# Patient Record
Sex: FEMALE | Race: WHITE | NOT HISPANIC OR LATINO | Employment: FULL TIME | URBAN - NONMETROPOLITAN AREA
[De-identification: names, ages, dates, MRNs, and addresses within clinical notes are randomized per-mention and may not be internally consistent; named-entity substitution may affect disease eponyms.]

---

## 2019-12-02 ENCOUNTER — HOSPITAL ENCOUNTER (INPATIENT)
Facility: HOSPITAL | Age: 44
LOS: 3 days | Discharge: HOME | DRG: 603 | End: 2019-12-05
Attending: EMERGENCY MEDICINE | Admitting: FAMILY MEDICINE

## 2019-12-02 ENCOUNTER — APPOINTMENT (EMERGENCY)
Dept: RADIOLOGY | Facility: HOSPITAL | Age: 44
DRG: 603 | End: 2019-12-02

## 2019-12-02 DIAGNOSIS — Z79.4 DIABETES MELLITUS TYPE 2, INSULIN DEPENDENT (HCC): ICD-10-CM

## 2019-12-02 DIAGNOSIS — S91.309A OPEN WOUND OF FOOT: Primary | ICD-10-CM

## 2019-12-02 DIAGNOSIS — R73.9 HYPERGLYCEMIA: ICD-10-CM

## 2019-12-02 DIAGNOSIS — R07.9 CHEST PAIN: ICD-10-CM

## 2019-12-02 DIAGNOSIS — R06.02 SHORTNESS OF BREATH: ICD-10-CM

## 2019-12-02 DIAGNOSIS — R10.9 ABDOMINAL PAIN, UNSPECIFIED ABDOMINAL LOCATION: ICD-10-CM

## 2019-12-02 DIAGNOSIS — E11.9 DIABETES MELLITUS TYPE 2, INSULIN DEPENDENT (HCC): ICD-10-CM

## 2019-12-02 PROBLEM — S91.302A OPEN WOUND OF LEFT FOOT: Status: ACTIVE | Noted: 2019-12-02

## 2019-12-02 LAB
ALBUMIN SERPL BCP-MCNC: 2.4 G/DL (ref 3.5–5)
ALP SERPL-CCNC: 109 U/L (ref 46–116)
ALT SERPL W P-5'-P-CCNC: 16 U/L (ref 12–78)
ANION GAP SERPL CALCULATED.3IONS-SCNC: 9 MMOL/L (ref 4–13)
AST SERPL W P-5'-P-CCNC: 12 U/L (ref 5–45)
BACTERIA UR QL AUTO: ABNORMAL /HPF
BASE EX.OXY STD BLDV CALC-SCNC: 68.9 % (ref 60–80)
BASE EXCESS BLDV CALC-SCNC: 0.2 MMOL/L
BASOPHILS # BLD AUTO: 0.02 THOUSANDS/ΜL (ref 0–0.1)
BASOPHILS NFR BLD AUTO: 0 % (ref 0–1)
BILIRUB DIRECT SERPL-MCNC: 0.17 MG/DL (ref 0–0.2)
BILIRUB SERPL-MCNC: 0.39 MG/DL (ref 0.2–1)
BILIRUB UR QL STRIP: NEGATIVE
BUN SERPL-MCNC: 15 MG/DL (ref 5–25)
CALCIUM SERPL-MCNC: 8.8 MG/DL (ref 8.3–10.1)
CHLORIDE SERPL-SCNC: 95 MMOL/L (ref 100–108)
CLARITY UR: ABNORMAL
CO2 SERPL-SCNC: 25 MMOL/L (ref 21–32)
COLOR UR: YELLOW
CREAT SERPL-MCNC: 1.14 MG/DL (ref 0.6–1.3)
CRP SERPL QL: 175.3 MG/L
EOSINOPHIL # BLD AUTO: 0.11 THOUSAND/ΜL (ref 0–0.61)
EOSINOPHIL NFR BLD AUTO: 2 % (ref 0–6)
ERYTHROCYTE [DISTWIDTH] IN BLOOD BY AUTOMATED COUNT: 13.2 % (ref 11.6–15.1)
ERYTHROCYTE [SEDIMENTATION RATE] IN BLOOD: 95 MM/HOUR (ref 0–20)
GFR SERPL CREATININE-BSD FRML MDRD: 58 ML/MIN/1.73SQ M
GLUCOSE SERPL-MCNC: 313 MG/DL (ref 65–140)
GLUCOSE SERPL-MCNC: 445 MG/DL (ref 65–140)
GLUCOSE SERPL-MCNC: 455 MG/DL (ref 65–140)
GLUCOSE SERPL-MCNC: 479 MG/DL (ref 65–140)
GLUCOSE UR STRIP-MCNC: ABNORMAL MG/DL
HCG SERPL QL: NEGATIVE
HCO3 BLDV-SCNC: 23.6 MMOL/L (ref 24–30)
HCT VFR BLD AUTO: 32.2 % (ref 34.8–46.1)
HGB BLD-MCNC: 10.8 G/DL (ref 11.5–15.4)
HGB UR QL STRIP.AUTO: ABNORMAL
IMM GRANULOCYTES # BLD AUTO: 0.07 THOUSAND/UL (ref 0–0.2)
IMM GRANULOCYTES NFR BLD AUTO: 1 % (ref 0–2)
KETONES UR STRIP-MCNC: ABNORMAL MG/DL
LACTATE SERPL-SCNC: 1.2 MMOL/L (ref 0.5–2)
LEUKOCYTE ESTERASE UR QL STRIP: ABNORMAL
LIPASE SERPL-CCNC: 81 U/L (ref 73–393)
LYMPHOCYTES # BLD AUTO: 1.36 THOUSANDS/ΜL (ref 0.6–4.47)
LYMPHOCYTES NFR BLD AUTO: 18 % (ref 14–44)
MCH RBC QN AUTO: 26.8 PG (ref 26.8–34.3)
MCHC RBC AUTO-ENTMCNC: 33.5 G/DL (ref 31.4–37.4)
MCV RBC AUTO: 80 FL (ref 82–98)
MONOCYTES # BLD AUTO: 0.55 THOUSAND/ΜL (ref 0.17–1.22)
MONOCYTES NFR BLD AUTO: 7 % (ref 4–12)
NEUTROPHILS # BLD AUTO: 5.35 THOUSANDS/ΜL (ref 1.85–7.62)
NEUTS SEG NFR BLD AUTO: 72 % (ref 43–75)
NITRITE UR QL STRIP: NEGATIVE
NON-SQ EPI CELLS URNS QL MICRO: ABNORMAL /HPF
NRBC BLD AUTO-RTO: 0 /100 WBCS
NT-PROBNP SERPL-MCNC: 185 PG/ML
O2 CT BLDV-SCNC: 11.2 ML/DL
PCO2 BLDV: 33.9 MM HG (ref 42–50)
PH BLDV: 7.46 [PH] (ref 7.3–7.4)
PH UR STRIP.AUTO: 6.5 [PH]
PLATELET # BLD AUTO: 247 THOUSANDS/UL (ref 149–390)
PMV BLD AUTO: 12 FL (ref 8.9–12.7)
PO2 BLDV: 33.9 MM HG (ref 35–45)
POTASSIUM SERPL-SCNC: 3.8 MMOL/L (ref 3.5–5.3)
PROT SERPL-MCNC: 7.6 G/DL (ref 6.4–8.2)
PROT UR STRIP-MCNC: ABNORMAL MG/DL
RBC # BLD AUTO: 4.03 MILLION/UL (ref 3.81–5.12)
RBC #/AREA URNS AUTO: ABNORMAL /HPF
SODIUM SERPL-SCNC: 129 MMOL/L (ref 136–145)
SP GR UR STRIP.AUTO: 1.01 (ref 1–1.03)
TROPONIN I SERPL-MCNC: <0.02 NG/ML
URINE COMMENT: ABNORMAL
UROBILINOGEN UR QL STRIP.AUTO: 0.2 E.U./DL
WBC # BLD AUTO: 7.46 THOUSAND/UL (ref 4.31–10.16)
WBC #/AREA URNS AUTO: ABNORMAL /HPF

## 2019-12-02 PROCEDURE — 82948 REAGENT STRIP/BLOOD GLUCOSE: CPT

## 2019-12-02 PROCEDURE — 80076 HEPATIC FUNCTION PANEL: CPT | Performed by: EMERGENCY MEDICINE

## 2019-12-02 PROCEDURE — 87205 SMEAR GRAM STAIN: CPT | Performed by: EMERGENCY MEDICINE

## 2019-12-02 PROCEDURE — 85025 COMPLETE CBC W/AUTO DIFF WBC: CPT | Performed by: EMERGENCY MEDICINE

## 2019-12-02 PROCEDURE — 96360 HYDRATION IV INFUSION INIT: CPT

## 2019-12-02 PROCEDURE — 81001 URINALYSIS AUTO W/SCOPE: CPT | Performed by: EMERGENCY MEDICINE

## 2019-12-02 PROCEDURE — 87147 CULTURE TYPE IMMUNOLOGIC: CPT | Performed by: EMERGENCY MEDICINE

## 2019-12-02 PROCEDURE — 85652 RBC SED RATE AUTOMATED: CPT | Performed by: EMERGENCY MEDICINE

## 2019-12-02 PROCEDURE — 73630 X-RAY EXAM OF FOOT: CPT

## 2019-12-02 PROCEDURE — 82805 BLOOD GASES W/O2 SATURATION: CPT | Performed by: EMERGENCY MEDICINE

## 2019-12-02 PROCEDURE — 80048 BASIC METABOLIC PNL TOTAL CA: CPT | Performed by: EMERGENCY MEDICINE

## 2019-12-02 PROCEDURE — 83880 ASSAY OF NATRIURETIC PEPTIDE: CPT | Performed by: EMERGENCY MEDICINE

## 2019-12-02 PROCEDURE — 87070 CULTURE OTHR SPECIMN AEROBIC: CPT | Performed by: EMERGENCY MEDICINE

## 2019-12-02 PROCEDURE — 93005 ELECTROCARDIOGRAM TRACING: CPT

## 2019-12-02 PROCEDURE — 96361 HYDRATE IV INFUSION ADD-ON: CPT

## 2019-12-02 PROCEDURE — 99285 EMERGENCY DEPT VISIT HI MDM: CPT | Performed by: EMERGENCY MEDICINE

## 2019-12-02 PROCEDURE — 86140 C-REACTIVE PROTEIN: CPT | Performed by: EMERGENCY MEDICINE

## 2019-12-02 PROCEDURE — 99285 EMERGENCY DEPT VISIT HI MDM: CPT

## 2019-12-02 PROCEDURE — 83690 ASSAY OF LIPASE: CPT | Performed by: EMERGENCY MEDICINE

## 2019-12-02 PROCEDURE — 36415 COLL VENOUS BLD VENIPUNCTURE: CPT | Performed by: EMERGENCY MEDICINE

## 2019-12-02 PROCEDURE — 71046 X-RAY EXAM CHEST 2 VIEWS: CPT

## 2019-12-02 PROCEDURE — 84703 CHORIONIC GONADOTROPIN ASSAY: CPT | Performed by: EMERGENCY MEDICINE

## 2019-12-02 PROCEDURE — 87040 BLOOD CULTURE FOR BACTERIA: CPT | Performed by: EMERGENCY MEDICINE

## 2019-12-02 PROCEDURE — 83605 ASSAY OF LACTIC ACID: CPT | Performed by: EMERGENCY MEDICINE

## 2019-12-02 PROCEDURE — 84484 ASSAY OF TROPONIN QUANT: CPT | Performed by: EMERGENCY MEDICINE

## 2019-12-02 RX ORDER — ONDANSETRON 2 MG/ML
4 INJECTION INTRAMUSCULAR; INTRAVENOUS EVERY 6 HOURS PRN
Status: DISCONTINUED | OUTPATIENT
Start: 2019-12-02 | End: 2019-12-05 | Stop reason: HOSPADM

## 2019-12-02 RX ORDER — GABAPENTIN 100 MG/1
200 CAPSULE ORAL 2 TIMES DAILY
Status: DISCONTINUED | OUTPATIENT
Start: 2019-12-02 | End: 2019-12-05 | Stop reason: HOSPADM

## 2019-12-02 RX ORDER — GABAPENTIN 100 MG/1
200 CAPSULE ORAL 2 TIMES DAILY
COMMUNITY
End: 2020-11-07 | Stop reason: HOSPADM

## 2019-12-02 RX ORDER — SULFAMETHOXAZOLE AND TRIMETHOPRIM 800; 160 MG/1; MG/1
1 TABLET ORAL ONCE
Status: COMPLETED | OUTPATIENT
Start: 2019-12-02 | End: 2019-12-02

## 2019-12-02 RX ORDER — INSULIN GLARGINE 100 [IU]/ML
10 INJECTION, SOLUTION SUBCUTANEOUS
Status: DISCONTINUED | OUTPATIENT
Start: 2019-12-02 | End: 2019-12-03

## 2019-12-02 RX ORDER — KETOCONAZOLE 20 MG/G
2 CREAM TOPICAL AS NEEDED
COMMUNITY
Start: 2019-03-25 | End: 2019-12-05 | Stop reason: HOSPADM

## 2019-12-02 RX ORDER — OXYCODONE HYDROCHLORIDE 5 MG/1
5 TABLET ORAL EVERY 4 HOURS PRN
Status: DISCONTINUED | OUTPATIENT
Start: 2019-12-02 | End: 2019-12-05 | Stop reason: HOSPADM

## 2019-12-02 RX ORDER — LANCETS
EACH MISCELLANEOUS
Status: ON HOLD | COMMUNITY

## 2019-12-02 RX ORDER — INSULIN GLARGINE 100 [IU]/ML
10 INJECTION, SOLUTION SUBCUTANEOUS
Status: ON HOLD | COMMUNITY
End: 2019-12-05 | Stop reason: SDUPTHER

## 2019-12-02 RX ORDER — OXYCODONE HYDROCHLORIDE 5 MG/1
10 TABLET ORAL EVERY 4 HOURS PRN
Status: DISCONTINUED | OUTPATIENT
Start: 2019-12-02 | End: 2019-12-05 | Stop reason: HOSPADM

## 2019-12-02 RX ORDER — ACETAMINOPHEN 325 MG/1
650 TABLET ORAL EVERY 4 HOURS PRN
Status: DISCONTINUED | OUTPATIENT
Start: 2019-12-02 | End: 2019-12-05 | Stop reason: HOSPADM

## 2019-12-02 RX ORDER — HYDROMORPHONE HCL/PF 1 MG/ML
0.5 SYRINGE (ML) INJECTION
Status: DISCONTINUED | OUTPATIENT
Start: 2019-12-02 | End: 2019-12-05 | Stop reason: HOSPADM

## 2019-12-02 RX ADMIN — VANCOMYCIN HYDROCHLORIDE 1250 MG: 1 INJECTION, POWDER, LYOPHILIZED, FOR SOLUTION INTRAVENOUS at 21:01

## 2019-12-02 RX ADMIN — INSULIN LISPRO 5 UNITS: 100 INJECTION, SOLUTION INTRAVENOUS; SUBCUTANEOUS at 22:39

## 2019-12-02 RX ADMIN — GABAPENTIN 200 MG: 100 CAPSULE ORAL at 22:24

## 2019-12-02 RX ADMIN — SULFAMETHOXAZOLE AND TRIMETHOPRIM 1 TABLET: 800; 160 TABLET ORAL at 18:42

## 2019-12-02 RX ADMIN — Medication 2000 MG: at 23:19

## 2019-12-02 RX ADMIN — INSULIN GLARGINE 10 UNITS: 100 INJECTION, SOLUTION SUBCUTANEOUS at 22:25

## 2019-12-02 RX ADMIN — SODIUM CHLORIDE 1000 ML: 0.9 INJECTION, SOLUTION INTRAVENOUS at 18:27

## 2019-12-02 NOTE — ED PROVIDER NOTES
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, but still has open surgical site  PCP told pt to come to ER d/t symptoms concerned for infection  Pt also reporting uncontrolled sugars at home  67-year-old female with past medical history pertinent for Charcot foot, diabetes who presents to the emergency department for evaluation of worsening foot pain and concern for infection since Saturday  Patient also reports having chest pain that is worse with coughing along with shortness of breath  Patient reports abdominal pain as well as periumbilical and nonradiating  No nausea, vomiting or diarrhea  Normal bowel movements  Patient had a meal just prior to arrival   Patient reports that she did not take her insulin this evening  Patient is concerned about her Charcot foot which had surgery in April and states that the surgical site is still open with foul odor and drainage  Patient reports that she has uncontrolled sugars at home  Patient reports in the past she has been allergic to clindamycin and has had to have IV antibiotics  Denies fevers or chills  No URI symptoms  No other concerns        History provided by:  Patient and spouse  Chest Pain   Pain location:  Substernal area  Pain quality: aching    Pain radiates to:  Does not radiate  Pain radiates to the back: no    Pain severity:  No pain (Unless coughing)  Onset quality:  Unable to specify  Timing:  Sporadic  Progression:  Worsening  Relieved by:  Nothing  Worsened by:  Nothing tried  Ineffective treatments:  None tried  Associated symptoms: abdominal pain, cough and shortness of breath    Associated symptoms: no back pain, no dizziness, no fatigue, no fever, no headache, no lower extremity edema, no nausea, no numbness, no palpitations, not vomiting and no weakness    Abdominal pain:     Location:  Periumbilical    Quality:  Dull    Severity:  Mild    Onset quality:  Gradual    Timing:  Constant    Progression:  Worsening      Prior to Admission Medications   Prescriptions Last Dose Informant Patient Reported? Taking? ACCU-CHEK FASTCLIX LANCETS MISC   Yes Yes   Sig: by Does not apply route   Syringe/Needle, Disp, 30G X 1/2" 1 ML MISC   Yes Yes   Sig: by Does not apply route   gabapentin (NEURONTIN) 100 mg capsule   Yes No   Sig: Take 200 mg by mouth 2 (two) times a day   insulin glargine (LANTUS) 100 units/mL subcutaneous injection   Yes Yes   Sig: Inject 10 Units under the skin daily at bedtime   insulin glulisine (APIDRA) 100 units/mL injection   Yes No   Sig: Inject 100 Units under the skin   ketoconazole (NIZORAL) 2 % cream   Yes Yes   Sig: Apply 2 g topically as needed      Facility-Administered Medications: None       Past Medical History:   Diagnosis Date    Charcot's joint of foot, left     Diabetes mellitus ([facility] [address] )        History reviewed  No pertinent surgical history  History reviewed  No pertinent family history  I have reviewed and agree with the history as documented  Social History     Tobacco Use    Smoking status: Never Smoker    Smokeless tobacco: Never Used   Substance Use Topics    Alcohol use: Never     Frequency: Never    Drug use: Never        Review of Systems   Constitutional: Negative for activity change, appetite change, chills, fatigue and fever  HENT: Negative for congestion, ear discharge, rhinorrhea and sore throat  Eyes: Negative for visual disturbance  Respiratory: Positive for cough and shortness of breath  Negative for chest tightness and wheezing  Cardiovascular: Positive for chest pain  Negative for palpitations and leg swelling  Gastrointestinal: Positive for abdominal pain  Negative for constipation, diarrhea, nausea and vomiting  Endocrine: Positive for polyuria  Genitourinary: Positive for vaginal bleeding  Negative for dysuria, flank pain, pelvic pain and vaginal pain  Musculoskeletal: Negative for arthralgias, back pain, gait problem, joint swelling, neck pain and neck stiffness  Skin: Positive for wound  Negative for rash  With drainage on the left lateral side of left lower extremity foot   Neurological: Negative for dizziness, weakness, numbness and headaches  Psychiatric/Behavioral: Negative for behavioral problems  Physical Exam  Physical Exam   Constitutional: She is oriented to person, place, and time  She appears well-developed and well-nourished  No distress  HENT:   Head: Normocephalic and atraumatic  Right Ear: External ear normal    Left Ear: External ear normal    Nose: Nose normal    Mouth/Throat: Oropharynx is clear and moist    Eyes: Pupils are equal, round, and reactive to light  EOM are normal    Neck: Normal range of motion  Neck supple  Cardiovascular: Regular rhythm, normal heart sounds and intact distal pulses  Tachycardia present  Mild tachycardia to the 110s   Pulmonary/Chest: Effort normal and breath sounds normal  No respiratory distress  She has no wheezes  She has no rales  Abdominal: Soft  Bowel sounds are normal  There is no tenderness  Negative Mccarthy's sign   Negative McBurney's sign   Musculoskeletal: Normal range of motion  She exhibits no edema, tenderness or deformity  Neurological: She is alert and oriented to person, place, and time  She exhibits normal muscle tone  Skin: Skin is warm and dry  Capillary refill takes less than 2 seconds  She is not diaphoretic  Left foot:  1 5 cm diameter circular wound on the lateral aspect of patient's left mid foot with purulent drainage and odor noted with surrounding warmth and mild erythema  Mild swelling notable in comparison to the right lower extremity  Right foot:  Amputated digit noted  Psychiatric: She has a normal mood and affect     Nursing note and vitals reviewed  Vital Signs  ED Triage Vitals [12/02/19 1722]   Temperature Pulse Respirations Blood Pressure SpO2   99 2 °F (37 3 °C) (!) 110 18 157/80 98 %      Temp Source Heart Rate Source Patient Position - Orthostatic VS BP Location FiO2 (%)   Oral Monitor Sitting Right arm --      Pain Score       9           Vitals:    12/02/19 1722 12/02/19 1842 12/02/19 1957   BP: 157/80 132/67 148/77   Pulse: (!) 110 92 91   Patient Position - Orthostatic VS: Sitting Lying Sitting         Visual Acuity      ED Medications  Medications   vancomycin (VANCOCIN) 1,250 mg in sodium chloride 0 9 % 500 mL IVPB (has no administration in time range)   sodium chloride 0 9 % bolus 1,000 mL (1,000 mL Intravenous New Bag 12/2/19 1827)   sulfamethoxazole-trimethoprim (BACTRIM DS) 800-160 mg per tablet 1 tablet (1 tablet Oral Given 12/2/19 1842)       Diagnostic Studies  Results Reviewed     Procedure Component Value Units Date/Time    Wound culture and Gram stain [[id number]] Collected:  12/02/19 1945    Lab Status:   In process Specimen:  Wound from Foot, Left Updated:  12/02/19 1954    Sedimentation rate, automated [[id number]]  (Abnormal) Collected:  12/02/19 1736    Lab Status:  Final result Specimen:  Blood from Arm, Right Updated:  12/02/19 1949     Sed Rate 95 mm/hour     Fingerstick Glucose (POCT) [[id number]]  (Abnormal) Collected:  12/02/19 1933    Lab Status:  Final result Updated:  12/02/19 1935     POC Glucose 445 mg/dl     Urine Microscopic [[id number]]  (Abnormal) Collected:  12/02/19 1835    Lab Status:  Final result Specimen:  Urine Updated:  12/02/19 1901     RBC, UA 2-4 /hpf      WBC, UA 2-4 /hpf      Epithelial Cells Occasional /hpf      Bacteria, UA Occasional /hpf      URINE COMMENT Yeast present 1+    UA w Reflex to Microscopic w Reflex to Culture [[id number]]  (Abnormal) Collected:  12/02/19 1835    Lab Status:  Final result Specimen:  Urine Updated:  12/02/19 1845     Color, UA Yellow     Clarity, UA Slightly Cloudy     Specific Adams, UA 1 010     pH, UA 6 5     Leukocytes, UA Elevated glucose may cause decreased leukocyte values  See urine microscopic for [facility] result/     Nitrite, UA Negative     Protein, UA 30 (1+) mg/dl      Glucose, UA >=1000 (1%) mg/dl      Ketones, UA Trace mg/dl      Urobilinogen, UA 0 2 E U /dl      Bilirubin, UA Negative     Blood, UA Small    C-reactive protein [[id number]]  (Abnormal) Collected:  12/02/19 1736    Lab Status:  Final result Specimen:  Blood from Arm, Right Updated:  12/02/19 1841      3 mg/L     Troponin I [[id number]]  (Normal) Collected:  12/02/19 1736    Lab Status:  Final result Specimen:  Blood from Arm, Right Updated:  12/02/19 1839     Troponin I <0 02 ng/mL     Blood culture #2 [[id number]] Collected:  12/02/19 1827    Lab Status: In process Specimen:  Blood from Arm, Right Updated:  12/02/19 1833    Blood culture #1 [[id number]] Collected:  12/02/19 1828    Lab Status:   In process Specimen:  Blood from Arm, Left Updated:  12/02/19 1833    NT-BNP PRO [[id number]]  (Abnormal) Collected:  12/02/19 1736    Lab Status:  Final result Specimen:  Blood from Arm, Right Updated:  12/02/19 1821     NT-proBNP 185 pg/mL     hCG, qualitative pregnancy [[id number]]  (Normal) Collected:  12/02/19 1738    Lab Status:  Final result Specimen:  Blood from Arm, Right Updated:  12/02/19 1817     Preg, Serum Negative    Basic metabolic panel [[id number]]  (Abnormal) Collected:  12/02/19 1736    Lab Status:  Final result Specimen:  Blood from Arm, Right Updated:  12/02/19 1813     Sodium 129 mmol/L      Potassium 3 8 mmol/L      Chloride 95 mmol/L      CO2 25 mmol/L      ANION GAP 9 mmol/L      BUN 15 mg/dL      Creatinine 1 14 mg/dL      Glucose 455 mg/dL      Calcium 8 8 mg/dL      eGFR 58 ml/min/1 73sq m     Narrative:       [place] guidelines for Chronic Kidney Disease (CKD):     Stage 1 with normal or high GFR (GFR > 90 mL/min/1 73 square meters)    Stage 2 Mild CKD (GFR = 60-89 mL/min/1 73 square meters)    Stage 3A Moderate CKD (GFR = 45-59 mL/min/1 73 square meters)    Stage 3B Moderate CKD (GFR = 30-44 mL/min/1 73 square meters)    Stage 4 Severe CKD (GFR = 15-29 mL/min/1 73 square meters)    Stage 5 End Stage CKD (GFR <15 mL/min/1 73 square meters)  Note: GFR calculation is accurate only with a steady state creatinine    Hepatic function panel [[id number]]  (Abnormal) Collected:  12/02/19 1736    Lab Status:  Final result Specimen:  Blood from Arm, Right Updated:  12/02/19 1811     Total Bilirubin 0 39 mg/dL      Bilirubin, Direct 0 17 mg/dL      Alkaline Phosphatase 109 U/L      AST 12 U/L      ALT 16 U/L      Total Protein 7 6 g/dL      Albumin 2 4 g/dL     Lipase [[id number]]  (Normal) Collected:  12/02/19 1736    Lab Status:  Final result Specimen:  Blood from Arm, Right Updated:  12/02/19 1811     Lipase 81 u/L     Lactic acid, plasma [[id number]]  (Normal) Collected:  12/02/19 1736    Lab Status:  Final result Specimen:  Blood from Arm, Right Updated:  12/02/19 1811     LACTIC ACID 1 2 mmol/L     Narrative:       Result may be elevated if tourniquet was used during collection      CBC and differential [[id number]]  (Abnormal) Collected:  12/02/19 1736    Lab Status:  Final result Specimen:  Blood from Arm, Right Updated:  12/02/19 1749     WBC 7 46 Thousand/uL      RBC 4 03 Million/uL      Hemoglobin 10 8 g/dL      Hematocrit 32 2 %      MCV 80 fL      MCH 26 8 pg      MCHC 33 5 g/dL      RDW 13 2 %      MPV 12 0 fL      Platelets 609 Thousands/uL      nRBC 0 /100 WBCs      Neutrophils Relative 72 %      Immat GRANS % 1 %      Lymphocytes Relative 18 %      Monocytes Relative 7 %      Eosinophils Relative 2 %      Basophils Relative 0 %      Neutrophils Absolute 5 35 Thousands/µL      Immature Grans Absolute 0 07 Thousand/uL      Lymphocytes Absolute 1 36 Thousands/µL      Monocytes Absolute 0 55 Thousand/µL      Eosinophils Absolute 0 11 Thousand/µL      Basophils Absolute 0 02 Thousands/µL     Blood gas, venous [[id number]]  (Abnormal) Collected:  12/02/19 1736    Lab Status:  Final result Specimen:  Blood from Arm, Right Updated:  12/02/19 1749     pH, Isaias 7 460     pCO2, Isaias 33 9 mm Hg      pO2, Isaias 33 9 mm Hg      HCO3, Isaias 23 6 mmol/L      Base Excess, Isaias 0 2 mmol/L      O2 Content, Isaias 11 2 ml/dL      O2 HGB, VENOUS 68 9 %     Fingerstick Glucose (POCT) [[id number]]  (Abnormal) Collected:  12/02/19 1736    Lab Status:  Final result Updated:  12/02/19 1737     POC Glucose 479 mg/dl                  XR chest 2 views    (Results Pending)   XR foot 3+ views LEFT    (Results Pending)   Left foot noted to have inflammation around the site of the wound on x-ray  No obvious osteomyelitis currently  CXR without infiltrate               Procedures  ECG 12 Lead Documentation Only  Date/Time: 12/2/2019 5:30 PM  Performed by: Kailee Mcdermott MD  Authorized by: Kailee Mcdermott MD     Indications / Diagnosis:  Chest pain  ECG reviewed by me, the ED Provider: yes    Patient location:  ED  Previous ECG:     Previous ECG:  Unavailable    Comparison to cardiac monitor: Yes    Interpretation:     Interpretation: abnormal    Rate:     ECG rate:  102    ECG rate assessment: tachycardic    Rhythm:     Rhythm: sinus tachycardia    Ectopy:     Ectopy: none    QRS:     QRS axis:  Normal  Conduction:     Conduction: normal    ST segments:     ST segments:  Normal  T waves:     T waves: normal               ED Course                               MDM  Number of Diagnoses or Management Options  Abdominal pain, unspecified abdominal location:   Chest pain:   Open wound of foot:   Shortness of breath:   Diagnosis management comments: 40-year-old female with past medical history pertinent for Charcot foot, diabetes who presents to the emergency department for evaluation of worsening foot pain and concern for infection since Saturday, along with chest pain, abdominal pain and shortness of breath  Fingerstick blood sugar level on arrival was noted be 409  EKG obtained on arrival did not show any signs of ischemia  Patient had multiple complaints including abdominal pain, chest pain and shortness of breath occluding infection of her left foot  Left foot was noted to have pus and drainage as noted above  Otherwise patient's exam was non concerning over her chest, abdomen or lungs  Labs were obtained and patient was started on IV fluids for her elevated blood sugar level  Bactrim was started for patient's possible foot infection after blood cultures were obtained as patient reports that she is allergic to clindamycin  Results of labs showed elevated CRP of 175 with an elevated sed rate of 95  There is no significant leukocytosis  The sodium was noted to be 129 though likely secondary to patient's hyperglycemia  Given abnormal CRP and sed rate, patient was started on vancomycin and had wound cultures obtained  Troponin and abdominal labs were nonconcerning  Chest x-ray showed no infiltrate  Blood sugar levels did decrease after IV fluids were given  Foot x-ray showed inflammation without any obvious osteomyelitis  Patient was discussed with hospitalist for admission given abnormal inflammatory labs and possible need for orthopedic evaluation of patient's wound  Patient was agreeable to plan of admission at this time         Amount and/or Complexity of Data Reviewed  Clinical lab tests: ordered and reviewed  Tests in the radiology section of CPT®: ordered and reviewed  Tests in the medicine section of CPT®: ordered and reviewed  Discussion of test results with the performing providers: yes  Decide to obtain previous medical records or to obtain history from someone other than the patient: yes  Obtain history from someone other than the patient: yes  Review and summarize past medical records: yes  Discuss the patient with other providers: yes  Independent visualization of images, tracings, or specimens: yes    Patient Progress  Patient progress: improved        Disposition  Final diagnoses:   Open wound of foot   Chest pain   Abdominal pain, unspecified abdominal location   Shortness of breath   Hyperglycemia     Time reflects when diagnosis was documented in both MDM as applicable and the Disposition within this note     Time User Action Codes Description Comment    12/2/2019  7:56 PM Papo Hardy [address] Open wound of foot     12/2/2019  7:56 PM Han Velasquez N Add [R07 9] Chest pain     12/2/2019  7:56 PM Han Velasquez N Add [R10 9] Abdominal pain, unspecified abdominal location     12/2/2019  7:56 PM Marianna Eva N Add [R06 02] Shortness of breath     12/2/2019  8:02 PM Marina Hernandez Add [R73 9] Hyperglycemia       ED Disposition     ED Disposition Condition Date/Time Comment    Admit Stable Mon Dec 2, 2019  8:03 PM Case was discussed with Dr Kd Huber and the patient's admission status was agreed to be Admission Status: inpatient status to the service of Dr Kd Huber  Follow-up Information    None         Patient's Medications   Discharge Prescriptions    No medications on file     No discharge procedures on file      ED Provider  Electronically Signed by MD Salma Miramontes MD  12/02/19 2006

## 2019-12-02 NOTE — LETTER
[facility]  [address]  [facility] [address]  Dept: [phone] December 5, 2019     Patient: Geovanna Ziegler   YOB: 1975   Date of Visit: 12/2/2019       To Whom it May Concern:    Jimy Ponce is under my professional care  She was seen in the hospital from 12/2/2019   to 12/05/19  She may return to work on 12/9/19 with the following limitations Nonweightbearing left lower extremity  If you have any questions or concerns, please don't hesitate to call           Sincerely,          Dela Rubinstein, MD

## 2019-12-03 ENCOUNTER — APPOINTMENT (INPATIENT)
Dept: MRI IMAGING | Facility: HOSPITAL | Age: 44
DRG: 603 | End: 2019-12-03

## 2019-12-03 LAB
ANION GAP SERPL CALCULATED.3IONS-SCNC: 8 MMOL/L (ref 4–13)
ATRIAL RATE: 102 BPM
BASOPHILS # BLD AUTO: 0.03 THOUSANDS/ΜL (ref 0–0.1)
BASOPHILS NFR BLD AUTO: 1 % (ref 0–1)
BUN SERPL-MCNC: 11 MG/DL (ref 5–25)
CALCIUM SERPL-MCNC: 8.4 MG/DL (ref 8.3–10.1)
CHLORIDE SERPL-SCNC: 104 MMOL/L (ref 100–108)
CO2 SERPL-SCNC: 24 MMOL/L (ref 21–32)
CREAT SERPL-MCNC: 0.84 MG/DL (ref 0.6–1.3)
EOSINOPHIL # BLD AUTO: 0.25 THOUSAND/ΜL (ref 0–0.61)
EOSINOPHIL NFR BLD AUTO: 4 % (ref 0–6)
ERYTHROCYTE [DISTWIDTH] IN BLOOD BY AUTOMATED COUNT: 13.3 % (ref 11.6–15.1)
EST. AVERAGE GLUCOSE BLD GHB EST-MCNC: 301 MG/DL
GFR SERPL CREATININE-BSD FRML MDRD: 84 ML/MIN/1.73SQ M
GLUCOSE SERPL-MCNC: 241 MG/DL (ref 65–140)
GLUCOSE SERPL-MCNC: 261 MG/DL (ref 65–140)
GLUCOSE SERPL-MCNC: 287 MG/DL (ref 65–140)
GLUCOSE SERPL-MCNC: 307 MG/DL (ref 65–140)
GLUCOSE SERPL-MCNC: 309 MG/DL (ref 65–140)
HBA1C MFR BLD: 12.1 % (ref 4.2–6.3)
HCT VFR BLD AUTO: 31.1 % (ref 34.8–46.1)
HGB BLD-MCNC: 10.2 G/DL (ref 11.5–15.4)
IMM GRANULOCYTES # BLD AUTO: 0.08 THOUSAND/UL (ref 0–0.2)
IMM GRANULOCYTES NFR BLD AUTO: 1 % (ref 0–2)
LYMPHOCYTES # BLD AUTO: 1.42 THOUSANDS/ΜL (ref 0.6–4.47)
LYMPHOCYTES NFR BLD AUTO: 24 % (ref 14–44)
MCH RBC QN AUTO: 26.5 PG (ref 26.8–34.3)
MCHC RBC AUTO-ENTMCNC: 32.8 G/DL (ref 31.4–37.4)
MCV RBC AUTO: 81 FL (ref 82–98)
MONOCYTES # BLD AUTO: 0.54 THOUSAND/ΜL (ref 0.17–1.22)
MONOCYTES NFR BLD AUTO: 9 % (ref 4–12)
NEUTROPHILS # BLD AUTO: 3.51 THOUSANDS/ΜL (ref 1.85–7.62)
NEUTS SEG NFR BLD AUTO: 61 % (ref 43–75)
NRBC BLD AUTO-RTO: 0 /100 WBCS
P AXIS: 71 DEGREES
PLATELET # BLD AUTO: 236 THOUSANDS/UL (ref 149–390)
PMV BLD AUTO: 11.7 FL (ref 8.9–12.7)
POTASSIUM SERPL-SCNC: 3.5 MMOL/L (ref 3.5–5.3)
PR INTERVAL: 130 MS
QRS AXIS: 77 DEGREES
QRSD INTERVAL: 84 MS
QT INTERVAL: 338 MS
QTC INTERVAL: 440 MS
RBC # BLD AUTO: 3.85 MILLION/UL (ref 3.81–5.12)
SODIUM SERPL-SCNC: 136 MMOL/L (ref 136–145)
T WAVE AXIS: 47 DEGREES
VENTRICULAR RATE: 102 BPM
WBC # BLD AUTO: 5.83 THOUSAND/UL (ref 4.31–10.16)

## 2019-12-03 PROCEDURE — 99232 SBSQ HOSP IP/OBS MODERATE 35: CPT | Performed by: FAMILY MEDICINE

## 2019-12-03 PROCEDURE — A9585 GADOBUTROL INJECTION: HCPCS | Performed by: PODIATRIST

## 2019-12-03 PROCEDURE — 80048 BASIC METABOLIC PNL TOTAL CA: CPT | Performed by: NURSE PRACTITIONER

## 2019-12-03 PROCEDURE — 83036 HEMOGLOBIN GLYCOSYLATED A1C: CPT | Performed by: NURSE PRACTITIONER

## 2019-12-03 PROCEDURE — 85025 COMPLETE CBC W/AUTO DIFF WBC: CPT | Performed by: NURSE PRACTITIONER

## 2019-12-03 PROCEDURE — 82948 REAGENT STRIP/BLOOD GLUCOSE: CPT

## 2019-12-03 PROCEDURE — 73720 MRI LWR EXTREMITY W/O&W/DYE: CPT

## 2019-12-03 RX ORDER — INSULIN GLARGINE 100 [IU]/ML
12 INJECTION, SOLUTION SUBCUTANEOUS
Status: DISCONTINUED | OUTPATIENT
Start: 2019-12-03 | End: 2019-12-04

## 2019-12-03 RX ADMIN — GABAPENTIN 200 MG: 100 CAPSULE ORAL at 08:06

## 2019-12-03 RX ADMIN — Medication 2000 MG: at 11:07

## 2019-12-03 RX ADMIN — ENOXAPARIN SODIUM 40 MG: 40 INJECTION SUBCUTANEOUS at 08:06

## 2019-12-03 RX ADMIN — INSULIN GLARGINE 12 UNITS: 100 INJECTION, SOLUTION SUBCUTANEOUS at 22:49

## 2019-12-03 RX ADMIN — VANCOMYCIN HYDROCHLORIDE 1500 MG: 1 INJECTION, POWDER, LYOPHILIZED, FOR SOLUTION INTRAVENOUS at 08:11

## 2019-12-03 RX ADMIN — INSULIN LISPRO 3 UNITS: 100 INJECTION, SOLUTION INTRAVENOUS; SUBCUTANEOUS at 18:48

## 2019-12-03 RX ADMIN — VANCOMYCIN HYDROCHLORIDE 1500 MG: 1 INJECTION, POWDER, LYOPHILIZED, FOR SOLUTION INTRAVENOUS at 20:28

## 2019-12-03 RX ADMIN — OXYCODONE HYDROCHLORIDE 10 MG: 10 TABLET ORAL at 08:04

## 2019-12-03 RX ADMIN — INSULIN LISPRO 4 UNITS: 100 INJECTION, SOLUTION INTRAVENOUS; SUBCUTANEOUS at 11:01

## 2019-12-03 RX ADMIN — INSULIN LISPRO 4 UNITS: 100 INJECTION, SOLUTION INTRAVENOUS; SUBCUTANEOUS at 17:47

## 2019-12-03 RX ADMIN — GABAPENTIN 200 MG: 100 CAPSULE ORAL at 17:52

## 2019-12-03 RX ADMIN — GADOBUTROL 10 ML: 604.72 INJECTION INTRAVENOUS at 10:24

## 2019-12-03 RX ADMIN — INSULIN LISPRO 3 UNITS: 100 INJECTION, SOLUTION INTRAVENOUS; SUBCUTANEOUS at 07:52

## 2019-12-03 RX ADMIN — COLLAGENASE SANTYL: 250 OINTMENT TOPICAL at 20:14

## 2019-12-03 RX ADMIN — Medication 2000 MG: at 22:50

## 2019-12-03 RX ADMIN — INSULIN LISPRO 4 UNITS: 100 INJECTION, SOLUTION INTRAVENOUS; SUBCUTANEOUS at 22:49

## 2019-12-03 NOTE — PROGRESS NOTES
Pt away at MRI upon time of pt interview  Per RN, pt has good intake of meals while hospitalized  No family available at time of visit  Per wound, will add David BID  Will obtain verbal and diet wt hx as pt is available upon follow up  Per wt hx, pt had nonsignificant wt loss x 6 mo, will monitor  Would benefit from DM ed  Will consider banatrol if pt experiences diarrhea from antibiotic treatment  Will monitor I/O, labs  Provide diet ed as pt is available/ as appropriate

## 2019-12-03 NOTE — H&P
[address] [facility]    H&P- [place] 1975, 38 y o  female MRN: [id number]    Unit/Bed#: -[facility] Encounter: [id number]    Primary Care Provider: Kimber Dumont MD   Date and time admitted to hospital: 12/2/2019  5:17 PM        * Open wound of left foot  Assessment & Plan      · Wound opened on Saturday with purulent drainage and foul odor  · Had surgery for Charcot foot in April, opened wound is at the location of small surgical site  · Follows with Dr Louisa Winter of [place] [facility]  ·  3  · In the ER received Bactrim and Vanco  · Continue vanco  · Start cefepime and de-escalate pending culture results  · MRI of foot with contrast  · Wound care consult  · Podiatry consult    Diabetes mellitus type 2, insulin dependent ([facility] )  Assessment & Plan  No results found for: HGBA1C    Recent Labs     12/02/19  1736 12/02/19  1933   POCGLU 479* 445*       Blood Sugar Average: Last 72 hrs:  (P) 462     · Glucose 462  · Patient says sugars have been running high lately, this could be due to infection  · Diabetic diet  · Fingerstick blood sugars with sliding scale coverage  · Continue home Lantus  · Hold home Apidra  · Check HGB A1c        VTE Prophylaxis: Enoxaparin (Lovenox)  / sequential compression device   Code Status:  Full  POLST: POLST form is not discussed and not completed at this time  Discussion with family:  Patient    Anticipated Length of Stay:  Patient will be admitted on an Inpatient basis with an anticipated length of stay of  greater than 2 midnights  Justification for Hospital Stay:  As described in plan above    Total Time for Visit, including Counseling / Coordination of Care: 30 minutes  Greater than 50% of this total time spent on direct patient counseling and coordination of care      Chief Complaint:   Left foot wound    History of Present Illness:    Jimy Reyes is a 38 y o  female with a history of type 2 insulin-dependent diabetes and Charcot foot who presents with a wound that opened on the bottom of her left foot on Saturday  She said that she had a surgery for Charcot foot in April, and there had been a small healing surgical wound at the site, but it has also felt tender, like a bruise  On Saturday when it opened she said it drained yellow fluid and smelled like a dead animal   She says that it is hot around the area, and she has been nauseous  She denies fever or chills  She also endorses a cough  She says that her sugars have also been running high  She has had the cough for several days, it is nonproductive and it is associated with mid chest nonradiating pain, which is only when she coughs  She says that she has been nauseous, however her appetite has not been affected  Review of Systems:    Review of Systems   Constitutional: Negative for chills and fever  Respiratory: Positive for cough  Negative for shortness of breath  Cardiovascular: Positive for chest pain  Negative for palpitations and leg swelling  Gastrointestinal: Positive for nausea  Negative for abdominal distention, abdominal pain, constipation, diarrhea and vomiting  Genitourinary: Negative for dysuria and frequency  Musculoskeletal: Negative for arthralgias and myalgias  Skin: Positive for wound  Neurological: Negative for dizziness, syncope, weakness and headaches  All other systems reviewed and are negative  Past Medical and Surgical History:     Past Medical History:   Diagnosis Date    Charcot's joint of foot, left     [facility] mellitus ([facility] )        History reviewed  No pertinent surgical history  Meds/Allergies:    Prior to Admission medications    Medication Sig Start Date End Date Taking?  Authorizing Provider   ACCU-CHEK FASTCLIX LANCETS MISC by Does not apply route   Yes Historical Provider, MD   insulin glargine (LANTUS) 100 units/mL subcutaneous injection Inject 10 Units under the skin daily at bedtime   Yes Historical Provider, MD ketoconazole (NIZORAL) 2 % cream Apply 2 g topically as needed 3/25/19  Yes Historical Provider, MD   Syringe/Needle, Disp, 30G X 1/2" 1 ML MISC by Does not apply route   Yes Historical Provider, MD   gabapentin (NEURONTIN) 100 mg capsule Take 200 mg by mouth 2 (two) times a day    Historical Provider, MD   insulin glulisine (APIDRA) 100 units/mL injection Inject 100 Units under the skin    Historical Provider, MD     I have reviewed home medications with patient personally  Allergies: Allergies   Allergen Reactions    Clindamycin Other (See Comments)     CHEST PRESSURE, FEELS LIKE A HEART ATTACK PER PT       Social History:     Marital Status: /Civil Union   Occupation:  Caregiver  Patient Pre-hospital Living Situation:  Home  Patient Pre-hospital Level of Mobility:  Independent  Patient Pre-hospital Diet Restrictions:  Diabetic  Substance Use History:   Social History     Substance and Sexual Activity   Alcohol Use Never    Frequency: Never     Social History     Tobacco Use   Smoking Status Never Smoker   Smokeless Tobacco Never Used     Social History     Substance and Sexual Activity   Drug Use Never       Family History:    History reviewed  No pertinent family history  Physical Exam:     Vitals:   Blood Pressure: 114/70 (12/02/19 2304)  Pulse: 81 (12/02/19 2304)  Temperature: 98 7 °F (37 1 °C) (12/02/19 2304)  Temp Source: Oral (12/02/19 2136)  Respirations: 18 (12/02/19 2304)  Height: 5' 4" (162 6 cm) (12/02/19 2144)  Weight - Scale: 104 kg (228 lb 13 4 oz) (12/02/19 2144)  SpO2: 95 % (12/02/19 2304)    Physical Exam   Constitutional: She is oriented to person, place, and time  She appears well-developed and well-nourished  HENT:   Head: Normocephalic and atraumatic  Mouth/Throat: Oropharynx is clear and moist    Eyes: Pupils are equal, round, and reactive to light  EOM are normal    Neck: Normal range of motion  Neck supple     Cardiovascular: Normal rate, regular rhythm, normal heart sounds and intact distal pulses  Exam reveals no gallop and no friction rub  No murmur heard  Pulmonary/Chest: Effort normal and breath sounds normal  No respiratory distress  Abdominal: Soft  Bowel sounds are normal  She exhibits no distension and no mass  There is no tenderness  There is no guarding  Musculoskeletal: Normal range of motion  She exhibits no edema, tenderness or deformity  Right 1st toe amputation and Charcot foot   Neurological: She is alert and oriented to person, place, and time  Skin: Skin is warm and dry  Capillary refill takes less than 2 seconds  Left foot wound 1 5 cm round, with surrounding redness and heat, tenderness, and yellow drainage and foul odor   Nursing note and vitals reviewed  Additional Data:     Lab Results: I have personally reviewed pertinent reports  Results from last 7 days   Lab Units 12/02/19  1736   WBC Thousand/uL 7 46   HEMOGLOBIN g/dL 10 8*   HEMATOCRIT % 32 2*   PLATELETS Thousands/uL 247   NEUTROS PCT % 72   LYMPHS PCT % 18   MONOS PCT % 7   EOS PCT % 2     Results from last 7 days   Lab Units 12/02/19  1736   SODIUM mmol/L 129*   POTASSIUM mmol/L 3 8   CHLORIDE mmol/L 95*   CO2 mmol/L 25   BUN mg/dL 15   CREATININE mg/dL 1 14   ANION GAP mmol/L 9   CALCIUM mg/dL 8 8   ALBUMIN g/dL 2 4*   TOTAL BILIRUBIN mg/dL 0 39   ALK PHOS U/L 109   ALT U/L 16   AST U/L 12   GLUCOSE RANDOM mg/dL 455*         Results from last 7 days   Lab Units 12/02/19  2217 12/02/19  1933 12/02/19  1736   POC GLUCOSE mg/dl 313* 445* 479*         Results from last 7 days   Lab Units 12/02/19  1736   LACTIC ACID mmol/L 1 2       Imaging: I have personally reviewed pertinent reports        XR chest 2 views    (Results Pending)   XR foot 3+ views LEFT    (Results Pending)   MRI inpatient order    (Results Pending)       EKG, Pathology, and Other Studies Reviewed on Admission:   · EKG:     Allscripts / Epic Records Reviewed: Yes     ** Please Note: This note has been constructed using a voice recognition system   **

## 2019-12-03 NOTE — PROGRESS NOTES
Progress Note - Alyx Breaker 1975, 38 y o  female MRN: [id number]    Unit/Bed#: -01 Encounter: [id number]    Primary Care Provider: Armida Boss MD   Date and time admitted to hospital: 12/2/2019  5:17 PM        Diabetes mellitus type 2, insulin dependent [facility])  Assessment & Plan  Lab Results   Component Value Date    HGBA1C 12 1 (H) 12/03/2019       Recent Labs     12/02/19  2217 12/03/19  0722 12/03/19  1057 12/03/19  1550   POCGLU 313* 261* 309* 287*       Blood Sugar Average: Last 72 hrs:  (P) 349     · Glucose 462  · Patient says sugars have been running high lately, this could be due to infection  · Diabetic diet  · Fingerstick blood sugars with sliding scale coverage  · Continue home Lantus  · Insulin sliding scale  · Hemoglobin A1c is above 12 showing chronic the elevated blood sugar      * Open wound of left foot  Assessment & Plan      · Wound opened on Saturday with purulent drainage and foul odor  · Had surgery for Charcot foot in April, opened wound is at the location of small surgical site  · Follows with Dr Raul Persaud of [place] [facility]  ·  3  · Continue with the cefepime and vancomycin and follow-up on the cultures  · MRI did not show any evidence of abscess or osteomyelitis  · Surgery evaluation appreciated        VTE Pharmacologic Prophylaxis:   Pharmacologic: Enoxaparin (Lovenox)  Mechanical VTE Prophylaxis in Place: Yes    Patient Centered Rounds: I have performed bedside rounds with nursing staff today  Discussions with Specialists or Other Care Team Provider:     Education and Discussions with Family / Patient:     Time Spent for Care: 30 minutes  More than 50% of total time spent on counseling and coordination of care as described above      Current Length of Stay: 1 day(s)    Current Patient Status: Inpatient   Certification Statement: The patient will continue to require additional inpatient hospital stay due to Pending wound care    Discharge Plan:     Code Status: Level 1 - Full Code      Subjective:   Patient seen and examined  No specific complaints offered    Objective:     Vitals:   Temp (24hrs), Av 5 °F (36 9 °C), Min:98 4 °F (36 9 °C), Max:98 7 °F (37 1 °C)    Temp:  [98 4 °F (36 9 °C)-98 7 °F (37 1 °C)] 98 4 °F (36 9 °C)  HR:  [81-94] 89  Resp:  [16-20] 18  BP: (114-156)/(67-83) 118/68  SpO2:  [95 %-98 %] 96 %  Body mass index is 39 28 kg/m²  Input and Output Summary (last 24 hours): Intake/Output Summary (Last 24 hours) at 12/3/2019 1822  Last data filed at 12/3/2019 1401  Gross per 24 hour   Intake 2127 87 ml   Output    Net 2127 87 ml       Physical Exam:     Physical Exam   Constitutional: She appears well-developed  No distress  HENT:   Head: Normocephalic and atraumatic  Eyes: Right eye exhibits no discharge  Neck: No JVD present  Cardiovascular: Normal rate and regular rhythm  No murmur heard  Pulmonary/Chest: Effort normal and breath sounds normal  No stridor  No respiratory distress  Abdominal: Soft  Bowel sounds are normal  She exhibits no distension  Musculoskeletal: Normal range of motion  She exhibits no edema  Lower extremity wound covered in dressing  Drainage noted on opening the dressing   Neurological: She is alert  No cranial nerve deficit  Coordination normal    Skin: Skin is warm  Additional Data:     Labs:    Results from last 7 days   Lab Units 19  0547   WBC Thousand/uL 5 83   HEMOGLOBIN g/dL 10 2*   HEMATOCRIT % 31 1*   PLATELETS Thousands/uL 236   NEUTROS PCT % 61   LYMPHS PCT % 24   MONOS PCT % 9   EOS PCT % 4     Results from last 7 days   Lab Units 19  0547 19  1736   POTASSIUM mmol/L 3 5 3 8   CHLORIDE mmol/L 104 95*   CO2 mmol/L 24 25   BUN mg/dL 11 15   CREATININE mg/dL 0 84 1 14   CALCIUM mg/dL 8 4 8 8   ALK PHOS U/L  --  109   ALT U/L  --  16   AST U/L  --  12           * I Have Reviewed All Lab Data Listed Above    * Additional Pertinent Lab Tests Reviewed: [address] Admission Reviewed    Imaging:    Imaging Reports Reviewed Today Include:   Imaging Personally Reviewed by Myself Includes:      Recent Cultures (last 7 days):     Results from last 7 days   Lab Units 12/02/19 1945 12/02/19 1828 12/02/19 1827   BLOOD CULTURE   --  Received in Microbiology Lab  Culture in Progress  Received in Microbiology Lab  Culture in Progress  GRAM STAIN RESULT  3+ Gram positive cocci in pairs and chains*  1+ Gram negative rods*  No Polys*  --   --        Last 24 Hours Medication List:     Current Facility-Administered Medications:  acetaminophen 650 mg Oral Q4H PRN KIRA Lu    cefepime 2,000 mg Intravenous Q12H KIRA Lu Last Rate: Stopped (12/03/19 1140)   collagenase  Topical Daily Fam Ellis DO    enoxaparin 40 mg Subcutaneous Daily KIRA Lu    gabapentin 200 mg Oral BID KIRA Lu    HYDROmorphone 0 5 mg Intravenous Q1H PRN KIRA Lu    insulin glargine 12 Units Subcutaneous HS Nicolas Robles MD    insulin lispro 1-6 Units Subcutaneous TID AC KIRA Lu    insulin lispro 1-6 Units Subcutaneous HS KIRA Lu    insulin lispro 3 Units Subcutaneous TID With Meals Nicolas Robles MD    ondansetron 4 mg Intravenous Q6H PRN KIRA Lu    oxyCODONE 10 mg Oral Q4H PRN KIRA Lu    oxyCODONE 5 mg Oral Q4H PRN KIRA Lu    vancomycin 15 mg/kg Intravenous Q12H KIRA Lu Last Rate: Stopped (12/03/19 1103)        Today, Patient Was Seen By: Nicolas Robles MD    ** Please Note: Dictation voice to text software may have been used in the creation of this document   **

## 2019-12-03 NOTE — PLAN OF CARE
Problem: PAIN - ADULT  Goal: Verbalizes/displays adequate comfort level or baseline comfort level  Description  Interventions:  - Encourage patient to monitor pain and request assistance  - Assess pain using appropriate pain scale/0-10  - Administer analgesics based on type and severity of pain and evaluate response  - Implement non-pharmacological measures as appropriate and evaluate response  - Consider cultural and social influences on pain and pain management  - Notify physician/advanced practitioner if interventions unsuccessful or patient reports new pain   Outcome: Progressing     Problem: INFECTION - ADULT  Goal: Absence or prevention of progression during hospitalization  Description  INTERVENTIONS:  - Assess and monitor for signs and symptoms of infection,(left lower foot wound monitor ,drainage , pain , swelling,redness)  - Monitor lab/diagnostic results  - Monitor all insertion sites  -   - [place] appropriate cooling/warming therapies per order  - Administer medications as ordered/iv antibiotics, pain medication  - Instruct and encourage patient and family to use good hand hygiene technique  - Identify and instruct in appropriate isolation precautions for identified infection/condition    Outcome: Progressing

## 2019-12-03 NOTE — ASSESSMENT & PLAN NOTE
Lab Results   Component Value Date    HGBA1C 12 1 (H) 12/03/2019       Recent Labs     12/02/19  2217 12/03/19  0722 12/03/19  1057 12/03/19  1550   POCGLU 313* 261* 309* 287*       Blood Sugar Average: Last 72 hrs:  (P) 349     · Glucose 462  · Patient says sugars have been running high lately, this could be due to infection  · Diabetic diet  · Fingerstick blood sugars with sliding scale coverage  · Continue home Lantus  · Insulin sliding scale  · Hemoglobin A1c is above 12 showing chronic the elevated blood sugar

## 2019-12-03 NOTE — ASSESSMENT & PLAN NOTE
· Wound opened on Saturday with purulent drainage and foul odor  · Had surgery for Charcot foot in April, opened wound is at the location of small surgical site  · Follows with Dr Dharmesh Espinoza of [place] [facility]  ·  3  · Continue with the cefepime and vancomycin and follow-up on the cultures  · MRI did not show any evidence of abscess or osteomyelitis  · Surgery evaluation appreciated

## 2019-12-03 NOTE — ASSESSMENT & PLAN NOTE
No results found for: HGBA1C    Recent Labs     12/02/19  1736 12/02/19  1933   POCGLU 479* 445*       Blood Sugar Average: Last 72 hrs:  (P) 462     · Glucose 462  · Patient says sugars have been running high lately, this could be due to infection  · Diabetic diet  · Fingerstick blood sugars with sliding scale coverage  · Continue home Lantus  · Hold home Apidra  · Check HGB A1c

## 2019-12-03 NOTE — UTILIZATION REVIEW
Initial Clinical Review    Admission: Date/Time/Statement: Inpatient Admission Orders (From admission, onward)     Ordered        12/02/19 2005  Inpatient Admission (expected length of stay for this patient Order details is greater than two midnights)  Once                   Orders Placed This Encounter   Procedures    Inpatient Admission (expected length of stay for this patient Order details is greater than two midnights)     Standing Status:   Standing     Number of Occurrences:   1     Order Specific Question:   Admitting Physician     Answer:   Dick Mack [[id number]]     Order Specific Question:   Level of Care     Answer:   Med Surg [16]     Order Specific Question:   Estimated length of stay     Answer:   More than 2 Midnights     Order Specific Question:   Certification     Answer:   I certify that inpatient services are medically necessary for this patient for a duration of greater than two midnights  See H&P and MD Progress Notes for additional information about the patient's course of treatment  ED Arrival Information     Expected Arrival Acuity Means of Arrival Escorted By Service Admission Type    - 12/2/2019 17:13 Emergent Walk-In Spouse Hospitalist Emergency    Arrival Complaint    chest pain        Chief Complaint   Patient presents with    Chest Pain     Pt here with CP worse with coughing  Pt also with some SOB  pt rating pain 9/10  pt describing pain as "feelign like a bruise"  Pt also with abdominal pain  Pt has sharkof foot and had surgery in April, but still has open surgical site  PCP told pt to come to ER d/t symptoms concerned for infection  Pt also reporting uncontrolled sugars at home  Assessment/Plan: 38year old female to the ED from home with complaints of worsening foot pain for 3 days along with chest pian, cough and shortness of breath  Admitted to inpatient for open wound left foot, diabetes mellitus    H/o DM, charcot foot with sugery April 2019 and has had a small healing surgical wound at the site  Draining yellow fluid with foul smell  Has been nauseaous with cough and high blood sugars recently  Arrives to ED with clear lungs, right 1st toe amuptation with charcot foot, 1 5 cm round wound on left food with surrounding rdenss, heat, tenderness and yellow foul smelling drainage  Sodium 129, could be secondary to blood sugar  IV fluids initiated in ED  Elevated CRP and sed rate  Wound and blood cultures pending, IV abx initiated  Check MRI, wound and podiatry consult  ED Triage Vitals [19 1722]   Temperature Pulse Respirations Blood Pressure SpO2   99 2 °F (37 3 °C) (!) 110 18 157/80 98 %      Temp Source Heart Rate Source Patient Position - Orthostatic VS BP Location FiO2 (%)   Oral Monitor Sitting Right arm --      Pain Score       9        Wt Readings from Last 1 Encounters:   19 104 kg (228 lb 13 4 oz)     Additional Vital Signs:   Date/Time Temp Pulse Resp BP MAP (mmHg) SpO2 O2 Device Patient Position - Orthostatic VS   19 0804       None (Room air)    19 07:24:08 98 4 °F (36 9 °C) 94 17 148/77 101 97 %     19 23:04:27 98 7 °F (37 1 °C) 81 18 114/70 85 95 %     19 2200       None (Room air)    19 21:36:20 98 6 °F (37 °C) 93 16 156/83 107 98 % None (Room air) Lying   19 2102  91 18 125/73  98 % None (Room air) Lying   19 1957  91 20 148/77  98 % None (Room air) Sitting   19 1842  92 20 132/67  98 % None (Room       Pertinent Labs/Diagnostic Test Results:   Xray left foot 12/3:   Marked deformity of the midfoot presumably related to Charcot arthropathy   No definite acute osseous abnormality, and no radiographic evidence for osteomyelitis  CXR 12/3: No acute cardiopulmonary disease    EK/2: Interpretation:     Interpretation: abnormal    Rate:     ECG rate:  102    ECG rate assessment: tachycardic    Rhythm:     Rhythm: sinus tachycardia    Ectopy:     Ectopy: none QRS:     QRS axis:  Normal  Conduction:     Conduction: normal    ST segments:     ST segments:  Normal  T waves:     T waves: normal    Results from last 7 days   Lab Units 12/03/19  0547 12/02/19  1736   WBC Thousand/uL 5 83 7 46   HEMOGLOBIN g/dL 10 2* 10 8*   HEMATOCRIT % 31 1* 32 2*   PLATELETS Thousands/uL 236 247   NEUTROS ABS Thousands/µL 3 51 5 35         Results from last 7 days   Lab Units 12/03/19  0547 12/02/19  1736   SODIUM mmol/L 136 129*   POTASSIUM mmol/L 3 5 3 8   CHLORIDE mmol/L 104 95*   CO2 mmol/L 24 25   ANION GAP mmol/L 8 9   BUN mg/dL 11 15   CREATININE mg/dL 0 84 1 14   EGFR ml/min/1 73sq m 84 58   CALCIUM mg/dL 8 4 8 8     Results from last 7 days   Lab Units 12/02/19  1736   AST U/L 12   ALT U/L 16   ALK PHOS U/L 109   TOTAL PROTEIN g/dL 7 6   ALBUMIN g/dL 2 4*   TOTAL BILIRUBIN mg/dL 0 39   BILIRUBIN DIRECT mg/dL 0 17     Results from last 7 days   Lab Units 12/03/19  1057 12/03/19  0722 12/02/19  2217 12/02/19  1933 12/02/19  1736   POC GLUCOSE mg/dl 309* 261* 313* 445* 479*     Results from last 7 days   Lab Units 12/03/19  0547 12/02/19  1736   GLUCOSE RANDOM mg/dL 241* 455*             No results found for: BETA-HYDROXYBUTYRATE       Results from last 7 days   Lab Units 12/02/19  1736   PH RENETTA  7 460*   PCO2 RENETTA mm Hg 33 9*   PO2 RENETTA mm Hg 33 9*   HCO3 RENETTA mmol/L 23 6*   BASE EXC RENETTA mmol/L 0 2   O2 CONTENT RENETTA ml/dL 11 2   O2 HGB, VENOUS % 68 9             Results from last 7 days   Lab Units 12/02/19  1736   TROPONIN I ng/mL <0 02       Results from last 7 days   Lab Units 12/02/19  1736   LACTIC ACID mmol/L 1 2     Results from last 7 days   Lab Units 12/02/19  1736   NT-PRO BNP pg/mL 185*             Results from last 7 days   Lab Units 12/02/19  1736   LIPASE u/L 81     Results from last 7 days   Lab Units 12/02/19  1736   CRP mg/L 175 3*   SED RATE mm/hour 95*         Results from last 7 days   Lab Units 12/02/19  1835   CLARITY UA  Slightly Cloudy   COLOR UA  Yellow   SPEC GRAV UA  1 010   PH UA  6 5   GLUCOSE UA mg/dl >=1000 (1%)*   KETONES UA mg/dl Trace*   BLOOD UA  Small*   PROTEIN UA mg/dl 30 (1+)*   NITRITE UA  Negative   BILIRUBIN UA  Negative   UROBILINOGEN UA E U /dl 0 2   LEUKOCYTES UA  Elevated glucose may cause decreased leukocyte values  See urine microscopic for [facility] result/*   WBC UA /hpf 2-4*   RBC UA /hpf 2-4*   BACTERIA UA /hpf Occasional   EPITHELIAL CELLS WET PREP /hpf Occasional     Results from last 7 days   Lab Units 12/02/19  1945 12/02/19  1828 12/02/19  1827   BLOOD CULTURE   --  Received in Microbiology Lab  Culture in Progress  Received in Microbiology Lab  Culture in Progress  GRAM STAIN RESULT  3+ Gram positive cocci in pairs and chains*  1+ Gram negative rods*  No Polys*  --   --      ED Treatment:   Medication Administration from 12/02/2019 1713 to 12/02/2019 2126       Date/Time Order Dose Route Action     12/02/2019 1827 sodium chloride 0 9 % bolus 1,000 mL 1,000 mL Intravenous New Bag     12/02/2019 1842 sulfamethoxazole-trimethoprim (BACTRIM DS) 800-160 mg per tablet 1 tablet 1 tablet Oral Given     12/02/2019 2101 vancomycin (VANCOCIN) 1,250 mg in sodium chloride 0 9 % 500 mL IVPB 1,250 mg Intravenous New Bag        Past Medical History:   Diagnosis Date    Charcot's joint of foot, left     Diabetes mellitus ([facility] [address] )      Admitting Diagnosis: Shortness of breath [R06 02]  Chest pain [R07 9]  Hyperglycemia [R73 9]  Open wound of foot [S91 309A]  Abdominal pain, unspecified abdominal location [R10 9]  Age/Sex: 38 y o  female  Admission Orders:   Up with assist  MRI foot  HGB A1c  Scheduled Medications:    Medications:  cefepime 2,000 mg Intravenous Q12H   enoxaparin 40 mg Subcutaneous Daily   gabapentin 200 mg Oral BID   insulin glargine 10 Units Subcutaneous HS   insulin lispro 1-6 Units Subcutaneous TID AC   insulin lispro 1-6 Units Subcutaneous HS   vancomycin 15 mg/kg Intravenous Q12H     Continuous IV Infusions:     PRN Meds:    acetaminophen 650 mg Oral Q4H PRN   HYDROmorphone 0 5 mg Intravenous Q1H PRN   ondansetron 4 mg Intravenous Q6H PRN   oxyCODONE 10 mg Oral Q4H PRN   oxyCODONE 5 mg Oral Q4H PRN       IP CONSULT TO CASE MANAGEMENT  IP CONSULT TO WOUND CARE  IP CONSULT TO PODIATRY  IP CONSULT TO PHARMACY    Parvin Reyna RN  Network Utilization Review Department  [email or web address]  ATTENTION: Please call with any questions or concerns to [phone] and carefully listen to the prompts so that you are directed to the right person  All voicemails are confidential   Livia Sinha all requests for admission clinical reviews, approved or denied determinations and any other requests to dedicated fax number below belonging to the campus where the patient is receiving treatment   List of dedicated fax numbers for the Facilities:  [address] DENIALS (Administrative/Medical Necessity) [phone]   [address] (Maternity/NICU/Pediatrics) [phone] 5400 [facility] [phone]   Liliana Po [phone]   [facility] [phone]   [facility] 1525 [facility] [phone]   Meagan Magdaleno [phone]   [facility] 2000 [facility] [address] [place] [address] [address] [facility] [phone]

## 2019-12-03 NOTE — CONSULTS
Consultation - General Surgery   Jimy Frazier 38 y o  female MRN: [id number]  Unit/Bed#: -01 Encounter: [id number]    Assessment/Plan     Assessment:    Postsurgical changes and possibly cellulitis at the lateral aspect of the midfoot without a drainable abscess or evidence of osteomyelitis  MRI examination left forefoot and toes which is completed  Cellulitis plantar aspect left foot    Preliminary wound culture grew 3+ g positive cocci in pairs and 1+ g negative rods  Type 2 insulin dependent diabetes mellitus, hemoglobin A1c 12 1    Charcot left foot, open wound bottom left heel which opened up 3 days ago    Diabetic peripheral neuropathy, both lower extremities    Plan:  Discussed via secure tiger text messaging with Dr Lurdes Flores, on-call surgeon  The patient is on a diabetic diet  There is no indication for surgical drainage as no evidence of drainable abscess or osteomyelitis on MRI of the left foot, results posted below  Maintain the patient on present IV antibiotics including cefepime and vancomycin for presumptive diagnosis of cellulitis left forefoot  White blood cell count is normal   Local wound care, daily dressing change with wet to dry dressing recommended left forefoot  Wound cultures have been obtained  This was prior to starting antibiotic therapy  The patient can be heel-touch partial weight-bearing left foot with crutches  Wound management consultation requested  Dr Lurdes Flores will examine the patient later this afternoon  Management of medical comorbidities including glycemic control per the hospitalist team    History of Present Illness     HPI:  Jimy Frazier is a 38 y o  diabetic white female who presents with an open area on the plantar aspect of her left foot since 3 days ago  The patient has a history of type 2 diabetes mellitus and requires insulin for glycemic control    She had been followed by podiatrist in [facility] and underwent surgery for a Charcot left foot in April of 2019  The patient has had drainage from healing surgical wound that opened up  She had noted yellow foul-smelling drainage from the open area  The patient has been nauseated and was seen emergency department because of chest pain which has now ameliorated  There has been no fever or chills  She has had a nonproductive cough  The patient is nonsmoker  Her blood sugars have been running, she stated in excess of 400  [facility]  She previously was on an insulin pump though the patient had to discontinue as she did not have insurance coverage since about a month ago  She was not evaluated in the emergency department since yesterday afternoon  The patient had x-ray of her left foot performed which did not show any evidence of osteomyelitis present  The patient was sent for an MRI of the left forefoot and toes with results just finalized and listed below  There is no evidence of drainable fluid collection or osteomyelitis  Her white count on admission was entirely normal   Wound cultures were obtained prior to starting IV cefepime by the hospitalist physician  General surgical consultation has been requested at this time for evaluation of the open nonhealing ulcer on the left foot  Photographic documentation on examination of the plantar aspect left foot was reviewed and available on the ED physicians documentation  Wound culture and Gram stain   Order: [id number]   Status:  Preliminary result   Visible to patient:  No (Not Released)   Next appt:  None   Specimen Information: Foot, Left;  Wound        GRAM STAIN RESULT  Abnormal    3+ Gram positive cocci in pairs and chains      1+ Gram negative rods      No Polys                  Specimen Collected: 12/02/19 19:45   Last Resulted: 12/03/19 10:22               Inpatient consult to Acute Care Surgery  Consult performed by: Britney Watts PA-C  Consult ordered by: Rodena Closs, MD        Review of Systems     Review of Systems   Constitutional: Denies any fever or chills  No weight change  HEENT: She denies any symptoms   Respiratory: Negative SOB, adnits dry non-productive cough  Nonsmoker  Cardiovascular: She admitted chest pain  Negative for palpitations and leg swelling  Gastrointestinal: Nausea present, no vomitting  No specific abdominal pain  Genitourinary: Negative for dysuria and frequency  Musculoskeletal: Negative for arthralgias and myalgias  Skin: As described above in the HPI  Neurological: Denies any numbness or tingling in either foot  All other systems reviewed and are negative  OB/GYN: , AB0, LMP 2 weeks ago  Historical Information   Past Medical History:   Diagnosis Date    Charcot's joint of foot, left     Diabetes mellitus ([address] )      History reviewed  No pertinent surgical history  Social History   Social History     Substance and Sexual Activity   Alcohol Use Never    Frequency: Never     Social History     Substance and Sexual Activity   Drug Use Never     Social History     Tobacco Use   Smoking Status Never Smoker   Smokeless Tobacco Never Used     Family History: Patient's brother  at age 61 for last year because of complications of diabetes mellitus      Meds/Allergies   current meds:   Current Facility-Administered Medications   Medication Dose Route Frequency    acetaminophen (TYLENOL) tablet 650 mg  650 mg Oral Q4H PRN    cefepime (MAXIPIME) 2,000 mg in dextrose 5 % 50 mL IVPB  2,000 mg Intravenous Q12H    enoxaparin (LOVENOX) subcutaneous injection 40 mg  40 mg Subcutaneous Daily    gabapentin (NEURONTIN) capsule 200 mg  200 mg Oral BID    HYDROmorphone (DILAUDID) injection 0 5 mg  0 5 mg Intravenous Q1H PRN    insulin glargine (LANTUS) subcutaneous injection 10 Units 0 1 mL  10 Units Subcutaneous HS    insulin lispro (HumaLOG) 100 units/mL subcutaneous injection 1-6 Units  1-6 Units Subcutaneous TID AC    insulin lispro (HumaLOG) 100 units/mL subcutaneous injection 1-6 Units  1-6 Units Subcutaneous HS    ondansetron (ZOFRAN) injection 4 mg  4 mg Intravenous Q6H PRN    oxyCODONE (ROXICODONE) immediate release tablet 10 mg  10 mg Oral Q4H PRN    oxyCODONE (ROXICODONE) IR tablet 5 mg  5 mg Oral Q4H PRN    vancomycin (VANCOCIN) 1,500 mg in sodium chloride 0 9 % 250 mL IVPB  15 mg/kg Intravenous Q12H     Allergies   Allergen Reactions    Clindamycin Other (See Comments)     CHEST PRESSURE, FEELS LIKE A HEART ATTACK PER PT       Objective   First Vitals:   Blood Pressure: 157/80 (12/02/19 1722)  Pulse: (!) 110 (12/02/19 1722)  Temperature: 99 2 °F (37 3 °C) (12/02/19 1722)  Temp Source: Oral (12/02/19 1722)  Respirations: 18 (12/02/19 1722)  Height: 5' 4" (162 6 cm) (12/02/19 2144)  Weight - Scale: 104 kg (229 lb 15 oz) (12/02/19 1956)  SpO2: 98 % (12/02/19 1722)    Current Vitals:   Blood Pressure: 148/77 (12/03/19 0724)  Pulse: 94 (12/03/19 0724)  Temperature: 98 4 °F (36 9 °C) (12/03/19 0724)  Temp Source: Oral (12/02/19 2136)  Respirations: 17 (12/03/19 0724)  Height: 5' 4" (162 6 cm) (12/03/19 1110)  Weight - Scale: 104 kg (228 lb 13 4 oz) (12/02/19 2144)  SpO2: 97 % (12/03/19 0724)      Intake/Output Summary (Last 24 hours) at 12/3/2019 1153  Last data filed at 12/3/2019 1103  Gross per 24 hour   Intake 1477 87 ml   Output    Net 1477 87 ml       Invasive Devices     Peripheral Intravenous Line            Peripheral IV 12/02/19 Right Antecubital less than 1 day                Physical Exam     Patient is awake and alert  No acute distress  Speech is clear  ENT clear and unremarkable  Oral mucosa is pink and moist   Neck supple  No adenopathy or goiter  Chest symmetric  Heart regular rate and rhythm  No murmur or gallop  Lungs clear auscultation  Back no CVA tenderness to percussion  Abdomen positive bowel sounds are heard  The abdomen is soft and nontender  No guarding or rigidity    No epigastric tenderness to palpation  Negative Mccarthy sign  She moves all 4 extremities well  Neurological exam shows that the patient has decreased sensation light touch on the lateral aspect of the left foot  There is a 1 5-2 cm round circular open area of the left lateral mid forefoot with yellow foul-smelling purulent drainage and surrounding mild erythema and tenderness to palpation  No crepitus  Swelling around the ulceration is mild  DP and PT pulses are present  Good capillary refill in the toes left foot  Ambulation is not observed  Mental status appropriate  No focal motor weakness is present in the extremities  Cranial nerves 2-12 appear symmetrical     Lab Results:   I have personally reviewed pertinent lab results  , CBC:   Lab Results   Component Value Date    WBC 5 83 12/03/2019    HGB 10 2 (L) 12/03/2019    HCT 31 1 (L) 12/03/2019    MCV 81 (L) 12/03/2019     12/03/2019    MCH 26 5 (L) 12/03/2019    MCHC 32 8 12/03/2019    RDW 13 3 12/03/2019    MPV 11 7 12/03/2019    NRBC 0 12/03/2019   , CMP:   Lab Results   Component Value Date    SODIUM 136 12/03/2019    K 3 5 12/03/2019     12/03/2019    CO2 24 12/03/2019    BUN 11 12/03/2019    CREATININE 0 84 12/03/2019    CALCIUM 8 4 12/03/2019    AST 12 12/02/2019    ALT 16 12/02/2019    ALKPHOS 109 12/02/2019    EGFR 84 12/03/2019   , Coagulation: No results found for: PT, INR, APTT, Urinalysis:   Lab Results   Component Value Date    COLORU Yellow 12/02/2019    CLARITYU Slightly Cloudy 12/02/2019    SPECGRAV 1 010 12/02/2019    PHUR 6 5 12/02/2019    LEUKOCYTESUR (A) 12/02/2019     Elevated glucose may cause decreased leukocyte values   See urine microscopic for [facility] result/    NITRITE Negative 12/02/2019    GLUCOSEU >=1000 (1%) (A) 12/02/2019    KETONESU Trace (A) 12/02/2019    BILIRUBINUR Negative 12/02/2019    BLOODU Small (A) 12/02/2019     hCG, qualitative pregnancy   Order: [id number]   Status:  Final result   Visible to patient:  No (Inaccessible in [address])   Next appt:  None    Ref Range & Units 12/2/19 1738   Preg, Serum Negative Negative          Specimen Collected: 12/02/19 17:38   Last Resulted: 12/02/19 18:17           C-reactive protein   Order: [id number]   Status:  Final result   Visible to patient:  No (Not Released)   Next appt:  None    Ref Range & Units 12/2/19 1736   CRP <3 0 mg/L 175  3High           Specimen Collected: 12/02/19 17:36   Last Resulted: 12/02/19 18:41           Sedimentation rate, automated   Order: [id number]   Status:  Final result   Visible to patient:  No (Not Released)   Next appt:  None    Ref Range & Units 12/2/19 1736   Sed Rate 0 - 20 mm/hour 95High           Specimen Collected: 12/02/19 17:36   Last Resulted: 12/02/19 19:49           Lactic acid, plasma   Order: [id number]   Status:  Final result   Visible to patient:  No (Inaccessible in MyChart)   Next appt:  None    Ref Range & Units 12/2/19 1736   LACTIC ACID 0 5 - 2 0 mmol/L 1 2              Imaging: I have personally reviewed pertinent films in PACS     LEFT FOOT     INDICATION:   r/o osteo, hx of charcot foot      COMPARISON:  None     VIEWS:  XR FOOT 3+ VW LEFT         FINDINGS:  There is marked deformity of the midfoot, presumably related to Charcot arthropathy  A metallic bone anchor is seen in the lateral aspect of the midfoot  No definite evidence of acute fracture or malalignment      No periosteal reaction or cortical destruction to suggest osteomyelitis      There is diffuse soft tissue swelling  No evidence of free opaque foreign body or soft tissue gas      IMPRESSION:     Marked deformity of the midfoot presumably related to Charcot arthropathy  No definite acute osseous abnormality, and no radiographic evidence for osteomyelitis      MRI LEFT FOOT WITH AND WITHOUT CONTRAST     INDICATION:   nonhealing open area lateral aspect of left foot after surgery(4-2019)      COMPARISON:  Left foot radiographs 12/2/2019      TECHNIQUE:   sequences were obtained of the left foot including the following:  Precontrast sequences: localizers, sagittal STIR, sagittal T1, coronal T1, coronal T2 fat sat/STIR, axial T2 fat sat/STIR, axial PD, coronal T1 fat sat  Postcontrast   sequences: Coronal T1 fat sat, sagittal T1 fat sat        IV Contrast:  10 mL of gadobutrol injection (MULTI-DOSE)      FINDINGS:     SUBCUTANEOUS TISSUES: Multiple foci of susceptibility artifact, as well as ill-defined T1 intermediate signal, T2 hyperintensity and enhancement in the lateral soft tissues overlying the cuboid and 5th tarsometatarsal joint consistent with prior surgery  No focal fluid collection      BONES:  Otherwise normal signal intensity and enhancement      FIRST MTP JOINT:  Intact      SESAMOID BONES:  Intact      OTHER ARTICULAR SURFACES:  Neuropathic arthropathy of the hindfoot and midfoot      PLANTAR FASCIA:  Intact      LISFRANC LIGAMENT:  Intact      FOREFOOT TENDONS:  Intact      INTERMETATARSAL REGIONS:  No bursitis or Wilson's neuroma      MUSCULATURE: Diffuse fatty atrophy and T2 hyperintensity of the intrinsic foot musculature      IMPRESSION:     1  Postsurgical changes and possibly cellulitis at the lateral aspect of the midfoot without a drainable abscess or evidence of osteomyelitis      2   Neuropathic arthropathy in the hindfoot and midfoot        EKG, Pathology, and Other Studies: I have personally reviewed pertinent reports  Counseling / Coordination of Care  Total floor / unit time spent today 45 minutes  Greater than 50% of total time was spent with the patient and / or family counseling and / or coordination of care  A description of the counseling / coordination of care: review of diagnostic imaging laboratory studies, historical perspective, personal examination of the patient by this provider, and tiger text messages with the on-call general surgeon who also examine the patient later today        Leidy Little PA-C

## 2019-12-03 NOTE — ASSESSMENT & PLAN NOTE
· Wound opened on Saturday with purulent drainage and foul odor  · Had surgery for Charcot foot in April, opened wound is at the location of small surgical site  · Follows with Dr Jessee Menchaca of [facility]  ·  3  · In the ER received Bactrim and Vanco  · Continue vanco  · Start cefepime and de-escalate pending culture results  · MRI of foot with contrast  · Wound care consult  · Podiatry consult

## 2019-12-03 NOTE — SOCIAL WORK
CM met with patient at the bedside,baseline information was obtained  CM discussed the role of CM in helping the patient develop a discharge plan and assist the patient in carry out their plan  Pt lives with her  in a 2 SH, 2 FELISA and 5 steps to the 2nd floor bedroom, bathroom is on the first floor  Pt performed ADLs independently  Pt ambulates with a cane and has corrective shoes  Pt is employed full time and drives  Pt has hx of HHC with [facility]  Pt has no hx of STR  Pt denies hx of MH or D&A tx  PCP: Dr Joaquina Valle  Preferred Pharmacy: [facility], [address] [place] [address]    Contact: John Koroma () [phone]  No formal POA   will speak on pt behalf  Pt  will transport at time of d/c     Pt has support of  and adult children  Pt is currently uninsured  CM notified financial counselor/patient advocacy  CM to work with pt to provide resources at time of d/c

## 2019-12-03 NOTE — PLAN OF CARE
Problem: Potential for Falls  Goal: Patient will remain free of falls  Description  INTERVENTIONS:  - Assess patient frequently for physical needs  -  Identify cognitive and physical deficits and behaviors that affect risk of falls    -  [place] fall precautions as indicated by assessment   - Educate patient/family on patient safety including physical limitations  - Instruct patient to call for assistance with activity based on assessment  - Modify environment to reduce risk of injury  - Consider OT/PT consult to assist with strengthening/mobility  Outcome: Progressing     Problem: PAIN - ADULT  Goal: Verbalizes/displays adequate comfort level or baseline comfort level  Description  Interventions:  - Encourage patient to monitor pain and request assistance  - Assess pain using appropriate pain scale  - Administer analgesics based on type and severity of pain and evaluate response  - Implement non-pharmacological measures as appropriate and evaluate response  - Consider cultural and social influences on pain and pain management  - Notify physician/advanced practitioner if interventions unsuccessful or patient reports new pain  Outcome: Progressing     Problem: INFECTION - ADULT  Goal: Absence or prevention of progression during hospitalization  Description  INTERVENTIONS:  - Assess and monitor for signs and symptoms of infection  - Monitor lab/diagnostic results  - Monitor all insertion sites, i e  indwelling lines, tubes, and drains  - Monitor endotracheal if appropriate and nasal secretions for changes in amount and color  - [place] appropriate cooling/warming therapies per order  - Administer medications as ordered  - Instruct and encourage patient and family to use good hand hygiene technique  - Identify and instruct in appropriate isolation precautions for identified infection/condition  Outcome: Progressing     Problem: SAFETY ADULT  Goal: Maintain or return to baseline ADL function  Description  INTERVENTIONS:  -  Assess patient's ability to carry out ADLs; assess patient's baseline for ADL function and identify physical deficits which impact ability to perform ADLs (bathing, care of mouth/teeth, toileting, grooming, dressing, etc )  - Assess/evaluate cause of self-care deficits   - Assess range of motion  - Assess patient's mobility; develop plan if impaired  - Assess patient's need for assistive devices and provide as appropriate  - Encourage maximum independence but intervene and supervise when necessary  - Involve family in performance of ADLs  - Assess for home care needs following discharge   - Consider OT consult to assist with ADL evaluation and planning for discharge  - Provide patient education as appropriate  Outcome: Progressing  Goal: Maintain or return mobility status to optimal level  Description  INTERVENTIONS:  - Assess patient's baseline mobility status (ambulation, transfers, stairs, etc )    - Identify cognitive and physical deficits and behaviors that affect mobility  - Identify mobility aids required to assist with transfers and/or ambulation (gait belt, sit-to-stand, lift, walker, cane, etc )  - [place] fall precautions as indicated by assessment  - Record patient progress and toleration of activity level on Mobility SBAR; progress patient to next Phase/Stage  - Instruct patient to call for assistance with activity based on assessment  - Consider rehabilitation consult to assist with strengthening/weightbearing, etc   Outcome: Progressing     Problem: DISCHARGE PLANNING  Goal: Discharge to home or other facility with appropriate resources  Description  INTERVENTIONS:  - Identify barriers to discharge w/patient and caregiver  - Arrange for needed discharge resources and transportation as appropriate  - Identify discharge learning needs (meds, wound care, etc )  - Arrange for interpretive services to assist at discharge as needed  - Refer to Case Management Department for coordinating discharge planning if the patient needs post-hospital services based on physician/advanced practitioner order or complex needs related to functional status, cognitive ability, or social support system  Outcome: Progressing     Problem: Knowledge Deficit  Goal: Patient/family/caregiver demonstrates understanding of disease process, treatment plan, medications, and discharge instructions  Description  Complete learning assessment and assess knowledge base  Interventions:  - Provide teaching at level of understanding  - Provide teaching via preferred learning methods  Outcome: Progressing     Problem: Nutrition/Hydration-ADULT  Goal: Nutrient/Hydration intake appropriate for improving, restoring or maintaining nutritional needs  Description  Monitor and assess patient's nutrition/hydration status for malnutrition  Collaborate with interdisciplinary team and initiate plan and interventions as ordered  Monitor patient's weight and dietary intake as ordered or per policy  Utilize nutrition screening tool and intervene as necessary  Determine patient's food preferences and provide high-protein, high-caloric foods as appropriate       INTERVENTIONS:  - Monitor oral intake, urinary output, labs, and treatment plans  - Assess nutrition and hydration status and recommend course of action  - Evaluate amount of meals eaten  - Assist patient with eating if necessary   - Allow adequate time for meals  - Recommend/ encourage appropriate diets, oral nutritional supplements, and vitamin/mineral supplements  - Order, calculate, and assess calorie counts as needed  - Recommend, monitor, and adjust tube feedings and TPN/PPN based on assessed needs  - Assess need for intravenous fluids  - Provide specific nutrition/hydration education as appropriate  - Include patient/family/caregiver in decisions related to nutrition  Outcome: Progressing

## 2019-12-04 LAB
GLUCOSE SERPL-MCNC: 147 MG/DL (ref 65–140)
GLUCOSE SERPL-MCNC: 261 MG/DL (ref 65–140)
GLUCOSE SERPL-MCNC: 326 MG/DL (ref 65–140)
GLUCOSE SERPL-MCNC: 356 MG/DL (ref 65–140)
GLUCOSE SERPL-MCNC: 357 MG/DL (ref 65–140)
GLUCOSE SERPL-MCNC: 382 MG/DL (ref 65–140)
GLUCOSE SERPL-MCNC: 423 MG/DL (ref 65–140)
VANCOMYCIN TROUGH SERPL-MCNC: 11.5 UG/ML (ref 10–20)

## 2019-12-04 PROCEDURE — 99232 SBSQ HOSP IP/OBS MODERATE 35: CPT | Performed by: FAMILY MEDICINE

## 2019-12-04 PROCEDURE — 80202 ASSAY OF VANCOMYCIN: CPT | Performed by: NURSE PRACTITIONER

## 2019-12-04 PROCEDURE — 82948 REAGENT STRIP/BLOOD GLUCOSE: CPT

## 2019-12-04 RX ORDER — INSULIN GLARGINE 100 [IU]/ML
18 INJECTION, SOLUTION SUBCUTANEOUS
Status: DISCONTINUED | OUTPATIENT
Start: 2019-12-04 | End: 2019-12-04

## 2019-12-04 RX ORDER — INSULIN GLARGINE 100 [IU]/ML
30 INJECTION, SOLUTION SUBCUTANEOUS
Status: DISCONTINUED | OUTPATIENT
Start: 2019-12-04 | End: 2019-12-04

## 2019-12-04 RX ADMIN — Medication 2000 MG: at 11:17

## 2019-12-04 RX ADMIN — INSULIN LISPRO 5 UNITS: 100 INJECTION, SOLUTION INTRAVENOUS; SUBCUTANEOUS at 08:29

## 2019-12-04 RX ADMIN — SODIUM CHLORIDE 6 UNITS/HR: 9 INJECTION, SOLUTION INTRAVENOUS at 20:30

## 2019-12-04 RX ADMIN — ENOXAPARIN SODIUM 40 MG: 40 INJECTION SUBCUTANEOUS at 08:28

## 2019-12-04 RX ADMIN — COLLAGENASE SANTYL: 250 OINTMENT TOPICAL at 08:29

## 2019-12-04 RX ADMIN — GABAPENTIN 200 MG: 100 CAPSULE ORAL at 17:34

## 2019-12-04 RX ADMIN — GABAPENTIN 200 MG: 100 CAPSULE ORAL at 08:29

## 2019-12-04 RX ADMIN — VANCOMYCIN HYDROCHLORIDE 1500 MG: 1 INJECTION, POWDER, LYOPHILIZED, FOR SOLUTION INTRAVENOUS at 09:47

## 2019-12-04 RX ADMIN — Medication 2000 MG: at 22:07

## 2019-12-04 RX ADMIN — SODIUM CHLORIDE 7 UNITS/HR: 9 INJECTION, SOLUTION INTRAVENOUS at 16:04

## 2019-12-04 RX ADMIN — INSULIN LISPRO 10 UNITS: 100 INJECTION, SOLUTION INTRAVENOUS; SUBCUTANEOUS at 11:48

## 2019-12-04 NOTE — CONSULTS
Consult Note - Wound   Jimy Balderas 38 y o  female MRN: [id number]  Unit/Bed#: -01 Encounter: [id number]      History and Present Illness:    Patient is a 37 y/o female admitted for open wound of left foot  Wound care is consulted for the wound on the left lateral foot  Patient is alert, oriented, continent and mobile, and assisted in the assessment and answered questions appropriately  Patient has Charcot foot, and had a prior ulceration on the left plantar foot that healed after being in a cast and after a bone removal, and the current wound is a result of a nonhealing incision from the bone removal  Patient follows at [facility] but has no insurance so access to care is limited  Patient states that she has special shoe inserts but feels she needed rehab to walk correctly after the cast removal and new shoe inserts, since she felt she kept walking on the side of her foot  Pt states that her blood sugars are generally ranging from  but become extremely elevated when sick  Santyl has been applied daily since admission  Assessment Findings:     1- Left lateral foot: very moist pink wound bed with small amount of slough and biofilm present  Periwound is macerated, erythematic and warm/tender to touch and the left foot is more swollen than the right foot  Wound is approximately 0 6cm deep, no bone is palpated at this time  2- Right foot: clean, dry and intact  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          Wound Care Plan:   1- Left Lateral Foot: irrigate with normal saline and pat dry  Apply skin prep to periwound, especially to white areas of maceration  Cut or tear Maxorb Ag to fit the wound bed and place directly on wound  Cover with 4x4 gauze and an ABD pad folded in half  Wrap with lilian or kerlix  Change once a day or as needed for strikethrough drainage  Surgical shoe should be worn at all times when ambulating         Patient was educated on importance of staying off foot as much as possible and offloading, and discussed following up outpatient with her current podiatrist            Wound Foot Left (Active)   Wound Image   12/4/2019 12:04 PM   Wound Description Pink;Edema;Drainage 12/4/2019 12:04 PM   Ursula-wound Assessment Maceration;Edema; Erythema 12/4/2019 12:04 PM   Wound Length (cm) 0 6 cm 12/4/2019 12:04 PM   Wound Width (cm) 1 cm 12/4/2019 12:04 PM   Wound Depth (cm) 0 6 12/4/2019 12:04 PM   Calculated Wound Area (cm^2) 0 6 cm^2 12/4/2019 12:04 PM   Calculated Wound Volume (cm^3) 0 36 cm^3 12/4/2019 12:04 PM   Drainage Amount Moderate 12/4/2019 12:04 PM   Drainage Description Serosanguineous 12/4/2019 12:04 PM   Treatments Irrigation with NSS 12/4/2019 12:04 PM   Dressing Calcium Alginate with Silver;ABD;Gauze 12/4/2019 12:04 PM   Wound packed? No 12/4/2019  9:59 AM   Dressing Changed New 12/4/2019 12:04 PM   Patient Tolerance Tolerated well 12/4/2019 12:04 PM   Dressing Status Clean; Intact;Dry 12/4/2019 12:04 PM       Wound Care will sign off  Call or Tiger text with any questions      Zoe SEXTON

## 2019-12-04 NOTE — PROGRESS NOTES
Progress Note - General Surgery   Imagene Lise 38 y o  female MRN: [id number]  Unit/Bed#: -01 Encounter: [id number]    Assessment:  37 y/o female with left foot wound    Plan:  Continue santyl to the wound  Pt states she may be unable to afford cream   Care management consult placed  Avoid weight bearing on wound area  Continue abx - culture pending  Stable from surgical standpoint, may follow with podiatry as an outpatient    Subjective/Objective       Subjective: Pt denies left foot pain    Objective:     Blood pressure 127/74, pulse 92, temperature 97 8 °F (36 6 °C), resp  rate 17, height 5' 4" (1 626 m), weight 104 kg (228 lb 13 4 oz), SpO2 99 %  ,Body mass index is 39 28 kg/m²        Intake/Output Summary (Last 24 hours) at 12/4/2019 0800  Last data filed at 12/3/2019 1401  Gross per 24 hour   Intake 1127 87 ml   Output    Net 1127 87 ml       Invasive Devices     Peripheral Intravenous Line            Peripheral IV 12/02/19 Right Antecubital 1 day                Physical Exam: General appearance: alert and oriented, in no acute distress  Extremities: extremities normal, warm and well-perfused; no cyanosis, clubbing, or edema and + 1cm round wound with slight superficial necrosis of the lateral eft foot, minimal erythema anteriorly  Skin: Skin color, texture, turgor normal  No rashes or lesions  Neurologic: Grossly normal    Lab, Imaging and other studies: none new

## 2019-12-04 NOTE — PROGRESS NOTES
Initiated insulin gtt at 7units/hr for bs poct of 356 at Alg  1 per order  Cosigned for Saúl Lee RN

## 2019-12-04 NOTE — PROGRESS NOTES
Vancomycin Assessment    Jimy Olmos is a 38 y o  female who is currently receiving vancomycin 1500 mg iv q 12 hrs for MRSA suspected     Relevant clinical data and objective history reviewed:  Creatinine   Date Value Ref Range Status   12/03/2019 0 84 0 60 - 1 30 mg/dL Final     Comment:     Standardized to IDMS reference method   12/02/2019 1 14 0 60 - 1 30 mg/dL Final     Comment:     Standardized to IDMS reference method     /74   Pulse 92   Temp 97 8 °F (36 6 °C)   Resp 17   Ht 5' 4" (1 626 m)   Wt 104 kg (228 lb 13 4 oz)   SpO2 99%   BMI 39 28 kg/m²   I/O last 3 completed shifts: In: 2127 9 [P O :600; IV Piggyback:1527 9]  Out: -   Lab Results   Component Value Date/Time    BUN 11 12/03/2019 05:47 AM    WBC 5 83 12/03/2019 05:47 AM    HGB 10 2 (L) 12/03/2019 05:47 AM    HCT 31 1 (L) 12/03/2019 05:47 AM    MCV 81 (L) 12/03/2019 05:47 AM     12/03/2019 05:47 AM     Temp Readings from Last 3 Encounters:   12/04/19 97 8 °F (36 6 °C)     Vancomycin Days of Therapy: 3    Assessment/Plan  The patient is currently on vancomycin utilizing scheduled dosing based on actual body weight  Baseline risks associated with therapy include: concomitant nephrotoxic medications  The patient is currently receiving 1500 mg iv q 12 hrs and after clinical evaluation will be changed to 2000 mg iv q 12 hrs  Pharmacy will also follow closely for s/sx of nephrotoxicity, infusion reactions and appropriateness of therapy  BMP and CBC will be ordered per protocol  Plan for trough as patient approaches steady state, prior to the 4th  dose at approximately 1930 on 12/5/19  Due to infection severity, will target a trough of 15-20 (appropriate for most indications)   Pharmacy will continue to follow the patients culture results and clinical progress daily      Harika Mcgarry, Pharmacist

## 2019-12-04 NOTE — PLAN OF CARE
Problem: Potential for Falls  Goal: Patient will remain free of falls  Description  INTERVENTIONS:  - Assess patient frequently for physical needs  -  Identify cognitive and physical deficits and behaviors that affect risk of falls    -  [place] fall precautions as indicated by assessment   - Educate patient/family on patient safety including physical limitations  - Instruct patient to call for assistance with activity based on assessment  - Modify environment to reduce risk of injury  - Consider OT/PT consult to assist with strengthening/mobility  Outcome: Progressing     Problem: PAIN - ADULT  Goal: Verbalizes/displays adequate comfort level or baseline comfort level  Description  Interventions:  - Encourage patient to monitor pain and request assistance  - Assess pain using appropriate pain scale/0-10  - Administer analgesics based on type and severity of pain and evaluate response  - Implement non-pharmacological measures as appropriate and evaluate response  - Consider cultural and social influences on pain and pain management  - Notify physician/advanced practitioner if interventions unsuccessful or patient reports new pain   Outcome: Progressing     Problem: INFECTION - ADULT  Goal: Absence or prevention of progression during hospitalization  Description  INTERVENTIONS:  - Assess and monitor for signs and symptoms of infection,(left lower foot wound monitor ,drainage , pain , swelling,redness)  - Monitor lab/diagnostic results  - Monitor all insertion sites  -   - [place] appropriate cooling/warming therapies per order  - Administer medications as ordered/iv antibiotics, pain medication  - Instruct and encourage patient and family to use good hand hygiene technique  - Identify and instruct in appropriate isolation precautions for identified infection/condition    Outcome: Progressing     Problem: SAFETY ADULT  Goal: Maintain or return to baseline ADL function  Description  INTERVENTIONS:  -  Assess patient's ability to carry out ADLs; assess patient's baseline for ADL function and identify physical deficits which impact ability to perform ADLs (bathing, care of mouth/teeth, toileting, grooming, dressing, etc )  - Assess/evaluate cause of self-care deficits   - Assess range of motion  - Assess patient's mobility; develop plan if impaired  - Assess patient's need for assistive devices and provide as appropriate  - Encourage maximum independence but intervene and supervise when necessary  - Involve family in performance of ADLs  - Assess for home care needs following discharge   - Consider OT consult to assist with ADL evaluation and planning for discharge  - Provide patient education as appropriate  Outcome: Progressing  Goal: Maintain or return mobility status to optimal level  Description  INTERVENTIONS:  - Assess patient's baseline mobility status (ambulation, transfers, stairs, etc )    - Identify cognitive and physical deficits and behaviors that affect mobility  - Identify mobility aids required to assist with transfers and/or ambulation (gait belt, sit-to-stand, lift, walker, cane, etc )  - [place] fall precautions as indicated by assessment  - Record patient progress and toleration of activity level on Mobility SBAR; progress patient to next Phase/Stage  - Instruct patient to call for assistance with activity based on assessment  - Consider rehabilitation consult to assist with strengthening/weightbearing, etc   Outcome: Progressing     Problem: DISCHARGE PLANNING  Goal: Discharge to home or other facility with appropriate resources  Description  INTERVENTIONS:  - Identify barriers to discharge w/patient and caregiver  - Arrange for needed discharge resources and transportation as appropriate  - Identify discharge learning needs (meds, wound care, etc )  - Arrange for interpretive services to assist at discharge as needed  - Refer to Case Management Department for coordinating discharge planning if the patient needs post-hospital services based on physician/advanced practitioner order or complex needs related to functional status, cognitive ability, or social support system  Outcome: Progressing     Problem: Knowledge Deficit  Goal: Patient/family/caregiver demonstrates understanding of disease process, treatment plan, medications, and discharge instructions  Description  Complete learning assessment and assess knowledge base  Interventions:  - Provide teaching at level of understanding  - Provide teaching via preferred learning methods  Outcome: Progressing     Problem: Nutrition/Hydration-ADULT  Goal: Nutrient/Hydration intake appropriate for improving, restoring or maintaining nutritional needs  Description  Monitor and assess patient's nutrition/hydration status for malnutrition  Collaborate with interdisciplinary team and initiate plan and interventions as ordered  Monitor patient's weight and dietary intake as ordered or per policy  Utilize nutrition screening tool and intervene as necessary  Determine patient's food preferences and provide high-protein, high-caloric foods as appropriate       INTERVENTIONS:  - Monitor oral intake, urinary output, labs, and treatment plans  - Assess nutrition and hydration status and recommend course of action  -  - Recommend/ encourage appropriate diets, oral nutritional supplements, and vitamin/mineral supplements  - Order, calculate, and assess calorie counts as needed, const carb level diet  -     - Provide specific nutrition/hydration education as appropriate/encourage protein rich drinks to promote wound healing  - Include patient/family/caregiver in decisions related to nutrition    Outcome: Progressing

## 2019-12-05 VITALS
OXYGEN SATURATION: 98 % | HEIGHT: 64 IN | WEIGHT: 225.31 LBS | HEART RATE: 83 BPM | BODY MASS INDEX: 38.47 KG/M2 | DIASTOLIC BLOOD PRESSURE: 76 MMHG | TEMPERATURE: 98 F | SYSTOLIC BLOOD PRESSURE: 148 MMHG | RESPIRATION RATE: 16 BRPM

## 2019-12-05 LAB
BACTERIA WND AEROBE CULT: ABNORMAL
BACTERIA WND AEROBE CULT: ABNORMAL
GLUCOSE SERPL-MCNC: 157 MG/DL (ref 65–140)
GLUCOSE SERPL-MCNC: 199 MG/DL (ref 65–140)
GLUCOSE SERPL-MCNC: 315 MG/DL (ref 65–140)
GLUCOSE SERPL-MCNC: 324 MG/DL (ref 65–140)
GLUCOSE SERPL-MCNC: 69 MG/DL (ref 65–140)
GRAM STN SPEC: ABNORMAL

## 2019-12-05 PROCEDURE — 82948 REAGENT STRIP/BLOOD GLUCOSE: CPT

## 2019-12-05 PROCEDURE — 99239 HOSP IP/OBS DSCHRG MGMT >30: CPT | Performed by: FAMILY MEDICINE

## 2019-12-05 RX ORDER — INSULIN GLARGINE 100 [IU]/ML
30 INJECTION, SOLUTION SUBCUTANEOUS EVERY 12 HOURS SCHEDULED
Status: DISCONTINUED | OUTPATIENT
Start: 2019-12-05 | End: 2019-12-05 | Stop reason: HOSPADM

## 2019-12-05 RX ORDER — CEPHALEXIN 500 MG/1
500 CAPSULE ORAL EVERY 6 HOURS SCHEDULED
Status: DISCONTINUED | OUTPATIENT
Start: 2019-12-05 | End: 2019-12-05 | Stop reason: HOSPADM

## 2019-12-05 RX ORDER — INSULIN GLARGINE 100 [IU]/ML
60 INJECTION, SOLUTION SUBCUTANEOUS
Refills: 0 | Status: ON HOLD
Start: 2019-12-05 | End: 2020-09-09 | Stop reason: SDUPTHER

## 2019-12-05 RX ORDER — CEPHALEXIN 500 MG/1
500 CAPSULE ORAL EVERY 6 HOURS SCHEDULED
Qty: 28 CAPSULE | Refills: 0 | Status: SHIPPED | OUTPATIENT
Start: 2019-12-05 | End: 2019-12-12

## 2019-12-05 RX ORDER — CEPHALEXIN 500 MG/1
500 CAPSULE ORAL EVERY 6 HOURS SCHEDULED
Qty: 28 CAPSULE | Refills: 0 | Status: CANCELLED | OUTPATIENT
Start: 2019-12-05 | End: 2019-12-12

## 2019-12-05 RX ADMIN — INSULIN LISPRO 15 UNITS: 100 INJECTION, SOLUTION INTRAVENOUS; SUBCUTANEOUS at 08:13

## 2019-12-05 RX ADMIN — INSULIN LISPRO 8 UNITS: 100 INJECTION, SOLUTION INTRAVENOUS; SUBCUTANEOUS at 11:20

## 2019-12-05 RX ADMIN — INSULIN LISPRO 8 UNITS: 100 INJECTION, SOLUTION INTRAVENOUS; SUBCUTANEOUS at 08:12

## 2019-12-05 RX ADMIN — GABAPENTIN 200 MG: 100 CAPSULE ORAL at 08:13

## 2019-12-05 RX ADMIN — INSULIN GLARGINE 30 UNITS: 100 INJECTION, SOLUTION SUBCUTANEOUS at 01:27

## 2019-12-05 RX ADMIN — ENOXAPARIN SODIUM 40 MG: 40 INJECTION SUBCUTANEOUS at 08:14

## 2019-12-05 RX ADMIN — INSULIN LISPRO 15 UNITS: 100 INJECTION, SOLUTION INTRAVENOUS; SUBCUTANEOUS at 11:20

## 2019-12-05 RX ADMIN — Medication 2000 MG: at 09:26

## 2019-12-05 RX ADMIN — INSULIN GLARGINE 30 UNITS: 100 INJECTION, SOLUTION SUBCUTANEOUS at 08:13

## 2019-12-05 NOTE — DISCHARGE INSTR - AVS FIRST PAGE
Follow-up with your podiatrist as soon as possible  Check your blood sugar before meals and at bedtime and keep a log and take the log to the time of appointment  Follow-up a diabetic diet  Nonweightbearing left lower extremity

## 2019-12-05 NOTE — CONSULTS
Vancomycin IV Pharmacy-to-Dose Consultation  Jimy Carmona is a 38 y o  female who was receiving Vancomycin IV with management by the Pharmacy Consult service for treatment of MRSA suspected    The patients Vancomycin therapy has been completed / discontinued  Thank you for allowing us to take part in this patient's care  Pharmacy will sign-off now; please call or re-consult if there are any questions       Inga Gonzalez PharmD  Pharmacist

## 2019-12-05 NOTE — ASSESSMENT & PLAN NOTE
· Wound opened on Saturday with purulent drainage and foul odor  · Had surgery for Charcot foot in April, opened wound is at the location of small surgical site  · Follows with Dr Paola Ray of [facility]  ·  3  · Continue with the cefepime and vancomycin and follow-up on the cultures-culture is growing beta-hemolytic strep    Will discontinue vancomycin  · MRI did not show any evidence of abscess or osteomyelitis  · Surgery evaluation appreciated

## 2019-12-05 NOTE — PLAN OF CARE
Problem: Potential for Falls  Goal: Patient will remain free of falls  Description  INTERVENTIONS:  - Assess patient frequently for physical needs  -  Identify cognitive and physical deficits and behaviors that affect risk of falls    -  [place] fall precautions as indicated by assessment   - Educate patient/family on patient safety including physical limitations  - Instruct patient to call for assistance with activity based on assessment  - Modify environment to reduce risk of injury  - Consider OT/PT consult to assist with strengthening/mobility  Outcome: Progressing     Problem: PAIN - ADULT  Goal: Verbalizes/displays adequate comfort level or baseline comfort level  Description  Interventions:  - Encourage patient to monitor pain and request assistance  - Assess pain using appropriate pain scale/0-10  - Administer analgesics based on type and severity of pain and evaluate response  - Implement non-pharmacological measures as appropriate and evaluate response  - Consider cultural and social influences on pain and pain management  - Notify physician/advanced practitioner if interventions unsuccessful or patient reports new pain   Outcome: Progressing     Problem: INFECTION - ADULT  Goal: Absence or prevention of progression during hospitalization  Description  INTERVENTIONS:  - Assess and monitor for signs and symptoms of infection,(left lower foot wound monitor ,drainage , pain , swelling,redness)  - Monitor lab/diagnostic results  - Monitor all insertion sites  -   - [place] appropriate cooling/warming therapies per order  - Administer medications as ordered/iv antibiotics, pain medication  - Instruct and encourage patient and family to use good hand hygiene technique  - Identify and instruct in appropriate isolation precautions for identified infection/condition    Outcome: Progressing     Problem: SAFETY ADULT  Goal: Maintain or return to baseline ADL function  Description  INTERVENTIONS:  -  Assess patient's ability to carry out ADLs; assess patient's baseline for ADL function and identify physical deficits which impact ability to perform ADLs (bathing, care of mouth/teeth, toileting, grooming, dressing, etc )  - Assess/evaluate cause of self-care deficits   - Assess range of motion  - Assess patient's mobility; develop plan if impaired  - Assess patient's need for assistive devices and provide as appropriate  - Encourage maximum independence but intervene and supervise when necessary  - Involve family in performance of ADLs  - Assess for home care needs following discharge   - Consider OT consult to assist with ADL evaluation and planning for discharge  - Provide patient education as appropriate  Outcome: Progressing  Goal: Maintain or return mobility status to optimal level  Description  INTERVENTIONS:  - Assess patient's baseline mobility status (ambulation, transfers, stairs, etc )    - Identify cognitive and physical deficits and behaviors that affect mobility  - Identify mobility aids required to assist with transfers and/or ambulation (gait belt, sit-to-stand, lift, walker, cane, etc )  - [place] fall precautions as indicated by assessment  - Record patient progress and toleration of activity level on Mobility SBAR; progress patient to next Phase/Stage  - Instruct patient to call for assistance with activity based on assessment  - Consider rehabilitation consult to assist with strengthening/weightbearing, etc   Outcome: Progressing     Problem: DISCHARGE PLANNING  Goal: Discharge to home or other facility with appropriate resources  Description  INTERVENTIONS:  - Identify barriers to discharge w/patient and caregiver  - Arrange for needed discharge resources and transportation as appropriate  - Identify discharge learning needs (meds, wound care, etc )  - Arrange for interpretive services to assist at discharge as needed  - Refer to Case Management Department for coordinating discharge planning if the patient needs post-hospital services based on physician/advanced practitioner order or complex needs related to functional status, cognitive ability, or social support system  Outcome: Progressing     Problem: Knowledge Deficit  Goal: Patient/family/caregiver demonstrates understanding of disease process, treatment plan, medications, and discharge instructions  Description  Complete learning assessment and assess knowledge base  Interventions:  - Provide teaching at level of understanding  - Provide teaching via preferred learning methods  Outcome: Progressing     Problem: Nutrition/Hydration-ADULT  Goal: Nutrient/Hydration intake appropriate for improving, restoring or maintaining nutritional needs  Description  Monitor and assess patient's nutrition/hydration status for malnutrition  Collaborate with interdisciplinary team and initiate plan and interventions as ordered  Monitor patient's weight and dietary intake as ordered or per policy  Utilize nutrition screening tool and intervene as necessary  Determine patient's food preferences and provide high-protein, high-caloric foods as appropriate       INTERVENTIONS:  - Monitor oral intake, urinary output, labs, and treatment plans  - Assess nutrition and hydration status and recommend course of action  -  - Recommend/ encourage appropriate diets, oral nutritional supplements, and vitamin/mineral supplements  - Order, calculate, and assess calorie counts as needed, const carb level diet  -     - Provide specific nutrition/hydration education as appropriate/encourage protein rich drinks to promote wound healing  - Include patient/family/caregiver in decisions related to nutrition    Outcome: Progressing     Problem: METABOLIC, FLUID AND ELECTROLYTES - ADULT  Goal: Electrolytes maintained within normal limits  Description  INTERVENTIONS:  - Monitor labs and assess patient for signs and symptoms of electrolyte imbalances  - Administer electrolyte replacement as ordered  - Monitor response to electrolyte replacements, including repeat lab results as appropriate  - Instruct patient on fluid and nutrition as appropriate  Outcome: Progressing  Goal: Fluid balance maintained  Description  INTERVENTIONS:  - Monitor labs   - Monitor I/O and WT  - Instruct patient on fluid and nutrition as appropriate  - Assess for signs & symptoms of volume excess or deficit  Outcome: Progressing  Goal: Glucose maintained within target range  Description  INTERVENTIONS:  - Monitor Blood Glucose as ordered  - Assess for signs and symptoms of hyperglycemia and hypoglycemia  - Administer ordered medications to maintain glucose within target range  - Assess nutritional intake and initiate nutrition service referral as needed  Outcome: Progressing     Problem: SKIN/TISSUE INTEGRITY - ADULT  Goal: Skin integrity remains intact  Description  INTERVENTIONS  - Identify patients at risk for skin breakdown  - Assess and monitor skin integrity  - Assess and monitor nutrition and hydration status  - Monitor labs (i e  albumin)  - Assess for incontinence   - Turn and reposition patient  - Assist with mobility/ambulation  - Relieve pressure over bony prominences  - Avoid friction and shearing  - Provide appropriate hygiene as needed including keeping skin clean and dry  - Evaluate need for skin moisturizer/barrier cream  - Collaborate with interdisciplinary team (i e  Nutrition, Rehabilitation, etc )   - Patient/family teaching  Outcome: Progressing  Goal: Incision(s), wounds(s) or drain site(s) healing without S/S of infection  Description  INTERVENTIONS  - Assess and document risk factors for skin impairment   - Assess and document dressing, incision, wound bed, drain sites and surrounding tissue  - Consider nutrition services referral as needed  - Oral mucous membranes remain intact  - Provide patient/ family education  Outcome: Progressing  Goal: Oral mucous membranes remain intact  Description  INTERVENTIONS  - Assess oral mucosa and hygiene practices  - Implement preventative oral hygiene regimen  - Implement oral medicated treatments as ordered  - Initiate Nutrition services referral as needed  Outcome: Progressing     Problem: MUSCULOSKELETAL - ADULT  Goal: Maintain or return mobility to safest level of function  Description  INTERVENTIONS:  - Assess patient's ability to carry out ADLs; assess patient's baseline for ADL function and identify physical deficits which impact ability to perform ADLs (bathing, care of mouth/teeth, toileting, grooming, dressing, etc )  - Assess/evaluate cause of self-care deficits   - Assess range of motion  - Assess patient's mobility  - Assess patient's need for assistive devices and provide as appropriate  - Encourage maximum independence but intervene and supervise when necessary  - Involve family in performance of ADLs  - Assess for home care needs following discharge   - Consider OT consult to assist with ADL evaluation and planning for discharge  - Provide patient education as appropriate  Outcome: Progressing  Goal: Maintain proper alignment of affected body part  Description  INTERVENTIONS:  - Support, maintain and protect limb and body alignment  - Provide patient/ family with appropriate education  Outcome: Progressing

## 2019-12-05 NOTE — SOCIAL WORK
Pt cased reviewed during CM rounds  Awaiting results from cultures  Pt anticipated to be ready for d/c today  CM offered [facility] for [facility], pt declined at this time  Pt requesting assistance with medication as she does not currently have insurance   CM to discuss d/c plan and medication with attending Dr Emma Burrell to see if she can find medications that are on the [facility] discount medication list

## 2019-12-05 NOTE — PLAN OF CARE
Problem: Potential for Falls  Goal: Patient will remain free of falls  Description  INTERVENTIONS:  - Assess patient frequently for physical needs  -  Identify cognitive and physical deficits and behaviors that affect risk of falls    -  [place] fall precautions as indicated by assessment   - Educate patient/family on patient safety including physical limitations  - Instruct patient to call for assistance with activity based on assessment  - Modify environment to reduce risk of injury  - Consider OT/PT consult to assist with strengthening/mobility  12/5/2019 1541 by Raul Velez RN  Outcome: Adequate for Discharge  12/5/2019 0650 by Raul Velez RN  Outcome: Progressing     Problem: PAIN - ADULT  Goal: Verbalizes/displays adequate comfort level or baseline comfort level  Description  Interventions:  - Encourage patient to monitor pain and request assistance  - Assess pain using appropriate pain scale/0-10  - Administer analgesics based on type and severity of pain and evaluate response  - Implement non-pharmacological measures as appropriate and evaluate response  - Consider cultural and social influences on pain and pain management  - Notify physician/advanced practitioner if interventions unsuccessful or patient reports new pain   12/5/2019 1541 by Raul Velez RN  Outcome: Adequate for Discharge  12/5/2019 0650 by Raul Velez RN  Outcome: Progressing     Problem: INFECTION - ADULT  Goal: Absence or prevention of progression during hospitalization  Description  INTERVENTIONS:  - Assess and monitor for signs and symptoms of infection,(left lower foot wound monitor ,drainage , pain , swelling,redness)  - Monitor lab/diagnostic results  - Monitor all insertion sites  -   - [place] appropriate cooling/warming therapies per order  - Administer medications as ordered/iv antibiotics, pain medication  - Instruct and encourage patient and family to use good hand hygiene technique  - Identify and instruct in appropriate isolation precautions for identified infection/condition    12/5/2019 1541 by Vivian Solis RN  Outcome: Adequate for Discharge  12/5/2019 0650 by Vivian Solis RN  Outcome: Progressing     Problem: SAFETY ADULT  Goal: Maintain or return to baseline ADL function  Description  INTERVENTIONS:  -  Assess patient's ability to carry out ADLs; assess patient's baseline for ADL function and identify physical deficits which impact ability to perform ADLs (bathing, care of mouth/teeth, toileting, grooming, dressing, etc )  - Assess/evaluate cause of self-care deficits   - Assess range of motion  - Assess patient's mobility; develop plan if impaired  - Assess patient's need for assistive devices and provide as appropriate  - Encourage maximum independence but intervene and supervise when necessary  - Involve family in performance of ADLs  - Assess for home care needs following discharge   - Consider OT consult to assist with ADL evaluation and planning for discharge  - Provide patient education as appropriate  12/5/2019 1541 by Vivian Solis RN  Outcome: Adequate for Discharge  12/5/2019 0650 by Vivian Solis RN  Outcome: Progressing  Goal: Maintain or return mobility status to optimal level  Description  INTERVENTIONS:  - Assess patient's baseline mobility status (ambulation, transfers, stairs, etc )    - Identify cognitive and physical deficits and behaviors that affect mobility  - Identify mobility aids required to assist with transfers and/or ambulation (gait belt, sit-to-stand, lift, walker, cane, etc )  - [place] fall precautions as indicated by assessment  - Record patient progress and toleration of activity level on Mobility SBAR; progress patient to next Phase/Stage  - Instruct patient to call for assistance with activity based on assessment  - Consider rehabilitation consult to assist with strengthening/weightbearing, etc   12/5/2019 1541 by Vivian Solis RN  Outcome: Adequate for Discharge  12/5/2019 0650 by Belem Jon RN  Outcome: Progressing     Problem: DISCHARGE PLANNING  Goal: Discharge to home or other facility with appropriate resources  Description  INTERVENTIONS:  - Identify barriers to discharge w/patient and caregiver  - Arrange for needed discharge resources and transportation as appropriate  - Identify discharge learning needs (meds, wound care, etc )  - Arrange for interpretive services to assist at discharge as needed  - Refer to Case Management Department for coordinating discharge planning if the patient needs post-hospital services based on physician/advanced practitioner order or complex needs related to functional status, cognitive ability, or social support system  12/5/2019 1541 by Belem Jon RN  Outcome: Adequate for Discharge  12/5/2019 0650 by Belem Jon RN  Outcome: Progressing     Problem: Knowledge Deficit  Goal: Patient/family/caregiver demonstrates understanding of disease process, treatment plan, medications, and discharge instructions  Description  Complete learning assessment and assess knowledge base  Interventions:  - Provide teaching at level of understanding  - Provide teaching via preferred learning methods  12/5/2019 1541 by Belem Jon RN  Outcome: Adequate for Discharge  12/5/2019 0650 by Belem Jon RN  Outcome: Progressing     Problem: Nutrition/Hydration-ADULT  Goal: Nutrient/Hydration intake appropriate for improving, restoring or maintaining nutritional needs  Description  Monitor and assess patient's nutrition/hydration status for malnutrition  Collaborate with interdisciplinary team and initiate plan and interventions as ordered  Monitor patient's weight and dietary intake as ordered or per policy  Utilize nutrition screening tool and intervene as necessary  Determine patient's food preferences and provide high-protein, high-caloric foods as appropriate       INTERVENTIONS:  - Monitor oral intake, urinary output, labs, and treatment plans  - Assess nutrition and hydration status and recommend course of action  -  - Recommend/ encourage appropriate diets, oral nutritional supplements, and vitamin/mineral supplements  - Order, calculate, and assess calorie counts as needed, const carb level diet  -     - Provide specific nutrition/hydration education as appropriate/encourage protein rich drinks to promote wound healing  - Include patient/family/caregiver in decisions related to nutrition    12/5/2019 1541 by Jenni Strong RN  Outcome: Adequate for Discharge  12/5/2019 0650 by Jenni Strong RN  Outcome: Progressing     Problem: METABOLIC, FLUID AND ELECTROLYTES - ADULT  Goal: Electrolytes maintained within normal limits  Description  INTERVENTIONS:  - Monitor labs and assess patient for signs and symptoms of electrolyte imbalances  - Administer electrolyte replacement as ordered  - Monitor response to electrolyte replacements, including repeat lab results as appropriate  - Instruct patient on fluid and nutrition as appropriate  12/5/2019 1541 by Jenni Strong RN  Outcome: Adequate for Discharge  12/5/2019 0650 by Jenni Strong RN  Outcome: Progressing  Goal: Fluid balance maintained  Description  INTERVENTIONS:  - Monitor labs   - Monitor I/O and WT  - Instruct patient on fluid and nutrition as appropriate  - Assess for signs & symptoms of volume excess or deficit  12/5/2019 1541 by Jenni Strong RN  Outcome: Adequate for Discharge  12/5/2019 0650 by Jenni Strong RN  Outcome: Progressing  Goal: Glucose maintained within target range  Description  INTERVENTIONS:  - Monitor Blood Glucose as ordered  - Assess for signs and symptoms of hyperglycemia and hypoglycemia  - Administer ordered medications to maintain glucose within target range  - Assess nutritional intake and initiate nutrition service referral as needed  12/5/2019 1541 by Jenni Strong RN  Outcome: Adequate for Discharge  12/5/2019 8578 by Silvino Robert RN  Outcome: Progressing     Problem: SKIN/TISSUE INTEGRITY - ADULT  Goal: Skin integrity remains intact  Description  INTERVENTIONS  - Identify patients at risk for skin breakdown  - Assess and monitor skin integrity  - Assess and monitor nutrition and hydration status  - Monitor labs (i e  albumin)  - Assess for incontinence   - Turn and reposition patient  - Assist with mobility/ambulation  - Relieve pressure over bony prominences  - Avoid friction and shearing  - Provide appropriate hygiene as needed including keeping skin clean and dry  - Evaluate need for skin moisturizer/barrier cream  - Collaborate with interdisciplinary team (i e  Nutrition, Rehabilitation, etc )   - Patient/family teaching  12/5/2019 1541 by Silvino Robert RN  Outcome: Adequate for Discharge  12/5/2019 0650 by Silvino Robert RN  Outcome: Progressing  Goal: Incision(s), wounds(s) or drain site(s) healing without S/S of infection  Description  INTERVENTIONS  - Assess and document risk factors for skin impairment   - Assess and document dressing, incision, wound bed, drain sites and surrounding tissue  - Consider nutrition services referral as needed  - Oral mucous membranes remain intact  - Provide patient/ family education  12/5/2019 1541 by Silvino Robert RN  Outcome: Adequate for Discharge  12/5/2019 0650 by Silvino Robert RN  Outcome: Progressing  Goal: Oral mucous membranes remain intact  Description  INTERVENTIONS  - Assess oral mucosa and hygiene practices  - Implement preventative oral hygiene regimen  - Implement oral medicated treatments as ordered  - Initiate Nutrition services referral as needed  12/5/2019 1541 by Silvino Robert RN  Outcome: Adequate for Discharge  12/5/2019 0650 by Silvino Robert RN  Outcome: Progressing     Problem: MUSCULOSKELETAL - ADULT  Goal: Maintain or return mobility to safest level of function  Description  INTERVENTIONS:  - Assess patient's ability to carry out ADLs; assess patient's baseline for ADL function and identify physical deficits which impact ability to perform ADLs (bathing, care of mouth/teeth, toileting, grooming, dressing, etc )  - Assess/evaluate cause of self-care deficits   - Assess range of motion  - Assess patient's mobility  - Assess patient's need for assistive devices and provide as appropriate  - Encourage maximum independence but intervene and supervise when necessary  - Involve family in performance of ADLs  - Assess for home care needs following discharge   - Consider OT consult to assist with ADL evaluation and planning for discharge  - Provide patient education as appropriate  12/5/2019 1541 by Edwin Carmichael RN  Outcome: Adequate for Discharge  12/5/2019 0650 by Edwin Carmichael RN  Outcome: Progressing  Goal: Maintain proper alignment of affected body part  Description  INTERVENTIONS:  - Support, maintain and protect limb and body alignment  - Provide patient/ family with appropriate education  12/5/2019 1541 by Edwin Carmichael RN  Outcome: Adequate for Discharge  12/5/2019 0650 by Edwin Carmichael RN  Outcome: Progressing

## 2019-12-05 NOTE — ASSESSMENT & PLAN NOTE
Lab Results   Component Value Date    HGBA1C 12 1 (H) 12/03/2019       Recent Labs     12/04/19  1518 12/04/19  1655 12/04/19  1800 12/04/19 2014   POCGLU 356* 423* 357* 261*       Blood Sugar Average: Last 72 hrs:  (P) 696 [id number]     · Patient with consistently elevated blood sugar  · Started on insulin drip and monitor blood sugar  · Patient reported that she was on Lantus 58 units at nighttime and Apidra 10 units with meals

## 2019-12-05 NOTE — DISCHARGE INSTRUCTIONS
Irrigate with normal saline and pat dry  Apply skin prep to periwound, especially to white areas of maceration  Cut or tear Maxorb Ag to fit the wound bed and place directly on wound  Cover with 4x4 gauze and an ABD pad folded in half  Wrap with lilian or kerlix  Change once a day or as needed for strikethrough drainage  Surgical shoe should be worn at all times when ambulating

## 2019-12-05 NOTE — NURSING NOTE
2400 FSBS 69, pt states that she "feels okay " Snack/drink provided, pt refused at this time  SLIM NP made aware, insulin gtt d/c'd  Will monitor FSBS & patient  Denies further complaints  Call bell in reach

## 2019-12-05 NOTE — PLAN OF CARE
Problem: Potential for Falls  Goal: Patient will remain free of falls  Description  INTERVENTIONS:  - Assess patient frequently for physical needs  -  Identify cognitive and physical deficits and behaviors that affect risk of falls    -  [place] fall precautions as indicated by assessment   - Educate patient/family on patient safety including physical limitations  - Instruct patient to call for assistance with activity based on assessment  - Modify environment to reduce risk of injury  - Consider OT/PT consult to assist with strengthening/mobility  Outcome: Progressing     Problem: PAIN - ADULT  Goal: Verbalizes/displays adequate comfort level or baseline comfort level  Description  Interventions:  - Encourage patient to monitor pain and request assistance  - Assess pain using appropriate pain scale/0-10  - Administer analgesics based on type and severity of pain and evaluate response  - Implement non-pharmacological measures as appropriate and evaluate response  - Consider cultural and social influences on pain and pain management  - Notify physician/advanced practitioner if interventions unsuccessful or patient reports new pain   Outcome: Progressing     Problem: INFECTION - ADULT  Goal: Absence or prevention of progression during hospitalization  Description  INTERVENTIONS:  - Assess and monitor for signs and symptoms of infection,(left lower foot wound monitor ,drainage , pain , swelling,redness)  - Monitor lab/diagnostic results  - Monitor all insertion sites  -   - [place] appropriate cooling/warming therapies per order  - Administer medications as ordered/iv antibiotics, pain medication  - Instruct and encourage patient and family to use good hand hygiene technique  - Identify and instruct in appropriate isolation precautions for identified infection/condition    Outcome: Progressing     Problem: SAFETY ADULT  Goal: Maintain or return to baseline ADL function  Description  INTERVENTIONS:  -  Assess patient's ability to carry out ADLs; assess patient's baseline for ADL function and identify physical deficits which impact ability to perform ADLs (bathing, care of mouth/teeth, toileting, grooming, dressing, etc )  - Assess/evaluate cause of self-care deficits   - Assess range of motion  - Assess patient's mobility; develop plan if impaired  - Assess patient's need for assistive devices and provide as appropriate  - Encourage maximum independence but intervene and supervise when necessary  - Involve family in performance of ADLs  - Assess for home care needs following discharge   - Consider OT consult to assist with ADL evaluation and planning for discharge  - Provide patient education as appropriate  Outcome: Progressing  Goal: Maintain or return mobility status to optimal level  Description  INTERVENTIONS:  - Assess patient's baseline mobility status (ambulation, transfers, stairs, etc )    - Identify cognitive and physical deficits and behaviors that affect mobility  - Identify mobility aids required to assist with transfers and/or ambulation (gait belt, sit-to-stand, lift, walker, cane, etc )  - [place] fall precautions as indicated by assessment  - Record patient progress and toleration of activity level on Mobility SBAR; progress patient to next Phase/Stage  - Instruct patient to call for assistance with activity based on assessment  - Consider rehabilitation consult to assist with strengthening/weightbearing, etc   Outcome: Progressing     Problem: DISCHARGE PLANNING  Goal: Discharge to home or other facility with appropriate resources  Description  INTERVENTIONS:  - Identify barriers to discharge w/patient and caregiver  - Arrange for needed discharge resources and transportation as appropriate  - Identify discharge learning needs (meds, wound care, etc )  - Arrange for interpretive services to assist at discharge as needed  - Refer to Case Management Department for coordinating discharge planning if the patient needs post-hospital services based on physician/advanced practitioner order or complex needs related to functional status, cognitive ability, or social support system  Outcome: Progressing     Problem: Knowledge Deficit  Goal: Patient/family/caregiver demonstrates understanding of disease process, treatment plan, medications, and discharge instructions  Description  Complete learning assessment and assess knowledge base  Interventions:  - Provide teaching at level of understanding  - Provide teaching via preferred learning methods  Outcome: Progressing     Problem: Nutrition/Hydration-ADULT  Goal: Nutrient/Hydration intake appropriate for improving, restoring or maintaining nutritional needs  Description  Monitor and assess patient's nutrition/hydration status for malnutrition  Collaborate with interdisciplinary team and initiate plan and interventions as ordered  Monitor patient's weight and dietary intake as ordered or per policy  Utilize nutrition screening tool and intervene as necessary  Determine patient's food preferences and provide high-protein, high-caloric foods as appropriate       INTERVENTIONS:  - Monitor oral intake, urinary output, labs, and treatment plans  - Assess nutrition and hydration status and recommend course of action  -  - Recommend/ encourage appropriate diets, oral nutritional supplements, and vitamin/mineral supplements  - Order, calculate, and assess calorie counts as needed, const carb level diet  -     - Provide specific nutrition/hydration education as appropriate/encourage protein rich drinks to promote wound healing  - Include patient/family/caregiver in decisions related to nutrition    Outcome: Progressing     Problem: METABOLIC, FLUID AND ELECTROLYTES - ADULT  Goal: Electrolytes maintained within normal limits  Description  INTERVENTIONS:  - Monitor labs and assess patient for signs and symptoms of electrolyte imbalances  - Administer electrolyte replacement as ordered  - Monitor response to electrolyte replacements, including repeat lab results as appropriate  - Instruct patient on fluid and nutrition as appropriate  Outcome: Progressing  Goal: Fluid balance maintained  Description  INTERVENTIONS:  - Monitor labs   - Monitor I/O and WT  - Instruct patient on fluid and nutrition as appropriate  - Assess for signs & symptoms of volume excess or deficit  Outcome: Progressing  Goal: Glucose maintained within target range  Description  INTERVENTIONS:  - Monitor Blood Glucose as ordered  - Assess for signs and symptoms of hyperglycemia and hypoglycemia  - Administer ordered medications to maintain glucose within target range  - Assess nutritional intake and initiate nutrition service referral as needed  Outcome: Progressing     Problem: SKIN/TISSUE INTEGRITY - ADULT  Goal: Skin integrity remains intact  Description  INTERVENTIONS  - Identify patients at risk for skin breakdown  - Assess and monitor skin integrity  - Assess and monitor nutrition and hydration status  - Monitor labs (i e  albumin)  - Assess for incontinence   - Turn and reposition patient  - Assist with mobility/ambulation  - Relieve pressure over bony prominences  - Avoid friction and shearing  - Provide appropriate hygiene as needed including keeping skin clean and dry  - Evaluate need for skin moisturizer/barrier cream  - Collaborate with interdisciplinary team (i e  Nutrition, Rehabilitation, etc )   - Patient/family teaching  Outcome: Progressing  Goal: Incision(s), wounds(s) or drain site(s) healing without S/S of infection  Description  INTERVENTIONS  - Assess and document risk factors for skin impairment   - Assess and document dressing, incision, wound bed, drain sites and surrounding tissue  - Consider nutrition services referral as needed  - Oral mucous membranes remain intact  - Provide patient/ family education  Outcome: Progressing  Goal: Oral mucous membranes remain intact  Description  INTERVENTIONS  - Assess oral mucosa and hygiene practices  - Implement preventative oral hygiene regimen  - Implement oral medicated treatments as ordered  - Initiate Nutrition services referral as needed  Outcome: Progressing     Problem: MUSCULOSKELETAL - ADULT  Goal: Maintain or return mobility to safest level of function  Description  INTERVENTIONS:  - Assess patient's ability to carry out ADLs; assess patient's baseline for ADL function and identify physical deficits which impact ability to perform ADLs (bathing, care of mouth/teeth, toileting, grooming, dressing, etc )  - Assess/evaluate cause of self-care deficits   - Assess range of motion  - Assess patient's mobility  - Assess patient's need for assistive devices and provide as appropriate  - Encourage maximum independence but intervene and supervise when necessary  - Involve family in performance of ADLs  - Assess for home care needs following discharge   - Consider OT consult to assist with ADL evaluation and planning for discharge  - Provide patient education as appropriate  Outcome: Progressing  Goal: Maintain proper alignment of affected body part  Description  INTERVENTIONS:  - Support, maintain and protect limb and body alignment  - Provide patient/ family with appropriate education  Outcome: Progressing

## 2019-12-05 NOTE — PROGRESS NOTES
Progress Note - Kylie Nickerson 1975, 38 y o  female MRN: [id number]    Unit/Bed#: -01 Encounter: [id number]    Primary Care Provider: Marcus Bailey MD   Date and time admitted to hospital: 12/2/2019  5:17 PM        Diabetes mellitus type 2, insulin dependent [facility])  Assessment & Plan  Lab Results   Component Value Date    HGBA1C 12 1 (H) 12/03/2019       Recent Labs     12/04/19  1518 12/04/19  1655 12/04/19  1800 12/04/19 2014   POCGLU 356* 423* 357* 261*       Blood Sugar Average: Last 72 hrs:  (P) 436 [id number]     · Patient with consistently elevated blood sugar  · Started on insulin drip and monitor blood sugar  · Patient reported that she was on Lantus 58 units at nighttime and Apidra 10 units with meals      * Open wound of left foot  Assessment & Plan      · Wound opened on Saturday with purulent drainage and foul odor  · Had surgery for Charcot foot in April, opened wound is at the location of small surgical site  · Follows with Dr Ephraim Ng of [facility]  ·  3  · Continue with the cefepime and vancomycin and follow-up on the cultures-culture is growing beta-hemolytic strep  Will discontinue vancomycin  · MRI did not show any evidence of abscess or osteomyelitis  · Surgery evaluation appreciated        VTE Pharmacologic Prophylaxis:   Pharmacologic: Enoxaparin (Lovenox)  Mechanical VTE Prophylaxis in Place: Yes    Patient Centered Rounds: I have performed bedside rounds with nursing staff today  Discussions with Specialists or Other Care Team Provider:     Education and Discussions with Family / Patient:     Time Spent for Care: 30 minutes  More than 50% of total time spent on counseling and coordination of care as described above      Current Length of Stay: 2 day(s)    Current Patient Status: Inpatient   Certification Statement: The patient will continue to require additional inpatient hospital stay due to Pending final wound culture    Discharge Plan:     Code Status: Level 1 - Full Code      Subjective:   Patient seen and examined  No specific complaints  Appears that the drainage is improving and no surgical intervention is needed    Objective:     Vitals:   Temp (24hrs), Av 2 °F (36 8 °C), Min:97 8 °F (36 6 °C), Max:98 6 °F (37 °C)    Temp:  [97 8 °F (36 6 °C)-98 6 °F (37 °C)] 98 6 °F (37 °C)  HR:  [85-92] 89  Resp:  [17-18] 17  BP: (124-127)/(71-74) 127/74  SpO2:  [97 %-99 %] 97 %  Body mass index is 39 28 kg/m²  Input and Output Summary (last 24 hours): Intake/Output Summary (Last 24 hours) at 2019  Last data filed at 2019 1744  Gross per 24 hour   Intake 600 ml   Output 1 ml   Net 599 ml       Physical Exam:     Physical Exam   Constitutional: She appears well-developed  No distress  HENT:   Head: Normocephalic and atraumatic  Eyes: Right eye exhibits no discharge  Left eye exhibits no discharge  Neck: No JVD present  Cardiovascular: Normal rate and regular rhythm  No murmur heard  Pulmonary/Chest: Effort normal  No stridor  No respiratory distress  Abdominal: Soft  Musculoskeletal: Normal range of motion  Lower extremity wound covered in dressing   Neurological: She is alert  Additional Data:     Labs:    Results from last 7 days   Lab Units 19  0547   WBC Thousand/uL 5 83   HEMOGLOBIN g/dL 10 2*   HEMATOCRIT % 31 1*   PLATELETS Thousands/uL 236   NEUTROS PCT % 61   LYMPHS PCT % 24   MONOS PCT % 9   EOS PCT % 4     Results from last 7 days   Lab Units 19  0547 19  1736   POTASSIUM mmol/L 3 5 3 8   CHLORIDE mmol/L 104 95*   CO2 mmol/L 24 25   BUN mg/dL 11 15   CREATININE mg/dL 0 84 1 14   CALCIUM mg/dL 8 4 8 8   ALK PHOS U/L  --  109   ALT U/L  --  16   AST U/L  --  12           * I Have Reviewed All Lab Data Listed Above  * Additional Pertinent Lab Tests Reviewed:  All Labs For Current Hospital Admission Reviewed    Imaging:    Imaging Reports Reviewed Today Include:   Imaging Personally Reviewed by Myself Includes:     Recent Cultures (last 7 days):     Results from last 7 days   Lab Units 12/02/19 1945 12/02/19 1828 12/02/19 1827   BLOOD CULTURE   --  No Growth at 24 hrs  No Growth at 24 hrs  GRAM STAIN RESULT  3+ Gram positive cocci in pairs and chains*  1+ Gram negative rods*  No Polys*  --   --    WOUND CULTURE  4+ Growth of Beta Hemolytic Streptococcus Group B*  --   --        Last 24 Hours Medication List:     Current Facility-Administered Medications:  acetaminophen 650 mg Oral Q4H PRN KIRA Sánchez    cefepime 2,000 mg Intravenous Q12H KIRA Sánchez Last Rate: 2,000 mg (12/04/19 1117)   enoxaparin 40 mg Subcutaneous Daily KIRA Sánchez    gabapentin 200 mg Oral BID KIRA Sánchez    HYDROmorphone 0 5 mg Intravenous Q1H PRN KIRA Sánchez    insulin regular (HumuLIN R,NovoLIN R) infusion 0 3-21 Units/hr Intravenous Titrated Laine Rain MD Last Rate: 6 Units/hr (12/04/19 2030)   ondansetron 4 mg Intravenous Q6H PRN KIRA Sánchez    oxyCODONE 10 mg Oral Q4H PRN KIRA Sánchez    oxyCODONE 5 mg Oral Q4H PRN KIRA Sánchez    vancomycin 2,000 mg Intravenous Q12H KIRA Sánchez         Today, Patient Was Seen By: Laine Rain MD    ** Please Note: Dictation voice to text software may have been used in the creation of this document   **

## 2019-12-06 ENCOUNTER — DOCUMENTATION (OUTPATIENT)
Dept: OTHER | Facility: HOSPITAL | Age: 44
End: 2019-12-06

## 2019-12-07 NOTE — DISCHARGE SUMMARY
Discharge summary- Nino Pham 1975, 38 y o  female MRN: [id number]     Unit/Bed#: -01 Encounter: [id number]     Primary Care Provider: Vanesa Corral MD   Date and time admitted to hospital: 12/2/2019  5:17 PM           Diabetes mellitus type 2, insulin dependent [facility])  Assessment & Plan        Lab Results   Component Value Date     HGBA1C 12 1 (H) 12/03/2019                Recent Labs     12/05/19  0103 12/05/19  0738 12/05/19  1043 12/05/19  1551   POCGLU 157* 324* 315* 199*         Blood Sugar Average: Last 72 hrs:  (P) 882 [id number]      · Patient's blood sugar was controlled with the insulin drip and transition to Lantus and sliding scale of insulin  Patient reported that she has Lantus and mealtime insulin at home and she can manage her blood sugar at home  · Recommended to follow up with the primary care physician as an outpatient given her elevated hemoglobin A1c  · Also recommended to check her blood sugar before meals and at bedtime and take the log to the primary care physician at the time of appointment for further management of her blood sugar        * Open wound of left foot  Assessment & Plan        · Wound opened on Saturday with purulent drainage and foul odor  · Had surgery for Charcot foot in April, opened wound is at the location of small surgical site  · Follows with Dr Stephanie Nettles of [facility]  ·  3  · Wound culture is growing group B strep-transition to Keflex  · MRI did not show any evidence of abscess or osteomyelitis  · Surgery evaluation appreciated, continue with the local wound care    Patient reported that she can manage the wound care as an outpatient           Discharging Physician / Practitioner: Jagruti Garcia MD  PCP: Vanesa Corral MD  Admission Date:       Admission Orders (From admission, onward)       Ordered          12/02/19 2005   Inpatient Admission (expected length of stay for this patient Order details is greater than two midnights)  Once                       Discharge Date: 12/5/19          Resolved Problems  Date Reviewed: 12/7/2019     None             Consultations During Hospital Stay:  · Surgery  · Wound care     Procedures Performed:   ·       Significant Findings / Test Results:   · MRI foot-postsurgical changes and possibly cellulitis at the lateral aspect of the midfoot without drainable abscess or evidence of osteomyelitis  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     Incidental Findings:   ·       Test Results Pending at Discharge (will require follow up):   ·       Outpatient Tests Requested:  ·       Complications:   none     Reason for Admission:  Open wound on the left foot     Hospital Course:      Jimy Ponce is a 38 y o  female patient who originally presented to the hospital on 12/2/2019 due to nonhealing/draining wound on the left  Foot  Patient with known history of diabetes mellitus with previous ulcerations  Patient was admitted to the hospital and had an MRI done which was negative for any abscess or any osteomyelitis  Patient believes her wound developed from her new orthotic shoes which was rubbing again is her foot  Patient was admitted to the hospital and surgery evaluated the patient  Wound Care followed the patient while in the hospital and manage the dressing changes  Her wound culture came back positive for group B strep  Patient was on vancomycin which was subsequently transitioned to Keflex  Patient do not have insurance but she is able to afford Keflex  Patient will be discharged home with 7 more days of Keflex with outpatient wound care and follow-up with the primary care physician and podiatrist as an outpatient  Patient has her own podiatrist into our health system  Patient remained afebrile and hemodynamically stable  Patient with uncontrolled blood sugar and her hemoglobin A1c was about 12  This was discussed with the patient in detail    Patient reported that she does not have insurance and she tries to stretch  her insulin  Recommended that she should monitor her blood sugar before meals and at bedtime and keep a log and take the log to the primary care physician at the time of appointment  For details refer to the chart        Please see above list of diagnoses and related plan for additional information       Condition at Discharge: good      Discharge Day Visit / Exam:      Subjective:  Patient seen and examined  Patient feeling well and wants to go home  Discussed with the patient about transition to Keflex  Patient does not have any insurance and she can afford Keflex  Vitals: Blood Pressure: 148/76 (12/05/19 1531)  Pulse: 83 (12/05/19 1531)  Temperature: 98 °F (36 7 °C) (12/05/19 1531)  Temp Source: Oral (12/02/19 2136)  Respirations: 16 (12/05/19 1531)  Height: 5' 4" (162 6 cm) (12/03/19 1110)  Weight - Scale: 102 kg (225 lb 5 oz) (12/05/19 0525)  SpO2: 98 % (12/05/19 1531)  Exam:   Physical Exam   Constitutional: She appears well-developed  No distress  HENT:   Head: Normocephalic and atraumatic  Eyes: Right eye exhibits no discharge  Left eye exhibits no discharge  Neck: No JVD present  Cardiovascular: Normal rate and regular rhythm  Pulmonary/Chest: Effort normal and breath sounds normal    Abdominal: Soft  Musculoskeletal:   Right foot wound with decreasing drainage  No erythema noted in the surrounding region   Neurological: She is alert  No cranial nerve deficit  Skin: Skin is warm   No erythema          Discussion with Family:  Patient     Discharge instructions/Information to patient and family:   See after visit summary for information provided to patient and family        Provisions for Follow-Up Care:  See after visit summary for information related to follow-up care and any pertinent home health orders        Disposition:      Home     For Discharges to [address] [facility]:   · Not Applicable to this Patient - Not Applicable to this Patient     Planned Readmission: none     Discharge Statement:  I spent 45 minutes discharging the patient  This time was spent on the day of discharge  I had direct contact with the patient on the day of discharge  Greater than 50% of the total time was spent examining patient, answering all patient questions, arranging and discussing plan of care with patient as well as directly providing post-discharge instructions    Additional time then spent on discharge activities      Discharge Medications:  See after visit summary for reconciled discharge medications provided to patient and family        ** Please Note: This note has been constructed using a voice recognition system **

## 2019-12-07 NOTE — ASSESSMENT & PLAN NOTE
· Wound opened on Saturday with purulent drainage and foul odor  · Had surgery for Charcot foot in April, opened wound is at the location of small surgical site  · Follows with Dr Santa Pimentel of [facility]  ·  3  · Wound culture is growing group B strep-transition to Keflex  · MRI did not show any evidence of abscess or osteomyelitis  · Surgery evaluation appreciated, continue with the local wound care    Patient reported that she can manage the wound care as an outpatient

## 2019-12-07 NOTE — PROGRESS NOTES
Progress Note - Roxana Rubalcava 1975, 38 y o  female MRN: [id number]    Unit/Bed#: -01 Encounter: [id number]    Primary Care Provider: Chris Alexander MD   Date and time admitted to hospital: 12/2/2019  5:17 PM        Diabetes mellitus type 2, insulin dependent [facility])  Assessment & Plan  Lab Results   Component Value Date    HGBA1C 12 1 (H) 12/03/2019       Recent Labs     12/05/19  0103 12/05/19  0738 12/05/19  1043 12/05/19  1551   POCGLU 157* 324* 315* 199*       Blood Sugar Average: Last 72 hrs:  (P) 780 [id number]     · Patient's blood sugar was controlled with the insulin drip and transition to Lantus and sliding scale of insulin  Patient reported that she has Lantus and mealtime insulin at home and she can manage her blood sugar at home  · Recommended to follow up with the primary care physician as an outpatient given her elevated hemoglobin A1c  · Also recommended to check her blood sugar before meals and at bedtime and take the log to the primary care physician at the time of appointment for further management of her blood sugar      * Open wound of left foot  Assessment & Plan      · Wound opened on Saturday with purulent drainage and foul odor  · Had surgery for Charcot foot in April, opened wound is at the location of small surgical site  · Follows with Dr Van Mckay of [facility]  ·  3  · Wound culture is growing group B strep-transition to Keflex  · MRI did not show any evidence of abscess or osteomyelitis  · Surgery evaluation appreciated, continue with the local wound care    Patient reported that she can manage the wound care as an outpatient        Discharging Physician / Practitioner: Jodie Ontiveros MD  PCP: Chris Alexander MD  Admission Date:   Admission Orders (From admission, onward)     Ordered        12/02/19 2005  Inpatient Admission (expected length of stay for this patient Order details is greater than two midnights)  Once Discharge Date: 12/07/19    Resolved Problems  Date Reviewed: 12/7/2019    None          Consultations During Hospital Stay:  · Surgery  · Wound care    Procedures Performed:   ·     Significant Findings / Test Results:   · MRI foot-postsurgical changes and possibly cellulitis at the lateral aspect of the midfoot without drainable abscess or evidence of osteomyelitis  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    Incidental Findings:   ·     Test Results Pending at Discharge (will require follow up):   ·      Outpatient Tests Requested:  ·     Complications:   none    Reason for Admission:  Open wound on the left foot    Hospital Course: Jimy Frazier is a 38 y o  female patient who originally presented to the hospital on 12/2/2019 due to nonhealing/draining wound on the left  Foot  Patient with known history of diabetes mellitus with previous ulcerations  Patient was admitted to the hospital and had an MRI done which was negative for any abscess or any osteomyelitis  Patient believes her wound developed from her new orthotic shoes which was rubbing again is her foot  Patient was admitted to the hospital and surgery evaluated the patient  Wound Care followed the patient while in the hospital and manage the dressing changes  Her wound culture came back positive for group B strep  Patient was on vancomycin which was subsequently transitioned to Keflex  Patient do not have insurance but she is able to afford Keflex  Patient will be discharged home with 7 more days of Keflex with outpatient wound care and follow-up with the primary care physician and podiatrist as an outpatient  Patient has her own podiatrist into our health system  Patient remained afebrile and hemodynamically stable  Patient with uncontrolled blood sugar and her hemoglobin A1c was about 12  This was discussed with the patient in detail    Patient reported that she does not have insurance and she tries to stretch her insulin  Recommended that she should monitor her blood sugar before meals and at bedtime and keep a log and take the log to the primary care physician at the time of appointment  For details refer to the chart      Please see above list of diagnoses and related plan for additional information  Condition at Discharge: good     Discharge Day Visit / Exam:     Subjective:  Patient seen and examined  Patient feeling well and wants to go home  Discussed with the patient about transition to Keflex  Patient does not have any insurance and she can afford Keflex  Vitals: Blood Pressure: 148/76 (12/05/19 1531)  Pulse: 83 (12/05/19 1531)  Temperature: 98 °F (36 7 °C) (12/05/19 1531)  Temp Source: Oral (12/02/19 2136)  Respirations: 16 (12/05/19 1531)  Height: 5' 4" (162 6 cm) (12/03/19 1110)  Weight - Scale: 102 kg (225 lb 5 oz) (12/05/19 0525)  SpO2: 98 % (12/05/19 1531)  Exam:   Physical Exam   Constitutional: She appears well-developed  No distress  HENT:   Head: Normocephalic and atraumatic  Eyes: Right eye exhibits no discharge  Left eye exhibits no discharge  Neck: No JVD present  Cardiovascular: Normal rate and regular rhythm  Pulmonary/Chest: Effort normal and breath sounds normal    Abdominal: Soft  Musculoskeletal:   Right foot wound with decreasing drainage  No erythema noted in the surrounding region   Neurological: She is alert  No cranial nerve deficit  Skin: Skin is warm  No erythema  Discussion with Family:  Patient    Discharge instructions/Information to patient and family:   See after visit summary for information provided to patient and family  Provisions for Follow-Up Care:  See after visit summary for information related to follow-up care and any pertinent home health orders        Disposition:     Home    For Discharges to [address] [facility]:   · Not Applicable to this Patient - Not Applicable to this Patient    Planned Readmission: none     Discharge Statement:  I spent 45 minutes discharging the patient  This time was spent on the day of discharge  I had direct contact with the patient on the day of discharge  Greater than 50% of the total time was spent examining patient, answering all patient questions, arranging and discussing plan of care with patient as well as directly providing post-discharge instructions  Additional time then spent on discharge activities  Discharge Medications:  See after visit summary for reconciled discharge medications provided to patient and family        ** Please Note: This note has been constructed using a voice recognition system **

## 2019-12-07 NOTE — ASSESSMENT & PLAN NOTE
Lab Results   Component Value Date    HGBA1C 12 1 (H) 12/03/2019       Recent Labs     12/05/19  0103 12/05/19  0738 12/05/19  1043 12/05/19  1551   POCGLU 157* 324* 315* 199*       Blood Sugar Average: Last 72 hrs:  (P) 059 [id number]     · Patient's blood sugar was controlled with the insulin drip and transition to Lantus and sliding scale of insulin  Patient reported that she has Lantus and mealtime insulin at home and she can manage her blood sugar at home    · Recommended to follow up with the primary care physician as an outpatient given her elevated hemoglobin A1c  · Also recommended to check her blood sugar before meals and at bedtime and take the log to the primary care physician at the time of appointment for further management of her blood sugar

## 2019-12-08 LAB
BACTERIA BLD CULT: NORMAL
BACTERIA BLD CULT: NORMAL

## 2020-09-07 ENCOUNTER — APPOINTMENT (EMERGENCY)
Dept: RADIOLOGY | Facility: HOSPITAL | Age: 45
DRG: 638 | End: 2020-09-07
Payer: COMMERCIAL

## 2020-09-07 ENCOUNTER — HOSPITAL ENCOUNTER (INPATIENT)
Facility: HOSPITAL | Age: 45
LOS: 2 days | Discharge: HOME | DRG: 638 | End: 2020-09-09
Attending: EMERGENCY MEDICINE | Admitting: ANESTHESIOLOGY
Payer: COMMERCIAL

## 2020-09-07 DIAGNOSIS — R79.89 ELEVATED D-DIMER: ICD-10-CM

## 2020-09-07 DIAGNOSIS — Z79.4 DIABETES MELLITUS TYPE 2, INSULIN DEPENDENT (HCC): ICD-10-CM

## 2020-09-07 DIAGNOSIS — S91.302A OPEN WOUND OF LEFT FOOT, INITIAL ENCOUNTER: ICD-10-CM

## 2020-09-07 DIAGNOSIS — E11.9 DIABETES MELLITUS TYPE 2, INSULIN DEPENDENT (HCC): ICD-10-CM

## 2020-09-07 DIAGNOSIS — E83.39 SERUM PHOSPHATE ELEVATED: ICD-10-CM

## 2020-09-07 DIAGNOSIS — E10.10 DIABETIC KETOACIDOSIS WITHOUT COMA ASSOCIATED WITH TYPE 1 DIABETES MELLITUS (HCC): Primary | ICD-10-CM

## 2020-09-07 LAB
ALBUMIN SERPL BCP-MCNC: 3.4 G/DL (ref 3.5–5)
ALP SERPL-CCNC: 149 U/L (ref 46–116)
ALT SERPL W P-5'-P-CCNC: 26 U/L (ref 12–78)
ANION GAP SERPL CALCULATED.3IONS-SCNC: 27 MMOL/L (ref 4–13)
ARTERIAL PATENCY WRIST A: YES
AST SERPL W P-5'-P-CCNC: 14 U/L (ref 5–45)
BASE EXCESS BLDA CALC-SCNC: -20.3 MMOL/L
BETA-HYDROXYBUTYRATE: 6.2 MMOL/L
BILIRUB SERPL-MCNC: 0.5 MG/DL (ref 0.2–1)
BILIRUB UR QL STRIP: NEGATIVE
BUN SERPL-MCNC: 29 MG/DL (ref 5–25)
CALCIUM SERPL-MCNC: 9.8 MG/DL (ref 8.3–10.1)
CHLORIDE SERPL-SCNC: 94 MMOL/L (ref 100–108)
CK SERPL-CCNC: 27 U/L (ref 26–192)
CLARITY UR: CLEAR
CO2 SERPL-SCNC: 10 MMOL/L (ref 21–32)
COLOR UR: YELLOW
CREAT SERPL-MCNC: 1.8 MG/DL (ref 0.6–1.3)
D DIMER PPP FEU-MCNC: 2.73 UG/ML FEU
ERYTHROCYTE [DISTWIDTH] IN BLOOD BY AUTOMATED COUNT: 15.6 % (ref 11.6–15.1)
EXT PREG TEST URINE: NEGATIVE
EXT. CONTROL ED NAV: NORMAL
GFR SERPL CREATININE-BSD FRML MDRD: 33 ML/MIN/1.73SQ M
GLUCOSE SERPL-MCNC: 715 MG/DL (ref 65–140)
GLUCOSE SERPL-MCNC: >500 MG/DL (ref 65–140)
GLUCOSE SERPL-MCNC: >500 MG/DL (ref 65–140)
GLUCOSE UR STRIP-MCNC: ABNORMAL MG/DL
HCO3 BLDA-SCNC: 6.1 MMOL/L (ref 22–28)
HCT VFR BLD AUTO: 35.5 % (ref 34.8–46.1)
HGB BLD-MCNC: 11 G/DL (ref 11.5–15.4)
HGB UR QL STRIP.AUTO: ABNORMAL
INR PPP: 1.04 (ref 0.84–1.19)
KETONES UR STRIP-MCNC: ABNORMAL MG/DL
LACTATE SERPL-SCNC: 3 MMOL/L (ref 0.5–2)
LEUKOCYTE ESTERASE UR QL STRIP: NEGATIVE
LIPASE SERPL-CCNC: 141 U/L (ref 73–393)
MAGNESIUM SERPL-MCNC: 2.4 MG/DL (ref 1.6–2.6)
MCH RBC QN AUTO: 21.8 PG (ref 26.8–34.3)
MCHC RBC AUTO-ENTMCNC: 31 G/DL (ref 31.4–37.4)
MCV RBC AUTO: 70 FL (ref 82–98)
NITRITE UR QL STRIP: NEGATIVE
NON VENT ROOM AIR: 21 %
O2 CT BLDA-SCNC: 17.1 ML/DL (ref 16–23)
OXYHGB MFR BLDA: 97 % (ref 94–97)
PCO2 BLDA: 17.1 MM HG (ref 36–44)
PH BLDA: 7.17 [PH] (ref 7.35–7.45)
PH UR STRIP.AUTO: 5.5 [PH]
PHOSPHATE SERPL-MCNC: 6.6 MG/DL (ref 2.7–4.5)
PLATELET # BLD AUTO: 404 THOUSANDS/UL (ref 149–390)
PMV BLD AUTO: 11.2 FL (ref 8.9–12.7)
PO2 BLDA: 125.1 MM HG (ref 75–129)
POTASSIUM SERPL-SCNC: 6.1 MMOL/L (ref 3.5–5.3)
PROT SERPL-MCNC: 9.5 G/DL (ref 6.4–8.2)
PROT UR STRIP-MCNC: ABNORMAL MG/DL
PROTHROMBIN TIME: 13.4 SECONDS (ref 11.6–14.5)
RBC # BLD AUTO: 5.05 MILLION/UL (ref 3.81–5.12)
SODIUM SERPL-SCNC: 131 MMOL/L (ref 136–145)
SP GR UR STRIP.AUTO: 1.02 (ref 1–1.03)
SPECIMEN SOURCE: ABNORMAL
TROPONIN I SERPL-MCNC: <0.02 NG/ML
UROBILINOGEN UR QL STRIP.AUTO: 0.2 E.U./DL
WBC # BLD AUTO: 19.36 THOUSAND/UL (ref 4.31–10.16)

## 2020-09-07 PROCEDURE — 84484 ASSAY OF TROPONIN QUANT: CPT | Performed by: EMERGENCY MEDICINE

## 2020-09-07 PROCEDURE — 99223 1ST HOSP IP/OBS HIGH 75: CPT | Performed by: ANESTHESIOLOGY

## 2020-09-07 PROCEDURE — 36415 COLL VENOUS BLD VENIPUNCTURE: CPT | Performed by: EMERGENCY MEDICINE

## 2020-09-07 PROCEDURE — 83605 ASSAY OF LACTIC ACID: CPT | Performed by: EMERGENCY MEDICINE

## 2020-09-07 PROCEDURE — 99291 CRITICAL CARE FIRST HOUR: CPT | Performed by: EMERGENCY MEDICINE

## 2020-09-07 PROCEDURE — 85610 PROTHROMBIN TIME: CPT | Performed by: EMERGENCY MEDICINE

## 2020-09-07 PROCEDURE — 82948 REAGENT STRIP/BLOOD GLUCOSE: CPT

## 2020-09-07 PROCEDURE — 81025 URINE PREGNANCY TEST: CPT | Performed by: EMERGENCY MEDICINE

## 2020-09-07 PROCEDURE — 82805 BLOOD GASES W/O2 SATURATION: CPT | Performed by: EMERGENCY MEDICINE

## 2020-09-07 PROCEDURE — 82010 KETONE BODYS QUAN: CPT | Performed by: EMERGENCY MEDICINE

## 2020-09-07 PROCEDURE — 96375 TX/PRO/DX INJ NEW DRUG ADDON: CPT

## 2020-09-07 PROCEDURE — 96374 THER/PROPH/DIAG INJ IV PUSH: CPT

## 2020-09-07 PROCEDURE — 81001 URINALYSIS AUTO W/SCOPE: CPT | Performed by: EMERGENCY MEDICINE

## 2020-09-07 PROCEDURE — 36600 WITHDRAWAL OF ARTERIAL BLOOD: CPT

## 2020-09-07 PROCEDURE — 84100 ASSAY OF PHOSPHORUS: CPT | Performed by: EMERGENCY MEDICINE

## 2020-09-07 PROCEDURE — 82550 ASSAY OF CK (CPK): CPT | Performed by: EMERGENCY MEDICINE

## 2020-09-07 PROCEDURE — 85027 COMPLETE CBC AUTOMATED: CPT | Performed by: EMERGENCY MEDICINE

## 2020-09-07 PROCEDURE — 96361 HYDRATE IV INFUSION ADD-ON: CPT

## 2020-09-07 PROCEDURE — 83735 ASSAY OF MAGNESIUM: CPT | Performed by: EMERGENCY MEDICINE

## 2020-09-07 PROCEDURE — 80053 COMPREHEN METABOLIC PANEL: CPT | Performed by: EMERGENCY MEDICINE

## 2020-09-07 PROCEDURE — 87635 SARS-COV-2 COVID-19 AMP PRB: CPT | Performed by: EMERGENCY MEDICINE

## 2020-09-07 PROCEDURE — 99291 CRITICAL CARE FIRST HOUR: CPT

## 2020-09-07 PROCEDURE — 93005 ELECTROCARDIOGRAM TRACING: CPT

## 2020-09-07 PROCEDURE — 85379 FIBRIN DEGRADATION QUANT: CPT | Performed by: EMERGENCY MEDICINE

## 2020-09-07 PROCEDURE — 71045 X-RAY EXAM CHEST 1 VIEW: CPT

## 2020-09-07 PROCEDURE — 83690 ASSAY OF LIPASE: CPT | Performed by: EMERGENCY MEDICINE

## 2020-09-07 PROCEDURE — 80307 DRUG TEST PRSMV CHEM ANLYZR: CPT | Performed by: EMERGENCY MEDICINE

## 2020-09-07 PROCEDURE — 83036 HEMOGLOBIN GLYCOSYLATED A1C: CPT | Performed by: EMERGENCY MEDICINE

## 2020-09-07 RX ORDER — ONDANSETRON 2 MG/ML
4 INJECTION INTRAMUSCULAR; INTRAVENOUS ONCE
Status: COMPLETED | OUTPATIENT
Start: 2020-09-07 | End: 2020-09-07

## 2020-09-07 RX ORDER — SODIUM CHLORIDE 9 MG/ML
250 INJECTION, SOLUTION INTRAVENOUS CONTINUOUS
Status: DISCONTINUED | OUTPATIENT
Start: 2020-09-08 | End: 2020-09-08

## 2020-09-07 RX ORDER — MORPHINE SULFATE 4 MG/ML
4 INJECTION, SOLUTION INTRAMUSCULAR; INTRAVENOUS ONCE
Status: COMPLETED | OUTPATIENT
Start: 2020-09-07 | End: 2020-09-07

## 2020-09-07 RX ORDER — SODIUM CHLORIDE 9 MG/ML
3 INJECTION INTRAVENOUS
Status: DISCONTINUED | OUTPATIENT
Start: 2020-09-07 | End: 2020-09-08

## 2020-09-07 RX ORDER — SODIUM CHLORIDE 9 MG/ML
2000 INJECTION, SOLUTION INTRAVENOUS CONTINUOUS
Status: DISPENSED | OUTPATIENT
Start: 2020-09-07 | End: 2020-09-07

## 2020-09-07 RX ORDER — SODIUM CHLORIDE 9 MG/ML
500 INJECTION, SOLUTION INTRAVENOUS CONTINUOUS
Status: DISCONTINUED | OUTPATIENT
Start: 2020-09-07 | End: 2020-09-08

## 2020-09-07 RX ADMIN — INSULIN HUMAN 10 UNITS: 100 INJECTION, SOLUTION PARENTERAL at 23:31

## 2020-09-07 RX ADMIN — SODIUM CHLORIDE 1000 ML/HR: 0.9 INJECTION, SOLUTION INTRAVENOUS at 22:42

## 2020-09-07 RX ADMIN — SODIUM CHLORIDE 500 ML/HR: 0.9 INJECTION, SOLUTION INTRAVENOUS at 22:43

## 2020-09-07 RX ADMIN — SODIUM CHLORIDE 1000 ML/HR: 0.9 INJECTION, SOLUTION INTRAVENOUS at 21:51

## 2020-09-07 RX ADMIN — Medication 9 UNITS/HR: at 23:40

## 2020-09-07 RX ADMIN — MORPHINE SULFATE 4 MG: 4 INJECTION INTRAVENOUS at 22:21

## 2020-09-07 RX ADMIN — Medication 3.38 G: at 23:35

## 2020-09-07 RX ADMIN — ONDANSETRON 4 MG: 2 INJECTION INTRAMUSCULAR; INTRAVENOUS at 22:21

## 2020-09-07 RX ADMIN — SODIUM CHLORIDE 500 ML/HR: 0.9 INJECTION, SOLUTION INTRAVENOUS at 23:32

## 2020-09-07 NOTE — LETTER
[address] [address]  Dept: [phone]    September 9, 2020     Patient: Kobe German   YOB: 1975   Date of Visit: 9/7/2020       To Whom it May Concern:    Jimy Clark is under my professional care  She was seen in the hospital from 9/7/2020   to 09/09/20  She may return to work on 09/11/2020 without limitations  If you have any questions or concerns, please don't hesitate to call           Sincerely,          Chikis Opitz, CRNP

## 2020-09-08 PROBLEM — D72.829 LEUKOCYTOSIS: Status: ACTIVE | Noted: 2020-09-08

## 2020-09-08 PROBLEM — E10.10 DIABETIC KETOACIDOSIS WITHOUT COMA ASSOCIATED WITH TYPE 1 DIABETES MELLITUS (HCC): Status: ACTIVE | Noted: 2020-09-08

## 2020-09-08 PROBLEM — R79.89 ELEVATED D-DIMER: Status: RESOLVED | Noted: 2020-09-08 | Resolved: 2020-09-08

## 2020-09-08 PROBLEM — N17.9 AKI (ACUTE KIDNEY INJURY) (HCC): Status: ACTIVE | Noted: 2020-09-08

## 2020-09-08 PROBLEM — E11.10 DIABETIC KETOACIDOSIS WITHOUT COMA ASSOCIATED WITH TYPE 2 DIABETES MELLITUS (HCC): Status: ACTIVE | Noted: 2020-09-08

## 2020-09-08 PROBLEM — E87.2 METABOLIC ACIDOSIS, INCREASED ANION GAP: Status: ACTIVE | Noted: 2020-09-08

## 2020-09-08 PROBLEM — S81.802A NON-HEALING WOUND OF LEFT LOWER EXTREMITY: Status: ACTIVE | Noted: 2020-09-08

## 2020-09-08 PROBLEM — R79.89 ELEVATED D-DIMER: Status: ACTIVE | Noted: 2020-09-08

## 2020-09-08 PROBLEM — E87.29 METABOLIC ACIDOSIS, INCREASED ANION GAP: Status: ACTIVE | Noted: 2020-09-08

## 2020-09-08 PROBLEM — N17.9 AKI (ACUTE KIDNEY INJURY) (HCC): Status: RESOLVED | Noted: 2020-09-08 | Resolved: 2020-09-08

## 2020-09-08 PROBLEM — E87.29 METABOLIC ACIDOSIS, INCREASED ANION GAP: Status: RESOLVED | Noted: 2020-09-08 | Resolved: 2020-09-08

## 2020-09-08 PROBLEM — E87.2 METABOLIC ACIDOSIS, INCREASED ANION GAP: Status: RESOLVED | Noted: 2020-09-08 | Resolved: 2020-09-08

## 2020-09-08 LAB
ALBUMIN SERPL BCP-MCNC: 2.6 G/DL (ref 3.5–5)
ALP SERPL-CCNC: 111 U/L (ref 46–116)
ALT SERPL W P-5'-P-CCNC: 21 U/L (ref 12–78)
AMPHETAMINES SERPL QL SCN: NEGATIVE
ANION GAP SERPL CALCULATED.3IONS-SCNC: 10 MMOL/L (ref 4–13)
ANION GAP SERPL CALCULATED.3IONS-SCNC: 12 MMOL/L (ref 4–13)
ANION GAP SERPL CALCULATED.3IONS-SCNC: 17 MMOL/L (ref 4–13)
ANION GAP SERPL CALCULATED.3IONS-SCNC: 22 MMOL/L (ref 4–13)
ANION GAP SERPL CALCULATED.3IONS-SCNC: 26 MMOL/L (ref 4–13)
ANION GAP SERPL CALCULATED.3IONS-SCNC: 9 MMOL/L (ref 4–13)
ANION GAP SERPL CALCULATED.3IONS-SCNC: 9 MMOL/L (ref 4–13)
AST SERPL W P-5'-P-CCNC: 14 U/L (ref 5–45)
ATRIAL RATE: 109 BPM
ATRIAL RATE: 128 BPM
BACTERIA UR QL AUTO: ABNORMAL /HPF
BARBITURATES UR QL: NEGATIVE
BASE EX.OXY STD BLDV CALC-SCNC: 87.6 % (ref 60–80)
BASE EXCESS BLDV CALC-SCNC: -11.9 MMOL/L
BASOPHILS # BLD AUTO: 0.02 THOUSANDS/ΜL (ref 0–0.1)
BASOPHILS NFR BLD AUTO: 0 % (ref 0–1)
BENZODIAZ UR QL: NEGATIVE
BILIRUB DIRECT SERPL-MCNC: 0.11 MG/DL (ref 0–0.2)
BILIRUB SERPL-MCNC: 0.35 MG/DL (ref 0.2–1)
BUN SERPL-MCNC: 12 MG/DL (ref 5–25)
BUN SERPL-MCNC: 13 MG/DL (ref 5–25)
BUN SERPL-MCNC: 14 MG/DL (ref 5–25)
BUN SERPL-MCNC: 17 MG/DL (ref 5–25)
BUN SERPL-MCNC: 21 MG/DL (ref 5–25)
BUN SERPL-MCNC: 25 MG/DL (ref 5–25)
BUN SERPL-MCNC: 26 MG/DL (ref 5–25)
CALCIUM SERPL-MCNC: 7.4 MG/DL (ref 8.3–10.1)
CALCIUM SERPL-MCNC: 7.4 MG/DL (ref 8.3–10.1)
CALCIUM SERPL-MCNC: 7.8 MG/DL (ref 8.3–10.1)
CALCIUM SERPL-MCNC: 7.8 MG/DL (ref 8.3–10.1)
CALCIUM SERPL-MCNC: 8 MG/DL (ref 8.3–10.1)
CALCIUM SERPL-MCNC: 8.1 MG/DL (ref 8.3–10.1)
CALCIUM SERPL-MCNC: 8.4 MG/DL (ref 8.3–10.1)
CHLORIDE SERPL-SCNC: 103 MMOL/L (ref 100–108)
CHLORIDE SERPL-SCNC: 105 MMOL/L (ref 100–108)
CHLORIDE SERPL-SCNC: 108 MMOL/L (ref 100–108)
CHLORIDE SERPL-SCNC: 109 MMOL/L (ref 100–108)
CHLORIDE SERPL-SCNC: 110 MMOL/L (ref 100–108)
CHLORIDE SERPL-SCNC: 111 MMOL/L (ref 100–108)
CHLORIDE SERPL-SCNC: 113 MMOL/L (ref 100–108)
CO2 SERPL-SCNC: 10 MMOL/L (ref 21–32)
CO2 SERPL-SCNC: 15 MMOL/L (ref 21–32)
CO2 SERPL-SCNC: 18 MMOL/L (ref 21–32)
CO2 SERPL-SCNC: 19 MMOL/L (ref 21–32)
CO2 SERPL-SCNC: 20 MMOL/L (ref 21–32)
CO2 SERPL-SCNC: 20 MMOL/L (ref 21–32)
CO2 SERPL-SCNC: 8 MMOL/L (ref 21–32)
COCAINE UR QL: NEGATIVE
CREAT SERPL-MCNC: 0.99 MG/DL (ref 0.6–1.3)
CREAT SERPL-MCNC: 1.08 MG/DL (ref 0.6–1.3)
CREAT SERPL-MCNC: 1.09 MG/DL (ref 0.6–1.3)
CREAT SERPL-MCNC: 1.18 MG/DL (ref 0.6–1.3)
CREAT SERPL-MCNC: 1.32 MG/DL (ref 0.6–1.3)
CREAT SERPL-MCNC: 1.42 MG/DL (ref 0.6–1.3)
CREAT SERPL-MCNC: 1.43 MG/DL (ref 0.6–1.3)
EOSINOPHIL # BLD AUTO: 0 THOUSAND/ΜL (ref 0–0.61)
EOSINOPHIL NFR BLD AUTO: 0 % (ref 0–6)
ERYTHROCYTE [DISTWIDTH] IN BLOOD BY AUTOMATED COUNT: 15.9 % (ref 11.6–15.1)
EST. AVERAGE GLUCOSE BLD GHB EST-MCNC: 355 MG/DL
GFR SERPL CREATININE-BSD FRML MDRD: 44 ML/MIN/1.73SQ M
GFR SERPL CREATININE-BSD FRML MDRD: 44 ML/MIN/1.73SQ M
GFR SERPL CREATININE-BSD FRML MDRD: 48 ML/MIN/1.73SQ M
GFR SERPL CREATININE-BSD FRML MDRD: 55 ML/MIN/1.73SQ M
GFR SERPL CREATININE-BSD FRML MDRD: 61 ML/MIN/1.73SQ M
GFR SERPL CREATININE-BSD FRML MDRD: 62 ML/MIN/1.73SQ M
GFR SERPL CREATININE-BSD FRML MDRD: 69 ML/MIN/1.73SQ M
GLUCOSE SERPL-MCNC: 114 MG/DL (ref 65–140)
GLUCOSE SERPL-MCNC: 115 MG/DL (ref 65–140)
GLUCOSE SERPL-MCNC: 124 MG/DL (ref 65–140)
GLUCOSE SERPL-MCNC: 127 MG/DL (ref 65–140)
GLUCOSE SERPL-MCNC: 155 MG/DL (ref 65–140)
GLUCOSE SERPL-MCNC: 162 MG/DL (ref 65–140)
GLUCOSE SERPL-MCNC: 169 MG/DL (ref 65–140)
GLUCOSE SERPL-MCNC: 170 MG/DL (ref 65–140)
GLUCOSE SERPL-MCNC: 191 MG/DL (ref 65–140)
GLUCOSE SERPL-MCNC: 192 MG/DL (ref 65–140)
GLUCOSE SERPL-MCNC: 193 MG/DL (ref 65–140)
GLUCOSE SERPL-MCNC: 203 MG/DL (ref 65–140)
GLUCOSE SERPL-MCNC: 206 MG/DL (ref 65–140)
GLUCOSE SERPL-MCNC: 228 MG/DL (ref 65–140)
GLUCOSE SERPL-MCNC: 238 MG/DL (ref 65–140)
GLUCOSE SERPL-MCNC: 278 MG/DL (ref 65–140)
GLUCOSE SERPL-MCNC: 292 MG/DL (ref 65–140)
GLUCOSE SERPL-MCNC: 343 MG/DL (ref 65–140)
GLUCOSE SERPL-MCNC: 357 MG/DL (ref 65–140)
GLUCOSE SERPL-MCNC: 376 MG/DL (ref 65–140)
GLUCOSE SERPL-MCNC: 435 MG/DL (ref 65–140)
GLUCOSE SERPL-MCNC: 505 MG/DL (ref 65–140)
GLUCOSE SERPL-MCNC: 76 MG/DL (ref 65–140)
GLUCOSE SERPL-MCNC: >500 MG/DL (ref 65–140)
GLUCOSE SERPL-MCNC: >500 MG/DL (ref 65–140)
HBA1C MFR BLD: 14 %
HCO3 BLDV-SCNC: 14.5 MMOL/L (ref 24–30)
HCT VFR BLD AUTO: 32.5 % (ref 34.8–46.1)
HGB BLD-MCNC: 10.1 G/DL (ref 11.5–15.4)
IMM GRANULOCYTES # BLD AUTO: 0.17 THOUSAND/UL (ref 0–0.2)
IMM GRANULOCYTES NFR BLD AUTO: 1 % (ref 0–2)
LACTATE SERPL-SCNC: 1.9 MMOL/L (ref 0.5–2)
LYMPHOCYTES # BLD AUTO: 2.15 THOUSANDS/ΜL (ref 0.6–4.47)
LYMPHOCYTES NFR BLD AUTO: 13 % (ref 14–44)
MAGNESIUM SERPL-MCNC: 1.9 MG/DL (ref 1.6–2.6)
MAGNESIUM SERPL-MCNC: 2 MG/DL (ref 1.6–2.6)
MAGNESIUM SERPL-MCNC: 2.1 MG/DL (ref 1.6–2.6)
MAGNESIUM SERPL-MCNC: 2.1 MG/DL (ref 1.6–2.6)
MAGNESIUM SERPL-MCNC: 2.4 MG/DL (ref 1.6–2.6)
MAGNESIUM SERPL-MCNC: 2.8 MG/DL (ref 1.6–2.6)
MCH RBC QN AUTO: 22.3 PG (ref 26.8–34.3)
MCHC RBC AUTO-ENTMCNC: 31.1 G/DL (ref 31.4–37.4)
MCV RBC AUTO: 72 FL (ref 82–98)
METHADONE UR QL: NEGATIVE
MONOCYTES # BLD AUTO: 0.78 THOUSAND/ΜL (ref 0.17–1.22)
MONOCYTES NFR BLD AUTO: 5 % (ref 4–12)
NEUTROPHILS # BLD AUTO: 13.28 THOUSANDS/ΜL (ref 1.85–7.62)
NEUTS SEG NFR BLD AUTO: 81 % (ref 43–75)
NON-SQ EPI CELLS URNS QL MICRO: ABNORMAL /HPF
NRBC BLD AUTO-RTO: 0 /100 WBCS
O2 CT BLDV-SCNC: 13.2 ML/DL
OPIATES UR QL SCN: POSITIVE
OTHER STN SPEC: ABNORMAL
OXYCODONE+OXYMORPHONE UR QL SCN: NEGATIVE
P AXIS: 61 DEGREES
P AXIS: 73 DEGREES
PCO2 BLDV: 34.9 MM HG (ref 42–50)
PCP UR QL: NEGATIVE
PH BLDV: 7.24 [PH] (ref 7.3–7.4)
PHOSPHATE SERPL-MCNC: 0.9 MG/DL (ref 2.7–4.5)
PHOSPHATE SERPL-MCNC: 1.4 MG/DL (ref 2.7–4.5)
PHOSPHATE SERPL-MCNC: 1.7 MG/DL (ref 2.7–4.5)
PHOSPHATE SERPL-MCNC: 1.8 MG/DL (ref 2.7–4.5)
PHOSPHATE SERPL-MCNC: 2.2 MG/DL (ref 2.7–4.5)
PHOSPHATE SERPL-MCNC: 2.4 MG/DL (ref 2.7–4.5)
PLATELET # BLD AUTO: 352 THOUSANDS/UL (ref 149–390)
PMV BLD AUTO: 11.2 FL (ref 8.9–12.7)
PO2 BLDV: 58.1 MM HG (ref 35–45)
POTASSIUM SERPL-SCNC: 3.7 MMOL/L (ref 3.5–5.3)
POTASSIUM SERPL-SCNC: 3.9 MMOL/L (ref 3.5–5.3)
POTASSIUM SERPL-SCNC: 4 MMOL/L (ref 3.5–5.3)
POTASSIUM SERPL-SCNC: 4 MMOL/L (ref 3.5–5.3)
POTASSIUM SERPL-SCNC: 4.1 MMOL/L (ref 3.5–5.3)
POTASSIUM SERPL-SCNC: 4.1 MMOL/L (ref 3.5–5.3)
POTASSIUM SERPL-SCNC: 4.2 MMOL/L (ref 3.5–5.3)
PR INTERVAL: 138 MS
PR INTERVAL: 144 MS
PROCALCITONIN SERPL-MCNC: 0.37 NG/ML
PROT SERPL-MCNC: 7.6 G/DL (ref 6.4–8.2)
QRS AXIS: 46 DEGREES
QRS AXIS: 80 DEGREES
QRSD INTERVAL: 84 MS
QRSD INTERVAL: 88 MS
QT INTERVAL: 320 MS
QT INTERVAL: 350 MS
QTC INTERVAL: 467 MS
QTC INTERVAL: 471 MS
RBC # BLD AUTO: 4.53 MILLION/UL (ref 3.81–5.12)
RBC #/AREA URNS AUTO: ABNORMAL /HPF
SARS-COV-2 RNA RESP QL NAA+PROBE: NEGATIVE
SODIUM SERPL-SCNC: 137 MMOL/L (ref 136–145)
SODIUM SERPL-SCNC: 137 MMOL/L (ref 136–145)
SODIUM SERPL-SCNC: 138 MMOL/L (ref 136–145)
SODIUM SERPL-SCNC: 139 MMOL/L (ref 136–145)
SODIUM SERPL-SCNC: 140 MMOL/L (ref 136–145)
SODIUM SERPL-SCNC: 141 MMOL/L (ref 136–145)
SODIUM SERPL-SCNC: 142 MMOL/L (ref 136–145)
T WAVE AXIS: -10 DEGREES
T WAVE AXIS: 22 DEGREES
THC UR QL: NEGATIVE
VENTRICULAR RATE: 109 BPM
VENTRICULAR RATE: 128 BPM
WBC # BLD AUTO: 16.4 THOUSAND/UL (ref 4.31–10.16)
WBC #/AREA URNS AUTO: ABNORMAL /HPF

## 2020-09-08 PROCEDURE — 84145 PROCALCITONIN (PCT): CPT | Performed by: NURSE PRACTITIONER

## 2020-09-08 PROCEDURE — 80076 HEPATIC FUNCTION PANEL: CPT | Performed by: NURSE PRACTITIONER

## 2020-09-08 PROCEDURE — 83735 ASSAY OF MAGNESIUM: CPT | Performed by: NURSE PRACTITIONER

## 2020-09-08 PROCEDURE — 99233 SBSQ HOSP IP/OBS HIGH 50: CPT | Performed by: ANESTHESIOLOGY

## 2020-09-08 PROCEDURE — 83605 ASSAY OF LACTIC ACID: CPT | Performed by: EMERGENCY MEDICINE

## 2020-09-08 PROCEDURE — 84100 ASSAY OF PHOSPHORUS: CPT | Performed by: NURSE PRACTITIONER

## 2020-09-08 PROCEDURE — 82948 REAGENT STRIP/BLOOD GLUCOSE: CPT

## 2020-09-08 PROCEDURE — 80048 BASIC METABOLIC PNL TOTAL CA: CPT | Performed by: NURSE PRACTITIONER

## 2020-09-08 PROCEDURE — 36415 COLL VENOUS BLD VENIPUNCTURE: CPT | Performed by: EMERGENCY MEDICINE

## 2020-09-08 PROCEDURE — 93005 ELECTROCARDIOGRAM TRACING: CPT

## 2020-09-08 PROCEDURE — 82805 BLOOD GASES W/O2 SATURATION: CPT | Performed by: NURSE PRACTITIONER

## 2020-09-08 PROCEDURE — 80048 BASIC METABOLIC PNL TOTAL CA: CPT

## 2020-09-08 PROCEDURE — 85025 COMPLETE CBC W/AUTO DIFF WBC: CPT | Performed by: NURSE PRACTITIONER

## 2020-09-08 RX ORDER — MAGNESIUM SULFATE HEPTAHYDRATE 40 MG/ML
2 INJECTION, SOLUTION INTRAVENOUS ONCE
Status: COMPLETED | OUTPATIENT
Start: 2020-09-08 | End: 2020-09-08

## 2020-09-08 RX ORDER — SODIUM CHLORIDE 9 MG/ML
2000 INJECTION, SOLUTION INTRAVENOUS CONTINUOUS
Status: DISCONTINUED | OUTPATIENT
Start: 2020-09-08 | End: 2020-09-08

## 2020-09-08 RX ORDER — HEPARIN SODIUM 5000 [USP'U]/ML
5000 INJECTION, SOLUTION INTRAVENOUS; SUBCUTANEOUS EVERY 8 HOURS SCHEDULED
Status: DISCONTINUED | OUTPATIENT
Start: 2020-09-08 | End: 2020-09-09 | Stop reason: HOSPADM

## 2020-09-08 RX ORDER — DEXTROSE AND SODIUM CHLORIDE 5; .9 G/100ML; G/100ML
250 INJECTION, SOLUTION INTRAVENOUS CONTINUOUS
Status: DISCONTINUED | OUTPATIENT
Start: 2020-09-08 | End: 2020-09-08

## 2020-09-08 RX ORDER — LORAZEPAM 2 MG/ML
0.5 INJECTION INTRAMUSCULAR ONCE
Status: COMPLETED | OUTPATIENT
Start: 2020-09-08 | End: 2020-09-08

## 2020-09-08 RX ORDER — SODIUM CHLORIDE 9 MG/ML
250 INJECTION, SOLUTION INTRAVENOUS CONTINUOUS
Status: DISCONTINUED | OUTPATIENT
Start: 2020-09-08 | End: 2020-09-08

## 2020-09-08 RX ORDER — POLYETHYLENE GLYCOL 3350 17 G/17G
17 POWDER, FOR SOLUTION ORAL DAILY
Status: DISCONTINUED | OUTPATIENT
Start: 2020-09-08 | End: 2020-09-09 | Stop reason: HOSPADM

## 2020-09-08 RX ORDER — SODIUM CHLORIDE 9 MG/ML
500 INJECTION, SOLUTION INTRAVENOUS CONTINUOUS
Status: DISCONTINUED | OUTPATIENT
Start: 2020-09-08 | End: 2020-09-08

## 2020-09-08 RX ORDER — POTASSIUM CHLORIDE 14.9 MG/ML
20 INJECTION INTRAVENOUS ONCE
Status: COMPLETED | OUTPATIENT
Start: 2020-09-08 | End: 2020-09-08

## 2020-09-08 RX ORDER — INSULIN GLARGINE 100 [IU]/ML
30 INJECTION, SOLUTION SUBCUTANEOUS ONCE
Status: COMPLETED | OUTPATIENT
Start: 2020-09-08 | End: 2020-09-08

## 2020-09-08 RX ORDER — SODIUM CHLORIDE 9 MG/ML
500 INJECTION, SOLUTION INTRAVENOUS ONCE
Status: COMPLETED | OUTPATIENT
Start: 2020-09-08 | End: 2020-09-08

## 2020-09-08 RX ORDER — ONDANSETRON 2 MG/ML
4 INJECTION INTRAMUSCULAR; INTRAVENOUS ONCE
Status: COMPLETED | OUTPATIENT
Start: 2020-09-08 | End: 2020-09-08

## 2020-09-08 RX ORDER — DEXTROSE MONOHYDRATE 25 G/50ML
25 INJECTION, SOLUTION INTRAVENOUS ONCE
Status: COMPLETED | OUTPATIENT
Start: 2020-09-08 | End: 2020-09-08

## 2020-09-08 RX ORDER — GABAPENTIN 100 MG/1
200 CAPSULE ORAL 2 TIMES DAILY
Status: DISCONTINUED | OUTPATIENT
Start: 2020-09-08 | End: 2020-09-09 | Stop reason: HOSPADM

## 2020-09-08 RX ORDER — FLUCONAZOLE 100 MG/1
200 TABLET ORAL ONCE
Status: COMPLETED | OUTPATIENT
Start: 2020-09-08 | End: 2020-09-08

## 2020-09-08 RX ORDER — CALCIUM GLUCONATE 20 MG/ML
1 INJECTION, SOLUTION INTRAVENOUS ONCE
Status: COMPLETED | OUTPATIENT
Start: 2020-09-08 | End: 2020-09-08

## 2020-09-08 RX ORDER — INSULIN GLARGINE 100 [IU]/ML
60 INJECTION, SOLUTION SUBCUTANEOUS
Status: DISCONTINUED | OUTPATIENT
Start: 2020-09-09 | End: 2020-09-09 | Stop reason: HOSPADM

## 2020-09-08 RX ORDER — POTASSIUM CHLORIDE 20 MEQ/1
40 TABLET, EXTENDED RELEASE ORAL ONCE
Status: COMPLETED | OUTPATIENT
Start: 2020-09-08 | End: 2020-09-08

## 2020-09-08 RX ADMIN — Medication 1.5 UNITS/HR: at 14:03

## 2020-09-08 RX ADMIN — HEPARIN SODIUM 5000 UNITS: 5000 INJECTION INTRAVENOUS; SUBCUTANEOUS at 14:03

## 2020-09-08 RX ADMIN — DEXTROSE AND SODIUM CHLORIDE 250 ML/HR: 5; .9 INJECTION, SOLUTION INTRAVENOUS at 11:36

## 2020-09-08 RX ADMIN — DEXTROSE MONOHYDRATE 25 ML: 25 INJECTION, SOLUTION INTRAVENOUS at 12:32

## 2020-09-08 RX ADMIN — SODIUM CHLORIDE 500 ML/HR: 0.9 INJECTION, SOLUTION INTRAVENOUS at 02:15

## 2020-09-08 RX ADMIN — POTASSIUM PHOSPHATE, MONOBASIC POTASSIUM PHOSPHATE, DIBASIC 30 MMOL: 224; 236 INJECTION, SOLUTION, CONCENTRATE INTRAVENOUS at 07:33

## 2020-09-08 RX ADMIN — CALCIUM GLUCONATE 1 G: 20 INJECTION, SOLUTION INTRAVENOUS at 16:36

## 2020-09-08 RX ADMIN — FLUCONAZOLE 200 MG: 100 TABLET ORAL at 16:36

## 2020-09-08 RX ADMIN — Medication 18 UNITS/HR: at 08:15

## 2020-09-08 RX ADMIN — POLYETHYLENE GLYCOL 3350 17 G: 17 POWDER, FOR SOLUTION ORAL at 08:16

## 2020-09-08 RX ADMIN — MAGNESIUM SULFATE HEPTAHYDRATE 2 G: 40 INJECTION, SOLUTION INTRAVENOUS at 02:44

## 2020-09-08 RX ADMIN — SODIUM CHLORIDE 250 ML/HR: 0.9 INJECTION, SOLUTION INTRAVENOUS at 07:37

## 2020-09-08 RX ADMIN — SODIUM CHLORIDE 250 ML/HR: 0.9 INJECTION, SOLUTION INTRAVENOUS at 03:15

## 2020-09-08 RX ADMIN — POTASSIUM CHLORIDE 40 MEQ: 1500 TABLET, EXTENDED RELEASE ORAL at 11:36

## 2020-09-08 RX ADMIN — DEXTROSE AND SODIUM CHLORIDE 250 ML/HR: 5; .9 INJECTION, SOLUTION INTRAVENOUS at 07:38

## 2020-09-08 RX ADMIN — ONDANSETRON 4 MG: 2 INJECTION INTRAMUSCULAR; INTRAVENOUS at 07:32

## 2020-09-08 RX ADMIN — Medication 2 TABLET: at 19:22

## 2020-09-08 RX ADMIN — INSULIN GLARGINE 30 UNITS: 100 INJECTION, SOLUTION SUBCUTANEOUS at 19:19

## 2020-09-08 RX ADMIN — GABAPENTIN 200 MG: 100 CAPSULE ORAL at 08:16

## 2020-09-08 RX ADMIN — Medication 2 TABLET: at 02:44

## 2020-09-08 RX ADMIN — INSULIN LISPRO 4 UNITS: 100 INJECTION, SOLUTION INTRAVENOUS; SUBCUTANEOUS at 22:48

## 2020-09-08 RX ADMIN — SODIUM CHLORIDE 500 ML/HR: 0.9 INJECTION, SOLUTION INTRAVENOUS at 01:14

## 2020-09-08 RX ADMIN — POTASSIUM CHLORIDE 20 MEQ: 14.9 INJECTION, SOLUTION INTRAVENOUS at 02:43

## 2020-09-08 RX ADMIN — LORAZEPAM 0.5 MG: 2 INJECTION INTRAMUSCULAR; INTRAVENOUS at 06:18

## 2020-09-08 RX ADMIN — HEPARIN SODIUM 5000 UNITS: 5000 INJECTION INTRAVENOUS; SUBCUTANEOUS at 01:55

## 2020-09-08 RX ADMIN — POTASSIUM & SODIUM PHOSPHATES POWDER PACK 280-160-250 MG 2 PACKET: 280-160-250 PACK at 22:47

## 2020-09-08 RX ADMIN — HEPARIN SODIUM 5000 UNITS: 5000 INJECTION INTRAVENOUS; SUBCUTANEOUS at 22:48

## 2020-09-08 NOTE — ASSESSMENT & PLAN NOTE
· Per patient, has hx of Charcot foot  Had bone spur causing non-healing ulcer  Surgical procedure last year to remove spur and now has non-healing surgical wound  · Follows up with wound care service in [facility] Marlena [facility]  · Consult wound care while inpatient

## 2020-09-08 NOTE — ASSESSMENT & PLAN NOTE
Lab Results   Component Value Date    HGBA1C 12 1 (H) 12/03/2019       Recent Labs     09/07/20  2345 09/08/20  0044 09/08/20  0111 09/08/20  0312   POCGLU >500* >500* >500* 357*       Blood Sugar Average: Last 72 hrs:  (P) 357   · Pt w hx DM type 2, states has no previous history of DKA  · Home medications include   · Lantus 60 units at HS  · Apidra 10 units TID w meals  · Has not been taking lantus for last month due to financial difficulty obtaining medications  Continued to take mealtime insulin  · Follows with Endocrinology (Dr Anneliese White) at [address]  · This morning, stated feeling not well, nausea, multiple episodes of vomiting  · In the ED  · BHB 6 2, pH on ABG 7 1, glucose on , potassium 6 1, AG 27, sodium 131 (glucose corrected 141)  · Received 2 liter NSS for DKA protocol   Started on /hr x 4 hours  · Given 10 units insulin bolus and started on insulin gtt  · Start insulin gtt, IVF per DKA protocol  · q4hr BMP, Mag, phos w replacements ordered  · Hgb A1C ordered  · NPO until corrected  · Case mgmt consult to assist w financial difficulty w medications  ·

## 2020-09-08 NOTE — ASSESSMENT & PLAN NOTE
· Per patient, has hx of Charcot foot  Had bone spur causing non-healing ulcer  Surgical procedure last year to remove spur and now has non-healing surgical wound  · Follows up with  [facility] in [address] [place] [address] last visit was last week  · Consult wound care while inpatient  · Consider getting records from [address]  Foot ulcer significantly worse when compared to pictures in our system from 12/2019     · No concerns for infectious etiology at this point  · Continue gabapentin 200mg BID as ordered PTA

## 2020-09-08 NOTE — ASSESSMENT & PLAN NOTE
· Per patient, has hx of Charcot foot  Had bone spur causing non-healing ulcer  Surgical procedure last year to remove spur and now has non-healing surgical wound  · Follows up with  [facility] in [address] [place] [address] last visit was last week  · Consult wound care while inpatient  · Consider getting records from [address]  Foot ulcer significantly worse when compared to pictures in our system from 12/2019  · Consider further work up as source of infection if she does not improve     · Continue gabapentin 200mg BID as ordered PTA

## 2020-09-08 NOTE — UTILIZATION REVIEW
Initial Clinical Review    Admission: Date/Time/Statement:   Admission Orders (From admission, onward)     Ordered        09/07/20 2253  Inpatient Admission (expected length of stay for this patient Order details is greater than two midnights)  Once                   Orders Placed This Encounter   Procedures    Inpatient Admission (expected length of stay for this patient Order details is greater than two midnights)     Standing Status:   Standing     Number of Occurrences:   1     Order Specific Question:   Admitting Physician     Answer:   Mitchell Ng [[id number]]     Order Specific Question:   Level of Care     Answer:   Critical Care [15]     Order Specific Question:   Estimated length of stay     Answer:   More than 2 Midnights     Order Specific Question:   Certification     Answer:   I certify that inpatient services are medically necessary for this patient for a duration of greater than two midnights  See H&P and MD Progress Notes for additional information about the patient's course of treatment  ED Arrival Information     Expected Arrival Acuity Means of Arrival Escorted By Service Admission Type    - 9/7/2020 21:26 Urgent Walk-In Self Critical Care/ICU Urgent    Arrival Complaint    Vomiting, Rapid Heart Rate, High Blood Sugar        Chief Complaint   Patient presents with    Abdominal Pain     Epigastric pain starting at 9AM this morning, patient had BM right before, pt reports N/V throughout the day     Assessment/Plan: 54year old female to the ED from home with complaints of abdominal pain, chest pain, nausea, fatigue  which started 12 hours prior to arrival  H/O diabetes and Charcot's joint of left foot   Admitted to inpatient critical care for DKA, RAQUEL, metabolic acidosis with increased anion gap  She arrives to the ED appearing ill, with dry mucous membranes, is tachycardic with abdominal tenderness and decreased bs  Her glucose is 715    Has not been taking lantus for a month due to financial difficulties  BHB 6 2, pH on ABG 7 1, glucose on , potassium 6 1, AG 27, sodium 131  IV fluids started  IV insulin drip initiated  DKA protocol  Check BMP every 4 hours  NPO  I/O  D-dimer elevated  Unable to do CT due to renal function  Given morphine, zofran, IV abx in the ED  COVID negative  Additionally, has a nonhealing foot wound to left foot  Case management consult  9/8 UPdate: H/O DMI  Had episode of vomiting overnight  Only slight improvement of labs  Tachycardia continues  Continue IV fluids       ED Triage Vitals [09/07/20 2138]   Temperature Pulse Respirations Blood Pressure SpO2   97 7 °F (36 5 °C) (!) 124 18 158/76 100 %      Temp Source Heart Rate Source Patient Position - Orthostatic VS BP Location FiO2 (%)   Temporal Monitor Lying Left arm --      Pain Score       Worst Possible Pain          Wt Readings from Last 1 Encounters:   09/08/20 92 8 kg (204 lb 9 4 oz)     Additional Vital Signs:   Date/Time   Temp  Pulse   Resp   BP   MAP (mmHg)   SpO2   O2 Device  Patient Position - Orthostatic VS    09/08/20 1200   98 7 °F (37 1 °C)  103   17   118/59   82   97 %   None (Room air)  Lying    09/08/20 0700   98 8 °F (37 1 °C)  108Abnormal     14   140/72   97   96 %   None (Room air)  Lying    09/08/20 0500   98 °F (36 7 °C)  116Abnormal     12   105/59   79   95 %   None (Room air)  --    09/08/20 0400   --  118Abnormal     15   114/57   80   95 %   --  --    09/08/20 0300   --  126Abnormal     19   158/74   106   100 %   None (Room air)  Lying    09/08/20 0200   --  131Abnormal     21   118/56   80   100 %   None (Room air)  Lying    09/08/20 0115   --  129Abnormal     17   130/70   92   100 %   None (Room air)  --    09/08/20 0030   --  128Abnormal     20   145/67   97   100 %   --  Lying    09/08/20 0000   --  131Abnormal     23Abnormal     137/63   91   100 %   --  Lying    09/07/20 2345   --  129Abnormal     17   149/72   104   100 %   --  Lying    09/07/20 2009   -- 130Abnormal     18   149/72   --   100 %   --  Lying    09/07/20 2313   98 °F (36 7 °C)  --   --   --   --   --   --  --    09/07/20 2142   97 7 °F (36 5 °C)  134Abnormal     --   158/76   --   --   --  Lying    09/07/20 2138   97 7 °F (36 5 °C)  124Abnormal     18   158/76             Pertinent Labs/Diagnostic Test Results:   9/8 CXR: No acute cardiopulmonary disease  9/7 EKG:  Interpretation:     Interpretation: abnormal    Rate:     ECG rate:  128bpm    ECG rate assessment: tachycardic    Rhythm:     Rhythm: sinus tachycardia    Ectopy:     Ectopy: none    QRS:     QRS axis:  Right  ST segments:     ST segments:  Abnormal    Depression:  V5, V6 and II  T waves:     T waves: flattening and inverted      Flattening:  V4, V5 and V6    Inverted:  V1  Comments:      No STEMI  MI 144ms  QRS 84ms  QT 467ms  Results from last 7 days   Lab Units 09/07/20  2320   SARS-COV-2  Negative     Results from last 7 days   Lab Units 09/08/20  0541 09/07/20  2150   WBC Thousand/uL 16 40* 19 36*   HEMOGLOBIN g/dL 10 1* 11 0*   HEMATOCRIT % 32 5* 35 5   PLATELETS Thousands/uL 352 404*   NEUTROS ABS Thousands/µL 13 28*  --          Results from last 7 days   Lab Units 09/08/20  1004 09/08/20  0541 09/08/20  0205 09/08/20  0056 09/07/20  2150   SODIUM mmol/L 142 140 137 137 131*   POTASSIUM mmol/L 3 7 4 1 4 2 4 0 6 1*   CHLORIDE mmol/L 113* 108 105 103 94*   CO2 mmol/L 20* 15* 10* 8* 10*   ANION GAP mmol/L 9 17* 22* 26* 27*   BUN mg/dL 17 21 25 26* 29*   CREATININE mg/dL 1 08 1 32* 1 43* 1 42* 1 80*   EGFR ml/min/1 73sq m 62 48 44 44 33   CALCIUM mg/dL 7 8* 8 4 8 1* 8 0* 9 8   MAGNESIUM mg/dL 2 4 2 8* 1 9  --  2 4   PHOSPHORUS mg/dL 1 4* 0 9* 2 4*  --  6 6*     Results from last 7 days   Lab Units 09/08/20  0541 09/07/20  2150   AST U/L 14 14   ALT U/L 21 26   ALK PHOS U/L 111 149*   TOTAL PROTEIN g/dL 7 6 9 5*   ALBUMIN g/dL 2 6* 3 4*   TOTAL BILIRUBIN mg/dL 0 35 0 50   BILIRUBIN DIRECT mg/dL 0 11  --      Results from last 7 days Lab Units 09/08/20  1203 09/08/20  1101 09/08/20  0959 09/08/20  0900 09/08/20  0814 09/08/20  0732 09/08/20  0625 09/08/20  0514 09/08/20  0404 09/08/20  0312 09/08/20  0111 09/08/20  0044   POC GLUCOSE mg/dl 76 114 162* 169* 191* 228* 278* 376* 343* 357* >500* >500*     Results from last 7 days   Lab Units 09/08/20  1004 09/08/20  0541 09/08/20  0205 09/08/20  0056 09/07/20  2150   GLUCOSE RANDOM mg/dL 155* 292* 435* 505* 715*         Results from last 7 days   Lab Units 09/07/20  2150   HEMOGLOBIN A1C % 14 0*   EAG mg/dl 355     BETA-HYDROXYBUTYRATE   Date Value Ref Range Status   09/07/2020 6 2 (H) <0 6 mmol/L Final      Results from last 7 days   Lab Units 09/07/20  2207   PH ART  7 170*   PCO2 ART mm Hg 17 1*   PO2 ART mm Hg 125 1   HCO3 ART mmol/L 6 1*   BASE EXC ART mmol/L -20 3   O2 CONTENT ART mL/dL 17 1   O2 HGB, ARTERIAL % 97 0   ABG SOURCE  Radial, Left     Results from last 7 days   Lab Units 09/08/20  0625   PH RNEETTA  7 237*   PCO2 RENETTA mm Hg 34 9*   PO2 RENETTA mm Hg 58 1*   HCO3 RENETTA mmol/L 14 5*   BASE EXC RENETTA mmol/L -11 9   O2 CONTENT RENETTA ml/dL 13 2   O2 HGB, VENOUS % 87 6*         Results from last 7 days   Lab Units 09/07/20  2150   CK TOTAL U/L 27     Results from last 7 days   Lab Units 09/07/20  2150   TROPONIN I ng/mL <0 02     Results from last 7 days   Lab Units 09/07/20  2150   D-DIMER QUANTITATIVE ug/ml FEU 2 73*     Results from last 7 days   Lab Units 09/07/20  2150   PROTIME seconds 13 4   INR  1 04         Results from last 7 days   Lab Units 09/08/20  0025   PROCALCITONIN ng/ml 0 37*     Results from last 7 days   Lab Units 09/08/20  0025 09/07/20  2150   LACTIC ACID mmol/L 1 9 3 0*       Results from last 7 days   Lab Units 09/07/20  2150   LIPASE u/L 141     Results from last 7 days   Lab Units 09/07/20  2330   CLARITY UA  Clear   COLOR UA  Yellow   SPEC GRAV UA  1 025   PH UA  5 5   GLUCOSE UA mg/dl 500 (1/2%)*   KETONES UA mg/dl >=80 (3+)*   BLOOD UA  Trace-Intact*   PROTEIN UA mg/dl Trace*   NITRITE UA  Negative   BILIRUBIN UA  Negative   UROBILINOGEN UA E U /dl 0 2   LEUKOCYTES UA  Negative   WBC UA /hpf 2-4*   RBC UA /hpf 4-10*   BACTERIA UA /hpf Occasional   EPITHELIAL CELLS WET PREP /hpf Occasional     Results from last 7 days   Lab Units 09/07/20  2330   AMPH/METH  Negative   BARBITURATE UR  Negative   BENZODIAZEPINE UR  Negative   COCAINE UR  Negative   METHADONE URINE  Negative   OPIATE UR  Positive*   PCP UR  Negative   THC UR  Negative         ED Treatment:   Medication Administration from 09/07/2020 2126 to 09/08/2020 0102       Date/Time Order Dose Route Action     09/07/2020 2242 sodium chloride 0 9 % infusion 1,000 mL/hr Intravenous New Bag     09/07/2020 2151 sodium chloride 0 9 % infusion 1,000 mL/hr Intravenous New Bag     09/07/2020 2332 sodium chloride 0 9 % infusion 500 mL/hr Intravenous New Bag     09/07/2020 2243 sodium chloride 0 9 % infusion 500 mL/hr Intravenous New Bag     09/07/2020 2221 morphine (PF) 4 mg/mL injection 4 mg 4 mg Intravenous Given     09/07/2020 2221 ondansetron (ZOFRAN) injection 4 mg 4 mg Intravenous Given     09/07/2020 2331 insulin regular (HumuLIN R,NovoLIN R) injection 10 Units 10 Units Intravenous Given     09/07/2020 2340 insulin regular (HumuLIN R,NovoLIN R) 1 Units/mL in sodium chloride 0 9 % 100 mL infusion 9 Units/hr Intravenous New Bag     09/07/2020 2325 sodium bicarbonate 150 mEq in dextrose 5 % 1,000 mL infusion 100 mL/hr Intravenous Not Given     09/07/2020 2335 piperacillin-tazobactam (ZOSYN) 3 375 g in sodium chloride 0 9 % 100 mL IVPB 3 375 g Intravenous New Bag        Past Medical History:   Diagnosis Date    Charcot's joint of foot, left     Diabetes mellitus ([facility] )      Admitting Diagnosis: Vomiting [R11 10]  Serum phosphate elevated [E83 39]  Elevated d-dimer [R79 89]  Diabetic ketoacidosis without coma associated with type 1 diabetes mellitus ([facility] ) [E10 10]  Age/Sex: 54 y o  female  Admission Orders:  I/O  SCDS UP with assist  Procal  BMP, phos, mag every 4 hours    Scheduled Medications:  gabapentin, 200 mg, Oral, BID  heparin (porcine), 5,000 Units, Subcutaneous, Q8H ALEX  polyethylene glycol, 17 g, Oral, Daily  potassium phosphate, 30 mmol, Intravenous, Once      Continuous IV Infusions:  dextrose 5 % and sodium chloride 0 9 %, 250 mL/hr, Intravenous, Continuous  insulin regular (HumuLIN R,NovoLIN R) infusion, 0 1-30 Units/hr, Intravenous, Continuous      PRN Meds:       IP CONSULT TO CASE MANAGEMENT  IP CONSULT TO PODIATRY    Network Utilization Review Department  [email or web address]  ATTENTION: Please call with any questions or concerns to [phone] and carefully listen to the prompts so that you are directed to the right person  All voicemails are confidential   Jeff Gaitan all requests for admission clinical reviews, approved or denied determinations and any other requests to dedicated fax number below belonging to the campus where the patient is receiving treatment   List of dedicated fax numbers for the Facilities:  [address] DENIALS (Administrative/Medical Necessity) [phone]   [address] [place] [address] (Maternity/NICU/Pediatrics) [phone]   Diana Gregoryo [phone]   Ashlee Turk [phone]   Fartun Perkins [phone]   Kenya Martin [phone]   [address] [place] [address] [phone]   [facility]  [phone]   [address] [facility]  [address] [address] [address] [facility] [phone]

## 2020-09-08 NOTE — PLAN OF CARE
Problem: Potential for Falls  Goal: Patient will remain free of falls  Description: INTERVENTIONS:  - Assess patient frequently for physical needs  -  Identify cognitive and physical deficits and behaviors that affect risk of falls    -  [place] fall precautions as indicated by assessment   - Educate patient/family on patient safety including physical limitations  - Instruct patient to call for assistance with activity based on assessment  - Modify environment to reduce risk of injury  - Consider OT/PT consult to assist with strengthening/mobility  Outcome: Progressing     Problem: PAIN - ADULT  Goal: Verbalizes/displays adequate comfort level or baseline comfort level  Description: Interventions:  - Encourage patient to monitor pain and request assistance  - Assess pain using appropriate pain scale  - Administer analgesics based on type and severity of pain and evaluate response  - Implement non-pharmacological measures as appropriate and evaluate response  - Consider cultural and social influences on pain and pain management  - Notify physician/advanced practitioner if interventions unsuccessful or patient reports new pain  Outcome: Progressing     Problem: INFECTION - ADULT  Goal: Absence or prevention of progression during hospitalization  Description: INTERVENTIONS:  - Assess and monitor for signs and symptoms of infection  - Monitor lab/diagnostic results  - Monitor all insertion sites, i e  indwelling lines, tubes, and drains  - Monitor endotracheal if appropriate and nasal secretions for changes in amount and color  - [place] appropriate cooling/warming therapies per order  - Administer medications as ordered  - Instruct and encourage patient and family to use good hand hygiene technique  - Identify and instruct in appropriate isolation precautions for identified infection/condition  Outcome: Progressing  Goal: Absence of fever/infection during neutropenic period  Description: INTERVENTIONS:  - Monitor WBC    Outcome: Progressing     Problem: SAFETY ADULT  Goal: Maintain or return to baseline ADL function  Description: INTERVENTIONS:  -  Assess patient's ability to carry out ADLs; assess patient's baseline for ADL function and identify physical deficits which impact ability to perform ADLs (bathing, care of mouth/teeth, toileting, grooming, dressing, etc )  - Assess/evaluate cause of self-care deficits   - Assess range of motion  - Assess patient's mobility; develop plan if impaired  - Assess patient's need for assistive devices and provide as appropriate  - Encourage maximum independence but intervene and supervise when necessary  - Involve family in performance of ADLs  - Assess for home care needs following discharge   - Consider OT consult to assist with ADL evaluation and planning for discharge  - Provide patient education as appropriate  Outcome: Progressing  Goal: Maintain or return mobility status to optimal level  Description: INTERVENTIONS:  - Assess patient's baseline mobility status (ambulation, transfers, stairs, etc )    - Identify cognitive and physical deficits and behaviors that affect mobility  - Identify mobility aids required to assist with transfers and/or ambulation (gait belt, sit-to-stand, lift, walker, cane, etc )  - [place] fall precautions as indicated by assessment  - Record patient progress and toleration of activity level on Mobility SBAR; progress patient to next Phase/Stage  - Instruct patient to call for assistance with activity based on assessment  - Consider rehabilitation consult to assist with strengthening/weightbearing, etc   Outcome: Progressing     Problem: DISCHARGE PLANNING  Goal: Discharge to home or other facility with appropriate resources  Description: INTERVENTIONS:  - Identify barriers to discharge w/patient and caregiver  - Arrange for needed discharge resources and transportation as appropriate  - Identify discharge learning needs (meds, wound care, etc )  - Arrange for interpretive services to assist at discharge as needed  - Refer to Case Management Department for coordinating discharge planning if the patient needs post-hospital services based on physician/advanced practitioner order or complex needs related to functional status, cognitive ability, or social support system  Outcome: Progressing     Problem: Knowledge Deficit  Goal: Patient/family/caregiver demonstrates understanding of disease process, treatment plan, medications, and discharge instructions  Description: Complete learning assessment and assess knowledge base    Interventions:  - Provide teaching at level of understanding  - Provide teaching via preferred learning methods  Outcome: Progressing

## 2020-09-08 NOTE — ASSESSMENT & PLAN NOTE
Lab Results   Component Value Date    HGBA1C 12 1 (H) 12/03/2019       Recent Labs     09/07/20  2155 09/07/20  2345   POCGLU >500* >500*       Blood Sugar Average: Last 72 hrs:     · Pt w hx DM type 2, states has no previous history of DKA  · Home medications include   · Lantus 60 units at HS  · Apidra 10 units TID w meals  · Has not been taking lantus for last month due to financial difficulty obtaining medications  Continued to take mealtime insulin  · Follows with Endocrinology at [address]  · This morning, stated feeling not well, nausea, multiple episodes of vomiting  · In the ED  · BHB 6 2, pH on ABG 7 1, glucose on , potassium 6 1, AG 27, sodium 131 (glucose corrected 141)  · Received 2 liter NSS for DKA protocol   Started on /hr x 4 hours  · Given 10 units insulin bolus and started on insulin gtt  · Start insulin gtt, IVF per DKA protocol  · q4hr BMP, Mag, phos w replacements ordered  · Hgb A1C ordered  · NPO until corrected  · Case mgmt consult to assist w financial difficulty w medications  ·

## 2020-09-08 NOTE — ASSESSMENT & PLAN NOTE
· Baseline creatinine from 12/2019  Is 0 8-1 1  Denies any history of kidney diease     · Secondary to DKA, dehydration, nausea/vomiting  · Fluid resuscitation per DKA protocol  · Trend labs  · Accurate I&O  · Avoid hypotension, nephrotoxins

## 2020-09-08 NOTE — ED NOTES
Patient to be held until covid specimen results  Patient ambulatory to bathroom with steady gait to provide urine specimen  Patient wound on left foot, [facility]       [facility]  09/07/20 9828

## 2020-09-08 NOTE — ED NOTES
Supervisor called to mix medications  Inpatient provided at bedside gave verbal orders to cancel [facility] Supervisor called back and made aware again               Manoj Durbin RN  09/07/20 9213

## 2020-09-08 NOTE — ASSESSMENT & PLAN NOTE
· Secondary to #1  · Monitor fever curve, WBC  · Procalcitonin ordered  · Received Zosyn 3 375 x1 dose in ED  Hold on further antibiotics  · CXR without infectious concern  · Covid swab negative   UA negative

## 2020-09-08 NOTE — ASSESSMENT & PLAN NOTE
· Secondary to #1  · Monitor fever curve, WBC  · Procalcitonin 0 37  · Received Zosyn 3 375 x1 dose in ED  Hold on further antibiotics  · CXR without infectious concern  · Covid swab negative   UA negative  · May need further workup of diabetic foot ulcer if does not improve

## 2020-09-08 NOTE — ASSESSMENT & PLAN NOTE
Lab Results   Component Value Date    HGBA1C 14 0 (H) 09/07/2020       Recent Labs     09/08/20  1400 09/08/20  1613 09/08/20  1816 09/08/20 1958   POCGLU 127 170* 193* 238*       Blood Sugar Average: Last 72 hrs:  (P) 357   · Pt w hx DM type 1, states has no previous history of DKA  · Home medications include   · Lantus 60 units at HS  · Apidra 10 units TID w meals  · Has not been taking lantus for last month due to financial difficulty obtaining medications  Continued to take mealtime insulin  · Follows with Endocrinology (Dr Ambar Garcia) at [address]  · In the ED  · BHB 6 2, pH on ABG 7 1, glucose on , potassium 6 1, AG 27, sodium 131 (glucose corrected 141)  · Received 2 liter NSS for DKA protocol  Started on /hr x 4 hours  · Given 10 units insulin bolus and started on insulin gtt  · Insulin gtt, IVF per DKA protocol  · q4hr BMP, Mag, phos w replacements ordered  · Case mgmt consult to assist w financial difficulty w medications  · AG closed 9/8, diet ordered and tolerated  · 9/8, non-DKA insulin gtt discontinued  Lantus resumed  SSI ordered  Glucose checks AC/HS and at bedtime

## 2020-09-08 NOTE — ASSESSMENT & PLAN NOTE
· Secondary to #1  · Monitor fever curve, WBC  · Procalcitonin ordered  · Received Zosyn 3 375 x1 dose in ED  Hold on further antibiotics  · CXR without infectious concern  · Covid swab negative   UA negative  · May need further workup of diabetic foot ulcer if does not improve

## 2020-09-08 NOTE — PROGRESS NOTES
Chart reviewed aware of medical management  Admit with DKA  Scheduled IV insulin drip with IV fluids  CM consulted for dc planning and assist with medications upon dc  Aware patient is having difficulties paying her co pays  Wound Care is Consulted and Podiatry  CM met with patient at the bedside,baseline information  was obtained  CM discussed the role of CM in helping the patient develop a discharge plan and assist the patient in carry out their plan  Patient has identified:  Cody Sherman her spouse as her primary  he  is able to assist upon discharge  No POA: No advance directive:  Pt verbalized Cody Sherman would be the person assisting with decisions with making if need be  At this time patient is not interested in receiving information  PCP: Dr Nicki Coronado            Pt has a prescription plan and uses [facility]           Patient is not a [place]  Pt denies SA/MH history  Pt resides with her   She has 2 adult children who are out of the house  She is employed full time  Discussed cost of insulin: for 3 months the cost is $200 for each insulin thus $400 for 3 month supply  Pt does have an endocrine MD from [facility]  CM suggested vials instead of pens for patient  Per patient it is least expensive for pens then vials      CM spoke to Mr Osmin Leon at the bedside and we reviewed the cost of the insulin, he is self employee, however aware of the importance of his wife health-- and he stated " he will make it work" to get her the insulin that is needed "     Only alternative CM can think of at this time is Relion brand insulin, unsure if this will be effective, will discuss with medical team     Aware patient was on an insulin pump in the past, but that was discontinued as insurance changed and the copy for the pump and Insulin was $1000    House set up: 2 [address] 2 story home - bathroom on the 1st floor, 12 steps to the bedroom  Functional level PTA: I/pta  DME use:  None currently ( has a cane)  Prior use of Home Care or STR: none  Transportation: pt drives and family available upon dc  Community Resources: none  CM reviewed d/c planning process including the following: identifying help at home, patient preference for d/c planning needs, availability of treatment team to discuss questions or concerns patient and/or family may have regarding understanding medications and recognizing signs and symptoms once discharged  CM also encouraged patient to follow up with all recommended appointments after discharge  Patient advised of importance for patient and family to participate in managing patients medical well being  Goal of patient upon discharge is to go home  CM will follow, anticipate home, with PCP appointment

## 2020-09-08 NOTE — ED NOTES
Report given to [facility]  Advised insulin bolus, finger stick, and covid swab would be collected  Insulin trip and zosyn to be started on the floor  Aware of changing fluid orders        Reta Perez RN  09/07/20 9822

## 2020-09-08 NOTE — H&P
H&P- Gillian Cisneros 1975, 54 y o  female MRN: [id number]    Unit/Bed#: -01 Encounter: [id number]    Primary Care Provider: Rodrick Rivera MD   Date and time admitted to hospital: 9/7/2020  9:34 PM        * Diabetic ketoacidosis without coma associated with type 1 diabetes mellitus [facility])  Assessment & Plan  Lab Results   Component Value Date    HGBA1C 12 1 (H) 12/03/2019       Recent Labs     09/07/20  2155 09/07/20  2345   POCGLU >500* >500*       Blood Sugar Average: Last 72 hrs:     · Pt w hx DM type 1, states has no previous history of DKA  · Home medications include   · Lantus 60 units at HS  · Apidra 10 units TID w meals  · Has not been taking lantus for last month due to financial difficulty obtaining medications  Continued to take mealtime insulin  · Follows with Endocrinology at [address]  · This morning, stated feeling not well, nausea, multiple episodes of vomiting  · In the ED  · BHB 6 2, pH on ABG 7 1, glucose on , potassium 6 1, AG 27, sodium 131 (glucose corrected 141)  · Received 2 liter NSS for DKA protocol  Started on /hr x 4 hours  · Given 10 units insulin bolus and started on insulin gtt  · Start insulin gtt, IVF per DKA protocol  · q4hr BMP, Mag, phos w replacements ordered  · Hgb A1C ordered  · NPO until corrected  · Case mgmt consult to assist w financial difficulty w medications  ·     Metabolic acidosis, increased anion gap  Assessment & Plan  · Related to DKA  · See plan under DKA  · Monitor AG on BMP q4 hours    RAQUEL (acute kidney injury) ([facility] [address] )  Assessment & Plan  · Baseline creatinine from 12/2019  Is 0 8-1 1  Denies any history of kidney diease  · Secondary to DKA, dehydration, nausea/vomiting  · Fluid resuscitation per DKA protocol  · Trend labs  · Accurate I&O  · Avoid hypotension, nephrotoxins    Non-healing wound of left foot  Assessment & Plan  · Per patient, has hx of Charcot foot  Had bone spur causing non-healing ulcer   Surgical procedure last year to remove spur and now has non-healing surgical wound  · Follows up with wound care service in [facility] [place]  · Consult wound care while inpatient  Elevated d-dimer  Assessment & Plan  · Likely secondary to DKA, elevated lactic acid, dehydration, RAQUEL  · Pt also w tachycardia on ED presentation  · Unable to obtain CT scan due to RAQUEL  · Treat DKA  If symptoms do not improve, consider CT or VQ scan vs  BLE duplex  Leukocytosis  Assessment & Plan  · Secondary to #1  · Monitor fever curve, WBC  · Procalcitonin ordered  · Received Zosyn 3 375 x1 dose in ED  Hold on further antibiotics  · CXR without infectious concern  · Covid swab negative  UA negative        -------------------------------------------------------------------------------------------------------------  Chief Complaint: nausea, vomiting, elevated blood sugars    History of Present Illness     Jimy Becker is a 54year old female PMH of DM 1, DM foot ulcer, Charcots joint left foot, osteomyelitis left foot in 2019, who presented to the ED w complaints of vomiting, epigastric pain, constipation and high blood sugars at home  Stopped lantus 1 month ago due to financial issues  In the ED, BHB was 6 2, lactic acid 3 0, pH 7 1, glucose 715, AG 27 consistent w DKA    Given morphine zofran and Zosyn x1 dose for abd pain and vomiting  D- dimer was 2 73  CT scan unable to be compleyed due to RAQUEL  She was given 2 5L NSS, 10 units Insulin bolus, and started on insulin gtt  Admitted to critical care medicine for DKA  History obtained from chart review, the patient and    -------------------------------------------------------------------------------------------------------------  Dispo:  Admit to Stepdown Level 1    Code Status: Full code  --------------------------------------------------------------------------------------------------------------  Review of Systems   Constitutional: Positive for appetite change, chills and fatigue  HENT: Negative for sore throat and trouble swallowing  Respiratory: Negative for cough, chest tightness and shortness of breath  Cardiovascular: Negative for chest pain, palpitations and leg swelling  Does report some discomfort with vomiting  Gastrointestinal: Positive for abdominal pain, constipation, nausea and vomiting  Negative for diarrhea  Genitourinary: Negative for difficulty urinating, frequency and urgency  Neurological: Positive for dizziness and light-headedness  Negative for numbness  Physical Exam  Vitals signs and nursing note reviewed  Constitutional:       General: She is not in acute distress  Appearance: She is obese  She is ill-appearing  She is not toxic-appearing or diaphoretic  HENT:      Head: Normocephalic and atraumatic  Nose: No congestion  Mouth/Throat:      Mouth: Mucous membranes are dry  Pharynx: No oropharyngeal exudate  Eyes:      General: No scleral icterus  Right eye: No discharge  Left eye: No discharge  Conjunctiva/sclera: Conjunctivae normal       Pupils: Pupils are equal, round, and reactive to light  Neck:      Musculoskeletal: Neck supple  No neck rigidity  Trachea: No tracheal deviation  Cardiovascular:      Rate and Rhythm: Regular rhythm  Tachycardia present  Pulses: Normal pulses  Heart sounds: Normal heart sounds  No murmur  No friction rub  Pulmonary:      Effort: Pulmonary effort is normal  No respiratory distress  Breath sounds: Normal breath sounds  No wheezing or rales  Abdominal:      General: Bowel sounds are normal  There is no distension  Palpations: Abdomen is soft  Tenderness: There is abdominal tenderness  There is no guarding  Comments: Epigastric tenderness on palpation  Genitourinary:     Comments: Has not voided since arrival  Musculoskeletal: Normal range of motion  General: No swelling     Skin:     General: Skin is warm and dry  Capillary Refill: Capillary refill takes less than 2 seconds  Coloration: Skin is not pale  Neurological:      General: No focal deficit present  Mental Status: She is alert and oriented to person, place, and time  Mental status is at baseline  Psychiatric:         Mood and Affect: Mood normal        --------------------------------------------------------------------------------------------------------------  Vitals:   Vitals:    09/07/20 2343 09/07/20 2345 09/08/20 0000 09/08/20 0030   BP: 149/72 149/72 137/63 145/67   BP Location: Left arm Left arm Left arm Left arm   Pulse: (!) 130 (!) 129 (!) 131 (!) 128   Resp: 18 17 (!) 23 20   Temp:       TempSrc:       SpO2: 100% 100% 100% 100%   Weight:         Temp  Min: 97 7 °F (36 5 °C)  Max: 98 °F (36 7 °C)  IBW: -92 5 kg     Body mass index is 33 98 kg/m²      Laboratory and Diagnostics:  Results from last 7 days   Lab Units 09/07/20 2150   WBC Thousand/uL 19 36*   HEMOGLOBIN g/dL 11 0*   HEMATOCRIT % 35 5   PLATELETS Thousands/uL 404*     Results from last 7 days   Lab Units 09/07/20 2150   SODIUM mmol/L 131*   POTASSIUM mmol/L 6 1*   CHLORIDE mmol/L 94*   CO2 mmol/L 10*   ANION GAP mmol/L 27*   BUN mg/dL 29*   CREATININE mg/dL 1 80*   CALCIUM mg/dL 9 8   GLUCOSE RANDOM mg/dL 715*   ALT U/L 26   AST U/L 14   ALK PHOS U/L 149*   ALBUMIN g/dL 3 4*   TOTAL BILIRUBIN mg/dL 0 50     Results from last 7 days   Lab Units 09/07/20 2150   MAGNESIUM mg/dL 2 4   PHOSPHORUS mg/dL 6 6*      Results from last 7 days   Lab Units 09/07/20  2150   INR  1 04      Results from last 7 days   Lab Units 09/07/20  2150   TROPONIN I ng/mL <0 02     Results from last 7 days   Lab Units 09/08/20  0025 09/07/20  2150   LACTIC ACID mmol/L 1 9 3 0*     ABG:  Results from last 7 days   Lab Units 09/07/20  2207   PH ART  7 170*   PCO2 ART mm Hg 17 1*   PO2 ART mm Hg 125 1   HCO3 ART mmol/L 6 1*   BASE EXC ART mmol/L -20 3   ABG SOURCE  Radial, Left VBG:  Results from last 7 days   Lab Units 09/07/20  2207   ABG SOURCE  Radial, Left           Micro:        EKG: ST  QTc 467  Imaging: I have personally reviewed pertinent reports  and I have personally reviewed pertinent films in PACS CXR shows NAD  Historical Information   Past Medical History:   Diagnosis Date    Charcot's joint of foot, left     Diabetes mellitus ([address] )      History reviewed  No pertinent surgical history  Social History   Social History     Substance and Sexual Activity   Alcohol Use Never    Frequency: Never     Social History     Substance and Sexual Activity   Drug Use Never     Social History     Tobacco Use   Smoking Status Never Smoker   Smokeless Tobacco Never Used     Exercise History: none  Family History:   History reviewed  No pertinent family history  Medications:  Current Facility-Administered Medications   Medication Dose Route Frequency    insulin regular (HumuLIN R,NovoLIN R) 1 Units/mL in sodium chloride 0 9 % 100 mL infusion  0 1-30 Units/hr Intravenous Continuous    sodium chloride (PF) 0 9 % injection 3 mL  3 mL Intravenous Q1H PRN    sodium chloride 0 9 % infusion  500 mL/hr Intravenous Continuous    Followed by   Nawaf Pert sodium chloride 0 9 % infusion  250 mL/hr Intravenous Continuous     Home medications:  Prior to Admission Medications   Prescriptions Last Dose Informant Patient Reported? Taking?    ACCU-CHEK FASTCLIX LANCETS MISC   Yes No   Sig: by Does not apply route   Syringe/Needle, Disp, 30G X 1/2" 1 ML MISC   Yes No   Sig: by Does not apply route   gabapentin (NEURONTIN) 100 mg capsule   Yes No   Sig: Take 200 mg by mouth 2 (two) times a day   insulin glargine (LANTUS) 100 units/mL subcutaneous injection   No No   Sig: Inject 60 Units under the skin daily at bedtime   insulin glulisine (APIDRA) 100 units/mL injection   No No   Sig: Inject 10 Units under the skin 3 (three) times a day with meals      Facility-Administered Medications: None Allergies: Allergies   Allergen Reactions    Clindamycin Other (See Comments)     CHEST PRESSURE, FEELS LIKE A HEART ATTACK PER PT       ------------------------------------------------------------------------------------------------------------  Advance Directive and Living Will:      Power of :    POLST:    ------------------------------------------------------------------------------------------------------------  Anticipated Length of Stay is > 2 midnights    Care Time Delivered:   Upon my evaluation, this patient had a high probability of imminent or life-threatening deterioration due to DKA, which required my direct attention, intervention, and personal management  I have personally provided 20 minutes (2250 to 2310) of critical care time, exclusive of procedures, teaching, family meetings, and any prior time recorded by providers other than myself  Susa Kehr, CRNP        Portions of the record may have been created with voice recognition software  Occasional wrong word or "sound a like" substitutions may have occurred due to the inherent limitations of voice recognition software    Read the chart carefully and recognize, using context, where substitutions have occurred

## 2020-09-08 NOTE — ED PROVIDER NOTES
History  Chief Complaint   Patient presents with    Abdominal Pain     Epigastric pain starting at 9AM this morning, patient had BM right before, pt reports N/V throughout the day     14-year-old female with a past medical history of diabetes and Charcot's joint of left foot presents with abdominal pain, chest pain, nausea, fatigue, nonbloody nonbilious emesis, constipation since this morning  Patient states that she took a laxative and an enema for her constipation earlier today because she had not had a bowel movement two days  She did have a small bowel movement later this morning  Patient is a diabetic and states that she ran out of her Lantus two days ago because "I cannot afford it anymore "  Patient states she has checked her blood sugar at home and it was over 600  Patient denies previous history of diabetic ketoacidosis and has never been in diabetic coma  Patient denies sick contacts or known COVID-19 exposure  Patient denies fever, chills, headache, neck stiffness, myalgias, cough, sputum production, sore throat, back pain, rash, urinary symptoms or bloody stools  Patient has taken no medications at home for symptoms  Dr Cresencio Collet wore PPE during clinical evaluation due COVID-19 pandemic including Bonnet, face mask, eye goggles and gloves        History provided by:  Patient and spouse   used: No    Abdominal Pain   Pain location:  Epigastric  Pain quality: aching and bloating    Pain radiates to:  Does not radiate  Pain severity:  Moderate  Onset quality:  Sudden  Duration:  12 hours  Timing:  Constant  Progression:  Worsening  Chronicity:  New  Context: not alcohol use, not awakening from sleep, not diet changes, not eating, not laxative use, not medication withdrawal, not previous surgeries, not recent illness, not recent sexual activity, not recent travel, not retching, not sick contacts, not suspicious food intake and not trauma    Relieved by:  Nothing  Worsened by: Nothing  Ineffective treatments:  None tried  Associated symptoms: chest pain, constipation, fatigue, nausea and vomiting    Associated symptoms: no anorexia, no belching, no chills, no cough, no diarrhea, no dysuria, no fever, no flatus, no hematemesis, no hematochezia, no hematuria, no melena, no shortness of breath, no sore throat, no vaginal bleeding and no vaginal discharge    Chest pain:     Quality: pressure      Severity:  Mild    Onset quality:  Sudden    Duration:  12 hours    Timing:  Constant    Progression:  Waxing and waning    Chronicity:  New  Nausea:     Severity:  Moderate    Onset quality:  Sudden    Duration:  12 hours    Timing:  Intermittent    Progression:  Waxing and waning  Vomiting:     Quality:  Unable to specify    Number of occurrences:  4    Severity:  Moderate    Duration:  12 hours    Timing:  Sporadic    Progression:  Partially resolved  Risk factors: obesity    Risk factors: no alcohol abuse, no aspirin use, not elderly, has not had multiple surgeries, no NSAID use and no recent hospitalization        Prior to Admission Medications   Prescriptions Last Dose Informant Patient Reported? Taking? ACCU-CHEK FASTCLIX LANCETS MISC   Yes No   Sig: by Does not apply route   Syringe/Needle, Disp, 30G X 1/2" 1 ML MISC   Yes No   Sig: by Does not apply route   gabapentin (NEURONTIN) 100 mg capsule   Yes No   Sig: Take 200 mg by mouth 2 (two) times a day   insulin glargine (LANTUS) 100 units/mL subcutaneous injection   No No   Sig: Inject 60 Units under the skin daily at bedtime   insulin glulisine (APIDRA) 100 units/mL injection   No No   Sig: Inject 10 Units under the skin 3 (three) times a day with meals      Facility-Administered Medications: None       Past Medical History:   Diagnosis Date    Charcot's joint of foot, left     Diabetes mellitus ([facility] [address] )        History reviewed  No pertinent surgical history  History reviewed  No pertinent family history    I have reviewed and agree with the history as documented  E-Cigarette/Vaping    E-Cigarette Use Never User      E-Cigarette/Vaping Substances     Social History     Tobacco Use    Smoking status: Never Smoker    Smokeless tobacco: Never Used   Substance Use Topics    Alcohol use: Never     Frequency: Never    Drug use: Never       Review of Systems   Constitutional: Positive for fatigue  Negative for chills, diaphoresis and fever  HENT: Negative for congestion, drooling, facial swelling, nosebleeds, sneezing, sore throat, trouble swallowing and voice change  Eyes: Negative for photophobia, pain and visual disturbance  Respiratory: Negative for cough, chest tightness, shortness of breath and wheezing  Cardiovascular: Positive for chest pain  Negative for palpitations and leg swelling  Gastrointestinal: Positive for abdominal pain, constipation, nausea and vomiting  Negative for anal bleeding, anorexia, blood in stool, diarrhea, flatus, hematemesis, hematochezia and melena  Genitourinary: Negative for decreased urine volume, difficulty urinating, dysuria, flank pain, frequency, hematuria, pelvic pain, urgency, vaginal bleeding and vaginal discharge  Musculoskeletal: Negative for arthralgias, back pain, gait problem, joint swelling, myalgias, neck pain and neck stiffness  Skin: Negative for color change, pallor, rash and wound  Allergic/Immunologic: Negative for immunocompromised state  Neurological: Negative for dizziness, tremors, seizures, syncope, facial asymmetry, speech difficulty, weakness, light-headedness, numbness and headaches  Hematological: Negative for adenopathy  Psychiatric/Behavioral: Negative for agitation, confusion, dysphoric mood, hallucinations and suicidal ideas  The patient is not nervous/anxious  Physical Exam  Physical Exam  Vitals signs and nursing note reviewed  Exam conducted with a chaperone present  Constitutional:       General: She is not in acute distress       Appearance: She is well-developed  She is obese  She is ill-appearing  She is not toxic-appearing or diaphoretic  HENT:      Head: Normocephalic and atraumatic  Jaw: There is normal jaw occlusion  Right Ear: Hearing, tympanic membrane, ear canal and external ear normal       Left Ear: Hearing, tympanic membrane, ear canal and external ear normal       Nose: Nose normal       Right Sinus: No maxillary sinus tenderness or frontal sinus tenderness  Left Sinus: No maxillary sinus tenderness or frontal sinus tenderness  Mouth/Throat:      Mouth: Mucous membranes are dry  Pharynx: Uvula midline  No pharyngeal swelling, oropharyngeal exudate or posterior oropharyngeal erythema  Tonsils: No tonsillar abscesses  Eyes:      General: Lids are normal  No visual field deficit or scleral icterus  Extraocular Movements: Extraocular movements intact  Conjunctiva/sclera: Conjunctivae normal       Pupils: Pupils are equal, round, and reactive to light  Neck:      Musculoskeletal: Full passive range of motion without pain, normal range of motion and neck supple  Thyroid: No thyroid mass or thyromegaly  Trachea: Trachea and phonation normal    Cardiovascular:      Rate and Rhythm: Regular rhythm  Tachycardia present  Pulses: Normal pulses  Radial pulses are 2+ on the right side and 2+ on the left side  Dorsalis pedis pulses are 2+ on the right side and 2+ on the left side  Heart sounds: Normal heart sounds, S1 normal and S2 normal    Pulmonary:      Effort: Pulmonary effort is normal  No tachypnea, accessory muscle usage, respiratory distress or retractions  Breath sounds: Normal breath sounds  No stridor  No decreased breath sounds, wheezing, rhonchi or rales  Chest:      Chest wall: No mass, deformity or tenderness  Abdominal:      General: Abdomen is flat and protuberant  Bowel sounds are decreased  There is no distension        Palpations: Abdomen is soft  Abdomen is not rigid  There is no shifting dullness, fluid wave, hepatomegaly, splenomegaly, mass or pulsatile mass  Tenderness: There is abdominal tenderness in the epigastric area  There is no right CVA tenderness, left CVA tenderness, guarding or rebound  Negative signs include Mccarthy's sign and McBurney's sign  Hernia: No hernia is present  Musculoskeletal: Normal range of motion  General: No swelling, tenderness, deformity or signs of injury  Right lower leg: No edema  Left lower leg: No edema  Lymphadenopathy:      Cervical: No cervical adenopathy  Skin:     General: Skin is warm and dry  Capillary Refill: Capillary refill takes less than 2 seconds  Coloration: Skin is not ashen, cyanotic, jaundiced, mottled, pale or sallow  Findings: No abrasion, acne, bruising, burn, ecchymosis, erythema, signs of injury, laceration, lesion, petechiae or rash  Rash is not macular or papular  Neurological:      General: No focal deficit present  Mental Status: She is alert and oriented to person, place, and time  Mental status is at baseline  She is not disoriented  GCS: GCS eye subscore is 4  GCS verbal subscore is 5  GCS motor subscore is 6  Cranial Nerves: Cranial nerves are intact  No cranial nerve deficit, dysarthria or facial asymmetry  Sensory: Sensation is intact  No sensory deficit  Motor: Motor function is intact  No weakness, tremor, atrophy, abnormal muscle tone or seizure activity  Coordination: Coordination is intact  Coordination normal       Gait: Gait is intact  Gait normal       Comments: Patient is AAOx4, GCS 15; speaking clearly and appropriately; motor and sensation intact; visual fields intact; cranial nerves II-XII grossly intact; no facial droop, slurred speech or arm drift   Psychiatric:         Attention and Perception: Attention and perception normal  She is attentive           Mood and Affect: Affect normal  Mood is anxious  Speech: Speech normal          Behavior: Behavior normal  Behavior is cooperative  Thought Content:  Thought content normal          Cognition and Memory: Cognition and memory normal          Judgment: Judgment normal          Vital Signs  ED Triage Vitals [09/07/20 2138]   Temperature Pulse Respirations Blood Pressure SpO2   97 7 °F (36 5 °C) (!) 124 18 158/76 100 %      Temp Source Heart Rate Source Patient Position - Orthostatic VS BP Location FiO2 (%)   Temporal Monitor Lying Left arm --      Pain Score       Worst Possible Pain           Vitals:    09/07/20 2142 09/07/20 2343 09/07/20 2345 09/08/20 0000   BP: 158/76 149/72 149/72 137/63   Pulse: (!) 134 (!) 130 (!) 129 (!) 131   Patient Position - Orthostatic VS: Lying Lying Lying Lying         Visual Acuity      ED Medications  Medications   sodium chloride (PF) 0 9 % injection 3 mL (has no administration in time range)   sodium chloride 0 9 % infusion (0 mL/hr Intravenous Stopped 9/7/20 2256)     Followed by   sodium chloride 0 9 % infusion (500 mL/hr Intravenous New Bag 9/7/20 2332)     Followed by   sodium chloride 0 9 % infusion (has no administration in time range)   insulin regular (HumuLIN R,NovoLIN R) 1 Units/mL in sodium chloride 0 9 % 100 mL infusion (9 Units/hr Intravenous New Bag 9/7/20 2340)   piperacillin-tazobactam (ZOSYN) 3 375 g in sodium chloride 0 9 % 100 mL IVPB (3 375 g Intravenous New Bag 9/7/20 2335)   morphine (PF) 4 mg/mL injection 4 mg (4 mg Intravenous Given 9/7/20 2221)   ondansetron (ZOFRAN) injection 4 mg (4 mg Intravenous Given 9/7/20 2221)   insulin regular (HumuLIN R,NovoLIN R) injection 10 Units (10 Units Intravenous Given 9/7/20 2331)       Diagnostic Studies  Results Reviewed     Procedure Component Value Units Date/Time    Basic metabolic panel [[id number]]     Lab Status:  No result Specimen:  Blood     Magnesium [[id number]]     Lab Status:  No result Specimen:  Blood     Phosphorus [[id number]] Lab Status:  No result Specimen:  Blood     POCT pregnancy, urine [[id number]]  (Normal) Resulted:  09/07/20 2341    Lab Status:  Final result Specimen:  Urine Updated:  09/07/20 2357     EXT PREG TEST UR (Ref: Negative) Negative     Control valid    UA w Reflex to Microscopic w Reflex to Culture [[id number]]  (Abnormal) Collected:  09/07/20 2330    Lab Status:  Final result Specimen:  Urine, Clean Catch Updated:  09/07/20 2357     Color, UA Yellow     Clarity, UA Clear     Specific Gravity, UA 1 025     pH, UA 5 5     Leukocytes, UA Negative     Nitrite, UA Negative     Protein, UA Trace mg/dl      Glucose,  (1/2%) mg/dl      Ketones, UA >=80 (3+) mg/dl      Urobilinogen, UA 0 2 E U /dl      Bilirubin, UA Negative     Blood, UA Trace-Intact    Urine Microscopic [[id number]] Collected:  09/07/20 2330    Lab Status: In process Specimen:  Urine, Clean Catch Updated:  09/07/20 2357    Rapid drug screen, urine [[id number]] Collected:  09/07/20 2330    Lab Status: In process Specimen:  Urine, Catheter Updated:  09/07/20 2352    Procalcitonin with AM Reflex [[id number]]     Lab Status:  No result Specimen:  Blood     Novel Coronavirus [facility] [[id number]] Collected:  09/07/20 2320    Lab Status:   In process Specimen:  Nares from Nose Updated:  09/07/20 2348    Fingerstick Glucose (POCT) [[id number]]  (Abnormal) Collected:  09/07/20 2345    Lab Status:  Final result Updated:  09/07/20 2347     POC Glucose >500 mg/dl     CBC [[id number]]  (Abnormal) Collected:  09/07/20 2150    Lab Status:  Edited Result - FINAL Specimen:  Blood from Arm, Right Updated:  09/07/20 2308     WBC 19 36 Thousand/uL      RBC 5 05 Million/uL      Hemoglobin 11 0 g/dL      Hematocrit 35 5 %      MCV 70 fL      MCH 21 8 pg      MCHC 31 0 g/dL      RDW 15 6 %      Platelets 499 Thousands/uL      MPV 11 2 fL     Lactic acid, plasma [[id number]]  (Abnormal) Collected:  09/07/20 2150    Lab Status:  Final result Specimen:  Blood from Arm, Right Updated:  09/07/20 2234     LACTIC ACID 3 0 mmol/L     Narrative:       Result may be elevated if tourniquet was used during collection      Lactic acid 2 Hours [[id number]]     Lab Status:  No result Specimen:  Blood     Comprehensive metabolic panel [[id number]]  (Abnormal) Collected:  09/07/20 2150    Lab Status:  Final result Specimen:  Blood from Arm, Right Updated:  09/07/20 2234     Sodium 131 mmol/L      Potassium 6 1 mmol/L      Chloride 94 mmol/L      CO2 10 mmol/L      ANION GAP 27 mmol/L      BUN 29 mg/dL      Creatinine 1 80 mg/dL      Glucose 715 mg/dL      Calcium 9 8 mg/dL      AST 14 U/L      ALT 26 U/L      Alkaline Phosphatase 149 U/L      Total Protein 9 5 g/dL      Albumin 3 4 g/dL      Total Bilirubin 0 50 mg/dL      eGFR 33 ml/min/1 73sq m     Narrative:       [place] guidelines for Chronic Kidney Disease (CKD):     Stage 1 with normal or high GFR (GFR > 90 mL/min/1 73 square meters)    Stage 2 Mild CKD (GFR = 60-89 mL/min/1 73 square meters)    Stage 3A Moderate CKD (GFR = 45-59 mL/min/1 73 square meters)    Stage 3B Moderate CKD (GFR = 30-44 mL/min/1 73 square meters)    Stage 4 Severe CKD (GFR = 15-29 mL/min/1 73 square meters)    Stage 5 End Stage CKD (GFR <15 mL/min/1 73 square meters)  Note: GFR calculation is accurate only with a steady state creatinine    Troponin I [[id number]]  (Normal) Collected:  09/07/20 2150    Lab Status:  Final result Specimen:  Blood from Arm, Right Updated:  09/07/20 2223     Troponin I <0 02 ng/mL     Lipase [[id number]]  (Normal) Collected:  09/07/20 2150    Lab Status:  Final result Specimen:  Blood from Arm, Right Updated:  09/07/20 2218     Lipase 141 u/L     CK [[id number]]  (Normal) Collected:  09/07/20 2150    Lab Status:  Final result Specimen:  Blood from Arm, Right Updated:  09/07/20 2218     Total CK 27 U/L     Magnesium [[id number]]  (Normal) Collected:  09/07/20 2150    Lab Status:  Final result Specimen:  Blood from Arm, Right Updated:  09/07/20 2217     Magnesium 2 4 mg/dL     Phosphorus [[id number]]  (Abnormal) Collected:  09/07/20 2150    Lab Status:  Final result Specimen:  Blood from Arm, Right Updated:  09/07/20 2217     Phosphorus 6 6 mg/dL     D-dimer, quantitative [[id number]]  (Abnormal) Collected:  09/07/20 2150    Lab Status:  Final result Specimen:  Blood from Arm, Right Updated:  09/07/20 2216     D-Dimer, Quant 2 73 ug/ml FEU     Blood gas, arterial [[id number]]  (Abnormal) Collected:  09/07/20 2207    Lab Status:  Final result Specimen:  Blood, Arterial from Radial, Left Updated:  09/07/20 2215     pH, Arterial 7 170     pCO2, Arterial 17 1 mm Hg      pO2, Arterial 125 1 mm Hg      HCO3, Arterial 6 1 mmol/L      Base Excess, Arterial -20 3 mmol/L      O2 Content, Arterial 17 1 mL/dL      O2 HGB,Arterial  97 0 %      SOURCE Radial, Left     SABRA TEST Yes     ROOM AIR FIO2 21 %     Protime-INR [[id number]]  (Normal) Collected:  09/07/20 2150    Lab Status:  Final result Specimen:  Blood from Arm, Right Updated:  09/07/20 2213     Protime 13 4 seconds      INR 1 04    Beta Hydroxybutyrate [[id number]]  (Abnormal) Collected:  09/07/20 2150    Lab Status:  Final result Specimen:  Blood from Arm, Right Updated:  09/07/20 2201     BETA-HYDROXYBUTYRATE 6 2 mmol/L     Hemoglobin A1c w/EAG Estimation [[id number]] Collected:  09/07/20 2150    Lab Status: In process Specimen:  Blood from Arm, Right Updated:  09/07/20 2157    Fingerstick Glucose (POCT) [[id number]]  (Abnormal) Collected:  09/07/20 2155    Lab Status:  Final result Updated:  09/07/20 2157     POC Glucose >500 mg/dl                  XR chest 1 view portable    (Results Pending)              Procedures  ECG 12 Lead Documentation Only    Date/Time: 9/7/2020 9:47 PM  Performed by: Maddie Taveras MD  Authorized by:  Maddie Taveras MD     ECG reviewed by me, the ED Provider: yes    Patient location:  ED  Previous ECG:     Comparison to cardiac monitor: Yes Interpretation:     Interpretation: abnormal    Rate:     ECG rate:  128bpm    ECG rate assessment: tachycardic    Rhythm:     Rhythm: sinus tachycardia    Ectopy:     Ectopy: none    QRS:     QRS axis:  Right  ST segments:     ST segments:  Abnormal    Depression:  V5, V6 and II  T waves:     T waves: flattening and inverted      Flattening:  V4, V5 and V6    Inverted:  V1  Comments:      No STEMI  MI 144ms  QRS 84ms  QT 467ms      CriticalCare Time  Performed by: Jacques Jordan MD  Authorized by: Jacques Jordan MD     Critical care provider statement:     Critical care time (minutes):  45    Critical care was necessary to treat or prevent imminent or life-threatening deterioration of the following conditions:  Metabolic crisis    Critical care was time spent personally by me on the following activities:  Development of treatment plan with patient or surrogate, discussions with consultants, evaluation of patient's response to treatment, examination of patient, review of old charts, re-evaluation of patient's condition, ordering and review of laboratory studies and ordering and review of radiographic studies    I assumed direction of critical care for this patient from another provider in my specialty: no    Comments:      DKA             ED Course  ED Course as of Sep 08 0004   Mon Sep 07, 2020   2236 Texted Dr Erica Mcclendon, Critical Care Attending who accepts patient to ICU for DKA  65 Contacted by CT tech regarding patient's elevated creatinine and her GFR status  Will discuss CT PE study and CT abdomen pelvis with critical care team   May need to wait until she has improvement of her RAQUEL secondary to her diabetic ketoacidosis  [facility] [address] [place] [address], [address] who accepts patient  Patient updated on her labs and plan for admission to ICU       2252 Zainab requests to stop bicarb gtt order  Nurse updated        2252 Will hold on CT PE study and CT abdomen pelvis as discussed with Nely Farah Mary Curran   Critical care team can re-evaluate later tonight or tomorrow  US AUDIT      Most Recent Value   Initial Alcohol Screen: US AUDIT-C    1  How often do you have a drink containing alcohol?  0 Filed at: 09/07/2020 2241   2  How many drinks containing alcohol do you have on a typical day you are drinking? 0 Filed at: 09/07/2020 2241   3a  Male UNDER 65: How often do you have five or more drinks on one occasion? 0 Filed at: 09/07/2020 2241   3b  FEMALE Any Age, or MALE 65+: How often do you have 4 or more drinks on one occassion?   0 Filed at: 09/07/2020 2241   Audit-C Score  0 Filed at: 09/07/2020 2241            MDM  Number of Diagnoses or Management Options  Diabetic ketoacidosis without coma associated with type 1 diabetes mellitus ([facility] ):   Elevated d-dimer:   Serum phosphate elevated:      Amount and/or Complexity of Data Reviewed  Clinical lab tests: reviewed and ordered  Tests in the radiology section of CPT®: reviewed and ordered  Tests in the medicine section of CPT®: ordered and reviewed  Review and summarize past medical records: yes  Discuss the patient with other providers: yes (Critical Care, Dr Jet Vincent and Aleah Little PA-C)  Independent visualization of images, tracings, or specimens: yes (Chest x-ray, EKG, CT abdomen pelvis)    Risk of Complications, Morbidity, and/or Mortality  Presenting problems: moderate  Diagnostic procedures: moderate  Management options: moderate    Patient Progress  Patient progress: stable        Disposition  Final diagnoses:   Diabetic ketoacidosis without coma associated with type 1 diabetes mellitus ([place] [address] )   Elevated d-dimer   Serum phosphate elevated     Time reflects when diagnosis was documented in both MDM as applicable and the Disposition within this note     Time User Action Codes Description Comment    9/7/2020 10:53 PM Eulalia Lion Add [E10 10] Diabetic ketoacidosis without coma associated with type 1 diabetes mellitus ([facility] )     9/7/2020 11:35 PM German Stone Add [R79 [id number]] Elevated d-dimer     9/7/2020 11:35 PM Williealberto Stone Add [E83 39] Serum phosphate elevated       ED Disposition     ED Disposition Condition Date/Time Comment    Admit Stable Mon Sep 7, 2020 10:53 PM Case was discussed with Dr Werner Arango and the patient's admission status was agreed to be Admission Status: [facility]    None         Patient's Medications   Discharge Prescriptions    No medications on file     No discharge procedures on file      PDMP Review       Value Time User    PDMP Reviewed  Yes 12/5/2019  2:45 PM Alysia Gabriel MD          ED Provider  Electronically Signed by      MD Garry Do MD  09/08/20 0005

## 2020-09-08 NOTE — ASSESSMENT & PLAN NOTE
· Likely secondary to DKA, elevated lactic acid, dehydration, RAQUEL  · Pt also w tachycardia on ED presentation  · Unable to obtain CT scan due to RAQUEL  · Treat DKA  If symptoms do not improve, consider CT or VQ scan vs  BLE duplex

## 2020-09-08 NOTE — PROGRESS NOTES
Progress Note - Nate Pals 1975, 54 y o  female MRN: [id number]    Unit/Bed#: -01 Encounter: [id number]    Primary Care Provider: Zuleika Goff MD   Date and time admitted to hospital: 9/7/2020  9:34 PM        * Diabetic ketoacidosis without coma associated with type 1 diabetes mellitus [facility])  Assessment & Plan  Lab Results   Component Value Date    HGBA1C 12 1 (H) 12/03/2019       Recent Labs     09/07/20  2345 09/08/20  0044 09/08/20  0111 09/08/20  0312   POCGLU >500* >500* >500* 357*       Blood Sugar Average: Last 72 hrs:  (P) 357   · Pt w hx DM type 1, states has no previous history of DKA  · Home medications include   · Lantus 60 units at HS  · Apidra 10 units TID w meals  · Has not been taking lantus for last month due to financial difficulty obtaining medications  Continued to take mealtime insulin  · Follows with Endocrinology (Dr Yanick Farah) at [address]  · This morning, stated feeling not well, nausea, multiple episodes of vomiting  · In the ED  · BHB 6 2, pH on ABG 7 1, glucose on , potassium 6 1, AG 27, sodium 131 (glucose corrected 141)  · Received 2 liter NSS for DKA protocol  Started on /hr x 4 hours  · Given 10 units insulin bolus and started on insulin gtt  · Start insulin gtt, IVF per DKA protocol  · q4hr BMP, Mag, phos w replacements ordered  · Hgb A1C ordered  · NPO until corrected  · Case mgmt consult to assist w financial difficulty w medications  ·     Metabolic acidosis, increased anion gap  Assessment & Plan  · Related to DKA  · See plan under DKA  · Monitor AG on BMP q4 hours    RAQUEL (acute kidney injury) ([facility] )  Assessment & Plan  · Baseline creatinine from 12/2019  Is 0 8-1 1  Denies any history of kidney diease     · Secondary to DKA, dehydration, nausea/vomiting  · Fluid resuscitation per DKA protocol  · Trend labs  · Accurate I&O  · Avoid hypotension, nephrotoxins    Non-healing wound of left foot  Assessment & Plan  · Per patient, has hx of Charcot foot  Had bone spur causing non-healing ulcer  Surgical procedure last year to remove spur and now has non-healing surgical wound  · Follows up with  [facility] in [address] [place] [address] last visit was last week  · Consult wound care while inpatient  · Consider getting records from [address]  Foot ulcer significantly worse when compared to pictures in our system from 12/2019  · Consider further work up as source of infection if she does not improve  · Continue gabapentin 200mg BID as ordered PTA    Elevated d-dimer  Assessment & Plan  · Likely secondary to DKA, elevated lactic acid, dehydration, RAQUEL  · Pt also w tachycardia on ED presentation  · Unable to obtain CT scan due to RAQUEL  · Treat DKA  If symptoms do not improve, consider CT or VQ scan vs  BLE duplex  Leukocytosis  Assessment & Plan  · Secondary to #1  · Monitor fever curve, WBC  · Procalcitonin ordered  · Received Zosyn 3 375 x1 dose in ED  Hold on further antibiotics  · CXR without infectious concern  · Covid swab negative  UA negative  · May need further workup of diabetic foot ulcer if does not improve        ----------------------------------------------------------------------------------------  HPI/24hr events: Admitted last night w DKA  Hx type 1 DM  States has not had lantus for approx 1 month  Felt ok until yesterday morning  Overnight, had slight improvement in her labs  Episode of vomiting x 1 after taking po phos  No other issues overnight  Disposition: Continue Stepdown Level 1 level of care , consider downgrade later if DKA improves  Code Status: Level 1 - Full Code  ---------------------------------------------------------------------------------------  SUBJECTIVE    Review of Systems   Constitutional: Positive for fatigue  Negative for chills and fever  HENT: Negative for sore throat  Respiratory: Negative for chest tightness and shortness of breath      Cardiovascular: Negative for chest pain and palpitations  Gastrointestinal: Positive for vomiting  Negative for abdominal pain and nausea  Genitourinary: Negative for difficulty urinating  Neurological: Negative for light-headedness and headaches  Psychiatric/Behavioral: Negative for sleep disturbance  ---------------------------------------------------------------------------------------  OBJECTIVE    Vitals   Vitals:    20 0100 20 0115 20 0200 20 0300   BP:  130/70 118/56 158/74   BP Location:  Left arm Left arm Left arm   Pulse:  (!) 129 (!) 131 (!) 126   Resp:     Temp:       TempSrc:       SpO2:  100% 100% 100%   Weight: 92 8 kg (204 lb 9 4 oz)      Height: 5' 4" (1 626 m)        Temp (24hrs), Av 8 °F (36 6 °C), Min:97 7 °F (36 5 °C), Max:98 °F (36 7 °C)  Current: Temperature: 98 °F (36 7 °C)          Respiratory:  SpO2: SpO2: 100 %, SpO2 Activity: SpO2 Activity: At Rest, SpO2 Device: O2 Device: None (Room air)       Invasive/non-invasive ventilation settings   Respiratory    Lab Data (Last 4 hours)    None         O2/Vent Data (Last 4 hours)    None                Physical Exam  Vitals signs and nursing note reviewed  Constitutional:       General: She is not in acute distress  Appearance: Normal appearance  She is obese  She is not ill-appearing or diaphoretic  HENT:      Head: Normocephalic and atraumatic  Mouth/Throat:      Mouth: Mucous membranes are moist       Pharynx: No oropharyngeal exudate  Eyes:      Conjunctiva/sclera: Conjunctivae normal       Pupils: Pupils are equal, round, and reactive to light  Neck:      Musculoskeletal: Neck supple  Trachea: No tracheal deviation  Cardiovascular:      Rate and Rhythm: Regular rhythm  Tachycardia present  Pulses: Normal pulses  Heart sounds: Normal heart sounds  No murmur  No friction rub  Pulmonary:      Effort: Pulmonary effort is normal  No respiratory distress  Breath sounds: Normal breath sounds   No wheezing or rales  Abdominal:      General: Bowel sounds are normal  There is no distension  Palpations: Abdomen is soft  Tenderness: There is no abdominal tenderness  There is no guarding  Musculoskeletal: Normal range of motion  General: No swelling  Skin:     General: Skin is warm and dry  Capillary Refill: Capillary refill takes less than 2 seconds  Coloration: Skin is not pale  Comments: Foot ulcer to dorsal side of left foot  Thin yellow drainage  Dressing in place   Neurological:      General: No focal deficit present  Mental Status: She is alert and oriented to person, place, and time  Mental status is at baseline     Psychiatric:         Mood and Affect: Mood normal          Behavior: Behavior normal          Laboratory and Diagnostics:  Results from last 7 days   Lab Units 09/07/20  2150   WBC Thousand/uL 19 36*   HEMOGLOBIN g/dL 11 0*   HEMATOCRIT % 35 5   PLATELETS Thousands/uL 404*     Results from last 7 days   Lab Units 09/08/20  0205 09/08/20  0056 09/07/20  2150   SODIUM mmol/L 137 137 131*   POTASSIUM mmol/L 4 2 4 0 6 1*   CHLORIDE mmol/L 105 103 94*   CO2 mmol/L 10* 8* 10*   ANION GAP mmol/L 22* 26* 27*   BUN mg/dL 25 26* 29*   CREATININE mg/dL 1 43* 1 42* 1 80*   CALCIUM mg/dL 8 1* 8 0* 9 8   GLUCOSE RANDOM mg/dL 435* 505* 715*   ALT U/L  --   --  26   AST U/L  --   --  14   ALK PHOS U/L  --   --  149*   ALBUMIN g/dL  --   --  3 4*   TOTAL BILIRUBIN mg/dL  --   --  0 50     Results from last 7 days   Lab Units 09/08/20  0205 09/07/20  2150   MAGNESIUM mg/dL 1 9 2 4   PHOSPHORUS mg/dL 2 4* 6 6*      Results from last 7 days   Lab Units 09/07/20  2150   INR  1 04      Results from last 7 days   Lab Units 09/07/20  2150   TROPONIN I ng/mL <0 02     Results from last 7 days   Lab Units 09/08/20  0025 09/07/20  2150   LACTIC ACID mmol/L 1 9 3 0*     ABG:  Results from last 7 days   Lab Units 09/07/20  2207   PH ART  7 170*   PCO2 ART mm Hg 17 1*   PO2 ART mm Hg 125 1   HCO3 ART mmol/L 6 1*   BASE EXC ART mmol/L -20 3   ABG SOURCE  Radial, Left     VBG:  Results from last 7 days   Lab Units 09/07/20  2207   ABG SOURCE  Radial, Left           Micro        EKG: ST  Imaging: No new imaging to review    Intake and Output  I/O       09/06 0701 - 09/07 0700 09/07 0701 - 09/08 0700    I V  (mL/kg)  3550 (38 3)    IV Piggyback  100    Total Intake(mL/kg)  3650 (39 3)    Urine (mL/kg/hr)  500    Emesis/NG output  0    Total Output  500    Net  +3150          Unmeasured Emesis Occurrence  1 x          Height and Weights   Height: 5' 4" (162 6 cm)  IBW: 54 7 kg  Body mass index is 35 12 kg/m²  Weight (last 2 days)     Date/Time   Weight    09/08/20 0100   92 8 (204 59)    09/07/20 2142   89 8 (197 97)                Nutrition       Diet Orders   (From admission, onward)             Start     Ordered    09/08/20 0146  Diet NPO; Ice chips  Diet effective now     Question Answer Comment   Diet Type NPO    NPO Except: Ice chips    RD to adjust diet per protocol?  No        09/08/20 0146                  Active Medications  Scheduled Meds:  Current Facility-Administered Medications   Medication Dose Route Frequency Provider Last Rate    dextrose 5 % and sodium chloride 0 9 %  250 mL/hr Intravenous Continuous Temi Hard, CRNP      gabapentin  200 mg Oral BID Temi Hard, CRNP      heparin (porcine)  5,000 Units Subcutaneous [facility] Temi Hard, CRNP      insulin regular (HumuLIN R,NovoLIN R) infusion  0 1-30 Units/hr Intravenous Continuous Temi Hard, CRNP 4 5 Units/hr (09/08/20 0131)    magnesium sulfate  2 g Intravenous Once Mónica Ash MD      polyethylene glycol  17 g Oral Daily Temi Hard, CRNP      potassium chloride  20 mEq Intravenous Once Mónica Ash MD 20 mEq (09/08/20 0243)    sodium chloride  250 mL/hr Intravenous Continuous Temi Hard, CRNP 250 mL/hr (09/08/20 0315)     Continuous Infusions:  dextrose 5 % and sodium chloride 0 9 %, 250 mL/hr  insulin regular (HumuLIN R,NovoLIN R) infusion, 0 1-30 Units/hr, Last Rate: 4 5 Units/hr (09/08/20 0131)  sodium chloride, 250 mL/hr, Last Rate: 250 mL/hr (09/08/20 0315)      PRN Meds:        Invasive Devices Review  Invasive Devices     Peripheral Intravenous Line            Peripheral IV 09/07/20 Left Antecubital less than 1 day    Peripheral IV 09/07/20 Right Antecubital less than 1 day                Rationale for remaining devices: n/a     ---------------------------------------------------------------------------------------  Care Time Delivered:   No Critical Care time spent       KIRA Vasquez      Portions of the record may have been created with voice recognition software  Occasional wrong word or "sound a like" substitutions may have occurred due to the inherent limitations of voice recognition software    Read the chart carefully and recognize, using context, where substitutions have occurred

## 2020-09-08 NOTE — PLAN OF CARE
Problem: Potential for Falls  Goal: Patient will remain free of falls  Description: INTERVENTIONS:  - Assess patient frequently for physical needs  -  Identify cognitive and physical deficits and behaviors that affect risk of falls    -  [place] fall precautions as indicated by assessment   - Educate patient/family on patient safety including physical limitations  - Instruct patient to call for assistance with activity based on assessment  - Modify environment to reduce risk of injury  - Consider OT/PT consult to assist with strengthening/mobility  Outcome: Progressing     Problem: PAIN - ADULT  Goal: Verbalizes/displays adequate comfort level or baseline comfort level  Description: Interventions:  - Encourage patient to monitor pain and request assistance  - Assess pain using appropriate pain scale  - Administer analgesics based on type and severity of pain and evaluate response  - Implement non-pharmacological measures as appropriate and evaluate response  - Consider cultural and social influences on pain and pain management  - Notify physician/advanced practitioner if interventions unsuccessful or patient reports new pain  Outcome: Progressing     Problem: INFECTION - ADULT  Goal: Absence or prevention of progression during hospitalization  Description: INTERVENTIONS:  - Assess and monitor for signs and symptoms of infection  - Monitor lab/diagnostic results  - Monitor all insertion sites, i e  indwelling lines, tubes, and drains  - Monitor endotracheal if appropriate and nasal secretions for changes in amount and color  - [place] appropriate cooling/warming therapies per order  - Administer medications as ordered  - Instruct and encourage patient and family to use good hand hygiene technique  - Identify and instruct in appropriate isolation precautions for identified infection/condition  Outcome: Progressing  Goal: Absence of fever/infection during neutropenic period  Description: INTERVENTIONS:  - Monitor WBC    Outcome: Progressing     Problem: SAFETY ADULT  Goal: Maintain or return to baseline ADL function  Description: INTERVENTIONS:  -  Assess patient's ability to carry out ADLs; assess patient's baseline for ADL function and identify physical deficits which impact ability to perform ADLs (bathing, care of mouth/teeth, toileting, grooming, dressing, etc )  - Assess/evaluate cause of self-care deficits   - Assess range of motion  - Assess patient's mobility; develop plan if impaired  - Assess patient's need for assistive devices and provide as appropriate  - Encourage maximum independence but intervene and supervise when necessary  - Involve family in performance of ADLs  - Assess for home care needs following discharge   - Consider OT consult to assist with ADL evaluation and planning for discharge  - Provide patient education as appropriate  Outcome: Progressing  Goal: Maintain or return mobility status to optimal level  Description: INTERVENTIONS:  - Assess patient's baseline mobility status (ambulation, transfers, stairs, etc )    - Identify cognitive and physical deficits and behaviors that affect mobility  - Identify mobility aids required to assist with transfers and/or ambulation (gait belt, sit-to-stand, lift, walker, cane, etc )  - [place] fall precautions as indicated by assessment  - Record patient progress and toleration of activity level on Mobility SBAR; progress patient to next Phase/Stage  - Instruct patient to call for assistance with activity based on assessment  - Consider rehabilitation consult to assist with strengthening/weightbearing, etc   Outcome: Progressing     Problem: DISCHARGE PLANNING  Goal: Discharge to home or other facility with appropriate resources  Description: INTERVENTIONS:  - Identify barriers to discharge w/patient and caregiver  - Arrange for needed discharge resources and transportation as appropriate  - Identify discharge learning needs (meds, wound care, etc )  - Arrange for interpretive services to assist at discharge as needed  - Refer to Case Management Department for coordinating discharge planning if the patient needs post-hospital services based on physician/advanced practitioner order or complex needs related to functional status, cognitive ability, or social support system  Outcome: Progressing     Problem: Knowledge Deficit  Goal: Patient/family/caregiver demonstrates understanding of disease process, treatment plan, medications, and discharge instructions  Description: Complete learning assessment and assess knowledge base  Interventions:  - Provide teaching at level of understanding  - Provide teaching via preferred learning methods  Outcome: Progressing     Problem: Nutrition/Hydration-ADULT  Goal: Nutrient/Hydration intake appropriate for improving, restoring or maintaining nutritional needs  Description: Monitor and assess patient's nutrition/hydration status for malnutrition  Collaborate with interdisciplinary team and initiate plan and interventions as ordered  Monitor patient's weight and dietary intake as ordered or per policy  Utilize nutrition screening tool and intervene as necessary  Determine patient's food preferences and provide high-protein, high-caloric foods as appropriate       INTERVENTIONS:  - Monitor oral intake, urinary output, labs, and treatment plans  - Assess nutrition and hydration status and recommend course of action  - Evaluate amount of meals eaten  - Assist patient with eating if necessary   - Allow adequate time for meals  - Recommend/ encourage appropriate diets, oral nutritional supplements, and vitamin/mineral supplements  - Order, calculate, and assess calorie counts as needed  - Recommend, monitor, and adjust tube feedings and TPN/PPN based on assessed needs  - Assess need for intravenous fluids  - Provide specific nutrition/hydration education as appropriate  - Include patient/family/caregiver in decisions related to nutrition  Outcome: Progressing

## 2020-09-09 VITALS
SYSTOLIC BLOOD PRESSURE: 141 MMHG | TEMPERATURE: 98.2 F | HEIGHT: 64 IN | BODY MASS INDEX: 35 KG/M2 | DIASTOLIC BLOOD PRESSURE: 76 MMHG | WEIGHT: 205.03 LBS | RESPIRATION RATE: 16 BRPM | HEART RATE: 89 BPM | OXYGEN SATURATION: 97 %

## 2020-09-09 PROBLEM — D72.829 LEUKOCYTOSIS: Status: RESOLVED | Noted: 2020-09-08 | Resolved: 2020-09-09

## 2020-09-09 LAB
ANION GAP SERPL CALCULATED.3IONS-SCNC: 8 MMOL/L (ref 4–13)
BUN SERPL-MCNC: 9 MG/DL (ref 5–25)
CALCIUM SERPL-MCNC: 7.8 MG/DL (ref 8.3–10.1)
CHLORIDE SERPL-SCNC: 108 MMOL/L (ref 100–108)
CO2 SERPL-SCNC: 23 MMOL/L (ref 21–32)
CREAT SERPL-MCNC: 0.96 MG/DL (ref 0.6–1.3)
ERYTHROCYTE [DISTWIDTH] IN BLOOD BY AUTOMATED COUNT: 16.5 % (ref 11.6–15.1)
GFR SERPL CREATININE-BSD FRML MDRD: 71 ML/MIN/1.73SQ M
GLUCOSE SERPL-MCNC: 234 MG/DL (ref 65–140)
GLUCOSE SERPL-MCNC: 252 MG/DL (ref 65–140)
GLUCOSE SERPL-MCNC: 278 MG/DL (ref 65–140)
HCT VFR BLD AUTO: 29.1 % (ref 34.8–46.1)
HGB BLD-MCNC: 8.8 G/DL (ref 11.5–15.4)
MAGNESIUM SERPL-MCNC: 2 MG/DL (ref 1.6–2.6)
MCH RBC QN AUTO: 22.2 PG (ref 26.8–34.3)
MCHC RBC AUTO-ENTMCNC: 30.2 G/DL (ref 31.4–37.4)
MCV RBC AUTO: 74 FL (ref 82–98)
PHOSPHATE SERPL-MCNC: 1.9 MG/DL (ref 2.7–4.5)
PLATELET # BLD AUTO: 245 THOUSANDS/UL (ref 149–390)
PMV BLD AUTO: 11 FL (ref 8.9–12.7)
POTASSIUM SERPL-SCNC: 4 MMOL/L (ref 3.5–5.3)
PROCALCITONIN SERPL-MCNC: 0.2 NG/ML
RBC # BLD AUTO: 3.96 MILLION/UL (ref 3.81–5.12)
SODIUM SERPL-SCNC: 139 MMOL/L (ref 136–145)
WBC # BLD AUTO: 10.11 THOUSAND/UL (ref 4.31–10.16)

## 2020-09-09 PROCEDURE — 99238 HOSP IP/OBS DSCHRG MGMT 30/<: CPT | Performed by: NURSE PRACTITIONER

## 2020-09-09 PROCEDURE — 84145 PROCALCITONIN (PCT): CPT | Performed by: PHYSICIAN ASSISTANT

## 2020-09-09 PROCEDURE — 82948 REAGENT STRIP/BLOOD GLUCOSE: CPT

## 2020-09-09 PROCEDURE — 80048 BASIC METABOLIC PNL TOTAL CA: CPT | Performed by: PHYSICIAN ASSISTANT

## 2020-09-09 PROCEDURE — 85027 COMPLETE CBC AUTOMATED: CPT | Performed by: PHYSICIAN ASSISTANT

## 2020-09-09 PROCEDURE — NC001 PR NO CHARGE: Performed by: PHYSICIAN ASSISTANT

## 2020-09-09 PROCEDURE — 83735 ASSAY OF MAGNESIUM: CPT | Performed by: PHYSICIAN ASSISTANT

## 2020-09-09 PROCEDURE — 84100 ASSAY OF PHOSPHORUS: CPT | Performed by: PHYSICIAN ASSISTANT

## 2020-09-09 RX ORDER — INSULIN GLULISINE 100 [IU]/ML
INJECTION, SOLUTION SUBCUTANEOUS
Qty: 10 ML | Refills: 0 | Status: ON HOLD
Start: 2020-09-09 | End: 2020-10-27 | Stop reason: CLARIF

## 2020-09-09 RX ORDER — INSULIN GLARGINE 100 [IU]/ML
60 INJECTION, SOLUTION SUBCUTANEOUS
Qty: 2000 UNITS | Refills: 0 | Status: SHIPPED | OUTPATIENT
Start: 2020-09-09 | End: 2020-09-09 | Stop reason: SDUPTHER

## 2020-09-09 RX ORDER — INSULIN GLARGINE 100 [IU]/ML
INJECTION, SOLUTION SUBCUTANEOUS
Qty: 2000 UNITS | Refills: 0 | Status: ON HOLD
Start: 2020-09-09 | End: 2022-08-04

## 2020-09-09 RX ORDER — INSULIN GLULISINE 100 [IU]/ML
10 INJECTION, SOLUTION SUBCUTANEOUS
Qty: 10 ML | Refills: 0 | Status: SHIPPED | OUTPATIENT
Start: 2020-09-09 | End: 2020-09-09 | Stop reason: SDUPTHER

## 2020-09-09 RX ADMIN — INSULIN LISPRO 10 UNITS: 100 INJECTION, SOLUTION INTRAVENOUS; SUBCUTANEOUS at 11:20

## 2020-09-09 RX ADMIN — INSULIN LISPRO 8 UNITS: 100 INJECTION, SOLUTION INTRAVENOUS; SUBCUTANEOUS at 07:35

## 2020-09-09 RX ADMIN — INSULIN LISPRO 10 UNITS: 100 INJECTION, SOLUTION INTRAVENOUS; SUBCUTANEOUS at 07:59

## 2020-09-09 RX ADMIN — HEPARIN SODIUM 5000 UNITS: 5000 INJECTION INTRAVENOUS; SUBCUTANEOUS at 05:31

## 2020-09-09 RX ADMIN — POTASSIUM PHOSPHATE, MONOBASIC POTASSIUM PHOSPHATE, DIBASIC 30 MMOL: 224; 236 INJECTION, SOLUTION, CONCENTRATE INTRAVENOUS at 08:02

## 2020-09-09 RX ADMIN — INSULIN LISPRO 6 UNITS: 100 INJECTION, SOLUTION INTRAVENOUS; SUBCUTANEOUS at 05:31

## 2020-09-09 RX ADMIN — POLYETHYLENE GLYCOL 3350 17 G: 17 POWDER, FOR SOLUTION ORAL at 08:02

## 2020-09-09 RX ADMIN — MICONAZOLE NITRATE: 20 CREAM TOPICAL at 08:02

## 2020-09-09 RX ADMIN — INSULIN LISPRO 8 UNITS: 100 INJECTION, SOLUTION INTRAVENOUS; SUBCUTANEOUS at 11:19

## 2020-09-09 NOTE — ASSESSMENT & PLAN NOTE
Lab Results   Component Value Date    HGBA1C 14 0 (H) 09/07/2020       Recent Labs     09/08/20  1958 09/08/20  2212 09/09/20  0734 09/09/20  1118   POCGLU 238* 203* 252* 234*       Blood Sugar Average: Last 72 hrs:  (P) 239 [id number]   · Pt w hx DM type 1, states has no previous history of DKA  · Home medications include   · Lantus 60 units at HS  · Apidra 10 units TID w meals  · Has not been taking lantus for last month due to financial difficulty obtaining medications  Continued to take mealtime insulin  · Follows with Endocrinology (Dr Alejandra Mcwilliams) at [address]  · In the ED  · BHB 6 2, pH on ABG 7 1, glucose on , potassium 6 1, AG 27, sodium 131 (glucose corrected 141)  · Received 2 liter NSS for DKA protocol  Started on /hr x 4 hours  · Given 10 units insulin bolus and started on insulin gtt  · Insulin gtt, IVF per DKA protocol  · q4hr BMP, Mag, phos w replacements ordered  · Case mgmt consult to assist w financial difficulty w medications  · DKA resolved: AG closed 9/8, diet ordered and tolerated  · 9/8, non-DKA insulin gtt discontinued  Lantus resumed  SSI ordered  Glucose checks AC/HS and at bedtime  · At discharge will resume home insulin  Case management spoke with pt and spouse regarding insulin cost and both were agreeable  · Instructed pt to follow up PCP and Endocrinology after discharge

## 2020-09-09 NOTE — PROGRESS NOTES
Progress Note - Shanon December 1975, 54 y o  female MRN: [id number]    Unit/Bed#: -01 Encounter: [id number]    Primary Care Provider: Jay Oakley MD   Date and time admitted to hospital: 9/7/2020  9:34 PM        * Diabetic ketoacidosis without coma associated with type 1 diabetes mellitus [facility])  Assessment & Plan  Lab Results   Component Value Date    HGBA1C 14 0 (H) 09/07/2020       Recent Labs     09/08/20  1400 09/08/20  1613 09/08/20  1816 09/08/20 1958   POCGLU 127 170* 193* 238*       Blood Sugar Average: Last 72 hrs:  (P) 357   · Pt w hx DM type 1, states has no previous history of DKA  · Home medications include   · Lantus 60 units at HS  · Apidra 10 units TID w meals  · Has not been taking lantus for last month due to financial difficulty obtaining medications  Continued to take mealtime insulin  · Follows with Endocrinology (Dr Ankit Gill) at [address]  · In the ED  · BHB 6 2, pH on ABG 7 1, glucose on , potassium 6 1, AG 27, sodium 131 (glucose corrected 141)  · Received 2 liter NSS for DKA protocol  Started on /hr x 4 hours  · Given 10 units insulin bolus and started on insulin gtt  · Insulin gtt, IVF per DKA protocol  · q4hr BMP, Mag, phos w replacements ordered  · Case mgmt consult to assist w financial difficulty w medications  · AG closed 9/8, diet ordered and tolerated  · 9/8, non-DKA insulin gtt discontinued  Lantus resumed  SSI ordered  Glucose checks AC/HS and at bedtime  Non-healing wound of left foot  Assessment & Plan  · Per patient, has hx of Charcot foot  Had bone spur causing non-healing ulcer  Surgical procedure last year to remove spur and now has non-healing surgical wound  · Follows up with  [facility] Ank[facility] in [address] [place] [address] last visit was last week  · Consult wound care while inpatient  · Consider getting records from [address]  Foot ulcer significantly worse when compared to pictures in our system from 12/2019  · No concerns for infectious etiology at this point  · Continue gabapentin 200mg BID as ordered PTA    Leukocytosis  Assessment & Plan  · Secondary to #1  · Monitor fever curve, WBC  · Procalcitonin 0 37  · Received Zosyn 3 375 x1 dose in ED  Hold on further antibiotics  · CXR without infectious concern  · Covid swab negative  UA negative  · May need further workup of diabetic foot ulcer if does not improve      ----------------------------------------------------------------------------------------  HPI/24hr events: Transitioned from DKA protocol to non-DKA insulin  Repeat evening labs revealed anion gap remained closed  Patient administered Lantus and insulin drip was discontinued with sliding scale insulin coverage  No significant events overnight  Patient denies nausea, vomiting, diarrhea, chest pain, SOB, abdominal pain and headache       Disposition: Transfer to Med-Surg   Code Status: Level 1 - Full Code  ---------------------------------------------------------------------------------------    Review of Systems  Review of systems was reviewed and negative unless stated above in HPI/24-hour events   ---------------------------------------------------------------------------------------  OBJECTIVE    Vitals   Vitals:    20 1900 20 1945 20 2254 20 0400   BP:  139/74 153/82 136/79   BP Location:  Left arm Left arm Left arm   Pulse: 101 100 99 84   Resp: 19 18 20 17   Temp: 98 1 °F (36 7 °C)  98 6 °F (37 °C) 98 3 °F (36 8 °C)   TempSrc: Oral  Oral Oral   SpO2: 98% 97% 98% 97%   Weight:       Height:         Temp (24hrs), Av 4 °F (36 9 °C), Min:98 °F (36 7 °C), Max:98 8 °F (37 1 °C)  Current: Temperature: 98 3 °F (36 8 °C)          Respiratory:  SpO2: SpO2: 97 %       Invasive/non-invasive ventilation settings   Respiratory    Lab Data (Last 4 hours)    None         O2/Vent Data (Last 4 hours)    None                Physical Exam  Constitutional:       General: She is not in acute distress  Appearance: Normal appearance  She is not diaphoretic  HENT:      Head: Normocephalic and atraumatic  Right Ear: External ear normal       Left Ear: External ear normal       Nose: Nose normal  No congestion  Mouth/Throat:      Mouth: Mucous membranes are moist       Pharynx: Oropharynx is clear  Eyes:      General: No scleral icterus  Right eye: No discharge  Left eye: No discharge  Extraocular Movements: Extraocular movements intact  Conjunctiva/sclera: Conjunctivae normal       Pupils: Pupils are equal, round, and reactive to light  Neck:      Musculoskeletal: Normal range of motion and neck supple  No neck rigidity or muscular tenderness  Cardiovascular:      Rate and Rhythm: Normal rate and regular rhythm  Pulses: Normal pulses  Heart sounds: Normal heart sounds  No friction rub  No gallop  Pulmonary:      Effort: Pulmonary effort is normal  No respiratory distress  Breath sounds: Normal breath sounds  No stridor  No wheezing, rhonchi or rales  Chest:      Chest wall: No tenderness  Abdominal:      General: Bowel sounds are normal  There is no distension  Palpations: Abdomen is soft  There is no mass  Tenderness: There is no abdominal tenderness  There is no guarding or rebound  Hernia: No hernia is present  Musculoskeletal: Normal range of motion  General: No swelling, tenderness or deformity  Right lower leg: No edema  Left lower leg: No edema  Comments: Wound noted on the left foot  Dressing in place  Skin:     General: Skin is warm and dry  Capillary Refill: Capillary refill takes less than 2 seconds  Coloration: Skin is not jaundiced or pale  Findings: No rash  Neurological:      General: No focal deficit present  Mental Status: She is alert and oriented to person, place, and time  Motor: No weakness           Laboratory and Diagnostics:  Results from last 7 days   Lab Units 09/08/20  0541 09/07/20  2150   WBC Thousand/uL 16 40* 19 36*   HEMOGLOBIN g/dL 10 1* 11 0*   HEMATOCRIT % 32 5* 35 5   PLATELETS Thousands/uL 352 404*   NEUTROS PCT % 81*  --    MONOS PCT % 5  --      Results from last 7 days   Lab Units 09/08/20 2212 09/08/20 1823 09/08/20  1422 09/08/20  1004 09/08/20  0541 09/08/20  0205 09/08/20  0056 09/07/20  2150   SODIUM mmol/L 138 139 141 142 140 137 137 131*   POTASSIUM mmol/L 4 0 3 9 4 1 3 7 4 1 4 2 4 0 6 1*   CHLORIDE mmol/L 110* 109* 111* 113* 108 105 103 94*   CO2 mmol/L 19* 18* 20* 20* 15* 10* 8* 10*   ANION GAP mmol/L 9 12 10 9 17* 22* 26* 27*   BUN mg/dL 12 13 14 17 21 25 26* 29*   CREATININE mg/dL 0 99 1 18 1 09 1 08 1 32* 1 43* 1 42* 1 80*   CALCIUM mg/dL 7 8* 7 4* 7 4* 7 8* 8 4 8 1* 8 0* 9 8   GLUCOSE RANDOM mg/dL 192* 206* 124 155* 292* 435* 505* 715*   ALT U/L  --   --   --   --  21  --   --  26   AST U/L  --   --   --   --  14  --   --  14   ALK PHOS U/L  --   --   --   --  111  --   --  149*   ALBUMIN g/dL  --   --   --   --  2 6*  --   --  3 4*   TOTAL BILIRUBIN mg/dL  --   --   --   --  0 35  --   --  0 50     Results from last 7 days   Lab Units 09/08/20 2212 09/08/20 1823 09/08/20  1422 09/08/20  1004 09/08/20  0541 09/08/20  0205 09/07/20  2150   MAGNESIUM mg/dL 2 1 2 0 2 1 2 4 2 8* 1 9 2 4   PHOSPHORUS mg/dL 1 7* 1 8* 2 2* 1 4* 0 9* 2 4* 6 6*      Results from last 7 days   Lab Units 09/07/20  2150   INR  1 04      Results from last 7 days   Lab Units 09/07/20  2150   TROPONIN I ng/mL <0 02     Results from last 7 days   Lab Units 09/08/20  0025 09/07/20  2150   LACTIC ACID mmol/L 1 9 3 0*     ABG:  Results from last 7 days   Lab Units 09/07/20 2207   PH ART  7 170*   PCO2 ART mm Hg 17 1*   PO2 ART mm Hg 125 1   HCO3 ART mmol/L 6 1*   BASE EXC ART mmol/L -20 3   ABG SOURCE  Radial, Left     VBG:  Results from last 7 days   Lab Units 09/08/20  0625 09/07/20 2207   PH RENETTA  7 237*  --    PCO2 RENETTA mm Hg 34 9*  --    PO2 RENETTA mm Hg 58 1*  --    HCO3 RENETTA mmol/L 14 5*  --    BASE EXC RENETTA mmol/L -11 9  --    ABG SOURCE   --  Radial, Left     Results from last 7 days   Lab Units 09/08/20  0025   PROCALCITONIN ng/ml 0 37*       Micro        EKG: NSR on telemetry  Imaging: I have personally reviewed pertinent reports  Intake and Output  I/O       09/07 0701 - 09/08 0700 09/08 0701 - 09/09 0700    I V  (mL/kg) 4286 8 (46 2) 2197 2 (23 7)    IV Piggyback 100 318 8    Total Intake(mL/kg) 4386 8 (47 3) 2515 9 (27 1)    Urine (mL/kg/hr) 1100 625 (0 5)    Emesis/NG output 0     Total Output 1100 625    Net +3286 8 +1890 9          Unmeasured Emesis Occurrence 2 x           Height and Weights   Height: 5' 4" (162 6 cm)  IBW: 54 7 kg  Body mass index is 35 12 kg/m²  Weight (last 2 days)     Date/Time   Weight    09/08/20 0100   92 8 (204 59)    09/07/20 2142   89 8 (197 97)                Nutrition       Diet Orders   (From admission, onward)             Start     Ordered    09/08/20 1425  Diet Conrad/CHO Controlled; Consistent Carbohydrate Diet Level 2 (5 carb servings/75 grams CHO/meal)  Diet effective now     Comments:  Patient can not use artificial sweetners  May have regular sugar for coffee and regular soda (no diet soda)   Question Answer Comment   Diet Type Conrad/CHO Controlled    Conrad/CHO Controlled Consistent Carbohydrate Diet Level 2 (5 carb servings/75 grams CHO/meal)    RD to adjust diet per protocol?  No        09/08/20 1425                  Active Medications  Scheduled Meds:  Current Facility-Administered Medications   Medication Dose Route Frequency Provider Last Rate    gabapentin  200 mg Oral BID KIRA Malone      heparin (porcine)  5,000 Units Subcutaneous Count includes the [facility] KIRA Malone      insulin glargine  60 Units Subcutaneous HS Arpan Yancey Jr, PA-C      insulin lispro  4-20 Units Subcutaneous 4x Daily (with meals and at bedtime) Arpan Dozier PA-C      polyethylene glycol  17 g Oral Daily Zainab ALTAMIRANO KIRA Otto       Continuous Infusions:     PRN Meds:        Invasive Devices Review  Invasive Devices     Peripheral Intravenous Line            Peripheral IV 09/07/20 Left Antecubital 1 day    Peripheral IV 09/07/20 Right Antecubital 1 day                Rationale for remaining devices: NA  ---------------------------------------------------------------------------------------  Advance Directive and Living Will:      Power of :    POLST:    ---------------------------------------------------------------------------------------  Care Time Delivered:   No Critical Care time spent       [employer], PA-C      Portions of the record may have been created with voice recognition software  Occasional wrong word or "sound a like" substitutions may have occurred due to the inherent limitations of voice recognition software    Read the chart carefully and recognize, using context, where substitutions have occurred

## 2020-09-09 NOTE — CASE MANAGEMENT
LORELEI followed up with patient that her insulin is $100 each for 3 months  Yesterday I spoke to patient's spouse and he said he will make sure they obtain Jimy Mccartney's insulin

## 2020-09-09 NOTE — WOUND OSTOMY CARE
Spoke with Jak Dietz, she stated patient was being discharged home and did not require a consult from [facility]  She stated the patient follows with  Foot and Ankle in [address] [facility], with her latest visit last week  Patient is to follow with Wound Care and Podiatry after discharge         Kasia Pugh, INDERJITN, RN

## 2020-09-09 NOTE — ASSESSMENT & PLAN NOTE
· Per patient, has hx of Charcot foot  Had bone spur causing non-healing ulcer  Surgical procedure last year to remove spur and now has non-healing surgical wound  · Follows up with  Foot and Ankle in [address] [place] [address] last visit was last week  · Consult wound care while inpatient  · Consider getting records from [address]  Foot ulcer significantly worse when compared to pictures in our system from 12/2019  · No concerns for infectious etiology at this point  · Continue gabapentin 200mg BID as ordered PTA  · Pt instructed to continue with wound care and podiatry after discharge

## 2020-09-09 NOTE — DISCHARGE SUMMARY
Discharge- Kody Pike 1975, 54 y o  female MRN: [id number]    Unit/Bed#: -01 Encounter: [id number]    Primary Care Provider: Blayne Tello MD   Date and time admitted to hospital: 9/7/2020  9:34 PM        * Diabetic ketoacidosis without coma associated with type 1 diabetes mellitus [facility])  Assessment & Plan  Lab Results   Component Value Date    HGBA1C 14 0 (H) 09/07/2020       Recent Labs     09/08/20 1958 09/08/20  2212 09/09/20  0734 09/09/20  1118   POCGLU 238* 203* 252* 234*       Blood Sugar Average: Last 72 hrs:  (P) 218 [id number]   · Pt w hx DM type 1, states has no previous history of DKA  · Home medications include   · Lantus 60 units at HS  · Apidra 10 units TID w meals  · Has not been taking lantus for last month due to financial difficulty obtaining medications  Continued to take mealtime insulin  · Follows with Endocrinology (Dr Michael Wade) at [address]  · In the ED  · BHB 6 2, pH on ABG 7 1, glucose on , potassium 6 1, AG 27, sodium 131 (glucose corrected 141)  · Received 2 liter NSS for DKA protocol  Started on /hr x 4 hours  · Given 10 units insulin bolus and started on insulin gtt  · Insulin gtt, IVF per DKA protocol  · q4hr BMP, Mag, phos w replacements ordered  · Case mgmt consult to assist w financial difficulty w medications  · DKA resolved: AG closed 9/8, diet ordered and tolerated  · 9/8, non-DKA insulin gtt discontinued  Lantus resumed  SSI ordered  Glucose checks AC/HS and at bedtime  · At discharge will resume home insulin  Case management spoke with pt and spouse regarding insulin cost and both were agreeable  · Instructed pt to follow up PCP and Endocrinology after discharge  Non-healing wound of left foot  Assessment & Plan  · Per patient, has hx of Charcot foot  Had bone spur causing non-healing ulcer  Surgical procedure last year to remove spur and now has non-healing surgical wound    · Follows up with [address] [place] [address] and Ankle in [address] [facility] last visit was last week  · Consult wound care while inpatient  · Consider getting records from [address]  Foot ulcer significantly worse when compared to pictures in our system from 12/2019  · No concerns for infectious etiology at this point  · Continue gabapentin 200mg BID as ordered PTA  · Pt instructed to continue with wound care and podiatry after discharge  Leukocytosis-resolved as of 9/9/2020  Assessment & Plan  · Secondary to #1  · Monitor fever curve, WBC  · Procalcitonin 0 37 > 0 20  · Received Zosyn 3 375 x1 dose in ED  Hold on further antibiotics  · CXR without infectious concern  · Covid swab negative  · UA negative  · diabetic foot ulcer does not appear to be source of infection at this time  Encouraged pt to continue with wound care outpatient  · Leukocytosis resolved on 09/09/2020  Resolved Problems  Date Reviewed: 9/9/2020          Resolved    Metabolic acidosis, increased anion gap 9/8/2020     Resolved by  Mo Hatfield PA-C    Elevated d-dimer 9/8/2020     Resolved by  Mo Hatfield PA-C    Leukocytosis 9/9/2020     Resolved by  KIRA Abdalla    RAQUEL (acute kidney [facility]) ([facility] ) 9/8/2020     Resolved by  Mo Hatfield PA-C          Admission Date:   Admission Orders (From admission, onward)     Ordered        09/07/20 2253  Inpatient Admission (expected length of stay for this patient Order details is greater than two midnights)  Once                     Admitting Diagnosis: Vomiting [R11 10]  Serum phosphate elevated [E83 39]  Elevated d-dimer [R79 89]  Diabetic ketoacidosis without coma associated with type 1 diabetes mellitus ([facility] ) [E10 10]    HPI:  Shanon Carlisle is a 54year old female PMHx of DM 1, DM foot ulcer, Charcots joint left foot, osteomyelitis left foot in 2019, who presented to the ED on 09/07/2020 with complaints of vomiting, epigastric pain, constipation and high blood sugars at home  Patient admitted to stopping lantus 1 month ago due to financial issues  In the ED, patient was in DKA as evidence by BHB 6 2, lactic acid 3 0, pH 7 1, glucose 715, AG 27  Given morphine zofran and Zosyn x1 dose for abd pain and vomiting  D- dimer was 2 73  CT scan unable to be compleyed due to RAQUEL  She was given 2 5L NSS, 10 units Insulin bolus, and started on insulin gtt  Patient was admitted to critical care medicine for continued treatment of DKA  After tolerating diet, patient was resumed on lantus (half dose of 30 units given) and insulin infusion was discontinued 2 hours after  Today (09/09/2020), patient was continued on home dose of meal time insulin  Pt reports resolution of nausea and vomiting  States she "feels full" but is still eating  Discussed with Case Management regarding discharge planning  Case management informed patient and her spouse of cost of insulin  Patient agreeable to plan which included continuing on insulin as previously ordered (prior to admission), follow up with PCP and Endocrinology regarding admission and insulin, and [facility]  Procedures Performed:   Orders Placed This Encounter   Procedures   [facility] ED ECG Documentation Only       Summary of Hospital Course: Patient was admitted to step down level of care for DKA  Patient was continued on DKA protocol  On 09/08/2020, DKA resolved; anion gap closed  Patient was transitioned to non-DKA insulin infusion  Significant Findings, Care, Treatment and Services Provided: see above    Complications: n/a    Condition at Discharge: stable         Discharge instructions/Information to patient and family:   See after visit summary for information provided to patient and family  Provisions for Follow-Up Care:  See after visit summary for information related to follow-up care and any pertinent home health orders        PCP: Heavenly Groves MD    Disposition: Home    Planned Readmission: No    Discharge Statement   I spent 30 minutes discharging the patient  This time was spent on the day of discharge  I had direct contact with the patient on the day of discharge  Additional documentation is required if more than 30 minutes were spent on discharge  Discharge Medications:  See after visit summary for reconciled discharge medications provided to patient and family

## 2020-09-09 NOTE — DISCHARGE INSTRUCTIONS
Diabetic Ketoacidosis   WHAT YOU NEED TO KNOW:   What is diabetic ketoacidosis? Diabetic ketoacidosis (DKA) is a life-threatening condition caused by dangerously high blood sugar levels  Your blood sugar levels become high because your body does not have enough insulin  Insulin helps move sugar out of the blood so it can be used for energy  The lack of insulin forces your body to use fat instead of sugar for energy  As fats are broken down, they leave chemicals called ketones that build up in your blood  Ketones are dangerous at high levels  What increases my risk for DKA? · Not enough insulin    · Poorly controlled diabetes    · Infection or other illness    · Heart attack, stroke, trauma, or surgery    · Certain medicines such as steroids or blood pressure medicines    · Illegal drugs such as cocaine    · Emotional stress    · Pregnancy  What are the signs and symptoms of DKA? · More thirst and more frequent urination than usual     · Abdominal pain, nausea, and vomiting     · Blurry vision     · Dry mouth, eyes, and skin, or your face is red and warm     · Fast, deep breathing, and a faster heartbeat than normal for you    · Weak, tired, and confused     · Fruity, sweet breath     · Mood changes and irritability  How is DKA treated? DKA can be life-threatening  You must get immediate medical attention  The goal of treatment is to replace lost body fluids, and to bring your blood sugar level back to normal    How can I help prevent DKA? The best way to prevent DKA is to control your diabetes  Ask your healthcare provider for more information on how to manage your diabetes  The following may help decrease your risk for DKA:  · Monitor your blood sugar levels closely  if you have an infection, are stressed, sick, or experience trauma  Check your blood sugar levels often  You may need to check at least 3 times each day   If your blood sugar level is too high, give yourself insulin as directed by your healthcare provider  · Manage your sick days  When you are sick, you may not eat as much as you normally would  You may need to change the amount of insulin you give yourself  You may need to check your blood sugar level more often than usual  Make a plan with your healthcare provider about how to manage your diabetes when you are sick  · Check your ketones as directed  Follow your healthcare provider's instructions about when you should check your blood or urine for ketones  Your healthcare provider may give you a machine to check your blood ketones  Urine ketones can be checked with sticks you dip in your urine  Do not exercise if you have ketones in your urine or blood  · Know how to treat DKA  If you have signs of DKA, drink more liquids that do not contain sugar, such as water  Take your insulin as directed by your healthcare provider and go to the nearest emergency room  Call 911 for any of the following:   · You have a seizure  · You begin to breathe fast, or are short of breath  · You become weak and confused  When should I seek immediate care? · You are more drowsy than usual      When should I contact my healthcare provider? · You have fruity, sweet breath  · You have severe, new stomach pain and are vomiting  · Your blood sugar level is lower or higher than your healthcare provider says it should be  · You have ketones in your blood or urine  · You have a fever or chills  · You are more thirsty than usual      · You are urinating more often than usual      · You have questions or concerns about your condition or care  CARE AGREEMENT:   You have the right to help plan your care  Learn about your health condition and how it may be treated  Discuss treatment options with your caregivers to decide what care you want to receive  You always have the right to refuse treatment  The above information is an  only   It is not intended as medical advice for individual conditions or treatments  Talk to your doctor, nurse or pharmacist before following any medical regimen to see if it is safe and effective for you  © 2017 [address] [employer] Information is for End User's use only and may not be sold, redistributed or otherwise used for commercial purposes  All illustrations and images included in CareNotes® are the copyrighted property of A D A M [employer] Inc  or [employer]

## 2020-09-09 NOTE — PROGRESS NOTES
Pt with decreased appetite at this time related to acute condition, ate a few bites of B tray at time of visit  RD does not have protocol to adjust diet order  Recommend ordering ensure compact chocolate daily at B as pt has reported GI pain with sugar substitute consumption (glucerna not appropriate)  Initiated diet ed, pt reports drinking sweetened guers tea frequently  Encouraged decreased intake of these, increasing plain water intake noting low fluid intake of about 6 cups fluid daily  Pt understands  Will provide further diet ed as appropriate/at follow up  Noting intolerance to sugar substitutes  Pt reports being able to order food options she can tolerate on current diet order  Continue diet as ordered

## 2020-09-09 NOTE — ASSESSMENT & PLAN NOTE
· Secondary to #1  · Monitor fever curve, WBC  · Procalcitonin 0 37 > 0 20  · Received Zosyn 3 375 x1 dose in ED  Hold on further antibiotics  · CXR without infectious concern  · Covid swab negative  · UA negative  · diabetic foot ulcer does not appear to be source of infection at this time  Encouraged pt to continue with wound care outpatient  · Leukocytosis resolved on 09/09/2020

## 2020-09-09 NOTE — PLAN OF CARE
Problem: Potential for Falls  Goal: Patient will remain free of falls  Description: INTERVENTIONS:  - Assess patient frequently for physical needs  -  Identify cognitive and physical deficits and behaviors that affect risk of falls    -  [place] fall precautions as indicated by assessment   - Educate patient/family on patient safety including physical limitations  - Instruct patient to call for assistance with activity based on assessment  - Modify environment to reduce risk of injury  - Consider OT/PT consult to assist with strengthening/mobility  Outcome: Progressing     Problem: PAIN - ADULT  Goal: Verbalizes/displays adequate comfort level or baseline comfort level  Description: Interventions:  - Encourage patient to monitor pain and request assistance  - Assess pain using appropriate pain scale  - Administer analgesics based on type and severity of pain and evaluate response  - Implement non-pharmacological measures as appropriate and evaluate response  - Consider cultural and social influences on pain and pain management  - Notify physician/advanced practitioner if interventions unsuccessful or patient reports new pain  Outcome: Progressing     Problem: INFECTION - ADULT  Goal: Absence or prevention of progression during hospitalization  Description: INTERVENTIONS:  - Assess and monitor for signs and symptoms of infection  - Monitor lab/diagnostic results  - Monitor all insertion sites, i e  indwelling lines, tubes, and drains  - Monitor endotracheal if appropriate and nasal secretions for changes in amount and color  - [place] appropriate cooling/warming therapies per order  - Administer medications as ordered  - Instruct and encourage patient and family to use good hand hygiene technique  - Identify and instruct in appropriate isolation precautions for identified infection/condition  Outcome: Progressing  Goal: Absence of fever/infection during neutropenic period  Description: INTERVENTIONS:  - Monitor WBC    Outcome: Progressing     Problem: SAFETY ADULT  Goal: Maintain or return to baseline ADL function  Description: INTERVENTIONS:  -  Assess patient's ability to carry out ADLs; assess patient's baseline for ADL function and identify physical deficits which impact ability to perform ADLs (bathing, care of mouth/teeth, toileting, grooming, dressing, etc )  - Assess/evaluate cause of self-care deficits   - Assess range of motion  - Assess patient's mobility; develop plan if impaired  - Assess patient's need for assistive devices and provide as appropriate  - Encourage maximum independence but intervene and supervise when necessary  - Involve family in performance of ADLs  - Assess for home care needs following discharge   - Consider OT consult to assist with ADL evaluation and planning for discharge  - Provide patient education as appropriate  Outcome: Progressing  Goal: Maintain or return mobility status to optimal level  Description: INTERVENTIONS:  - Assess patient's baseline mobility status (ambulation, transfers, stairs, etc )    - Identify cognitive and physical deficits and behaviors that affect mobility  - Identify mobility aids required to assist with transfers and/or ambulation (gait belt, sit-to-stand, lift, walker, cane, etc )  - [place] fall precautions as indicated by assessment  - Record patient progress and toleration of activity level on Mobility SBAR; progress patient to next Phase/Stage  - Instruct patient to call for assistance with activity based on assessment  - Consider rehabilitation consult to assist with strengthening/weightbearing, etc   Outcome: Progressing     Problem: DISCHARGE PLANNING  Goal: Discharge to home or other facility with appropriate resources  Description: INTERVENTIONS:  - Identify barriers to discharge w/patient and caregiver  - Arrange for needed discharge resources and transportation as appropriate  - Identify discharge learning needs (meds, wound care, etc )  - Arrange for interpretive services to assist at discharge as needed  - Refer to Case Management Department for coordinating discharge planning if the patient needs post-hospital services based on physician/advanced practitioner order or complex needs related to functional status, cognitive ability, or social support system  Outcome: Progressing     Problem: Knowledge Deficit  Goal: Patient/family/caregiver demonstrates understanding of disease process, treatment plan, medications, and discharge instructions  Description: Complete learning assessment and assess knowledge base  Interventions:  - Provide teaching at level of understanding  - Provide teaching via preferred learning methods  Outcome: Progressing     Problem: Nutrition/Hydration-ADULT  Goal: Nutrient/Hydration intake appropriate for improving, restoring or maintaining nutritional needs  Description: Monitor and assess patient's nutrition/hydration status for malnutrition  Collaborate with interdisciplinary team and initiate plan and interventions as ordered  Monitor patient's weight and dietary intake as ordered or per policy  Utilize nutrition screening tool and intervene as necessary  Determine patient's food preferences and provide high-protein, high-caloric foods as appropriate       INTERVENTIONS:  - Monitor oral intake, urinary output, labs, and treatment plans  - Assess nutrition and hydration status and recommend course of action  - Evaluate amount of meals eaten  - Assist patient with eating if necessary   - Allow adequate time for meals  - Recommend/ encourage appropriate diets, oral nutritional supplements, and vitamin/mineral supplements  - Order, calculate, and assess calorie counts as needed  - Recommend, monitor, and adjust tube feedings and TPN/PPN based on assessed needs  - Assess need for intravenous fluids  - Provide specific nutrition/hydration education as appropriate  - Include patient/family/caregiver in decisions related to nutrition  Outcome: Progressing

## 2020-09-11 NOTE — UTILIZATION REVIEW
Notification of Discharge  This is a Notification of Discharge from our facility [address]  Please be advised that this patient has been discharge from our facility  Below you will find the admission and discharge date and time including the patients disposition  PRESENTATION DATE: 9/7/2020  9:34 PM  OBS ADMISSION DATE:   IP ADMISSION DATE: 9/7/20 2254   DISCHARGE DATE: 9/9/2020  3:45 PM  DISPOSITION: Home/Self Care Home/Self Care   Admission Orders listed below:  Admission Orders (From admission, onward)     Ordered        09/07/20 2253  Inpatient Admission (expected length of stay for this patient Order details is greater than two midnights)  Once                   Please contact the UR Department if additional information is required to close this patient's authorization/case  [address] Utilization Review Department  Main: [phone] x carefully listen to the prompts  All voicemails are confidential   [email or web address]  Send all requests for admission clinical reviews, approved or denied determinations and any other requests to dedicated fax number below belonging to the campus where the patient is receiving treatment   List of dedicated fax numbers:  [address] DENIALS (Administrative/Medical Necessity) [phone]   [address] (Maternity/NICU/Pediatrics) [phone]   Maricruz Sherman [phone]   Julia Hinton [phone]   Cullen Opitz [phone]   [address] [phone]   [facility]  [phone]   [address] [facility]  [address] [facility] [address] [facility] [phone]

## 2020-10-26 ENCOUNTER — TRANSCRIBE ORDERS (OUTPATIENT)
Dept: ADMINISTRATIVE | Facility: HOSPITAL | Age: 45
End: 2020-10-26

## 2020-10-26 ENCOUNTER — HOSPITAL ENCOUNTER (OUTPATIENT)
Dept: RADIOLOGY | Facility: HOSPITAL | Age: 45
Discharge: HOME | End: 2020-10-26
Payer: COMMERCIAL

## 2020-10-26 ENCOUNTER — LAB (OUTPATIENT)
Dept: LAB | Facility: HOSPITAL | Age: 45
End: 2020-10-26
Payer: COMMERCIAL

## 2020-10-26 DIAGNOSIS — E10.621 DIABETIC ULCER OF LEFT FOOT ASSOCIATED WITH TYPE 1 DIABETES MELLITUS, WITH BONE INVOLVEMENT WITHOUT EVIDENCE OF NECROSIS, UNSPECIFIED PART OF FOOT (HCC): ICD-10-CM

## 2020-10-26 DIAGNOSIS — E08.621 DIABETIC ULCER OF LEFT FOOT ASSOCIATED WITH DIABETES MELLITUS DUE TO UNDERLYING CONDITION, WITH BONE INVOLVEMENT WITHOUT EVIDENCE OF NECROSIS, UNSPECIFIED PART OF FOOT (HCC): Primary | ICD-10-CM

## 2020-10-26 DIAGNOSIS — L97.528 DIABETIC ULCER OF LEFT FOOT ASSOCIATED WITH DIABETES MELLITUS DUE TO UNDERLYING CONDITION, WITH OTHER ULCER SEVERITY, UNSPECIFIED PART OF FOOT (HCC): Primary | ICD-10-CM

## 2020-10-26 DIAGNOSIS — E08.621 DIABETIC ULCER OF LEFT FOOT ASSOCIATED WITH DIABETES MELLITUS DUE TO UNDERLYING CONDITION, WITH OTHER ULCER SEVERITY, UNSPECIFIED PART OF FOOT (HCC): ICD-10-CM

## 2020-10-26 DIAGNOSIS — L97.526 DIABETIC ULCER OF LEFT FOOT ASSOCIATED WITH DIABETES MELLITUS DUE TO UNDERLYING CONDITION, WITH BONE INVOLVEMENT WITHOUT EVIDENCE OF NECROSIS, UNSPECIFIED PART OF FOOT (HCC): Primary | ICD-10-CM

## 2020-10-26 DIAGNOSIS — L97.526 DIABETIC ULCER OF LEFT FOOT ASSOCIATED WITH TYPE 1 DIABETES MELLITUS, WITH BONE INVOLVEMENT WITHOUT EVIDENCE OF NECROSIS, UNSPECIFIED PART OF FOOT (HCC): ICD-10-CM

## 2020-10-26 DIAGNOSIS — E08.621 DIABETIC ULCER OF LEFT FOOT ASSOCIATED WITH DIABETES MELLITUS DUE TO UNDERLYING CONDITION, WITH BONE INVOLVEMENT WITHOUT EVIDENCE OF NECROSIS, UNSPECIFIED PART OF FOOT (HCC): ICD-10-CM

## 2020-10-26 DIAGNOSIS — L97.528 DIABETIC ULCER OF LEFT FOOT ASSOCIATED WITH DIABETES MELLITUS DUE TO UNDERLYING CONDITION, WITH OTHER ULCER SEVERITY, UNSPECIFIED PART OF FOOT (HCC): ICD-10-CM

## 2020-10-26 DIAGNOSIS — L97.526 DIABETIC ULCER OF LEFT FOOT ASSOCIATED WITH DIABETES MELLITUS DUE TO UNDERLYING CONDITION, WITH BONE INVOLVEMENT WITHOUT EVIDENCE OF NECROSIS, UNSPECIFIED PART OF FOOT (HCC): ICD-10-CM

## 2020-10-26 DIAGNOSIS — E08.621 DIABETIC ULCER OF LEFT FOOT ASSOCIATED WITH DIABETES MELLITUS DUE TO UNDERLYING CONDITION, WITH OTHER ULCER SEVERITY, UNSPECIFIED PART OF FOOT (HCC): Primary | ICD-10-CM

## 2020-10-26 LAB
ALBUMIN SERPL BCP-MCNC: 2.6 G/DL (ref 3.5–5)
ALP SERPL-CCNC: 121 U/L (ref 46–116)
ALT SERPL W P-5'-P-CCNC: 17 U/L (ref 12–78)
ANION GAP SERPL CALCULATED.3IONS-SCNC: 8 MMOL/L (ref 4–13)
AST SERPL W P-5'-P-CCNC: 11 U/L (ref 5–45)
BASOPHILS # BLD AUTO: 0.04 THOUSANDS/ΜL (ref 0–0.1)
BASOPHILS NFR BLD AUTO: 0 % (ref 0–1)
BILIRUB SERPL-MCNC: 0.23 MG/DL (ref 0.2–1)
BUN SERPL-MCNC: 16 MG/DL (ref 5–25)
CALCIUM ALBUM COR SERPL-MCNC: 10.3 MG/DL (ref 8.3–10.1)
CALCIUM SERPL-MCNC: 9.2 MG/DL (ref 8.3–10.1)
CHLORIDE SERPL-SCNC: 101 MMOL/L (ref 100–108)
CO2 SERPL-SCNC: 27 MMOL/L (ref 21–32)
CREAT SERPL-MCNC: 1.02 MG/DL (ref 0.6–1.3)
EOSINOPHIL # BLD AUTO: 0.28 THOUSAND/ΜL (ref 0–0.61)
EOSINOPHIL NFR BLD AUTO: 2 % (ref 0–6)
ERYTHROCYTE [DISTWIDTH] IN BLOOD BY AUTOMATED COUNT: 15.8 % (ref 11.6–15.1)
GFR SERPL CREATININE-BSD FRML MDRD: 66 ML/MIN/1.73SQ M
GLUCOSE SERPL-MCNC: 162 MG/DL (ref 65–140)
HCT VFR BLD AUTO: 28.9 % (ref 34.8–46.1)
HGB BLD-MCNC: 8.6 G/DL (ref 11.5–15.4)
IMM GRANULOCYTES # BLD AUTO: 0.07 THOUSAND/UL (ref 0–0.2)
IMM GRANULOCYTES NFR BLD AUTO: 1 % (ref 0–2)
LYMPHOCYTES # BLD AUTO: 1.66 THOUSANDS/ΜL (ref 0.6–4.47)
LYMPHOCYTES NFR BLD AUTO: 14 % (ref 14–44)
MAGNESIUM SERPL-MCNC: 1.7 MG/DL (ref 1.6–2.6)
MCH RBC QN AUTO: 21.5 PG (ref 26.8–34.3)
MCHC RBC AUTO-ENTMCNC: 29.8 G/DL (ref 31.4–37.4)
MCV RBC AUTO: 72 FL (ref 82–98)
MONOCYTES # BLD AUTO: 0.58 THOUSAND/ΜL (ref 0.17–1.22)
MONOCYTES NFR BLD AUTO: 5 % (ref 4–12)
NEUTROPHILS # BLD AUTO: 8.96 THOUSANDS/ΜL (ref 1.85–7.62)
NEUTS SEG NFR BLD AUTO: 78 % (ref 43–75)
NRBC BLD AUTO-RTO: 0 /100 WBCS
PLATELET # BLD AUTO: 400 THOUSANDS/UL (ref 149–390)
PMV BLD AUTO: 10.7 FL (ref 8.9–12.7)
POTASSIUM SERPL-SCNC: 3.9 MMOL/L (ref 3.5–5.3)
PROT SERPL-MCNC: 8.1 G/DL (ref 6.4–8.2)
RBC # BLD AUTO: 4 MILLION/UL (ref 3.81–5.12)
SODIUM SERPL-SCNC: 136 MMOL/L (ref 136–145)
WBC # BLD AUTO: 11.59 THOUSAND/UL (ref 4.31–10.16)

## 2020-10-26 PROCEDURE — 85025 COMPLETE CBC W/AUTO DIFF WBC: CPT

## 2020-10-26 PROCEDURE — 83735 ASSAY OF MAGNESIUM: CPT

## 2020-10-26 PROCEDURE — 80053 COMPREHEN METABOLIC PANEL: CPT

## 2020-10-26 PROCEDURE — 36415 COLL VENOUS BLD VENIPUNCTURE: CPT

## 2020-10-26 PROCEDURE — 73630 X-RAY EXAM OF FOOT: CPT

## 2020-10-27 ENCOUNTER — HOSPITAL ENCOUNTER (INPATIENT)
Facility: HOSPITAL | Age: 45
LOS: 11 days | Discharge: HOME HEALTH | DRG: 617 | End: 2020-11-07
Attending: EMERGENCY MEDICINE | Admitting: FAMILY MEDICINE
Payer: COMMERCIAL

## 2020-10-27 DIAGNOSIS — E11.42 CONTROLLED TYPE 2 DIABETES MELLITUS WITH DIABETIC POLYNEUROPATHY, WITH LONG-TERM CURRENT USE OF INSULIN (HCC): ICD-10-CM

## 2020-10-27 DIAGNOSIS — Z78.9 DIFFICULT INTRAVENOUS ACCESS: ICD-10-CM

## 2020-10-27 DIAGNOSIS — E11.621 ULCER OF LEFT FOOT DUE TO TYPE 2 DIABETES MELLITUS (HCC): ICD-10-CM

## 2020-10-27 DIAGNOSIS — Z45.2 PICC (PERIPHERALLY INSERTED CENTRAL CATHETER) IN PLACE: ICD-10-CM

## 2020-10-27 DIAGNOSIS — Z89.9 S/P AMPUTATION: ICD-10-CM

## 2020-10-27 DIAGNOSIS — D62 ACUTE BLOOD LOSS AS CAUSE OF POSTOPERATIVE ANEMIA: ICD-10-CM

## 2020-10-27 DIAGNOSIS — S91.302A OPEN WOUND OF LEFT FOOT: ICD-10-CM

## 2020-10-27 DIAGNOSIS — L97.529 ULCER OF LEFT FOOT DUE TO TYPE 2 DIABETES MELLITUS (HCC): ICD-10-CM

## 2020-10-27 DIAGNOSIS — Z79.4 CONTROLLED TYPE 2 DIABETES MELLITUS WITH DIABETIC POLYNEUROPATHY, WITH LONG-TERM CURRENT USE OF INSULIN (HCC): ICD-10-CM

## 2020-10-27 DIAGNOSIS — N17.9 ACUTE KIDNEY INJURY (HCC): ICD-10-CM

## 2020-10-27 DIAGNOSIS — S81.802A NON-HEALING WOUND OF LEFT LOWER EXTREMITY: ICD-10-CM

## 2020-10-27 DIAGNOSIS — E11.621 DIABETIC ULCER OF LEFT MIDFOOT ASSOCIATED WITH TYPE 2 DIABETES MELLITUS, WITH MUSCLE INVOLVEMENT WITHOUT EVIDENCE OF NECROSIS (HCC): ICD-10-CM

## 2020-10-27 DIAGNOSIS — M86.9 OSTEOMYELITIS (HCC): Primary | ICD-10-CM

## 2020-10-27 DIAGNOSIS — L97.425 DIABETIC ULCER OF LEFT MIDFOOT ASSOCIATED WITH TYPE 2 DIABETES MELLITUS, WITH MUSCLE INVOLVEMENT WITHOUT EVIDENCE OF NECROSIS (HCC): ICD-10-CM

## 2020-10-27 PROBLEM — L97.509 DIABETIC FOOT ULCER (HCC): Status: ACTIVE | Noted: 2020-10-27

## 2020-10-27 LAB
ALBUMIN SERPL BCP-MCNC: 2.5 G/DL (ref 3.5–5)
ALP SERPL-CCNC: 122 U/L (ref 46–116)
ALT SERPL W P-5'-P-CCNC: 17 U/L (ref 12–78)
ANION GAP SERPL CALCULATED.3IONS-SCNC: 10 MMOL/L (ref 4–13)
APTT PPP: 25 SECONDS (ref 23–37)
AST SERPL W P-5'-P-CCNC: 11 U/L (ref 5–45)
BASOPHILS # BLD AUTO: 0.03 THOUSANDS/ΜL (ref 0–0.1)
BASOPHILS NFR BLD AUTO: 0 % (ref 0–1)
BILIRUB SERPL-MCNC: 0.25 MG/DL (ref 0.2–1)
BUN SERPL-MCNC: 16 MG/DL (ref 5–25)
CALCIUM ALBUM COR SERPL-MCNC: 10.1 MG/DL (ref 8.3–10.1)
CALCIUM SERPL-MCNC: 8.9 MG/DL (ref 8.3–10.1)
CHLORIDE SERPL-SCNC: 97 MMOL/L (ref 100–108)
CO2 SERPL-SCNC: 26 MMOL/L (ref 21–32)
CREAT SERPL-MCNC: 1.11 MG/DL (ref 0.6–1.3)
CRP SERPL QL: 105.7 MG/L
EOSINOPHIL # BLD AUTO: 0.19 THOUSAND/ΜL (ref 0–0.61)
EOSINOPHIL NFR BLD AUTO: 2 % (ref 0–6)
ERYTHROCYTE [DISTWIDTH] IN BLOOD BY AUTOMATED COUNT: 15.7 % (ref 11.6–15.1)
ERYTHROCYTE [SEDIMENTATION RATE] IN BLOOD: 88 MM/HOUR (ref 0–19)
GFR SERPL CREATININE-BSD FRML MDRD: 60 ML/MIN/1.73SQ M
GLUCOSE SERPL-MCNC: 256 MG/DL (ref 65–140)
GLUCOSE SERPL-MCNC: 286 MG/DL (ref 65–140)
HCT VFR BLD AUTO: 27.8 % (ref 34.8–46.1)
HGB BLD-MCNC: 8.2 G/DL (ref 11.5–15.4)
IMM GRANULOCYTES # BLD AUTO: 0.09 THOUSAND/UL (ref 0–0.2)
IMM GRANULOCYTES NFR BLD AUTO: 1 % (ref 0–2)
INR PPP: 1.05 (ref 0.84–1.19)
LACTATE SERPL-SCNC: 1.3 MMOL/L (ref 0.5–2)
LYMPHOCYTES # BLD AUTO: 1.41 THOUSANDS/ΜL (ref 0.6–4.47)
LYMPHOCYTES NFR BLD AUTO: 12 % (ref 14–44)
MCH RBC QN AUTO: 21.4 PG (ref 26.8–34.3)
MCHC RBC AUTO-ENTMCNC: 29.5 G/DL (ref 31.4–37.4)
MCV RBC AUTO: 72 FL (ref 82–98)
MONOCYTES # BLD AUTO: 0.53 THOUSAND/ΜL (ref 0.17–1.22)
MONOCYTES NFR BLD AUTO: 5 % (ref 4–12)
NEUTROPHILS # BLD AUTO: 9.51 THOUSANDS/ΜL (ref 1.85–7.62)
NEUTS SEG NFR BLD AUTO: 80 % (ref 43–75)
NRBC BLD AUTO-RTO: 0 /100 WBCS
PLATELET # BLD AUTO: 409 THOUSANDS/UL (ref 149–390)
PMV BLD AUTO: 10.8 FL (ref 8.9–12.7)
POTASSIUM SERPL-SCNC: 3.7 MMOL/L (ref 3.5–5.3)
PROT SERPL-MCNC: 8 G/DL (ref 6.4–8.2)
PROTHROMBIN TIME: 13.5 SECONDS (ref 11.6–14.5)
RBC # BLD AUTO: 3.84 MILLION/UL (ref 3.81–5.12)
SODIUM SERPL-SCNC: 133 MMOL/L (ref 136–145)
WBC # BLD AUTO: 11.76 THOUSAND/UL (ref 4.31–10.16)

## 2020-10-27 PROCEDURE — 99284 EMERGENCY DEPT VISIT MOD MDM: CPT

## 2020-10-27 PROCEDURE — 84145 PROCALCITONIN (PCT): CPT | Performed by: EMERGENCY MEDICINE

## 2020-10-27 PROCEDURE — 87205 SMEAR GRAM STAIN: CPT | Performed by: NURSE PRACTITIONER

## 2020-10-27 PROCEDURE — 87186 SC STD MICRODIL/AGAR DIL: CPT | Performed by: NURSE PRACTITIONER

## 2020-10-27 PROCEDURE — 85025 COMPLETE CBC W/AUTO DIFF WBC: CPT | Performed by: EMERGENCY MEDICINE

## 2020-10-27 PROCEDURE — 36415 COLL VENOUS BLD VENIPUNCTURE: CPT | Performed by: EMERGENCY MEDICINE

## 2020-10-27 PROCEDURE — 85610 PROTHROMBIN TIME: CPT | Performed by: EMERGENCY MEDICINE

## 2020-10-27 PROCEDURE — 86140 C-REACTIVE PROTEIN: CPT | Performed by: NURSE PRACTITIONER

## 2020-10-27 PROCEDURE — 99222 1ST HOSP IP/OBS MODERATE 55: CPT | Performed by: NURSE PRACTITIONER

## 2020-10-27 PROCEDURE — 85730 THROMBOPLASTIN TIME PARTIAL: CPT | Performed by: EMERGENCY MEDICINE

## 2020-10-27 PROCEDURE — 96375 TX/PRO/DX INJ NEW DRUG ADDON: CPT

## 2020-10-27 PROCEDURE — 96365 THER/PROPH/DIAG IV INF INIT: CPT

## 2020-10-27 PROCEDURE — 87077 CULTURE AEROBIC IDENTIFY: CPT | Performed by: NURSE PRACTITIONER

## 2020-10-27 PROCEDURE — 82948 REAGENT STRIP/BLOOD GLUCOSE: CPT

## 2020-10-27 PROCEDURE — 87147 CULTURE TYPE IMMUNOLOGIC: CPT | Performed by: NURSE PRACTITIONER

## 2020-10-27 PROCEDURE — 85652 RBC SED RATE AUTOMATED: CPT | Performed by: NURSE PRACTITIONER

## 2020-10-27 PROCEDURE — 80053 COMPREHEN METABOLIC PANEL: CPT | Performed by: EMERGENCY MEDICINE

## 2020-10-27 PROCEDURE — 87040 BLOOD CULTURE FOR BACTERIA: CPT | Performed by: EMERGENCY MEDICINE

## 2020-10-27 PROCEDURE — 83605 ASSAY OF LACTIC ACID: CPT | Performed by: EMERGENCY MEDICINE

## 2020-10-27 PROCEDURE — 87070 CULTURE OTHR SPECIMN AEROBIC: CPT | Performed by: NURSE PRACTITIONER

## 2020-10-27 PROCEDURE — 93005 ELECTROCARDIOGRAM TRACING: CPT

## 2020-10-27 RX ORDER — INSULIN GLARGINE 100 [IU]/ML
56 INJECTION, SOLUTION SUBCUTANEOUS
Status: DISCONTINUED | OUTPATIENT
Start: 2020-10-27 | End: 2020-11-04

## 2020-10-27 RX ORDER — POLYETHYLENE GLYCOL 3350 17 G/17G
17 POWDER, FOR SOLUTION ORAL DAILY PRN
Status: DISCONTINUED | OUTPATIENT
Start: 2020-10-27 | End: 2020-11-01

## 2020-10-27 RX ORDER — OXYCODONE HYDROCHLORIDE 5 MG/1
5 TABLET ORAL EVERY 4 HOURS PRN
Status: DISCONTINUED | OUTPATIENT
Start: 2020-10-27 | End: 2020-11-07 | Stop reason: HOSPADM

## 2020-10-27 RX ORDER — METRONIDAZOLE 500 MG/1
500 TABLET ORAL EVERY 8 HOURS
Status: DISCONTINUED | OUTPATIENT
Start: 2020-10-27 | End: 2020-10-28

## 2020-10-27 RX ORDER — ACETAMINOPHEN 325 MG/1
650 TABLET ORAL EVERY 6 HOURS PRN
Status: DISCONTINUED | OUTPATIENT
Start: 2020-10-27 | End: 2020-11-07 | Stop reason: HOSPADM

## 2020-10-27 RX ORDER — POLYETHYLENE GLYCOL 3350 17 G/17G
17 POWDER, FOR SOLUTION ORAL DAILY
Status: ON HOLD | COMMUNITY

## 2020-10-27 RX ORDER — MORPHINE SULFATE 4 MG/ML
4 INJECTION, SOLUTION INTRAMUSCULAR; INTRAVENOUS EVERY 4 HOURS PRN
Status: DISCONTINUED | OUTPATIENT
Start: 2020-10-27 | End: 2020-11-07 | Stop reason: HOSPADM

## 2020-10-27 RX ORDER — CEFAZOLIN SODIUM 2 G/50ML
2000 SOLUTION INTRAVENOUS EVERY 8 HOURS
Status: DISCONTINUED | OUTPATIENT
Start: 2020-10-28 | End: 2020-10-28

## 2020-10-27 RX ORDER — GABAPENTIN 100 MG/1
200 CAPSULE ORAL 2 TIMES DAILY
Status: DISCONTINUED | OUTPATIENT
Start: 2020-10-27 | End: 2020-10-31

## 2020-10-27 RX ORDER — KETOROLAC TROMETHAMINE 30 MG/ML
30 INJECTION, SOLUTION INTRAMUSCULAR; INTRAVENOUS ONCE
Status: COMPLETED | OUTPATIENT
Start: 2020-10-27 | End: 2020-10-27

## 2020-10-27 RX ADMIN — METRONIDAZOLE 500 MG: 500 TABLET, FILM COATED ORAL at 23:46

## 2020-10-27 RX ADMIN — INSULIN LISPRO 6 UNITS: 100 INJECTION, SOLUTION INTRAVENOUS; SUBCUTANEOUS at 23:46

## 2020-10-27 RX ADMIN — SODIUM CHLORIDE 1500 ML: 0.9 INJECTION, SOLUTION INTRAVENOUS at 20:05

## 2020-10-27 RX ADMIN — KETOROLAC TROMETHAMINE 30 MG: 30 INJECTION, SOLUTION INTRAMUSCULAR at 20:17

## 2020-10-27 RX ADMIN — GABAPENTIN 200 MG: 100 CAPSULE ORAL at 23:46

## 2020-10-27 RX ADMIN — INSULIN GLARGINE 56 UNITS: 100 INJECTION, SOLUTION SUBCUTANEOUS at 23:47

## 2020-10-27 RX ADMIN — PIPERACILLIN SODIUM AND TAZOBACTAM SODIUM 3.38 G: 36; 4.5 INJECTION, POWDER, FOR SOLUTION INTRAVENOUS at 23:48

## 2020-10-27 RX ADMIN — VANCOMYCIN HYDROCHLORIDE 1500 MG: 1 INJECTION, POWDER, LYOPHILIZED, FOR SOLUTION INTRAVENOUS at 20:17

## 2020-10-28 ENCOUNTER — APPOINTMENT (INPATIENT)
Dept: NON INVASIVE DIAGNOSTICS | Facility: HOSPITAL | Age: 45
DRG: 617 | End: 2020-10-28
Payer: COMMERCIAL

## 2020-10-28 ENCOUNTER — APPOINTMENT (INPATIENT)
Dept: MRI IMAGING | Facility: HOSPITAL | Age: 45
DRG: 617 | End: 2020-10-28
Payer: COMMERCIAL

## 2020-10-28 PROBLEM — E66.01 MORBID OBESITY (HCC): Status: ACTIVE | Noted: 2020-10-28

## 2020-10-28 PROBLEM — D64.9 ACUTE ON CHRONIC ANEMIA: Status: ACTIVE | Noted: 2020-10-28

## 2020-10-28 PROBLEM — E11.8 CONTROLLED TYPE 2 DIABETES MELLITUS WITH COMPLICATION, WITH LONG-TERM CURRENT USE OF INSULIN (HCC): Status: ACTIVE | Noted: 2019-12-02

## 2020-10-28 PROBLEM — E11.49 CONTROLLED TYPE 2 DIABETES MELLITUS WITH NEUROLOGIC COMPLICATION, WITH LONG-TERM CURRENT USE OF INSULIN (HCC): Status: ACTIVE | Noted: 2019-12-02

## 2020-10-28 PROBLEM — L97.425 DIABETIC ULCER OF LEFT MIDFOOT WITH MUSCLE INVOLVEMENT WITHOUT EVIDENCE OF NECROSIS (HCC): Status: ACTIVE | Noted: 2020-10-27

## 2020-10-28 LAB
ALBUMIN SERPL BCP-MCNC: 2 G/DL (ref 3.5–5)
ALP SERPL-CCNC: 96 U/L (ref 46–116)
ALT SERPL W P-5'-P-CCNC: 14 U/L (ref 12–78)
ANION GAP SERPL CALCULATED.3IONS-SCNC: 9 MMOL/L (ref 4–13)
AST SERPL W P-5'-P-CCNC: 15 U/L (ref 5–45)
BASOPHILS # BLD AUTO: 0.03 THOUSANDS/ΜL (ref 0–0.1)
BASOPHILS NFR BLD AUTO: 0 % (ref 0–1)
BILIRUB SERPL-MCNC: 0.23 MG/DL (ref 0.2–1)
BUN SERPL-MCNC: 14 MG/DL (ref 5–25)
CALCIUM ALBUM COR SERPL-MCNC: 9.9 MG/DL (ref 8.3–10.1)
CALCIUM SERPL-MCNC: 8.3 MG/DL (ref 8.3–10.1)
CHLORIDE SERPL-SCNC: 104 MMOL/L (ref 100–108)
CO2 SERPL-SCNC: 26 MMOL/L (ref 21–32)
CREAT SERPL-MCNC: 1.01 MG/DL (ref 0.6–1.3)
EOSINOPHIL # BLD AUTO: 0.29 THOUSAND/ΜL (ref 0–0.61)
EOSINOPHIL NFR BLD AUTO: 4 % (ref 0–6)
ERYTHROCYTE [DISTWIDTH] IN BLOOD BY AUTOMATED COUNT: 15.6 % (ref 11.6–15.1)
FERRITIN SERPL-MCNC: 46 NG/ML (ref 8–388)
GFR SERPL CREATININE-BSD FRML MDRD: 67 ML/MIN/1.73SQ M
GLUCOSE SERPL-MCNC: 127 MG/DL (ref 65–140)
GLUCOSE SERPL-MCNC: 135 MG/DL (ref 65–140)
GLUCOSE SERPL-MCNC: 157 MG/DL (ref 65–140)
GLUCOSE SERPL-MCNC: 207 MG/DL (ref 65–140)
GLUCOSE SERPL-MCNC: 55 MG/DL (ref 65–140)
GLUCOSE SERPL-MCNC: 78 MG/DL (ref 65–140)
HCT VFR BLD AUTO: 25.2 % (ref 34.8–46.1)
HGB BLD-MCNC: 7.4 G/DL (ref 11.5–15.4)
IMM GRANULOCYTES # BLD AUTO: 0.08 THOUSAND/UL (ref 0–0.2)
IMM GRANULOCYTES NFR BLD AUTO: 1 % (ref 0–2)
IRON SATN MFR SERPL: 4 %
IRON SERPL-MCNC: 11 UG/DL (ref 50–170)
LYMPHOCYTES # BLD AUTO: 1.68 THOUSANDS/ΜL (ref 0.6–4.47)
LYMPHOCYTES NFR BLD AUTO: 22 % (ref 14–44)
MAGNESIUM SERPL-MCNC: 1.7 MG/DL (ref 1.6–2.6)
MCH RBC QN AUTO: 21.6 PG (ref 26.8–34.3)
MCHC RBC AUTO-ENTMCNC: 29.4 G/DL (ref 31.4–37.4)
MCV RBC AUTO: 74 FL (ref 82–98)
MONOCYTES # BLD AUTO: 0.48 THOUSAND/ΜL (ref 0.17–1.22)
MONOCYTES NFR BLD AUTO: 6 % (ref 4–12)
NEUTROPHILS # BLD AUTO: 4.98 THOUSANDS/ΜL (ref 1.85–7.62)
NEUTS SEG NFR BLD AUTO: 67 % (ref 43–75)
NRBC BLD AUTO-RTO: 0 /100 WBCS
PLATELET # BLD AUTO: 319 THOUSANDS/UL (ref 149–390)
PMV BLD AUTO: 10.5 FL (ref 8.9–12.7)
POTASSIUM SERPL-SCNC: 3.5 MMOL/L (ref 3.5–5.3)
PROCALCITONIN SERPL-MCNC: <0.05 NG/ML
PROCALCITONIN SERPL-MCNC: <0.05 NG/ML
PROT SERPL-MCNC: 6.8 G/DL (ref 6.4–8.2)
RBC # BLD AUTO: 3.43 MILLION/UL (ref 3.81–5.12)
SODIUM SERPL-SCNC: 139 MMOL/L (ref 136–145)
TIBC SERPL-MCNC: 281 UG/DL (ref 250–450)
VIT B12 SERPL-MCNC: 368 PG/ML (ref 100–900)
WBC # BLD AUTO: 7.54 THOUSAND/UL (ref 4.31–10.16)

## 2020-10-28 PROCEDURE — 82728 ASSAY OF FERRITIN: CPT | Performed by: FAMILY MEDICINE

## 2020-10-28 PROCEDURE — 84145 PROCALCITONIN (PCT): CPT | Performed by: NURSE PRACTITIONER

## 2020-10-28 PROCEDURE — 93926 LOWER EXTREMITY STUDY: CPT

## 2020-10-28 PROCEDURE — 83735 ASSAY OF MAGNESIUM: CPT | Performed by: NURSE PRACTITIONER

## 2020-10-28 PROCEDURE — 85025 COMPLETE CBC W/AUTO DIFF WBC: CPT | Performed by: NURSE PRACTITIONER

## 2020-10-28 PROCEDURE — 93922 UPR/L XTREMITY ART 2 LEVELS: CPT | Performed by: SURGERY

## 2020-10-28 PROCEDURE — 82948 REAGENT STRIP/BLOOD GLUCOSE: CPT

## 2020-10-28 PROCEDURE — 83550 IRON BINDING TEST: CPT | Performed by: FAMILY MEDICINE

## 2020-10-28 PROCEDURE — 93926 LOWER EXTREMITY STUDY: CPT | Performed by: SURGERY

## 2020-10-28 PROCEDURE — 80053 COMPREHEN METABOLIC PANEL: CPT | Performed by: NURSE PRACTITIONER

## 2020-10-28 PROCEDURE — 73718 MRI LOWER EXTREMITY W/O DYE: CPT

## 2020-10-28 PROCEDURE — 83540 ASSAY OF IRON: CPT | Performed by: FAMILY MEDICINE

## 2020-10-28 PROCEDURE — 99285 EMERGENCY DEPT VISIT HI MDM: CPT | Performed by: EMERGENCY MEDICINE

## 2020-10-28 PROCEDURE — G1004 CDSM NDSC: HCPCS

## 2020-10-28 PROCEDURE — 82607 VITAMIN B-12: CPT | Performed by: FAMILY MEDICINE

## 2020-10-28 PROCEDURE — 99232 SBSQ HOSP IP/OBS MODERATE 35: CPT | Performed by: FAMILY MEDICINE

## 2020-10-28 RX ADMIN — CEFAZOLIN SODIUM 2000 MG: 2 SOLUTION INTRAVENOUS at 03:46

## 2020-10-28 RX ADMIN — INSULIN LISPRO 30 UNITS: 100 INJECTION, SOLUTION INTRAVENOUS; SUBCUTANEOUS at 09:09

## 2020-10-28 RX ADMIN — ENOXAPARIN SODIUM 40 MG: 40 INJECTION SUBCUTANEOUS at 08:57

## 2020-10-28 RX ADMIN — GABAPENTIN 200 MG: 100 CAPSULE ORAL at 18:07

## 2020-10-28 RX ADMIN — PIPERACILLIN SODIUM AND TAZOBACTAM SODIUM 3.38 G: 36; 4.5 INJECTION, POWDER, FOR SOLUTION INTRAVENOUS at 18:07

## 2020-10-28 RX ADMIN — VANCOMYCIN HYDROCHLORIDE 1500 MG: 1 INJECTION, POWDER, LYOPHILIZED, FOR SOLUTION INTRAVENOUS at 09:09

## 2020-10-28 RX ADMIN — GABAPENTIN 200 MG: 100 CAPSULE ORAL at 08:57

## 2020-10-28 RX ADMIN — METRONIDAZOLE 500 MG: 500 TABLET, FILM COATED ORAL at 06:04

## 2020-10-28 RX ADMIN — INSULIN GLARGINE 56 UNITS: 100 INJECTION, SOLUTION SUBCUTANEOUS at 21:13

## 2020-10-28 RX ADMIN — INSULIN LISPRO 2 UNITS: 100 INJECTION, SOLUTION INTRAVENOUS; SUBCUTANEOUS at 21:13

## 2020-10-28 RX ADMIN — INSULIN LISPRO 30 UNITS: 100 INJECTION, SOLUTION INTRAVENOUS; SUBCUTANEOUS at 11:32

## 2020-10-28 RX ADMIN — PIPERACILLIN SODIUM AND TAZOBACTAM SODIUM 3.38 G: 36; 4.5 INJECTION, POWDER, FOR SOLUTION INTRAVENOUS at 11:31

## 2020-10-28 RX ADMIN — INSULIN LISPRO 4 UNITS: 100 INJECTION, SOLUTION INTRAVENOUS; SUBCUTANEOUS at 11:31

## 2020-10-29 LAB
ABO GROUP BLD: NORMAL
ABO GROUP BLD: NORMAL
APTT PPP: 30 SECONDS (ref 23–37)
ATRIAL RATE: 127 BPM
BLD GP AB SCN SERPL QL: NEGATIVE
ERYTHROCYTE [DISTWIDTH] IN BLOOD BY AUTOMATED COUNT: 15.6 % (ref 11.6–15.1)
GLUCOSE SERPL-MCNC: 119 MG/DL (ref 65–140)
GLUCOSE SERPL-MCNC: 156 MG/DL (ref 65–140)
GLUCOSE SERPL-MCNC: 159 MG/DL (ref 65–140)
GLUCOSE SERPL-MCNC: 209 MG/DL (ref 65–140)
GLUCOSE SERPL-MCNC: 61 MG/DL (ref 65–140)
GLUCOSE SERPL-MCNC: 62 MG/DL (ref 65–140)
HCT VFR BLD AUTO: 25 % (ref 34.8–46.1)
HGB BLD-MCNC: 7.6 G/DL (ref 11.5–15.4)
INR PPP: 0.94 (ref 0.84–1.19)
MCH RBC QN AUTO: 21.8 PG (ref 26.8–34.3)
MCHC RBC AUTO-ENTMCNC: 30.4 G/DL (ref 31.4–37.4)
MCV RBC AUTO: 72 FL (ref 82–98)
P AXIS: 66 DEGREES
PLATELET # BLD AUTO: 394 THOUSANDS/UL (ref 149–390)
PMV BLD AUTO: 10.4 FL (ref 8.9–12.7)
PR INTERVAL: 132 MS
PROTHROMBIN TIME: 12.4 SECONDS (ref 11.6–14.5)
QRS AXIS: 85 DEGREES
QRSD INTERVAL: 80 MS
QT INTERVAL: 296 MS
QTC INTERVAL: 430 MS
RBC # BLD AUTO: 3.48 MILLION/UL (ref 3.81–5.12)
RH BLD: POSITIVE
RH BLD: POSITIVE
SPECIMEN EXPIRATION DATE: NORMAL
T WAVE AXIS: 15 DEGREES
VENTRICULAR RATE: 127 BPM
WBC # BLD AUTO: 7.8 THOUSAND/UL (ref 4.31–10.16)

## 2020-10-29 PROCEDURE — 85610 PROTHROMBIN TIME: CPT | Performed by: PODIATRIST

## 2020-10-29 PROCEDURE — 82948 REAGENT STRIP/BLOOD GLUCOSE: CPT

## 2020-10-29 PROCEDURE — 93010 ELECTROCARDIOGRAM REPORT: CPT | Performed by: INTERNAL MEDICINE

## 2020-10-29 PROCEDURE — 86901 BLOOD TYPING SEROLOGIC RH(D): CPT | Performed by: PODIATRIST

## 2020-10-29 PROCEDURE — 99232 SBSQ HOSP IP/OBS MODERATE 35: CPT | Performed by: FAMILY MEDICINE

## 2020-10-29 PROCEDURE — NC001 PR NO CHARGE: Performed by: NURSE PRACTITIONER

## 2020-10-29 PROCEDURE — 85730 THROMBOPLASTIN TIME PARTIAL: CPT | Performed by: PODIATRIST

## 2020-10-29 PROCEDURE — 86920 COMPATIBILITY TEST SPIN: CPT

## 2020-10-29 PROCEDURE — 86850 RBC ANTIBODY SCREEN: CPT | Performed by: PODIATRIST

## 2020-10-29 PROCEDURE — 85027 COMPLETE CBC AUTOMATED: CPT | Performed by: FAMILY MEDICINE

## 2020-10-29 PROCEDURE — 86900 BLOOD TYPING SEROLOGIC ABO: CPT | Performed by: PODIATRIST

## 2020-10-29 RX ORDER — FERROUS SULFATE 325(65) MG
325 TABLET ORAL
Status: DISCONTINUED | OUTPATIENT
Start: 2020-10-30 | End: 2020-11-05

## 2020-10-29 RX ADMIN — VANCOMYCIN HYDROCHLORIDE 1250 MG: 1 INJECTION, POWDER, LYOPHILIZED, FOR SOLUTION INTRAVENOUS at 09:31

## 2020-10-29 RX ADMIN — INSULIN LISPRO 2 UNITS: 100 INJECTION, SOLUTION INTRAVENOUS; SUBCUTANEOUS at 12:02

## 2020-10-29 RX ADMIN — INSULIN LISPRO 2 UNITS: 100 INJECTION, SOLUTION INTRAVENOUS; SUBCUTANEOUS at 22:11

## 2020-10-29 RX ADMIN — CYANOCOBALAMIN TAB 500 MCG 1000 MCG: 500 TAB at 12:02

## 2020-10-29 RX ADMIN — GABAPENTIN 200 MG: 100 CAPSULE ORAL at 08:43

## 2020-10-29 RX ADMIN — ENOXAPARIN SODIUM 40 MG: 40 INJECTION SUBCUTANEOUS at 08:43

## 2020-10-29 RX ADMIN — GABAPENTIN 200 MG: 100 CAPSULE ORAL at 18:21

## 2020-10-29 RX ADMIN — PIPERACILLIN SODIUM AND TAZOBACTAM SODIUM 3.38 G: 36; 4.5 INJECTION, POWDER, FOR SOLUTION INTRAVENOUS at 02:16

## 2020-10-29 RX ADMIN — OXYCODONE HYDROCHLORIDE 5 MG: 5 TABLET ORAL at 02:21

## 2020-10-29 RX ADMIN — INSULIN LISPRO 30 UNITS: 100 INJECTION, SOLUTION INTRAVENOUS; SUBCUTANEOUS at 12:01

## 2020-10-29 RX ADMIN — INSULIN LISPRO 4 UNITS: 100 INJECTION, SOLUTION INTRAVENOUS; SUBCUTANEOUS at 07:56

## 2020-10-29 RX ADMIN — PIPERACILLIN SODIUM AND TAZOBACTAM SODIUM 3.38 G: 36; 4.5 INJECTION, POWDER, FOR SOLUTION INTRAVENOUS at 11:30

## 2020-10-29 RX ADMIN — PIPERACILLIN SODIUM AND TAZOBACTAM SODIUM 3.38 G: 36; 4.5 INJECTION, POWDER, FOR SOLUTION INTRAVENOUS at 18:21

## 2020-10-29 RX ADMIN — VANCOMYCIN HYDROCHLORIDE 1250 MG: 1 INJECTION, POWDER, LYOPHILIZED, FOR SOLUTION INTRAVENOUS at 20:23

## 2020-10-29 RX ADMIN — INSULIN LISPRO 30 UNITS: 100 INJECTION, SOLUTION INTRAVENOUS; SUBCUTANEOUS at 07:56

## 2020-10-30 ENCOUNTER — APPOINTMENT (INPATIENT)
Dept: RADIOLOGY | Facility: HOSPITAL | Age: 45
DRG: 617 | End: 2020-10-30
Payer: COMMERCIAL

## 2020-10-30 ENCOUNTER — ANESTHESIA EVENT (INPATIENT)
Dept: PERIOP | Facility: HOSPITAL | Age: 45
DRG: 617 | End: 2020-10-30
Payer: COMMERCIAL

## 2020-10-30 ENCOUNTER — ANESTHESIA (INPATIENT)
Dept: PERIOP | Facility: HOSPITAL | Age: 45
DRG: 617 | End: 2020-10-30
Payer: COMMERCIAL

## 2020-10-30 VITALS — HEART RATE: 122 BPM

## 2020-10-30 LAB
BASE EXCESS BLDA CALC-SCNC: 0 MMOL/L (ref -2–3)
CA-I BLD-SCNC: 1.16 MMOL/L (ref 1.12–1.32)
EXT PREGNANCY TEST URINE: NEGATIVE
EXT. CONTROL: NORMAL
GLUCOSE SERPL-MCNC: 169 MG/DL (ref 65–140)
GLUCOSE SERPL-MCNC: 221 MG/DL (ref 65–140)
GLUCOSE SERPL-MCNC: 236 MG/DL (ref 65–140)
GLUCOSE SERPL-MCNC: 246 MG/DL (ref 65–140)
HCO3 BLDA-SCNC: 24.5 MMOL/L (ref 24–30)
HCT VFR BLD CALC: 24 % (ref 34.8–46.1)
HGB BLDA-MCNC: 8.2 G/DL (ref 11.5–15.4)
PCO2 BLD: 26 MMOL/L (ref 21–32)
PCO2 BLD: 39.2 MM HG (ref 42–50)
PH BLD: 7.4 [PH] (ref 7.3–7.4)
PO2 BLD: 57 MM HG (ref 35–45)
POTASSIUM BLD-SCNC: 4.3 MMOL/L (ref 3.5–5.3)
SAO2 % BLD FROM PO2: 89 % (ref 60–85)
SARS-COV-2 RNA RESP QL NAA+PROBE: NEGATIVE
SODIUM BLD-SCNC: 139 MMOL/L (ref 136–145)
SPECIMEN SOURCE: ABNORMAL
VANCOMYCIN TROUGH SERPL-MCNC: 15.3 UG/ML (ref 10–20)

## 2020-10-30 PROCEDURE — P9016 RBC LEUKOCYTES REDUCED: HCPCS

## 2020-10-30 PROCEDURE — 82948 REAGENT STRIP/BLOOD GLUCOSE: CPT

## 2020-10-30 PROCEDURE — 0Y6N0Z0 DETACHMENT AT LEFT FOOT, COMPLETE, OPEN APPROACH: ICD-10-PCS | Performed by: PODIATRIST

## 2020-10-30 PROCEDURE — 73630 X-RAY EXAM OF FOOT: CPT

## 2020-10-30 PROCEDURE — 82803 BLOOD GASES ANY COMBINATION: CPT

## 2020-10-30 PROCEDURE — 82330 ASSAY OF CALCIUM: CPT

## 2020-10-30 PROCEDURE — 84295 ASSAY OF SERUM SODIUM: CPT

## 2020-10-30 PROCEDURE — 81025 URINE PREGNANCY TEST: CPT | Performed by: PHYSICIAN ASSISTANT

## 2020-10-30 PROCEDURE — 30233N1 TRANSFUSION OF NONAUTOLOGOUS RED BLOOD CELLS INTO PERIPHERAL VEIN, PERCUTANEOUS APPROACH: ICD-10-PCS | Performed by: FAMILY MEDICINE

## 2020-10-30 PROCEDURE — 87635 SARS-COV-2 COVID-19 AMP PRB: CPT | Performed by: FAMILY MEDICINE

## 2020-10-30 PROCEDURE — 80202 ASSAY OF VANCOMYCIN: CPT | Performed by: PHYSICIAN ASSISTANT

## 2020-10-30 PROCEDURE — 88305 TISSUE EXAM BY PATHOLOGIST: CPT | Performed by: PATHOLOGY

## 2020-10-30 PROCEDURE — 84132 ASSAY OF SERUM POTASSIUM: CPT

## 2020-10-30 PROCEDURE — 88311 DECALCIFY TISSUE: CPT | Performed by: PATHOLOGY

## 2020-10-30 PROCEDURE — 85014 HEMATOCRIT: CPT

## 2020-10-30 PROCEDURE — 82947 ASSAY GLUCOSE BLOOD QUANT: CPT

## 2020-10-30 PROCEDURE — 0L8P0ZZ DIVISION OF LEFT LOWER LEG TENDON, OPEN APPROACH: ICD-10-PCS | Performed by: PODIATRIST

## 2020-10-30 RX ORDER — HYDROMORPHONE HCL/PF 1 MG/ML
0.5 SYRINGE (ML) INJECTION
Status: DISCONTINUED | OUTPATIENT
Start: 2020-10-30 | End: 2020-10-30 | Stop reason: HOSPADM

## 2020-10-30 RX ORDER — EPHEDRINE SULFATE 50 MG/ML
INJECTION INTRAVENOUS AS NEEDED
Status: DISCONTINUED | OUTPATIENT
Start: 2020-10-30 | End: 2020-10-30

## 2020-10-30 RX ORDER — ONDANSETRON 2 MG/ML
4 INJECTION INTRAMUSCULAR; INTRAVENOUS ONCE AS NEEDED
Status: COMPLETED | OUTPATIENT
Start: 2020-10-30 | End: 2020-10-30

## 2020-10-30 RX ORDER — LIDOCAINE HYDROCHLORIDE 10 MG/ML
INJECTION, SOLUTION EPIDURAL; INFILTRATION; INTRACAUDAL; PERINEURAL AS NEEDED
Status: DISCONTINUED | OUTPATIENT
Start: 2020-10-30 | End: 2020-10-30

## 2020-10-30 RX ORDER — ONDANSETRON 2 MG/ML
INJECTION INTRAMUSCULAR; INTRAVENOUS AS NEEDED
Status: DISCONTINUED | OUTPATIENT
Start: 2020-10-30 | End: 2020-10-30

## 2020-10-30 RX ORDER — FENTANYL CITRATE 50 UG/ML
INJECTION, SOLUTION INTRAMUSCULAR; INTRAVENOUS AS NEEDED
Status: DISCONTINUED | OUTPATIENT
Start: 2020-10-30 | End: 2020-10-30

## 2020-10-30 RX ORDER — BUPIVACAINE HYDROCHLORIDE 5 MG/ML
INJECTION, SOLUTION PERINEURAL AS NEEDED
Status: DISCONTINUED | OUTPATIENT
Start: 2020-10-30 | End: 2020-10-30 | Stop reason: HOSPADM

## 2020-10-30 RX ORDER — MIDAZOLAM HYDROCHLORIDE 2 MG/2ML
INJECTION, SOLUTION INTRAMUSCULAR; INTRAVENOUS AS NEEDED
Status: DISCONTINUED | OUTPATIENT
Start: 2020-10-30 | End: 2020-10-30

## 2020-10-30 RX ORDER — MAGNESIUM HYDROXIDE 1200 MG/15ML
LIQUID ORAL AS NEEDED
Status: DISCONTINUED | OUTPATIENT
Start: 2020-10-30 | End: 2020-10-30 | Stop reason: HOSPADM

## 2020-10-30 RX ORDER — DIPHENHYDRAMINE HYDROCHLORIDE 50 MG/ML
12.5 INJECTION INTRAMUSCULAR; INTRAVENOUS ONCE AS NEEDED
Status: COMPLETED | OUTPATIENT
Start: 2020-10-30 | End: 2020-10-30

## 2020-10-30 RX ORDER — FENTANYL CITRATE/PF 50 MCG/ML
25 SYRINGE (ML) INJECTION
Status: DISCONTINUED | OUTPATIENT
Start: 2020-10-30 | End: 2020-10-30 | Stop reason: HOSPADM

## 2020-10-30 RX ORDER — PROPOFOL 10 MG/ML
INJECTION, EMULSION INTRAVENOUS AS NEEDED
Status: DISCONTINUED | OUTPATIENT
Start: 2020-10-30 | End: 2020-10-30

## 2020-10-30 RX ORDER — SODIUM CHLORIDE, SODIUM LACTATE, POTASSIUM CHLORIDE, CALCIUM CHLORIDE 600; 310; 30; 20 MG/100ML; MG/100ML; MG/100ML; MG/100ML
INJECTION, SOLUTION INTRAVENOUS CONTINUOUS PRN
Status: DISCONTINUED | OUTPATIENT
Start: 2020-10-30 | End: 2020-10-30

## 2020-10-30 RX ADMIN — INSULIN GLARGINE 56 UNITS: 100 INJECTION, SOLUTION SUBCUTANEOUS at 22:03

## 2020-10-30 RX ADMIN — VANCOMYCIN HYDROCHLORIDE 1250 MG: 1 INJECTION, POWDER, LYOPHILIZED, FOR SOLUTION INTRAVENOUS at 10:31

## 2020-10-30 RX ADMIN — MORPHINE SULFATE 4 MG: 4 INJECTION INTRAVENOUS at 22:38

## 2020-10-30 RX ADMIN — FENTANYL CITRATE 25 MCG: 50 INJECTION, SOLUTION INTRAMUSCULAR; INTRAVENOUS at 11:08

## 2020-10-30 RX ADMIN — FENTANYL CITRATE 25 MCG: 50 INJECTION INTRAMUSCULAR; INTRAVENOUS at 15:49

## 2020-10-30 RX ADMIN — FENTANYL CITRATE 25 MCG: 50 INJECTION, SOLUTION INTRAMUSCULAR; INTRAVENOUS at 13:25

## 2020-10-30 RX ADMIN — SODIUM CHLORIDE, SODIUM LACTATE, POTASSIUM CHLORIDE, AND CALCIUM CHLORIDE: .6; .31; .03; .02 INJECTION, SOLUTION INTRAVENOUS at 13:45

## 2020-10-30 RX ADMIN — VANCOMYCIN HYDROCHLORIDE 1250 MG: 1 INJECTION, POWDER, LYOPHILIZED, FOR SOLUTION INTRAVENOUS at 08:45

## 2020-10-30 RX ADMIN — FENTANYL CITRATE 25 MCG: 50 INJECTION INTRAMUSCULAR; INTRAVENOUS at 16:07

## 2020-10-30 RX ADMIN — LIDOCAINE HYDROCHLORIDE 50 MG: 10 INJECTION, SOLUTION EPIDURAL; INFILTRATION; INTRACAUDAL; PERINEURAL at 11:08

## 2020-10-30 RX ADMIN — ONDANSETRON 4 MG: 2 INJECTION INTRAMUSCULAR; INTRAVENOUS at 15:30

## 2020-10-30 RX ADMIN — SODIUM CHLORIDE, SODIUM LACTATE, POTASSIUM CHLORIDE, AND CALCIUM CHLORIDE: .6; .31; .03; .02 INJECTION, SOLUTION INTRAVENOUS at 10:49

## 2020-10-30 RX ADMIN — PIPERACILLIN SODIUM AND TAZOBACTAM SODIUM 3.38 G: 36; 4.5 INJECTION, POWDER, FOR SOLUTION INTRAVENOUS at 18:11

## 2020-10-30 RX ADMIN — PIPERACILLIN SODIUM AND TAZOBACTAM SODIUM 3.38 G: 36; 4.5 INJECTION, POWDER, FOR SOLUTION INTRAVENOUS at 01:50

## 2020-10-30 RX ADMIN — INSULIN LISPRO 4 UNITS: 100 INJECTION, SOLUTION INTRAVENOUS; SUBCUTANEOUS at 22:04

## 2020-10-30 RX ADMIN — GABAPENTIN 200 MG: 100 CAPSULE ORAL at 18:11

## 2020-10-30 RX ADMIN — PROPOFOL 50 MG: 10 INJECTION, EMULSION INTRAVENOUS at 11:24

## 2020-10-30 RX ADMIN — FENTANYL CITRATE 25 MCG: 50 INJECTION, SOLUTION INTRAMUSCULAR; INTRAVENOUS at 14:33

## 2020-10-30 RX ADMIN — FENTANYL CITRATE 25 MCG: 50 INJECTION, SOLUTION INTRAMUSCULAR; INTRAVENOUS at 13:45

## 2020-10-30 RX ADMIN — DIPHENHYDRAMINE HYDROCHLORIDE 12.5 MG: 50 INJECTION, SOLUTION INTRAMUSCULAR; INTRAVENOUS at 15:47

## 2020-10-30 RX ADMIN — VANCOMYCIN HYDROCHLORIDE 1250 MG: 1 INJECTION, POWDER, LYOPHILIZED, FOR SOLUTION INTRAVENOUS at 22:23

## 2020-10-30 RX ADMIN — EPHEDRINE SULFATE 10 MG: 50 INJECTION, SOLUTION INTRAVENOUS at 11:38

## 2020-10-30 RX ADMIN — INSULIN LISPRO 6 UNITS: 100 INJECTION, SOLUTION INTRAVENOUS; SUBCUTANEOUS at 16:10

## 2020-10-30 RX ADMIN — ONDANSETRON 4 MG: 2 INJECTION INTRAMUSCULAR; INTRAVENOUS at 14:37

## 2020-10-30 RX ADMIN — PROPOFOL 120 MG: 10 INJECTION, EMULSION INTRAVENOUS at 11:08

## 2020-10-30 RX ADMIN — MIDAZOLAM HYDROCHLORIDE 2 MG: 1 INJECTION, SOLUTION INTRAMUSCULAR; INTRAVENOUS at 11:01

## 2020-10-31 ENCOUNTER — APPOINTMENT (INPATIENT)
Dept: RADIOLOGY | Facility: HOSPITAL | Age: 45
DRG: 617 | End: 2020-10-31
Payer: COMMERCIAL

## 2020-10-31 PROBLEM — Z45.2 PICC (PERIPHERALLY INSERTED CENTRAL CATHETER) IN PLACE: Status: ACTIVE | Noted: 2020-10-31

## 2020-10-31 LAB
BACTERIA WND AEROBE CULT: ABNORMAL
CREAT SERPL-MCNC: 1.14 MG/DL (ref 0.6–1.3)
ERYTHROCYTE [DISTWIDTH] IN BLOOD BY AUTOMATED COUNT: 17.2 % (ref 11.6–15.1)
GFR SERPL CREATININE-BSD FRML MDRD: 58 ML/MIN/1.73SQ M
GLUCOSE SERPL-MCNC: 179 MG/DL (ref 65–140)
GLUCOSE SERPL-MCNC: 217 MG/DL (ref 65–140)
GLUCOSE SERPL-MCNC: 248 MG/DL (ref 65–140)
GLUCOSE SERPL-MCNC: 95 MG/DL (ref 65–140)
GRAM STN SPEC: ABNORMAL
GRAM STN SPEC: ABNORMAL
HCT VFR BLD AUTO: 24.1 % (ref 34.8–46.1)
HGB BLD-MCNC: 7 G/DL (ref 11.5–15.4)
MCH RBC QN AUTO: 21.9 PG (ref 26.8–34.3)
MCHC RBC AUTO-ENTMCNC: 29 G/DL (ref 31.4–37.4)
MCV RBC AUTO: 76 FL (ref 82–98)
PLATELET # BLD AUTO: 381 THOUSANDS/UL (ref 149–390)
PMV BLD AUTO: 10.5 FL (ref 8.9–12.7)
RBC # BLD AUTO: 3.19 MILLION/UL (ref 3.81–5.12)
WBC # BLD AUTO: 10.9 THOUSAND/UL (ref 4.31–10.16)

## 2020-10-31 PROCEDURE — 36569 INSJ PICC 5 YR+ W/O IMAGING: CPT | Performed by: ANESTHESIOLOGY

## 2020-10-31 PROCEDURE — 82948 REAGENT STRIP/BLOOD GLUCOSE: CPT

## 2020-10-31 PROCEDURE — 85027 COMPLETE CBC AUTOMATED: CPT | Performed by: PHYSICIAN ASSISTANT

## 2020-10-31 PROCEDURE — 30233N1 TRANSFUSION OF NONAUTOLOGOUS RED BLOOD CELLS INTO PERIPHERAL VEIN, PERCUTANEOUS APPROACH: ICD-10-PCS | Performed by: FAMILY MEDICINE

## 2020-10-31 PROCEDURE — 71045 X-RAY EXAM CHEST 1 VIEW: CPT

## 2020-10-31 PROCEDURE — 93005 ELECTROCARDIOGRAM TRACING: CPT

## 2020-10-31 PROCEDURE — 99232 SBSQ HOSP IP/OBS MODERATE 35: CPT | Performed by: PHYSICIAN ASSISTANT

## 2020-10-31 PROCEDURE — 02HV33Z INSERTION OF INFUSION DEVICE INTO SUPERIOR VENA CAVA, PERCUTANEOUS APPROACH: ICD-10-PCS | Performed by: ANESTHESIOLOGY

## 2020-10-31 PROCEDURE — 82565 ASSAY OF CREATININE: CPT | Performed by: FAMILY MEDICINE

## 2020-10-31 PROCEDURE — P9016 RBC LEUKOCYTES REDUCED: HCPCS

## 2020-10-31 RX ORDER — HEPARIN SODIUM 5000 [USP'U]/ML
5000 INJECTION, SOLUTION INTRAVENOUS; SUBCUTANEOUS EVERY 8 HOURS SCHEDULED
Status: DISCONTINUED | OUTPATIENT
Start: 2020-10-31 | End: 2020-11-07 | Stop reason: HOSPADM

## 2020-10-31 RX ORDER — GABAPENTIN 300 MG/1
300 CAPSULE ORAL 3 TIMES DAILY
Status: DISCONTINUED | OUTPATIENT
Start: 2020-10-31 | End: 2020-11-07 | Stop reason: HOSPADM

## 2020-10-31 RX ADMIN — INSULIN LISPRO 4 UNITS: 100 INJECTION, SOLUTION INTRAVENOUS; SUBCUTANEOUS at 08:15

## 2020-10-31 RX ADMIN — SODIUM CHLORIDE 500 ML: 0.9 INJECTION, SOLUTION INTRAVENOUS at 14:22

## 2020-10-31 RX ADMIN — INSULIN GLARGINE 56 UNITS: 100 INJECTION, SOLUTION SUBCUTANEOUS at 21:59

## 2020-10-31 RX ADMIN — INSULIN LISPRO 2 UNITS: 100 INJECTION, SOLUTION INTRAVENOUS; SUBCUTANEOUS at 12:13

## 2020-10-31 RX ADMIN — GABAPENTIN 300 MG: 300 CAPSULE ORAL at 15:27

## 2020-10-31 RX ADMIN — HEPARIN SODIUM 5000 UNITS: 5000 INJECTION INTRAVENOUS; SUBCUTANEOUS at 15:27

## 2020-10-31 RX ADMIN — CYANOCOBALAMIN TAB 500 MCG 1000 MCG: 500 TAB at 08:15

## 2020-10-31 RX ADMIN — INSULIN LISPRO 30 UNITS: 100 INJECTION, SOLUTION INTRAVENOUS; SUBCUTANEOUS at 08:16

## 2020-10-31 RX ADMIN — HEPARIN SODIUM 5000 UNITS: 5000 INJECTION INTRAVENOUS; SUBCUTANEOUS at 21:14

## 2020-10-31 RX ADMIN — FERROUS SULFATE TAB 325 MG (65 MG ELEMENTAL FE) 325 MG: 325 (65 FE) TAB at 08:15

## 2020-10-31 RX ADMIN — INSULIN LISPRO 4 UNITS: 100 INJECTION, SOLUTION INTRAVENOUS; SUBCUTANEOUS at 21:58

## 2020-10-31 RX ADMIN — VANCOMYCIN HYDROCHLORIDE 1500 MG: 1 INJECTION, POWDER, LYOPHILIZED, FOR SOLUTION INTRAVENOUS at 08:21

## 2020-10-31 RX ADMIN — PIPERACILLIN SODIUM AND TAZOBACTAM SODIUM 3.38 G: 36; 4.5 INJECTION, POWDER, FOR SOLUTION INTRAVENOUS at 11:08

## 2020-10-31 RX ADMIN — PIPERACILLIN SODIUM AND TAZOBACTAM SODIUM 3.38 G: 36; 4.5 INJECTION, POWDER, FOR SOLUTION INTRAVENOUS at 17:45

## 2020-10-31 RX ADMIN — INSULIN LISPRO 30 UNITS: 100 INJECTION, SOLUTION INTRAVENOUS; SUBCUTANEOUS at 12:14

## 2020-10-31 RX ADMIN — GABAPENTIN 300 MG: 300 CAPSULE ORAL at 21:08

## 2020-10-31 RX ADMIN — IRON SUCROSE 300 MG: 20 INJECTION, SOLUTION INTRAVENOUS at 21:00

## 2020-10-31 RX ADMIN — VANCOMYCIN HYDROCHLORIDE 1500 MG: 1 INJECTION, POWDER, LYOPHILIZED, FOR SOLUTION INTRAVENOUS at 21:09

## 2020-10-31 RX ADMIN — GABAPENTIN 200 MG: 100 CAPSULE ORAL at 08:15

## 2020-10-31 RX ADMIN — PIPERACILLIN SODIUM AND TAZOBACTAM SODIUM 3.38 G: 36; 4.5 INJECTION, POWDER, FOR SOLUTION INTRAVENOUS at 03:33

## 2020-10-31 RX ADMIN — OXYCODONE HYDROCHLORIDE 5 MG: 5 TABLET ORAL at 13:40

## 2020-11-01 LAB
ABO GROUP BLD BPU: NORMAL
ABO GROUP BLD BPU: NORMAL
ANION GAP SERPL CALCULATED.3IONS-SCNC: 5 MMOL/L (ref 4–13)
BACTERIA UR QL AUTO: ABNORMAL /HPF
BILIRUB UR QL STRIP: NEGATIVE
BPU ID: NORMAL
BPU ID: NORMAL
BUN SERPL-MCNC: 16 MG/DL (ref 5–25)
CALCIUM SERPL-MCNC: 8.1 MG/DL (ref 8.3–10.1)
CHLORIDE SERPL-SCNC: 108 MMOL/L (ref 100–108)
CLARITY UR: CLEAR
CO2 SERPL-SCNC: 29 MMOL/L (ref 21–32)
COLOR UR: ABNORMAL
CREAT SERPL-MCNC: 1.99 MG/DL (ref 0.6–1.3)
CROSSMATCH: NORMAL
CROSSMATCH: NORMAL
ERYTHROCYTE [DISTWIDTH] IN BLOOD BY AUTOMATED COUNT: 18.6 % (ref 11.6–15.1)
GFR SERPL CREATININE-BSD FRML MDRD: 29 ML/MIN/1.73SQ M
GLUCOSE SERPL-MCNC: 100 MG/DL (ref 65–140)
GLUCOSE SERPL-MCNC: 155 MG/DL (ref 65–140)
GLUCOSE SERPL-MCNC: 174 MG/DL (ref 65–140)
GLUCOSE SERPL-MCNC: 186 MG/DL (ref 65–140)
GLUCOSE SERPL-MCNC: 191 MG/DL (ref 65–140)
GLUCOSE SERPL-MCNC: 226 MG/DL (ref 65–140)
GLUCOSE UR STRIP-MCNC: NEGATIVE MG/DL
HCT VFR BLD AUTO: 24.9 % (ref 34.8–46.1)
HGB BLD-MCNC: 7.4 G/DL (ref 11.5–15.4)
HGB UR QL STRIP.AUTO: NEGATIVE
KETONES UR STRIP-MCNC: NEGATIVE MG/DL
LEUKOCYTE ESTERASE UR QL STRIP: ABNORMAL
MCH RBC QN AUTO: 23.5 PG (ref 26.8–34.3)
MCHC RBC AUTO-ENTMCNC: 29.7 G/DL (ref 31.4–37.4)
MCV RBC AUTO: 79 FL (ref 82–98)
NITRITE UR QL STRIP: NEGATIVE
NON-SQ EPI CELLS URNS QL MICRO: ABNORMAL /HPF
PH UR STRIP.AUTO: 6 [PH]
PLATELET # BLD AUTO: 293 THOUSANDS/UL (ref 149–390)
PMV BLD AUTO: 10.4 FL (ref 8.9–12.7)
POTASSIUM SERPL-SCNC: 4.2 MMOL/L (ref 3.5–5.3)
PROT UR STRIP-MCNC: NEGATIVE MG/DL
RBC # BLD AUTO: 3.15 MILLION/UL (ref 3.81–5.12)
RBC #/AREA URNS AUTO: ABNORMAL /HPF
SODIUM SERPL-SCNC: 142 MMOL/L (ref 136–145)
SP GR UR STRIP.AUTO: <=1.005 (ref 1–1.03)
UNIT DISPENSE STATUS: NORMAL
UNIT DISPENSE STATUS: NORMAL
UNIT PRODUCT CODE: NORMAL
UNIT PRODUCT CODE: NORMAL
UNIT RH: NORMAL
UNIT RH: NORMAL
UROBILINOGEN UR QL STRIP.AUTO: 0.2 E.U./DL
WBC # BLD AUTO: 10.46 THOUSAND/UL (ref 4.31–10.16)
WBC #/AREA URNS AUTO: ABNORMAL /HPF

## 2020-11-01 PROCEDURE — 85027 COMPLETE CBC AUTOMATED: CPT | Performed by: PHYSICIAN ASSISTANT

## 2020-11-01 PROCEDURE — 80048 BASIC METABOLIC PNL TOTAL CA: CPT | Performed by: FAMILY MEDICINE

## 2020-11-01 PROCEDURE — 82948 REAGENT STRIP/BLOOD GLUCOSE: CPT

## 2020-11-01 PROCEDURE — 81001 URINALYSIS AUTO W/SCOPE: CPT | Performed by: FAMILY MEDICINE

## 2020-11-01 PROCEDURE — 82272 OCCULT BLD FECES 1-3 TESTS: CPT | Performed by: FAMILY MEDICINE

## 2020-11-01 PROCEDURE — 99232 SBSQ HOSP IP/OBS MODERATE 35: CPT | Performed by: FAMILY MEDICINE

## 2020-11-01 RX ORDER — POLYETHYLENE GLYCOL 3350 17 G/17G
17 POWDER, FOR SOLUTION ORAL DAILY
Status: DISCONTINUED | OUTPATIENT
Start: 2020-11-01 | End: 2020-11-07 | Stop reason: HOSPADM

## 2020-11-01 RX ORDER — SODIUM CHLORIDE 9 MG/ML
50 INJECTION, SOLUTION INTRAVENOUS CONTINUOUS
Status: DISCONTINUED | OUTPATIENT
Start: 2020-11-01 | End: 2020-11-04

## 2020-11-01 RX ORDER — AMOXICILLIN 250 MG
2 CAPSULE ORAL 2 TIMES DAILY
Status: DISCONTINUED | OUTPATIENT
Start: 2020-11-01 | End: 2020-11-07 | Stop reason: HOSPADM

## 2020-11-01 RX ADMIN — WATER 1 ML: 1 INJECTION INTRAMUSCULAR; INTRAVENOUS; SUBCUTANEOUS at 21:07

## 2020-11-01 RX ADMIN — PIPERACILLIN SODIUM AND TAZOBACTAM SODIUM 3.38 G: 36; 4.5 INJECTION, POWDER, FOR SOLUTION INTRAVENOUS at 17:34

## 2020-11-01 RX ADMIN — POLYETHYLENE GLYCOL 3350 17 G: 17 POWDER, FOR SOLUTION ORAL at 12:14

## 2020-11-01 RX ADMIN — GABAPENTIN 300 MG: 300 CAPSULE ORAL at 20:39

## 2020-11-01 RX ADMIN — INSULIN LISPRO 4 UNITS: 100 INJECTION, SOLUTION INTRAVENOUS; SUBCUTANEOUS at 11:55

## 2020-11-01 RX ADMIN — INSULIN GLARGINE 56 UNITS: 100 INJECTION, SOLUTION SUBCUTANEOUS at 21:54

## 2020-11-01 RX ADMIN — VANCOMYCIN HYDROCHLORIDE 1500 MG: 1 INJECTION, POWDER, LYOPHILIZED, FOR SOLUTION INTRAVENOUS at 08:12

## 2020-11-01 RX ADMIN — DOCUSATE SODIUM AND SENNOSIDES 2 TABLET: 8.6; 5 TABLET ORAL at 12:14

## 2020-11-01 RX ADMIN — PIPERACILLIN SODIUM AND TAZOBACTAM SODIUM 3.38 G: 36; 4.5 INJECTION, POWDER, FOR SOLUTION INTRAVENOUS at 11:56

## 2020-11-01 RX ADMIN — ACETAMINOPHEN 650 MG: 325 TABLET ORAL at 17:34

## 2020-11-01 RX ADMIN — ALTEPLASE 2 MG: 2.2 INJECTION, POWDER, LYOPHILIZED, FOR SOLUTION INTRAVENOUS at 20:39

## 2020-11-01 RX ADMIN — MORPHINE SULFATE 4 MG: 4 INJECTION INTRAVENOUS at 02:19

## 2020-11-01 RX ADMIN — HEPARIN SODIUM 5000 UNITS: 5000 INJECTION INTRAVENOUS; SUBCUTANEOUS at 06:23

## 2020-11-01 RX ADMIN — PIPERACILLIN SODIUM AND TAZOBACTAM SODIUM 3.38 G: 36; 4.5 INJECTION, POWDER, FOR SOLUTION INTRAVENOUS at 02:20

## 2020-11-01 RX ADMIN — INSULIN LISPRO 30 UNITS: 100 INJECTION, SOLUTION INTRAVENOUS; SUBCUTANEOUS at 11:55

## 2020-11-01 RX ADMIN — HEPARIN SODIUM 5000 UNITS: 5000 INJECTION INTRAVENOUS; SUBCUTANEOUS at 21:53

## 2020-11-01 RX ADMIN — GABAPENTIN 300 MG: 300 CAPSULE ORAL at 08:08

## 2020-11-01 RX ADMIN — GABAPENTIN 300 MG: 300 CAPSULE ORAL at 16:42

## 2020-11-01 RX ADMIN — INSULIN LISPRO 30 UNITS: 100 INJECTION, SOLUTION INTRAVENOUS; SUBCUTANEOUS at 08:08

## 2020-11-01 RX ADMIN — MICONAZOLE NITRATE 200 MG: 200 SUPPOSITORY VAGINAL at 21:55

## 2020-11-01 RX ADMIN — CYANOCOBALAMIN TAB 500 MCG 1000 MCG: 500 TAB at 08:08

## 2020-11-01 RX ADMIN — MICONAZOLE NITRATE 1 APPLICATION: KIT VAGINAL at 21:54

## 2020-11-01 RX ADMIN — INSULIN LISPRO 2 UNITS: 100 INJECTION, SOLUTION INTRAVENOUS; SUBCUTANEOUS at 21:53

## 2020-11-01 RX ADMIN — INSULIN LISPRO 2 UNITS: 100 INJECTION, SOLUTION INTRAVENOUS; SUBCUTANEOUS at 08:08

## 2020-11-01 RX ADMIN — SODIUM CHLORIDE 75 ML/HR: 0.9 INJECTION, SOLUTION INTRAVENOUS at 16:42

## 2020-11-01 RX ADMIN — FERROUS SULFATE TAB 325 MG (65 MG ELEMENTAL FE) 325 MG: 325 (65 FE) TAB at 08:08

## 2020-11-01 RX ADMIN — HEPARIN SODIUM 5000 UNITS: 5000 INJECTION INTRAVENOUS; SUBCUTANEOUS at 13:03

## 2020-11-01 RX ADMIN — DOCUSATE SODIUM AND SENNOSIDES 2 TABLET: 8.6; 5 TABLET ORAL at 17:11

## 2020-11-01 RX ADMIN — VANCOMYCIN HYDROCHLORIDE 1500 MG: 1 INJECTION, POWDER, LYOPHILIZED, FOR SOLUTION INTRAVENOUS at 21:58

## 2020-11-02 PROBLEM — D62 ACUTE BLOOD LOSS AS CAUSE OF POSTOPERATIVE ANEMIA: Status: ACTIVE | Noted: 2020-11-02

## 2020-11-02 LAB
ABO GROUP BLD: NORMAL
ANION GAP SERPL CALCULATED.3IONS-SCNC: 9 MMOL/L (ref 4–13)
ANISOCYTOSIS BLD QL SMEAR: PRESENT
ATRIAL RATE: 115 BPM
BACTERIA BLD CULT: NORMAL
BACTERIA BLD CULT: NORMAL
BASOPHILS # BLD MANUAL: 0 THOUSAND/UL (ref 0–0.1)
BASOPHILS NFR MAR MANUAL: 0 % (ref 0–1)
BLD GP AB SCN SERPL QL: NEGATIVE
BUN SERPL-MCNC: 16 MG/DL (ref 5–25)
CALCIUM SERPL-MCNC: 6.5 MG/DL (ref 8.3–10.1)
CHLORIDE SERPL-SCNC: 115 MMOL/L (ref 100–108)
CO2 SERPL-SCNC: 21 MMOL/L (ref 21–32)
CREAT SERPL-MCNC: 1.76 MG/DL (ref 0.6–1.3)
EOSINOPHIL # BLD MANUAL: 0.18 THOUSAND/UL (ref 0–0.4)
EOSINOPHIL NFR BLD MANUAL: 2 % (ref 0–6)
ERYTHROCYTE [DISTWIDTH] IN BLOOD BY AUTOMATED COUNT: 19 % (ref 11.6–15.1)
GFR SERPL CREATININE-BSD FRML MDRD: 34 ML/MIN/1.73SQ M
GLUCOSE SERPL-MCNC: 131 MG/DL (ref 65–140)
GLUCOSE SERPL-MCNC: 139 MG/DL (ref 65–140)
GLUCOSE SERPL-MCNC: 143 MG/DL (ref 65–140)
GLUCOSE SERPL-MCNC: 153 MG/DL (ref 65–140)
GLUCOSE SERPL-MCNC: 56 MG/DL (ref 65–140)
GLUCOSE SERPL-MCNC: 57 MG/DL (ref 65–140)
GLUCOSE SERPL-MCNC: 59 MG/DL (ref 65–140)
GLUCOSE SERPL-MCNC: 70 MG/DL (ref 65–140)
GLUCOSE SERPL-MCNC: 83 MG/DL (ref 65–140)
HCT VFR BLD AUTO: 19.2 % (ref 34.8–46.1)
HCT VFR BLD AUTO: 31.4 % (ref 34.8–46.1)
HGB BLD-MCNC: 5.7 G/DL (ref 11.5–15.4)
HGB BLD-MCNC: 9.4 G/DL (ref 11.5–15.4)
LYMPHOCYTES # BLD AUTO: 2.15 THOUSAND/UL (ref 0.6–4.47)
LYMPHOCYTES # BLD AUTO: 24 % (ref 14–44)
MCH RBC QN AUTO: 23.9 PG (ref 26.8–34.3)
MCHC RBC AUTO-ENTMCNC: 29.2 G/DL (ref 31.4–37.4)
MCV RBC AUTO: 82 FL (ref 82–98)
MICROCYTES BLD QL AUTO: PRESENT
MONOCYTES # BLD AUTO: 0 THOUSAND/UL (ref 0–1.22)
MONOCYTES NFR BLD: 0 % (ref 4–12)
NEUTROPHILS # BLD MANUAL: 6.62 THOUSAND/UL (ref 1.85–7.62)
NEUTS SEG NFR BLD AUTO: 74 % (ref 43–75)
NRBC BLD AUTO-RTO: 0 /100 WBCS
P AXIS: 51 DEGREES
PLATELET # BLD AUTO: 192 THOUSANDS/UL (ref 149–390)
PLATELET BLD QL SMEAR: ADEQUATE
PMV BLD AUTO: 10.8 FL (ref 8.9–12.7)
POIKILOCYTOSIS BLD QL SMEAR: PRESENT
POTASSIUM SERPL-SCNC: 3.2 MMOL/L (ref 3.5–5.3)
PR INTERVAL: 136 MS
QRS AXIS: 49 DEGREES
QRSD INTERVAL: 80 MS
QT INTERVAL: 320 MS
QTC INTERVAL: 442 MS
RBC # BLD AUTO: 2.34 MILLION/UL (ref 3.81–5.12)
RH BLD: POSITIVE
SODIUM SERPL-SCNC: 145 MMOL/L (ref 136–145)
SPECIMEN EXPIRATION DATE: NORMAL
T WAVE AXIS: 30 DEGREES
TOTAL CELLS COUNTED SPEC: 100
VANCOMYCIN TROUGH SERPL-MCNC: 44.3 UG/ML (ref 10–20)
VENTRICULAR RATE: 115 BPM
WBC # BLD AUTO: 8.94 THOUSAND/UL (ref 4.31–10.16)

## 2020-11-02 PROCEDURE — 82948 REAGENT STRIP/BLOOD GLUCOSE: CPT

## 2020-11-02 PROCEDURE — 86900 BLOOD TYPING SEROLOGIC ABO: CPT | Performed by: NURSE PRACTITIONER

## 2020-11-02 PROCEDURE — 86850 RBC ANTIBODY SCREEN: CPT | Performed by: NURSE PRACTITIONER

## 2020-11-02 PROCEDURE — 85018 HEMOGLOBIN: CPT | Performed by: NURSE PRACTITIONER

## 2020-11-02 PROCEDURE — 85014 HEMATOCRIT: CPT | Performed by: NURSE PRACTITIONER

## 2020-11-02 PROCEDURE — 85027 COMPLETE CBC AUTOMATED: CPT | Performed by: NURSE PRACTITIONER

## 2020-11-02 PROCEDURE — P9016 RBC LEUKOCYTES REDUCED: HCPCS

## 2020-11-02 PROCEDURE — 86923 COMPATIBILITY TEST ELECTRIC: CPT

## 2020-11-02 PROCEDURE — 80048 BASIC METABOLIC PNL TOTAL CA: CPT | Performed by: NURSE PRACTITIONER

## 2020-11-02 PROCEDURE — 86901 BLOOD TYPING SEROLOGIC RH(D): CPT | Performed by: NURSE PRACTITIONER

## 2020-11-02 PROCEDURE — 80202 ASSAY OF VANCOMYCIN: CPT | Performed by: FAMILY MEDICINE

## 2020-11-02 PROCEDURE — 99232 SBSQ HOSP IP/OBS MODERATE 35: CPT | Performed by: FAMILY MEDICINE

## 2020-11-02 PROCEDURE — 85007 BL SMEAR W/DIFF WBC COUNT: CPT | Performed by: NURSE PRACTITIONER

## 2020-11-02 RX ORDER — DEXTROSE MONOHYDRATE 25 G/50ML
25 INJECTION, SOLUTION INTRAVENOUS ONCE
Status: COMPLETED | OUTPATIENT
Start: 2020-11-02 | End: 2020-11-02

## 2020-11-02 RX ORDER — POTASSIUM CHLORIDE 20 MEQ/1
40 TABLET, EXTENDED RELEASE ORAL 2 TIMES DAILY
Status: COMPLETED | OUTPATIENT
Start: 2020-11-02 | End: 2020-11-02

## 2020-11-02 RX ADMIN — INSULIN LISPRO 30 UNITS: 100 INJECTION, SOLUTION INTRAVENOUS; SUBCUTANEOUS at 11:47

## 2020-11-02 RX ADMIN — INSULIN LISPRO 30 UNITS: 100 INJECTION, SOLUTION INTRAVENOUS; SUBCUTANEOUS at 16:59

## 2020-11-02 RX ADMIN — INSULIN LISPRO 2 UNITS: 100 INJECTION, SOLUTION INTRAVENOUS; SUBCUTANEOUS at 11:47

## 2020-11-02 RX ADMIN — MICONAZOLE NITRATE 1 APPLICATION: KIT VAGINAL at 22:31

## 2020-11-02 RX ADMIN — CYANOCOBALAMIN TAB 500 MCG 1000 MCG: 500 TAB at 08:03

## 2020-11-02 RX ADMIN — HEPARIN SODIUM 5000 UNITS: 5000 INJECTION INTRAVENOUS; SUBCUTANEOUS at 05:43

## 2020-11-02 RX ADMIN — INSULIN LISPRO 30 UNITS: 100 INJECTION, SOLUTION INTRAVENOUS; SUBCUTANEOUS at 08:03

## 2020-11-02 RX ADMIN — GABAPENTIN 300 MG: 300 CAPSULE ORAL at 22:24

## 2020-11-02 RX ADMIN — GABAPENTIN 300 MG: 300 CAPSULE ORAL at 16:50

## 2020-11-02 RX ADMIN — VANCOMYCIN HYDROCHLORIDE 1500 MG: 1 INJECTION, POWDER, LYOPHILIZED, FOR SOLUTION INTRAVENOUS at 08:03

## 2020-11-02 RX ADMIN — POTASSIUM CHLORIDE 40 MEQ: 1500 TABLET, EXTENDED RELEASE ORAL at 17:03

## 2020-11-02 RX ADMIN — DEXTROSE MONOHYDRATE 25 ML: 25 INJECTION, SOLUTION INTRAVENOUS at 22:39

## 2020-11-02 RX ADMIN — PIPERACILLIN SODIUM AND TAZOBACTAM SODIUM 3.38 G: 36; 4.5 INJECTION, POWDER, FOR SOLUTION INTRAVENOUS at 02:12

## 2020-11-02 RX ADMIN — HEPARIN SODIUM 5000 UNITS: 5000 INJECTION INTRAVENOUS; SUBCUTANEOUS at 14:39

## 2020-11-02 RX ADMIN — DOCUSATE SODIUM AND SENNOSIDES 2 TABLET: 8.6; 5 TABLET ORAL at 08:03

## 2020-11-02 RX ADMIN — INSULIN GLARGINE 56 UNITS: 100 INJECTION, SOLUTION SUBCUTANEOUS at 23:31

## 2020-11-02 RX ADMIN — OXYCODONE HYDROCHLORIDE 5 MG: 5 TABLET ORAL at 10:41

## 2020-11-02 RX ADMIN — DOCUSATE SODIUM AND SENNOSIDES 2 TABLET: 8.6; 5 TABLET ORAL at 17:03

## 2020-11-02 RX ADMIN — HEPARIN SODIUM 5000 UNITS: 5000 INJECTION INTRAVENOUS; SUBCUTANEOUS at 22:24

## 2020-11-02 RX ADMIN — GABAPENTIN 300 MG: 300 CAPSULE ORAL at 08:03

## 2020-11-02 RX ADMIN — POTASSIUM CHLORIDE 40 MEQ: 1500 TABLET, EXTENDED RELEASE ORAL at 08:43

## 2020-11-02 RX ADMIN — SODIUM CHLORIDE 75 ML/HR: 0.9 INJECTION, SOLUTION INTRAVENOUS at 09:46

## 2020-11-02 RX ADMIN — FERROUS SULFATE TAB 325 MG (65 MG ELEMENTAL FE) 325 MG: 325 (65 FE) TAB at 08:03

## 2020-11-02 RX ADMIN — MICONAZOLE NITRATE 200 MG: 200 SUPPOSITORY VAGINAL at 22:32

## 2020-11-02 RX ADMIN — POLYETHYLENE GLYCOL 3350 17 G: 17 POWDER, FOR SOLUTION ORAL at 08:03

## 2020-11-03 ENCOUNTER — APPOINTMENT (INPATIENT)
Dept: ULTRASOUND IMAGING | Facility: HOSPITAL | Age: 45
DRG: 617 | End: 2020-11-03
Payer: COMMERCIAL

## 2020-11-03 LAB
ABO GROUP BLD BPU: NORMAL
ANION GAP SERPL CALCULATED.3IONS-SCNC: 10 MMOL/L (ref 4–13)
BASOPHILS # BLD AUTO: 0.03 THOUSANDS/ΜL (ref 0–0.1)
BASOPHILS NFR BLD AUTO: 0 % (ref 0–1)
BPU ID: NORMAL
BUN SERPL-MCNC: 19 MG/DL (ref 5–25)
CALCIUM SERPL-MCNC: 9.1 MG/DL (ref 8.3–10.1)
CHLORIDE SERPL-SCNC: 109 MMOL/L (ref 100–108)
CO2 SERPL-SCNC: 25 MMOL/L (ref 21–32)
CREAT SERPL-MCNC: 2.22 MG/DL (ref 0.6–1.3)
CROSSMATCH: NORMAL
EOSINOPHIL # BLD AUTO: 0.35 THOUSAND/ΜL (ref 0–0.61)
EOSINOPHIL NFR BLD AUTO: 3 % (ref 0–6)
ERYTHROCYTE [DISTWIDTH] IN BLOOD BY AUTOMATED COUNT: 20.4 % (ref 11.6–15.1)
GFR SERPL CREATININE-BSD FRML MDRD: 26 ML/MIN/1.73SQ M
GLUCOSE SERPL-MCNC: 163 MG/DL (ref 65–140)
GLUCOSE SERPL-MCNC: 291 MG/DL (ref 65–140)
GLUCOSE SERPL-MCNC: 300 MG/DL (ref 65–140)
GLUCOSE SERPL-MCNC: 79 MG/DL (ref 65–140)
GLUCOSE SERPL-MCNC: 81 MG/DL (ref 65–140)
HCT VFR BLD AUTO: 25.7 % (ref 34.8–46.1)
HCT VFR BLD AUTO: 28.1 % (ref 34.8–46.1)
HGB BLD-MCNC: 7.9 G/DL (ref 11.5–15.4)
HGB BLD-MCNC: 8.2 G/DL (ref 11.5–15.4)
IMM GRANULOCYTES # BLD AUTO: 0.28 THOUSAND/UL (ref 0–0.2)
IMM GRANULOCYTES NFR BLD AUTO: 2 % (ref 0–2)
LYMPHOCYTES # BLD AUTO: 2.8 THOUSANDS/ΜL (ref 0.6–4.47)
LYMPHOCYTES NFR BLD AUTO: 22 % (ref 14–44)
MCH RBC QN AUTO: 24 PG (ref 26.8–34.3)
MCHC RBC AUTO-ENTMCNC: 29.2 G/DL (ref 31.4–37.4)
MCV RBC AUTO: 82 FL (ref 82–98)
MONOCYTES # BLD AUTO: 0.62 THOUSAND/ΜL (ref 0.17–1.22)
MONOCYTES NFR BLD AUTO: 5 % (ref 4–12)
NEUTROPHILS # BLD AUTO: 8.42 THOUSANDS/ΜL (ref 1.85–7.62)
NEUTS SEG NFR BLD AUTO: 68 % (ref 43–75)
NRBC BLD AUTO-RTO: 0 /100 WBCS
PLATELET # BLD AUTO: 284 THOUSANDS/UL (ref 149–390)
PMV BLD AUTO: 10.7 FL (ref 8.9–12.7)
POTASSIUM SERPL-SCNC: 4.6 MMOL/L (ref 3.5–5.3)
PTH-INTACT SERPL-MCNC: 64.9 PG/ML (ref 18.4–80.1)
RBC # BLD AUTO: 3.41 MILLION/UL (ref 3.81–5.12)
SODIUM SERPL-SCNC: 144 MMOL/L (ref 136–145)
UNIT DISPENSE STATUS: NORMAL
UNIT PRODUCT CODE: NORMAL
UNIT RH: NORMAL
VANCOMYCIN SERPL-MCNC: 22.9 UG/ML
WBC # BLD AUTO: 12.5 THOUSAND/UL (ref 4.31–10.16)

## 2020-11-03 PROCEDURE — 80202 ASSAY OF VANCOMYCIN: CPT | Performed by: NURSE PRACTITIONER

## 2020-11-03 PROCEDURE — 97163 PT EVAL HIGH COMPLEX 45 MIN: CPT

## 2020-11-03 PROCEDURE — 82948 REAGENT STRIP/BLOOD GLUCOSE: CPT

## 2020-11-03 PROCEDURE — 99232 SBSQ HOSP IP/OBS MODERATE 35: CPT | Performed by: FAMILY MEDICINE

## 2020-11-03 PROCEDURE — 85025 COMPLETE CBC W/AUTO DIFF WBC: CPT | Performed by: NURSE PRACTITIONER

## 2020-11-03 PROCEDURE — 97116 GAIT TRAINING THERAPY: CPT

## 2020-11-03 PROCEDURE — 85014 HEMATOCRIT: CPT | Performed by: NURSE PRACTITIONER

## 2020-11-03 PROCEDURE — 97167 OT EVAL HIGH COMPLEX 60 MIN: CPT

## 2020-11-03 PROCEDURE — 85018 HEMOGLOBIN: CPT | Performed by: NURSE PRACTITIONER

## 2020-11-03 PROCEDURE — 80048 BASIC METABOLIC PNL TOTAL CA: CPT | Performed by: NURSE PRACTITIONER

## 2020-11-03 PROCEDURE — 99255 IP/OBS CONSLTJ NEW/EST HI 80: CPT | Performed by: INTERNAL MEDICINE

## 2020-11-03 PROCEDURE — 76770 US EXAM ABDO BACK WALL COMP: CPT

## 2020-11-03 PROCEDURE — 83970 ASSAY OF PARATHORMONE: CPT | Performed by: INTERNAL MEDICINE

## 2020-11-03 RX ADMIN — INSULIN LISPRO 6 UNITS: 100 INJECTION, SOLUTION INTRAVENOUS; SUBCUTANEOUS at 13:29

## 2020-11-03 RX ADMIN — PIPERACILLIN AND TAZOBACTAM 3.38 G: 3; .375 INJECTION, POWDER, LYOPHILIZED, FOR SOLUTION INTRAVENOUS at 16:54

## 2020-11-03 RX ADMIN — GABAPENTIN 300 MG: 300 CAPSULE ORAL at 16:53

## 2020-11-03 RX ADMIN — HEPARIN SODIUM 5000 UNITS: 5000 INJECTION INTRAVENOUS; SUBCUTANEOUS at 05:04

## 2020-11-03 RX ADMIN — INSULIN LISPRO 8 UNITS: 100 INJECTION, SOLUTION INTRAVENOUS; SUBCUTANEOUS at 16:53

## 2020-11-03 RX ADMIN — INSULIN GLARGINE 56 UNITS: 100 INJECTION, SOLUTION SUBCUTANEOUS at 22:12

## 2020-11-03 RX ADMIN — POLYETHYLENE GLYCOL 3350 17 G: 17 POWDER, FOR SOLUTION ORAL at 07:58

## 2020-11-03 RX ADMIN — SODIUM CHLORIDE 50 ML/HR: 0.9 INJECTION, SOLUTION INTRAVENOUS at 11:05

## 2020-11-03 RX ADMIN — INSULIN LISPRO 2 UNITS: 100 INJECTION, SOLUTION INTRAVENOUS; SUBCUTANEOUS at 22:12

## 2020-11-03 RX ADMIN — GABAPENTIN 300 MG: 300 CAPSULE ORAL at 20:28

## 2020-11-03 RX ADMIN — HEPARIN SODIUM 5000 UNITS: 5000 INJECTION INTRAVENOUS; SUBCUTANEOUS at 22:12

## 2020-11-03 RX ADMIN — HEPARIN SODIUM 5000 UNITS: 5000 INJECTION INTRAVENOUS; SUBCUTANEOUS at 13:28

## 2020-11-03 RX ADMIN — VANCOMYCIN HYDROCHLORIDE 750 MG: 750 INJECTION, SOLUTION INTRAVENOUS at 18:46

## 2020-11-03 RX ADMIN — DOCUSATE SODIUM AND SENNOSIDES 2 TABLET: 8.6; 5 TABLET ORAL at 18:45

## 2020-11-03 RX ADMIN — GABAPENTIN 300 MG: 300 CAPSULE ORAL at 07:58

## 2020-11-03 RX ADMIN — CYANOCOBALAMIN TAB 500 MCG 1000 MCG: 500 TAB at 07:58

## 2020-11-03 RX ADMIN — PIPERACILLIN AND TAZOBACTAM 3.38 G: 3; .375 INJECTION, POWDER, LYOPHILIZED, FOR SOLUTION INTRAVENOUS at 22:12

## 2020-11-03 RX ADMIN — FERROUS SULFATE TAB 325 MG (65 MG ELEMENTAL FE) 325 MG: 325 (65 FE) TAB at 07:59

## 2020-11-03 RX ADMIN — PIPERACILLIN AND TAZOBACTAM 3.38 G: 3; .375 INJECTION, POWDER, LYOPHILIZED, FOR SOLUTION INTRAVENOUS at 11:06

## 2020-11-03 RX ADMIN — DOCUSATE SODIUM AND SENNOSIDES 2 TABLET: 8.6; 5 TABLET ORAL at 07:57

## 2020-11-04 LAB
ALBUMIN SERPL BCP-MCNC: 1.9 G/DL (ref 3.5–5)
ANION GAP SERPL CALCULATED.3IONS-SCNC: 6 MMOL/L (ref 4–13)
ANION GAP SERPL CALCULATED.3IONS-SCNC: 8 MMOL/L (ref 4–13)
BACTERIA UR QL AUTO: ABNORMAL /HPF
BASOPHILS # BLD AUTO: 0.02 THOUSANDS/ΜL (ref 0–0.1)
BASOPHILS NFR BLD AUTO: 0 % (ref 0–1)
BILIRUB UR QL STRIP: NEGATIVE
BUN SERPL-MCNC: 22 MG/DL (ref 5–25)
BUN SERPL-MCNC: 22 MG/DL (ref 5–25)
CALCIUM ALBUM COR SERPL-MCNC: 10.6 MG/DL (ref 8.3–10.1)
CALCIUM SERPL-MCNC: 8.9 MG/DL (ref 8.3–10.1)
CALCIUM SERPL-MCNC: 8.9 MG/DL (ref 8.3–10.1)
CHLORIDE SERPL-SCNC: 107 MMOL/L (ref 100–108)
CHLORIDE SERPL-SCNC: 107 MMOL/L (ref 100–108)
CLARITY UR: CLEAR
CO2 SERPL-SCNC: 28 MMOL/L (ref 21–32)
CO2 SERPL-SCNC: 29 MMOL/L (ref 21–32)
COLOR UR: YELLOW
CREAT SERPL-MCNC: 2.24 MG/DL (ref 0.6–1.3)
CREAT SERPL-MCNC: 2.24 MG/DL (ref 0.6–1.3)
CREAT UR-MCNC: 17.6 MG/DL
CREAT UR-MCNC: 18.3 MG/DL
EOSINOPHIL # BLD AUTO: 0.33 THOUSAND/ΜL (ref 0–0.61)
EOSINOPHIL NFR BLD AUTO: 4 % (ref 0–6)
ERYTHROCYTE [DISTWIDTH] IN BLOOD BY AUTOMATED COUNT: 20.4 % (ref 11.6–15.1)
GFR SERPL CREATININE-BSD FRML MDRD: 26 ML/MIN/1.73SQ M
GFR SERPL CREATININE-BSD FRML MDRD: 26 ML/MIN/1.73SQ M
GLUCOSE SERPL-MCNC: 172 MG/DL (ref 65–140)
GLUCOSE SERPL-MCNC: 195 MG/DL (ref 65–140)
GLUCOSE SERPL-MCNC: 222 MG/DL (ref 65–140)
GLUCOSE SERPL-MCNC: 61 MG/DL (ref 65–140)
GLUCOSE SERPL-MCNC: 68 MG/DL (ref 65–140)
GLUCOSE SERPL-MCNC: 68 MG/DL (ref 65–140)
GLUCOSE SERPL-MCNC: 87 MG/DL (ref 65–140)
GLUCOSE UR STRIP-MCNC: NEGATIVE MG/DL
HCT VFR BLD AUTO: 23.9 % (ref 34.8–46.1)
HEMOCCULT STL QL: NEGATIVE
HGB BLD-MCNC: 7.2 G/DL (ref 11.5–15.4)
HGB UR QL STRIP.AUTO: NEGATIVE
IMM GRANULOCYTES # BLD AUTO: 0.15 THOUSAND/UL (ref 0–0.2)
IMM GRANULOCYTES NFR BLD AUTO: 2 % (ref 0–2)
KETONES UR STRIP-MCNC: NEGATIVE MG/DL
LEUKOCYTE ESTERASE UR QL STRIP: NEGATIVE
LYMPHOCYTES # BLD AUTO: 1.72 THOUSANDS/ΜL (ref 0.6–4.47)
LYMPHOCYTES NFR BLD AUTO: 18 % (ref 14–44)
MCH RBC QN AUTO: 24.7 PG (ref 26.8–34.3)
MCHC RBC AUTO-ENTMCNC: 30.1 G/DL (ref 31.4–37.4)
MCV RBC AUTO: 82 FL (ref 82–98)
MONOCYTES # BLD AUTO: 0.43 THOUSAND/ΜL (ref 0.17–1.22)
MONOCYTES NFR BLD AUTO: 5 % (ref 4–12)
NEUTROPHILS # BLD AUTO: 6.73 THOUSANDS/ΜL (ref 1.85–7.62)
NEUTS SEG NFR BLD AUTO: 71 % (ref 43–75)
NITRITE UR QL STRIP: NEGATIVE
NON-SQ EPI CELLS URNS QL MICRO: ABNORMAL /HPF
NRBC BLD AUTO-RTO: 0 /100 WBCS
OTHER STN SPEC: ABNORMAL
PH UR STRIP.AUTO: 6.5 [PH]
PHOSPHATE SERPL-MCNC: 5.2 MG/DL (ref 2.7–4.5)
PLATELET # BLD AUTO: 267 THOUSANDS/UL (ref 149–390)
PMV BLD AUTO: 10.6 FL (ref 8.9–12.7)
POTASSIUM SERPL-SCNC: 4.1 MMOL/L (ref 3.5–5.3)
POTASSIUM SERPL-SCNC: 4.1 MMOL/L (ref 3.5–5.3)
PROT UR STRIP-MCNC: NEGATIVE MG/DL
PROT UR-MCNC: 9 MG/DL
PROT/CREAT UR: 0.51 MG/G{CREAT} (ref 0–0.1)
RBC # BLD AUTO: 2.91 MILLION/UL (ref 3.81–5.12)
RBC #/AREA URNS AUTO: ABNORMAL /HPF
SODIUM 24H UR-SCNC: 91 MOL/L
SODIUM SERPL-SCNC: 142 MMOL/L (ref 136–145)
SODIUM SERPL-SCNC: 143 MMOL/L (ref 136–145)
SP GR UR STRIP.AUTO: 1.01 (ref 1–1.03)
UROBILINOGEN UR QL STRIP.AUTO: 0.2 E.U./DL
UUN 24H UR-MCNC: 114 MG/DL
VANCOMYCIN SERPL-MCNC: 19 UG/ML
WBC # BLD AUTO: 9.38 THOUSAND/UL (ref 4.31–10.16)
WBC #/AREA URNS AUTO: ABNORMAL /HPF

## 2020-11-04 PROCEDURE — P9016 RBC LEUKOCYTES REDUCED: HCPCS

## 2020-11-04 PROCEDURE — 82948 REAGENT STRIP/BLOOD GLUCOSE: CPT

## 2020-11-04 PROCEDURE — 84300 ASSAY OF URINE SODIUM: CPT | Performed by: INTERNAL MEDICINE

## 2020-11-04 PROCEDURE — 81001 URINALYSIS AUTO W/SCOPE: CPT | Performed by: INTERNAL MEDICINE

## 2020-11-04 PROCEDURE — 82570 ASSAY OF URINE CREATININE: CPT | Performed by: INTERNAL MEDICINE

## 2020-11-04 PROCEDURE — 80202 ASSAY OF VANCOMYCIN: CPT | Performed by: NURSE PRACTITIONER

## 2020-11-04 PROCEDURE — 80048 BASIC METABOLIC PNL TOTAL CA: CPT | Performed by: NURSE PRACTITIONER

## 2020-11-04 PROCEDURE — 84156 ASSAY OF PROTEIN URINE: CPT | Performed by: INTERNAL MEDICINE

## 2020-11-04 PROCEDURE — 80069 RENAL FUNCTION PANEL: CPT | Performed by: INTERNAL MEDICINE

## 2020-11-04 PROCEDURE — 84540 ASSAY OF URINE/UREA-N: CPT | Performed by: INTERNAL MEDICINE

## 2020-11-04 PROCEDURE — 99232 SBSQ HOSP IP/OBS MODERATE 35: CPT | Performed by: INTERNAL MEDICINE

## 2020-11-04 PROCEDURE — 99232 SBSQ HOSP IP/OBS MODERATE 35: CPT | Performed by: FAMILY MEDICINE

## 2020-11-04 PROCEDURE — 85025 COMPLETE CBC W/AUTO DIFF WBC: CPT | Performed by: NURSE PRACTITIONER

## 2020-11-04 RX ORDER — INSULIN GLARGINE 100 [IU]/ML
45 INJECTION, SOLUTION SUBCUTANEOUS
Status: DISCONTINUED | OUTPATIENT
Start: 2020-11-04 | End: 2020-11-07 | Stop reason: HOSPADM

## 2020-11-04 RX ADMIN — HEPARIN SODIUM 5000 UNITS: 5000 INJECTION INTRAVENOUS; SUBCUTANEOUS at 05:00

## 2020-11-04 RX ADMIN — INSULIN LISPRO 2 UNITS: 100 INJECTION, SOLUTION INTRAVENOUS; SUBCUTANEOUS at 16:53

## 2020-11-04 RX ADMIN — HEPARIN SODIUM 5000 UNITS: 5000 INJECTION INTRAVENOUS; SUBCUTANEOUS at 21:21

## 2020-11-04 RX ADMIN — INSULIN GLARGINE 45 UNITS: 100 INJECTION, SOLUTION SUBCUTANEOUS at 21:21

## 2020-11-04 RX ADMIN — HEPARIN SODIUM 5000 UNITS: 5000 INJECTION INTRAVENOUS; SUBCUTANEOUS at 13:41

## 2020-11-04 RX ADMIN — GABAPENTIN 300 MG: 300 CAPSULE ORAL at 07:21

## 2020-11-04 RX ADMIN — PIPERACILLIN AND TAZOBACTAM 3.38 G: 3; .375 INJECTION, POWDER, LYOPHILIZED, FOR SOLUTION INTRAVENOUS at 10:53

## 2020-11-04 RX ADMIN — VANCOMYCIN HYDROCHLORIDE 750 MG: 750 INJECTION, SOLUTION INTRAVENOUS at 21:21

## 2020-11-04 RX ADMIN — POLYETHYLENE GLYCOL 3350 17 G: 17 POWDER, FOR SOLUTION ORAL at 07:21

## 2020-11-04 RX ADMIN — INSULIN LISPRO 2 UNITS: 100 INJECTION, SOLUTION INTRAVENOUS; SUBCUTANEOUS at 10:58

## 2020-11-04 RX ADMIN — DOCUSATE SODIUM AND SENNOSIDES 2 TABLET: 8.6; 5 TABLET ORAL at 07:22

## 2020-11-04 RX ADMIN — PIPERACILLIN AND TAZOBACTAM 3.38 G: 3; .375 INJECTION, POWDER, LYOPHILIZED, FOR SOLUTION INTRAVENOUS at 05:00

## 2020-11-04 RX ADMIN — GABAPENTIN 300 MG: 300 CAPSULE ORAL at 16:53

## 2020-11-04 RX ADMIN — PIPERACILLIN AND TAZOBACTAM 3.38 G: 3; .375 INJECTION, POWDER, LYOPHILIZED, FOR SOLUTION INTRAVENOUS at 16:54

## 2020-11-04 RX ADMIN — GABAPENTIN 300 MG: 300 CAPSULE ORAL at 21:21

## 2020-11-04 RX ADMIN — PIPERACILLIN AND TAZOBACTAM 3.38 G: 3; .375 INJECTION, POWDER, LYOPHILIZED, FOR SOLUTION INTRAVENOUS at 22:47

## 2020-11-04 RX ADMIN — INSULIN LISPRO 4 UNITS: 100 INJECTION, SOLUTION INTRAVENOUS; SUBCUTANEOUS at 21:21

## 2020-11-04 RX ADMIN — FERROUS SULFATE TAB 325 MG (65 MG ELEMENTAL FE) 325 MG: 325 (65 FE) TAB at 07:21

## 2020-11-04 RX ADMIN — CYANOCOBALAMIN TAB 500 MCG 1000 MCG: 500 TAB at 07:22

## 2020-11-05 LAB
ABO GROUP BLD BPU: NORMAL
ALBUMIN SERPL BCP-MCNC: 1.6 G/DL (ref 3.5–5)
ANION GAP SERPL CALCULATED.3IONS-SCNC: 10 MMOL/L (ref 4–13)
ANION GAP SERPL CALCULATED.3IONS-SCNC: 10 MMOL/L (ref 4–13)
BASOPHILS # BLD AUTO: 0.02 THOUSANDS/ΜL (ref 0–0.1)
BASOPHILS NFR BLD AUTO: 0 % (ref 0–1)
BPU ID: NORMAL
BUN SERPL-MCNC: 19 MG/DL (ref 5–25)
BUN SERPL-MCNC: 19 MG/DL (ref 5–25)
CALCIUM ALBUM COR SERPL-MCNC: 9.1 MG/DL (ref 8.3–10.1)
CALCIUM SERPL-MCNC: 7.2 MG/DL (ref 8.3–10.1)
CALCIUM SERPL-MCNC: 7.3 MG/DL (ref 8.3–10.1)
CHLORIDE SERPL-SCNC: 112 MMOL/L (ref 100–108)
CHLORIDE SERPL-SCNC: 112 MMOL/L (ref 100–108)
CO2 SERPL-SCNC: 24 MMOL/L (ref 21–32)
CO2 SERPL-SCNC: 24 MMOL/L (ref 21–32)
CREAT SERPL-MCNC: 2.09 MG/DL (ref 0.6–1.3)
CREAT SERPL-MCNC: 2.13 MG/DL (ref 0.6–1.3)
CROSSMATCH: NORMAL
EOSINOPHIL # BLD AUTO: 0.33 THOUSAND/ΜL (ref 0–0.61)
EOSINOPHIL NFR BLD AUTO: 4 % (ref 0–6)
ERYTHROCYTE [DISTWIDTH] IN BLOOD BY AUTOMATED COUNT: 20.5 % (ref 11.6–15.1)
GFR SERPL CREATININE-BSD FRML MDRD: 27 ML/MIN/1.73SQ M
GFR SERPL CREATININE-BSD FRML MDRD: 28 ML/MIN/1.73SQ M
GLUCOSE SERPL-MCNC: 102 MG/DL (ref 65–140)
GLUCOSE SERPL-MCNC: 125 MG/DL (ref 65–140)
GLUCOSE SERPL-MCNC: 154 MG/DL (ref 65–140)
GLUCOSE SERPL-MCNC: 179 MG/DL (ref 65–140)
GLUCOSE SERPL-MCNC: 82 MG/DL (ref 65–140)
GLUCOSE SERPL-MCNC: 82 MG/DL (ref 65–140)
GLUCOSE SERPL-MCNC: 93 MG/DL (ref 65–140)
HCT VFR BLD AUTO: 24.4 % (ref 34.8–46.1)
HGB BLD-MCNC: 7.3 G/DL (ref 11.5–15.4)
IMM GRANULOCYTES # BLD AUTO: 0.14 THOUSAND/UL (ref 0–0.2)
IMM GRANULOCYTES NFR BLD AUTO: 2 % (ref 0–2)
LYMPHOCYTES # BLD AUTO: 1.79 THOUSANDS/ΜL (ref 0.6–4.47)
LYMPHOCYTES NFR BLD AUTO: 23 % (ref 14–44)
MCH RBC QN AUTO: 24.8 PG (ref 26.8–34.3)
MCHC RBC AUTO-ENTMCNC: 29.9 G/DL (ref 31.4–37.4)
MCV RBC AUTO: 83 FL (ref 82–98)
MONOCYTES # BLD AUTO: 0.38 THOUSAND/ΜL (ref 0.17–1.22)
MONOCYTES NFR BLD AUTO: 5 % (ref 4–12)
NEUTROPHILS # BLD AUTO: 5.04 THOUSANDS/ΜL (ref 1.85–7.62)
NEUTS SEG NFR BLD AUTO: 66 % (ref 43–75)
NRBC BLD AUTO-RTO: 0 /100 WBCS
PHOSPHATE SERPL-MCNC: 4.4 MG/DL (ref 2.7–4.5)
PLATELET # BLD AUTO: 265 THOUSANDS/UL (ref 149–390)
PMV BLD AUTO: 10.8 FL (ref 8.9–12.7)
POTASSIUM SERPL-SCNC: 3.4 MMOL/L (ref 3.5–5.3)
POTASSIUM SERPL-SCNC: 3.4 MMOL/L (ref 3.5–5.3)
RBC # BLD AUTO: 2.94 MILLION/UL (ref 3.81–5.12)
SODIUM SERPL-SCNC: 146 MMOL/L (ref 136–145)
SODIUM SERPL-SCNC: 146 MMOL/L (ref 136–145)
UNIT DISPENSE STATUS: NORMAL
UNIT PRODUCT CODE: NORMAL
UNIT RH: NORMAL
WBC # BLD AUTO: 7.7 THOUSAND/UL (ref 4.31–10.16)

## 2020-11-05 PROCEDURE — 80069 RENAL FUNCTION PANEL: CPT | Performed by: INTERNAL MEDICINE

## 2020-11-05 PROCEDURE — 82948 REAGENT STRIP/BLOOD GLUCOSE: CPT

## 2020-11-05 PROCEDURE — 99232 SBSQ HOSP IP/OBS MODERATE 35: CPT | Performed by: INTERNAL MEDICINE

## 2020-11-05 PROCEDURE — 80048 BASIC METABOLIC PNL TOTAL CA: CPT | Performed by: NURSE PRACTITIONER

## 2020-11-05 PROCEDURE — 85025 COMPLETE CBC W/AUTO DIFF WBC: CPT | Performed by: NURSE PRACTITIONER

## 2020-11-05 PROCEDURE — 99232 SBSQ HOSP IP/OBS MODERATE 35: CPT | Performed by: PHYSICIAN ASSISTANT

## 2020-11-05 RX ORDER — LEVOFLOXACIN 750 MG/1
750 TABLET ORAL EVERY OTHER DAY
Status: COMPLETED | OUTPATIENT
Start: 2020-11-05 | End: 2020-11-07

## 2020-11-05 RX ORDER — AMOXICILLIN 250 MG/1
250 CAPSULE ORAL EVERY 12 HOURS SCHEDULED
Status: DISCONTINUED | OUTPATIENT
Start: 2020-11-05 | End: 2020-11-07 | Stop reason: HOSPADM

## 2020-11-05 RX ORDER — LORAZEPAM 0.5 MG/1
0.5 TABLET ORAL EVERY 6 HOURS PRN
Status: DISCONTINUED | OUTPATIENT
Start: 2020-11-05 | End: 2020-11-07 | Stop reason: HOSPADM

## 2020-11-05 RX ADMIN — POLYETHYLENE GLYCOL 3350 17 G: 17 POWDER, FOR SOLUTION ORAL at 08:23

## 2020-11-05 RX ADMIN — DOCUSATE SODIUM AND SENNOSIDES 2 TABLET: 8.6; 5 TABLET ORAL at 18:05

## 2020-11-05 RX ADMIN — INSULIN LISPRO 2 UNITS: 100 INJECTION, SOLUTION INTRAVENOUS; SUBCUTANEOUS at 12:00

## 2020-11-05 RX ADMIN — OXYCODONE HYDROCHLORIDE 5 MG: 5 TABLET ORAL at 14:39

## 2020-11-05 RX ADMIN — PIPERACILLIN AND TAZOBACTAM 3.38 G: 3; .375 INJECTION, POWDER, LYOPHILIZED, FOR SOLUTION INTRAVENOUS at 04:39

## 2020-11-05 RX ADMIN — INSULIN GLARGINE 45 UNITS: 100 INJECTION, SOLUTION SUBCUTANEOUS at 22:44

## 2020-11-05 RX ADMIN — GABAPENTIN 300 MG: 300 CAPSULE ORAL at 08:22

## 2020-11-05 RX ADMIN — FERROUS SULFATE TAB 325 MG (65 MG ELEMENTAL FE) 325 MG: 325 (65 FE) TAB at 08:22

## 2020-11-05 RX ADMIN — GABAPENTIN 300 MG: 300 CAPSULE ORAL at 22:43

## 2020-11-05 RX ADMIN — INSULIN LISPRO 2 UNITS: 100 INJECTION, SOLUTION INTRAVENOUS; SUBCUTANEOUS at 22:44

## 2020-11-05 RX ADMIN — PIPERACILLIN AND TAZOBACTAM 3.38 G: 3; .375 INJECTION, POWDER, LYOPHILIZED, FOR SOLUTION INTRAVENOUS at 11:12

## 2020-11-05 RX ADMIN — AMOXICILLIN 250 MG: 250 CAPSULE ORAL at 22:43

## 2020-11-05 RX ADMIN — CYANOCOBALAMIN TAB 500 MCG 1000 MCG: 500 TAB at 08:22

## 2020-11-05 RX ADMIN — GABAPENTIN 300 MG: 300 CAPSULE ORAL at 18:03

## 2020-11-05 RX ADMIN — DOCUSATE SODIUM AND SENNOSIDES 2 TABLET: 8.6; 5 TABLET ORAL at 08:22

## 2020-11-05 RX ADMIN — LEVOFLOXACIN 750 MG: 750 TABLET, FILM COATED ORAL at 14:01

## 2020-11-05 RX ADMIN — AMOXICILLIN 250 MG: 250 CAPSULE ORAL at 14:01

## 2020-11-06 LAB
ANION GAP SERPL CALCULATED.3IONS-SCNC: 7 MMOL/L (ref 4–13)
BUN SERPL-MCNC: 28 MG/DL (ref 5–25)
CALCIUM SERPL-MCNC: 9.6 MG/DL (ref 8.3–10.1)
CHLORIDE SERPL-SCNC: 106 MMOL/L (ref 100–108)
CO2 SERPL-SCNC: 29 MMOL/L (ref 21–32)
CREAT SERPL-MCNC: 2.7 MG/DL (ref 0.6–1.3)
ERYTHROCYTE [DISTWIDTH] IN BLOOD BY AUTOMATED COUNT: 20.1 % (ref 11.6–15.1)
GFR SERPL CREATININE-BSD FRML MDRD: 20 ML/MIN/1.73SQ M
GLUCOSE SERPL-MCNC: 190 MG/DL (ref 65–140)
GLUCOSE SERPL-MCNC: 302 MG/DL (ref 65–140)
GLUCOSE SERPL-MCNC: 62 MG/DL (ref 65–140)
GLUCOSE SERPL-MCNC: 81 MG/DL (ref 65–140)
GLUCOSE SERPL-MCNC: 90 MG/DL (ref 65–140)
HCT VFR BLD AUTO: 28.9 % (ref 34.8–46.1)
HGB BLD-MCNC: 8.8 G/DL (ref 11.5–15.4)
MCH RBC QN AUTO: 24.7 PG (ref 26.8–34.3)
MCHC RBC AUTO-ENTMCNC: 30.4 G/DL (ref 31.4–37.4)
MCV RBC AUTO: 81 FL (ref 82–98)
PLATELET # BLD AUTO: 296 THOUSANDS/UL (ref 149–390)
PMV BLD AUTO: 10.3 FL (ref 8.9–12.7)
POTASSIUM SERPL-SCNC: 4.7 MMOL/L (ref 3.5–5.3)
RBC # BLD AUTO: 3.56 MILLION/UL (ref 3.81–5.12)
SODIUM SERPL-SCNC: 142 MMOL/L (ref 136–145)
WBC # BLD AUTO: 8.15 THOUSAND/UL (ref 4.31–10.16)

## 2020-11-06 PROCEDURE — 99232 SBSQ HOSP IP/OBS MODERATE 35: CPT | Performed by: PHYSICIAN ASSISTANT

## 2020-11-06 PROCEDURE — 97116 GAIT TRAINING THERAPY: CPT

## 2020-11-06 PROCEDURE — 85027 COMPLETE CBC AUTOMATED: CPT | Performed by: PHYSICIAN ASSISTANT

## 2020-11-06 PROCEDURE — 97530 THERAPEUTIC ACTIVITIES: CPT

## 2020-11-06 PROCEDURE — 99232 SBSQ HOSP IP/OBS MODERATE 35: CPT | Performed by: INTERNAL MEDICINE

## 2020-11-06 PROCEDURE — 80048 BASIC METABOLIC PNL TOTAL CA: CPT | Performed by: PHYSICIAN ASSISTANT

## 2020-11-06 PROCEDURE — 82948 REAGENT STRIP/BLOOD GLUCOSE: CPT

## 2020-11-06 RX ADMIN — DOCUSATE SODIUM AND SENNOSIDES 2 TABLET: 8.6; 5 TABLET ORAL at 08:46

## 2020-11-06 RX ADMIN — INSULIN LISPRO 8 UNITS: 100 INJECTION, SOLUTION INTRAVENOUS; SUBCUTANEOUS at 22:02

## 2020-11-06 RX ADMIN — AMOXICILLIN 250 MG: 250 CAPSULE ORAL at 22:00

## 2020-11-06 RX ADMIN — POLYETHYLENE GLYCOL 3350 17 G: 17 POWDER, FOR SOLUTION ORAL at 08:46

## 2020-11-06 RX ADMIN — GABAPENTIN 300 MG: 300 CAPSULE ORAL at 22:00

## 2020-11-06 RX ADMIN — INSULIN GLARGINE 45 UNITS: 100 INJECTION, SOLUTION SUBCUTANEOUS at 22:03

## 2020-11-06 RX ADMIN — AMOXICILLIN 250 MG: 250 CAPSULE ORAL at 08:46

## 2020-11-06 RX ADMIN — DOCUSATE SODIUM AND SENNOSIDES 2 TABLET: 8.6; 5 TABLET ORAL at 18:06

## 2020-11-06 RX ADMIN — GABAPENTIN 300 MG: 300 CAPSULE ORAL at 08:46

## 2020-11-06 RX ADMIN — INSULIN LISPRO 2 UNITS: 100 INJECTION, SOLUTION INTRAVENOUS; SUBCUTANEOUS at 12:30

## 2020-11-06 RX ADMIN — CYANOCOBALAMIN TAB 500 MCG 1000 MCG: 500 TAB at 08:46

## 2020-11-06 RX ADMIN — GABAPENTIN 300 MG: 300 CAPSULE ORAL at 18:06

## 2020-11-07 VITALS
WEIGHT: 213 LBS | RESPIRATION RATE: 18 BRPM | HEART RATE: 83 BPM | SYSTOLIC BLOOD PRESSURE: 151 MMHG | OXYGEN SATURATION: 97 % | DIASTOLIC BLOOD PRESSURE: 79 MMHG | BODY MASS INDEX: 36.37 KG/M2 | TEMPERATURE: 99 F | HEIGHT: 64 IN

## 2020-11-07 PROBLEM — Z89.9 S/P AMPUTATION: Status: ACTIVE | Noted: 2020-11-07

## 2020-11-07 LAB
ANION GAP SERPL CALCULATED.3IONS-SCNC: 8 MMOL/L (ref 4–13)
BACTERIA UR QL AUTO: ABNORMAL /HPF
BILIRUB UR QL STRIP: NEGATIVE
BUN SERPL-MCNC: 34 MG/DL (ref 5–25)
CALCIUM SERPL-MCNC: 9.4 MG/DL (ref 8.3–10.1)
CHLORIDE SERPL-SCNC: 105 MMOL/L (ref 100–108)
CLARITY UR: CLEAR
CO2 SERPL-SCNC: 30 MMOL/L (ref 21–32)
COLOR UR: YELLOW
CREAT SERPL-MCNC: 2.56 MG/DL (ref 0.6–1.3)
CREAT UR-MCNC: 22.7 MG/DL
ERYTHROCYTE [DISTWIDTH] IN BLOOD BY AUTOMATED COUNT: 19.9 % (ref 11.6–15.1)
GFR SERPL CREATININE-BSD FRML MDRD: 22 ML/MIN/1.73SQ M
GLUCOSE SERPL-MCNC: 163 MG/DL (ref 65–140)
GLUCOSE SERPL-MCNC: 71 MG/DL (ref 65–140)
GLUCOSE SERPL-MCNC: 79 MG/DL (ref 65–140)
GLUCOSE SERPL-MCNC: 80 MG/DL (ref 65–140)
GLUCOSE UR STRIP-MCNC: NEGATIVE MG/DL
HCT VFR BLD AUTO: 30.1 % (ref 34.8–46.1)
HGB BLD-MCNC: 9.4 G/DL (ref 11.5–15.4)
HGB UR QL STRIP.AUTO: NEGATIVE
KETONES UR STRIP-MCNC: NEGATIVE MG/DL
LEUKOCYTE ESTERASE UR QL STRIP: NEGATIVE
MCH RBC QN AUTO: 25.5 PG (ref 26.8–34.3)
MCHC RBC AUTO-ENTMCNC: 31.2 G/DL (ref 31.4–37.4)
MCV RBC AUTO: 82 FL (ref 82–98)
NITRITE UR QL STRIP: NEGATIVE
NON-SQ EPI CELLS URNS QL MICRO: ABNORMAL /HPF
OTHER STN SPEC: ABNORMAL
PH UR STRIP.AUTO: 7.5 [PH]
PLATELET # BLD AUTO: 332 THOUSANDS/UL (ref 149–390)
PMV BLD AUTO: 10.5 FL (ref 8.9–12.7)
POTASSIUM SERPL-SCNC: 4.1 MMOL/L (ref 3.5–5.3)
PROT UR STRIP-MCNC: NEGATIVE MG/DL
RBC # BLD AUTO: 3.69 MILLION/UL (ref 3.81–5.12)
RBC #/AREA URNS AUTO: ABNORMAL /HPF
SODIUM 24H UR-SCNC: 85 MOL/L
SODIUM SERPL-SCNC: 143 MMOL/L (ref 136–145)
SP GR UR STRIP.AUTO: 1.01 (ref 1–1.03)
UROBILINOGEN UR QL STRIP.AUTO: 0.2 E.U./DL
UUN 24H UR-MCNC: 195 MG/DL
WBC # BLD AUTO: 8.52 THOUSAND/UL (ref 4.31–10.16)
WBC #/AREA URNS AUTO: ABNORMAL /HPF

## 2020-11-07 PROCEDURE — 81001 URINALYSIS AUTO W/SCOPE: CPT | Performed by: INTERNAL MEDICINE

## 2020-11-07 PROCEDURE — 82948 REAGENT STRIP/BLOOD GLUCOSE: CPT

## 2020-11-07 PROCEDURE — 84540 ASSAY OF URINE/UREA-N: CPT | Performed by: INTERNAL MEDICINE

## 2020-11-07 PROCEDURE — 99239 HOSP IP/OBS DSCHRG MGMT >30: CPT | Performed by: FAMILY MEDICINE

## 2020-11-07 PROCEDURE — 82570 ASSAY OF URINE CREATININE: CPT | Performed by: INTERNAL MEDICINE

## 2020-11-07 PROCEDURE — 84300 ASSAY OF URINE SODIUM: CPT | Performed by: INTERNAL MEDICINE

## 2020-11-07 PROCEDURE — 80048 BASIC METABOLIC PNL TOTAL CA: CPT | Performed by: PHYSICIAN ASSISTANT

## 2020-11-07 PROCEDURE — 85027 COMPLETE CBC AUTOMATED: CPT | Performed by: PHYSICIAN ASSISTANT

## 2020-11-07 PROCEDURE — 99232 SBSQ HOSP IP/OBS MODERATE 35: CPT | Performed by: INTERNAL MEDICINE

## 2020-11-07 RX ORDER — OXYCODONE HYDROCHLORIDE 5 MG/1
5 TABLET ORAL EVERY 4 HOURS PRN
Qty: 18 TABLET | Refills: 0 | Status: SHIPPED | OUTPATIENT
Start: 2020-11-07 | End: 2021-04-13

## 2020-11-07 RX ORDER — LEVOFLOXACIN 500 MG/1
500 TABLET, FILM COATED ORAL EVERY 24 HOURS
Qty: 3 TABLET | Refills: 0 | Status: SHIPPED | OUTPATIENT
Start: 2020-11-07 | End: 2020-11-10

## 2020-11-07 RX ORDER — AMOXICILLIN 250 MG/1
250 CAPSULE ORAL EVERY 12 HOURS SCHEDULED
Qty: 5 CAPSULE | Refills: 0 | Status: SHIPPED | OUTPATIENT
Start: 2020-11-07 | End: 2020-11-10

## 2020-11-07 RX ORDER — GABAPENTIN 300 MG/1
300 CAPSULE ORAL 3 TIMES DAILY
Qty: 90 CAPSULE | Refills: 0 | Status: ON HOLD | OUTPATIENT
Start: 2020-11-07 | End: 2021-04-13

## 2020-11-07 RX ADMIN — CYANOCOBALAMIN TAB 500 MCG 1000 MCG: 500 TAB at 08:08

## 2020-11-07 RX ADMIN — GABAPENTIN 300 MG: 300 CAPSULE ORAL at 16:03

## 2020-11-07 RX ADMIN — POLYETHYLENE GLYCOL 3350 17 G: 17 POWDER, FOR SOLUTION ORAL at 08:08

## 2020-11-07 RX ADMIN — AMOXICILLIN 250 MG: 250 CAPSULE ORAL at 08:08

## 2020-11-07 RX ADMIN — INSULIN LISPRO 2 UNITS: 100 INJECTION, SOLUTION INTRAVENOUS; SUBCUTANEOUS at 12:35

## 2020-11-07 RX ADMIN — LEVOFLOXACIN 750 MG: 750 TABLET, FILM COATED ORAL at 08:08

## 2020-11-07 RX ADMIN — DOCUSATE SODIUM AND SENNOSIDES 2 TABLET: 8.6; 5 TABLET ORAL at 08:08

## 2020-11-07 RX ADMIN — GABAPENTIN 300 MG: 300 CAPSULE ORAL at 08:08

## 2020-11-16 ENCOUNTER — TELEPHONE (OUTPATIENT)
Dept: FAMILY MEDICINE CLINIC | Facility: CLINIC | Age: 45
End: 2020-11-16

## 2021-01-21 ENCOUNTER — OFFICE VISIT (OUTPATIENT)
Dept: WOUND CARE | Facility: CLINIC | Age: 46
End: 2021-01-21
Payer: COMMERCIAL

## 2021-01-21 VITALS
TEMPERATURE: 97.5 F | WEIGHT: 228.6 LBS | HEIGHT: 64 IN | HEART RATE: 80 BPM | RESPIRATION RATE: 18 BRPM | BODY MASS INDEX: 39.03 KG/M2 | SYSTOLIC BLOOD PRESSURE: 148 MMHG | DIASTOLIC BLOOD PRESSURE: 84 MMHG

## 2021-01-21 DIAGNOSIS — E08.621 DIABETIC ULCER OF RIGHT MIDFOOT ASSOCIATED WITH DIABETES MELLITUS DUE TO UNDERLYING CONDITION, LIMITED TO BREAKDOWN OF SKIN (HCC): Primary | ICD-10-CM

## 2021-01-21 DIAGNOSIS — L97.411 DIABETIC ULCER OF RIGHT MIDFOOT ASSOCIATED WITH DIABETES MELLITUS DUE TO UNDERLYING CONDITION, LIMITED TO BREAKDOWN OF SKIN (HCC): Primary | ICD-10-CM

## 2021-01-21 PROCEDURE — G0463 HOSPITAL OUTPT CLINIC VISIT: HCPCS | Performed by: PODIATRIST

## 2021-01-21 PROCEDURE — 11042 DBRDMT SUBQ TIS 1ST 20SQCM/<: CPT | Performed by: PODIATRIST

## 2021-01-21 PROCEDURE — 99213 OFFICE O/P EST LOW 20 MIN: CPT | Performed by: PODIATRIST

## 2021-01-21 RX ORDER — LIDOCAINE 40 MG/G
CREAM TOPICAL ONCE
Status: COMPLETED | OUTPATIENT
Start: 2021-01-21 | End: 2021-01-21

## 2021-01-21 RX ADMIN — LIDOCAINE 1 APPLICATION: 40 CREAM TOPICAL at 08:30

## 2021-01-21 NOTE — ASSESSMENT & PLAN NOTE
Post debridement wound redressed with Puracol dry sterile dressing  Wound was offloaded in surgical shoe provided    Patient to change dressing every other day  Lab Results   Component Value Date    HGBA1C 14 0 (H) 09/07/2020

## 2021-01-21 NOTE — PATIENT INSTRUCTIONS
Orders Placed This Encounter   Procedures    Wound cleansing and dressings     Right Plantar foot wound:  Wash your hands with soap and water  Remove old dressing, discard into plastic bag and place in trash  Cleanse the wound with NSS prior to applying a clean dressing  Do not use tissue or cotton balls  Do not scrub the wound  Pat dry using gauze  Shower yes keep wound dry in the showere  Apply Betadine paint to skin surrounding wound  Apply Puracol to the wound  Cover with gauze  Secure with lilian and tape  Change dressing every other day    Off-loading Instructions:    Keep weight and pressure off wound at all times and Wear off-loading device as directed by your physician-----Surgical shoe to the right foot       Treatments above were completed today at the [facility]    ABD pad tot he surgical shoe for offloading     Standing Status:   Future     Standing Expiration Date:   1/21/2022

## 2021-01-21 NOTE — PROGRESS NOTES
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 due to underlying condition, limited to breakdown of skin (HCC)  -     lidocaine (LMX) 4 % cream  -     Wound cleansing and dressings; Future              Debridement   Universal Protocol:  Consent given by: patient  Time out: Immediately prior to procedure a "time out" was called to verify the correct patient, procedure, equipment, support staff and site/side marked as required  Performed by: physician  Debridement type: surgical  Level of debridement: subcutaneous tissue  Pain control: lidocaine 4%  Post-debridement measurements  Length (cm): 2 1  Width (cm): 2 4  Depth (cm): 0 2  Percent debrided: 100%  Surface Area (cm^2): 5 04  Area debrided (cm^2): 5 04  Volume (cm^3): 1 01  Tissue and other material debrided: subcutaneous tissue  Devitalized tissue debrided: biofilm, callus, exudate, fibrin and slough  Instrument(s) utilized: blade  Bleeding: small  Hemostasis obtained with: pressure  Procedural pain (0-10): 0  Post-procedural pain: 0   Response to treatment: procedure was tolerated well              Subjective:        Patient presents for initial visit for her right foot  She relates new onset blister right plantar foot which subsequently broke down into an open wound  She states that she does have inserts into the shoe typically, but that with recovery of the left foot she had had increased pressure on the right lower extremity    She denies any pain      The following portions of the patient's history were reviewed and updated as appropriate: She  has a past medical history of Charcot's joint of foot, left, Diabetes mellitus ([facility] [address] ), and Osteomyelitis of foot, right, acute ([facility] [address] )  She  has a past surgical history that includes Foot surgery (Right); Foot Amputation (Left, 10/30/2020); and Foot capsule release w/ percutaneous heel cord lengthening, tibial tendon transfer (Left, 10/30/2020)  Current Outpatient Medications   Medication Sig Dispense Refill    ACCU-CHEK FASTCLIX LANCETS MISC by Does not apply route      cyanocobalamin (VITAMIN B-12) 1000 MCG tablet Take 1 tablet (1,000 mcg total) by mouth daily 30 tablet 0    gabapentin (NEURONTIN) 300 mg capsule Take 1 capsule (300 mg total) by mouth 3 (three) times a day 90 capsule 0    INSULIN ASPART FLEXPEN SC Inject 30 Units under the skin 3 (three) times a day 30 units with every meal and an additional sliding scale based on blood glucose      insulin glargine (LANTUS) 100 units/mL subcutaneous injection Continue prior to admission dose (Patient taking differently: 57 Units daily at bedtime Continue prior to admission dose) 2000 Units 0    oxyCODONE (ROXICODONE) 5 mg immediate release tablet Take 1 tablet (5 mg total) by mouth every 4 (four) hours as needed for moderate painMax Daily Amount: 30 mg 18 tablet 0    polyethylene glycol (MIRALAX) 17 g packet Take 17 g by mouth daily      Syringe/Needle, Disp, 30G X 1/2" 1 ML MISC by Does not apply route       No current facility-administered medications for this visit        Current Outpatient Medications on File Prior to Visit   Medication Sig    ACCU-CHEK FASTCLIX LANCETS MISC by Does not apply route    cyanocobalamin (VITAMIN B-12) 1000 MCG tablet Take 1 tablet (1,000 mcg total) by mouth daily    gabapentin (NEURONTIN) 300 mg capsule Take 1 capsule (300 mg total) by mouth 3 (three) times a day    INSULIN ASPART FLEXPEN SC Inject 30 Units under the skin 3 (three) times a day 30 units with every meal and an additional sliding scale based on blood glucose    insulin glargine (LANTUS) 100 units/mL subcutaneous injection Continue prior to admission dose (Patient taking differently: 57 Units daily at bedtime Continue prior to admission dose)    oxyCODONE (ROXICODONE) 5 mg immediate release tablet Take 1 tablet (5 mg total) by mouth every 4 (four) hours as needed for moderate painMax Daily Amount: 30 mg    polyethylene glycol (MIRALAX) 17 g packet Take 17 g by mouth daily    Syringe/Needle, Disp, 30G X 1/2" 1 ML MISC by Does not apply route     No current facility-administered medications on file prior to visit  She is allergic to clindamycin       Review of Systems   Skin: Positive for color change and wound  Objective:         Wound 01/21/21 Diabetic Ulcer Plantar Right (Active)   Wound Image   01/21/21 0848   Wound Description Slough;Pink;Yellow 01/21/21 0822   Ursula-wound Assessment Maceration;Callus 01/21/21 0822   Wound Length (cm) 2 cm 01/21/21 0822   Wound Width (cm) 2 2 cm 01/21/21 0822   Wound Depth (cm) 0 1 cm 01/21/21 0822   Wound Surface Area (cm^2) 4 4 cm^2 01/21/21 0822   Wound Volume (cm^3) 0 44 cm^3 01/21/21 0822   Calculated Wound Volume (cm^3) 0 44 cm^3 01/21/21 0822   Drainage Amount Small 01/21/21 0822   Drainage Description Serosanguineous 01/21/21 0822   Non-staged Wound Description Full thickness 01/21/21 0822                          /84   Pulse 80   Temp 97 5 °F (36 4 °C)   Resp 18   Ht 5' 4" (1 626 m)   Wt 104 kg (228 lb 9 6 oz)   BMI 39 24 kg/m²     Physical Exam  Constitutional:       General: She is awake  Appearance: She is obese  Cardiovascular:      Pulses:           Dorsalis pedis pulses are 2+ on the right side  Posterior tibial pulses are 2+ on the right side  Musculoskeletal:      Right foot: Charcot foot present  Feet:      Right foot:      Skin integrity: Skin breakdown and callus present  No erythema or warmth  Comments: Full-thickness wound extending through subcutaneous layer with 100% fibrous base    Periwound maceration approximately 3 cm with hyperkeratosis periwound  No active drainage no purulence no clinical signs of infection  Skin:     Capillary Refill: Capillary refill takes less than 2 seconds  Neurological:      Mental Status: She is alert  Psychiatric:         Behavior: Behavior is cooperative  Wound Instructions:  Orders Placed This Encounter   Procedures    Wound cleansing and dressings     Right Plantar foot wound:  Wash your hands with soap and water  Remove old dressing, discard into plastic bag and place in trash  Cleanse the wound with NSS prior to applying a clean dressing  Do not use tissue or cotton balls  Do not scrub the wound  Pat dry using gauze  Shower yes keep wound dry in the showere  Apply Betadine paint to skin surrounding wound  Apply Puracol to the wound  Cover with gauze  Secure with lilian and tape  Change dressing every other day    Off-loading Instructions:    Keep weight and pressure off wound at all times and Wear off-loading device as directed by your physician-----Surgical shoe to the right foot       Treatments above were completed today at the [facility]    ABD pad tot he surgical shoe for offloading     Standing Status:   Future     Standing Expiration Date:   1/21/2022

## 2021-01-28 ENCOUNTER — OFFICE VISIT (OUTPATIENT)
Dept: WOUND CARE | Facility: CLINIC | Age: 46
End: 2021-01-28
Payer: COMMERCIAL

## 2021-01-28 VITALS
TEMPERATURE: 97.7 F | HEART RATE: 88 BPM | DIASTOLIC BLOOD PRESSURE: 72 MMHG | RESPIRATION RATE: 20 BRPM | SYSTOLIC BLOOD PRESSURE: 144 MMHG

## 2021-01-28 DIAGNOSIS — L97.411 DIABETIC ULCER OF RIGHT MIDFOOT ASSOCIATED WITH DIABETES MELLITUS DUE TO UNDERLYING CONDITION, LIMITED TO BREAKDOWN OF SKIN (HCC): Primary | ICD-10-CM

## 2021-01-28 DIAGNOSIS — E08.621 DIABETIC ULCER OF RIGHT MIDFOOT ASSOCIATED WITH DIABETES MELLITUS DUE TO UNDERLYING CONDITION, LIMITED TO BREAKDOWN OF SKIN (HCC): Primary | ICD-10-CM

## 2021-01-28 PROCEDURE — 97597 DBRDMT OPN WND 1ST 20 CM/<: CPT | Performed by: PODIATRIST

## 2021-01-28 RX ORDER — LIDOCAINE 40 MG/G
CREAM TOPICAL ONCE
Status: COMPLETED | OUTPATIENT
Start: 2021-01-28 | End: 2021-01-28

## 2021-01-28 RX ADMIN — LIDOCAINE: 40 CREAM TOPICAL at 08:44

## 2021-01-28 NOTE — PATIENT INSTRUCTIONS
Orders Placed This Encounter   Procedures    Wound cleansing and dressings     Right Plantar foot wound:    Wash your hands with soap and water  Remove old dressing, discard into plastic bag and place in trash  Cleanse the wound with NSS prior to applying a clean dressing  Do not use tissue or cotton balls  Do not scrub the wound  Pat dry using gauze  Shower yes keep wound dry in the shower  Apply Betadine paint to skin surrounding wound  Apply Mes[place] to the wound  Cover with gauze  Secure with lilian and tape  Change dressing every other day  This was done today      Off-loading Instructions:     Keep weight and pressure off wound at all times and Wear off-loading device as directed by your physician-----Surgical shoe to the right foot          Standing Status:   Future     Standing Expiration Date:   1/28/2022     High Protein Diet   WHAT YOU NEED TO KNOW:   A high-protein diet is a meal plan that includes extra protein  Your body may need extra protein if you have certain health conditions, such as cancer, burns, or injuries  You may also need to follow this diet to get stronger after a surgery or illness  Extra protein helps to heal wounds and form new tissue in the body  Your dietitian will tell you how much protein and how many calories you need each day  DISCHARGE INSTRUCTIONS:   Foods high in protein:  The average amount of protein is listed below in grams (g)  To find the exact amount of protein in a food, read the food labels on packaged items  · Dairy:      ? 1 cup of any type of milk (8 g)    ? ½ cup of evaporated canned milk (9 g)    ? ¼ cup of nonfat dry milk (11 g)    ? 1 ounce of semi-hard or solid cheese (7 g)    ? ¼ cup of parmesan cheese (8 g)    ? ½ cup of cottage cheese (14 g)    ? ½ cup of pudding (4 g)    ? 1 cup of plain or fruit yogurt (8 g)    · Meats and meat substitutes:      ? 3 ounces of cooked freshwater fish (21 g)     ? 3 ounces of cooked shellfish (19 g)    ?  ½ cup of canned tuna (14 g)    ? 3 ounces of cooked chicken, turkey, or other poultry (24 g)    ? 3 ounces of cooked beef, pork, lamb, or other red meat (21 g)    ? 1 large egg (6 g)    ? ¼ cup of fat free egg substitute (5 g)    ? ½ cup of tofu or tempeh (10 g)    ? 1 cup of cooked dried beans, such as mabry, kidney, or navy (15 g)    · Nuts and seeds:      ? 2 tablespoons of almonds, cashews, sunflower seeds, or walnuts (5 g)    ? 2 tablespoons of peanuts (7 g)    ? 2 tablespoons of peanut butter (8 g)    How to add extra protein:   · Add powdered milk to milk, cereals, scrambled eggs, soups, and casseroles  · Add cheese to sauces, soups, or vegetables  · Add eggs to tuna, salads, sauces, or casseroles  · Add nutrition supplements and breakfast drink mixes to milk or shakes  · Add nuts to foods or eat them as snacks  · Add meat (beef, chicken, and pork) to soups, casseroles, pasta dishes, or vegetables  · Add beans, peas, and other legumes to salads  · Eat cottage cheese or yogurt with fruit  © Copyright [address] Information is for End User's use only and may not be sold, redistributed or otherwise used for commercial purposes  All illustrations and images included in CareNotes® are the copyrighted property of A D A M [employer] Inc  or Fabien Jorge  The above information is an  only  It is not intended as medical advice for individual conditions or treatments  Talk to your doctor, nurse or pharmacist before following any medical regimen to see if it is safe and effective for you

## 2021-02-02 NOTE — ASSESSMENT & PLAN NOTE
Post debridement wound redressed with mesalt DSD    Patient to change every other day  Lab Results   Component Value Date    HGBA1C 14 0 (H) 09/07/2020

## 2021-02-02 NOTE — PROGRESS NOTES
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 due to underlying condition, limited to breakdown of skin (HCC)  -     lidocaine (LMX) 4 % cream  -     Wound cleansing and dressings; Future              Debridement   Universal Protocol:  Consent given by: patient  Time out: Immediately prior to procedure a "time out" was called to verify the correct patient, procedure, equipment, support staff and site/side marked as required  Performed by: physician  Debridement type: selective  Pain control: lidocaine 4%  Post-debridement measurements  Length (cm): 2 2  Width (cm): 1 6  Depth (cm): 0 1  Percent debrided: 100%  Surface Area (cm^2): 3 52  Area debrided (cm^2): 3 52  Volume (cm^3): 0 35  Devitalized tissue debrided: biofilm, callus, exudate, fibrin and slough  Instrument(s) utilized: blade  Bleeding: small  Hemostasis obtained with: pressure  Procedural pain (0-10): 0  Post-procedural pain: 0   Response to treatment: procedure was tolerated well              Subjective:        Patient presents for follow up eval right foot    She relates that she could not tolerate the surgical shoe and has offloaded her regular shoe instead      The following portions of the patient's history were reviewed and updated as appropriate: She  has a past medical history of Charcot's joint of foot, left, Diabetes mellitus ([address] ), and Osteomyelitis of foot, right, acute ([facility] [address] )  She  has a past surgical history that includes Foot surgery (Right);  Foot Amputation (Left, 10/30/2020); and Foot capsule release w/ percutaneous heel cord lengthening, tibial tendon transfer (Left, 10/30/2020)  Current Outpatient Medications   Medication Sig Dispense Refill    ACCU-CHEK FASTCLIX LANCETS MISC by Does not apply route      cyanocobalamin (VITAMIN B-12) 1000 MCG tablet Take 1 tablet (1,000 mcg total) by mouth daily 30 tablet 0    gabapentin (NEURONTIN) 300 mg capsule Take 1 capsule (300 mg total) by mouth 3 (three) times a day 90 capsule 0    INSULIN ASPART FLEXPEN SC Inject 30 Units under the skin 3 (three) times a day 30 units with every meal and an additional sliding scale based on blood glucose      insulin glargine (LANTUS) 100 units/mL subcutaneous injection Continue prior to admission dose (Patient taking differently: 57 Units daily at bedtime Continue prior to admission dose) 2000 Units 0    oxyCODONE (ROXICODONE) 5 mg immediate release tablet Take 1 tablet (5 mg total) by mouth every 4 (four) hours as needed for moderate painMax Daily Amount: 30 mg 18 tablet 0    polyethylene glycol (MIRALAX) 17 g packet Take 17 g by mouth daily      Syringe/Needle, Disp, 30G X 1/2" 1 ML MISC by Does not apply route       No current facility-administered medications for this visit        Current Outpatient Medications on File Prior to Visit   Medication Sig    ACCU-CHEK FASTCLIX LANCETS MISC by Does not apply route    cyanocobalamin (VITAMIN B-12) 1000 MCG tablet Take 1 tablet (1,000 mcg total) by mouth daily    gabapentin (NEURONTIN) 300 mg capsule Take 1 capsule (300 mg total) by mouth 3 (three) times a day    INSULIN ASPART FLEXPEN SC Inject 30 Units under the skin 3 (three) times a day 30 units with every meal and an additional sliding scale based on blood glucose    insulin glargine (LANTUS) 100 units/mL subcutaneous injection Continue prior to admission dose (Patient taking differently: 57 Units daily at bedtime Continue prior to admission dose)    oxyCODONE (ROXICODONE) 5 mg immediate release tablet Take 1 tablet (5 mg total) by mouth every 4 (four) hours as needed for moderate painMax Daily Amount: 30 mg    polyethylene glycol (MIRALAX) 17 g packet Take 17 g by mouth daily    Syringe/Needle, Disp, 30G X 1/2" 1 ML MISC by Does not apply route     No current facility-administered medications on file prior to visit  She is allergic to clindamycin       Review of Systems   Skin: Positive for wound  Negative for color change  Objective:          Wound 01/21/21 Diabetic Ulcer Plantar Right (Active)   Wound Image   01/28/21 0816   Wound Description Slough;Pink;Yellow 01/28/21 0816   Ursula-wound Assessment Maceration; Purple;Callus 01/28/21 0816   Wound Length (cm) 2 cm 01/28/21 0816   Wound Width (cm) 1 4 cm 01/28/21 0816   Wound Depth (cm) 0 1 cm 01/28/21 0816   Wound Surface Area (cm^2) 2 8 cm^2 01/28/21 0816   Wound Volume (cm^3) 0 28 cm^3 01/28/21 0816   Calculated Wound Volume (cm^3) 0 28 cm^3 01/28/21 0816   Change in Wound Size % 36 36 01/28/21 0816   Drainage Amount Moderate 01/28/21 0816   Drainage Description Yellow;Brown 01/28/21 0816   Non-staged Wound Description Full thickness 01/28/21 0816   Treatments Cleansed 01/28/21 0816   Patient Tolerance Tolerated well 01/28/21 0816                          /72   Pulse 88   Temp 97 7 °F (36 5 °C)   Resp 20     Physical Exam  Constitutional:       General: She is awake  Appearance: She is obese  Feet:      Comments: Wound is full thickness with periwound hyperkeratosis 0 5 cm and mild maceration 0 5 cm periwound  No active drainage  No periwound erythema  Neurological:      Mental Status: She is alert  Psychiatric:         Behavior: Behavior is cooperative  Wound Instructions:  Orders Placed This Encounter   Procedures    Wound cleansing and dressings     Right Plantar foot wound:    Wash your hands with soap and water  Remove old dressing, discard into plastic bag and place in trash  Cleanse the wound with NSS prior to applying a clean dressing   Do not use tissue or cotton balls  Do not scrub the wound  Pat dry using gauze  Shower yes keep wound dry in the shower  Apply Betadine paint to skin surrounding wound  Apply Mesalt to the wound  Cover with gauze  Secure with lilian and tape  Change dressing every other day   This was done today      Off-loading Instructions:     Keep weight and pressure off wound at all times and Wear off-loading device as directed by your physician-----Surgical shoe to the right foot          Standing Status:   Future     Standing Expiration Date:   1/28/2022

## 2021-02-03 ENCOUNTER — OFFICE VISIT (OUTPATIENT)
Dept: WOUND CARE | Facility: CLINIC | Age: 46
End: 2021-02-03
Payer: COMMERCIAL

## 2021-02-03 VITALS
TEMPERATURE: 98.5 F | HEART RATE: 88 BPM | DIASTOLIC BLOOD PRESSURE: 70 MMHG | RESPIRATION RATE: 18 BRPM | SYSTOLIC BLOOD PRESSURE: 148 MMHG

## 2021-02-03 DIAGNOSIS — L97.411 DIABETIC ULCER OF RIGHT MIDFOOT ASSOCIATED WITH DIABETES MELLITUS DUE TO UNDERLYING CONDITION, LIMITED TO BREAKDOWN OF SKIN (HCC): Primary | ICD-10-CM

## 2021-02-03 DIAGNOSIS — E08.621 DIABETIC ULCER OF RIGHT MIDFOOT ASSOCIATED WITH DIABETES MELLITUS DUE TO UNDERLYING CONDITION, LIMITED TO BREAKDOWN OF SKIN (HCC): Primary | ICD-10-CM

## 2021-02-03 PROCEDURE — 97597 DBRDMT OPN WND 1ST 20 CM/<: CPT | Performed by: PODIATRIST

## 2021-02-03 RX ORDER — LIDOCAINE 40 MG/G
CREAM TOPICAL ONCE
Status: COMPLETED | OUTPATIENT
Start: 2021-02-03 | End: 2021-02-03

## 2021-02-03 RX ADMIN — LIDOCAINE: 40 CREAM TOPICAL at 15:36

## 2021-02-03 NOTE — ASSESSMENT & PLAN NOTE
Post debridement wound was redressed with Puracol Ag and offloaded with felt padding  Prescription provided for right insole modification    Lab Results   Component Value Date    HGBA1C 14 0 (H) 09/07/2020

## 2021-02-03 NOTE — PROGRESS NOTES
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 due to underlying condition, limited to breakdown of skin (HCC)  -     Wound cleansing and dressings; Future  -     lidocaine (LMX) 4 % cream  -     Diabetic Shoe Inserts              Debridement   Universal Protocol:  Consent given by: patient  Time out: Immediately prior to procedure a "time out" was called to verify the correct patient, procedure, equipment, support staff and site/side marked as required  Performed by: physician  Debridement type: selective  Pain control: lidocaine 4%  Post-debridement measurements  Length (cm): 1 9  Width (cm): 1 3  Depth (cm): 0 1  Percent debrided: 100%  Surface Area (cm^2): 2 47  Area debrided (cm^2): 2 47  Volume (cm^3): 0 25  Devitalized tissue debrided: biofilm, callus, exudate, fibrin and slough  Instrument(s) utilized: blade  Bleeding: small  Hemostasis obtained with: pressure  Procedural pain (0-10): 0  Post-procedural pain: 0   Response to treatment: procedure was tolerated well              Subjective:        Patient presents for follow-up evaluation right lower extremity  The following portions of the patient's history were reviewed and updated as appropriate: She  has a past medical history of Charcot's joint of foot, left, Diabetes mellitus ([facility] [address] ), and Osteomyelitis of foot, right, acute ([facility] [address] )  She  has a past surgical history that includes Foot surgery (Right); Foot Amputation (Left, 10/30/2020); and Foot capsule release w/ percutaneous heel cord lengthening, tibial tendon transfer (Left, 10/30/2020)    Current Outpatient Medications   Medication Sig Dispense Refill    ACCU-CHEK FASTCLIX LANCETS MISC by Does not apply route      cyanocobalamin (VITAMIN B-12) 1000 MCG tablet Take 1 tablet (1,000 mcg total) by mouth daily 30 tablet 0    gabapentin (NEURONTIN) 300 mg capsule Take 1 capsule (300 mg total) by mouth 3 (three) times a day 90 capsule 0    INSULIN ASPART FLEXPEN SC Inject 30 Units under the skin 3 (three) times a day 30 units with every meal and an additional sliding scale based on blood glucose      insulin glargine (LANTUS) 100 units/mL subcutaneous injection Continue prior to admission dose (Patient taking differently: 57 Units daily at bedtime Continue prior to admission dose) 2000 Units 0    oxyCODONE (ROXICODONE) 5 mg immediate release tablet Take 1 tablet (5 mg total) by mouth every 4 (four) hours as needed for moderate painMax Daily Amount: 30 mg 18 tablet 0    polyethylene glycol (MIRALAX) 17 g packet Take 17 g by mouth daily      Syringe/Needle, Disp, 30G X 1/2" 1 ML MISC by Does not apply route       No current facility-administered medications for this visit        Current Outpatient Medications on File Prior to Visit   Medication Sig    ACCU-CHEK FASTCLIX LANCETS MISC by Does not apply route    cyanocobalamin (VITAMIN B-12) 1000 MCG tablet Take 1 tablet (1,000 mcg total) by mouth daily    gabapentin (NEURONTIN) 300 mg capsule Take 1 capsule (300 mg total) by mouth 3 (three) times a day    INSULIN ASPART FLEXPEN SC Inject 30 Units under the skin 3 (three) times a day 30 units with every meal and an additional sliding scale based on blood glucose    insulin glargine (LANTUS) 100 units/mL subcutaneous injection Continue prior to admission dose (Patient taking differently: 57 Units daily at bedtime Continue prior to admission dose)    oxyCODONE (ROXICODONE) 5 mg immediate release tablet Take 1 tablet (5 mg total) by mouth every 4 (four) hours as needed for moderate painMax Daily Amount: 30 mg    polyethylene glycol (MIRALAX) 17 g packet Take 17 g by mouth daily    Syringe/Needle, Disp, 30G X 1/2" 1 ML MISC by Does not apply route     No current facility-administered medications on file prior to visit  She is allergic to clindamycin       Review of Systems   Skin: Positive for wound  Negative for color change  Objective:       Wound 01/21/21 Diabetic Ulcer Plantar Right (Active)   Wound Image Images linked 02/03/21 1530   Wound Description Slough;Pink;Yellow 02/03/21 1530   Ursula-wound Assessment Maceration;Callus 02/03/21 1530   Wound Length (cm) 1 9 cm 02/03/21 1530   Wound Width (cm) 1 3 cm 02/03/21 1530   Wound Depth (cm) 0 1 cm 02/03/21 1530   Wound Surface Area (cm^2) 2 47 cm^2 02/03/21 1530   Wound Volume (cm^3) 0 25 cm^3 02/03/21 1530   Calculated Wound Volume (cm^3) 0 25 cm^3 02/03/21 1530   Change in Wound Size % 43 18 02/03/21 1530   Drainage Amount Moderate 02/03/21 1530   Drainage Description Yellow;Brown 02/03/21 1530   Non-staged Wound Description Full thickness 02/03/21 1530   Treatments Cleansed 02/03/21 1530       /70   Pulse 88   Temp 98 5 °F (36 9 °C)   Resp 18     Physical Exam  Constitutional:       General: She is awake  Appearance: She is obese  Musculoskeletal:      Right foot: Deformity and Charcot foot present  Feet:      Comments: Hemorrhagic callus right periwound  Plantar midfoot prominence in this area  Wound base is pale pink with fibro granular base  No active drainage no periwound erythema or edema  Neurological:      Mental Status: She is alert  Psychiatric:         Behavior: Behavior is cooperative  Wound Instructions:  Orders Placed This Encounter   Procedures    Wound cleansing and dressings     Right Plantar foot wound:     Wash your hands with soap and water  Dolphus Genta old dressing, discard into plastic bag and place in trash      Cleanse the wound with NSS prior to applying a clean dressing  Do not use tissue or cotton balls  Do not scrub the wound   Den Connolly dry using gauze  Shower yes keep wound dry in the shower  Apply Betadine paint to skin surrounding wound  Apply Mesalt to the wound   Cover with gauze  Secure with lilian and tape   Change dressing every other day  This was done today        Off-loading Instructions:       Keep weight and pressure off wound at all times and Wear off-loading device as directed by your physician-----Surgical shoe to the right foot  Standing Status:   Future     Standing Expiration Date:   2/3/2022    Diabetic Shoe Inserts     Please modify previously dispensed insoles x3 on the right lower extremity as it is not offloading and contributing to right foot ulcer  Order Specific Question:   Type     Answer:    Other-Please Comment

## 2021-02-03 NOTE — PATIENT INSTRUCTIONS
Orders Placed This Encounter   Procedures    Wound cleansing and dressings     Right Plantar foot wound:     Wash your hands with soap and water  Adriana Cramer old dressing, discard into plastic bag and place in trash      Cleanse the wound with NSS prior to applying a clean dressing  Do not use tissue or cotton balls  Do not scrub the wound  Pat dry using gauze  Shower yes keep wound dry in the shower  Apply Betadine paint to skin surrounding wound  Apply Mesalt to the wound   Cover with gauze  Secure with lilian and tape   Change dressing every other day  This was done today        Off-loading Instructions:       Keep weight and pressure off wound at all times and Wear off-loading device as directed by your physician-----Surgical shoe to the right foot       Standing Status:   Future     Standing Expiration Date:   2/3/2022

## 2021-02-10 ENCOUNTER — OFFICE VISIT (OUTPATIENT)
Dept: WOUND CARE | Facility: CLINIC | Age: 46
End: 2021-02-10
Payer: COMMERCIAL

## 2021-02-10 DIAGNOSIS — L97.411 DIABETIC ULCER OF RIGHT MIDFOOT ASSOCIATED WITH DIABETES MELLITUS DUE TO UNDERLYING CONDITION, LIMITED TO BREAKDOWN OF SKIN (HCC): Primary | ICD-10-CM

## 2021-02-10 DIAGNOSIS — E08.621 DIABETIC ULCER OF RIGHT MIDFOOT ASSOCIATED WITH DIABETES MELLITUS DUE TO UNDERLYING CONDITION, LIMITED TO BREAKDOWN OF SKIN (HCC): Primary | ICD-10-CM

## 2021-02-10 PROCEDURE — 97597 DBRDMT OPN WND 1ST 20 CM/<: CPT | Performed by: PODIATRIST

## 2021-02-10 NOTE — PROGRESS NOTES
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 due to underlying condition, limited to breakdown of skin ([facility] [address] )  -     Wound cleansing and dressings; Future              Debridement   Universal Protocol:  Consent given by: patient  Time out: Immediately prior to procedure a "time out" was called to verify the correct patient, procedure, equipment, support staff and site/side marked as required  Performed by: physician  Debridement type: selective  Pain control: lidocaine 4%  Post-debridement measurements  Length (cm): 1 5  Width (cm): 1  Depth (cm): 0 2  Percent debrided: 100%  Surface Area (cm^2): 1 5  Area debrided (cm^2): 1 5  Volume (cm^3): 0 3  Devitalized tissue debrided: biofilm, callus, exudate, fibrin and slough  Instrument(s) utilized: blade  Bleeding: small  Hemostasis obtained with: pressure  Procedural pain (0-10): 0  Post-procedural pain: 0   Response to treatment: procedure was tolerated well              Subjective:        Patient presents for follow-up evaluation right plantar foot  The following portions of the patient's history were reviewed and updated as appropriate: She  has a past medical history of Charcot's joint of foot, left, Diabetes mellitus ([facility] [address] ), and Osteomyelitis of foot, right, acute ([facility] [address] )  She  has a past surgical history that includes Foot surgery (Right);  Foot Amputation (Left, 10/30/2020); and Foot capsule release w/ percutaneous heel cord lengthening, tibial tendon transfer (Left, 10/30/2020)  Current Outpatient Medications   Medication Sig Dispense Refill    ACCU-CHEK FASTCLIX LANCETS MISC by Does not apply route      cyanocobalamin (VITAMIN B-12) 1000 MCG tablet Take 1 tablet (1,000 mcg total) by mouth daily 30 tablet 0    gabapentin (NEURONTIN) 300 mg capsule Take 1 capsule (300 mg total) by mouth 3 (three) times a day 90 capsule 0    INSULIN ASPART FLEXPEN SC Inject 30 Units under the skin 3 (three) times a day 30 units with every meal and an additional sliding scale based on blood glucose      insulin glargine (LANTUS) 100 units/mL subcutaneous injection Continue prior to admission dose (Patient taking differently: 57 Units daily at bedtime Continue prior to admission dose) 2000 Units 0    oxyCODONE (ROXICODONE) 5 mg immediate release tablet Take 1 tablet (5 mg total) by mouth every 4 (four) hours as needed for moderate painMax Daily Amount: 30 mg 18 tablet 0    polyethylene glycol (MIRALAX) 17 g packet Take 17 g by mouth daily      Syringe/Needle, Disp, 30G X 1/2" 1 ML MISC by Does not apply route       No current facility-administered medications for this visit        Current Outpatient Medications on File Prior to Visit   Medication Sig    ACCU-CHEK FASTCLIX LANCETS MISC by Does not apply route    cyanocobalamin (VITAMIN B-12) 1000 MCG tablet Take 1 tablet (1,000 mcg total) by mouth daily    gabapentin (NEURONTIN) 300 mg capsule Take 1 capsule (300 mg total) by mouth 3 (three) times a day    INSULIN ASPART FLEXPEN SC Inject 30 Units under the skin 3 (three) times a day 30 units with every meal and an additional sliding scale based on blood glucose    insulin glargine (LANTUS) 100 units/mL subcutaneous injection Continue prior to admission dose (Patient taking differently: 57 Units daily at bedtime Continue prior to admission dose)    oxyCODONE (ROXICODONE) 5 mg immediate release tablet Take 1 tablet (5 mg total) by mouth every 4 (four) hours as needed for moderate painMax Daily Amount: 30 mg    polyethylene glycol (MIRALAX) 17 g packet Take 17 g by mouth daily    Syringe/Needle, Disp, 30G X 1/2" 1 ML MISC by Does not apply route     No current facility-administered medications on file prior to visit  She is allergic to clindamycin       Review of Systems   Skin: Positive for color change and wound  Objective:       Wound 01/21/21 Diabetic Ulcer Plantar Right (Active)   Wound Image Images linked 02/10/21 1527   Wound Description Slough;Pink;Yellow 02/10/21 1527   Ursula-wound Assessment Maceration;Callus 02/10/21 1527   Wound Length (cm) 1 5 cm 02/10/21 1527   Wound Width (cm) 1 cm 02/10/21 1527   Wound Depth (cm) 0 1 cm 02/10/21 1527   Wound Surface Area (cm^2) 1 5 cm^2 02/10/21 1527   Wound Volume (cm^3) 0 15 cm^3 02/10/21 1527   Calculated Wound Volume (cm^3) 0 15 cm^3 02/10/21 1527   Change in Wound Size % 65 91 02/10/21 1527   Drainage Amount Moderate 02/10/21 1527   Drainage Description Yellow;Brown 02/10/21 1527   Non-staged Wound Description Full thickness 02/10/21 1527   Treatments Cleansed 02/10/21 1527   Patient Tolerance Tolerated well 02/10/21 1527       There were no vitals taken for this visit  Physical Exam  Constitutional:       General: She is awake  Musculoskeletal:        Feet:    Feet:      Right foot:      Skin integrity: Skin breakdown and callus present  No erythema or warmth  Neurological:      Mental Status: She is alert  Psychiatric:         Behavior: Behavior is cooperative  Wound Instructions:  Orders Placed This Encounter   Procedures    Wound cleansing and dressings     Right Plantar foot wound:     Wash your hands with soap and water  Endy Caron old dressing, discard into plastic bag and place in trash      Cleanse the wound with NSS prior to applying a clean dressing  Do not use tissue or cotton balls     Do not scrub the wound  Pat dry using gauze  Shower yes keep wound dry in the shower  Apply Betadine paint to skin surrounding wound  Apply Mesalt to the wound   Cover with gauze  Secure with lilian and tape   Change dressing every day  This was done today        Off-loading Instructions:       Keep weight and pressure off wound at all times and Wear off-loading device as directed by your physician-----Surgical shoe to the right foot       Standing Status:   Future     Standing Expiration Date:   2/10/2022

## 2021-02-10 NOTE — ASSESSMENT & PLAN NOTE
Post debridement wound redressed with Mesalt dry sterile dressing to be changed daily and offloaded  Lab Results   Component Value Date    HGBA1C 14 0 (H) 09/07/2020

## 2021-02-10 NOTE — PATIENT INSTRUCTIONS
Orders Placed This Encounter   Procedures    Wound cleansing and dressings     Right Plantar foot wound:     Wash your hands with soap and water  Adriana Cramer old dressing, discard into plastic bag and place in trash      Cleanse the wound with NSS prior to applying a clean dressing  Do not use tissue or cotton balls  Do not scrub the wound  Pat dry using gauze  Shower yes keep wound dry in the shower  Apply Betadine paint to skin surrounding wound  Apply Mesalt to the wound   Cover with gauze  Secure with lilian and tape   Change dressing every day  This was done today        Off-loading Instructions:       Keep weight and pressure off wound at all times and Wear off-loading device as directed by your physician-----Surgical shoe to the right foot       Standing Status:   Future     Standing Expiration Date:   2/10/2022

## 2021-02-17 ENCOUNTER — OFFICE VISIT (OUTPATIENT)
Dept: WOUND CARE | Facility: CLINIC | Age: 46
End: 2021-02-17
Payer: COMMERCIAL

## 2021-02-17 VITALS
DIASTOLIC BLOOD PRESSURE: 47 MMHG | RESPIRATION RATE: 20 BRPM | TEMPERATURE: 98.8 F | SYSTOLIC BLOOD PRESSURE: 105 MMHG | HEART RATE: 103 BPM

## 2021-02-17 DIAGNOSIS — L97.411 DIABETIC ULCER OF RIGHT MIDFOOT ASSOCIATED WITH DIABETES MELLITUS DUE TO UNDERLYING CONDITION, LIMITED TO BREAKDOWN OF SKIN (HCC): Primary | ICD-10-CM

## 2021-02-17 DIAGNOSIS — E08.621 DIABETIC ULCER OF RIGHT MIDFOOT ASSOCIATED WITH DIABETES MELLITUS DUE TO UNDERLYING CONDITION, LIMITED TO BREAKDOWN OF SKIN (HCC): Primary | ICD-10-CM

## 2021-02-17 PROCEDURE — 97597 DBRDMT OPN WND 1ST 20 CM/<: CPT | Performed by: PODIATRIST

## 2021-02-17 NOTE — PATIENT INSTRUCTIONS
Orders Placed This Encounter   Procedures    Wound cleansing and dressings     Right Plantar foot wound:      Wash your hands with soap and water  Silvia Oliva old dressing, discard into plastic bag and place in trash      Cleanse the wound with NSS prior to applying a clean dressing  Do not use tissue or cotton balls  Do not scrub the wound  Pat dry using gauze       Shower yes keep wound dry in the shower       Apply Betadine paint to skin surrounding wound  Apply Mesalt to the wound   Cover with gauze  Secure with lilian and tape   Change dressing every other day  This was done today        Off-loading Instructions:       Keep weight and pressure off wound at all times and Wear off-loading device as directed by your physician-----Surgical shoe to the right foot       Standing Status:   Future     Standing Expiration Date:   2/17/2022

## 2021-02-17 NOTE — ASSESSMENT & PLAN NOTE
Post debridement wound redressed with Mesalt dry sterile dressing offloading  Lab Results   Component Value Date    HGBA1C 14 0 (H) 09/07/2020

## 2021-02-17 NOTE — PROGRESS NOTES
Patient ID: Chester Banerjee is a 54 y o  female Date of Birth 1975     Chief Complaint   Patient presents with    Follow Up Wound Care Visit     Right foot ulcer       Allergies  Clindamycin    Assessment and Plan    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 due to underlying condition, limited to breakdown of skin ([facility] [address] )  Post debridement wound redressed with Mesalt dry sterile dressing offloading  Lab Results   Component Value Date    HGBA1C 14 0 (H) 09/07/2020        Diagnoses and all orders for this visit:    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 due to underlying condition, limited to breakdown of skin ([facility] [address] )  -     Wound cleansing and dressings; Future              Debridement   Universal Protocol:  Consent given by: patient  Time out: Immediately prior to procedure a "time out" was called to verify the correct patient, procedure, equipment, support staff and site/side marked as required  Performed by: physician  Debridement type: selective  Pain control: lidocaine 4%  Post-debridement measurements  Length (cm): 1 2  Width (cm): 0 9  Depth (cm): 0 1  Percent debrided: 100%  Surface Area (cm^2): 1 08  Area debrided (cm^2): 1 08  Volume (cm^3): 0 11  Devitalized tissue debrided: biofilm, callus, exudate, fibrin and slough  Instrument(s) utilized: blade  Bleeding: small  Hemostasis obtained with: pressure  Procedural pain (0-10): 0  Post-procedural pain: 0   Response to treatment: procedure was tolerated well              Subjective:        Patient seen for follow-up evaluation right plantar foot  She has had her insoles accommodated for      The following portions of the patient's history were reviewed and updated as appropriate: She  has a past medical history of Charcot's joint of foot, left, Diabetes mellitus ([facility] [address] ), and Osteomyelitis of foot, right, acute ([facility] [address] )  She  has a past surgical history that includes Foot surgery (Right);  Foot Amputation (Left, 10/30/2020); and Foot capsule release w/ percutaneous heel cord lengthening, tibial tendon transfer (Left, 10/30/2020)  Current Outpatient Medications   Medication Sig Dispense Refill    ACCU-CHEK FASTCLIX LANCETS MISC by Does not apply route      cyanocobalamin (VITAMIN B-12) 1000 MCG tablet Take 1 tablet (1,000 mcg total) by mouth daily 30 tablet 0    gabapentin (NEURONTIN) 300 mg capsule Take 1 capsule (300 mg total) by mouth 3 (three) times a day 90 capsule 0    INSULIN ASPART FLEXPEN SC Inject 30 Units under the skin 3 (three) times a day 30 units with every meal and an additional sliding scale based on blood glucose      insulin glargine (LANTUS) 100 units/mL subcutaneous injection Continue prior to admission dose (Patient taking differently: 57 Units daily at bedtime Continue prior to admission dose) 2000 Units 0    oxyCODONE (ROXICODONE) 5 mg immediate release tablet Take 1 tablet (5 mg total) by mouth every 4 (four) hours as needed for moderate painMax Daily Amount: 30 mg 18 tablet 0    polyethylene glycol (MIRALAX) 17 g packet Take 17 g by mouth daily      Syringe/Needle, Disp, 30G X 1/2" 1 ML MISC by Does not apply route       No current facility-administered medications for this visit        Current Outpatient Medications on File Prior to Visit   Medication Sig    ACCU-CHEK FASTCLIX LANCETS MISC by Does not apply route    cyanocobalamin (VITAMIN B-12) 1000 MCG tablet Take 1 tablet (1,000 mcg total) by mouth daily    gabapentin (NEURONTIN) 300 mg capsule Take 1 capsule (300 mg total) by mouth 3 (three) times a day    INSULIN ASPART FLEXPEN SC Inject 30 Units under the skin 3 (three) times a day 30 units with every meal and an additional sliding scale based on blood glucose    insulin glargine (LANTUS) 100 units/mL subcutaneous injection Continue prior to admission dose (Patient taking differently: 57 Units daily at bedtime Continue prior to admission dose)    oxyCODONE (ROXICODONE) 5 mg immediate release tablet Take 1 tablet (5 mg total) by mouth every 4 (four) hours as needed for moderate painMax Daily Amount: 30 mg    polyethylene glycol (MIRALAX) 17 g packet Take 17 g by mouth daily    Syringe/Needle, Disp, 30G X 1/2" 1 ML MISC by Does not apply route     No current facility-administered medications on file prior to visit  She is allergic to clindamycin       Review of Systems   Skin: Positive for wound  Negative for color change  Objective:       Wound 01/21/21 Diabetic Ulcer Plantar Right (Active)   Wound Image Images linked 02/17/21 1530   Wound Description Slough;Pink;Yellow 02/17/21 1530   Ursula-wound Assessment Maceration;Callus 02/17/21 1530   Wound Length (cm) 1 2 cm 02/17/21 1530   Wound Width (cm) 0 9 cm 02/17/21 1530   Wound Depth (cm) 0 1 cm 02/17/21 1530   Wound Surface Area (cm^2) 1 08 cm^2 02/17/21 1530   Wound Volume (cm^3) 0 11 cm^3 02/17/21 1530   Calculated Wound Volume (cm^3) 0 11 cm^3 02/17/21 1530   Change in Wound Size % 75 02/17/21 1530   Drainage Amount Moderate 02/17/21 1530   Drainage Description Brown;Yellow 02/17/21 1530   Non-staged Wound Description Full thickness 02/17/21 1530   Treatments Cleansed 02/17/21 1530   Patient Tolerance Tolerated well 02/17/21 1530       BP (!) 105/47   Pulse 103   Temp 98 8 °F (37 1 °C)   Resp 20     Physical Exam  Constitutional:       General: She is awake  Appearance: She is obese  Musculoskeletal:        Feet:    Neurological:      Mental Status: She is alert  Psychiatric:         Behavior: Behavior is cooperative  Wound Instructions:  Orders Placed This Encounter   Procedures    Wound cleansing and dressings     Right Plantar foot wound:      Wash your hands with soap and water  Tamsen Copier old dressing, discard into plastic bag and place in trash      Cleanse the wound with NSS prior to applying a clean dressing  Do not use tissue or cotton balls  Do not scrub the wound   Doris Castañeda dry using gauze       Shower yes keep wound dry in the shower       Apply Betadine paint to skin surrounding wound  Apply Mesalt to the wound   Cover with gauze  Secure with lilian and tape   Change dressing every other day  This was done today        Off-loading Instructions:       Keep weight and pressure off wound at all times and Wear off-loading device as directed by your physician-----Surgical shoe to the right foot       Standing Status:   Future     Standing Expiration Date:   2/17/2022

## 2021-03-02 ENCOUNTER — OFFICE VISIT (OUTPATIENT)
Dept: WOUND CARE | Facility: CLINIC | Age: 46
End: 2021-03-02
Payer: COMMERCIAL

## 2021-03-02 VITALS
DIASTOLIC BLOOD PRESSURE: 83 MMHG | SYSTOLIC BLOOD PRESSURE: 147 MMHG | TEMPERATURE: 98.2 F | HEART RATE: 88 BPM | RESPIRATION RATE: 20 BRPM

## 2021-03-02 DIAGNOSIS — L97.411 DIABETIC ULCER OF RIGHT MIDFOOT ASSOCIATED WITH DIABETES MELLITUS DUE TO UNDERLYING CONDITION, LIMITED TO BREAKDOWN OF SKIN (HCC): Primary | ICD-10-CM

## 2021-03-02 DIAGNOSIS — E08.621 DIABETIC ULCER OF RIGHT MIDFOOT ASSOCIATED WITH DIABETES MELLITUS DUE TO UNDERLYING CONDITION, LIMITED TO BREAKDOWN OF SKIN (HCC): Primary | ICD-10-CM

## 2021-03-02 PROCEDURE — 97597 DBRDMT OPN WND 1ST 20 CM/<: CPT | Performed by: PODIATRIST

## 2021-03-02 NOTE — PATIENT INSTRUCTIONS
Orders Placed This Encounter   Procedures    Wound cleansing and dressings     Right Plantar foot wound:      Wash your hands with soap and water  Mila Harris old dressing, discard into plastic bag and place in trash      Cleanse the wound with NSS prior to applying a clean dressing  Do not use tissue or cotton balls  Do not scrub the wound  Pat dry using gauze       Shower yes keep wound dry in the shower       Apply Betadine paint to skin surrounding wound  Apply Mesalt to the wound   Cover with gauze  Secure with lilian and tape   Change dressing every other day  This was done today        Off-loading Instructions:       Keep weight and pressure off wound at all times and Wear off-loading device as directed by your physician-----Surgical shoe to the right foot       Standing Status:   Future     Standing Expiration Date:   3/2/2022

## 2021-03-04 NOTE — PROGRESS NOTES
Patient ID: Héctor Phelps is a 54 y o  female Date of Birth 1975     Chief Complaint   Patient presents with    Follow Up Wound Care Visit     right foot wound, dressing intact       Allergies  Clindamycin    Assessment and Plan    No problem-specific Assessment & Plan notes found for this encounter  Diagnoses and all orders for this visit:    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 due to underlying condition, limited to breakdown of skin ([facility])  -     Wound cleansing and dressings; Future              Debridement   Universal Protocol:  Consent given by: patient  Time out: Immediately prior to procedure a "time out" was called to verify the correct patient, procedure, equipment, support staff and site/side marked as required  Performed by: physician  Debridement type: selective  Pain control: lidocaine 4%  Post-debridement measurements  Length (cm): 1  Width (cm): 0 5  Depth (cm): 0 1  Percent debrided: 100%  Surface Area (cm^2): 0 5  Area debrided (cm^2): 0 5  Volume (cm^3): 0 05  Devitalized tissue debrided: biofilm, callus, exudate, fibrin and slough  Instrument(s) utilized: blade  Bleeding: small  Hemostasis obtained with: pressure  Procedural pain (0-10): 0  Post-procedural pain: 0   Response to treatment: procedure was tolerated well              Subjective:        Patient seen for follow-up evaluation right foot      The following portions of the patient's history were reviewed and updated as appropriate: She  has a past medical history of Charcot's joint of foot, left, Diabetes mellitus ([facility] [address] ), and Osteomyelitis of foot, right, acute ([facility] [address] )  She  has a past surgical history that includes Foot surgery (Right); Foot Amputation (Left, 10/30/2020); and Foot capsule release w/ percutaneous heel cord lengthening, tibial tendon transfer (Left, 10/30/2020)    Current Outpatient Medications   Medication Sig Dispense Refill    ACCU-CHEK FASTCLIX LANCETS MISC by Does not apply route      cyanocobalamin (VITAMIN B-12) 1000 MCG tablet Take 1 tablet (1,000 mcg total) by mouth daily 30 tablet 0    gabapentin (NEURONTIN) 300 mg capsule Take 1 capsule (300 mg total) by mouth 3 (three) times a day 90 capsule 0    INSULIN ASPART FLEXPEN SC Inject 30 Units under the skin 3 (three) times a day 30 units with every meal and an additional sliding scale based on blood glucose      insulin glargine (LANTUS) 100 units/mL subcutaneous injection Continue prior to admission dose (Patient taking differently: 57 Units daily at bedtime Continue prior to admission dose) 2000 Units 0    oxyCODONE (ROXICODONE) 5 mg immediate release tablet Take 1 tablet (5 mg total) by mouth every 4 (four) hours as needed for moderate painMax Daily Amount: 30 mg 18 tablet 0    polyethylene glycol (MIRALAX) 17 g packet Take 17 g by mouth daily      Syringe/Needle, Disp, 30G X 1/2" 1 ML MISC by Does not apply route       No current facility-administered medications for this visit        Current Outpatient Medications on File Prior to Visit   Medication Sig    ACCU-CHEK FASTCLIX LANCETS MISC by Does not apply route    cyanocobalamin (VITAMIN B-12) 1000 MCG tablet Take 1 tablet (1,000 mcg total) by mouth daily    gabapentin (NEURONTIN) 300 mg capsule Take 1 capsule (300 mg total) by mouth 3 (three) times a day    INSULIN ASPART FLEXPEN SC Inject 30 Units under the skin 3 (three) times a day 30 units with every meal and an additional sliding scale based on blood glucose    insulin glargine (LANTUS) 100 units/mL subcutaneous injection Continue prior to admission dose (Patient taking differently: 57 Units daily at bedtime Continue prior to admission dose)    oxyCODONE (ROXICODONE) 5 mg immediate release tablet Take 1 tablet (5 mg total) by mouth every 4 (four) hours as needed for moderate painMax Daily Amount: 30 mg    polyethylene glycol (MIRALAX) 17 g packet Take 17 g by mouth daily    Syringe/Needle, Disp, 30G X 1/2" 1 ML MISC by Does not apply route     No current facility-administered medications on file prior to visit  She is allergic to clindamycin       Review of Systems   Skin: Positive for wound  Negative for color change  Objective:         Wound 01/21/21 Diabetic Ulcer Plantar Right (Active)   Wound Image   03/02/21 1602   Wound Description Slough;Pink;Yellow 03/02/21 1600   Ursula-wound Assessment Maceration;Callus 03/02/21 1600   Wound Length (cm) 0 9 cm 03/02/21 1600   Wound Width (cm) 0 4 cm 03/02/21 1600   Wound Depth (cm) 0 1 cm 03/02/21 1600   Wound Surface Area (cm^2) 0 36 cm^2 03/02/21 1600   Wound Volume (cm^3) 0 04 cm^3 03/02/21 1600   Calculated Wound Volume (cm^3) 0 04 cm^3 03/02/21 1600   Change in Wound Size % 90 91 03/02/21 1600   Drainage Amount Moderate 03/02/21 1600   Drainage Description Brown;Yellow 03/02/21 1600   Non-staged Wound Description Full thickness 03/02/21 1600   Treatments Cleansed 03/02/21 1600   Patient Tolerance Tolerated well 03/02/21 1600                          /83   Pulse 88   Temp 98 2 °F (36 8 °C)   Resp 20     Physical Exam  Constitutional:       General: She is awake  Appearance: She is obese  Feet:      Comments:  Minimal hyperkeratosis periwound with wound base fibro granular  No active drainage no periwound erythema or edema  Neurological:      Mental Status: She is alert  Sensory: Sensory deficit present  Psychiatric:         Behavior: Behavior is cooperative  Wound Instructions:  Orders Placed This Encounter   Procedures    Wound cleansing and dressings     Right Plantar foot wound:      Wash your hands with soap and water  Adriana Bela old dressing, discard into plastic bag and place in trash      Cleanse the wound with NSS prior to applying a clean dressing  Do not use tissue or cotton balls  Do not scrub the wound   Pat dry using gauze       Shower yes keep wound dry in the shower       Apply Betadine paint to skin surrounding wound  Apply Mesalt to the wound   Cover with gauze  Secure with lilian and tape   Change dressing every other day  This was done today        Off-loading Instructions:       Keep weight and pressure off wound at all times and Wear off-loading device as directed by your physician-----Surgical shoe to the right foot       Standing Status:   Future     Standing Expiration Date:   3/2/2022

## 2021-03-04 NOTE — ASSESSMENT & PLAN NOTE
Post debridement wound redressed with Mesalt dry sterile dressing   She is to continue with her current offloading insoles  Lab Results   Component Value Date    HGBA1C 14 0 (H) 09/07/2020

## 2021-03-16 ENCOUNTER — OFFICE VISIT (OUTPATIENT)
Dept: WOUND CARE | Facility: CLINIC | Age: 46
End: 2021-03-16
Payer: COMMERCIAL

## 2021-03-16 VITALS
RESPIRATION RATE: 18 BRPM | TEMPERATURE: 98.5 F | SYSTOLIC BLOOD PRESSURE: 138 MMHG | DIASTOLIC BLOOD PRESSURE: 81 MMHG | HEART RATE: 101 BPM

## 2021-03-16 DIAGNOSIS — E08.621 DIABETIC ULCER OF RIGHT MIDFOOT ASSOCIATED WITH DIABETES MELLITUS DUE TO UNDERLYING CONDITION, LIMITED TO BREAKDOWN OF SKIN (HCC): Primary | ICD-10-CM

## 2021-03-16 DIAGNOSIS — L97.411 DIABETIC ULCER OF RIGHT MIDFOOT ASSOCIATED WITH DIABETES MELLITUS DUE TO UNDERLYING CONDITION, LIMITED TO BREAKDOWN OF SKIN (HCC): Primary | ICD-10-CM

## 2021-03-16 PROCEDURE — 97597 DBRDMT OPN WND 1ST 20 CM/<: CPT | Performed by: PODIATRIST

## 2021-03-16 RX ORDER — LIDOCAINE 40 MG/G
CREAM TOPICAL ONCE
Status: COMPLETED | OUTPATIENT
Start: 2021-03-16 | End: 2021-03-16

## 2021-03-16 RX ADMIN — LIDOCAINE: 40 CREAM TOPICAL at 16:00

## 2021-03-16 NOTE — PATIENT INSTRUCTIONS
Orders Placed This Encounter   Procedures    Wound cleansing and dressings     Right Plantar foot wound:       Wash your hands with soap and water  Pat Iverson old dressing, discard into plastic bag and place in trash      Cleanse the wound with NSS prior to applying a clean dressing  Do not use tissue or cotton balls  Do not scrub the wound  Pat dry using gauze        Shower yes keep wound dry in the shower        Apply Betadine paint to skin surrounding wound  Apply Mesalt to the wound   Cover with gauze  Secure with lilian and tape   Change dressing every other day   This was done today        Off-loading Instructions:       Keep weight and pressure off wound at all times and Wear off-loading device as directed by your physician-----Surgical shoe to the right foot     Standing Status:   Future     Standing Expiration Date:   3/16/2022

## 2021-03-22 NOTE — PROGRESS NOTES
Patient ID: Marie Lezama is a 54 y o  female Date of Birth 1975     Chief Complaint   Patient presents with    Follow Up Wound Care Visit     dressing intact       Allergies  Clindamycin    Assessment and Plan    No problem-specific Assessment & Plan notes found for this encounter  Diagnoses and all orders for this visit:    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 due to underlying condition, limited to breakdown of skin (HCC)  -     Wound cleansing and dressings; Future  -     lidocaine (LMX) 4 % cream              Debridement   Universal Protocol:  Consent given by: patient  Time out: Immediately prior to procedure a "time out" was called to verify the correct patient, procedure, equipment, support staff and site/side marked as required  Performed by: physician  Debridement type: selective  Pain control: lidocaine 4%  Post-debridement measurements  Length (cm): 0 4  Width (cm): 0 3  Depth (cm): 0 1  Percent debrided: 100%  Surface Area (cm^2): 0 12  Area debrided (cm^2): 0 12  Volume (cm^3): 0 01  Devitalized tissue debrided: biofilm, callus, exudate, fibrin and slough  Instrument(s) utilized: blade  Bleeding: small  Hemostasis obtained with: pressure  Procedural pain (0-10): 0  Post-procedural pain: 0   Response to treatment: procedure was tolerated well              Subjective:        Patient seen for follow up right foot      The following portions of the patient's history were reviewed and updated as appropriate: She  has a past medical history of Charcot's joint of foot, left, Diabetes mellitus ([facility] [address] ), and Osteomyelitis of foot, right, acute ([facility] [address] )  She  has a past surgical history that includes Foot surgery (Right); Foot Amputation (Left, 10/30/2020); and Foot capsule release w/ percutaneous heel cord lengthening, tibial tendon transfer (Left, 10/30/2020)    Current Outpatient Medications   Medication Sig Dispense Refill    ACCU-CHEK FASTCLIX LANCETS MISC by Does not apply route      cyanocobalamin (VITAMIN B-12) 1000 MCG tablet Take 1 tablet (1,000 mcg total) by mouth daily 30 tablet 0    gabapentin (NEURONTIN) 300 mg capsule Take 1 capsule (300 mg total) by mouth 3 (three) times a day 90 capsule 0    INSULIN ASPART FLEXPEN SC Inject 30 Units under the skin 3 (three) times a day 30 units with every meal and an additional sliding scale based on blood glucose      insulin glargine (LANTUS) 100 units/mL subcutaneous injection Continue prior to admission dose (Patient taking differently: 57 Units daily at bedtime Continue prior to admission dose) 2000 Units 0    oxyCODONE (ROXICODONE) 5 mg immediate release tablet Take 1 tablet (5 mg total) by mouth every 4 (four) hours as needed for moderate painMax Daily Amount: 30 mg 18 tablet 0    polyethylene glycol (MIRALAX) 17 g packet Take 17 g by mouth daily      Syringe/Needle, Disp, 30G X 1/2" 1 ML MISC by Does not apply route       No current facility-administered medications for this visit        Current Outpatient Medications on File Prior to Visit   Medication Sig    ACCU-CHEK FASTCLIX LANCETS MISC by Does not apply route    cyanocobalamin (VITAMIN B-12) 1000 MCG tablet Take 1 tablet (1,000 mcg total) by mouth daily    gabapentin (NEURONTIN) 300 mg capsule Take 1 capsule (300 mg total) by mouth 3 (three) times a day    INSULIN ASPART FLEXPEN SC Inject 30 Units under the skin 3 (three) times a day 30 units with every meal and an additional sliding scale based on blood glucose    insulin glargine (LANTUS) 100 units/mL subcutaneous injection Continue prior to admission dose (Patient taking differently: 57 Units daily at bedtime Continue prior to admission dose)    oxyCODONE (ROXICODONE) 5 mg immediate release tablet Take 1 tablet (5 mg total) by mouth every 4 (four) hours as needed for moderate painMax Daily Amount: 30 mg    polyethylene glycol (MIRALAX) 17 g packet Take 17 g by mouth daily    Syringe/Needle, Disp, 30G X 1/2" 1 ML MISC by Does not apply route     No current facility-administered medications on file prior to visit  She is allergic to clindamycin       Review of Systems   Skin: Positive for wound  Negative for color change  Objective:         Wound 01/21/21 Diabetic Ulcer Plantar Right (Active)   Wound Image   03/16/21 1542   Wound Description Slough;Pink;Yellow 03/16/21 1542   Ursula-wound Assessment Maceration;Callus 03/16/21 1542   Wound Length (cm) 0 4 cm 03/16/21 1542   Wound Width (cm) 0 3 cm 03/16/21 1542   Wound Depth (cm) 0 1 cm 03/16/21 1542   Wound Surface Area (cm^2) 0 12 cm^2 03/16/21 1542   Wound Volume (cm^3) 0 01 cm^3 03/16/21 1542   Calculated Wound Volume (cm^3) 0 01 cm^3 03/16/21 1542   Change in Wound Size % 97 73 03/16/21 1542   Drainage Amount Small 03/16/21 1542   Drainage Description Brown;Yellow 03/16/21 1542   Non-staged Wound Description Full thickness 03/16/21 1542   Treatments Irrigation with NSS 03/16/21 1542   Patient Tolerance Tolerated well 03/16/21 1542                          /81   Pulse 101   Temp 98 5 °F (36 9 °C) (Temporal)   Resp 18     Physical Exam  Constitutional:       General: She is awake  Appearance: She is obese  Feet:      Right foot:      Skin integrity: Ulcer present  No erythema or warmth  Comments: Full thickness wound with hyperkeratosis periwound and granular base with fibrous tissue 15% well adherent  No active drainage, no clinical signs infection  Neurological:      Mental Status: She is alert  Psychiatric:         Behavior: Behavior is cooperative  Wound Instructions:  Orders Placed This Encounter   Procedures    Wound cleansing and dressings     Right Plantar foot wound:       Wash your hands with soap and water  Pat Iverson old dressing, discard into plastic bag and place in trash      Cleanse the wound with NSS prior to applying a clean dressing  Do not use tissue or cotton balls     Do not scrub the wound  Pat dry using gauze        Shower yes keep wound dry in the shower        Apply Betadine paint to skin surrounding wound  Apply Mesalt to the wound   Cover with gauze  Secure with lilian and tape   Change dressing every other day   This was done today        Off-loading Instructions:       Keep weight and pressure off wound at all times and Wear off-loading device as directed by your physician-----Surgical shoe to the right foot     Standing Status:   Future     Standing Expiration Date:   3/16/2022

## 2021-04-06 ENCOUNTER — OFFICE VISIT (OUTPATIENT)
Dept: WOUND CARE | Facility: CLINIC | Age: 46
End: 2021-04-06
Payer: COMMERCIAL

## 2021-04-06 VITALS
SYSTOLIC BLOOD PRESSURE: 162 MMHG | TEMPERATURE: 98.3 F | HEART RATE: 111 BPM | DIASTOLIC BLOOD PRESSURE: 95 MMHG | RESPIRATION RATE: 20 BRPM

## 2021-04-06 DIAGNOSIS — L97.411 DIABETIC ULCER OF RIGHT MIDFOOT ASSOCIATED WITH DIABETES MELLITUS DUE TO UNDERLYING CONDITION, LIMITED TO BREAKDOWN OF SKIN (HCC): Primary | ICD-10-CM

## 2021-04-06 DIAGNOSIS — Z79.4 CONTROLLED TYPE 2 DIABETES MELLITUS WITH DIABETIC NEUROPATHY, WITH LONG-TERM CURRENT USE OF INSULIN (HCC): ICD-10-CM

## 2021-04-06 DIAGNOSIS — E08.621 DIABETIC ULCER OF RIGHT MIDFOOT ASSOCIATED WITH DIABETES MELLITUS DUE TO UNDERLYING CONDITION, LIMITED TO BREAKDOWN OF SKIN (HCC): Primary | ICD-10-CM

## 2021-04-06 DIAGNOSIS — E11.40 CONTROLLED TYPE 2 DIABETES MELLITUS WITH DIABETIC NEUROPATHY, WITH LONG-TERM CURRENT USE OF INSULIN (HCC): ICD-10-CM

## 2021-04-06 PROCEDURE — 11042 DBRDMT SUBQ TIS 1ST 20SQCM/<: CPT | Performed by: PODIATRIST

## 2021-04-06 RX ORDER — LIDOCAINE 40 MG/G
CREAM TOPICAL ONCE
Status: COMPLETED | OUTPATIENT
Start: 2021-04-06 | End: 2021-04-06

## 2021-04-06 RX ADMIN — LIDOCAINE: 40 CREAM TOPICAL at 15:48

## 2021-04-06 NOTE — PATIENT INSTRUCTIONS
Orders Placed This Encounter   Procedures    Wound cleansing and dressings     Right Plantar foot wound:       Wash your hands with soap and water  Blima Livier old dressing, discard into plastic bag and place in trash      Cleanse the wound with NSS prior to applying a clean dressing  Do not use tissue or cotton balls  Do not scrub the wound  Pat dry using gauze        Shower yes keep wound dry in the shower        Apply Betadine paint to skin surrounding wound  Apply Mesalt to the wound   Cover with gauze  Secure with lilian and tape   Change dressing every other day  This was done today        Off-loading Instructions:       Keep weight and pressure off wound at all times and Wear off-loading device as directed by your physician-----Surgical shoe to the right foot     Standing Status:   Future     Standing Expiration Date:   4/6/2022     High Protein Diet   WHAT YOU NEED TO KNOW:   A high-protein diet is a meal plan that includes extra protein  Your body may need extra protein if you have certain health conditions, such as cancer, burns, or injuries  You may also need to follow this diet to get stronger after a surgery or illness  Extra protein helps to heal wounds and form new tissue in the body  Your dietitian will tell you how much protein and how many calories you need each day  DISCHARGE INSTRUCTIONS:   Foods high in protein:  The average amount of protein is listed below in grams (g)  To find the exact amount of protein in a food, read the food labels on packaged items  · Dairy:      ? 1 cup of any type of milk (8 g)    ? ½ cup of evaporated canned milk (9 g)    ? ¼ cup of nonfat dry milk (11 g)    ? 1 ounce of semi-hard or solid cheese (7 g)    ? ¼ cup of parmesan cheese (8 g)    ? ½ cup of cottage cheese (14 g)    ? ½ cup of pudding (4 g)    ? 1 cup of plain or fruit yogurt (8 g)    · Meats and meat substitutes:      ? 3 ounces of cooked freshwater fish (21 g)     ?  3 ounces of cooked shellfish (19 g)    ? ½ cup of canned tuna (14 g)    ? 3 ounces of cooked chicken, turkey, or other poultry (24 g)    ? 3 ounces of cooked beef, pork, lamb, or other red meat (21 g)    ? 1 large egg (6 g)    ? ¼ cup of fat free egg substitute (5 g)    ? ½ cup of tofu or tempeh (10 g)    ? 1 cup of cooked dried beans, such as mabry, kidney, or navy (15 g)    · Nuts and seeds:      ? 2 tablespoons of almonds, cashews, sunflower seeds, or walnuts (5 g)    ? 2 tablespoons of peanuts (7 g)    ? 2 tablespoons of peanut butter (8 g)    How to add extra protein:   · Add powdered milk to milk, cereals, scrambled eggs, soups, and casseroles  · Add cheese to sauces, soups, or vegetables  · Add eggs to tuna, salads, sauces, or casseroles  · Add nutrition supplements and breakfast drink mixes to milk or shakes  · Add nuts to foods or eat them as snacks  · Add meat (beef, chicken, and pork) to soups, casseroles, pasta dishes, or vegetables  · Add beans, peas, and other legumes to salads  · Eat cottage cheese or yogurt with fruit  © Copyright [address] Information is for End User's use only and may not be sold, redistributed or otherwise used for commercial purposes  All illustrations and images included in CareNotes® are the copyrighted property of A D A M , Inc  or [facility] [place]   The above information is an  only  It is not intended as medical advice for individual conditions or treatments  Talk to your doctor, nurse or pharmacist before following any medical regimen to see if it is safe and effective for you

## 2021-04-06 NOTE — ASSESSMENT & PLAN NOTE
Post debridement wound redressed with Mesalt dry sterile dressing with Betadine paint periwound  Patient is advised to return to specifically designed insoles for offloading for her right plantar foot    Lab Results   Component Value Date    HGBA1C 14 0 (H) 09/07/2020

## 2021-04-06 NOTE — ASSESSMENT & PLAN NOTE
Rigid MAFO prescribed for the left foot  Lab Results   Component Value Date    HGBA1C 14 0 (H) 09/07/2020

## 2021-04-06 NOTE — PROGRESS NOTES
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 due to underlying condition, limited to breakdown of skin (HCC)  -     lidocaine (LMX) 4 % cream  -     Wound cleansing and dressings; Future         Patient requires modification of left foot MAFO as initial fitting/ current device does not properly alleviate pressure  This should be a custom device to alleviate pressure on the lateral foot  Without modification is anticipated that ulceration will develop  Debridement   Universal Protocol:  Consent given by: patient  Time out: Immediately prior to procedure a "time out" was called to verify the correct patient, procedure, equipment, support staff and site/side marked as required  Performed by: physician  Debridement type: surgical  Level of debridement: subcutaneous tissue  Pain control: lidocaine 4%  Post-debridement measurements  Length (cm): 0 9  Width (cm): 0 6  Depth (cm): 0 4  Percent debrided: 100%  Surface Area (cm^2): 0 54  Area debrided (cm^2): 0 54  Volume (cm^3): 0 22  Tissue and other material debrided: subcutaneous tissue  Devitalized tissue debrided: biofilm, callus, exudate, fibrin and slough  Instrument(s) utilized: blade  Bleeding: small  Hemostasis obtained with: pressure  Procedural pain (0-10): 0  Post-procedural pain: 0   Response to treatment: procedure was tolerated well              Subjective:        Patient presents for follow-up evaluation right plantar foot wound    She states that her  has designed an accommodative insole for the right foot      The following portions of the patient's history were reviewed and updated as appropriate: She  has a past medical history of Charcot's joint of foot, left, Diabetes mellitus ([facility] [address] ), and Osteomyelitis of foot, right, acute ([facility] [address] )  She  has a past surgical history that includes Foot surgery (Right); Foot Amputation (Left, 10/30/2020); and Foot capsule release w/ percutaneous heel cord lengthening, tibial tendon transfer (Left, 10/30/2020)  Current Outpatient Medications   Medication Sig Dispense Refill    ACCU-CHEK FASTCLIX LANCETS MISC by Does not apply route      cyanocobalamin (VITAMIN B-12) 1000 MCG tablet Take 1 tablet (1,000 mcg total) by mouth daily 30 tablet 0    gabapentin (NEURONTIN) 300 mg capsule Take 1 capsule (300 mg total) by mouth 3 (three) times a day 90 capsule 0    INSULIN ASPART FLEXPEN SC Inject 30 Units under the skin 3 (three) times a day 30 units with every meal and an additional sliding scale based on blood glucose      insulin glargine (LANTUS) 100 units/mL subcutaneous injection Continue prior to admission dose (Patient taking differently: 57 Units daily at bedtime Continue prior to admission dose) 2000 Units 0    oxyCODONE (ROXICODONE) 5 mg immediate release tablet Take 1 tablet (5 mg total) by mouth every 4 (four) hours as needed for moderate painMax Daily Amount: 30 mg 18 tablet 0    polyethylene glycol (MIRALAX) 17 g packet Take 17 g by mouth daily      Syringe/Needle, Disp, 30G X 1/2" 1 ML MISC by Does not apply route       No current facility-administered medications for this visit        Current Outpatient Medications on File Prior to Visit   Medication Sig    ACCU-CHEK FASTCLIX LANCETS MISC by Does not apply route    cyanocobalamin (VITAMIN B-12) 1000 MCG tablet Take 1 tablet (1,000 mcg total) by mouth daily    gabapentin (NEURONTIN) 300 mg capsule Take 1 capsule (300 mg total) by mouth 3 (three) times a day    INSULIN ASPART FLEXPEN SC Inject 30 Units under the skin 3 (three) times a day 30 units with every meal and an additional sliding scale based on blood glucose    insulin glargine (LANTUS) 100 units/mL subcutaneous injection Continue prior to admission dose (Patient taking differently: 57 Units daily at bedtime Continue prior to admission dose)    oxyCODONE (ROXICODONE) 5 mg immediate release tablet Take 1 tablet (5 mg total) by mouth every 4 (four) hours as needed for moderate painMax Daily Amount: 30 mg    polyethylene glycol (MIRALAX) 17 g packet Take 17 g by mouth daily    Syringe/Needle, Disp, 30G X 1/2" 1 ML MISC by Does not apply route     No current facility-administered medications on file prior to visit  She is allergic to clindamycin       Review of Systems   Skin: Positive for wound  Negative for color change  Objective:       Wound 01/21/21 Diabetic Ulcer Plantar Right (Active)   Wound Image Images linked 04/06/21 1601   Wound Description -- (Post debridement 0 9cm x 0 6cm x 0 4cm) 04/06/21 1601       /95   Pulse (!) 111   Temp 98 3 °F (36 8 °C)   Resp 20     Physical Exam  Constitutional:       General: She is awake  Appearance: She is obese  Musculoskeletal:      Right lower leg: Edema present  Feet:    Feet:      Comments:  No active drainage no periwound erythema or edema  Skin:     Capillary Refill: Capillary refill takes 2 to 3 seconds  Neurological:      Mental Status: She is alert  Psychiatric:         Behavior: Behavior is cooperative  Wound Instructions:  Orders Placed This Encounter   Procedures    Wound cleansing and dressings     Right Plantar foot wound:       Wash your hands with soap and water  Violet Goff old dressing, discard into plastic bag and place in trash      Cleanse the wound with NSS prior to applying a clean dressing  Do not use tissue or cotton balls  Do not scrub the wound  Pat dry using gauze        Shower yes keep wound dry in the shower        Apply Betadine paint to skin surrounding wound  Apply Mesalt to the wound   Cover with gauze  Secure with lilian and tape   Change dressing every other day   This was done today        Off-loading Instructions:       Keep weight and pressure off wound at all times and Wear off-loading device as directed by your physician-----Surgical shoe to the right foot     Standing Status:   Future     Standing Expiration Date:   4/6/2022

## 2021-04-13 ENCOUNTER — HOSPITAL ENCOUNTER (EMERGENCY)
Facility: HOSPITAL | Age: 46
Discharge: HOME | End: 2021-04-13
Attending: EMERGENCY MEDICINE
Payer: COMMERCIAL

## 2021-04-13 ENCOUNTER — OFFICE VISIT (OUTPATIENT)
Dept: WOUND CARE | Facility: CLINIC | Age: 46
End: 2021-04-13
Payer: COMMERCIAL

## 2021-04-13 ENCOUNTER — APPOINTMENT (EMERGENCY)
Dept: RADIOLOGY | Facility: HOSPITAL | Age: 46
End: 2021-04-13
Payer: COMMERCIAL

## 2021-04-13 VITALS
HEART RATE: 100 BPM | DIASTOLIC BLOOD PRESSURE: 81 MMHG | RESPIRATION RATE: 20 BRPM | SYSTOLIC BLOOD PRESSURE: 159 MMHG | TEMPERATURE: 97.8 F

## 2021-04-13 VITALS
SYSTOLIC BLOOD PRESSURE: 136 MMHG | HEIGHT: 64 IN | BODY MASS INDEX: 40.8 KG/M2 | WEIGHT: 238.98 LBS | DIASTOLIC BLOOD PRESSURE: 61 MMHG | RESPIRATION RATE: 19 BRPM | TEMPERATURE: 97.5 F | OXYGEN SATURATION: 98 % | HEART RATE: 98 BPM

## 2021-04-13 DIAGNOSIS — E08.621 DIABETIC ULCER OF RIGHT MIDFOOT ASSOCIATED WITH DIABETES MELLITUS DUE TO UNDERLYING CONDITION, LIMITED TO BREAKDOWN OF SKIN (HCC): Primary | ICD-10-CM

## 2021-04-13 DIAGNOSIS — L03.116 CELLULITIS OF LEFT FOOT: ICD-10-CM

## 2021-04-13 DIAGNOSIS — Z51.89 VISIT FOR WOUND CHECK: Primary | ICD-10-CM

## 2021-04-13 DIAGNOSIS — E08.621 DIABETIC ULCER OF LEFT MIDFOOT ASSOCIATED WITH DIABETES MELLITUS DUE TO UNDERLYING CONDITION, WITH FAT LAYER EXPOSED (HCC): ICD-10-CM

## 2021-04-13 DIAGNOSIS — L97.422 DIABETIC ULCER OF LEFT MIDFOOT ASSOCIATED WITH DIABETES MELLITUS DUE TO UNDERLYING CONDITION, WITH FAT LAYER EXPOSED (HCC): ICD-10-CM

## 2021-04-13 DIAGNOSIS — L97.411 DIABETIC ULCER OF RIGHT MIDFOOT ASSOCIATED WITH DIABETES MELLITUS DUE TO UNDERLYING CONDITION, LIMITED TO BREAKDOWN OF SKIN (HCC): Primary | ICD-10-CM

## 2021-04-13 PROCEDURE — 87070 CULTURE OTHR SPECIMN AEROBIC: CPT | Performed by: PODIATRIST

## 2021-04-13 PROCEDURE — 99213 OFFICE O/P EST LOW 20 MIN: CPT | Performed by: PODIATRIST

## 2021-04-13 PROCEDURE — 99284 EMERGENCY DEPT VISIT MOD MDM: CPT | Performed by: EMERGENCY MEDICINE

## 2021-04-13 PROCEDURE — 87205 SMEAR GRAM STAIN: CPT | Performed by: PODIATRIST

## 2021-04-13 PROCEDURE — 87186 SC STD MICRODIL/AGAR DIL: CPT | Performed by: PODIATRIST

## 2021-04-13 PROCEDURE — 11042 DBRDMT SUBQ TIS 1ST 20SQCM/<: CPT | Performed by: PODIATRIST

## 2021-04-13 PROCEDURE — 99283 EMERGENCY DEPT VISIT LOW MDM: CPT

## 2021-04-13 PROCEDURE — 11045 DBRDMT SUBQ TISS EACH ADDL: CPT | Performed by: PODIATRIST

## 2021-04-13 PROCEDURE — 73620 X-RAY EXAM OF FOOT: CPT

## 2021-04-13 PROCEDURE — 87077 CULTURE AEROBIC IDENTIFY: CPT | Performed by: PODIATRIST

## 2021-04-13 PROCEDURE — 73130 X-RAY EXAM OF HAND: CPT

## 2021-04-13 PROCEDURE — 87147 CULTURE TYPE IMMUNOLOGIC: CPT | Performed by: PODIATRIST

## 2021-04-13 PROCEDURE — G0463 HOSPITAL OUTPT CLINIC VISIT: HCPCS | Performed by: PODIATRIST

## 2021-04-13 RX ORDER — BACITRACIN, NEOMYCIN, POLYMYXIN B 400; 3.5; 5 [USP'U]/G; MG/G; [USP'U]/G
1 OINTMENT TOPICAL ONCE
Status: DISCONTINUED | OUTPATIENT
Start: 2021-04-13 | End: 2021-04-13 | Stop reason: HOSPADM

## 2021-04-13 RX ORDER — LIDOCAINE 40 MG/G
CREAM TOPICAL ONCE
Status: COMPLETED | OUTPATIENT
Start: 2021-04-13 | End: 2021-04-13

## 2021-04-13 RX ORDER — AMOXICILLIN AND CLAVULANATE POTASSIUM 875; 125 MG/1; MG/1
1 TABLET, FILM COATED ORAL EVERY 12 HOURS SCHEDULED
Qty: 20 TABLET | Refills: 0 | Status: SHIPPED | OUTPATIENT
Start: 2021-04-13 | End: 2021-04-23

## 2021-04-13 RX ADMIN — LIDOCAINE: 40 CREAM TOPICAL at 13:37

## 2021-04-13 NOTE — PATIENT INSTRUCTIONS
Orders Placed This Encounter   Procedures    Wound cleansing and dressings     Right Plantar foot wound:       Wash your hands with soap and water  Jackeline Lush old dressing, discard into plastic bag and place in trash      Cleanse the wound with NSS prior to applying a clean dressing  Do not use tissue or cotton balls  Do not scrub the wound  Pat dry using gauze        Shower yes keep wound dry in the shower        Apply Mesalt to the wound   Cover with gauze  Secure with lilian and tape   Change dressing every other day  This was done today        Off-loading Instructions:       Keep weight and pressure off wound at all times and Wear off-loading device as directed by your physician    Left Plantar foot wound:       Wash your hands with soap and water  Jackeline Lush old dressing, discard into plastic bag and place in trash      Cleanse the wound with NSS prior to applying a clean dressing  Do not use tissue or cotton balls  Do not scrub the wound  Pat dry using gauze        Shower yes keep wound dry in the shower        Apply Maxorb AG to the wound   Cover with gauze  Secure with lilian and tape  Change dressing every other day  This was done today        Off-loading Instructions:       Keep weight and pressure off wound at all times and Wear off-loading device as directed by your physician     Standing Status:   Future     Standing Expiration Date:   4/13/2022     High Protein Diet   WHAT YOU NEED TO KNOW:   A high-protein diet is a meal plan that includes extra protein  Your body may need extra protein if you have certain health conditions, such as cancer, burns, or injuries  You may also need to follow this diet to get stronger after a surgery or illness  Extra protein helps to heal wounds and form new tissue in the body  Your dietitian will tell you how much protein and how many calories you need each day  DISCHARGE INSTRUCTIONS:   Foods high in protein:  The average amount of protein is listed below in grams (g)  To find the exact amount of protein in a food, read the food labels on packaged items  · Dairy:      ? 1 cup of any type of milk (8 g)    ? ½ cup of evaporated canned milk (9 g)    ? ¼ cup of nonfat dry milk (11 g)    ? 1 ounce of semi-hard or solid cheese (7 g)    ? ¼ cup of parmesan cheese (8 g)    ? ½ cup of cottage cheese (14 g)    ? ½ cup of pudding (4 g)    ? 1 cup of plain or fruit yogurt (8 g)    · Meats and meat substitutes:      ? 3 ounces of cooked freshwater fish (21 g)     ? 3 ounces of cooked shellfish (19 g)    ? ½ cup of canned tuna (14 g)    ? 3 ounces of cooked chicken, turkey, or other poultry (24 g)    ? 3 ounces of cooked beef, pork, lamb, or other red meat (21 g)    ? 1 large egg (6 g)    ? ¼ cup of fat free egg substitute (5 g)    ? ½ cup of tofu or tempeh (10 g)    ? 1 cup of cooked dried beans, such as mabry, kidney, or navy (15 g)    · Nuts and seeds:      ? 2 tablespoons of almonds, cashews, sunflower seeds, or walnuts (5 g)    ? 2 tablespoons of peanuts (7 g)    ? 2 tablespoons of peanut butter (8 g)    How to add extra protein:   · Add powdered milk to milk, cereals, scrambled eggs, soups, and casseroles  · Add cheese to sauces, soups, or vegetables  · Add eggs to tuna, salads, sauces, or casseroles  · Add nutrition supplements and breakfast drink mixes to milk or shakes  · Add nuts to foods or eat them as snacks  · Add meat (beef, chicken, and pork) to soups, casseroles, pasta dishes, or vegetables  · Add beans, peas, and other legumes to salads  · Eat cottage cheese or yogurt with fruit  © Copyright [address] Information is for End User's use only and may not be sold, redistributed or otherwise used for commercial purposes  All illustrations and images included in CareNotes® are the copyrighted property of [employer]  or [address]   The above information is an  only   It is not intended as medical advice for individual conditions or treatments  Talk to your doctor, nurse or pharmacist before following any medical regimen to see if it is safe and effective for you

## 2021-04-13 NOTE — ASSESSMENT & PLAN NOTE
Mesalt dry sterile dressing applied  Lab Results   Component Value Date    HGBA1C 14 0 (H) 09/07/2020

## 2021-04-13 NOTE — DISCHARGE INSTRUCTIONS
Is important that you  the antibiotic, Augmentin, sent to the pharmacy by the podiatrist and start this and take as prescribed  Additionally please keep hand and foot clean, dry  Please follow-up with both your family doctor for recheck of the hand and podiatrist for recheck of the foot  If you have ANY new or worsening symptoms please return immediately to the emergency department

## 2021-04-13 NOTE — ASSESSMENT & PLAN NOTE
Maxorb Ag dry sterile dressing applied post debridement  Lab Results   Component Value Date    HGBA1C 14 0 (H) 09/07/2020

## 2021-04-13 NOTE — ASSESSMENT & PLAN NOTE
Patient to take prescribed Augmentin 875mg 1 tab p o  b i d  for the next 10 days  She has also been advised to visit the emergency room today as she is not feeling well  And she also has redness and swelling to the left hand       wound cultures taken from left foot wound

## 2021-04-13 NOTE — LETTER
April 13, 2021     Patient: Maddie Dodson   YOB: 1975   Date of Visit: 4/13/2021       To Whom it May Concern:    Jimy Letymelissa Laurel is under my professional care  She was seen in my office on 4/13/2021  Please excuse from work for two weeks starting 4/13/2021  If you have any questions or concerns, please don't hesitate to call           Sincerely,          Mary Anne Thakur DPM        CC: No Recipients

## 2021-04-13 NOTE — ED PROVIDER NOTES
History  Chief Complaint   Patient presents with    Wound Check     Pt reports having half of L foot amputated in 10/2020 since then has been having issues with the surgical site splitting open  Pt also wanted her L hand looked at due to having glass shatter in her hand last week  50-year-old female presents emergency department for evaluation of wound check  Patient states she had lef foot amputated October 2020  States since having foot in she use the foot has been rolling over and forming a blister  States she is unable to feel this due to chronic neuropathy and developed a ulcer  Patient's all podiatry today was diagnosed with cellulitis in diabetic foot ulcer patient was started on Augmentin and has follow-up in 1 week on 04/20/2021  Patient states she also has wounds of the left hand  States last week she was cleaning a wine glass when it shattered in her hand  States she had swelling and redness however reports this is significantly improving  She has full range of motion of the hand  She denies any drainage from the wounds  Denies any current redness  Patient denies any fevers or chills  Patient reports tetanus is up-to-date  Patient denies any nausea or vomiting  Patient with history of diabetes  History provided by:  Patient  Wound Check   Fever duration: no fever  There has been no drainage from the wound  Redness Status: left foot redness new, left hand has no redness  Swelling Status: no swelling of left foot, swelling of left hand improved  There is no pain present  She has no difficulty moving the affected extremity or digit  Prior to Admission Medications   Prescriptions Last Dose Informant Patient Reported? Taking?    ACCU-CHEK FASTCLIX LANCETS MISC   Yes Yes   Sig: by Does not apply route   INSULIN ASPART FLEXPEN SC   Yes Yes   Sig: Inject 30 Units under the skin 3 (three) times a day 30 units with every meal and an additional sliding scale based on blood glucose Syringe/Needle, Disp, 30G X 1/2" 1 ML MISC   Yes Yes   Sig: by Does not apply route   amoxicillin-clavulanate (Augmentin) 875-125 mg per tablet Not Taking at Unknown time  No No   Sig: Take 1 tablet by mouth every 12 (twelve) hours for 10 days   Patient not taking: Reported on 4/13/2021   gabapentin (NEURONTIN) 300 mg capsule   No Yes   Sig: Take 1 capsule (300 mg total) by mouth 3 (three) times a day   insulin glargine (LANTUS) 100 units/mL subcutaneous injection   No Yes   Sig: Continue prior to admission dose   Patient taking differently: 57 Units daily at bedtime Continue prior to admission dose   polyethylene glycol (MIRALAX) 17 g packet 4/13/2021 at Unknown time  Yes Yes   Sig: Take 17 g by mouth daily      Facility-Administered Medications Last Administration Doses Remaining   lidocaine (LMX) 4 % cream 4/13/2021  1:37 PM 0          Past Medical History:   Diagnosis Date    Charcot's joint of foot, left     Diabetes mellitus ([facility] [address] )     Osteomyelitis of foot, right, acute ([facility] [address] )        Past Surgical History:   Procedure Laterality Date    FOOT AMPUTATION Left 10/30/2020    Procedure: CHOPART AMPUTATION LEFT  FOOT:;  Surgeon: Kajal Caballero DPM;  Location:  MAIN OR;  Service: Podiatry    FOOT CAPSULE RELEASE W/ PERCUTANEOUS HEEL CORD LENGTHENING, TIBIAL TENDON TRANSFER Left 10/30/2020    Procedure: Achilles tenotomy left foot;  Surgeon: Kajal Caballero DPM;  Location:  MAIN OR;  Service: Podiatry    FOOT SURGERY Right     right first toe amputation  2019       Family History   Problem Relation Age of Onset    Diabetes Brother     Hyperlipidemia Brother     Hypertension Brother     Diabetes Mother     Hypertension Father     Diabetes Sister      I have reviewed and agree with the history as documented      E-Cigarette/Vaping    E-Cigarette Use Never User      E-Cigarette/Vaping Substances    Nicotine No     THC No     CBD No     Flavoring No     Other No     Unknown No      Social History Tobacco Use    Smoking status: Never Smoker    Smokeless tobacco: Never Used   Substance Use Topics    Alcohol use: Never     Frequency: Never    Drug use: Never       Review of Systems   Constitutional: Negative for appetite change, chills, diaphoresis, fatigue and fever  HENT: Negative  Respiratory: Negative  Cardiovascular: Negative  Gastrointestinal: Negative  Genitourinary: Negative  Musculoskeletal: Negative  Skin: Positive for color change and wound  Neurological: Negative  All other systems reviewed and are negative  Physical Exam  Physical Exam  Vitals signs and nursing note reviewed  Constitutional:       General: She is not in acute distress  Appearance: Normal appearance  She is normal weight  She is not ill-appearing, toxic-appearing or diaphoretic  HENT:      Head: Normocephalic and atraumatic  Mouth/Throat:      Mouth: Mucous membranes are moist       Pharynx: Oropharynx is clear  No oropharyngeal exudate or posterior oropharyngeal erythema  Eyes:      Extraocular Movements: Extraocular movements intact  Conjunctiva/sclera: Conjunctivae normal       Pupils: Pupils are equal, round, and reactive to light  Neck:      Musculoskeletal: Normal range of motion  Cardiovascular:      Rate and Rhythm: Normal rate and regular rhythm  Heart sounds: No murmur  Pulmonary:      Effort: Pulmonary effort is normal  No respiratory distress  Breath sounds: Normal breath sounds  No stridor  No wheezing, rhonchi or rales  Chest:      Chest wall: No tenderness  Musculoskeletal:      Left hand: She exhibits swelling  She exhibits normal range of motion, no tenderness, no bony tenderness, normal capillary refill, no deformity and no laceration  Normal sensation noted  Normal strength noted  Comments: Multiple superficial wounds noted on the left hand  Hand is swollen  Patient has normal range of motion  No redness, no drainage from wounds  No tenderness  Normal sensation  Normal radial pulse, normal cap refill  Patient with partial amputation of the left foot  Noted open wound  No active drainage  Minimal surrounding erythema  Please see photo of both below   Skin:     General: Skin is warm and dry  Capillary Refill: Capillary refill takes less than 2 seconds  Findings: No erythema  Comments: Please see photos below  Multiple superficial wounds on the left hand appear to be well healing no active drainage, no tenderness  Open left foot ulcer, minimal surrounding erythema   Neurological:      General: No focal deficit present  Mental Status: She is alert and oriented to person, place, and time  Motor: No weakness  Coordination: Coordination normal       Deep Tendon Reflexes: Reflexes normal    Psychiatric:         Mood and Affect: Mood is anxious  Behavior: Behavior normal          Thought Content: Thought content normal          Judgment: Judgment normal                      Vital Signs  ED Triage Vitals [04/13/21 1556]   Temperature Pulse Respirations Blood Pressure SpO2   97 5 °F (36 4 °C) (!) 117 19 136/61 98 %      Temp Source Heart Rate Source Patient Position - Orthostatic VS BP Location FiO2 (%)   Temporal Monitor Lying Left arm --      Pain Score       2           Vitals:    04/13/21 1556 04/13/21 1702   BP: 136/61    Pulse: (!) 117 98   Patient Position - Orthostatic VS: Lying          Visual Acuity      ED Medications  Medications - No data to display    Diagnostic Studies  Results Reviewed     None                 XR foot 2 views LEFT   ED Interpretation by Isamar Larose PA-C (04/13 1658)   Noted ulcer, no osteomyelitis       Final Result by Woo Bustamante MD (04/13 1714)      Chopart amputation left foot  Lateral stump soft tissue ulcer measuring 2 9 cm x 1 1 cm x 2 3 cm  Subcentimeter osseous fragment identified along the anterior aspect of the calcaneus/talar bone   No definite radiolucent osseous bony erosion although MRI    would be better for osteomyelitis evaluation  Soft tissue swelling around the lateral malleolus and medial malleolus is identified  Workstation performed: [id number]         XR hand 3+ views LEFT   ED Interpretation by Sharona De Leon PA-C (04/13 1658)   No foreign bodies visualized, no acute osseous injury or osteomyelitis      Final Result by Gayle Miller MD (04/13 1709)      Soft tissue swelling about the digits most severe in the fourth digit  Small radiopaque densities identified along the ulnar aspect of the second and third proximal phalanx could relate to vascular calcification although I cannot exclude foreign body if    clinically suspected  Workstation performed: [id number]                    Procedures  Procedures         ED Course                 MDM  Number of Diagnoses or Management Options  Visit for wound check: new and requires workup  Diagnosis management comments: 43-year-old female presents emergency department for evaluation of left hand and foot wounds  Patient reports she developed hand wounds after glass shattered in hand last week  States hand is swollen  Reports redness, swelling have improved  Reports wounds are relatively healed  She denies any drainage  Denies any fevers  Patient reports left foot amputation last October developed blister approximately 5 days ago was seen by Podiatry today and started on Augmentin  Vitals and medical record reviewed  Please see photos above  X-rays were both negative for evidence of osteomyelitis  Questionable foreign bodies noted on x-ray of hand however with no concern with clinical correlation as wounds in this area very superficial, nontender  Patient will start Augmentin as prescribed  She has follow-up with Podiatry in 1 week  Will follow-up with PCP in 48 hours for recheck of hand  Patient refused any IV or blood work in the emergency department    She was educated on strict return precautions  Reports tetanus is up-to-date  Hand was thoroughly cleaned Neosporin was applied and both hands and was were dressed appropriately  Amount and/or Complexity of Data Reviewed  Tests in the radiology section of CPT®: ordered and reviewed  Review and summarize past medical records: yes  Independent visualization of images, tracings, or specimens: yes        Disposition  Final diagnoses:   Visit for wound check     Time reflects when diagnosis was documented in both MDM as applicable and the Disposition within this note     Time User Action Codes Description Comment    4/13/2021  4:57 PM Bobby Giovani [[id number]] Visit for wound check       ED Disposition     ED Disposition Condition Date/Time Comment    Discharge Stable Tue Apr 13, 2021  5:01 PM Rupali Flowers discharge to home/self care  Follow-up Information     Follow up With Specialties Details Why Contact Info    Elbert Kerr MD Family Medicine In 2 days For wound re-check, reevaluation of the left hand   [address]  [phone]      Donnie Rome DPM Podiatry  For wound re-check [address] [facility] [address]  [address] [address]  [phone]            Discharge Medication List as of 4/13/2021  5:02 PM      CONTINUE these medications which have NOT CHANGED    Details   ACCU-CHEK FASTCLIX LANCETS MISC by Does not apply route, Historical Med      amoxicillin-clavulanate (Augmentin) 875-125 mg per tablet Take 1 tablet by mouth every 12 (twelve) hours for 10 days, Starting Tue 4/13/2021, Until Fri 4/23/2021, Normal      gabapentin (NEURONTIN) 300 mg capsule Take 1 capsule (300 mg total) by mouth 3 (three) times a day, Starting Sat 11/7/2020, Until Tue 4/13/2021, Normal      INSULIN ASPART FLEXPEN SC Inject 30 Units under the skin 3 (three) times a day 30 units with every meal and an additional sliding scale based on blood glucose, Historical Med      insulin glargine (LANTUS) 100 units/mL subcutaneous injection Continue prior to admission dose, No Print      polyethylene glycol (MIRALAX) 17 g packet Take 17 g by mouth daily, Historical Med      Syringe/Needle, Disp, 30G X 1/2" 1 ML MISC by Does not apply route, Historical Med           No discharge procedures on file      PDMP Review       Value Time User    PDMP Reviewed  Yes 11/7/2020  4:54 PM Binu Borrero MD          ED Provider  Electronically Signed by           Jessee Lindsey PA-C  04/13/21 5850 Dalton Peters MD  04/17/21 3648

## 2021-04-13 NOTE — ED ATTENDING ATTESTATION
4/13/2021  ITravis MD, saw and evaluated the patient  I have discussed the patient with the resident/non-physician practitioner and agree with the resident's/non-physician practitioner's findings, Plan of Care, and MDM as documented in the resident's/non-physician practitioner's note, except where noted  All available labs and Radiology studies were reviewed  I was present for key portions of any procedure(s) performed by the resident/non-physician practitioner and I was immediately available to provide assistance  At this point I agree with the current assessment done in the Emergency Department        ED Course         Critical Care Time  Procedures

## 2021-04-13 NOTE — PROGRESS NOTES
Patient ID: Leah Hanson is a 54 y o  female Date of Birth 1975     Chief Complaint   Patient presents with    Follow Up Wound Care Visit     New wound left foot  Started as a blister this past Friday  Allergies  Clindamycin    Assessment and Plan    No problem-specific Assessment & Plan notes found for this encounter  Diagnoses and all orders for this visit:    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 due to underlying condition, limited to breakdown of skin ([facility])  -     lidocaine (LMX) 4 % cream  -     Wound cleansing and dressings; Future    Diabetic ulcer of left midfoot associated with diabetes mellitus due to underlying condition, with fat layer exposed ([address] )  -     lidocaine (LMX) 4 % cream  -     Wound cleansing and dressings; Future  -     Wound culture and Gram stain; Future    Cellulitis of left foot  -     amoxicillin-clavulanate (Augmentin) 875-125 mg per tablet; Take 1 tablet by mouth every 12 (twelve) hours for 10 days              Debridement   Wound 01/21/21 Diabetic Ulcer Plantar Right    Universal Protocol:  Consent given by: patient  Time out: Immediately prior to procedure a "time out" was called to verify the correct patient, procedure, equipment, support staff and site/side marked as required      Performed by: physician  Debridement type: surgical  Level of debridement: subcutaneous tissue  Pain control: lidocaine 4%  Post-debridement measurements  Length (cm): 0 5  Width (cm): 0 3  Depth (cm): 0 2  Percent debrided: 100%  Surface Area (cm^2): 0 15  Area debrided (cm^2): 0 15  Volume (cm^3): 0 03  Tissue and other material debrided: subcutaneous tissue  Devitalized tissue debrided: biofilm, callus, exudate, fibrin and slough  Instrument(s) utilized: blade  Bleeding: small  Hemostasis obtained with: pressure  Procedural pain (0-10): 0  Post-procedural pain: 0   Response to treatment: procedure was tolerated well    Debridement   Universal Protocol:  Consent given by: patient  Time out: Immediately prior to procedure a "time out" was called to verify the correct patient, procedure, equipment, support staff and site/side marked as required  Performed by: physician  Debridement type: surgical  Level of debridement: subcutaneous tissue  Pain control: lidocaine 4%  Post-debridement measurements  Length (cm): 4 4  Width (cm): 8  Depth (cm): 0 7  Percent debrided: 100%  Surface Area (cm^2): 35 2  Area debrided (cm^2): 35 2  Volume (cm^3): 24 64  Tissue and other material debrided: subcutaneous tissue  Devitalized tissue debrided: biofilm, callus, exudate, fibrin and slough  Instrument(s) utilized: blade  Bleeding: small  Hemostasis obtained with: pressure  Procedural pain (0-10): 0  Post-procedural pain: 0   Response to treatment: procedure was tolerated well              Subjective:        Patient presents for follow-up evaluation right plantar foot wound and new onset wound on the left foot  She states that over the past 5 days she has noticed increasing redness to left foot and she has noticed feeling chilled  She believes that she had blister formation developing on the left foot which subsequently opened up over the past few days  She states that there was no prior callus      The following portions of the patient's history were reviewed and updated as appropriate: She  has a past medical history of Charcot's joint of foot, left, Diabetes mellitus ([facility] [address] ), and Osteomyelitis of foot, right, acute ([facility] [address] )  She  has a past surgical history that includes Foot surgery (Right); Foot Amputation (Left, 10/30/2020); and Foot capsule release w/ percutaneous heel cord lengthening, tibial tendon transfer (Left, 10/30/2020)    Current Outpatient Medications   Medication Sig Dispense Refill    ACCU-CHEK FASTCLIX LANCETS MISC by Does not apply route      amoxicillin-clavulanate (Augmentin) 875-125 mg per tablet Take 1 tablet by mouth every 12 (twelve) hours for 10 days 20 tablet 0    cyanocobalamin (VITAMIN B-12) 1000 MCG tablet Take 1 tablet (1,000 mcg total) by mouth daily 30 tablet 0    gabapentin (NEURONTIN) 300 mg capsule Take 1 capsule (300 mg total) by mouth 3 (three) times a day 90 capsule 0    INSULIN ASPART FLEXPEN SC Inject 30 Units under the skin 3 (three) times a day 30 units with every meal and an additional sliding scale based on blood glucose      insulin glargine (LANTUS) 100 units/mL subcutaneous injection Continue prior to admission dose (Patient taking differently: 57 Units daily at bedtime Continue prior to admission dose) 2000 Units 0    oxyCODONE (ROXICODONE) 5 mg immediate release tablet Take 1 tablet (5 mg total) by mouth every 4 (four) hours as needed for moderate painMax Daily Amount: 30 mg 18 tablet 0    polyethylene glycol (MIRALAX) 17 g packet Take 17 g by mouth daily      Syringe/Needle, Disp, 30G X 1/2" 1 ML MISC by Does not apply route       No current facility-administered medications for this visit        Current Outpatient Medications on File Prior to Visit   Medication Sig    ACCU-CHEK FASTCLIX LANCETS MISC by Does not apply route    cyanocobalamin (VITAMIN B-12) 1000 MCG tablet Take 1 tablet (1,000 mcg total) by mouth daily    gabapentin (NEURONTIN) 300 mg capsule Take 1 capsule (300 mg total) by mouth 3 (three) times a day    INSULIN ASPART FLEXPEN SC Inject 30 Units under the skin 3 (three) times a day 30 units with every meal and an additional sliding scale based on blood glucose    insulin glargine (LANTUS) 100 units/mL subcutaneous injection Continue prior to admission dose (Patient taking differently: 57 Units daily at bedtime Continue prior to admission dose)    oxyCODONE (ROXICODONE) 5 mg immediate release tablet Take 1 tablet (5 mg total) by mouth every 4 (four) hours as needed for moderate painMax Daily Amount: 30 mg    polyethylene glycol (MIRALAX) 17 g packet Take 17 g by mouth daily    Syringe/Needle, Disp, 30G X 1/2" 1 ML MISC by Does not apply route     No current facility-administered medications on file prior to visit  She is allergic to clindamycin       Review of Systems   Constitutional: Positive for chills  Skin: Positive for color change and wound  Objective:         Wound 01/21/21 Diabetic Ulcer Plantar Right (Active)   Wound Image   04/13/21 1347   Wound Description Slough;Pink;Yellow 04/13/21 1329   Ursula-wound Assessment Callus 04/13/21 1329   Wound Length (cm) 0 4 cm 04/13/21 1329   Wound Width (cm) 0 2 cm 04/13/21 1329   Wound Depth (cm) 0 2 cm 04/13/21 1329   Wound Surface Area (cm^2) 0 08 cm^2 04/13/21 1329   Wound Volume (cm^3) 0 02 cm^3 04/13/21 1329   Calculated Wound Volume (cm^3) 0 02 cm^3 04/13/21 1329   Change in Wound Size % 95 45 04/13/21 1329   Drainage Amount Small 04/13/21 1329   Drainage Description Yellow 04/13/21 1329   Non-staged Wound Description Full thickness 04/13/21 1329   Treatments Cleansed 04/13/21 1329   Patient Tolerance Tolerated well 04/13/21 1329       Wound 04/13/21 Foot Anterior; Left (Active)   Wound Image   04/13/21 1347   Wound Description Pink;Slough;Brown;Black 04/13/21 1320   Ursula-wound Assessment Erythema 04/13/21 1320   Wound Length (cm) 4 4 cm 04/13/21 1320   Wound Width (cm) 7 3 cm 04/13/21 1320   Wound Depth (cm) 0 5 cm 04/13/21 1320   Wound Surface Area (cm^2) 32 12 cm^2 04/13/21 1320   Wound Volume (cm^3) 16 06 cm^3 04/13/21 1320   Calculated Wound Volume (cm^3) 16 06 cm^3 04/13/21 1320   Undermining 1 5 04/13/21 1320   Undermining is depth extending from 1-7 04/13/21 1320   Drainage Amount Moderate 04/13/21 1320   Drainage Description Serous 04/13/21 1320   Non-staged Wound Description Full thickness 04/13/21 1320   Treatments Cleansed 04/13/21 1320   Patient Tolerance Tolerated well 04/13/21 1320                     Wound 01/21/21 Diabetic Ulcer Plantar Right (Active)   Wound Image Images linked 04/13/21 1347       Wound 04/13/21 Foot Anterior; Left (Active)   Wound Image Images linked 04/13/21 1347       /81   Pulse 100   Temp 97 8 °F (36 6 °C)   Resp 20     Physical Exam  Constitutional:       General: She is awake  Musculoskeletal:      Left lower leg: Edema present  Feet:    Feet:      Comments:  Left foot is with significant erythema and edema and mild calor  Wound presents on the left plantar lateral foot with full-thickness breakdown extending through subcutaneous layer  Undermining is present more medially approximately 1 5 cm  Wound base is 100% fibrous well adherent with active serous drainage  No purulence  Skin:     General: Skin is warm  Findings: Erythema present  Neurological:      Mental Status: She is alert  Sensory: Sensory deficit present  Psychiatric:         Behavior: Behavior is cooperative  Wound Instructions:  Orders Placed This Encounter   Procedures    Wound cleansing and dressings     Right Plantar foot wound:       Wash your hands with soap and water  Elyssa Bryce old dressing, discard into plastic bag and place in trash      Cleanse the wound with NSS prior to applying a clean dressing  Do not use tissue or cotton balls  Do not scrub the wound  Pat dry using gauze        Shower yes keep wound dry in the shower        Apply Mesalt to the wound   Cover with gauze  Secure with lilian and tape   Change dressing every other day  This was done today        Off-loading Instructions:       Keep weight and pressure off wound at all times and Wear off-loading device as directed by your physician    Left Plantar foot wound:       Wash your hands with soap and water  Elyssa Bryce old dressing, discard into plastic bag and place in trash      Cleanse the wound with NSS prior to applying a clean dressing  Do not use tissue or cotton balls  Do not scrub the wound  Pat dry using gauze        Shower yes keep wound dry in the shower        Apply Maxorb AG to the wound   Cover with gauze   Secure with lilian and tape  Change dressing every other day   This was done today        Off-loading Instructions:       Keep weight and pressure off wound at all times and Wear off-loading device as directed by your physician     Standing Status:   Future     Standing Expiration Date:   4/13/2022    Wound culture and Gram stain     Standing Status:   Future     Standing Expiration Date:   4/13/2022     Order Specific Question:   Release to patient through Mychart     Answer:   Immediate

## 2021-04-16 LAB
BACTERIA WND AEROBE CULT: ABNORMAL
GRAM STN SPEC: ABNORMAL

## 2021-04-20 ENCOUNTER — OFFICE VISIT (OUTPATIENT)
Dept: WOUND CARE | Facility: CLINIC | Age: 46
End: 2021-04-20
Payer: COMMERCIAL

## 2021-04-20 VITALS
SYSTOLIC BLOOD PRESSURE: 150 MMHG | TEMPERATURE: 97.6 F | HEART RATE: 76 BPM | RESPIRATION RATE: 18 BRPM | DIASTOLIC BLOOD PRESSURE: 89 MMHG

## 2021-04-20 DIAGNOSIS — E08.621 DIABETIC ULCER OF LEFT MIDFOOT ASSOCIATED WITH DIABETES MELLITUS DUE TO UNDERLYING CONDITION, WITH FAT LAYER EXPOSED (HCC): ICD-10-CM

## 2021-04-20 DIAGNOSIS — L97.422 DIABETIC ULCER OF LEFT MIDFOOT ASSOCIATED WITH DIABETES MELLITUS DUE TO UNDERLYING CONDITION, WITH FAT LAYER EXPOSED (HCC): ICD-10-CM

## 2021-04-20 DIAGNOSIS — E08.621 DIABETIC ULCER OF RIGHT MIDFOOT ASSOCIATED WITH DIABETES MELLITUS DUE TO UNDERLYING CONDITION, LIMITED TO BREAKDOWN OF SKIN (HCC): Primary | ICD-10-CM

## 2021-04-20 DIAGNOSIS — L97.411 DIABETIC ULCER OF RIGHT MIDFOOT ASSOCIATED WITH DIABETES MELLITUS DUE TO UNDERLYING CONDITION, LIMITED TO BREAKDOWN OF SKIN (HCC): Primary | ICD-10-CM

## 2021-04-20 PROCEDURE — 11042 DBRDMT SUBQ TIS 1ST 20SQCM/<: CPT | Performed by: PODIATRIST

## 2021-04-20 NOTE — PATIENT INSTRUCTIONS
Orders Placed This Encounter   Procedures    Wound cleansing and dressings     Right Plantar foot wound:       Wash your hands with soap and water  Graceann Messing old dressing, discard into plastic bag and place in trash      Cleanse the wound with NSS prior to applying a clean dressing  Do not use tissue or cotton balls  Do not scrub the wound  Pat dry using gauze        Shower yes keep wound dry in the shower        Apply Mesalt to the wound   Cover with gauze  Secure with lilian and tape   Change dressing every other day  This was done today        Off-loading Instructions:       Keep weight and pressure off wound at all times and Wear off-loading device as directed by your physician     Left Plantar foot wound:       Wash your hands with soap and water  Graceann Messing old dressing, discard into plastic bag and place in trash      Cleanse the wound with NSS prior to applying a clean dressing  Do not use tissue or cotton balls  Do not scrub the wound  Pat dry using gauze        Shower yes keep wound dry in the shower        Apply Mesalt to the wound   Cover with gauze  Secure with lilian and tape  Change dressing every other day   This was done today        Off-loading Instructions:       Keep weight and pressure off wound at all times and Wear off-loading device as directed by your physician     Standing Status:   Future     Standing Expiration Date:   4/20/2022

## 2021-04-20 NOTE — ASSESSMENT & PLAN NOTE
Left plantar foot wound redressed with Mesalt dry sterile dressing and offloaded  Lab Results   Component Value Date    HGBA1C 14 0 (H) 09/07/2020

## 2021-04-20 NOTE — PROGRESS NOTES
Patient ID: Leah Hanson is a 54 y o  female Date of Birth 1975     Chief Complaint   Patient presents with    Follow Up Wound Care Visit     Dressing intact       Allergies  Clindamycin    Assessment and Plan    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 due to underlying condition, limited to breakdown of skin (HCC)   Wound is 100% epithelialized    Diabetic ulcer of left midfoot associated with diabetes mellitus due to underlying condition, with fat layer exposed ([address] )   Left plantar foot wound redressed with Mesalt dry sterile dressing and offloaded  Lab Results   Component Value Date    HGBA1C 14 0 (H) 09/07/2020        Diagnoses and all orders for this visit:    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 due to underlying condition, limited to breakdown of skin ([address] )  -     Wound cleansing and dressings; Future    Diabetic ulcer of left midfoot associated with diabetes mellitus due to underlying condition, with fat layer exposed ([address] )  -     Wound cleansing and dressings; Future              Debridement   Universal Protocol:  Consent given by: patient  Time out: Immediately prior to procedure a "time out" was called to verify the correct patient, procedure, equipment, support staff and site/side marked as required      Performed by: physician  Debridement type: surgical  Level of debridement: subcutaneous tissue  Pain control: lidocaine 4%  Post-debridement measurements  Length (cm): 3  Width (cm): 5 5  Depth (cm): 0 2  Percent debrided: 100%  Surface Area (cm^2): 16 5  Area debrided (cm^2): 16 5  Volume (cm^3): 3 3  Tissue and other material debrided: subcutaneous tissue  Devitalized tissue debrided: biofilm, callus, exudate, fibrin and slough  Instrument(s) utilized: blade  Bleeding: small  Hemostasis obtained with: pressure  Procedural pain (0-10): 0  Post-procedural pain: 0   Response to treatment: procedure was tolerated well              Subjective:        Patient seen for follow-up evaluation bilateral foot wound      The following portions of the patient's history were reviewed and updated as appropriate: She  has a past medical history of Charcot's joint of foot, left, Diabetes mellitus ([facility] [address] ), and Osteomyelitis of foot, right, acute ([facility] [address] )  She  has a past surgical history that includes Foot surgery (Right); Foot Amputation (Left, 10/30/2020); and Foot capsule release w/ percutaneous heel cord lengthening, tibial tendon transfer (Left, 10/30/2020)  Current Outpatient Medications   Medication Sig Dispense Refill    ACCU-CHEK FASTCLIX LANCETS MISC by Does not apply route      amoxicillin-clavulanate (Augmentin) 875-125 mg per tablet Take 1 tablet by mouth every 12 (twelve) hours for 10 days (Patient not taking: Reported on 4/13/2021) 20 tablet 0    gabapentin (NEURONTIN) 300 mg capsule Take 1 capsule (300 mg total) by mouth 3 (three) times a day 90 capsule 0    INSULIN ASPART FLEXPEN SC Inject 30 Units under the skin 3 (three) times a day 30 units with every meal and an additional sliding scale based on blood glucose      insulin glargine (LANTUS) 100 units/mL subcutaneous injection Continue prior to admission dose (Patient taking differently: 57 Units daily at bedtime Continue prior to admission dose) 2000 Units 0    polyethylene glycol (MIRALAX) 17 g packet Take 17 g by mouth daily      Syringe/Needle, Disp, 30G X 1/2" 1 ML MISC by Does not apply route       No current facility-administered medications for this visit        Current Outpatient Medications on File Prior to Visit   Medication Sig    ACCU-CHEK FASTCLIX LANCETS MISC by Does not apply route    amoxicillin-clavulanate (Augmentin) 875-125 mg per tablet Take 1 tablet by mouth every 12 (twelve) hours for 10 days (Patient not taking: Reported on 4/13/2021)    gabapentin (NEURONTIN) 300 mg capsule Take 1 capsule (300 mg total) by mouth 3 (three) times a day  INSULIN ASPART FLEXPEN SC Inject 30 Units under the skin 3 (three) times a day 30 units with every meal and an additional sliding scale based on blood glucose    insulin glargine (LANTUS) 100 units/mL subcutaneous injection Continue prior to admission dose (Patient taking differently: 57 Units daily at bedtime Continue prior to admission dose)    polyethylene glycol (MIRALAX) 17 g packet Take 17 g by mouth daily    Syringe/Needle, Disp, 30G X 1/2" 1 ML MISC by Does not apply route     No current facility-administered medications on file prior to visit  She is allergic to clindamycin       Review of Systems   Skin: Positive for wound  Negative for color change           Objective:         Wound 01/21/21 Diabetic Ulcer Plantar Right (Active)   Wound Image   04/20/21 1526   Wound Description Pink 04/20/21 1526   Ursula-wound Assessment Callus 04/20/21 1526   Wound Length (cm) 0 1 cm 04/20/21 1526   Wound Width (cm) 0 1 cm 04/20/21 1526   Wound Depth (cm) 0 1 cm 04/20/21 1526   Wound Surface Area (cm^2) 0 01 cm^2 04/20/21 1526   Wound Volume (cm^3) 0 cm^3 04/20/21 1526   Calculated Wound Volume (cm^3) 0 cm^3 04/20/21 1526   Change in Wound Size % 100 04/20/21 1526   Drainage Amount None 04/20/21 1526   Drainage Description Yellow 04/13/21 1329   Non-staged Wound Description Full thickness 04/13/21 1329   Treatments Cleansed 04/13/21 1329   Patient Tolerance Tolerated well 04/20/21 1526       Wound 04/13/21 Diabetic Ulcer Foot Left (Active)   Wound Image   04/20/21 1533   Wound Description Pink;Slough 04/20/21 1527   Ursula-wound Assessment Erythema 04/20/21 1527   Wound Length (cm) 3 cm 04/20/21 1527   Wound Width (cm) 5 5 cm 04/20/21 1527   Wound Depth (cm) 0 1 cm 04/20/21 1527   Wound Surface Area (cm^2) 16 5 cm^2 04/20/21 1527   Wound Volume (cm^3) 1 65 cm^3 04/20/21 1527   Calculated Wound Volume (cm^3) 1 65 cm^3 04/20/21 1527   Change in Wound Size % 89 73 04/20/21 1527   Undermining 1 5 04/13/21 1320 Undermining is depth extending from 1-7 04/13/21 1320   Drainage Amount Moderate 04/13/21 1320   Drainage Description Serous 04/13/21 1320   Non-staged Wound Description Full thickness 04/13/21 1320   Treatments Cleansed 04/13/21 1320   Patient Tolerance Tolerated well 04/13/21 1320                     Wound 01/21/21 Diabetic Ulcer Plantar Right (Active)   Wound Image Images linked 04/20/21 1526   Wound Description Pink 04/20/21 1526   Ursula-wound Assessment Callus 04/20/21 1526   Wound Length (cm) 0 1 cm 04/20/21 1526   Wound Width (cm) 0 1 cm 04/20/21 1526   Wound Depth (cm) 0 1 cm 04/20/21 1526   Wound Surface Area (cm^2) 0 01 cm^2 04/20/21 1526   Wound Volume (cm^3) 0 cm^3 04/20/21 1526   Calculated Wound Volume (cm^3) 0 cm^3 04/20/21 1526   Change in Wound Size % 100 04/20/21 1526   Drainage Amount None 04/20/21 1526   Patient Tolerance Tolerated well 04/20/21 1526       Wound 04/13/21 Diabetic Ulcer Foot Left (Active)   Wound Image Images linked 04/20/21 1533       /89   Pulse 76   Temp 97 6 °F (36 4 °C)   Resp 18   LMP 03/28/2021     Physical Exam  Constitutional:       General: She is awake  Musculoskeletal:      Left lower leg: No edema  Feet:      Left foot:      Skin integrity: Ulcer present  No erythema or warmth  Comments: Wound is full-thickness extending through subcutaneous layer with well adherent fibrous base  No active drainage no periwound erythema or edema  Skin:     Findings: No erythema  Neurological:      Mental Status: She is alert  Psychiatric:         Behavior: Behavior is cooperative  Wound Instructions:  Orders Placed This Encounter   Procedures    Wound cleansing and dressings     Right Plantar foot wound:       Wash your hands with soap and water  Wallace Heaton old dressing, discard into plastic bag and place in trash      Cleanse the wound with NSS prior to applying a clean dressing  Do not use tissue or cotton balls  Do not scrub the wound   Pat dry using gauze        Shower yes keep wound dry in the shower        Apply Mesalt to the wound   Cover with gauze  Secure with lilian and tape   Change dressing every other day  This was done today        Off-loading Instructions:       Keep weight and pressure off wound at all times and Wear off-loading device as directed by your physician     Left Plantar foot wound:       Wash your hands with soap and water  Elyssa Bryce old dressing, discard into plastic bag and place in trash      Cleanse the wound with NSS prior to applying a clean dressing  Do not use tissue or cotton balls  Do not scrub the wound  Pat dry using gauze        Shower yes keep wound dry in the shower        Apply Mesalt to the wound   Cover with gauze  Secure with lilian and tape  Change dressing every other day   This was done today        Off-loading Instructions:       Keep weight and pressure off wound at all times and Wear off-loading device as directed by your physician     Standing Status:   Future     Standing Expiration Date:   4/20/2022

## 2021-04-27 ENCOUNTER — OFFICE VISIT (OUTPATIENT)
Dept: WOUND CARE | Facility: CLINIC | Age: 46
End: 2021-04-27
Payer: COMMERCIAL

## 2021-04-27 VITALS
DIASTOLIC BLOOD PRESSURE: 81 MMHG | SYSTOLIC BLOOD PRESSURE: 152 MMHG | TEMPERATURE: 98.5 F | HEART RATE: 105 BPM | RESPIRATION RATE: 18 BRPM

## 2021-04-27 DIAGNOSIS — E08.621 DIABETIC ULCER OF LEFT MIDFOOT ASSOCIATED WITH DIABETES MELLITUS DUE TO UNDERLYING CONDITION, WITH FAT LAYER EXPOSED (HCC): ICD-10-CM

## 2021-04-27 DIAGNOSIS — L97.422 DIABETIC ULCER OF LEFT MIDFOOT ASSOCIATED WITH DIABETES MELLITUS DUE TO UNDERLYING CONDITION, WITH FAT LAYER EXPOSED (HCC): ICD-10-CM

## 2021-04-27 DIAGNOSIS — Z79.4 CONTROLLED TYPE 2 DIABETES MELLITUS WITH DIABETIC NEUROPATHY, WITH LONG-TERM CURRENT USE OF INSULIN (HCC): Primary | ICD-10-CM

## 2021-04-27 DIAGNOSIS — E11.40 CONTROLLED TYPE 2 DIABETES MELLITUS WITH DIABETIC NEUROPATHY, WITH LONG-TERM CURRENT USE OF INSULIN (HCC): Primary | ICD-10-CM

## 2021-04-27 PROCEDURE — 11042 DBRDMT SUBQ TIS 1ST 20SQCM/<: CPT | Performed by: PODIATRIST

## 2021-04-27 RX ORDER — LIDOCAINE 40 MG/G
CREAM TOPICAL ONCE
Status: COMPLETED | OUTPATIENT
Start: 2021-04-27 | End: 2021-04-27

## 2021-04-27 RX ADMIN — LIDOCAINE: 40 CREAM TOPICAL at 15:53

## 2021-04-27 NOTE — PATIENT INSTRUCTIONS
Orders Placed This Encounter   Procedures    Wound cleansing and dressings     Wound cleansing and dressings       Right Plantar foot wound:       Wash your hands with soap and water  Cassie Maverick old dressing, discard into plastic bag and place in trash      Cleanse the wound with NSS prior to applying a clean dressing  Do not use tissue or cotton balls  Do not scrub the wound  Pat dry using gauze        Shower yes keep wound dry in the shower        Apply polymem to the wound   Cover with gauze  Secure with lilian and tape   Change dressing every other day  This was done today        Off-loading Instructions:       Keep weight and pressure off wound at all times and Wear off-loading device as directed by your physician      Left Plantar foot wound:       Wash your hands with soap and water  Charlotte Maverick old dressing, discard into plastic bag and place in trash      Cleanse the wound with NSS prior to applying a clean dressing  Do not use tissue or cotton balls  Do not scrub the wound  Pat dry using gauze        Shower yes keep wound dry in the shower        Apply Mesalt to the wound and quik   Cover with gauze  Secure with lilian and tape  Change dressing every other day   This was done today        Off-loading Instructions:       Keep weight and pressure off wound at all times and Wear off-loading device as directed by your physician     Standing Status:   Future     Standing Expiration Date:   4/27/2022

## 2021-04-28 NOTE — ASSESSMENT & PLAN NOTE
Lab Results   Component Value Date    HGBA1C 14 0 (H) 09/07/2020    post debridement wound redressed with Mesalt quick

## 2021-04-28 NOTE — PROGRESS NOTES
Patient ID: Jb Jacinto is a 54 y o  female Date of Birth 1975     Chief Complaint   Patient presents with    Follow Up Wound Care Visit     Patient here for wounds to right and left foot       Allergies  Clindamycin    Assessment and Plan    No problem-specific Assessment & Plan notes found for this encounter  Diagnoses and all orders for this visit:    Controlled type 2 diabetes mellitus with diabetic neuropathy, with long-term current use of insulin (HCC)  -     lidocaine (LMX) 4 % cream  -     Wound cleansing and dressings; Future    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 due to underlying condition, limited to breakdown of skin (HCC)  -     lidocaine (LMX) 4 % cream  -     Wound cleansing and dressings; Future    Diabetic ulcer of left midfoot associated with diabetes mellitus due to underlying condition, with fat layer exposed ([address] )  -     lidocaine (LMX) 4 % cream  -     Wound cleansing and dressings; Future              Debridement   Universal Protocol:  Consent given by: patient  Time out: Immediately prior to procedure a "time out" was called to verify the correct patient, procedure, equipment, support staff and site/side marked as required  Performed by: physician  Debridement type: surgical  Level of debridement: subcutaneous tissue  Pain control: lidocaine 4%  Post-debridement measurements  Length (cm): 3 5  Width (cm): 3 5  Depth (cm): 0 2  Percent debrided: 100%  Surface Area (cm^2): 12 25  Area debrided (cm^2): 12 25  Volume (cm^3): 2 45  Tissue and other material debrided: subcutaneous tissue  Devitalized tissue debrided: biofilm, callus, exudate, fibrin and slough  Instrument(s) utilized: blade  Bleeding: small  Hemostasis obtained with: pressure  Procedural pain (0-10): 0  Post-procedural pain: 0   Response to treatment: procedure was tolerated well              Subjective:             Patient presents for follow-up evaluation plantar foot wound      The following portions of the patient's history were reviewed and updated as appropriate: She  has a past medical history of Charcot's joint of foot, left, Diabetes mellitus ([facility] [address] ), and Osteomyelitis of foot, right, acute ([facility] [address] )  She  has a past surgical history that includes Foot surgery (Right); Foot Amputation (Left, 10/30/2020); and Foot capsule release w/ percutaneous heel cord lengthening, tibial tendon transfer (Left, 10/30/2020)  Current Outpatient Medications   Medication Sig Dispense Refill    ACCU-CHEK FASTCLIX LANCETS MISC by Does not apply route      gabapentin (NEURONTIN) 300 mg capsule Take 1 capsule (300 mg total) by mouth 3 (three) times a day 90 capsule 0    INSULIN ASPART FLEXPEN SC Inject 30 Units under the skin 3 (three) times a day 30 units with every meal and an additional sliding scale based on blood glucose      insulin glargine (LANTUS) 100 units/mL subcutaneous injection Continue prior to admission dose (Patient taking differently: 57 Units daily at bedtime Continue prior to admission dose) 2000 Units 0    polyethylene glycol (MIRALAX) 17 g packet Take 17 g by mouth daily      Syringe/Needle, Disp, 30G X 1/2" 1 ML MISC by Does not apply route       No current facility-administered medications for this visit        Current Outpatient Medications on File Prior to Visit   Medication Sig    ACCU-CHEK FASTCLIX LANCETS MISC by Does not apply route    gabapentin (NEURONTIN) 300 mg capsule Take 1 capsule (300 mg total) by mouth 3 (three) times a day    INSULIN ASPART FLEXPEN SC Inject 30 Units under the skin 3 (three) times a day 30 units with every meal and an additional sliding scale based on blood glucose    insulin glargine (LANTUS) 100 units/mL subcutaneous injection Continue prior to admission dose (Patient taking differently: 57 Units daily at bedtime Continue prior to admission dose)    polyethylene glycol (MIRALAX) 17 g packet Take 17 g by mouth daily    Syringe/Needle, Disp, 30G X 1/2" 1 ML MISC by Does not apply route     No current facility-administered medications on file prior to visit  She is allergic to clindamycin       Review of Systems   Skin: Positive for wound  Negative for color change           Objective:         Wound 01/21/21 Diabetic Ulcer Plantar Right (Active)   Wound Image   04/27/21 1540   Wound Description Epithelialization;Pink 04/27/21 1543   Ursula-wound Assessment Callus 04/27/21 1543   Wound Length (cm) 0 cm 04/27/21 1543   Wound Width (cm) 0 cm 04/27/21 1543   Wound Depth (cm) 0 cm 04/27/21 1543   Wound Surface Area (cm^2) 0 cm^2 04/27/21 1543   Wound Volume (cm^3) 0 cm^3 04/27/21 1543   Calculated Wound Volume (cm^3) 0 cm^3 04/27/21 1543   Change in Wound Size % 100 04/27/21 1543   Drainage Amount None 04/27/21 1543   Drainage Description Yellow 04/13/21 1329   Non-staged Wound Description Not applicable 03/85/96 2091   Treatments Cleansed 04/13/21 1329   Patient Tolerance Tolerated well 04/20/21 1526       Wound 04/13/21 Diabetic Ulcer Foot Left (Active)   Wound Image   04/27/21 1554   Wound Description Pink;Slough 04/27/21 1543   Ursula-wound Assessment Edema 04/27/21 1543   Wound Length (cm) 3 5 cm 04/27/21 1543   Wound Width (cm) 3 5 cm 04/27/21 1543   Wound Depth (cm) 0 1 cm 04/27/21 1543   Wound Surface Area (cm^2) 12 25 cm^2 04/27/21 1543   Wound Volume (cm^3) 1 23 cm^3 04/27/21 1543   Calculated Wound Volume (cm^3) 1 23 cm^3 04/27/21 1543   Change in Wound Size % 92 34 04/27/21 1543   Undermining 1 5 04/13/21 1320   Undermining is depth extending from 1-7 04/13/21 1320   Drainage Amount Small 04/27/21 1543   Drainage Description Serous 04/27/21 1543   Non-staged Wound Description Full thickness 04/27/21 1543   Treatments Cleansed 04/13/21 1320   Patient Tolerance Tolerated well 04/13/21 1320                          /81   Pulse 105   Temp 98 5 °F (36 9 °C)   Resp 18   LMP 03/28/2021     Physical Exam  Constitutional: General: She is awake  Musculoskeletal:         General: No tenderness  Feet:      Comments:  Left plantar foot wound is full-thickness extending through subcutaneous layer with well adherent fibrous base  No periwound erythema or edema no active drainage  Neurological:      Mental Status: She is alert  Sensory: Sensory deficit present  Psychiatric:         Behavior: Behavior is cooperative  Wound Instructions:  Orders Placed This Encounter   Procedures    Wound cleansing and dressings     Wound cleansing and dressings       Right Plantar foot wound:       Wash your hands with soap and water  Erleen Debra old dressing, discard into plastic bag and place in trash      Cleanse the wound with NSS prior to applying a clean dressing  Do not use tissue or cotton balls  Do not scrub the wound  Pat dry using gauze        Shower yes keep wound dry in the shower        Apply polymem to the wound   Cover with gauze  Secure with lilian and tape   Change dressing every other day  This was done today        Off-loading Instructions:       Keep weight and pressure off wound at all times and Wear off-loading device as directed by your physician      Left Plantar foot wound:       Wash your hands with soap and water  Erleen Debra old dressing, discard into plastic bag and place in trash      Cleanse the wound with NSS prior to applying a clean dressing  Do not use tissue or cotton balls  Do not scrub the wound  Pat dry using gauze        Shower yes keep wound dry in the shower        Apply Mesalt to the wound and quik   Cover with gauze  Secure with lilian and tape  Change dressing every other day   This was done today        Off-loading Instructions:       Keep weight and pressure off wound at all times and Wear off-loading device as directed by your physician     Standing Status:   Future     Standing Expiration Date:   4/27/2022

## 2021-05-04 ENCOUNTER — OFFICE VISIT (OUTPATIENT)
Dept: WOUND CARE | Facility: CLINIC | Age: 46
End: 2021-05-04
Payer: COMMERCIAL

## 2021-05-04 VITALS
TEMPERATURE: 98 F | HEART RATE: 104 BPM | RESPIRATION RATE: 20 BRPM | SYSTOLIC BLOOD PRESSURE: 132 MMHG | DIASTOLIC BLOOD PRESSURE: 76 MMHG

## 2021-05-04 DIAGNOSIS — E08.621 DIABETIC ULCER OF LEFT MIDFOOT ASSOCIATED WITH DIABETES MELLITUS DUE TO UNDERLYING CONDITION, WITH FAT LAYER EXPOSED (HCC): Primary | ICD-10-CM

## 2021-05-04 DIAGNOSIS — L97.422 DIABETIC ULCER OF LEFT MIDFOOT ASSOCIATED WITH DIABETES MELLITUS DUE TO UNDERLYING CONDITION, WITH FAT LAYER EXPOSED (HCC): Primary | ICD-10-CM

## 2021-05-04 PROCEDURE — 11042 DBRDMT SUBQ TIS 1ST 20SQCM/<: CPT | Performed by: PODIATRIST

## 2021-05-04 RX ORDER — LIDOCAINE 40 MG/G
CREAM TOPICAL ONCE
Status: COMPLETED | OUTPATIENT
Start: 2021-05-04 | End: 2021-05-04

## 2021-05-04 RX ADMIN — LIDOCAINE: 40 CREAM TOPICAL at 15:45

## 2021-05-04 NOTE — PATIENT INSTRUCTIONS
Orders Placed This Encounter   Procedures    Wound cleansing and dressings     Wound cleansing and dressings                                   Left Plantar foot wound:       Wash your hands with soap and water  Thalia Alaniz old dressing, discard into plastic bag and place in trash      Cleanse the wound with NSS prior to applying a clean dressing  Do not use tissue or cotton balls  Do not scrub the wound  Pat dry using gauze        Shower yes keep wound dry in the shower        Apply Mesalt to the wound and quik   Cover with gauze  Secure with lilian and tape  Change dressing every other day   This was done today        Off-loading Instructions:       Keep weight and pressure off wound at all times and Wear off-loading device as directed by your physician     Standing Status:   Future     Standing Expiration Date:   5/4/2022

## 2021-05-11 ENCOUNTER — OFFICE VISIT (OUTPATIENT)
Dept: WOUND CARE | Facility: CLINIC | Age: 46
End: 2021-05-11
Payer: COMMERCIAL

## 2021-05-11 VITALS
HEART RATE: 112 BPM | SYSTOLIC BLOOD PRESSURE: 135 MMHG | RESPIRATION RATE: 20 BRPM | TEMPERATURE: 97.3 F | DIASTOLIC BLOOD PRESSURE: 79 MMHG

## 2021-05-11 DIAGNOSIS — L97.422 DIABETIC ULCER OF LEFT MIDFOOT ASSOCIATED WITH DIABETES MELLITUS DUE TO UNDERLYING CONDITION, WITH FAT LAYER EXPOSED (HCC): Primary | ICD-10-CM

## 2021-05-11 DIAGNOSIS — E08.621 DIABETIC ULCER OF LEFT MIDFOOT ASSOCIATED WITH DIABETES MELLITUS DUE TO UNDERLYING CONDITION, WITH FAT LAYER EXPOSED (HCC): Primary | ICD-10-CM

## 2021-05-11 PROCEDURE — 11042 DBRDMT SUBQ TIS 1ST 20SQCM/<: CPT | Performed by: PODIATRIST

## 2021-05-11 RX ORDER — LIDOCAINE 40 MG/G
CREAM TOPICAL ONCE
Status: COMPLETED | OUTPATIENT
Start: 2021-05-11 | End: 2021-05-11

## 2021-05-11 RX ADMIN — LIDOCAINE: 40 CREAM TOPICAL at 10:32

## 2021-05-11 NOTE — ASSESSMENT & PLAN NOTE
Lab Results   Component Value Date    HGBA1C 14 0 (H) 09/07/2020    post debridement wound redressed with Maxorb quick

## 2021-05-11 NOTE — PROGRESS NOTES
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 to underlying condition, with fat layer exposed ([facility])  -     Cancel: Wound cleansing and dressings; Future  -     lidocaine (LMX) 4 % cream              Debridement   Universal Protocol:  Consent given by: patient  Time out: Immediately prior to procedure a "time out" was called to verify the correct patient, procedure, equipment, support staff and site/side marked as required  Performed by: physician  Debridement type: surgical  Level of debridement: subcutaneous tissue  Pain control: lidocaine 4%  Post-debridement measurements  Length (cm): 2 5  Width (cm): 2 7  Depth (cm): 0 2  Percent debrided: 100%  Surface Area (cm^2): 6 75  Area debrided (cm^2): 6 75  Volume (cm^3): 1 35  Tissue and other material debrided: subcutaneous tissue  Devitalized tissue debrided: biofilm, callus, exudate, fibrin and slough  Instrument(s) utilized: blade  Bleeding: small  Hemostasis obtained with: pressure  Procedural pain (0-10): 0  Post-procedural pain: 0   Response to treatment: procedure was tolerated well              Subjective:        Patient presents follow-up evaluation left foot      The following portions of the patient's history were reviewed and updated as appropriate: She  has a past medical history of Charcot's joint of foot, left, Diabetes mellitus ([facility]), and Osteomyelitis of foot, right, acute ([facility])  She  has a past surgical history that includes Foot surgery (Right);  Foot Amputation (Left, 10/30/2020); and Foot capsule release w/ percutaneous heel cord lengthening, tibial tendon transfer (Left, 10/30/2020)  Current Outpatient Medications   Medication Sig Dispense Refill    ACCU-CHEK FASTCLIX LANCETS MISC by Does not apply route      gabapentin (NEURONTIN) 300 mg capsule Take 1 capsule (300 mg total) by mouth 3 (three) times a day 90 capsule 0    INSULIN ASPART FLEXPEN SC Inject 30 Units under the skin 3 (three) times a day 30 units with every meal and an additional sliding scale based on blood glucose      insulin glargine (LANTUS) 100 units/mL subcutaneous injection Continue prior to admission dose (Patient taking differently: 57 Units daily at bedtime Continue prior to admission dose) 2000 Units 0    polyethylene glycol (MIRALAX) 17 g packet Take 17 g by mouth daily      Syringe/Needle, Disp, 30G X 1/2" 1 ML MISC by Does not apply route       No current facility-administered medications for this visit  Current Outpatient Medications on File Prior to Visit   Medication Sig    ACCU-CHEK FASTCLIX LANCETS MISC by Does not apply route    gabapentin (NEURONTIN) 300 mg capsule Take 1 capsule (300 mg total) by mouth 3 (three) times a day    INSULIN ASPART FLEXPEN SC Inject 30 Units under the skin 3 (three) times a day 30 units with every meal and an additional sliding scale based on blood glucose    insulin glargine (LANTUS) 100 units/mL subcutaneous injection Continue prior to admission dose (Patient taking differently: 57 Units daily at bedtime Continue prior to admission dose)    polyethylene glycol (MIRALAX) 17 g packet Take 17 g by mouth daily    Syringe/Needle, Disp, 30G X 1/2" 1 ML MISC by Does not apply route     No current facility-administered medications on file prior to visit  She is allergic to clindamycin       Review of Systems   Skin: Positive for wound  Negative for color change  Objective:        Wound 04/13/21 Diabetic Ulcer Foot Left (Active)   Wound Image   05/11/21 1040   Wound Description Pink; Beefy red 05/11/21 1028   Ursula-wound Assessment Callus;Clean;Dry 05/11/21 1028   Wound Length (cm) 2 5 cm 05/11/21 1028   Wound Width (cm) 2 7 cm 05/11/21 1028   Wound Depth (cm) 0 1 cm 05/11/21 1028   Wound Surface Area (cm^2) 6 75 cm^2 05/11/21 1028   Wound Volume (cm^3) 0 68 cm^3 05/11/21 1028   Calculated Wound Volume (cm^3) 0 68 cm^3 05/11/21 1028   Change in Wound Size % 95 77 05/11/21 1028   Undermining 1 5 04/13/21 1320   Undermining is depth extending from 1-7 04/13/21 1320   Drainage Amount Small 05/11/21 1028   Drainage Description Serous 05/11/21 1028   Non-staged Wound Description Full thickness 05/11/21 1028   Treatments Cleansed 05/11/21 1028   Patient Tolerance Tolerated well 05/11/21 1028                            /76   Pulse 104   Temp 98 °F (36 7 °C)   Resp 20     Physical Exam  Constitutional:       Appearance: She is morbidly obese  Feet:      Comments:  Left plantar foot wound is full-thickness extending through subcutaneous layer  No periwound erythema or edema no active drainage  Fibrous base well adherent  Skin:     Findings: No erythema  Neurological:      Mental Status: She is alert  Sensory: Sensory deficit present  Psychiatric:         Behavior: Behavior is cooperative  Wound Instructions:  No orders of the defined types were placed in this encounter

## 2021-05-11 NOTE — PATIENT INSTRUCTIONS
Orders Placed This Encounter   Procedures    Wound cleansing and dressings     Wound cleansing and dressings                                   Left Plantar foot wound:       Wash your hands with soap and water  Sudhakar Messing old dressing, discard into plastic bag and place in trash      Cleanse the wound with NSS prior to applying a clean dressing  Do not use tissue or cotton balls  Do not scrub the wound  Pat dry using gauze        Shower yes keep wound dry in the shower        Apply Purachol AG to the wound and [address] with gauze  Secure with lilian and tape  Change dressing every other day   This was done today        Off-loading Instructions:       Keep weight and pressure off wound at all times and Wear off-loading device as directed by your physician     Standing Status:   Future     Standing Expiration Date:   5/11/2022

## 2021-05-18 ENCOUNTER — OFFICE VISIT (OUTPATIENT)
Dept: WOUND CARE | Facility: CLINIC | Age: 46
End: 2021-05-18
Payer: COMMERCIAL

## 2021-05-18 VITALS
HEART RATE: 74 BPM | DIASTOLIC BLOOD PRESSURE: 76 MMHG | TEMPERATURE: 97.5 F | SYSTOLIC BLOOD PRESSURE: 142 MMHG | RESPIRATION RATE: 18 BRPM

## 2021-05-18 DIAGNOSIS — L97.422 DIABETIC ULCER OF LEFT MIDFOOT ASSOCIATED WITH DIABETES MELLITUS DUE TO UNDERLYING CONDITION, WITH FAT LAYER EXPOSED (HCC): Primary | ICD-10-CM

## 2021-05-18 DIAGNOSIS — E08.621 DIABETIC ULCER OF LEFT MIDFOOT ASSOCIATED WITH DIABETES MELLITUS DUE TO UNDERLYING CONDITION, WITH FAT LAYER EXPOSED (HCC): Primary | ICD-10-CM

## 2021-05-18 PROCEDURE — 11042 DBRDMT SUBQ TIS 1ST 20SQCM/<: CPT | Performed by: PODIATRIST

## 2021-05-18 RX ORDER — LIDOCAINE 40 MG/G
CREAM TOPICAL ONCE
Status: COMPLETED | OUTPATIENT
Start: 2021-05-18 | End: 2021-05-18

## 2021-05-18 RX ADMIN — LIDOCAINE: 40 CREAM TOPICAL at 15:50

## 2021-05-18 NOTE — PATIENT INSTRUCTIONS
Orders Placed This Encounter   Procedures    Wound cleansing and dressings     Wound cleansing and dressings                                     Left Plantar foot wound:       Wash your hands with soap and water   Remove old dressing, discard into plastic bag and place in trash      Cleanse the wound with NSS prior to applying a clean dressing  Do not use tissue or cotton balls  Do not scrub the wound  Pat dry using gauze        Shower yes keep wound dry in the shower        Apply Purachol AG to the wound and [address] with gauze  Secure with lilian and tape  Change dressing every other day   This was done today        Off-loading Instructions:       Keep weight and pressure off wound at all times and Wear off-loading device as directed by your physician     Standing Status:   Future     Standing Expiration Date:   5/18/2022

## 2021-05-19 NOTE — ASSESSMENT & PLAN NOTE
Left plantar foot wound redressed with Puracol Ag dry sterile dressing post debridement  Lab Results   Component Value Date    HGBA1C 14 0 (H) 09/07/2020

## 2021-05-19 NOTE — PROGRESS NOTES
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 due to underlying condition, with fat layer exposed ([address] )  -     Cancel: Wound cleansing and dressings; Future  -     lidocaine (LMX) 4 % cream              Debridement   Universal Protocol:  Consent given by: patient  Time out: Immediately prior to procedure a "time out" was called to verify the correct patient, procedure, equipment, support staff and site/side marked as required  Performed by: physician  Debridement type: surgical  Level of debridement: subcutaneous tissue  Pain control: lidocaine 4%  Post-debridement measurements  Length (cm): 3 4  Width (cm): 3 1  Depth (cm): 1  Percent debrided: 100%  Surface Area (cm^2): 10 54  Area debrided (cm^2): 10 54  Volume (cm^3): 10 54  Tissue and other material debrided: subcutaneous tissue  Devitalized tissue debrided: biofilm, callus, exudate, fibrin and slough  Instrument(s) utilized: blade  Bleeding: small  Hemostasis obtained with: pressure  Procedural pain (0-10): 0  Post-procedural pain: 0   Response to treatment: procedure was tolerated well              Subjective:             Patient seen for follow-up evaluation left foot      The following portions of the patient's history were reviewed and updated as appropriate: She  has a past medical history of Charcot's joint of foot, left, Diabetes mellitus ([address] ), and Osteomyelitis of foot, right, acute [facility])   She  has a past surgical history that includes Foot surgery (Right); Foot Amputation (Left, 10/30/2020); and Foot capsule release w/ percutaneous heel cord lengthening, tibial tendon transfer (Left, 10/30/2020)  Current Outpatient Medications   Medication Sig Dispense Refill    ACCU-CHEK FASTCLIX LANCETS MISC by Does not apply route      gabapentin (NEURONTIN) 300 mg capsule Take 1 capsule (300 mg total) by mouth 3 (three) times a day 90 capsule 0    INSULIN ASPART FLEXPEN SC Inject 30 Units under the skin 3 (three) times a day 30 units with every meal and an additional sliding scale based on blood glucose      insulin glargine (LANTUS) 100 units/mL subcutaneous injection Continue prior to admission dose (Patient taking differently: 57 Units daily at bedtime Continue prior to admission dose) 2000 Units 0    polyethylene glycol (MIRALAX) 17 g packet Take 17 g by mouth daily      Syringe/Needle, Disp, 30G X 1/2" 1 ML MISC by Does not apply route       No current facility-administered medications for this visit  Current Outpatient Medications on File Prior to Visit   Medication Sig    ACCU-CHEK FASTCLIX LANCETS MISC by Does not apply route    gabapentin (NEURONTIN) 300 mg capsule Take 1 capsule (300 mg total) by mouth 3 (three) times a day    INSULIN ASPART FLEXPEN SC Inject 30 Units under the skin 3 (three) times a day 30 units with every meal and an additional sliding scale based on blood glucose    insulin glargine (LANTUS) 100 units/mL subcutaneous injection Continue prior to admission dose (Patient taking differently: 57 Units daily at bedtime Continue prior to admission dose)    polyethylene glycol (MIRALAX) 17 g packet Take 17 g by mouth daily    Syringe/Needle, Disp, 30G X 1/2" 1 ML MISC by Does not apply route     No current facility-administered medications on file prior to visit  She is allergic to clindamycin       Review of Systems   Skin: Positive for wound   Negative for color change  Objective:         Wound 04/13/21 Diabetic Ulcer Foot Left (Active)   Wound Image   05/18/21 1546   Wound Description Pink; Beefy red 05/18/21 1546   Ursula-wound Assessment Callus;Clean;Dry 05/18/21 1546   Wound Length (cm) 2 3 cm 05/18/21 1546   Wound Width (cm) 2 3 cm 05/18/21 1546   Wound Depth (cm) 0 1 cm 05/18/21 1546   Wound Surface Area (cm^2) 5 29 cm^2 05/18/21 1546   Wound Volume (cm^3) 0 53 cm^3 05/18/21 1546   Calculated Wound Volume (cm^3) 0 53 cm^3 05/18/21 1546   Change in Wound Size % 96 7 05/18/21 1546   Undermining 1 5 04/13/21 1320   Undermining is depth extending from 1-7 04/13/21 1320   Drainage Amount Scant 05/18/21 1546   Drainage Description Serous 05/18/21 1546   Non-staged Wound Description Full thickness 05/18/21 1546   Treatments Irrigation with NSS 05/18/21 1546   Patient Tolerance Tolerated well 05/11/21 1028                          /79   Pulse (!) 112   Temp (!) 97 3 °F (36 3 °C)   Resp 20     Physical Exam  Constitutional:       General: She is awake  Musculoskeletal:      Left lower leg: Edema present  Feet:      Comments:  Left foot wound is full-thickness extending through subcutaneous layer with fibro granular base well adherent  No active drainage no periwound erythema or edema  Skin:     Findings: No erythema  Neurological:      Mental Status: She is alert  Psychiatric:         Behavior: Behavior is cooperative  Wound Instructions:  No orders of the defined types were placed in this encounter

## 2021-05-21 NOTE — PROGRESS NOTES
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 to underlying condition, with fat layer exposed ([address] )  -     Wound cleansing and dressings; Future  -     lidocaine (LMX) 4 % cream    Controlled type 2 diabetes mellitus with diabetic neuropathy, with long-term current use of insulin ([facility])  -     Wound cleansing and dressings; Future  -     lidocaine (LMX) 4 % cream              Debridement   Universal Protocol:  Consent given by: patient  Time out: Immediately prior to procedure a "time out" was called to verify the correct patient, procedure, equipment, support staff and site/side marked as required  Performed by: physician  Debridement type: surgical  Level of debridement: subcutaneous tissue  Pain control: lidocaine 4%  Post-debridement measurements  Length (cm): 2 3  Width (cm): 2 3  Depth (cm): 0 2  Percent debrided: 100%  Surface Area (cm^2): 5 29  Area debrided (cm^2): 5 29  Volume (cm^3): 1 06  Tissue and other material debrided: subcutaneous tissue  Devitalized tissue debrided: biofilm, callus, exudate, fibrin and slough  Instrument(s) utilized: blade  Bleeding: small  Hemostasis obtained with: pressure  Procedural pain (0-10): 0  Post-procedural pain: 0   Response to treatment: procedure was tolerated well              Subjective:        Patient seen for follow-up evaluation left heel      The following portions of the patient's history were reviewed and updated as appropriate: She  has a past medical history of Charcot's joint of foot, left, Diabetes mellitus ([address] ), and Osteomyelitis of foot, right, acute ([facility] )    She  has a past surgical history that includes Foot surgery (Right); Foot Amputation (Left, 10/30/2020); and Foot capsule release w/ percutaneous heel cord lengthening, tibial tendon transfer (Left, 10/30/2020)  Current Outpatient Medications   Medication Sig Dispense Refill    ACCU-CHEK FASTCLIX LANCETS MISC by Does not apply route      gabapentin (NEURONTIN) 300 mg capsule Take 1 capsule (300 mg total) by mouth 3 (three) times a day 90 capsule 0    INSULIN ASPART FLEXPEN SC Inject 30 Units under the skin 3 (three) times a day 30 units with every meal and an additional sliding scale based on blood glucose      insulin glargine (LANTUS) 100 units/mL subcutaneous injection Continue prior to admission dose (Patient taking differently: 57 Units daily at bedtime Continue prior to admission dose) 2000 Units 0    polyethylene glycol (MIRALAX) 17 g packet Take 17 g by mouth daily      Syringe/Needle, Disp, 30G X 1/2" 1 ML MISC by Does not apply route       No current facility-administered medications for this visit  Current Outpatient Medications on File Prior to Visit   Medication Sig    ACCU-CHEK FASTCLIX LANCETS MISC by Does not apply route    gabapentin (NEURONTIN) 300 mg capsule Take 1 capsule (300 mg total) by mouth 3 (three) times a day    INSULIN ASPART FLEXPEN SC Inject 30 Units under the skin 3 (three) times a day 30 units with every meal and an additional sliding scale based on blood glucose    insulin glargine (LANTUS) 100 units/mL subcutaneous injection Continue prior to admission dose (Patient taking differently: 57 Units daily at bedtime Continue prior to admission dose)    polyethylene glycol (MIRALAX) 17 g packet Take 17 g by mouth daily    Syringe/Needle, Disp, 30G X 1/2" 1 ML MISC by Does not apply route     No current facility-administered medications on file prior to visit  She is allergic to clindamycin       Review of Systems   Skin: Positive for wound  Negative for color change           Objective: Wound 04/13/21 Diabetic Ulcer Foot Left (Active)   Wound Image   05/18/21 1546   Wound Description Pink; Beefy red 05/18/21 1546   Ursula-wound Assessment Callus;Clean;Dry 05/18/21 1546   Wound Length (cm) 2 3 cm 05/18/21 1546   Wound Width (cm) 2 3 cm 05/18/21 1546   Wound Depth (cm) 0 1 cm 05/18/21 1546   Wound Surface Area (cm^2) 5 29 cm^2 05/18/21 1546   Wound Volume (cm^3) 0 53 cm^3 05/18/21 1546   Calculated Wound Volume (cm^3) 0 53 cm^3 05/18/21 1546   Change in Wound Size % 96 7 05/18/21 1546   Undermining 1 5 04/13/21 1320   Undermining is depth extending from 1-7 04/13/21 1320   Drainage Amount Scant 05/18/21 1546   Drainage Description Serous 05/18/21 1546   Non-staged Wound Description Full thickness 05/18/21 1546   Treatments Irrigation with NSS 05/18/21 1546   Patient Tolerance Tolerated well 05/11/21 1028                          /76   Pulse 74   Temp 97 5 °F (36 4 °C)   Resp 18     Physical Exam  Constitutional:       General: She is awake  Appearance: She is morbidly obese  Musculoskeletal:        Feet:    Skin:     Findings: No erythema  Neurological:      Mental Status: She is alert  Sensory: Sensory deficit present  Psychiatric:         Behavior: Behavior is cooperative  Wound Instructions:  Orders Placed This Encounter   Procedures    Wound cleansing and dressings     Wound cleansing and dressings                                     Left Plantar foot wound:       Wash your hands with soap and water   Remove old dressing, discard into plastic bag and place in trash      Cleanse the wound with NSS prior to applying a clean dressing  Do not use tissue or cotton balls  Do not scrub the wound  Pat dry using gauze        Shower yes keep wound dry in the shower        Apply Purachol AG to the wound and [address] with gauze  Secure with lilian and tape  Change dressing every other day   This was done today        Off-loading Instructions:       Keep weight and pressure off wound at all times and Wear off-loading device as directed by your physician     Standing Status:   Future     Standing Expiration Date:   5/18/2022

## 2021-05-25 ENCOUNTER — OFFICE VISIT (OUTPATIENT)
Dept: WOUND CARE | Facility: CLINIC | Age: 46
End: 2021-05-25
Payer: COMMERCIAL

## 2021-05-25 VITALS
TEMPERATURE: 98.5 F | HEART RATE: 88 BPM | DIASTOLIC BLOOD PRESSURE: 82 MMHG | SYSTOLIC BLOOD PRESSURE: 162 MMHG | RESPIRATION RATE: 18 BRPM

## 2021-05-25 DIAGNOSIS — L97.422 DIABETIC ULCER OF LEFT MIDFOOT ASSOCIATED WITH DIABETES MELLITUS DUE TO UNDERLYING CONDITION, WITH FAT LAYER EXPOSED (HCC): Primary | ICD-10-CM

## 2021-05-25 DIAGNOSIS — E08.621 DIABETIC ULCER OF LEFT MIDFOOT ASSOCIATED WITH DIABETES MELLITUS DUE TO UNDERLYING CONDITION, WITH FAT LAYER EXPOSED (HCC): Primary | ICD-10-CM

## 2021-05-25 PROCEDURE — 11042 DBRDMT SUBQ TIS 1ST 20SQCM/<: CPT | Performed by: PODIATRIST

## 2021-05-25 RX ORDER — LIDOCAINE 40 MG/G
CREAM TOPICAL ONCE
Status: COMPLETED | OUTPATIENT
Start: 2021-05-25 | End: 2021-05-25

## 2021-05-25 RX ADMIN — LIDOCAINE: 40 CREAM TOPICAL at 15:54

## 2021-05-25 NOTE — PATIENT INSTRUCTIONS
Orders Placed This Encounter   Procedures    Wound cleansing and dressings     Wound cleansing and dressings                                     Left Plantar foot wound:       Wash your hands with soap and water   Remove old dressing, discard into plastic bag and place in trash      Cleanse the wound with NSS prior to applying a clean dressing  Do not use tissue or cotton balls  Do not scrub the wound  Pat dry using gauze        Shower yes keep wound dry in the shower        Apply Purachol AG to the wound and [address] with gauze  Secure with lilian and tape  Change dressing every other day   This was done today        Off-loading Instructions:       Keep weight and pressure off wound at all times and Wear off-loading device as directed by your physician     Standing Status:   Future     Standing Expiration Date:   5/25/2022

## 2021-05-25 NOTE — ASSESSMENT & PLAN NOTE
Post debridement wound redressed with Puracol Ag dry sterile dressing  Lab Results   Component Value Date    HGBA1C 14 0 (H) 09/07/2020

## 2021-05-25 NOTE — PROGRESS NOTES
Patient ID: Nori Altamirano is a 54 y o  female Date of Birth 1975     Chief Complaint   Patient presents with    Follow Up Wound Care Visit     dressing intact       Allergies  Clindamycin    Assessment and Plan    Diabetic ulcer of left midfoot associated with diabetes mellitus due to underlying condition, with fat layer exposed ([address] )   Post debridement wound redressed with Puracol Ag dry sterile dressing  Lab Results   Component Value Date    HGBA1C 14 0 (H) 09/07/2020        Diagnoses and all orders for this visit:    Diabetic ulcer of left midfoot associated with diabetes mellitus due to underlying condition, with fat layer exposed ([address] )  -     Wound cleansing and dressings; Future  -     lidocaine (LMX) 4 % cream              Debridement   Universal Protocol:  Consent given by: patient  Time out: Immediately prior to procedure a "time out" was called to verify the correct patient, procedure, equipment, support staff and site/side marked as required  Performed by: physician  Debridement type: surgical  Level of debridement: subcutaneous tissue  Pain control: lidocaine 4%  Post-debridement measurements  Length (cm): 2  Width (cm): 2 3  Depth (cm): 0 2  Percent debrided: 100%  Surface Area (cm^2): 4 6  Area debrided (cm^2): 4 6  Volume (cm^3): 0 92  Tissue and other material debrided: subcutaneous tissue  Devitalized tissue debrided: biofilm, callus, exudate, fibrin and slough  Instrument(s) utilized: blade  Bleeding: small  Hemostasis obtained with: pressure  Procedural pain (0-10): 0  Post-procedural pain: 0   Response to treatment: procedure was tolerated well              Subjective:        Patient presents for follow-up evaluation left plantar foot      The following portions of the patient's history were reviewed and updated as appropriate: She  has a past medical history of Charcot's joint of foot, left, Diabetes mellitus ([address] ), and Osteomyelitis of foot, right, acute ([address] )    She  has a past surgical history that includes Foot surgery (Right); Foot Amputation (Left, 10/30/2020); and Foot capsule release w/ percutaneous heel cord lengthening, tibial tendon transfer (Left, 10/30/2020)  Current Outpatient Medications   Medication Sig Dispense Refill    ACCU-CHEK FASTCLIX LANCETS MISC by Does not apply route      gabapentin (NEURONTIN) 300 mg capsule Take 1 capsule (300 mg total) by mouth 3 (three) times a day 90 capsule 0    INSULIN ASPART FLEXPEN SC Inject 30 Units under the skin 3 (three) times a day 30 units with every meal and an additional sliding scale based on blood glucose      insulin glargine (LANTUS) 100 units/mL subcutaneous injection Continue prior to admission dose (Patient taking differently: 57 Units daily at bedtime Continue prior to admission dose) 2000 Units 0    polyethylene glycol (MIRALAX) 17 g packet Take 17 g by mouth daily      Syringe/Needle, Disp, 30G X 1/2" 1 ML MISC by Does not apply route       No current facility-administered medications for this visit  Current Outpatient Medications on File Prior to Visit   Medication Sig    ACCU-CHEK FASTCLIX LANCETS MISC by Does not apply route    gabapentin (NEURONTIN) 300 mg capsule Take 1 capsule (300 mg total) by mouth 3 (three) times a day    INSULIN ASPART FLEXPEN SC Inject 30 Units under the skin 3 (three) times a day 30 units with every meal and an additional sliding scale based on blood glucose    insulin glargine (LANTUS) 100 units/mL subcutaneous injection Continue prior to admission dose (Patient taking differently: 57 Units daily at bedtime Continue prior to admission dose)    polyethylene glycol (MIRALAX) 17 g packet Take 17 g by mouth daily    Syringe/Needle, Disp, 30G X 1/2" 1 ML MISC by Does not apply route     No current facility-administered medications on file prior to visit  She is allergic to clindamycin       Review of Systems   Skin: Positive for wound           Objective:       Wound 04/13/21 Diabetic Ulcer Foot Left (Active)   Wound Image Images linked 05/25/21 1547   Wound Description Pink; Beefy red 05/25/21 1547   Ursula-wound Assessment Callus;Clean;Dry 05/25/21 1547   Wound Length (cm) 2 cm 05/25/21 1547   Wound Width (cm) 2 3 cm 05/25/21 1547   Wound Depth (cm) 0 1 cm 05/25/21 1547   Wound Surface Area (cm^2) 4 6 cm^2 05/25/21 1547   Wound Volume (cm^3) 0 46 cm^3 05/25/21 1547   Calculated Wound Volume (cm^3) 0 46 cm^3 05/25/21 1547   Change in Wound Size % 97 14 05/25/21 1547   Drainage Amount Moderate 05/25/21 1547   Drainage Description Serous 05/25/21 1547   Non-staged Wound Description Full thickness 05/25/21 1547   Treatments Irrigation with NSS 05/25/21 1547       /82   Pulse 88   Temp 98 5 °F (36 9 °C)   Resp 18     Physical Exam  Constitutional:       General: She is awake  Appearance: She is obese  Musculoskeletal:         General: No tenderness  Left lower leg: Edema present  Feet:      Comments:  Left plantar foot wound is full-thickness extending through subcutaneous layer with well adherent fibrous base  No periwound erythema or edema no active drainage  Neurological:      Mental Status: She is alert  Psychiatric:         Behavior: Behavior is cooperative  Wound Instructions:  Orders Placed This Encounter   Procedures    Wound cleansing and dressings     Wound cleansing and dressings                                     Left Plantar foot wound:       Wash your hands with soap and water   Remove old dressing, discard into plastic bag and place in trash      Cleanse the wound with NSS prior to applying a clean dressing  Do not use tissue or cotton balls  Do not scrub the wound  Pat dry using gauze        Shower yes keep wound dry in the shower        Apply Purachol AG to the wound and [address] with gauze  Secure with lilian and tape  Change dressing every other day   This was done today        Off-loading Instructions:       Keep weight and pressure off wound at all times and Wear off-loading device as directed by your physician     Standing Status:   Future     Standing Expiration Date:   5/25/2022

## 2021-06-02 ENCOUNTER — OFFICE VISIT (OUTPATIENT)
Dept: WOUND CARE | Facility: CLINIC | Age: 46
End: 2021-06-02
Payer: COMMERCIAL

## 2021-06-02 VITALS
DIASTOLIC BLOOD PRESSURE: 84 MMHG | RESPIRATION RATE: 18 BRPM | HEART RATE: 80 BPM | SYSTOLIC BLOOD PRESSURE: 149 MMHG | TEMPERATURE: 97.6 F

## 2021-06-02 DIAGNOSIS — L97.422 DIABETIC ULCER OF LEFT MIDFOOT ASSOCIATED WITH DIABETES MELLITUS DUE TO UNDERLYING CONDITION, WITH FAT LAYER EXPOSED (HCC): Primary | ICD-10-CM

## 2021-06-02 DIAGNOSIS — E08.621 DIABETIC ULCER OF LEFT MIDFOOT ASSOCIATED WITH DIABETES MELLITUS DUE TO UNDERLYING CONDITION, WITH FAT LAYER EXPOSED (HCC): Primary | ICD-10-CM

## 2021-06-02 PROCEDURE — 11042 DBRDMT SUBQ TIS 1ST 20SQCM/<: CPT | Performed by: PODIATRIST

## 2021-06-02 NOTE — PATIENT INSTRUCTIONS
Orders Placed This Encounter   Procedures    Debridement     This order was created via procedure documentation    Wound cleansing and dressings     Wound cleansing and dressings                                     Left Plantar foot wound:       Wash your hands with soap and water   Remove old dressing, discard into plastic bag and place in trash      Cleanse the wound with NSS prior to applying a clean dressing  Do not use tissue or cotton balls  Do not scrub the wound  Pat dry using gauze        Shower yes keep wound dry in the shower        Apply Purachol AG to the wound and [address] with gauze  Secure with lilian and tape  Change dressing every other day   This was done today        Off-loading Instructions:       Keep weight and pressure off wound at all times and Wear off-loading device as directed by your physician     Standing Status:   Future     Standing Expiration Date:   6/2/2022

## 2021-06-02 NOTE — PROGRESS NOTES
Patient ID: Gray Mckeon is a 54 y o  female Date of Birth 1975     Chief Complaint   Patient presents with    Follow Up Wound Care Visit       Allergies  Clindamycin    Assessment and Plan    No problem-specific Assessment & Plan notes found for this encounter  Diagnoses and all orders for this visit:    Diabetic ulcer of left midfoot associated with diabetes mellitus due to underlying condition, with fat layer exposed ([facility] )              Debridement   Universal Protocol:  Consent given by: patient  Time out: Immediately prior to procedure a "time out" was called to verify the correct patient, procedure, equipment, support staff and site/side marked as required  Performed by: physician  Debridement type: surgical  Level of debridement: subcutaneous tissue  Pain control: lidocaine 4%  Post-debridement measurements  Length (cm): 2 2  Width (cm): 2  Depth (cm): 0 3  Percent debrided: 100%  Surface Area (cm^2): 4 4  Area debrided (cm^2): 4 4  Volume (cm^3): 1 32  Tissue and other material debrided: subcutaneous tissue  Devitalized tissue debrided: biofilm, callus, exudate, fibrin and slough  Instrument(s) utilized: blade  Bleeding: small  Hemostasis obtained with: pressure  Procedural pain (0-10): 0  Post-procedural pain: 0   Response to treatment: procedure was tolerated well              Subjective:        Patient seen for follow-up evaluation left foot wound  She has had brace adjustment this morning      The following portions of the patient's history were reviewed and updated as appropriate: She  has a past medical history of Charcot's joint of foot, left, Diabetes mellitus ([facility] [address] ), and Osteomyelitis of foot, right, acute ([facility] [address] )  She  has a past surgical history that includes Foot surgery (Right); Foot Amputation (Left, 10/30/2020); and Foot capsule release w/ percutaneous heel cord lengthening, tibial tendon transfer (Left, 10/30/2020)    Current Outpatient Medications   Medication Sig Dispense Refill    ACCU-CHEK FASTCLIX LANCETS MISC by Does not apply route      gabapentin (NEURONTIN) 300 mg capsule Take 1 capsule (300 mg total) by mouth 3 (three) times a day 90 capsule 0    INSULIN ASPART FLEXPEN SC Inject 30 Units under the skin 3 (three) times a day 30 units with every meal and an additional sliding scale based on blood glucose      insulin glargine (LANTUS) 100 units/mL subcutaneous injection Continue prior to admission dose (Patient taking differently: 57 Units daily at bedtime Continue prior to admission dose) 2000 Units 0    polyethylene glycol (MIRALAX) 17 g packet Take 17 g by mouth daily      Syringe/Needle, Disp, 30G X 1/2" 1 ML MISC by Does not apply route       No current facility-administered medications for this visit  Current Outpatient Medications on File Prior to Visit   Medication Sig    ACCU-CHEK FASTCLIX LANCETS MISC by Does not apply route    gabapentin (NEURONTIN) 300 mg capsule Take 1 capsule (300 mg total) by mouth 3 (three) times a day    INSULIN ASPART FLEXPEN SC Inject 30 Units under the skin 3 (three) times a day 30 units with every meal and an additional sliding scale based on blood glucose    insulin glargine (LANTUS) 100 units/mL subcutaneous injection Continue prior to admission dose (Patient taking differently: 57 Units daily at bedtime Continue prior to admission dose)    polyethylene glycol (MIRALAX) 17 g packet Take 17 g by mouth daily    Syringe/Needle, Disp, 30G X 1/2" 1 ML MISC by Does not apply route     No current facility-administered medications on file prior to visit  She is allergic to clindamycin       Review of Systems   Skin: Positive for wound  Negative for color change  Objective:       Wound 04/13/21 Diabetic Ulcer Foot Left (Active)   Wound Image Images linked 06/02/21 1533   Wound Description Pink; Beefy red 06/02/21 1533   Ursula-wound Assessment Callus;Clean;Dry 06/02/21 1533   Wound Length (cm) 1 9 cm 06/02/21 1533   Wound Width (cm) 1 9 cm 06/02/21 1533   Wound Depth (cm) 0 1 cm 06/02/21 1533   Wound Surface Area (cm^2) 3 61 cm^2 06/02/21 1533   Wound Volume (cm^3) 0 36 cm^3 06/02/21 1533   Calculated Wound Volume (cm^3) 0 36 cm^3 06/02/21 1533   Change in Wound Size % 97 76 06/02/21 1533   Drainage Amount Moderate 06/02/21 1533   Drainage Description Serous 06/02/21 1533   Non-staged Wound Description Full thickness 06/02/21 1533   Treatments Irrigation with NSS 06/02/21 1533       There were no vitals taken for this visit  Physical Exam  Constitutional:       General: She is awake  Appearance: She is morbidly obese  Musculoskeletal:      Left lower leg: Edema present  Feet:      Comments:  Left foot wound is full-thickness extending through subcutaneous layer with hyperkeratosis periwound  Diffuse lower extremity edema left lower extremity     fibro granular base with fibrous tissue loosely adherent no active drainage with hyperkeratosis 0 25 cm periwound  Neurological:      Mental Status: She is alert  Psychiatric:         Behavior: Behavior is cooperative  Wound Instructions:  No orders of the defined types were placed in this encounter

## 2021-06-08 ENCOUNTER — OFFICE VISIT (OUTPATIENT)
Dept: WOUND CARE | Facility: CLINIC | Age: 46
End: 2021-06-08
Payer: COMMERCIAL

## 2021-06-08 VITALS
HEART RATE: 105 BPM | RESPIRATION RATE: 16 BRPM | SYSTOLIC BLOOD PRESSURE: 124 MMHG | TEMPERATURE: 97.3 F | DIASTOLIC BLOOD PRESSURE: 72 MMHG

## 2021-06-08 DIAGNOSIS — E08.621 DIABETIC ULCER OF LEFT MIDFOOT ASSOCIATED WITH DIABETES MELLITUS DUE TO UNDERLYING CONDITION, WITH FAT LAYER EXPOSED (HCC): Primary | ICD-10-CM

## 2021-06-08 DIAGNOSIS — L97.422 DIABETIC ULCER OF LEFT MIDFOOT ASSOCIATED WITH DIABETES MELLITUS DUE TO UNDERLYING CONDITION, WITH FAT LAYER EXPOSED (HCC): Primary | ICD-10-CM

## 2021-06-08 PROCEDURE — 15004 WOUND PREP F/N/HF/G: CPT | Performed by: PODIATRIST

## 2021-06-08 PROCEDURE — 15275 SKIN SUB GRAFT FACE/NK/HF/G: CPT | Performed by: PODIATRIST

## 2021-06-08 RX ORDER — LIDOCAINE 40 MG/G
CREAM TOPICAL ONCE
Status: COMPLETED | OUTPATIENT
Start: 2021-06-08 | End: 2021-06-08

## 2021-06-08 RX ADMIN — LIDOCAINE 1 APPLICATION: 40 CREAM TOPICAL at 15:50

## 2021-06-08 NOTE — PROGRESS NOTES
Procedure Performed: Surgical Preparation Foot Wound = <100sq cm     A formal timeout including patient identification, laterality and existing allergies using [facility] protocol was conducted  Anesthetic used as needed to reduce discomfort  Under aseptic technique, the wound was thoroughly sharp debrided using scalpel to remove all rolled wound edges, fibrotic tissue and debris from the wound base and marginal keratotic tissue so that only viable bleeding tissue remains  This was followed by irrigation with saline solution  This was done in an effort to reduce bacterial burden and provide a viable wound bed surface for the upcoming  Apligraf procedure   Total sq cm debrided = 4

## 2021-06-08 NOTE — PROGRESS NOTES
Patient ID: Gray Mckeon is a 54 y o  female Date of Birth 1975     Chief Complaint   Patient presents with    Follow Up Wound Care Visit     left LE       Allergies  Clindamycin    Assessment and Plan    No problem-specific Assessment & Plan notes found for this encounter  Diagnoses and all orders for this visit:    Diabetic ulcer of left midfoot associated with diabetes mellitus due to underlying condition, with fat layer exposed ([address] )  -     lidocaine (LMX) 4 % cream              Skin Substitute   Performed by: Physician  Product: Apligraf  Application Location and Measurements  Location: genitalia / hands / feet / multiple digits  Skin Sub Lot #: [id number]  Skin Sub Expiration: 6/16/21  Product Applied (sq cm): 7  Product Wasted (sq cm): 37    Application Details  Fenestrated: Yes  Instrument: blade  Secured: Yes  Secured With: Steri-Strips  Dressing Applied: Yes    Procedural pain (0-10): insensate  Post-procedural pain: insensate   Response to treatment: procedure was tolerated well     NSS  NSS Lot #: [id number]   NSS Expiration: 1/31/24                Subjective:        Patient presents for follow-up evaluation left foot wound      The following portions of the patient's history were reviewed and updated as appropriate: She  has a past medical history of Charcot's joint of foot, left, Diabetes mellitus ([facility] [address] ), and Osteomyelitis of foot, right, acute ([facility] [address] )  She  has a past surgical history that includes Foot surgery (Right); Foot Amputation (Left, 10/30/2020); and Foot capsule release w/ percutaneous heel cord lengthening, tibial tendon transfer (Left, 10/30/2020)    Current Outpatient Medications   Medication Sig Dispense Refill    ACCU-CHEK FASTCLIX LANCETS MISC by Does not apply route      gabapentin (NEURONTIN) 300 mg capsule Take 1 capsule (300 mg total) by mouth 3 (three) times a day 90 capsule 0    INSULIN ASPART FLEXPEN SC Inject 30 Units under the skin 3 (three) times a day 30 units with every meal and an additional sliding scale based on blood glucose      insulin glargine (LANTUS) 100 units/mL subcutaneous injection Continue prior to admission dose (Patient taking differently: 57 Units daily at bedtime Continue prior to admission dose) 2000 Units 0    polyethylene glycol (MIRALAX) 17 g packet Take 17 g by mouth daily      Syringe/Needle, Disp, 30G X 1/2" 1 ML MISC by Does not apply route       No current facility-administered medications for this visit  Current Outpatient Medications on File Prior to Visit   Medication Sig    ACCU-CHEK FASTCLIX LANCETS MISC by Does not apply route    gabapentin (NEURONTIN) 300 mg capsule Take 1 capsule (300 mg total) by mouth 3 (three) times a day    INSULIN ASPART FLEXPEN SC Inject 30 Units under the skin 3 (three) times a day 30 units with every meal and an additional sliding scale based on blood glucose    insulin glargine (LANTUS) 100 units/mL subcutaneous injection Continue prior to admission dose (Patient taking differently: 57 Units daily at bedtime Continue prior to admission dose)    polyethylene glycol (MIRALAX) 17 g packet Take 17 g by mouth daily    Syringe/Needle, Disp, 30G X 1/2" 1 ML MISC by Does not apply route     No current facility-administered medications on file prior to visit  She is allergic to clindamycin       Review of Systems   Skin: Positive for wound  Negative for color change  Objective:       Wound 04/13/21 Diabetic Ulcer Foot Left (Active)   Wound Description Pink; Beefy red 06/08/21 1540   Ursula-wound Assessment Callus;Clean;Dry;Hemosiderin staining 06/08/21 1540   Wound Length (cm) 2 cm 06/08/21 1540   Wound Width (cm) 2 cm 06/08/21 1540   Wound Depth (cm) 0 1 cm 06/08/21 1540   Wound Surface Area (cm^2) 4 cm^2 06/08/21 1540   Wound Volume (cm^3) 0 4 cm^3 06/08/21 1540   Calculated Wound Volume (cm^3) 0 4 cm^3 06/08/21 1540   Change in Wound Size % 97 51 06/08/21 1540   Drainage Amount Small 06/08/21 1540   Drainage Description Serosanguineous 06/08/21 1540   Non-staged Wound Description Full thickness 06/08/21 1540   Treatments Irrigation with NSS 06/08/21 1540   Dressing Changed Changed 06/08/21 1540   Patient Tolerance Tolerated well 06/08/21 1540       /72   Pulse 105   Temp (!) 97 3 °F (36 3 °C)   Resp 16     Physical Exam  Constitutional:       General: She is awake  Appearance: She is morbidly obese  Feet:      Comments:  Left foot wound is full-thickness  With hyperkeratosis 0 5 cm periwound  No active drainage no periwound maceration  Skin:     Findings: No erythema  Neurological:      Mental Status: She is alert  Psychiatric:         Behavior: Behavior is cooperative  Wound Instructions:  No orders of the defined types were placed in this encounter

## 2021-06-08 NOTE — PATIENT INSTRUCTIONS
Orders Placed This Encounter   Procedures    Skin Substitute     This order was created via procedure documentation    Wound cleansing and dressings     Debridement       This order was created via procedure documentation   Wound cleansing and dressings      Wound cleansing and dressings                                     Left Plantar foot wound:       Wash your hands with soap and water   Remove old dressing, discard into plastic bag and place in trash      Cleanse the wound with NSS prior to applying a clean dressing  Do not use tissue or cotton balls  Do not scrub the wound  Pat dry using gauze         Shower No  Apligraf applied by physician   PO number [id number]  52:06 01  PH 7 3    Dry sterile dressing over top with gauze, ABD pad, and kerlix  Secured with tape  This was done at today's visit  Leave dressing in place and do not get wet for one week  Come back to wound center in one week for dressing change and wound assessment       Off-loading Instructions:       Keep weight and pressure off wound at all times and Wear off-loading device as directed by your physician     Standing Status:   Future     Standing Expiration Date:   6/8/2022

## 2021-06-15 ENCOUNTER — OFFICE VISIT (OUTPATIENT)
Dept: WOUND CARE | Facility: CLINIC | Age: 46
End: 2021-06-15
Payer: COMMERCIAL

## 2021-06-15 VITALS
RESPIRATION RATE: 18 BRPM | HEART RATE: 92 BPM | SYSTOLIC BLOOD PRESSURE: 150 MMHG | DIASTOLIC BLOOD PRESSURE: 92 MMHG | TEMPERATURE: 98.3 F

## 2021-06-15 DIAGNOSIS — L97.422 DIABETIC ULCER OF LEFT MIDFOOT ASSOCIATED WITH DIABETES MELLITUS DUE TO UNDERLYING CONDITION, WITH FAT LAYER EXPOSED (HCC): ICD-10-CM

## 2021-06-15 DIAGNOSIS — Z79.4 CONTROLLED TYPE 2 DIABETES MELLITUS WITH DIABETIC NEUROPATHY, WITH LONG-TERM CURRENT USE OF INSULIN (HCC): Primary | ICD-10-CM

## 2021-06-15 DIAGNOSIS — E08.621 DIABETIC ULCER OF LEFT MIDFOOT ASSOCIATED WITH DIABETES MELLITUS DUE TO UNDERLYING CONDITION, WITH FAT LAYER EXPOSED (HCC): ICD-10-CM

## 2021-06-15 DIAGNOSIS — E11.40 CONTROLLED TYPE 2 DIABETES MELLITUS WITH DIABETIC NEUROPATHY, WITH LONG-TERM CURRENT USE OF INSULIN (HCC): Primary | ICD-10-CM

## 2021-06-15 PROCEDURE — 15275 SKIN SUB GRAFT FACE/NK/HF/G: CPT | Performed by: PODIATRIST

## 2021-06-15 PROCEDURE — 15004 WOUND PREP F/N/HF/G: CPT | Performed by: PODIATRIST

## 2021-06-15 NOTE — PATIENT INSTRUCTIONS
Orders Placed This Encounter   Procedures    Wound cleansing and dressings     Wound cleansing and dressings                                     Left Plantar foot wound:       Wash your hands with soap and water   Remove old dressing, discard into plastic bag and place in trash      Cleanse the wound with NSS prior to applying a clean dressing  Do not use tissue or cotton balls  Do not scrub the wound  Pat dry using gauze         Shower No  Apligraf applied by physician        Dry sterile dressing over top with gauze, ABD pad, and kerlix  Secured with tape  This was done at today's visit  Leave dressing in place and do not get wet for one week   Come back to wound center in one week for dressing change and wound assessment       Off-loading Instructions:       Keep weight and pressure off wound at all times and Wear off-loading device as directed by your physician     Standing Status:   Future     Standing Expiration Date:   6/15/2022

## 2021-06-16 NOTE — ASSESSMENT & PLAN NOTE
Apligraf applied left lower extremity and offloaded  Lab Results   Component Value Date    HGBA1C 14 0 (H) 09/07/2020

## 2021-06-16 NOTE — PROGRESS NOTES
Procedure Performed: Surgical Preparation Foot Wound = <100sq cm     A formal timeout including patient identification, laterality and existing allergies using [facility] protocol was conducted  Anesthetic used as needed to reduce discomfort  Under aseptic technique, the wound was thoroughly sharp debrided using scalpel to remove all rolled wound edges, fibrotic tissue and debris from the wound base and marginal keratotic tissue so that only viable bleeding tissue remains  This was followed by irrigation with Prophase/saline solution  This was done in an effort to reduce bacterial burden and provide a viable wound bed surface for the upcoming  Apligraf procedure   Total sq cm debrided = 2 25

## 2021-06-16 NOTE — PROGRESS NOTES
Patient ID: Dia Yang is a 54 y o  female Date of Birth 1975     Chief Complaint   Patient presents with    Follow Up Wound Care Visit     Patient here for follow up for left foot wound       Allergies  Clindamycin    Assessment and Plan    Diabetic ulcer of left midfoot associated with diabetes mellitus due to underlying condition, with fat layer exposed ([address] )   Apligraf applied left lower extremity and offloaded  Lab Results   Component Value Date    HGBA1C 14 0 (H) 09/07/2020        Diagnoses and all orders for this visit:    Controlled type 2 diabetes mellitus with diabetic neuropathy, with long-term current use of insulin ([address] )  -     Wound cleansing and dressings; Future    Diabetic ulcer of left midfoot associated with diabetes mellitus due to underlying condition, with fat layer exposed ([address] )  -     Wound cleansing and dressings; Future  -     Skin Substitute              Skin Substitute   Performed by: Physician  Product: Apligraf  Application Location and Measurements  Location: scalp / face / neck / ears  Skin Sub Lot #: [id number]  [id number]  Skin Sub Expiration: 6/19/21  Product Applied (sq cm): 10  Product Wasted (sq cm): 34    Application Details  Fenestrated: Yes  Instrument: blade  Secured: Yes  Secured With: Steri-Strips  Dressing Applied: Yes    Procedural pain (0-10): insensate  Post-procedural pain: insensate   Response to treatment: procedure was tolerated well     NSS  NSS Lot #: [id number]   NSS Expiration: 2/29/24                Subjective:        Patient presents for follow-up evaluation right foot wound      The following portions of the patient's history were reviewed and updated as appropriate: She  has a past medical history of Charcot's joint of foot, left, Diabetes mellitus ([address] ), and Osteomyelitis of foot, right, acute ([address] )  She  has a past surgical history that includes Foot surgery (Right);  Foot Amputation (Left, 10/30/2020); and Foot capsule release w/ percutaneous heel cord lengthening, tibial tendon transfer (Left, 10/30/2020)  Current Outpatient Medications   Medication Sig Dispense Refill    ACCU-CHEK FASTCLIX LANCETS MISC by Does not apply route      gabapentin (NEURONTIN) 300 mg capsule Take 1 capsule (300 mg total) by mouth 3 (three) times a day 90 capsule 0    INSULIN ASPART FLEXPEN SC Inject 30 Units under the skin 3 (three) times a day 30 units with every meal and an additional sliding scale based on blood glucose      insulin glargine (LANTUS) 100 units/mL subcutaneous injection Continue prior to admission dose (Patient taking differently: 57 Units daily at bedtime Continue prior to admission dose) 2000 Units 0    polyethylene glycol (MIRALAX) 17 g packet Take 17 g by mouth daily      Syringe/Needle, Disp, 30G X 1/2" 1 ML MISC by Does not apply route       No current facility-administered medications for this visit  Current Outpatient Medications on File Prior to Visit   Medication Sig    ACCU-CHEK FASTCLIX LANCETS MISC by Does not apply route    gabapentin (NEURONTIN) 300 mg capsule Take 1 capsule (300 mg total) by mouth 3 (three) times a day    INSULIN ASPART FLEXPEN SC Inject 30 Units under the skin 3 (three) times a day 30 units with every meal and an additional sliding scale based on blood glucose    insulin glargine (LANTUS) 100 units/mL subcutaneous injection Continue prior to admission dose (Patient taking differently: 57 Units daily at bedtime Continue prior to admission dose)    polyethylene glycol (MIRALAX) 17 g packet Take 17 g by mouth daily    Syringe/Needle, Disp, 30G X 1/2" 1 ML MISC by Does not apply route     No current facility-administered medications on file prior to visit  She is allergic to clindamycin       Review of Systems   Skin: Positive for wound  Negative for color change           Objective:            /92   Pulse 92   Temp 98 3 °F (36 8 °C)   Resp 18     Physical Exam  Constitutional: General: She is awake  Appearance: She is obese  Musculoskeletal:        Feet:    Skin:     Findings: No erythema  Neurological:      Mental Status: She is alert  Psychiatric:         Behavior: Behavior is cooperative  Wound Instructions:  Orders Placed This Encounter   Procedures    Wound cleansing and dressings     Wound cleansing and dressings                                     Left Plantar foot wound:       Wash your hands with soap and water   Remove old dressing, discard into plastic bag and place in trash      Cleanse the wound with NSS prior to applying a clean dressing  Do not use tissue or cotton balls  Do not scrub the wound  Pat dry using gauze         Shower No  Apligraf applied by physician        Dry sterile dressing over top with gauze, ABD pad, and kerlix  Secured with tape  This was done at today's visit  Leave dressing in place and do not get wet for one week   Come back to wound center in one week for dressing change and wound assessment       Off-loading Instructions:       Keep weight and pressure off wound at all times and Wear off-loading device as directed by your physician     Standing Status:   Future     Standing Expiration Date:   6/15/2022    Skin Substitute     This order was created via procedure documentation

## 2021-06-22 ENCOUNTER — OFFICE VISIT (OUTPATIENT)
Dept: WOUND CARE | Facility: CLINIC | Age: 46
End: 2021-06-22
Payer: COMMERCIAL

## 2021-06-22 VITALS
SYSTOLIC BLOOD PRESSURE: 146 MMHG | TEMPERATURE: 97.6 F | RESPIRATION RATE: 20 BRPM | HEART RATE: 102 BPM | DIASTOLIC BLOOD PRESSURE: 106 MMHG

## 2021-06-22 DIAGNOSIS — L97.422 DIABETIC ULCER OF LEFT MIDFOOT ASSOCIATED WITH DIABETES MELLITUS DUE TO UNDERLYING CONDITION, WITH FAT LAYER EXPOSED (HCC): Primary | ICD-10-CM

## 2021-06-22 DIAGNOSIS — E08.621 DIABETIC ULCER OF LEFT MIDFOOT ASSOCIATED WITH DIABETES MELLITUS DUE TO UNDERLYING CONDITION, WITH FAT LAYER EXPOSED (HCC): Primary | ICD-10-CM

## 2021-06-22 PROCEDURE — 99213 OFFICE O/P EST LOW 20 MIN: CPT | Performed by: PODIATRIST

## 2021-06-22 PROCEDURE — G0463 HOSPITAL OUTPT CLINIC VISIT: HCPCS | Performed by: PODIATRIST

## 2021-06-22 NOTE — PATIENT INSTRUCTIONS
Orders Placed This Encounter   Procedures    Wound cleansing and dressings     Wash your hands with soap and water  Remove old dressing, discard into plastic bag and place in trash  Cleanse the wound with soap (dove unscented) and water prior to applying a clean dressing  Do not use tissue or cotton balls  Do not scrub the wound  Pat dry using gauze  Shower no     GENTLY clean wound  Apply Maxorb AG to the left foot wound  Cover with gauze and lilian  Secure with tape     Change dressing every other day         Off-loading Instructions:       Keep weight and pressure off wound at all times and Wear off-loading device as directed by your physician     Standing Status:   Future     Standing Expiration Date:   6/22/2022

## 2021-06-23 NOTE — ASSESSMENT & PLAN NOTE
Wound redressed with Maxorb AG dsd    Patient for apligraf next week  Lab Results   Component Value Date    HGBA1C 14 0 (H) 09/07/2020

## 2021-06-23 NOTE — PROGRESS NOTES
Patient ID: Anjum Antoine is a 54 y o  female Date of Birth 1975     Chief Complaint   Patient presents with    Follow Up Wound Care Visit     left foot wound       Allergies  Clindamycin    Assessment and Plan    Diabetic ulcer of left midfoot associated with diabetes mellitus due to underlying condition, with fat layer exposed ([address] )  Wound redressed with Maxorb AG dsd  Patient for apligraf next week  Lab Results   Component Value Date    HGBA1C 14 0 (H) 09/07/2020        Diagnoses and all orders for this visit:    Diabetic ulcer of left midfoot associated with diabetes mellitus due to underlying condition, with fat layer exposed ([address] )  -     Wound cleansing and dressings; Future              Procedures        Subjective:        Patient seen for follow up evaluation left foot      The following portions of the patient's history were reviewed and updated as appropriate: She  has a past medical history of Charcot's joint of foot, left, Diabetes mellitus ([address] ), and Osteomyelitis of foot, right, acute ([address] )  She  has a past surgical history that includes Foot surgery (Right); Foot Amputation (Left, 10/30/2020); and Foot capsule release w/ percutaneous heel cord lengthening, tibial tendon transfer (Left, 10/30/2020)    Current Outpatient Medications   Medication Sig Dispense Refill    ACCU-CHEK FASTCLIX LANCETS MISC by Does not apply route      gabapentin (NEURONTIN) 300 mg capsule Take 1 capsule (300 mg total) by mouth 3 (three) times a day 90 capsule 0    INSULIN ASPART FLEXPEN SC Inject 30 Units under the skin 3 (three) times a day 30 units with every meal and an additional sliding scale based on blood glucose      insulin glargine (LANTUS) 100 units/mL subcutaneous injection Continue prior to admission dose (Patient taking differently: 57 Units daily at bedtime Continue prior to admission dose) 2000 Units 0    polyethylene glycol (MIRALAX) 17 g packet Take 17 g by mouth daily      Syringe/Needle, Disp, 30G X 1/2" 1 ML MISC by Does not apply route       No current facility-administered medications for this visit  Current Outpatient Medications on File Prior to Visit   Medication Sig    ACCU-CHEK FASTCLIX LANCETS MISC by Does not apply route    gabapentin (NEURONTIN) 300 mg capsule Take 1 capsule (300 mg total) by mouth 3 (three) times a day    INSULIN ASPART FLEXPEN SC Inject 30 Units under the skin 3 (three) times a day 30 units with every meal and an additional sliding scale based on blood glucose    insulin glargine (LANTUS) 100 units/mL subcutaneous injection Continue prior to admission dose (Patient taking differently: 57 Units daily at bedtime Continue prior to admission dose)    polyethylene glycol (MIRALAX) 17 g packet Take 17 g by mouth daily    Syringe/Needle, Disp, 30G X 1/2" 1 ML MISC by Does not apply route     No current facility-administered medications on file prior to visit  She is allergic to clindamycin       Review of Systems   Skin: Positive for wound  Negative for color change  Objective:            BP (!) 146/106   Pulse 102   Temp 97 6 °F (36 4 °C)   Resp 20     Physical Exam  Constitutional:       General: She is awake  Feet:      Comments: Left foot wound is full thickness with Apligraf still present  Wound base 90% granular no active drainage  Neurological:      Mental Status: She is alert  Sensory: Sensory deficit present  Psychiatric:         Behavior: Behavior is cooperative  Wound Instructions:  Orders Placed This Encounter   Procedures    Wound cleansing and dressings     Wash your hands with soap and water  Remove old dressing, discard into plastic bag and place in trash  Cleanse the wound with soap (dove unscented) and water prior to applying a clean dressing  Do not use tissue or cotton balls  Do not scrub the wound  Pat dry using gauze  Shower no     GENTLY clean wound      Apply Maxorb AG to the left foot wound  Cover with gauze and lilian  Secure with tape     Change dressing every other day         Off-loading Instructions:       Keep weight and pressure off wound at all times and Wear off-loading device as directed by your physician     Standing Status:   Future     Standing Expiration Date:   6/22/2022

## 2021-06-29 ENCOUNTER — OFFICE VISIT (OUTPATIENT)
Dept: WOUND CARE | Facility: CLINIC | Age: 46
End: 2021-06-29
Payer: COMMERCIAL

## 2021-06-29 VITALS
DIASTOLIC BLOOD PRESSURE: 63 MMHG | RESPIRATION RATE: 20 BRPM | HEART RATE: 96 BPM | TEMPERATURE: 98.7 F | SYSTOLIC BLOOD PRESSURE: 155 MMHG

## 2021-06-29 DIAGNOSIS — E08.621 DIABETIC ULCER OF LEFT MIDFOOT ASSOCIATED WITH DIABETES MELLITUS DUE TO UNDERLYING CONDITION, WITH FAT LAYER EXPOSED (HCC): Primary | ICD-10-CM

## 2021-06-29 DIAGNOSIS — L97.422 DIABETIC ULCER OF LEFT MIDFOOT ASSOCIATED WITH DIABETES MELLITUS DUE TO UNDERLYING CONDITION, WITH FAT LAYER EXPOSED (HCC): Primary | ICD-10-CM

## 2021-06-29 PROCEDURE — 15275 SKIN SUB GRAFT FACE/NK/HF/G: CPT | Performed by: PODIATRIST

## 2021-06-29 RX ORDER — LIDOCAINE 40 MG/G
CREAM TOPICAL ONCE
Status: COMPLETED | OUTPATIENT
Start: 2021-06-29 | End: 2021-06-29

## 2021-06-29 RX ADMIN — LIDOCAINE: 40 CREAM TOPICAL at 16:28

## 2021-06-29 NOTE — PATIENT INSTRUCTIONS
Orders Placed This Encounter   Procedures    Wound cleansing and dressings     Wash your hands with soap and water  Remove old dressing, discard into plastic bag and place in trash  Cleanse the wound with soap (dove unscented) and water prior to applying a clean dressing  Do not use tissue or cotton balls  Do not scrub the wound  Pat dry using gauze      Shower no      Apligraf applied by DPM today 6/29/21  Lot # [id number]   Saline lot # [id number] exp date: 2/29/24  Hydrated with saline  Apligraf applied by DPM followed by skin prep, adaptic, steri strips, followed by dry dressing   Will change dressing next week at [address] [facility],[address]          Off-loading Instructions:       Keep weight and pressure off wound at all times and Wear off-loading device as directed by your physician     Standing Status:   Future     Standing Expiration Date:   6/29/2022

## 2021-06-29 NOTE — PROGRESS NOTES
Patient ID: Avery Lujan is a 54 y o  female Date of Birth 1975     Chief Complaint   Patient presents with    Follow Up Wound Care Visit     left foot wound       Allergies  Clindamycin    Assessment and Plan    No problem-specific Assessment & Plan notes found for this encounter  Diagnoses and all orders for this visit:    Diabetic ulcer of left midfoot associated with diabetes mellitus due to underlying condition, with fat layer exposed ([facility] )  -     Wound cleansing and dressings; Future  -     lidocaine (LMX) 4 % cream              Skin Substitute   Performed by: Physician  Product: Apligraf  Application Location and Measurements  Location: genitalia / hands / feet / multiple digits  Skin Sub Lot #: [id number]  Skin Sub Expiration: 7/2/21  Area (sq cm): 2 25  Product Applied (sq cm): 11  Product Wasted (sq cm): 33    Application Details  Fenestrated: Yes  Instrument: blade  Secured: Yes  Secured With: Steri-Strips  Dressing Applied: Yes    Procedural pain (0-10): insensate  Post-procedural pain: insensate   Response to treatment: procedure was tolerated well     NSS  NSS Lot #: [id number]   NSS Expiration: 2/29/24                Subjective:        Patient presents for follow-up evaluation left foot      The following portions of the patient's history were reviewed and updated as appropriate: She  has a past medical history of Charcot's joint of foot, left, Diabetes mellitus ([facility] ), and Osteomyelitis of foot, right, acute ([facility] [facility] )  She  has a past surgical history that includes Foot surgery (Right); Foot Amputation (Left, 10/30/2020); and Foot capsule release w/ percutaneous heel cord lengthening, tibial tendon transfer (Left, 10/30/2020)    Current Outpatient Medications   Medication Sig Dispense Refill    ACCU-CHEK FASTCLIX LANCETS MISC by Does not apply route      gabapentin (NEURONTIN) 300 mg capsule Take 1 capsule (300 mg total) by mouth 3 (three) times a day 90 capsule 0    INSULIN ASPART FLEXPEN SC Inject 30 Units under the skin 3 (three) times a day 30 units with every meal and an additional sliding scale based on blood glucose      insulin glargine (LANTUS) 100 units/mL subcutaneous injection Continue prior to admission dose (Patient taking differently: 57 Units daily at bedtime Continue prior to admission dose) 2000 Units 0    polyethylene glycol (MIRALAX) 17 g packet Take 17 g by mouth daily      Syringe/Needle, Disp, 30G X 1/2" 1 ML MISC by Does not apply route       No current facility-administered medications for this visit  Current Outpatient Medications on File Prior to Visit   Medication Sig    ACCU-CHEK FASTCLIX LANCETS MISC by Does not apply route    gabapentin (NEURONTIN) 300 mg capsule Take 1 capsule (300 mg total) by mouth 3 (three) times a day    INSULIN ASPART FLEXPEN SC Inject 30 Units under the skin 3 (three) times a day 30 units with every meal and an additional sliding scale based on blood glucose    insulin glargine (LANTUS) 100 units/mL subcutaneous injection Continue prior to admission dose (Patient taking differently: 57 Units daily at bedtime Continue prior to admission dose)    polyethylene glycol (MIRALAX) 17 g packet Take 17 g by mouth daily    Syringe/Needle, Disp, 30G X 1/2" 1 ML MISC by Does not apply route     No current facility-administered medications on file prior to visit  She is allergic to clindamycin       Review of Systems   Skin: Positive for wound  Negative for color change  Objective:            /63   Pulse 96   Temp 98 7 °F (37 1 °C)   Resp 20     Physical Exam  Constitutional:       General: She is awake  Feet:      Comments: Wound is full-thickness with minimal hyperkeratosis periwound  No active drainage no periwound erythema or edema  Skin:     Findings: No erythema  Neurological:      Mental Status: She is alert  Sensory: Sensory deficit present     Psychiatric:         Behavior: Behavior is cooperative  Wound Instructions:  Orders Placed This Encounter   Procedures    Wound cleansing and dressings     Wash your hands with soap and water  Remove old dressing, discard into plastic bag and place in trash  Cleanse the wound with soap (dove unscented) and water prior to applying a clean dressing  Do not use tissue or cotton balls  Do not scrub the wound  Pat dry using gauze      Shower no      Apligraf applied by DPM today 6/29/21  Lot # [id number]   Saline lot # [id number] exp date: 2/29/24  Hydrated with saline  Apligraf applied by DPM followed by skin prep, adaptic, steri strips, followed by dry dressing   Will change dressing next week at [address] [facility],[address]          Off-loading Instructions:       Keep weight and pressure off wound at all times and Wear off-loading device as directed by your physician     Standing Status:   Future     Standing Expiration Date:   6/29/2022

## 2021-06-29 NOTE — PROGRESS NOTES
Procedure Performed: Surgical Preparation Foot Wound = <100sq cm     A formal timeout including patient identification, laterality and existing allergies using [facility] protocol was conducted  Anesthetic used as needed to reduce discomfort  Under aseptic technique, the wound was thoroughly sharp debrided using scalpel to remove all rolled wound edges, fibrotic tissue and debris from the wound base and marginal keratotic tissue so that only viable bleeding tissue remains  This was followed by irrigation with saline solution  This was done in an effort to reduce bacterial burden and provide a viable wound bed surface for the upcoming Apligraf procedure   Total sq cm debrided 2 25

## 2021-07-06 ENCOUNTER — OFFICE VISIT (OUTPATIENT)
Dept: WOUND CARE | Facility: CLINIC | Age: 46
End: 2021-07-06
Payer: COMMERCIAL

## 2021-07-06 VITALS
SYSTOLIC BLOOD PRESSURE: 129 MMHG | HEART RATE: 88 BPM | TEMPERATURE: 97.8 F | DIASTOLIC BLOOD PRESSURE: 84 MMHG | RESPIRATION RATE: 18 BRPM

## 2021-07-06 DIAGNOSIS — E11.40 CONTROLLED TYPE 2 DIABETES MELLITUS WITH DIABETIC NEUROPATHY, WITH LONG-TERM CURRENT USE OF INSULIN (HCC): ICD-10-CM

## 2021-07-06 DIAGNOSIS — Z79.4 CONTROLLED TYPE 2 DIABETES MELLITUS WITH DIABETIC NEUROPATHY, WITH LONG-TERM CURRENT USE OF INSULIN (HCC): ICD-10-CM

## 2021-07-06 DIAGNOSIS — L97.422 DIABETIC ULCER OF LEFT MIDFOOT ASSOCIATED WITH DIABETES MELLITUS DUE TO UNDERLYING CONDITION, WITH FAT LAYER EXPOSED (HCC): Primary | ICD-10-CM

## 2021-07-06 DIAGNOSIS — E08.621 DIABETIC ULCER OF LEFT MIDFOOT ASSOCIATED WITH DIABETES MELLITUS DUE TO UNDERLYING CONDITION, WITH FAT LAYER EXPOSED (HCC): Primary | ICD-10-CM

## 2021-07-06 PROCEDURE — 97597 DBRDMT OPN WND 1ST 20 CM/<: CPT | Performed by: PODIATRIST

## 2021-07-06 RX ORDER — LIDOCAINE 40 MG/G
CREAM TOPICAL ONCE
Status: COMPLETED | OUTPATIENT
Start: 2021-07-06 | End: 2021-07-06

## 2021-07-06 RX ADMIN — LIDOCAINE: 40 CREAM TOPICAL at 15:40

## 2021-07-06 NOTE — PATIENT INSTRUCTIONS
Orders Placed This Encounter   Procedures    Wound cleansing and dressings     Wash your hands with soap and water  Shannon Sails old dressing, discard into plastic bag and place in trash   Cleanse the wound with soap (dove unscented) and water prior to applying a clean dressing  Do not use tissue or cotton balls  Do not scrub the wound   Pat dry using gauze        Shower no       Apply Purachol ag and gauze and lilian and every other day and ABD windowed   for pressure relief      Off-loading Instructions:       Keep weight and pressure off wound at all times and Wear off-loading device as directed by your physician     Standing Status:   Future     Standing Expiration Date:   7/6/2022

## 2021-07-06 NOTE — ASSESSMENT & PLAN NOTE
Post debridement wound redressed with Maxorb Ag dry sterile dressing and offloaded   Plan for Apligraf next week  Lab Results   Component Value Date    HGBA1C 14 0 (H) 09/07/2020

## 2021-07-06 NOTE — PROGRESS NOTES
Patient ID: Ashly Najera is a 54 y o  female Date of Birth 1975     Chief Complaint   Patient presents with    Follow Up Wound Care Visit     Dressing intact       Allergies  Clindamycin    Assessment and Plan    No problem-specific Assessment & Plan notes found for this encounter  Diagnoses and all orders for this visit:    Diabetic ulcer of left midfoot associated with diabetes mellitus due to underlying condition, with fat layer exposed ([facility] [address] )  -     Wound cleansing and dressings; Future  -     lidocaine (LMX) 4 % cream    Controlled type 2 diabetes mellitus with diabetic neuropathy, with long-term current use of insulin ([facility])  -     Wound cleansing and dressings; Future  -     lidocaine (LMX) 4 % cream              Debridement   Universal Protocol:  Consent given by: patient  Time out: Immediately prior to procedure a "time out" was called to verify the correct patient, procedure, equipment, support staff and site/side marked as required  Performed by: physician  Debridement type: selective  Pain control: lidocaine 4%  Post-debridement measurements  Length (cm): 1 8  Width (cm): 1 4  Depth (cm): 0 1  Percent debrided: 100%  Surface Area (cm^2): 2 52  Area debrided (cm^2): 2 52  Volume (cm^3): 0 25  Devitalized tissue debrided: biofilm, callus, exudate, fibrin and slough  Instrument(s) utilized: blade  Bleeding: small  Hemostasis obtained with: pressure  Procedural pain (0-10): 0  Post-procedural pain: 0   Response to treatment: procedure was tolerated well              Subjective:        Patient presents for follow-up evaluation left foot      The following portions of the patient's history were reviewed and updated as appropriate: She  has a past medical history of Charcot's joint of foot, left, Diabetes mellitus ([facility] [address] ), and Osteomyelitis of foot, right, acute ([facility] [address] )  She  has a past surgical history that includes Foot surgery (Right);  Foot Amputation (Left, 10/30/2020); and Foot capsule release w/ percutaneous heel cord lengthening, tibial tendon transfer (Left, 10/30/2020)  Current Outpatient Medications   Medication Sig Dispense Refill    ACCU-CHEK FASTCLIX LANCETS MISC by Does not apply route      gabapentin (NEURONTIN) 300 mg capsule Take 1 capsule (300 mg total) by mouth 3 (three) times a day 90 capsule 0    INSULIN ASPART FLEXPEN SC Inject 30 Units under the skin 3 (three) times a day 30 units with every meal and an additional sliding scale based on blood glucose      insulin glargine (LANTUS) 100 units/mL subcutaneous injection Continue prior to admission dose (Patient taking differently: 57 Units daily at bedtime Continue prior to admission dose) 2000 Units 0    polyethylene glycol (MIRALAX) 17 g packet Take 17 g by mouth daily      Syringe/Needle, Disp, 30G X 1/2" 1 ML MISC by Does not apply route       No current facility-administered medications for this visit  Current Outpatient Medications on File Prior to Visit   Medication Sig    ACCU-CHEK FASTCLIX LANCETS MISC by Does not apply route    gabapentin (NEURONTIN) 300 mg capsule Take 1 capsule (300 mg total) by mouth 3 (three) times a day    INSULIN ASPART FLEXPEN SC Inject 30 Units under the skin 3 (three) times a day 30 units with every meal and an additional sliding scale based on blood glucose    insulin glargine (LANTUS) 100 units/mL subcutaneous injection Continue prior to admission dose (Patient taking differently: 57 Units daily at bedtime Continue prior to admission dose)    polyethylene glycol (MIRALAX) 17 g packet Take 17 g by mouth daily    Syringe/Needle, Disp, 30G X 1/2" 1 ML MISC by Does not apply route     No current facility-administered medications on file prior to visit  She is allergic to clindamycin       Review of Systems   Skin: Positive for wound  Negative for color change           Objective:       Wound 04/13/21 Diabetic Ulcer Foot Left (Active)   Wound Image Images linked 07/06/21 1527   Wound Description Pink; Beefy red 07/06/21 1527   Ursula-wound Assessment Callus;Clean;Dry;Hemosiderin staining 07/06/21 1527   Wound Length (cm) 1 8 cm 07/06/21 1527   Wound Width (cm) 1 4 cm 07/06/21 1527   Wound Depth (cm) 0 1 cm 07/06/21 1527   Wound Surface Area (cm^2) 2 52 cm^2 07/06/21 1527   Wound Volume (cm^3) 0 252 cm^3 07/06/21 1527   Calculated Wound Volume (cm^3) 0 25 cm^3 07/06/21 1527   Change in Wound Size % 98 44 07/06/21 1527   Drainage Amount Moderate 07/06/21 1527   Drainage Description Yellow;Brown 07/06/21 1527   Non-staged Wound Description Full thickness 07/06/21 1527   Treatments Irrigation with NSS 07/06/21 1527       /84   Pulse 88   Temp 97 8 °F (36 6 °C)   Resp 18     Physical Exam  Constitutional:       General: She is awake  Feet:      Left foot:      Skin integrity: Ulcer present  No erythema  Comments: Wound is full thickness with fibrogranular base  No active drainage  No periwound erythema or edema  Neurological:      Mental Status: She is alert  Sensory: Sensory deficit present  Psychiatric:         Behavior: Behavior is cooperative  Wound Instructions:  Orders Placed This Encounter   Procedures    Wound cleansing and dressings     Wash your hands with soap and water  Bora Dodson old dressing, discard into plastic bag and place in trash   Cleanse the wound with soap (dove unscented) and water prior to applying a clean dressing  Do not use tissue or cotton balls  Do not scrub the wound   Pat dry using gauze        Shower no       Apply Purachol ag and gauze and lilian and every other day and ABD windowed   for pressure relief      Off-loading Instructions:       Keep weight and pressure off wound at all times and Wear off-loading device as directed by your physician     Standing Status:   Future     Standing Expiration Date:   7/6/2022

## 2021-07-13 ENCOUNTER — OFFICE VISIT (OUTPATIENT)
Dept: WOUND CARE | Facility: CLINIC | Age: 46
End: 2021-07-13
Payer: COMMERCIAL

## 2021-07-13 VITALS
SYSTOLIC BLOOD PRESSURE: 128 MMHG | DIASTOLIC BLOOD PRESSURE: 68 MMHG | HEART RATE: 70 BPM | TEMPERATURE: 98.3 F | RESPIRATION RATE: 18 BRPM

## 2021-07-13 DIAGNOSIS — E11.40 CONTROLLED TYPE 2 DIABETES MELLITUS WITH DIABETIC NEUROPATHY, WITH LONG-TERM CURRENT USE OF INSULIN (HCC): ICD-10-CM

## 2021-07-13 DIAGNOSIS — E08.621 DIABETIC ULCER OF LEFT MIDFOOT ASSOCIATED WITH DIABETES MELLITUS DUE TO UNDERLYING CONDITION, WITH FAT LAYER EXPOSED (HCC): Primary | ICD-10-CM

## 2021-07-13 DIAGNOSIS — L97.422 DIABETIC ULCER OF LEFT MIDFOOT ASSOCIATED WITH DIABETES MELLITUS DUE TO UNDERLYING CONDITION, WITH FAT LAYER EXPOSED (HCC): Primary | ICD-10-CM

## 2021-07-13 DIAGNOSIS — Z79.4 CONTROLLED TYPE 2 DIABETES MELLITUS WITH DIABETIC NEUROPATHY, WITH LONG-TERM CURRENT USE OF INSULIN (HCC): ICD-10-CM

## 2021-07-13 PROCEDURE — 15275 SKIN SUB GRAFT FACE/NK/HF/G: CPT | Performed by: FAMILY MEDICINE

## 2021-07-13 RX ORDER — LIDOCAINE 40 MG/G
CREAM TOPICAL ONCE
Status: COMPLETED | OUTPATIENT
Start: 2021-07-13 | End: 2021-07-13

## 2021-07-13 RX ADMIN — LIDOCAINE: 40 CREAM TOPICAL at 15:49

## 2021-07-13 NOTE — PATIENT INSTRUCTIONS
Orders Placed This Encounter   Procedures    Wound cleansing and dressings     Wash your hands with soap and water  [place] old dressing, discard into plastic bag and place in trash   Cleanse the wound with soap (dove unscented) and water prior to applying a clean dressing  Do not use tissue or cotton balls  Do not scrub the wound   Pat dry using gauze        Shower no          Apligraf applied covered with wound veil then silver alginate  and secured with steri-strips cover with foam and secure with lilian and tape  Do not remove until next visit    Off-loading Instructions:       Keep weight and pressure off wound at all times and Wear off-loading device as directed by your physician    Desiigrsrikanth [id number] 03 02 1A exp:7/15/21  :06 30  PH7 3-7 5  Saline [id number] Exp:2/29/24  [id number]     Standing Status:   Future     Standing Expiration Date:   7/13/2022

## 2021-07-20 ENCOUNTER — OFFICE VISIT (OUTPATIENT)
Dept: WOUND CARE | Facility: CLINIC | Age: 46
End: 2021-07-20
Payer: COMMERCIAL

## 2021-07-20 VITALS
RESPIRATION RATE: 20 BRPM | SYSTOLIC BLOOD PRESSURE: 160 MMHG | HEART RATE: 98 BPM | DIASTOLIC BLOOD PRESSURE: 83 MMHG | TEMPERATURE: 96.7 F

## 2021-07-20 DIAGNOSIS — L97.422 DIABETIC ULCER OF LEFT MIDFOOT ASSOCIATED WITH DIABETES MELLITUS DUE TO UNDERLYING CONDITION, WITH FAT LAYER EXPOSED (HCC): Primary | ICD-10-CM

## 2021-07-20 DIAGNOSIS — E08.621 DIABETIC ULCER OF LEFT MIDFOOT ASSOCIATED WITH DIABETES MELLITUS DUE TO UNDERLYING CONDITION, WITH FAT LAYER EXPOSED (HCC): Primary | ICD-10-CM

## 2021-07-20 PROCEDURE — 99213 OFFICE O/P EST LOW 20 MIN: CPT | Performed by: PODIATRIST

## 2021-07-20 PROCEDURE — G0463 HOSPITAL OUTPT CLINIC VISIT: HCPCS | Performed by: PODIATRIST

## 2021-07-20 RX ORDER — LIDOCAINE 40 MG/G
CREAM TOPICAL ONCE
Status: COMPLETED | OUTPATIENT
Start: 2021-07-20 | End: 2021-07-20

## 2021-07-20 RX ADMIN — LIDOCAINE: 40 CREAM TOPICAL at 16:05

## 2021-07-20 NOTE — PATIENT INSTRUCTIONS
Orders Placed This Encounter   Procedures    Wound cleansing and dressings     Left foot wound:    Wash your hands with soap and water  Remove old dressing, discard into plastic bag and place in trash  Cleanse the wound with soap and water (Dove unscented) prior to applying a clean dressing  Do not use tissue or cotton balls  Do not scrub the wound  Pat dry using gauze  Shower yes with protection  Apply Mesalt to the left foot wound  Cover with gauze  Secure with lilian and tape  Change dressing every day and as needed for excessive drainage or leakage  This was done today  Keep pressure off foot  Standing Status:   Future     Standing Expiration Date:   7/20/2022   High Protein Diet   WHAT YOU NEED TO KNOW:   A high-protein diet is a meal plan that includes extra protein  Your body may need extra protein if you have certain health conditions, such as cancer, burns, or injuries  You may also need to follow this diet to get stronger after a surgery or illness  Extra protein helps to heal wounds and form new tissue in the body  Your dietitian will tell you how much protein and how many calories you need each day  DISCHARGE INSTRUCTIONS:   Foods high in protein:  The average amount of protein is listed below in grams (g)  To find the exact amount of protein in a food, read the food labels on packaged items  · Dairy:      ? 1 cup of any type of milk (8 g)    ? ½ cup of evaporated canned milk (9 g)    ? ¼ cup of nonfat dry milk (11 g)    ? 1 ounce of semi-hard or solid cheese (7 g)    ? ¼ cup of parmesan cheese (8 g)    ? ½ cup of cottage cheese (14 g)    ? ½ cup of pudding (4 g)    ? 1 cup of plain or fruit yogurt (8 g)    · Meats and meat substitutes:      ? 3 ounces of cooked freshwater fish (21 g)     ? 3 ounces of cooked shellfish (19 g)    ? ½ cup of canned tuna (14 g)    ? 3 ounces of cooked chicken, turkey, or other poultry (24 g)    ?  3 ounces of cooked beef, pork, lamb, or other red meat (21 g)    ? 1 large egg (6 g)    ? ¼ cup of fat free egg substitute (5 g)    ? ½ cup of tofu or tempeh (10 g)    ? 1 cup of cooked dried beans, such as mabry, kidney, or navy (15 g)    · Nuts and seeds:      ? 2 tablespoons of almonds, cashews, sunflower seeds, or walnuts (5 g)    ? 2 tablespoons of peanuts (7 g)    ? 2 tablespoons of peanut butter (8 g)    How to add extra protein:   · Add powdered milk to milk, cereals, scrambled eggs, soups, and casseroles  · Add cheese to sauces, soups, or vegetables  · Add eggs to tuna, salads, sauces, or casseroles  · Add nutrition supplements and breakfast drink mixes to milk or shakes  · Add nuts to foods or eat them as snacks  · Add meat (beef, chicken, and pork) to soups, casseroles, pasta dishes, or vegetables  · Add beans, peas, and other legumes to salads  · Eat cottage cheese or yogurt with fruit  © Copyright [employer] 2021 Information is for End User's use only and may not be sold, redistributed or otherwise used for commercial purposes  All illustrations and images included in CareNotes® are the copyrighted property of [employer]  or [employer]   The above information is an  only  It is not intended as medical advice for individual conditions or treatments  Talk to your doctor, nurse or pharmacist before following any medical regimen to see if it is safe and effective for you  Low-Sodium Diet   AMBULATORY CARE:   A low-sodium diet  limits foods that are high in sodium (salt)  You will need to follow a low-sodium diet if you have high blood pressure, kidney disease, or heart failure  You may also need to follow this diet if you have a condition that is causing your body to retain (hold) extra fluid  You may need to limit the amount of sodium you eat in a day to 1,500 to 2,000 mg  Ask your healthcare provider how much sodium you can have each day    How to use food labels to choose foods that are low in sodium:  Read food labels to find the amount of sodium they contain  The amount of sodium is listed in milligrams (mg)  The % Daily Value (DV) column tells you how much of your daily needs are met by 1 serving of the food for each nutrient listed  Choose foods that have less than 5% of the DV of sodium  These foods are considered low in sodium  Foods that have 20% or more of the DV of sodium are considered high in sodium  Some food labels may also list any of the following terms that tell you about the sodium content in the food:  · Sodium-free:  Less than 5 mg in each serving    · Very low sodium:  35 mg of sodium or less in each serving    · Low sodium:  140 mg of sodium or less in each serving    · Reduced sodium: At least 25% less sodium in each serving than the regular type    · Light in sodium:  50% less sodium in each serving    · Unsalted or no added salt:  No extra salt is added during processing (the food may still contain sodium)     Foods to avoid:  Salty foods are high in sodium  You should avoid the following:  · Processed foods:      ? Mixes for cornbread, biscuits, cake, and pudding     ? Instant foods, such as potatoes, cereals, noodles, and rice     ? Packaged foods, such as bread stuffing, rice and pasta mixes, snack dip mixes, and macaroni and cheese     ? Canned foods, such as canned vegetables, soups, broths, sauces, and vegetable or tomato juice    ? Snack foods, such as salted chips, popcorn, pretzels, pork rinds, salted crackers, and salted nuts    ? Frozen foods, such as dinners, entrees, vegetables with sauces, and breaded meats    ? Sauerkraut, pickled vegetables, and other foods prepared in brine    · Meats and cheeses:      ? Smoked or cured meat, such as corned beef, iyer, ham, hot dogs, and sausage    ? Canned meats or spreads, such as potted meats, sardines, anchovies, and imitation seafood    ? Deli or lunch meats, such as bologna, ham, turkey, and roast beef    ?  Processed cheese, such as American cheese and cheese spreads    · Condiments, sauces, and seasonings:      ? Salt (¼ teaspoon of salt contains 575 mg of sodium)    ? Seasonings made with salt, such as garlic salt, celery salt, onion salt, and seasoned salt    ? Regular soy sauce, barbecue sauce, teriyaki sauce, steak sauce, Worcestershire sauce, and most flavored vinegars    ? Canned gravy and mixes     ? Regular condiments, such as mustard, ketchup, and salad dressings    ? Pickles and olives    ? Meat tenderizers and monosodium glutamate (MSG)    Foods to include:  Read the food label to find the exact amount of sodium in each serving  · Bread and cereal:  Try to choose breads with less than 80 mg of sodium per serving  ? Bread, roll, alejandro, tortilla, or unsalted crackers  ? Ready-to-eat cereals with less than 5% DV of sodium (examples include shredded wheat and puffed rice)    ? Pasta    · Vegetables and fruits:      ? Unsalted fresh, frozen, or canned vegetables    ? Fresh, frozen, or canned fruits    ? Fruit juice    · Dairy:  One serving has about 150 mg of sodium  ? Milk, all types    ? Yogurt    ? Hard cheese, such as cheddar, Swiss, [employer], or mozzarella    · Meat and other protein foods:  Some raw meats may have added sodium  ? Plain meats, fish, and poultry     ? Eggs    · Other foods:      ? Homemade pudding    ? Unsalted nuts, popcorn, or pretzels    ? Unsalted butter or margarine    Ways to decrease sodium:   · Add spices and herbs to foods instead of salt during cooking  Use salt-free seasonings to add flavor to foods  Examples include onion powder, garlic powder, basil, cha powder, paprika, and parsley  Try lemon or lime juice or vinegar to give foods a tart flavor  Use hot peppers, pepper, or cayenne pepper to add a spicy flavor  · Do not keep a salt shaker at your kitchen table  This may help keep you from adding salt to food at the table  A teaspoon of salt has 2,300 mg of sodium   It may take time to get used to enjoying the natural flavor of food instead of adding salt  Talk to your healthcare provider before you use salt substitutes  Some salt substitutes have a high amount of potassium and need to be avoided if you have kidney disease  · Choose low-sodium foods at restaurants  Meals from restaurants are often high in sodium  Some restaurants have nutrition information on the menu that tells you the amount of sodium in their foods  If possible, ask for your food to be prepared with less, or no salt  · Shop for unsalted or low-sodium foods and snacks at the grocery store  Examples include unsalted or low-sodium broths, soups, and canned vegetables  Choose fresh or frozen vegetables instead  Choose unsalted nuts or seeds or fresh fruits or vegetables as snacks  Read food labels and choose salt-free, very low-sodium, or low-sodium foods  © Copyright [employer] 2021 Information is for End User's use only and may not be sold, redistributed or otherwise used for commercial purposes  All illustrations and images included in CareNotes® are the copyrighted property of [employer] M [employer] Inc  or [facility] [employer] [place]   The above information is an  only  It is not intended as medical advice for individual conditions or treatments  Talk to your doctor, nurse or pharmacist before following any medical regimen to see if it is safe and effective for you

## 2021-07-20 NOTE — LETTER
July 20, 2021     Patient: Kentrell Che   YOB: 1975   Date of Visit: 7/20/2021       To Whom it May Concern:    Jimy Alex Keily is under my professional care  She was seen in my office on 7/20/2021  She is not able to return to work  If you have any questions or concerns, please don't hesitate to call           Sincerely,          Ann Marie Arreguin DPM        CC: No Recipients

## 2021-07-22 NOTE — PROGRESS NOTES
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 due to underlying condition, with fat layer exposed ([address] )  -     lidocaine (LMX) 4 % cream  -     Wound cleansing and dressings; Future              Procedures        Subjective:        Patient presents for follow-up evaluation left foot      The following portions of the patient's history were reviewed and updated as appropriate: She  has a past medical history of Charcot's joint of foot, left, Diabetes mellitus ([facility] [address] ), and Osteomyelitis of foot, right, acute ([facility] [address] )  She  has a past surgical history that includes Foot surgery (Right); Foot Amputation (Left, 10/30/2020); and Foot capsule release w/ percutaneous heel cord lengthening, tibial tendon transfer (Left, 10/30/2020)  Current Outpatient Medications   Medication Sig Dispense Refill    ACCU-CHEK FASTCLIX LANCETS MISC by Does not apply route      gabapentin (NEURONTIN) 300 mg capsule Take 1 capsule (300 mg total) by mouth 3 (three) times a day 90 capsule 0    INSULIN ASPART FLEXPEN SC Inject 30 Units under the skin 3 (three) times a day 30 units with every meal and an additional sliding scale based on blood glucose      insulin glargine (LANTUS) 100 units/mL subcutaneous injection Continue prior to admission dose (Patient taking differently: 57 Units daily at bedtime Continue prior to admission dose) 2000 Units 0    polyethylene glycol (MIRALAX) 17 g packet Take 17 g by mouth daily      Syringe/Needle, Disp, 30G X 1/2" 1 ML MISC by Does not apply route       No current facility-administered medications for this visit       Current Outpatient Medications on File Prior to Visit   Medication Sig    ACCU-CHEK FASTCLIX LANCETS MISC by Does not apply route    gabapentin (NEURONTIN) 300 mg capsule Take 1 capsule (300 mg total) by mouth 3 (three) times a day    INSULIN ASPART FLEXPEN SC Inject 30 Units under the skin 3 (three) times a day 30 units with every meal and an additional sliding scale based on blood glucose    insulin glargine (LANTUS) 100 units/mL subcutaneous injection Continue prior to admission dose (Patient taking differently: 57 Units daily at bedtime Continue prior to admission dose)    polyethylene glycol (MIRALAX) 17 g packet Take 17 g by mouth daily    Syringe/Needle, Disp, 30G X 1/2" 1 ML MISC by Does not apply route     No current facility-administered medications on file prior to visit  She is allergic to clindamycin       Review of Systems   Skin: Positive for wound  Negative for color change  Objective:            /83   Pulse 98   Temp (!) 96 7 °F (35 9 °C)   Resp 20     Physical Exam  Constitutional:       General: She is awake  Musculoskeletal:      Left lower leg: Edema present  Feet:      Comments:  Left foot wound is full-thickness with Apligraf in place  No active drainage no periwound erythema or edema  Generalized edema left lower extremity  Skin:     Findings: No erythema  Neurological:      Mental Status: She is alert  Psychiatric:         Behavior: Behavior is cooperative  Wound Instructions:  Orders Placed This Encounter   Procedures    Wound cleansing and dressings     Left foot wound:    Wash your hands with soap and water  Remove old dressing, discard into plastic bag and place in trash  Cleanse the wound with soap and water (Dove unscented) prior to applying a clean dressing  Do not use tissue or cotton balls  Do not scrub the wound  Pat dry using gauze  Shower yes with protection  Apply Mesalt to the left foot wound  Cover with gauze  Secure with lilian and tape  Change dressing every day and as needed for excessive drainage or leakage  This was done today  Keep pressure off foot       Standing Status:   Future     Standing Expiration Date:   7/20/2022

## 2021-07-22 NOTE — ASSESSMENT & PLAN NOTE
Lab Results   Component Value Date    HGBA1C 14 0 (H) 09/07/2020    wound redressed with Mesalt dry sterile dressing

## 2021-08-03 ENCOUNTER — OFFICE VISIT (OUTPATIENT)
Dept: WOUND CARE | Facility: CLINIC | Age: 46
End: 2021-08-03
Payer: COMMERCIAL

## 2021-08-03 VITALS
DIASTOLIC BLOOD PRESSURE: 80 MMHG | SYSTOLIC BLOOD PRESSURE: 153 MMHG | TEMPERATURE: 98 F | RESPIRATION RATE: 20 BRPM | HEART RATE: 110 BPM

## 2021-08-03 DIAGNOSIS — E08.621 DIABETIC ULCER OF LEFT MIDFOOT ASSOCIATED WITH DIABETES MELLITUS DUE TO UNDERLYING CONDITION, WITH FAT LAYER EXPOSED (HCC): Primary | ICD-10-CM

## 2021-08-03 DIAGNOSIS — L97.422 DIABETIC ULCER OF LEFT MIDFOOT ASSOCIATED WITH DIABETES MELLITUS DUE TO UNDERLYING CONDITION, WITH FAT LAYER EXPOSED (HCC): Primary | ICD-10-CM

## 2021-08-03 PROCEDURE — 11042 DBRDMT SUBQ TIS 1ST 20SQCM/<: CPT | Performed by: PODIATRIST

## 2021-08-03 RX ORDER — FERROUS SULFATE 325(65) MG
325 TABLET ORAL
COMMUNITY

## 2021-08-03 RX ORDER — LIDOCAINE 40 MG/G
CREAM TOPICAL ONCE
Status: COMPLETED | OUTPATIENT
Start: 2021-08-03 | End: 2021-08-03

## 2021-08-03 RX ADMIN — LIDOCAINE: 40 CREAM TOPICAL at 15:42

## 2021-08-03 NOTE — PROGRESS NOTES
Dr Claus Bob asked RN to call  regarding patients insert  RN called  who stated that if there is an issue with patients insert, then patient could make an appt to be seen  I called patient and gave her the number to call  to make an appt  Patient verbalized understanding

## 2021-08-03 NOTE — PATIENT INSTRUCTIONS
Orders Placed This Encounter   Procedures    Wound cleansing and dressings     Left foot wound:     Wash your hands with soap and water  Remove old dressing, discard into plastic bag and place in trash  Cleanse the wound with soap and water (Dove unscented) prior to applying a clean dressing  Do not use tissue or cotton balls  Do not scrub the wound  Pat dry using gauze      Shower yes with protection      Apply Mesalt to the left foot wound  Cover with gauze  Secure with lilian and tape  Change dressing every day and as needed for excessive drainage or leakage  This was done today      Keep pressure off foot       Standing Status:   Future     Standing Expiration Date:   8/3/2022

## 2021-08-04 NOTE — PROGRESS NOTES
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 due to underlying condition, with fat layer exposed ([facility] [address] )  -     Wound cleansing and dressings; Future  -     lidocaine (LMX) 4 % cream    Other orders  -     ferrous sulfate 325 (65 Fe) mg tablet; Take 325 mg by mouth daily with breakfast              Debridement   Universal Protocol:  Consent given by: patient  Time out: Immediately prior to procedure a "time out" was called to verify the correct patient, procedure, equipment, support staff and site/side marked as required  Performed by: physician and resident  Debridement type: surgical  Level of debridement: subcutaneous tissue  Pain control: lidocaine 4%  Post-debridement measurements  Length (cm): 1 8  Width (cm): 1 5  Depth (cm): 0 9  Percent debrided: 100%  Surface Area (cm^2): 2 7  Area debrided (cm^2): 2 7  Volume (cm^3): 2 43  Tissue and other material debrided: subcutaneous tissue  Devitalized tissue debrided: biofilm, callus, exudate, fibrin and slough  Instrument(s) utilized: blade  Bleeding: small  Hemostasis obtained with: pressure  Procedural pain (0-10): 0  Post-procedural pain: 0   Response to treatment: procedure was tolerated well              Subjective:        Patient presents for follow-up evaluation left foot  She states that she has been ambulatory without any brace from time to time      The following portions of the patient's history were reviewed and updated as appropriate: She  has a past medical history of Charcot's joint of foot, left, Diabetes mellitus ([facility] [address] ), and Osteomyelitis of foot, right, acute ([facility] [address] )    She  has a past surgical history that includes Foot surgery (Right); Foot Amputation (Left, 10/30/2020); and Foot capsule release w/ percutaneous heel cord lengthening, tibial tendon transfer (Left, 10/30/2020)  Current Outpatient Medications   Medication Sig Dispense Refill    ferrous sulfate 325 (65 Fe) mg tablet Take 325 mg by mouth daily with breakfast      ACCU-CHEK FASTCLIX LANCETS MISC by Does not apply route      gabapentin (NEURONTIN) 300 mg capsule Take 1 capsule (300 mg total) by mouth 3 (three) times a day 90 capsule 0    INSULIN ASPART FLEXPEN SC Inject 30 Units under the skin 3 (three) times a day 30 units with every meal and an additional sliding scale based on blood glucose      insulin glargine (LANTUS) 100 units/mL subcutaneous injection Continue prior to admission dose (Patient taking differently: 57 Units daily at bedtime Continue prior to admission dose) 2000 Units 0    polyethylene glycol (MIRALAX) 17 g packet Take 17 g by mouth daily      Syringe/Needle, Disp, 30G X 1/2" 1 ML MISC by Does not apply route       No current facility-administered medications for this visit       Current Outpatient Medications on File Prior to Visit   Medication Sig    ferrous sulfate 325 (65 Fe) mg tablet Take 325 mg by mouth daily with breakfast    ACCU-CHEK FASTCLIX LANCETS MISC by Does not apply route    gabapentin (NEURONTIN) 300 mg capsule Take 1 capsule (300 mg total) by mouth 3 (three) times a day    INSULIN ASPART FLEXPEN SC Inject 30 Units under the skin 3 (three) times a day 30 units with every meal and an additional sliding scale based on blood glucose    insulin glargine (LANTUS) 100 units/mL subcutaneous injection Continue prior to admission dose (Patient taking differently: 57 Units daily at bedtime Continue prior to admission dose)    polyethylene glycol (MIRALAX) 17 g packet Take 17 g by mouth daily    Syringe/Needle, Disp, 30G X 1/2" 1 ML MISC by Does not apply route     No current facility-administered medications on file prior to visit  She is allergic to clindamycin       Review of Systems   Skin: Positive for wound  Negative for color change  Objective:         Wound 04/13/21 Diabetic Ulcer Foot Left (Active)   Wound Image   08/03/21 1538   Wound Description Beefy red 08/03/21 1538   Ursula-wound Assessment Callus 08/03/21 1538   Wound Length (cm) 1 8 cm 08/03/21 1538   Wound Width (cm) 1 5 cm 08/03/21 1538   Wound Depth (cm) 0 8 cm 08/03/21 1538   Wound Surface Area (cm^2) 2 7 cm^2 08/03/21 1538   Wound Volume (cm^3) 2 16 cm^3 08/03/21 1538   Calculated Wound Volume (cm^3) 2 16 cm^3 08/03/21 1538   Change in Wound Size % 86 55 08/03/21 1538   Undermining 1 5 04/13/21 1320   Undermining is depth extending from 1-7 04/13/21 1320   Drainage Amount Moderate 08/03/21 1538   Drainage Description Yellow;Brown 08/03/21 1538   Non-staged Wound Description Full thickness 08/03/21 1538   Treatments Cleansed 08/03/21 1538   Dressing Changed Changed 06/08/21 1540   Patient Tolerance Tolerated well 08/03/21 1538                          /80   Pulse (!) 110   Temp 98 °F (36 7 °C)   Resp 20     Physical Exam  Constitutional:       General: She is awake  Musculoskeletal:      Left foot: Deformity present  Feet:      Left foot:      Skin integrity: Callus present  Comments:  Hyperkeratosis periwound     wound base is fibro granular well adherent full-thickness extending through subcutaneous layer no active drainage  No periwound erythema or edema    Maceration periwound     Neurological:      Mental Status: She is alert  Sensory: Sensory deficit present  Psychiatric:         Behavior: Behavior is cooperative  Wound Instructions:  Orders Placed This Encounter   Procedures    Wound cleansing and dressings     Left foot wound:     Wash your hands with soap and water  Remove old dressing, discard into plastic bag and place in trash    Cleanse the wound with soap and water (Dove unscented) prior to applying a clean dressing  Do not use tissue or cotton balls  Do not scrub the wound  Pat dry using gauze      Shower yes with protection      Apply Mesalt to the left foot wound  Cover with gauze  Secure with lilian and tape  Change dressing every day and as needed for excessive drainage or leakage  This was done today      Keep pressure off foot       Standing Status:   Future     Standing Expiration Date:   8/3/2022

## 2021-08-04 NOTE — ASSESSMENT & PLAN NOTE
Patient's wound redressed with Mesalt dry sterile dressing  We discussed at length the need for offloading for the left foot which has been inadequate with the current MAFO brace   Patient has been referred back to [facility] for adjustment  Lab Results   Component Value Date    HGBA1C 14 0 (H) 09/07/2020

## 2021-08-10 ENCOUNTER — OFFICE VISIT (OUTPATIENT)
Dept: WOUND CARE | Facility: CLINIC | Age: 46
End: 2021-08-10
Payer: COMMERCIAL

## 2021-08-10 VITALS
DIASTOLIC BLOOD PRESSURE: 76 MMHG | SYSTOLIC BLOOD PRESSURE: 132 MMHG | HEART RATE: 106 BPM | RESPIRATION RATE: 20 BRPM | TEMPERATURE: 98 F

## 2021-08-10 DIAGNOSIS — L97.422 DIABETIC ULCER OF LEFT MIDFOOT ASSOCIATED WITH DIABETES MELLITUS DUE TO UNDERLYING CONDITION, WITH FAT LAYER EXPOSED (HCC): Primary | ICD-10-CM

## 2021-08-10 DIAGNOSIS — E08.621 DIABETIC ULCER OF LEFT MIDFOOT ASSOCIATED WITH DIABETES MELLITUS DUE TO UNDERLYING CONDITION, WITH FAT LAYER EXPOSED (HCC): Primary | ICD-10-CM

## 2021-08-10 PROCEDURE — 11042 DBRDMT SUBQ TIS 1ST 20SQCM/<: CPT | Performed by: PODIATRIST

## 2021-08-10 NOTE — PROGRESS NOTES
Patient ID: Hernan Cristina is a 54 y o  female Date of Birth 1975     Chief Complaint   Patient presents with    Follow Up Wound Care Visit     Left foot ulcer, patient has appt  on 8/13/2021 with   Allergies  Clindamycin    Assessment and Plan    No problem-specific Assessment & Plan notes found for this encounter  Diagnoses and all orders for this visit:    Diabetic ulcer of left midfoot associated with diabetes mellitus due to underlying condition, with fat layer exposed ([facility] [address] )  -     Wound cleansing and dressings; Future              Debridement   Universal Protocol:  Consent given by: patient  Time out: Immediately prior to procedure a "time out" was called to verify the correct patient, procedure, equipment, support staff and site/side marked as required  Performed by: physician  Debridement type: surgical  Level of debridement: subcutaneous tissue  Pain control: lidocaine 4%  Post-debridement measurements  Length (cm): 2  Width (cm): 1 5  Depth (cm): 1 4  Percent debrided: 100%  Surface Area (cm^2): 3  Area debrided (cm^2): 3  Volume (cm^3): 4 2  Tissue and other material debrided: subcutaneous tissue  Devitalized tissue debrided: biofilm, callus, exudate, fibrin and slough  Instrument(s) utilized: blade  Bleeding: small  Hemostasis obtained with: pressure  Procedural pain (0-10): 0  Post-procedural pain: 0   Response to treatment: procedure was tolerated well              Subjective:        Patient presents for follow-up evaluation left foot      The following portions of the patient's history were reviewed and updated as appropriate: She  has a past medical history of Charcot's joint of foot, left, Diabetes mellitus ([facility] [address] ), and Osteomyelitis of foot, right, acute ([facility] [address] )  She  has a past surgical history that includes Foot surgery (Right);  Foot Amputation (Left, 10/30/2020); and Foot capsule release w/ percutaneous heel cord lengthening, tibial tendon transfer (Left, 10/30/2020)  Current Outpatient Medications   Medication Sig Dispense Refill    ACCU-CHEK FASTCLIX LANCETS MISC by Does not apply route      ferrous sulfate 325 (65 Fe) mg tablet Take 325 mg by mouth daily with breakfast      gabapentin (NEURONTIN) 300 mg capsule Take 1 capsule (300 mg total) by mouth 3 (three) times a day 90 capsule 0    INSULIN ASPART FLEXPEN SC Inject 30 Units under the skin 3 (three) times a day 30 units with every meal and an additional sliding scale based on blood glucose      insulin glargine (LANTUS) 100 units/mL subcutaneous injection Continue prior to admission dose (Patient taking differently: 57 Units daily at bedtime Continue prior to admission dose) 2000 Units 0    polyethylene glycol (MIRALAX) 17 g packet Take 17 g by mouth daily      Syringe/Needle, Disp, 30G X 1/2" 1 ML MISC by Does not apply route       No current facility-administered medications for this visit  Current Outpatient Medications on File Prior to Visit   Medication Sig    ACCU-CHEK FASTCLIX LANCETS MISC by Does not apply route    ferrous sulfate 325 (65 Fe) mg tablet Take 325 mg by mouth daily with breakfast    gabapentin (NEURONTIN) 300 mg capsule Take 1 capsule (300 mg total) by mouth 3 (three) times a day    INSULIN ASPART FLEXPEN SC Inject 30 Units under the skin 3 (three) times a day 30 units with every meal and an additional sliding scale based on blood glucose    insulin glargine (LANTUS) 100 units/mL subcutaneous injection Continue prior to admission dose (Patient taking differently: 57 Units daily at bedtime Continue prior to admission dose)    polyethylene glycol (MIRALAX) 17 g packet Take 17 g by mouth daily    Syringe/Needle, Disp, 30G X 1/2" 1 ML MISC by Does not apply route     No current facility-administered medications on file prior to visit  She is allergic to clindamycin       Review of Systems   Skin: Positive for wound  Negative for color change           Objective: Wound 04/13/21 Diabetic Ulcer Foot Left (Active)   Wound Image   08/10/21 1537   Wound Description Pink;Slough 08/10/21 1532   Ursula-wound Assessment Callus 08/10/21 1532   Wound Length (cm) 1 8 cm 08/10/21 1532   Wound Width (cm) 1 5 cm 08/10/21 1532   Wound Depth (cm) 0 3 cm 08/10/21 1532   Wound Surface Area (cm^2) 2 7 cm^2 08/10/21 1532   Wound Volume (cm^3) 0 81 cm^3 08/10/21 1532   Calculated Wound Volume (cm^3) 0 81 cm^3 08/10/21 1532   Change in Wound Size % 94 96 08/10/21 1532   Undermining 1 5 04/13/21 1320   Undermining is depth extending from 1-7 04/13/21 1320   Drainage Amount Moderate 08/10/21 1532   Drainage Description Yellow;Brown 08/10/21 1532   Non-staged Wound Description Full thickness 08/10/21 1532   Treatments Cleansed 08/10/21 1532   Dressing Changed Changed 06/08/21 1540   Patient Tolerance Tolerated well 08/10/21 1532                     Wound 04/13/21 Diabetic Ulcer Foot Left (Active)   Wound Image Images linked 08/10/21 1537       /76   Pulse (!) 106   Temp 98 °F (36 7 °C)   Resp 20     Physical Exam  Constitutional:       General: She is awake  Musculoskeletal:      Left lower leg: Edema present  Feet:      Comments: Left foot wound is full-thickness extending through subcutaneous layer with fibro granular base well adherent  Hyperkeratosis periwound 0 5 cm  Periwound maceration 0 5 cm  No active drainage  Neurological:      Mental Status: She is alert  Sensory: Sensory deficit present  Psychiatric:         Behavior: Behavior is cooperative  Wound Instructions:  Orders Placed This Encounter   Procedures    Wound cleansing and dressings     Left foot wound:       Wash your hands with soap and water  Kay Burlesonner old dressing, discard into plastic bag and place in trash   Cleanse the wound with soap and water (Dove unscented) prior to applying a clean dressing  Do not use tissue or cotton balls  Do not scrub the wound   Pat dry using gauze        Shower yes with protection        Apply Mesalt to the left foot wound  Cover with gauze  Secure with lilian and tape  Change dressing every day and as needed for excessive drainage or leakage  This was done today        Keep pressure off foot       Standing Status:   Future     Standing Expiration Date:   8/10/2022

## 2021-08-10 NOTE — ASSESSMENT & PLAN NOTE
Post debridement wound redressed with mesalt DSD  Lab Results   Component Value Date    HGBA1C 14 0 (H) 09/07/2020

## 2021-08-10 NOTE — PATIENT INSTRUCTIONS
Orders Placed This Encounter   Procedures    Wound cleansing and dressings     Left foot wound:       Wash your hands with soap and water  Mariaelena Sunshine old dressing, discard into plastic bag and place in trash   Cleanse the wound with soap and water (Dove unscented) prior to applying a clean dressing  Do not use tissue or cotton balls  Do not scrub the wound  Pat dry using gauze        Shower yes with protection        Apply Mesalt to the left foot wound  Cover with gauze  Secure with lilian and tape  Change dressing every day and as needed for excessive drainage or leakage  This was done today        Keep pressure off foot  Standing Status:   Future     Standing Expiration Date:   8/10/2022   High Protein Diet   WHAT YOU NEED TO KNOW:   A high-protein diet is a meal plan that includes extra protein  Your body may need extra protein if you have certain health conditions, such as cancer, burns, or injuries  You may also need to follow this diet to get stronger after a surgery or illness  Extra protein helps to heal wounds and form new tissue in the body  Your dietitian will tell you how much protein and how many calories you need each day  DISCHARGE INSTRUCTIONS:   Foods high in protein:  The average amount of protein is listed below in grams (g)  To find the exact amount of protein in a food, read the food labels on packaged items  · Dairy:      ? 1 cup of any type of milk (8 g)    ? ½ cup of evaporated canned milk (9 g)    ? ¼ cup of nonfat dry milk (11 g)    ? 1 ounce of semi-hard or solid cheese (7 g)    ? ¼ cup of parmesan cheese (8 g)    ? ½ cup of cottage cheese (14 g)    ? ½ cup of pudding (4 g)    ? 1 cup of plain or fruit yogurt (8 g)    · Meats and meat substitutes:      ? 3 ounces of cooked freshwater fish (21 g)     ? 3 ounces of cooked shellfish (19 g)    ? ½ cup of canned tuna (14 g)    ? 3 ounces of cooked chicken, turkey, or other poultry (24 g)    ?  3 ounces of cooked beef, pork, lamb, or other red meat (21 g)    ? 1 large egg (6 g)    ? ¼ cup of fat free egg substitute (5 g)    ? ½ cup of tofu or tempeh (10 g)    ? 1 cup of cooked dried beans, such as ambry, kidney, or navy (15 g)    · Nuts and seeds:      ? 2 tablespoons of almonds, cashews, sunflower seeds, or walnuts (5 g)    ? 2 tablespoons of peanuts (7 g)    ? 2 tablespoons of peanut butter (8 g)    How to add extra protein:   · Add powdered milk to milk, cereals, scrambled eggs, soups, and casseroles  · Add cheese to sauces, soups, or vegetables  · Add eggs to tuna, salads, sauces, or casseroles  · Add nutrition supplements and breakfast drink mixes to milk or shakes  · Add nuts to foods or eat them as snacks  · Add meat (beef, chicken, and pork) to soups, casseroles, pasta dishes, or vegetables  · Add beans, peas, and other legumes to salads  · Eat cottage cheese or yogurt with fruit  © Copyright [address] 2021 Information is for End User's use only and may not be sold, redistributed or otherwise used for commercial purposes  All illustrations and images included in CareNotes® are the copyrighted property of A D A M , Inc  or Fabien Jorge  The above information is an  only  It is not intended as medical advice for individual conditions or treatments  Talk to your doctor, nurse or pharmacist before following any medical regimen to see if it is safe and effective for you

## 2021-08-17 ENCOUNTER — OFFICE VISIT (OUTPATIENT)
Dept: WOUND CARE | Facility: CLINIC | Age: 46
End: 2021-08-17
Payer: COMMERCIAL

## 2021-08-17 VITALS
TEMPERATURE: 97.5 F | RESPIRATION RATE: 18 BRPM | HEART RATE: 98 BPM | SYSTOLIC BLOOD PRESSURE: 142 MMHG | DIASTOLIC BLOOD PRESSURE: 76 MMHG

## 2021-08-17 DIAGNOSIS — L97.422 DIABETIC ULCER OF LEFT MIDFOOT ASSOCIATED WITH DIABETES MELLITUS DUE TO UNDERLYING CONDITION, WITH FAT LAYER EXPOSED (HCC): Primary | ICD-10-CM

## 2021-08-17 DIAGNOSIS — E08.621 DIABETIC ULCER OF LEFT MIDFOOT ASSOCIATED WITH DIABETES MELLITUS DUE TO UNDERLYING CONDITION, WITH FAT LAYER EXPOSED (HCC): Primary | ICD-10-CM

## 2021-08-17 PROCEDURE — 11042 DBRDMT SUBQ TIS 1ST 20SQCM/<: CPT | Performed by: PODIATRIST

## 2021-08-17 NOTE — LETTER
August 17, 2021     Patient: Terrance Modi   YOB: 1975   Date of Visit: 8/17/2021       To Whom it May Concern:    Jimy Alston is under my professional care  She was seen in my office on 8/17/2021  She may return to work with limitations no weight baring to left leg and must elevate leg at all times       If you have any questions or concerns, please don't hesitate to call           Sincerely,          Eduardo Boas, DPM        CC: No Recipients

## 2021-08-17 NOTE — PATIENT INSTRUCTIONS
Orders Placed This Encounter   Procedures    Wound cleansing and dressings     Left foot wound:       Wash your hands with soap and water  Rajinder Greenberg old dressing, discard into plastic bag and place in trash   Cleanse the wound with soap and water (Dove unscented) prior to applying a clean dressing  Do not use tissue or cotton balls  Do not scrub the wound  Pat dry using gauze        Shower yes with protection        Apply Mesalt with Maxsorb AG to the left foot wound  Cover with gauze  Secure with lilian and tape  Change dressing every day and as needed for excessive drainage or leakage  This was done today        Keep pressure off foot       Standing Status:   Future     Standing Expiration Date:   8/17/2022

## 2021-08-18 NOTE — PROGRESS NOTES
Patient ID: Hernan Cristina is a 54 y o  female Date of Birth 1975     Chief Complaint   Patient presents with    Follow Up Wound Care Visit     dressing intact       Allergies  Clindamycin    Assessment and Plan    No problem-specific Assessment & Plan notes found for this encounter  Diagnoses and all orders for this visit:    Diabetic ulcer of left midfoot associated with diabetes mellitus due to underlying condition, with fat layer exposed ([facility] [address] )  -     Wound cleansing and dressings; Future              Debridement   Universal Protocol:  Consent given by: patient  Time out: Immediately prior to procedure a "time out" was called to verify the correct patient, procedure, equipment, support staff and site/side marked as required  Performed by: physician  Debridement type: surgical  Level of debridement: subcutaneous tissue  Pain control: lidocaine 4%  Post-debridement measurements  Length (cm): 2  Width (cm): 1 7  Depth (cm): 0 3  Percent debrided: 100%  Surface Area (cm^2): 3 4  Area debrided (cm^2): 3 4  Volume (cm^3): 1 02  Tissue and other material debrided: subcutaneous tissue  Devitalized tissue debrided: biofilm, callus, exudate, fibrin and slough  Instrument(s) utilized: blade  Bleeding: small  Hemostasis obtained with: pressure  Procedural pain (0-10): 0  Post-procedural pain: 0   Response to treatment: procedure was tolerated well              Subjective:        Patient presents for follow up left foot wound  She is still awaiting her brace for adjustment      The following portions of the patient's history were reviewed and updated as appropriate: She  has a past medical history of Charcot's joint of foot, left, Diabetes mellitus ([facility] [address] ), and Osteomyelitis of foot, right, acute ([facility] [address] )  She  has a past surgical history that includes Foot surgery (Right);  Foot Amputation (Left, 10/30/2020); and Foot capsule release w/ percutaneous heel cord lengthening, tibial tendon transfer (Left, 10/30/2020)  Current Outpatient Medications   Medication Sig Dispense Refill    ACCU-CHEK FASTCLIX LANCETS MISC by Does not apply route      ferrous sulfate 325 (65 Fe) mg tablet Take 325 mg by mouth daily with breakfast      gabapentin (NEURONTIN) 300 mg capsule Take 1 capsule (300 mg total) by mouth 3 (three) times a day 90 capsule 0    INSULIN ASPART FLEXPEN SC Inject 30 Units under the skin 3 (three) times a day 30 units with every meal and an additional sliding scale based on blood glucose      insulin glargine (LANTUS) 100 units/mL subcutaneous injection Continue prior to admission dose (Patient taking differently: 57 Units daily at bedtime Continue prior to admission dose) 2000 Units 0    polyethylene glycol (MIRALAX) 17 g packet Take 17 g by mouth daily      Syringe/Needle, Disp, 30G X 1/2" 1 ML MISC by Does not apply route       No current facility-administered medications for this visit  Current Outpatient Medications on File Prior to Visit   Medication Sig    ACCU-CHEK FASTCLIX LANCETS MISC by Does not apply route    ferrous sulfate 325 (65 Fe) mg tablet Take 325 mg by mouth daily with breakfast    gabapentin (NEURONTIN) 300 mg capsule Take 1 capsule (300 mg total) by mouth 3 (three) times a day    INSULIN ASPART FLEXPEN SC Inject 30 Units under the skin 3 (three) times a day 30 units with every meal and an additional sliding scale based on blood glucose    insulin glargine (LANTUS) 100 units/mL subcutaneous injection Continue prior to admission dose (Patient taking differently: 57 Units daily at bedtime Continue prior to admission dose)    polyethylene glycol (MIRALAX) 17 g packet Take 17 g by mouth daily    Syringe/Needle, Disp, 30G X 1/2" 1 ML MISC by Does not apply route     No current facility-administered medications on file prior to visit  She is allergic to clindamycin       Review of Systems   Skin: Positive for wound  Negative for color change           Objective: Wound 04/13/21 Diabetic Ulcer Foot Left (Active)   Wound Image   08/17/21 1544   Wound Description Pink;Slough 08/17/21 1533   Ursula-wound Assessment Callus 08/17/21 1533   Wound Length (cm) 1 8 cm 08/17/21 1533   Wound Width (cm) 1 5 cm 08/17/21 1533   Wound Depth (cm) 0 2 cm 08/17/21 1533   Wound Surface Area (cm^2) 2 7 cm^2 08/17/21 1533   Wound Volume (cm^3) 0 54 cm^3 08/17/21 1533   Calculated Wound Volume (cm^3) 0 54 cm^3 08/17/21 1533   Change in Wound Size % 96 64 08/17/21 1533   Undermining 1 5 04/13/21 1320   Undermining is depth extending from 1-7 04/13/21 1320   Drainage Amount Moderate 08/17/21 1533   Drainage Description Yellow;Brown 08/17/21 1533   Non-staged Wound Description Full thickness 08/17/21 1533   Treatments Irrigation with NSS 08/17/21 1533   Dressing Changed Changed 06/08/21 1540   Patient Tolerance Tolerated well 08/17/21 1533                          /76   Pulse 98   Temp 97 5 °F (36 4 °C)   Resp 18     Physical Exam  Constitutional:       General: She is awake  Appearance: She is morbidly obese  Musculoskeletal:         General: No tenderness  Feet:      Comments: Left plantar foot wound is full thickness with fibrogranular base well adherent  Hyperkeratosis 0 25 cm periwound  No active drainage  No periwopund erythema, edema  Skin:     Findings: No erythema  Neurological:      Mental Status: She is alert  Sensory: Sensory deficit present  Psychiatric:         Behavior: Behavior is cooperative  Wound Instructions:  Orders Placed This Encounter   Procedures    Wound cleansing and dressings     Left foot wound:       Wash your hands with soap and water  Particia Alexandru old dressing, discard into plastic bag and place in trash   Cleanse the wound with soap and water (Dove unscented) prior to applying a clean dressing  Do not use tissue or cotton balls  Do not scrub the wound   Pat dry using gauze        Shower yes with protection        Apply Mesalt with Maxsorb AG to the left foot wound  Cover with gauze  Secure with lilian and tape  Change dressing every day and as needed for excessive drainage or leakage  This was done today        Keep pressure off foot       Standing Status:   Future     Standing Expiration Date:   8/17/2022

## 2021-08-18 NOTE — ASSESSMENT & PLAN NOTE
Post debridement wound redressed with mesalt/ maxorb DSD  Lab Results   Component Value Date    HGBA1C 14 0 (H) 09/07/2020

## 2021-08-24 ENCOUNTER — OFFICE VISIT (OUTPATIENT)
Dept: WOUND CARE | Facility: CLINIC | Age: 46
End: 2021-08-24
Payer: COMMERCIAL

## 2021-08-24 VITALS
SYSTOLIC BLOOD PRESSURE: 138 MMHG | DIASTOLIC BLOOD PRESSURE: 84 MMHG | TEMPERATURE: 98.6 F | HEART RATE: 111 BPM | RESPIRATION RATE: 18 BRPM

## 2021-08-24 DIAGNOSIS — L97.422 DIABETIC ULCER OF LEFT MIDFOOT ASSOCIATED WITH DIABETES MELLITUS DUE TO UNDERLYING CONDITION, WITH FAT LAYER EXPOSED (HCC): Primary | ICD-10-CM

## 2021-08-24 DIAGNOSIS — E08.621 DIABETIC ULCER OF LEFT MIDFOOT ASSOCIATED WITH DIABETES MELLITUS DUE TO UNDERLYING CONDITION, WITH FAT LAYER EXPOSED (HCC): Primary | ICD-10-CM

## 2021-08-24 PROCEDURE — 11042 DBRDMT SUBQ TIS 1ST 20SQCM/<: CPT | Performed by: PODIATRIST

## 2021-08-24 RX ORDER — LIDOCAINE 40 MG/G
CREAM TOPICAL ONCE
Status: COMPLETED | OUTPATIENT
Start: 2021-08-24 | End: 2021-08-24

## 2021-08-24 RX ADMIN — LIDOCAINE: 40 CREAM TOPICAL at 15:50

## 2021-08-24 NOTE — PROGRESS NOTES
Patient ID: Terrance Modi is a 54 y o  female Date of Birth 1975     Chief Complaint   Patient presents with    Follow Up Wound Care Visit       Allergies  Clindamycin    Assessment and Plan    No problem-specific Assessment & Plan notes found for this encounter  Diagnoses and all orders for this visit:    Diabetic ulcer of left midfoot associated with diabetes mellitus due to underlying condition, with fat layer exposed ([facility] [address] )  -     lidocaine (LMX) 4 % cream  -     Wound cleansing and dressings; Future              Debridement   Universal Protocol:  Consent given by: patient  Time out: Immediately prior to procedure a "time out" was called to verify the correct patient, procedure, equipment, support staff and site/side marked as required  Performed by: physician  Debridement type: surgical  Level of debridement: subcutaneous tissue  Pain control: lidocaine 4%  Post-debridement measurements  Length (cm): 2 2  Width (cm): 2 1  Depth (cm): 0 9  Percent debrided: 100%  Surface Area (cm^2): 4 62  Area debrided (cm^2): 4 62  Volume (cm^3): 4 16  Tissue and other material debrided: subcutaneous tissue  Devitalized tissue debrided: biofilm, callus, exudate, fibrin and slough  Instrument(s) utilized: blade  Bleeding: small  Hemostasis obtained with: pressure  Procedural pain (0-10): 0  Post-procedural pain: 0   Response to treatment: procedure was tolerated well              Subjective:        Patient seen for follow-up evaluation left lower extremity wound      The following portions of the patient's history were reviewed and updated as appropriate: She  has a past medical history of Charcot's joint of foot, left, Diabetes mellitus ([facility] [address] ), and Osteomyelitis of foot, right, acute ([facility] [address] )  She  has a past surgical history that includes Foot surgery (Right);  Foot Amputation (Left, 10/30/2020); and Foot capsule release w/ percutaneous heel cord lengthening, tibial tendon transfer (Left, 10/30/2020)  Current Outpatient Medications   Medication Sig Dispense Refill    ACCU-CHEK FASTCLIX LANCETS MISC by Does not apply route      ferrous sulfate 325 (65 Fe) mg tablet Take 325 mg by mouth daily with breakfast      gabapentin (NEURONTIN) 300 mg capsule Take 1 capsule (300 mg total) by mouth 3 (three) times a day 90 capsule 0    INSULIN ASPART FLEXPEN SC Inject 30 Units under the skin 3 (three) times a day 30 units with every meal and an additional sliding scale based on blood glucose      insulin glargine (LANTUS) 100 units/mL subcutaneous injection Continue prior to admission dose (Patient taking differently: 57 Units daily at bedtime Continue prior to admission dose) 2000 Units 0    polyethylene glycol (MIRALAX) 17 g packet Take 17 g by mouth daily      Syringe/Needle, Disp, 30G X 1/2" 1 ML MISC by Does not apply route       No current facility-administered medications for this visit  Current Outpatient Medications on File Prior to Visit   Medication Sig    ACCU-CHEK FASTCLIX LANCETS MISC by Does not apply route    ferrous sulfate 325 (65 Fe) mg tablet Take 325 mg by mouth daily with breakfast    gabapentin (NEURONTIN) 300 mg capsule Take 1 capsule (300 mg total) by mouth 3 (three) times a day    INSULIN ASPART FLEXPEN SC Inject 30 Units under the skin 3 (three) times a day 30 units with every meal and an additional sliding scale based on blood glucose    insulin glargine (LANTUS) 100 units/mL subcutaneous injection Continue prior to admission dose (Patient taking differently: 57 Units daily at bedtime Continue prior to admission dose)    polyethylene glycol (MIRALAX) 17 g packet Take 17 g by mouth daily    Syringe/Needle, Disp, 30G X 1/2" 1 ML MISC by Does not apply route     No current facility-administered medications on file prior to visit  She is allergic to clindamycin       Review of Systems   Skin: Positive for wound  Negative for color change           Objective: Wound 04/13/21 Diabetic Ulcer Foot Left (Active)   Wound Image   08/24/21 1555   Wound Description Pink;Slough 08/24/21 1543   Ursula-wound Assessment Callus; Maceration 08/24/21 1543   Wound Length (cm) 2 cm 08/24/21 1543   Wound Width (cm) 1 9 cm 08/24/21 1543   Wound Depth (cm) 0 4 cm 08/24/21 1543   Wound Surface Area (cm^2) 3 8 cm^2 08/24/21 1543   Wound Volume (cm^3) 1 52 cm^3 08/24/21 1543   Calculated Wound Volume (cm^3) 1 52 cm^3 08/24/21 1543   Change in Wound Size % 90 54 08/24/21 1543   Undermining 0 4 08/24/21 1543   Undermining is depth extending from 12-12 08/24/21 1543   Drainage Amount Moderate 08/24/21 1543   Drainage Description Yellow;Brown 08/24/21 1543   Non-staged Wound Description Full thickness 08/24/21 1543   Treatments Irrigation with NSS 08/24/21 1543   Dressing Changed Changed 06/08/21 1540   Patient Tolerance Tolerated well 08/17/21 1533                       Wound 04/13/21 Diabetic Ulcer Foot Left (Active)   Wound Image Images linked 08/24/21 1555       /84   Pulse (!) 111   Temp 98 6 °F (37 °C)   Resp 18     Physical Exam  Constitutional:       General: She is awake  Appearance: She is morbidly obese  Musculoskeletal:         General: No tenderness  Feet:      Left foot:      Skin integrity: No erythema or warmth  Comments:  Wound is full thickness extending through subcutaneous tissue with hyperkeratosis 0 5 cm periwound  No active drainage  Skin:     Findings: No erythema  Neurological:      Mental Status: She is alert  Psychiatric:         Behavior: Behavior is cooperative  Wound Instructions:  Orders Placed This Encounter   Procedures    Wound cleansing and dressings     Left foot wound:       Wash your hands with soap and water  Mariaelena Sunshine old dressing, discard into plastic bag and place in trash   Cleanse the wound with soap and water (Dove unscented) prior to applying a clean dressing  Do not use tissue or cotton balls   Do not scrub the wound  Pat dry using gauze        Shower yes with protection        Apply Mesalt with Maxsorb AG to the left foot wound  Cover with gauze bolster  ABD windowed Secure with lilian and tape  Change dressing every day and as needed for excessive drainage or leakage  This was done today        Keep pressure off foot       Standing Status:   Future     Standing Expiration Date:   8/24/2022

## 2021-08-24 NOTE — PATIENT INSTRUCTIONS
Orders Placed This Encounter   Procedures    Wound cleansing and dressings     Left foot wound:       Wash your hands with soap and water  Mira Clock old dressing, discard into plastic bag and place in trash   Cleanse the wound with soap and water (Dove unscented) prior to applying a clean dressing  Do not use tissue or cotton balls  Do not scrub the wound  Pat dry using gauze        Shower yes with protection        Apply Mesalt with Maxsorb AG to the left foot wound  Cover with gauze bolster  ABD windowed Secure with lilian and tape  Change dressing every day and as needed for excessive drainage or leakage  This was done today        Keep pressure off foot       Standing Status:   Future     Standing Expiration Date:   8/24/2022

## 2021-08-24 NOTE — ASSESSMENT & PLAN NOTE
Post debridement wound redressed with Mesalt then Maxorb dry sterile dressing and offloaded  Lab Results   Component Value Date    HGBA1C 14 0 (H) 09/07/2020

## 2022-08-04 ENCOUNTER — APPOINTMENT (EMERGENCY)
Dept: RADIOLOGY | Facility: HOSPITAL | Age: 47
DRG: 872 | End: 2022-08-04
Payer: COMMERCIAL

## 2022-08-04 ENCOUNTER — APPOINTMENT (INPATIENT)
Dept: CT IMAGING | Facility: HOSPITAL | Age: 47
DRG: 872 | End: 2022-08-04
Payer: COMMERCIAL

## 2022-08-04 ENCOUNTER — HOSPITAL ENCOUNTER (INPATIENT)
Facility: HOSPITAL | Age: 47
LOS: 4 days | DRG: 872 | End: 2022-08-08
Attending: EMERGENCY MEDICINE | Admitting: FAMILY MEDICINE
Payer: COMMERCIAL

## 2022-08-04 ENCOUNTER — TELEPHONE (OUTPATIENT)
Dept: CT IMAGING | Facility: HOSPITAL | Age: 47
End: 2022-08-04

## 2022-08-04 DIAGNOSIS — D64.9 ANEMIA: ICD-10-CM

## 2022-08-04 DIAGNOSIS — N13.30 HYDROURETERONEPHROSIS: ICD-10-CM

## 2022-08-04 DIAGNOSIS — E83.42 HYPOMAGNESEMIA: ICD-10-CM

## 2022-08-04 DIAGNOSIS — L97.422 DIABETIC ULCER OF LEFT MIDFOOT ASSOCIATED WITH DIABETES MELLITUS DUE TO UNDERLYING CONDITION, WITH FAT LAYER EXPOSED (HCC): ICD-10-CM

## 2022-08-04 DIAGNOSIS — A41.9 SEPSIS, DUE TO UNSPECIFIED ORGANISM, UNSPECIFIED WHETHER ACUTE ORGAN DYSFUNCTION PRESENT (HCC): ICD-10-CM

## 2022-08-04 DIAGNOSIS — R78.81 BACTEREMIA: ICD-10-CM

## 2022-08-04 DIAGNOSIS — R55 NEAR SYNCOPE: ICD-10-CM

## 2022-08-04 DIAGNOSIS — R42 DIZZINESS: Primary | ICD-10-CM

## 2022-08-04 DIAGNOSIS — E08.621 DIABETIC ULCER OF LEFT MIDFOOT ASSOCIATED WITH DIABETES MELLITUS DUE TO UNDERLYING CONDITION, WITH FAT LAYER EXPOSED (HCC): ICD-10-CM

## 2022-08-04 DIAGNOSIS — L03.116 CELLULITIS OF LEFT FOOT: ICD-10-CM

## 2022-08-04 DIAGNOSIS — E83.39 HYPOPHOSPHATEMIA: ICD-10-CM

## 2022-08-04 DIAGNOSIS — S91.309A OPEN WOUND OF FOOT, UNSPECIFIED LATERALITY, INITIAL ENCOUNTER: ICD-10-CM

## 2022-08-04 PROBLEM — Z45.2 PICC (PERIPHERALLY INSERTED CENTRAL CATHETER) IN PLACE: Status: RESOLVED | Noted: 2020-10-31 | Resolved: 2022-08-04

## 2022-08-04 PROBLEM — K52.9 GASTROENTERITIS: Status: ACTIVE | Noted: 2022-08-04

## 2022-08-04 PROBLEM — D50.9 IRON DEFICIENCY ANEMIA: Status: ACTIVE | Noted: 2020-10-28

## 2022-08-04 PROBLEM — E87.8 ELECTROLYTE IMBALANCE: Status: ACTIVE | Noted: 2022-08-04

## 2022-08-04 LAB
2HR DELTA HS TROPONIN: 6 NG/L
4HR DELTA HS TROPONIN: 5 NG/L
ALBUMIN SERPL BCP-MCNC: 2.5 G/DL (ref 3.5–5)
ALP SERPL-CCNC: 121 U/L (ref 46–116)
ALT SERPL W P-5'-P-CCNC: 18 U/L (ref 12–78)
ANION GAP SERPL CALCULATED.3IONS-SCNC: 13 MMOL/L (ref 4–13)
ANISOCYTOSIS BLD QL SMEAR: PRESENT
APTT PPP: 23 SECONDS (ref 23–37)
AST SERPL W P-5'-P-CCNC: 17 U/L (ref 5–45)
B-HCG SERPL-ACNC: <2 MIU/ML
BACTERIA UR QL AUTO: ABNORMAL /HPF
BASE EX.OXY STD BLDV CALC-SCNC: 96.4 % (ref 60–80)
BASE EXCESS BLDV CALC-SCNC: -2.3 MMOL/L
BASOPHILS # BLD MANUAL: 0 THOUSAND/UL (ref 0–0.1)
BASOPHILS NFR MAR MANUAL: 0 % (ref 0–1)
BILIRUB SERPL-MCNC: 0.83 MG/DL (ref 0.2–1)
BILIRUB UR QL STRIP: ABNORMAL
BUN SERPL-MCNC: 19 MG/DL (ref 5–25)
CALCIUM ALBUM COR SERPL-MCNC: 9.7 MG/DL (ref 8.3–10.1)
CALCIUM SERPL-MCNC: 8.5 MG/DL (ref 8.3–10.1)
CARDIAC TROPONIN I PNL SERPL HS: 11 NG/L
CARDIAC TROPONIN I PNL SERPL HS: 12 NG/L
CARDIAC TROPONIN I PNL SERPL HS: 6 NG/L
CHLORIDE SERPL-SCNC: 99 MMOL/L (ref 96–108)
CLARITY UR: CLEAR
CO2 SERPL-SCNC: 21 MMOL/L (ref 21–32)
COLOR UR: YELLOW
CREAT SERPL-MCNC: 1.25 MG/DL (ref 0.6–1.3)
CRP SERPL QL: 111.6 MG/L
EOSINOPHIL # BLD MANUAL: 0.13 THOUSAND/UL (ref 0–0.4)
EOSINOPHIL NFR BLD MANUAL: 1 % (ref 0–6)
ERYTHROCYTE [DISTWIDTH] IN BLOOD BY AUTOMATED COUNT: 16.7 % (ref 11.6–15.1)
FERRITIN SERPL-MCNC: 40 NG/ML (ref 8–388)
FLUAV RNA RESP QL NAA+PROBE: NEGATIVE
FLUBV RNA RESP QL NAA+PROBE: NEGATIVE
FOLATE SERPL-MCNC: 12.3 NG/ML (ref 3.1–17.5)
GFR SERPL CREATININE-BSD FRML MDRD: 51 ML/MIN/1.73SQ M
GLUCOSE SERPL-MCNC: 120 MG/DL (ref 65–140)
GLUCOSE SERPL-MCNC: 221 MG/DL (ref 65–140)
GLUCOSE SERPL-MCNC: 228 MG/DL (ref 65–140)
GLUCOSE UR STRIP-MCNC: NEGATIVE MG/DL
HCG SERPL QL: NEGATIVE
HCO3 BLDV-SCNC: 20.9 MMOL/L (ref 24–30)
HCT VFR BLD AUTO: 30.4 % (ref 34.8–46.1)
HGB BLD-MCNC: 8.9 G/DL (ref 11.5–15.4)
HGB UR QL STRIP.AUTO: ABNORMAL
INR PPP: 1.07 (ref 0.84–1.19)
IRON SATN MFR SERPL: 9 % (ref 15–50)
IRON SERPL-MCNC: 29 UG/DL (ref 50–170)
KETONES UR STRIP-MCNC: NEGATIVE MG/DL
LACTATE SERPL-SCNC: 1.9 MMOL/L (ref 0.5–2)
LEUKOCYTE ESTERASE UR QL STRIP: ABNORMAL
LIPASE SERPL-CCNC: 53 U/L (ref 73–393)
LYMPHOCYTES # BLD AUTO: 0.64 THOUSAND/UL (ref 0.6–4.47)
LYMPHOCYTES # BLD AUTO: 5 % (ref 14–44)
MAGNESIUM SERPL-MCNC: 1.2 MG/DL (ref 1.6–2.6)
MCH RBC QN AUTO: 21.5 PG (ref 26.8–34.3)
MCHC RBC AUTO-ENTMCNC: 29.3 G/DL (ref 31.4–37.4)
MCV RBC AUTO: 74 FL (ref 82–98)
MICROCYTES BLD QL AUTO: PRESENT
MONOCYTES # BLD AUTO: 0 THOUSAND/UL (ref 0–1.22)
MONOCYTES NFR BLD: 0 % (ref 4–12)
NEUTROPHILS # BLD MANUAL: 12.06 THOUSAND/UL (ref 1.85–7.62)
NEUTS BAND NFR BLD MANUAL: 6 % (ref 0–8)
NEUTS SEG NFR BLD AUTO: 88 % (ref 43–75)
NITRITE UR QL STRIP: NEGATIVE
NON-SQ EPI CELLS URNS QL MICRO: ABNORMAL /HPF
NT-PROBNP SERPL-MCNC: 175 PG/ML
O2 CT BLDV-SCNC: 13.2 ML/DL
PCO2 BLDV: 29.8 MM HG (ref 42–50)
PH BLDV: 7.46 [PH] (ref 7.3–7.4)
PH UR STRIP.AUTO: 6.5 [PH]
PHOSPHATE SERPL-MCNC: 2.5 MG/DL (ref 2.7–4.5)
PLATELET # BLD AUTO: 296 THOUSANDS/UL (ref 149–390)
PLATELET BLD QL SMEAR: ADEQUATE
PMV BLD AUTO: 10.1 FL (ref 8.9–12.7)
PO2 BLDV: 144.3 MM HG (ref 35–45)
POTASSIUM SERPL-SCNC: 3.9 MMOL/L (ref 3.5–5.3)
PROCALCITONIN SERPL-MCNC: 0.55 NG/ML
PROT SERPL-MCNC: 7.3 G/DL (ref 6.4–8.4)
PROT UR STRIP-MCNC: ABNORMAL MG/DL
PROTHROMBIN TIME: 14 SECONDS (ref 11.6–14.5)
RBC # BLD AUTO: 4.13 MILLION/UL (ref 3.81–5.12)
RBC #/AREA URNS AUTO: ABNORMAL /HPF
RBC MORPH BLD: PRESENT
RSV RNA RESP QL NAA+PROBE: NEGATIVE
SARS-COV-2 RNA RESP QL NAA+PROBE: NEGATIVE
SODIUM SERPL-SCNC: 133 MMOL/L (ref 135–147)
SP GR UR STRIP.AUTO: 1.01 (ref 1–1.03)
TIBC SERPL-MCNC: 324 UG/DL (ref 250–450)
TSH SERPL DL<=0.05 MIU/L-ACNC: 2 UIU/ML (ref 0.45–4.5)
UROBILINOGEN UR QL STRIP.AUTO: 0.2 E.U./DL
VIT B12 SERPL-MCNC: 409 PG/ML (ref 100–900)
WBC # BLD AUTO: 12.83 THOUSAND/UL (ref 4.31–10.16)
WBC #/AREA URNS AUTO: ABNORMAL /HPF

## 2022-08-04 PROCEDURE — 80053 COMPREHEN METABOLIC PANEL: CPT | Performed by: EMERGENCY MEDICINE

## 2022-08-04 PROCEDURE — 81001 URINALYSIS AUTO W/SCOPE: CPT | Performed by: EMERGENCY MEDICINE

## 2022-08-04 PROCEDURE — 87181 SC STD AGAR DILUTION PER AGT: CPT | Performed by: EMERGENCY MEDICINE

## 2022-08-04 PROCEDURE — 85025 COMPLETE CBC W/AUTO DIFF WBC: CPT | Performed by: EMERGENCY MEDICINE

## 2022-08-04 PROCEDURE — 99285 EMERGENCY DEPT VISIT HI MDM: CPT

## 2022-08-04 PROCEDURE — 99285 EMERGENCY DEPT VISIT HI MDM: CPT | Performed by: EMERGENCY MEDICINE

## 2022-08-04 PROCEDURE — 87077 CULTURE AEROBIC IDENTIFY: CPT | Performed by: FAMILY MEDICINE

## 2022-08-04 PROCEDURE — 87070 CULTURE OTHR SPECIMN AEROBIC: CPT | Performed by: FAMILY MEDICINE

## 2022-08-04 PROCEDURE — 83540 ASSAY OF IRON: CPT | Performed by: FAMILY MEDICINE

## 2022-08-04 PROCEDURE — 87154 CUL TYP ID BLD PTHGN 6+ TRGT: CPT | Performed by: EMERGENCY MEDICINE

## 2022-08-04 PROCEDURE — 87205 SMEAR GRAM STAIN: CPT | Performed by: FAMILY MEDICINE

## 2022-08-04 PROCEDURE — 84145 PROCALCITONIN (PCT): CPT | Performed by: EMERGENCY MEDICINE

## 2022-08-04 PROCEDURE — 73620 X-RAY EXAM OF FOOT: CPT

## 2022-08-04 PROCEDURE — 84484 ASSAY OF TROPONIN QUANT: CPT | Performed by: FAMILY MEDICINE

## 2022-08-04 PROCEDURE — 74177 CT ABD & PELVIS W/CONTRAST: CPT

## 2022-08-04 PROCEDURE — 87040 BLOOD CULTURE FOR BACTERIA: CPT | Performed by: EMERGENCY MEDICINE

## 2022-08-04 PROCEDURE — 82805 BLOOD GASES W/O2 SATURATION: CPT | Performed by: EMERGENCY MEDICINE

## 2022-08-04 PROCEDURE — 84703 CHORIONIC GONADOTROPIN ASSAY: CPT | Performed by: FAMILY MEDICINE

## 2022-08-04 PROCEDURE — 73630 X-RAY EXAM OF FOOT: CPT

## 2022-08-04 PROCEDURE — 82728 ASSAY OF FERRITIN: CPT | Performed by: FAMILY MEDICINE

## 2022-08-04 PROCEDURE — 85007 BL SMEAR W/DIFF WBC COUNT: CPT | Performed by: EMERGENCY MEDICINE

## 2022-08-04 PROCEDURE — 83605 ASSAY OF LACTIC ACID: CPT | Performed by: EMERGENCY MEDICINE

## 2022-08-04 PROCEDURE — 84443 ASSAY THYROID STIM HORMONE: CPT | Performed by: EMERGENCY MEDICINE

## 2022-08-04 PROCEDURE — 83690 ASSAY OF LIPASE: CPT | Performed by: EMERGENCY MEDICINE

## 2022-08-04 PROCEDURE — 96365 THER/PROPH/DIAG IV INF INIT: CPT

## 2022-08-04 PROCEDURE — 0241U HB NFCT DS VIR RESP RNA 4 TRGT: CPT | Performed by: EMERGENCY MEDICINE

## 2022-08-04 PROCEDURE — 84484 ASSAY OF TROPONIN QUANT: CPT | Performed by: EMERGENCY MEDICINE

## 2022-08-04 PROCEDURE — 82948 REAGENT STRIP/BLOOD GLUCOSE: CPT

## 2022-08-04 PROCEDURE — 82746 ASSAY OF FOLIC ACID SERUM: CPT | Performed by: FAMILY MEDICINE

## 2022-08-04 PROCEDURE — 87186 SC STD MICRODIL/AGAR DIL: CPT | Performed by: EMERGENCY MEDICINE

## 2022-08-04 PROCEDURE — 87077 CULTURE AEROBIC IDENTIFY: CPT | Performed by: EMERGENCY MEDICINE

## 2022-08-04 PROCEDURE — 83880 ASSAY OF NATRIURETIC PEPTIDE: CPT | Performed by: EMERGENCY MEDICINE

## 2022-08-04 PROCEDURE — 85610 PROTHROMBIN TIME: CPT | Performed by: EMERGENCY MEDICINE

## 2022-08-04 PROCEDURE — 85027 COMPLETE CBC AUTOMATED: CPT | Performed by: EMERGENCY MEDICINE

## 2022-08-04 PROCEDURE — 83036 HEMOGLOBIN GLYCOSYLATED A1C: CPT | Performed by: PODIATRIST

## 2022-08-04 PROCEDURE — 84702 CHORIONIC GONADOTROPIN TEST: CPT | Performed by: EMERGENCY MEDICINE

## 2022-08-04 PROCEDURE — 99223 1ST HOSP IP/OBS HIGH 75: CPT | Performed by: FAMILY MEDICINE

## 2022-08-04 PROCEDURE — 83550 IRON BINDING TEST: CPT | Performed by: FAMILY MEDICINE

## 2022-08-04 PROCEDURE — G1004 CDSM NDSC: HCPCS

## 2022-08-04 PROCEDURE — 87076 CULTURE ANAEROBE IDENT EACH: CPT | Performed by: EMERGENCY MEDICINE

## 2022-08-04 PROCEDURE — 71045 X-RAY EXAM CHEST 1 VIEW: CPT

## 2022-08-04 PROCEDURE — 84100 ASSAY OF PHOSPHORUS: CPT | Performed by: EMERGENCY MEDICINE

## 2022-08-04 PROCEDURE — 86140 C-REACTIVE PROTEIN: CPT | Performed by: EMERGENCY MEDICINE

## 2022-08-04 PROCEDURE — 36415 COLL VENOUS BLD VENIPUNCTURE: CPT | Performed by: EMERGENCY MEDICINE

## 2022-08-04 PROCEDURE — 87186 SC STD MICRODIL/AGAR DIL: CPT | Performed by: FAMILY MEDICINE

## 2022-08-04 PROCEDURE — 82607 VITAMIN B-12: CPT | Performed by: FAMILY MEDICINE

## 2022-08-04 PROCEDURE — 87086 URINE CULTURE/COLONY COUNT: CPT | Performed by: EMERGENCY MEDICINE

## 2022-08-04 PROCEDURE — 96361 HYDRATE IV INFUSION ADD-ON: CPT

## 2022-08-04 PROCEDURE — 96367 TX/PROPH/DG ADDL SEQ IV INF: CPT

## 2022-08-04 PROCEDURE — 93005 ELECTROCARDIOGRAM TRACING: CPT

## 2022-08-04 PROCEDURE — 87147 CULTURE TYPE IMMUNOLOGIC: CPT | Performed by: EMERGENCY MEDICINE

## 2022-08-04 PROCEDURE — 85730 THROMBOPLASTIN TIME PARTIAL: CPT | Performed by: EMERGENCY MEDICINE

## 2022-08-04 PROCEDURE — 83735 ASSAY OF MAGNESIUM: CPT | Performed by: EMERGENCY MEDICINE

## 2022-08-04 RX ORDER — MAGNESIUM SULFATE 1 G/100ML
1 INJECTION INTRAVENOUS ONCE
Status: COMPLETED | OUTPATIENT
Start: 2022-08-04 | End: 2022-08-04

## 2022-08-04 RX ORDER — SACCHAROMYCES BOULARDII 250 MG
250 CAPSULE ORAL 2 TIMES DAILY
Status: DISCONTINUED | OUTPATIENT
Start: 2022-08-04 | End: 2022-08-08 | Stop reason: HOSPADM

## 2022-08-04 RX ORDER — SODIUM CHLORIDE, SODIUM GLUCONATE, SODIUM ACETATE, POTASSIUM CHLORIDE, MAGNESIUM CHLORIDE, SODIUM PHOSPHATE, DIBASIC, AND POTASSIUM PHOSPHATE .53; .5; .37; .037; .03; .012; .00082 G/100ML; G/100ML; G/100ML; G/100ML; G/100ML; G/100ML; G/100ML
150 INJECTION, SOLUTION INTRAVENOUS CONTINUOUS
Status: DISCONTINUED | OUTPATIENT
Start: 2022-08-04 | End: 2022-08-06

## 2022-08-04 RX ORDER — HEPARIN SODIUM 5000 [USP'U]/ML
5000 INJECTION, SOLUTION INTRAVENOUS; SUBCUTANEOUS EVERY 8 HOURS SCHEDULED
Status: DISCONTINUED | OUTPATIENT
Start: 2022-08-04 | End: 2022-08-04

## 2022-08-04 RX ORDER — ACETAMINOPHEN 325 MG/1
650 TABLET ORAL EVERY 6 HOURS PRN
Status: DISCONTINUED | OUTPATIENT
Start: 2022-08-04 | End: 2022-08-08 | Stop reason: HOSPADM

## 2022-08-04 RX ORDER — HEPARIN SODIUM 5000 [USP'U]/ML
5000 INJECTION, SOLUTION INTRAVENOUS; SUBCUTANEOUS EVERY 8 HOURS SCHEDULED
Status: DISCONTINUED | OUTPATIENT
Start: 2022-08-04 | End: 2022-08-06

## 2022-08-04 RX ORDER — ONDANSETRON 2 MG/ML
4 INJECTION INTRAMUSCULAR; INTRAVENOUS EVERY 4 HOURS PRN
Status: DISCONTINUED | OUTPATIENT
Start: 2022-08-04 | End: 2022-08-08 | Stop reason: HOSPADM

## 2022-08-04 RX ORDER — SODIUM CHLORIDE 9 MG/ML
3 INJECTION INTRAVENOUS
Status: DISCONTINUED | OUTPATIENT
Start: 2022-08-04 | End: 2022-08-08 | Stop reason: HOSPADM

## 2022-08-04 RX ORDER — FERROUS SULFATE 325(65) MG
325 TABLET ORAL
Status: DISCONTINUED | OUTPATIENT
Start: 2022-08-05 | End: 2022-08-08 | Stop reason: HOSPADM

## 2022-08-04 RX ORDER — ALBUMIN (HUMAN) 12.5 G/50ML
100 SOLUTION INTRAVENOUS ONCE
Status: COMPLETED | OUTPATIENT
Start: 2022-08-04 | End: 2022-08-04

## 2022-08-04 RX ORDER — GABAPENTIN 300 MG/1
300 CAPSULE ORAL 3 TIMES DAILY
Status: DISCONTINUED | OUTPATIENT
Start: 2022-08-04 | End: 2022-08-08 | Stop reason: HOSPADM

## 2022-08-04 RX ORDER — ACETAMINOPHEN 325 MG/1
650 TABLET ORAL ONCE
Status: COMPLETED | OUTPATIENT
Start: 2022-08-04 | End: 2022-08-04

## 2022-08-04 RX ADMIN — PIPERACILLIN AND TAZOBACTAM 3.38 G: 36; 4.5 INJECTION, POWDER, FOR SOLUTION INTRAVENOUS at 13:09

## 2022-08-04 RX ADMIN — IOHEXOL 85 ML: 350 INJECTION, SOLUTION INTRAVENOUS at 21:45

## 2022-08-04 RX ADMIN — Medication 250 MG: at 22:11

## 2022-08-04 RX ADMIN — GABAPENTIN 300 MG: 300 CAPSULE ORAL at 22:10

## 2022-08-04 RX ADMIN — SODIUM CHLORIDE 1000 ML: 0.9 INJECTION, SOLUTION INTRAVENOUS at 13:54

## 2022-08-04 RX ADMIN — SODIUM CHLORIDE 1000 ML: 9 INJECTION, SOLUTION INTRAVENOUS at 13:10

## 2022-08-04 RX ADMIN — PIPERACILLIN AND TAZOBACTAM 3.38 G: 36; 4.5 INJECTION, POWDER, FOR SOLUTION INTRAVENOUS at 23:07

## 2022-08-04 RX ADMIN — POTASSIUM & SODIUM PHOSPHATES POWDER PACK 280-160-250 MG 1 PACKET: 280-160-250 PACK at 14:51

## 2022-08-04 RX ADMIN — ACETAMINOPHEN 650 MG: 325 TABLET ORAL at 23:07

## 2022-08-04 RX ADMIN — SODIUM CHLORIDE, SODIUM GLUCONATE, SODIUM ACETATE, POTASSIUM CHLORIDE, MAGNESIUM CHLORIDE, SODIUM PHOSPHATE, DIBASIC, AND POTASSIUM PHOSPHATE 150 ML/HR: .53; .5; .37; .037; .03; .012; .00082 INJECTION, SOLUTION INTRAVENOUS at 23:17

## 2022-08-04 RX ADMIN — ONDANSETRON 4 MG: 2 INJECTION INTRAMUSCULAR; INTRAVENOUS at 23:07

## 2022-08-04 RX ADMIN — MAGNESIUM SULFATE HEPTAHYDRATE 1 G: 1 INJECTION, SOLUTION INTRAVENOUS at 14:51

## 2022-08-04 RX ADMIN — ACETAMINOPHEN 650 MG: 325 TABLET ORAL at 12:57

## 2022-08-04 RX ADMIN — ALBUMIN (HUMAN) 100 G: 0.25 INJECTION, SOLUTION INTRAVENOUS at 20:19

## 2022-08-04 RX ADMIN — MAGNESIUM SULFATE HEPTAHYDRATE 1 G: 1 INJECTION, SOLUTION INTRAVENOUS at 22:09

## 2022-08-04 RX ADMIN — SODIUM CHLORIDE 1000 ML: 0.9 INJECTION, SOLUTION INTRAVENOUS at 14:29

## 2022-08-04 RX ADMIN — SODIUM CHLORIDE 1000 ML: 9 INJECTION, SOLUTION INTRAVENOUS at 12:44

## 2022-08-04 NOTE — ASSESSMENT & PLAN NOTE
Unknown etiology  Denies any sick contact  COVID/flu panel negative  Follow C diff, stool enteric panel  Continue IV hydration, continue probiotics

## 2022-08-04 NOTE — ASSESSMENT & PLAN NOTE
Lab Results   Component Value Date    HGBA1C 14 0 (H) 09/07/2020           Recent Labs     08/04/22  1242   POCGLU 221*       Blood Sugar Average: Last 72 hrs:  (P) 221   Continue patient's insulin pump, follow hypoglycemia precaution  Continue IV antibiotic, follow wound care, podiatry recommendation  Below MRI of foot to rule out osteomyelitis  Patient had history CHOPART AMPUTATION LEFT  FOOT: (Left)-Achilles tenotomy left foot (Left- on 10/30/20)

## 2022-08-04 NOTE — ASSESSMENT & PLAN NOTE
Hemoglobin varies from 7 6-11  Patient previously received blood transfusion  Patient and iron panel done in 2020, recheck iron panel, folic acid and vitamin B12  Continue iron supplement

## 2022-08-04 NOTE — ASSESSMENT & PLAN NOTE
Lab Results   Component Value Date    HGBA1C 14 0 (H) 09/07/2020           Recent Labs     08/04/22  1242   POCGLU 221*       Blood Sugar Average: Last 72 hrs:  (P) 221   Patient is on insulin pump, continue patient's home insulin pump patient feel of the form  Follow MRI, vascular study  Follow-up podiatry recommendation

## 2022-08-04 NOTE — ASSESSMENT & PLAN NOTE
Patient was found hyponatremia, hypomagnesemia, hypophosphatemia  Status post supplement  Continue to monitor

## 2022-08-04 NOTE — ASSESSMENT & PLAN NOTE
Unknown etiology  Patient met criteria with tachycardia, tachypnea, elevated WBC and elevated lactic acid  Also having gastroenteritis most likely the cause  Continue IV antibiotic, IV hydration, follow-up blood culture, procalcitonin, trend lactic acid  Follow CT scan abdomen pelvis  Patient also has diabetic foot ulcer, clinically looks infected, follow MRI of the foot  Also follow-up podiatry consult

## 2022-08-04 NOTE — ED PROVIDER NOTES
History  Chief Complaint   Patient presents with    Weakness - Generalized     Presents due to dizziness, weakness, unknown fever, states she has "holes in bilateral feet" for over a year and has not been seen      39-year-old female with past medical history pertinent for obesity, diabetes, dyslipidemia, hypertension, previous osteomyelitis who presents to the emergency department for evaluation of dizziness, presyncope, chest tightness, mild shortness of breath, wounds in bilateral feet, feeling generalized weakness and feeling generally run down  Reports no URI symptoms at this time  Reports having chills at home  Denies knowing if she has a fever at home  Did use off brand Advil about an hour prior to arrival   Denies any urinary symptoms  Denies any increased thirst or increased urinary frequency  No dysuria  Denies any coughing  Reports that her blood sugar levels have been in the 120s  States that her hemoglobin A1c is about 7 or 8  Reports that she gets vertiginous when she stands  Denies any dizziness when sitting or lying in bed  Denies any headache  No blurry vision  Denies any other focal deficits  States that she has wounds on her legs which she has not had evaluated in over a year  Also reports vomiting 3 times today and having nausea earlier  Denies any nausea at this time  No other concerns  Prior to Admission Medications   Prescriptions Last Dose Informant Patient Reported? Taking?    ACCU-CHEK FASTCLIX LANCETS MISC   Yes No   Sig: by Does not apply route   INSULIN ASPART FLEXPEN SC   Yes No   Sig: Inject 30 Units under the skin 3 (three) times a day 30 units with every meal and an additional sliding scale based on blood glucose   Syringe/Needle, Disp, 30G X 1/2" 1 ML MISC   Yes No   Sig: by Does not apply route   ferrous sulfate 325 (65 Fe) mg tablet   Yes No   Sig: Take 325 mg by mouth daily with breakfast   gabapentin (NEURONTIN) 300 mg capsule   No No   Sig: Take 1 capsule (300 mg total) by mouth 3 (three) times a day   insulin glargine (LANTUS) 100 units/mL subcutaneous injection   No No   Sig: Continue prior to admission dose   Patient taking differently: 57 Units daily at bedtime Continue prior to admission dose   polyethylene glycol (MIRALAX) 17 g packet   Yes No   Sig: Take 17 g by mouth daily      Facility-Administered Medications: None       Past Medical History:   Diagnosis Date    Charcot's joint of foot, left     Diabetes mellitus ([facility] )     [facility]        Past Surgical History:   Procedure Laterality Date    FOOT AMPUTATION Left 10/30/2020    Procedure: CHOPART AMPUTATION LEFT  FOOT:;  Surgeon: Dorina Francis DPM;  Location:  MAIN OR;  Service: Podiatry    FOOT CAPSULE RELEASE W/ PERCUTANEOUS HEEL CORD LENGTHENING, TIBIAL TENDON TRANSFER Left 10/30/2020    Procedure: Achilles tenotomy left foot;  Surgeon: Dorina Francis DPM;  Location:  MAIN OR;  Service: Podiatry    FOOT SURGERY Right     right first toe amputation  2019       Family History   Problem Relation Age of Onset    Diabetes Brother     Hyperlipidemia Brother     Hypertension Brother     Diabetes Mother     Hypertension Father     Diabetes Sister      I have reviewed and agree with the history as documented  E-Cigarette/Vaping    E-Cigarette Use Never User      E-Cigarette/Vaping Substances    Nicotine No     THC No     CBD No     Flavoring No     Other No     Unknown No      Social History     Tobacco Use    Smoking status: Never Smoker    Smokeless tobacco: Never Used   Vaping Use    Vaping Use: Never used   Substance Use Topics    Alcohol use: Never    Drug use: Never       Review of Systems   Constitutional: Positive for fatigue  Negative for activity change, appetite change, chills and fever  HENT: Negative for congestion, rhinorrhea and sore throat  Eyes: Negative for visual disturbance     Respiratory: Negative for chest tightness, shortness of breath and wheezing  Cardiovascular: Negative for chest pain, palpitations and leg swelling  Gastrointestinal: Positive for abdominal pain, nausea and vomiting  Negative for constipation and diarrhea  Genitourinary: Negative for dysuria, flank pain and pelvic pain  Musculoskeletal: Negative for back pain and neck pain  Skin: Negative for rash  Neurological: Positive for dizziness and weakness  Negative for numbness and headaches  Syncope: Presyncope  Psychiatric/Behavioral: Negative for behavioral problems  Physical Exam  Physical Exam  Vitals and nursing note reviewed  Constitutional:       General: She is not in acute distress  Appearance: She is well-developed  She is obese  She is not diaphoretic  HENT:      Head: Normocephalic and atraumatic  Right Ear: External ear normal       Left Ear: External ear normal       Nose: Nose normal  No congestion or rhinorrhea  Mouth/Throat:      Mouth: Mucous membranes are moist    Eyes:      Pupils: Pupils are equal, round, and reactive to light  Cardiovascular:      Rate and Rhythm: Regular rhythm  Tachycardia present  Pulses: Normal pulses  Heart sounds: Normal heart sounds  Pulmonary:      Effort: Pulmonary effort is normal  No respiratory distress  Breath sounds: Normal breath sounds  No wheezing or rales  Abdominal:      General: Bowel sounds are normal       Palpations: Abdomen is soft  Tenderness: There is no abdominal tenderness  Comments: Nontender abdomen   Musculoskeletal:         General: No tenderness or deformity  Normal range of motion  Cervical back: Normal range of motion and neck supple  Comments: Right great toe amputation noted on the right foot; foot amputation on the left lower extremity   Skin:     General: Skin is warm and dry  Capillary Refill: Capillary refill takes less than 2 seconds        Comments: 2 cm diameter wound under the left foot; 1 5 cm diameter wound over the right foot; see images below; no pus or drainage noted; no surrounding crepitance notable  Neurological:      General: No focal deficit present  Mental Status: She is alert and oriented to person, place, and time  Mental status is at baseline  Cranial Nerves: No cranial nerve deficit  Sensory: No sensory deficit  Motor: No abnormal muscle tone  Comments: Patient is awake and alert and oriented x 3  No apparent deficits of memory or concentration  Speech is fluent  Fund of knowledge is appropriate  CN II - No field cuts by confrontation  CN III, IV, VI - pupils are 3 mm and briskly reactive  EOMs are full with no nystagmus  CN V - facial sensation is intact  CN VII - no facial asymmetry  CN VIII - auditory acuity is grossly intact to finger rub bilaterally  CN IX - palate rises symmetrically  CN XI - SCM and trapezius muscles are intact  CN XII - tongue protrudes midline  Sensory exam in intact to light touch, pinprick in all dermatomes tested  Coordination is grossly intact for finger-to-nose  5/5 strength in all extremities                 Vital Signs  ED Triage Vitals [08/04/22 1230]   Temperature Pulse Respirations Blood Pressure SpO2   100 °F (37 8 °C) (!) 136 19 153/63 98 %      Temp Source Heart Rate Source Patient Position - Orthostatic VS BP Location FiO2 (%)   Temporal Monitor Lying Right arm --      Pain Score       3           Vitals:    08/04/22 1400 08/04/22 1430 08/04/22 1445 08/04/22 1500   BP: 113/53 113/59 109/55 (!) 97/49   Pulse: (!) 115 (!) 109 (!) 107 (!) 109   Patient Position - Orthostatic VS:             Visual Acuity      ED Medications  Medications   sodium chloride (PF) 0 9 % injection 3 mL (has no administration in time range)   sodium chloride 0 9 % bolus 1,000 mL (0 mL Intravenous Stopped 8/4/22 1310)     Followed by   sodium chloride 0 9 % bolus 1,000 mL (0 mL Intravenous Stopped 8/4/22 1354)     Followed by sodium chloride 0 9 % bolus 1,000 mL (0 mL Intravenous Stopped 8/4/22 1429)     Followed by   sodium chloride 0 9 % bolus 1,000 mL (0 mL Intravenous Stopped 8/4/22 1459)   piperacillin-tazobactam (ZOSYN) 3 375 g in sodium chloride 0 9 % 100 mL IVPB (0 g Intravenous Stopped 8/4/22 1339)   acetaminophen (TYLENOL) tablet 650 mg (650 mg Oral Given 8/4/22 1257)   magnesium sulfate IVPB (premix) SOLN 1 g (1 g Intravenous New Bag 8/4/22 1451)   potassium-sodium phosphates (PHOS-NAK) packet 1 packet (1 packet Oral Given 8/4/22 1451)       Diagnostic Studies  Results Reviewed     Procedure Component Value Units Date/Time    HS Troponin I 2hr [[id number]]  (Normal) Collected: 08/04/22 1429    Lab Status: Final result Specimen: Blood from Arm, Right Updated: 08/04/22 1458     hs TnI 2hr 12 ng/L      Delta 2hr hsTnI 6 ng/L     HS Troponin I 4hr [[id number]]     Lab Status: No result Specimen: Blood     Procalcitonin [[id number]]  (Abnormal) Collected: 08/04/22 1242    Lab Status: Final result Specimen: Blood from Arm, Right Updated: 08/04/22 1418     Procalcitonin 0 55 ng/ml     C-reactive protein [[id number]]  (Abnormal) Collected: 08/04/22 1245    Lab Status: Final result Specimen: Blood from Arm, Right Updated: 08/04/22 1346      6 mg/L     hCG, quantitative [[id number]]  (Normal) Collected: 08/04/22 1242    Lab Status: Final result Specimen: Blood from Arm, Right Updated: 08/04/22 1342     HCG, Quant <2 mIU/mL     Narrative:       Expected Ranges:     Approximate               Approximate HCG  Gestation age          Concentration ( mIU/mL)  _____________          ______________________   Mejia Giles                      HCG values  0 2-1                       5-50  1-2                           2-3                         100-5000  3-4                         500-67275  4-5                         1000-15144  5-6                         43529-962152  6-8                         94603-657182  8-12 48489-170157      FLU/RSV/COVID - if FLU/RSV clinically relevant [[id number]]  (Normal) Collected: 08/04/22 1242    Lab Status: Final result Specimen: Nares from Nose Updated: 08/04/22 1342     SARS-CoV-2 Negative     INFLUENZA A PCR Negative     INFLUENZA B PCR Negative     RSV PCR Negative    Narrative:      FOR PEDIATRIC PATIENTS - copy/paste COVID Guidelines URL to browser: [email or web address]  [employer]    SARS-CoV-2 assay is a Nucleic Acid Amplification assay intended for the  qualitative detection of nucleic acid from SARS-CoV-2 in nasopharyngeal  swabs  Results are for the presumptive identification of SARS-CoV-2 RNA  Positive results are indicative of infection with SARS-CoV-2, the virus  causing COVID-19, but do not rule out bacterial infection or co-infection  with other viruses  Laboratories within the [place] and its  territories are required to report all positive results to the appropriate  public health authorities  Negative results do not preclude SARS-CoV-2  infection and should not be used as the sole basis for treatment or other  patient management decisions  Negative results must be combined with  clinical observations, patient history, and epidemiological information  This test has not been FDA cleared or approved  This test has been authorized by FDA under an Emergency Use Authorization  (EUA)  This test is only authorized for the duration of time the  declaration that circumstances exist justifying the authorization of the  emergency use of an in vitro diagnostic tests for detection of SARS-CoV-2  virus and/or diagnosis of COVID-19 infection under section 564(b)(1) of  the Act, 21 U  [place] C  037VDI-4(K)(8), unless the authorization is terminated  or revoked sooner  The test has been validated but independent review by FDA  and CLIA is pending  Test performed using [employer]:  This RT-PCR assay targets N2,  a region unique to SARS-CoV-2  A conserved region in the E-gene was chosen  for pan-Sarbecovirus detection which includes SARS-CoV-2      Lipase [[id number]]  (Abnormal) Collected: 08/04/22 1242    Lab Status: Final result Specimen: Blood from Arm, Right Updated: 08/04/22 1342     Lipase 53 u/L     Magnesium [[id number]]  (Abnormal) Collected: 08/04/22 1242    Lab Status: Final result Specimen: Blood from Arm, Right Updated: 08/04/22 1342     Magnesium 1 2 mg/dL     Phosphorus [[id number]]  (Abnormal) Collected: 08/04/22 1242    Lab Status: Final result Specimen: Blood from Arm, Right Updated: 08/04/22 1342     Phosphorus 2 5 mg/dL     Comprehensive metabolic panel [[id number]]  (Abnormal) Collected: 08/04/22 1245    Lab Status: Final result Specimen: Blood from Arm, Right Updated: 08/04/22 1338     Sodium 133 mmol/L      Potassium 3 9 mmol/L      Chloride 99 mmol/L      CO2 21 mmol/L      ANION GAP 13 mmol/L      BUN 19 mg/dL      Creatinine 1 25 mg/dL      Glucose 228 mg/dL      Calcium 8 5 mg/dL      Corrected Calcium 9 7 mg/dL      AST 17 U/L      ALT 18 U/L      Alkaline Phosphatase 121 U/L      Total Protein 7 3 g/dL      Albumin 2 5 g/dL      Total Bilirubin 0 83 mg/dL      eGFR 51 ml/min/1 73sq m     Narrative:      [place] guidelines for Chronic Kidney Disease (CKD):     Stage 1 with normal or high GFR (GFR > 90 mL/min/1 73 square meters)    Stage 2 Mild CKD (GFR = 60-89 mL/min/1 73 square meters)    Stage 3A Moderate CKD (GFR = 45-59 mL/min/1 73 square meters)    Stage 3B Moderate CKD (GFR = 30-44 mL/min/1 73 square meters)    Stage 4 Severe CKD (GFR = 15-29 mL/min/1 73 square meters)    Stage 5 End Stage CKD (GFR <15 mL/min/1 73 square meters)  Note: GFR calculation is accurate only with a steady state creatinine    TSH [[id number]]  (Normal) Collected: 08/04/22 1245    Lab Status: Final result Specimen: Blood from Arm, Right Updated: 08/04/22 1338     TSH 3RD GENERATON 1 996 uIU/mL     Narrative: Patients undergoing fluorescein dye angiography may retain small amounts of fluorescein in the body for 48-72 hours post procedure  Samples containing fluorescein can produce falsely depressed TSH values  If the patient had this procedure,a specimen should be resubmitted post fluorescein clearance  NT-BNP PRO [[id number]]  (Abnormal) Collected: 08/04/22 1242    Lab Status: Final result Specimen: Blood from Arm, Right Updated: 08/04/22 1333     NT-proBNP 175 pg/mL     Lactic Acid [[id number]]  (Normal) Collected: 08/04/22 1242    Lab Status: Final result Specimen: Blood from Arm, Right Updated: 08/04/22 1331     LACTIC ACID 1 9 mmol/L     Narrative:      Result may be elevated if tourniquet was used during collection      HS Troponin 0hr (reflex protocol) [[id number]]  (Normal) Collected: 08/04/22 1242    Lab Status: Final result Specimen: Blood from Arm, Right Updated: 08/04/22 1330     hs TnI 0hr 6 ng/L     Protime-INR [[id number]]  (Normal) Collected: 08/04/22 1242    Lab Status: Final result Specimen: Blood from Arm, Right Updated: 08/04/22 1329     Protime 14 0 seconds      INR 1 07    APTT [[id number]]  (Normal) Collected: 08/04/22 1242    Lab Status: Final result Specimen: Blood from Arm, Right Updated: 08/04/22 1329     PTT 23 seconds     Manual Differential(PHLEBS Do Not Order) [[id number]]  (Abnormal) Collected: 08/04/22 1242    Lab Status: Final result Specimen: Blood from Arm, Right Updated: 08/04/22 1323     Segmented % 88 %      Bands % 6 %      Lymphocytes % 5 %      Monocytes % 0 %      Eosinophils, % 1 %      Basophils % 0 %      Absolute Neutrophils 12 06 Thousand/uL      Lymphocytes Absolute 0 64 Thousand/uL      Monocytes Absolute 0 00 Thousand/uL      Eosinophils Absolute 0 13 Thousand/uL      Basophils Absolute 0 00 Thousand/uL      Total Counted --     RBC Morphology Present     Anisocytosis Present     Microcytes Present     Platelet Estimate Adequate    Urine Microscopic [[id number]] (Abnormal) Collected: 08/04/22 1245    Lab Status: Final result Specimen: Urine, Clean Catch Updated: 08/04/22 1315     RBC, UA 4-10 /hpf      WBC, UA 30-50 /hpf      Epithelial Cells Occasional /hpf      Bacteria, UA Occasional /hpf     Urine culture [[id number]] Collected: 08/04/22 1245    Lab Status: In process Specimen: Urine, Clean Catch Updated: 08/04/22 1315    UA w Reflex to Microscopic w Reflex to Culture [[id number]]  (Abnormal) Collected: 08/04/22 1245    Lab Status: Final result Specimen: Urine, Clean Catch Updated: 08/04/22 1306     Color, UA Yellow     Clarity, UA Clear     Specific Gravity, UA 1 015     pH, UA 6 5     Leukocytes, UA Moderate     Nitrite, UA Negative     Protein, UA 30 (1+) mg/dl      Glucose, UA Negative mg/dl      Ketones, UA Negative mg/dl      Urobilinogen, UA 0 2 E U /dl      Bilirubin, UA Small     Occult Blood, UA Trace-Intact    CBC and differential [[id number]]  (Abnormal) Collected: 08/04/22 1242    Lab Status: Final result Specimen: Blood from Arm, Right Updated: 08/04/22 1300     WBC 12 83 Thousand/uL      RBC 4 13 Million/uL      Hemoglobin 8 9 g/dL      Hematocrit 30 4 %      MCV 74 fL      MCH 21 5 pg      MCHC 29 3 g/dL      RDW 16 7 %      MPV 10 1 fL      Platelets 001 Thousands/uL     Narrative: This is an appended report  These results have been appended to a previously verified report  Blood gas, venous [[id number]]  (Abnormal) Collected: 08/04/22 1242    Lab Status: Final result Specimen: Blood from Arm, Right Updated: 08/04/22 1259     pH, Isaias 7 463     pCO2, Isaias 29 8 mm Hg      pO2, Isaias 144 3 mm Hg      HCO3, Isaias 20 9 mmol/L      Base Excess, Isaias -2 3 mmol/L      O2 Content, Isaias 13 2 ml/dL      O2 HGB, VENOUS 96 4 %     Blood culture #1 [[id number]] Collected: 08/04/22 1242    Lab Status: In process Specimen: Blood from Arm, Left Updated: 08/04/22 1256    Blood culture #2 [[id number]] Collected: 08/04/22 1242    Lab Status:  In process Specimen: Blood from Arm, Right Updated: 08/04/22 1256    Fingerstick Glucose (POCT) [[id number]]  (Abnormal) Collected: 08/04/22 1242    Lab Status: Final result Updated: 08/04/22 1243     POC Glucose 221 mg/dl                  XR chest 1 view portable   Final Result by Mary Lou Villatoro MD (08/04 1454)      No acute cardiopulmonary disease  Workstation performed: [id number]         XR foot 3+ vw right   Final Result by Mary Lou Villatoro MD (08/04 1505)      No acute osseous abnormality nor osteomyelitis  Workstation performed: [id number]         XR foot 2 vw left   Final Result by Mary Lou Villatoro MD (08/04 1458)      No acute osseous abnormality nor osteomyelitis              Workstation performed: [id number]                    Procedures  ECG 12 Lead Documentation Only    Date/Time: 8/4/2022 12:20 PM  Performed by: Andrew Rajan MD  Authorized by: Andrew Rajan MD     ECG reviewed by me, the ED Provider: yes    Patient location:  ED  Previous ECG:     Previous ECG:  Compared to current    Similarity:  No change  Interpretation:     Interpretation: normal    Rate:     ECG rate:  129    ECG rate assessment: tachycardic    Rhythm:     Rhythm: sinus tachycardia    Ectopy:     Ectopy: none    QRS:     QRS axis:  Normal  Conduction:     Conduction: normal    ST segments:     ST segments:  Abnormal    Depression:  II, III and aVF  T waves:     T waves: normal               ED Course           RESULTS:  Results Reviewed     Procedure Component Value Units Date/Time    HS Troponin I 2hr [[id number]]  (Normal) Collected: 08/04/22 1429    Lab Status: Final result Specimen: Blood from Arm, Right Updated: 08/04/22 1458     hs TnI 2hr 12 ng/L      Delta 2hr hsTnI 6 ng/L     HS Troponin I 4hr [[id number]]     Lab Status: No result Specimen: Blood     Procalcitonin [[id number]]  (Abnormal) Collected: 08/04/22 1242    Lab Status: Final result Specimen: Blood from Arm, Right Updated: 08/04/22 1418     Procalcitonin 0 55 ng/ml C-reactive protein [[id number]]  (Abnormal) Collected: 08/04/22 1245    Lab Status: Final result Specimen: Blood from Arm, Right Updated: 08/04/22 1346      6 mg/L     hCG, quantitative [[id number]]  (Normal) Collected: 08/04/22 1242    Lab Status: Final result Specimen: Blood from Arm, Right Updated: 08/04/22 1342     HCG, Quant <2 mIU/mL     Narrative:       Expected Ranges:     Approximate               Approximate HCG  Gestation age          Concentration ( mIU/mL)  _____________          ______________________   Jamal Keithtz                      HCG values  0 2-1                       5-50  1-2                           2-3                         100-5000  3-4                         500-51114  4-5                         1000-59777  5-6                         10763-068554  6-8                         56180-071550  8-12                        25834-234117      FLU/RSV/COVID - if FLU/RSV clinically relevant [[id number]]  (Normal) Collected: 08/04/22 1242    Lab Status: Final result Specimen: Nares from Nose Updated: 08/04/22 1342     SARS-CoV-2 Negative     INFLUENZA A PCR Negative     INFLUENZA B PCR Negative     RSV PCR Negative    Narrative:      FOR PEDIATRIC PATIENTS - copy/paste COVID Guidelines URL to browser: [email or web address]    SARS-CoV-2 assay is a Nucleic Acid Amplification assay intended for the  qualitative detection of nucleic acid from SARS-CoV-2 in nasopharyngeal  swabs  Results are for the presumptive identification of SARS-CoV-2 RNA  Positive results are indicative of infection with SARS-CoV-2, the virus  causing COVID-19, but do not rule out bacterial infection or co-infection  with other viruses  Laboratories within the [place] and its  territories are required to report all positive results to the appropriate  public health authorities   Negative results do not preclude SARS-CoV-2  infection and should not be used as the sole basis for treatment or other  patient management decisions  Negative results must be combined with  clinical observations, patient history, and epidemiological information  This test has not been FDA cleared or approved  This test has been authorized by FDA under an Emergency Use Authorization  (EUA)  This test is only authorized for the duration of time the  declaration that circumstances exist justifying the authorization of the  emergency use of an in vitro diagnostic tests for detection of SARS-CoV-2  virus and/or diagnosis of COVID-19 infection under section 564(b)(1) of  the Act, 21 U  [place] C  812VWL-1(Z)(7), unless the authorization is terminated  or revoked sooner  The test has been validated but independent review by FDA  and CLIA is pending  Test performed using [employer]: This RT-PCR assay targets N2,  a region unique to SARS-CoV-2  A conserved region in the E-gene was chosen  for pan-Sarbecovirus detection which includes SARS-CoV-2      Lipase [[id number]]  (Abnormal) Collected: 08/04/22 1242    Lab Status: Final result Specimen: Blood from Arm, Right Updated: 08/04/22 1342     Lipase 53 u/L     Magnesium [[id number]]  (Abnormal) Collected: 08/04/22 1242    Lab Status: Final result Specimen: Blood from Arm, Right Updated: 08/04/22 1342     Magnesium 1 2 mg/dL     Phosphorus [[id number]]  (Abnormal) Collected: 08/04/22 1242    Lab Status: Final result Specimen: Blood from Arm, Right Updated: 08/04/22 1342     Phosphorus 2 5 mg/dL     Comprehensive metabolic panel [[id number]]  (Abnormal) Collected: 08/04/22 1245    Lab Status: Final result Specimen: Blood from Arm, Right Updated: 08/04/22 1338     Sodium 133 mmol/L      Potassium 3 9 mmol/L      Chloride 99 mmol/L      CO2 21 mmol/L      ANION GAP 13 mmol/L      BUN 19 mg/dL      Creatinine 1 25 mg/dL      Glucose 228 mg/dL      Calcium 8 5 mg/dL      Corrected Calcium 9 7 mg/dL      AST 17 U/L      ALT 18 U/L      Alkaline Phosphatase 121 U/L Total Protein 7 3 g/dL      Albumin 2 5 g/dL      Total Bilirubin 0 83 mg/dL      eGFR 51 ml/min/1 73sq m     Narrative:      [place] guidelines for Chronic Kidney Disease (CKD):     Stage 1 with normal or high GFR (GFR > 90 mL/min/1 73 square meters)    Stage 2 Mild CKD (GFR = 60-89 mL/min/1 73 square meters)    Stage 3A Moderate CKD (GFR = 45-59 mL/min/1 73 square meters)    Stage 3B Moderate CKD (GFR = 30-44 mL/min/1 73 square meters)    Stage 4 Severe CKD (GFR = 15-29 mL/min/1 73 square meters)    Stage 5 End Stage CKD (GFR <15 mL/min/1 73 square meters)  Note: GFR calculation is accurate only with a steady state creatinine    TSH [[id number]]  (Normal) Collected: 08/04/22 1245    Lab Status: Final result Specimen: Blood from Arm, Right Updated: 08/04/22 1338     TSH 3RD GENERATON 1 996 uIU/mL     Narrative:      Patients undergoing fluorescein dye angiography may retain small amounts of fluorescein in the body for 48-72 hours post procedure  Samples containing fluorescein can produce falsely depressed TSH values  If the patient had this procedure,a specimen should be resubmitted post fluorescein clearance  NT-BNP PRO [[id number]]  (Abnormal) Collected: 08/04/22 1242    Lab Status: Final result Specimen: Blood from Arm, Right Updated: 08/04/22 1333     NT-proBNP 175 pg/mL     Lactic Acid [[id number]]  (Normal) Collected: 08/04/22 1242    Lab Status: Final result Specimen: Blood from Arm, Right Updated: 08/04/22 1331     LACTIC ACID 1 9 mmol/L     Narrative:      Result may be elevated if tourniquet was used during collection      HS Troponin 0hr (reflex protocol) [[id number]]  (Normal) Collected: 08/04/22 1242    Lab Status: Final result Specimen: Blood from Arm, Right Updated: 08/04/22 1330     hs TnI 0hr 6 ng/L     Protime-INR [[id number]]  (Normal) Collected: 08/04/22 1242    Lab Status: Final result Specimen: Blood from Arm, Right Updated: 08/04/22 1329     Protime 14 0 seconds      INR 1 07    APTT [[id number]]  (Normal) Collected: 08/04/22 1242    Lab Status: Final result Specimen: Blood from Arm, Right Updated: 08/04/22 1329     PTT 23 seconds     Manual Differential(PHLEBS Do Not Order) [[id number]]  (Abnormal) Collected: 08/04/22 1242    Lab Status: Final result Specimen: Blood from Arm, Right Updated: 08/04/22 1323     Segmented % 88 %      Bands % 6 %      Lymphocytes % 5 %      Monocytes % 0 %      Eosinophils, % 1 %      Basophils % 0 %      Absolute Neutrophils 12 06 Thousand/uL      Lymphocytes Absolute 0 64 Thousand/uL      Monocytes Absolute 0 00 Thousand/uL      Eosinophils Absolute 0 13 Thousand/uL      Basophils Absolute 0 00 Thousand/uL      Total Counted --     RBC Morphology Present     Anisocytosis Present     Microcytes Present     Platelet Estimate Adequate    Urine Microscopic [[id number]]  (Abnormal) Collected: 08/04/22 1245    Lab Status: Final result Specimen: Urine, Clean Catch Updated: 08/04/22 1315     RBC, UA 4-10 /hpf      WBC, UA 30-50 /hpf      Epithelial Cells Occasional /hpf      Bacteria, UA Occasional /hpf     Urine culture [[id number]] Collected: 08/04/22 1245    Lab Status:  In process Specimen: Urine, Clean Catch Updated: 08/04/22 1315    UA w Reflex to Microscopic w Reflex to Culture [[id number]]  (Abnormal) Collected: 08/04/22 1245    Lab Status: Final result Specimen: Urine, Clean Catch Updated: 08/04/22 1306     Color, UA Yellow     Clarity, UA Clear     Specific Gravity, UA 1 015     pH, UA 6 5     Leukocytes, UA Moderate     Nitrite, UA Negative     Protein, UA 30 (1+) mg/dl      Glucose, UA Negative mg/dl      Ketones, UA Negative mg/dl      Urobilinogen, UA 0 2 E U /dl      Bilirubin, UA Small     Occult Blood, UA Trace-Intact    CBC and differential [[id number]]  (Abnormal) Collected: 08/04/22 1242    Lab Status: Final result Specimen: Blood from Arm, Right Updated: 08/04/22 1300     WBC 12 83 Thousand/uL      RBC 4 13 Million/uL Hemoglobin 8 9 g/dL      Hematocrit 30 4 %      MCV 74 fL      MCH 21 5 pg      MCHC 29 3 g/dL      RDW 16 7 %      MPV 10 1 fL      Platelets 399 Thousands/uL     Narrative: This is an appended report  These results have been appended to a previously verified report  Blood gas, venous [[id number]]  (Abnormal) Collected: 08/04/22 1242    Lab Status: Final result Specimen: Blood from Arm, Right Updated: 08/04/22 1259     pH, Isaias 7 463     pCO2, Isaias 29 8 mm Hg      pO2, Isaias 144 3 mm Hg      HCO3, Isaias 20 9 mmol/L      Base Excess, Isaias -2 3 mmol/L      O2 Content, Isaias 13 2 ml/dL      O2 HGB, VENOUS 96 4 %     Blood culture #1 [[id number]] Collected: 08/04/22 1242    Lab Status: In process Specimen: Blood from Arm, Left Updated: 08/04/22 1256    Blood culture #2 [[id number]] Collected: 08/04/22 1242    Lab Status: In process Specimen: Blood from Arm, Right Updated: 08/04/22 1256    Fingerstick Glucose (POCT) [[id number]]  (Abnormal) Collected: 08/04/22 1242    Lab Status: Final result Updated: 08/04/22 1243     POC Glucose 221 mg/dl           XR chest 1 view portable   Final Result      No acute cardiopulmonary disease  Workstation performed: [id number]         XR foot 3+ vw right   Final Result      No acute osseous abnormality nor osteomyelitis  Workstation performed: [id number]         XR foot 2 vw left   Final Result      No acute osseous abnormality nor osteomyelitis              Workstation performed: [id number]             Vitals:    08/04/22 1400 08/04/22 1430 08/04/22 1445 08/04/22 1500   BP: 113/53 113/59 109/55 (!) 97/49   TempSrc:       Pulse: (!) 115 (!) 109 (!) 107 (!) 109   Resp: 20 22 21 20   Patient Position - Orthostatic VS:       Temp:               MDM  Number of Diagnoses or Management Options  Anemia  Dizziness  Hypomagnesemia  Hypophosphatemia  Near syncope  Open wound of foot, unspecified laterality, initial encounter  Diagnosis management comments: 69-year-old female with past medical history pertinent for obesity, diabetes, dyslipidemia, hypertension, previous osteomyelitis who presents to the emergency department for evaluation of dizziness, presyncope, chest tightness, mild shortness of breath, wounds in bilateral feet, feeling generalized weakness and feeling generally run down  Vital signs on arrival here were notable for tachycardia with heart rate in the 130s with blood pressures in the 100s over 40s and temperature at 100 2° orally  Patient has exam as above with noted 2 wounds over the feet bilaterally  X-rays obtained of patient's feet, chest and lab work obtained along with cultures to evaluate further  Patient was started on broad-spectrum antibiotics and was given Tylenol as well  Patient had EKG that showed sinus tachycardia with ST depressions in II, III and AVF  Fingerstick blood sugar level on arrival was noted to be above 200  Results of lab work returned showing hypomagnesemia at 1 2, hypophosphatemia at 2 5, and anemia at 8 9  CRP elevated at 111  Imaging results showed no obvious signs of osteomyelitis on x-rays  Chest x-ray was not concerning  Patient was advised admission for her symptoms and to confirm no other cause  Patient was discussed with our hospitalist, Dr Lizeth Malik, who advised admission  Patient was agreeable to plan  Understands results thus far         Amount and/or Complexity of Data Reviewed  Clinical lab tests: ordered and reviewed  Tests in the radiology section of CPT®: ordered and reviewed  Tests in the medicine section of CPT®: ordered and reviewed  Obtain history from someone other than the patient: yes  Review and summarize past medical records: yes  Discuss the patient with other providers: yes  Independent visualization of images, tracings, or specimens: yes    Risk of Complications, Morbidity, and/or Mortality  Presenting problems: moderate  Diagnostic procedures: moderate  Management options: moderate        Disposition  Final diagnoses:   Dizziness   Near syncope   Open wound of foot, unspecified laterality, initial encounter   Hypomagnesemia   Hypophosphatemia   Anemia     Time reflects when diagnosis was documented in both MDM as applicable and the Disposition within this note     Time User Action Codes Description Comment    8/4/2022 12:21 PM Eliezer Blackbird Add [R42] Dizziness     8/4/2022 12:22 PM Eliezer Blackbird Add [R55] Near syncope     8/4/2022 12:55 PM Paulla Salvador N Add [S91 309A] Open wound of foot, unspecified laterality, initial encounter     8/4/2022  2:40 PM Paulla Salvador N Add [E83 42] Hypomagnesemia     8/4/2022  2:40 PM Paulla Salvador N Add [E83 39] Hypophosphatemia     8/4/2022  2:40 PM Eliezer Blackbird Add [D64 9] Anemia       ED Disposition     ED Disposition   Admit    Condition   Stable    Date/Time   Thu Aug 4, 2022  3:47 PM    Comment   Case was discussed with Dr Rey Kanner and the patient's admission status was agreed to be Admission Status: inpatient status to the service of Dr Rey Kanner            [facility]    None         Patient's Medications   Discharge Prescriptions    No medications on file       No discharge procedures on file      PDMP Review       Value Time User    PDMP Reviewed  Yes 11/7/2020  4:54 PM Naveen Murray MD          ED Provider  Electronically Signed by           Gwen Mack MD  08/04/22 5274

## 2022-08-04 NOTE — ASSESSMENT & PLAN NOTE
Continue IV Zosyn  Follow-up blood culture  Follow wound culture  Follow MRI of the foot, vascular study  Follow wound care, podiatry recommendation

## 2022-08-04 NOTE — H&P
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 due to underlying condition, limited to breakdown of skin [facility])  Assessment & Plan  Lab Results   Component Value Date    HGBA1C 14 0 (H) 09/07/2020           Recent Labs     08/04/22  1242   POCGLU 221*       Blood Sugar Average: Last 72 hrs:  (P) 221   Patient is on insulin pump, continue patient's home insulin pump patient feel of the form  Follow MRI, vascular study  Follow-up podiatry recommendation    * Sepsis [facility])  Assessment & Plan  Unknown etiology  Patient met criteria with tachycardia, tachypnea, elevated WBC and elevated lactic acid  Also having gastroenteritis most likely the cause  Continue IV antibiotic, IV hydration, follow-up blood culture, procalcitonin, trend lactic acid  Follow CT scan abdomen pelvis  Patient also has diabetic foot ulcer, clinically looks infected, follow MRI of the foot  Also follow-up podiatry consult    Gastroenteritis  Assessment & Plan  Unknown etiology  Denies any sick contact  COVID/flu panel negative  Follow C diff, stool enteric panel  Continue IV hydration, continue probiotics    Diabetic ulcer of left midfoot associated with diabetes mellitus due to underlying condition, with fat layer exposed [facility])  Assessment & Plan  Lab Results   Component Value Date    HGBA1C 14 0 (H) 09/07/2020           Recent Labs     08/04/22  1242   POCGLU 221*       Blood Sugar Average: Last 72 hrs:  (P) 221   Continue patient's insulin pump, follow hypoglycemia precaution  Continue IV antibiotic, follow wound care, podiatry recommendation  Below MRI of foot to rule out osteomyelitis  Patient had history CHOPART AMPUTATION LEFT  FOOT: (Left)-Achilles tenotomy left foot (Left- on 10/30/20)    Cellulitis of left foot  Assessment & Plan  Continue IV Zosyn  Follow-up blood culture  Follow wound culture  Follow MRI of the foot, vascular study  Follow wound care, podiatry recommendation          Iron deficiency anemia  Assessment & Plan  Hemoglobin varies from 7 6-11  Patient previously received blood transfusion  Patient and iron panel done in 2020, recheck iron panel, folic acid and vitamin B12  Continue iron supplement    VTE Pharmacologic Prophylaxis: VTE Score: 6 High Risk (Score >/= 5) - Pharmacological DVT Prophylaxis Ordered: heparin  Sequential Compression Devices Ordered  Code Status: Level 1 - Full Code as per patient  Discussion with family: With patient  Anticipated Length of Stay: Patient will be admitted on an inpatient basis with an anticipated length of stay of greater than 2 midnights secondary to To monitor above condition  Total Time for Visit, including Counseling / Coordination of Care: 45 minutes Greater than 50% of this total time spent on direct patient counseling and coordination of care  Chief Complaint:  Not feeling well, nausea vomiting and diarrhea    History of Present Illness: Naz Freire is a 51 y o  female with a PMH of type 1 diabetes on insulin pump, complicated with diabetes foot who presents with vomiting, diarrhea, discomfort in abdomen and chills  Patient reports she was not feeling good and having all the symptoms since last night  Denies any sick contact  Denies any chest pain, short of breath, nausea, vomiting  But feels lightheaded and dizzy       Review of Systems:  Review of Systems   Constitutional: Positive for activity change, appetite change and fatigue  Negative for chills, diaphoresis, fever and unexpected weight change     HENT: Negative for congestion, dental problem, rhinorrhea, sinus pressure, sinus pain, sneezing, sore throat and tinnitus  Respiratory: Negative for apnea, cough, choking, chest tightness, shortness of breath and wheezing  Cardiovascular: Negative for chest pain, palpitations and leg swelling  Gastrointestinal: Positive for abdominal pain, diarrhea, nausea and vomiting  Negative for abdominal distention, anal bleeding, blood in stool and constipation  Genitourinary: Negative for difficulty urinating, dyspareunia, dysuria, enuresis and flank pain  Musculoskeletal: Positive for gait problem  Negative for arthralgias  Skin: Positive for color change and wound  Neurological: Positive for dizziness and light-headedness  Negative for tremors, seizures, syncope, facial asymmetry, speech difficulty, weakness, numbness and headaches  Hematological: Negative for adenopathy  Does not bruise/bleed easily  Psychiatric/Behavioral: Negative for agitation, behavioral problems, confusion, decreased concentration and dysphoric mood  All other systems reviewed and are negative  Past Medical and Surgical History:   Past Medical History:   Diagnosis Date    Charcot's joint of foot, left     Diabetes [facility] )     Osteomyelitis of foot, right, acute ([facility] )        Past Surgical History:   Procedure Laterality Date    FOOT AMPUTATION Left 10/30/2020    Procedure: CHOPART AMPUTATION LEFT  FOOT:;  Surgeon: Miladis Gaston DPM;  Location:  MAIN OR;  Service: Podiatry    FOOT CAPSULE RELEASE W/ PERCUTANEOUS HEEL CORD LENGTHENING, TIBIAL TENDON TRANSFER Left 10/30/2020    Procedure: Achilles tenotomy left foot;  Surgeon: Miladis Gaston DPM;  Location:  MAIN OR;  Service: Podiatry    FOOT SURGERY Right     right first toe amputation  2019       Meds/Allergies:  Prior to Admission medications    Medication Sig Start Date End Date Taking?  Authorizing Provider   ferrous sulfate 325 (65 Fe) mg tablet Take 325 mg by mouth daily with breakfast Yes Historical Provider, MD   polyethylene glycol (MIRALAX) 17 g packet Take 17 g by mouth daily   Yes Historical Provider, MD Jb Washington MISC by Does not apply route    Historical Provider, MD   gabapentin (NEURONTIN) 300 mg capsule Take 1 capsule (300 mg total) by mouth 3 (three) times a day 11/7/20 4/13/21  Sonya Grider MD   Syringe/Needle, Disp, 30G X 1/2" 1 ML MISC by Does not apply route    Historical Provider, MD   INSULIN ASPART FLEXPEN SC Inject 30 Units under the skin 3 (three) times a day 30 units with every meal and an additional sliding scale based on blood glucose  Patient not taking: Reported on 8/4/2022 8/4/22  Historical Provider, MD   insulin glargine (LANTUS) 100 units/mL subcutaneous injection Continue prior to admission dose  Patient not taking: Reported on 8/4/2022 9/9/20 8/4/22  Gerhardt Lapine Raibeck, CRNP     I have reviewed home medications with patient personally  Allergies:    Allergies   Allergen Reactions    Clindamycin Other (See Comments)     CHEST PRESSURE, FEELS LIKE A HEART ATTACK PER PT       Social History:  Marital Status: /Civil Union   Occupation:  Employed  Patient Pre-hospital Living Situation: Home  Patient Pre-hospital Level of Mobility: walks  Patient Pre-hospital Diet Restrictions:  Cardiac/diabetic diet  Substance Use History:   Social History     Substance and Sexual Activity   Alcohol Use Never     Social History     Tobacco Use   Smoking Status Never Smoker   Smokeless Tobacco Never Used     Social History     Substance and Sexual Activity   Drug Use Never       Family History:  Family History   Problem Relation Age of Onset    Diabetes Brother     Hyperlipidemia Brother     Hypertension Brother     Diabetes Mother     Hypertension Father     Diabetes Sister        Physical Exam:     Vitals:   Blood Pressure: 92/53 (08/04/22 1615)  Pulse: 99 (08/04/22 1615)  Temperature: 99 4 °F (37 4 °C) (08/04/22 1615)  Temp Source: Temporal (08/04/22 1615)  Respirations: 21 (08/04/22 1615)  Weight - Scale: 109 kg (240 lb 4 8 oz) (08/04/22 1230)  SpO2: 97 % (08/04/22 1615)    Physical Exam  Vitals and nursing note reviewed  Constitutional:       Appearance: Normal appearance  She is not ill-appearing or diaphoretic  HENT:      Head: Normocephalic and atraumatic  Nose: Nose normal  No congestion or rhinorrhea  Mouth/Throat:      Mouth: Mucous membranes are moist       Pharynx: Oropharynx is clear  No oropharyngeal exudate  Eyes:      General: No scleral icterus  Extraocular Movements: Extraocular movements intact  Conjunctiva/sclera: Conjunctivae normal       Pupils: Pupils are equal, round, and reactive to light  Cardiovascular:      Rate and Rhythm: Tachycardia present  Pulses: Normal pulses  Heart sounds: Normal heart sounds  No murmur heard  No friction rub  No gallop  Pulmonary:      Effort: Pulmonary effort is normal  No respiratory distress  Breath sounds: No stridor  No wheezing or rhonchi  Chest:      Chest wall: No tenderness  Abdominal:      General: Abdomen is flat  Bowel sounds are normal  There is no distension  Palpations: There is no mass  Tenderness: There is no abdominal tenderness  There is no rebound  Hernia: No hernia is present  Musculoskeletal:         General: Deformity (Amputation of left foot, amputation of toes on right foot) present  No swelling, tenderness or signs of injury  Cervical back: Normal range of motion  No rigidity  Lymphadenopathy:      Cervical: No cervical adenopathy  Skin:     General: Skin is warm  Capillary Refill: Capillary refill takes less than 2 seconds  Coloration: Skin is not jaundiced or pale  Findings: Erythema ( left foot) present  No bruising or rash  Comments: Patient has foot ulcer as documented in imaging section  Neurological:      General: No focal deficit present        Mental Status: She is alert and oriented to person, place, and time  Cranial Nerves: No cranial nerve deficit  Sensory: No sensory deficit  Motor: No weakness  Coordination: Coordination normal    Psychiatric:         Mood and Affect: Mood normal          Additional Data:     Lab Results:  Results from last 7 days   Lab Units 08/04/22  1242   WBC Thousand/uL 12 83*   HEMOGLOBIN g/dL 8 9*   HEMATOCRIT % 30 4*   PLATELETS Thousands/uL 296   BANDS PCT % 6   LYMPHO PCT % 5*   MONO PCT % 0*   EOS PCT % 1     Results from last 7 days   Lab Units 08/04/22  1245   SODIUM mmol/L 133*   POTASSIUM mmol/L 3 9   CHLORIDE mmol/L 99   CO2 mmol/L 21   BUN mg/dL 19   CREATININE mg/dL 1 25   ANION GAP mmol/L 13   CALCIUM mg/dL 8 5   ALBUMIN g/dL 2 5*   TOTAL BILIRUBIN mg/dL 0 83   ALK PHOS U/L 121*   ALT U/L 18   AST U/L 17   GLUCOSE RANDOM mg/dL 228*     Results from last 7 days   Lab Units 08/04/22  1242   INR  1 07     Results from last 7 days   Lab Units 08/04/22  1242   POC GLUCOSE mg/dl 221*         Results from last 7 days   Lab Units 08/04/22  1242   LACTIC ACID mmol/L 1 9   PROCALCITONIN ng/ml 0 55*       Imaging: Reviewed radiology reports from this admission including: chest xray  XR chest 1 view portable   Final Result by Margreta Favre, MD (08/04 9121)      No acute cardiopulmonary disease  Workstation performed: [id number]         XR foot 3+ vw right   Final Result by Margreta Favre, MD (08/04 6793)      No acute osseous abnormality nor osteomyelitis  Workstation performed: [id number]         XR foot 2 vw left   Final Result by Margreta Favre, MD (08/04 3163)      No acute osseous abnormality nor osteomyelitis              Workstation performed: [id number]         MRI inpatient order    (Results Pending)   MRI inpatient order    (Results Pending)   VAS lower limb arterial duplex, complete bilateral    (Results Pending)   CT abdomen pelvis w contrast    (Results Pending)       EKG and Other Studies Reviewed on Admission:   · EKG: No EKG obtained  ** Please Note: This note has been constructed using a voice recognition system   **

## 2022-08-05 ENCOUNTER — APPOINTMENT (INPATIENT)
Dept: MRI IMAGING | Facility: HOSPITAL | Age: 47
DRG: 872 | End: 2022-08-05
Payer: COMMERCIAL

## 2022-08-05 ENCOUNTER — APPOINTMENT (INPATIENT)
Dept: NON INVASIVE DIAGNOSTICS | Facility: HOSPITAL | Age: 47
DRG: 872 | End: 2022-08-05
Payer: COMMERCIAL

## 2022-08-05 PROBLEM — R78.81 BACTEREMIA: Status: ACTIVE | Noted: 2022-08-05

## 2022-08-05 PROBLEM — N13.30 HYDROURETERONEPHROSIS: Status: ACTIVE | Noted: 2022-08-05

## 2022-08-05 PROBLEM — M86.9 OSTEOMYELITIS OF LEFT FOOT (HCC): Status: ACTIVE | Noted: 2022-08-05

## 2022-08-05 LAB
ALBUMIN SERPL BCP-MCNC: 2.3 G/DL (ref 3.5–5)
ALP SERPL-CCNC: 104 U/L (ref 46–116)
ALT SERPL W P-5'-P-CCNC: 14 U/L (ref 12–78)
ANION GAP SERPL CALCULATED.3IONS-SCNC: 11 MMOL/L (ref 4–13)
AST SERPL W P-5'-P-CCNC: 12 U/L (ref 5–45)
BILIRUB SERPL-MCNC: 1.01 MG/DL (ref 0.2–1)
BUN SERPL-MCNC: 17 MG/DL (ref 5–25)
CALCIUM ALBUM COR SERPL-MCNC: 9.3 MG/DL (ref 8.3–10.1)
CALCIUM SERPL-MCNC: 7.9 MG/DL (ref 8.3–10.1)
CHLORIDE SERPL-SCNC: 102 MMOL/L (ref 96–108)
CO2 SERPL-SCNC: 22 MMOL/L (ref 21–32)
CREAT SERPL-MCNC: 1.27 MG/DL (ref 0.6–1.3)
EST. AVERAGE GLUCOSE BLD GHB EST-MCNC: 157 MG/DL
GFR SERPL CREATININE-BSD FRML MDRD: 50 ML/MIN/1.73SQ M
GLUCOSE SERPL-MCNC: 227 MG/DL (ref 65–140)
GLUCOSE SERPL-MCNC: 237 MG/DL (ref 65–140)
GLUCOSE SERPL-MCNC: 254 MG/DL (ref 65–140)
GLUCOSE SERPL-MCNC: 255 MG/DL (ref 65–140)
GLUCOSE SERPL-MCNC: 270 MG/DL (ref 65–140)
HBA1C MFR BLD: 7.1 %
MAGNESIUM SERPL-MCNC: 2.1 MG/DL (ref 1.6–2.6)
PHOSPHATE SERPL-MCNC: 3.2 MG/DL (ref 2.7–4.5)
POTASSIUM SERPL-SCNC: 3.7 MMOL/L (ref 3.5–5.3)
PROCALCITONIN SERPL-MCNC: 56.43 NG/ML
PROT SERPL-MCNC: 6.6 G/DL (ref 6.4–8.4)
SODIUM SERPL-SCNC: 135 MMOL/L (ref 135–147)

## 2022-08-05 PROCEDURE — 73721 MRI JNT OF LWR EXTRE W/O DYE: CPT

## 2022-08-05 PROCEDURE — 84100 ASSAY OF PHOSPHORUS: CPT | Performed by: FAMILY MEDICINE

## 2022-08-05 PROCEDURE — 84145 PROCALCITONIN (PCT): CPT | Performed by: FAMILY MEDICINE

## 2022-08-05 PROCEDURE — 82948 REAGENT STRIP/BLOOD GLUCOSE: CPT

## 2022-08-05 PROCEDURE — 93925 LOWER EXTREMITY STUDY: CPT | Performed by: SURGERY

## 2022-08-05 PROCEDURE — 93922 UPR/L XTREMITY ART 2 LEVELS: CPT | Performed by: SURGERY

## 2022-08-05 PROCEDURE — 83735 ASSAY OF MAGNESIUM: CPT | Performed by: FAMILY MEDICINE

## 2022-08-05 PROCEDURE — 93923 UPR/LXTR ART STDY 3+ LVLS: CPT

## 2022-08-05 PROCEDURE — 93925 LOWER EXTREMITY STUDY: CPT

## 2022-08-05 PROCEDURE — 99254 IP/OBS CNSLTJ NEW/EST MOD 60: CPT | Performed by: PODIATRIST

## 2022-08-05 PROCEDURE — 93306 TTE W/DOPPLER COMPLETE: CPT

## 2022-08-05 PROCEDURE — G1004 CDSM NDSC: HCPCS

## 2022-08-05 PROCEDURE — 99233 SBSQ HOSP IP/OBS HIGH 50: CPT | Performed by: FAMILY MEDICINE

## 2022-08-05 PROCEDURE — 80053 COMPREHEN METABOLIC PANEL: CPT | Performed by: FAMILY MEDICINE

## 2022-08-05 PROCEDURE — 99222 1ST HOSP IP/OBS MODERATE 55: CPT | Performed by: PODIATRIST

## 2022-08-05 RX ORDER — METRONIDAZOLE 500 MG/1
500 TABLET ORAL EVERY 8 HOURS SCHEDULED
Status: DISCONTINUED | OUTPATIENT
Start: 2022-08-05 | End: 2022-08-05

## 2022-08-05 RX ORDER — METRONIDAZOLE 500 MG/1
500 TABLET ORAL EVERY 8 HOURS SCHEDULED
Status: DISCONTINUED | OUTPATIENT
Start: 2022-08-05 | End: 2022-08-08 | Stop reason: HOSPADM

## 2022-08-05 RX ORDER — CEFTRIAXONE 2 G/50ML
2000 INJECTION, SOLUTION INTRAVENOUS EVERY 24 HOURS
Status: DISCONTINUED | OUTPATIENT
Start: 2022-08-05 | End: 2022-08-08 | Stop reason: HOSPADM

## 2022-08-05 RX ORDER — CEFTRIAXONE 2 G/50ML
2000 INJECTION, SOLUTION INTRAVENOUS EVERY 24 HOURS
Status: DISCONTINUED | OUTPATIENT
Start: 2022-08-05 | End: 2022-08-05

## 2022-08-05 RX ORDER — INSULIN LISPRO 100 [IU]/ML
150 INJECTION, SOLUTION INTRAVENOUS; SUBCUTANEOUS ONCE AS NEEDED
Status: DISCONTINUED | OUTPATIENT
Start: 2022-08-05 | End: 2022-08-08 | Stop reason: HOSPADM

## 2022-08-05 RX ADMIN — GABAPENTIN 300 MG: 300 CAPSULE ORAL at 15:59

## 2022-08-05 RX ADMIN — CEFTRIAXONE 2000 MG: 2 INJECTION, SOLUTION INTRAVENOUS at 16:06

## 2022-08-05 RX ADMIN — SODIUM CHLORIDE, SODIUM GLUCONATE, SODIUM ACETATE, POTASSIUM CHLORIDE, MAGNESIUM CHLORIDE, SODIUM PHOSPHATE, DIBASIC, AND POTASSIUM PHOSPHATE 150 ML/HR: .53; .5; .37; .037; .03; .012; .00082 INJECTION, SOLUTION INTRAVENOUS at 15:45

## 2022-08-05 RX ADMIN — SODIUM CHLORIDE, SODIUM GLUCONATE, SODIUM ACETATE, POTASSIUM CHLORIDE, MAGNESIUM CHLORIDE, SODIUM PHOSPHATE, DIBASIC, AND POTASSIUM PHOSPHATE 150 ML/HR: .53; .5; .37; .037; .03; .012; .00082 INJECTION, SOLUTION INTRAVENOUS at 23:38

## 2022-08-05 RX ADMIN — METRONIDAZOLE 500 MG: 500 TABLET ORAL at 15:59

## 2022-08-05 RX ADMIN — ONDANSETRON 4 MG: 2 INJECTION INTRAMUSCULAR; INTRAVENOUS at 15:45

## 2022-08-05 RX ADMIN — ACETAMINOPHEN 650 MG: 325 TABLET ORAL at 15:44

## 2022-08-05 RX ADMIN — GABAPENTIN 300 MG: 300 CAPSULE ORAL at 21:09

## 2022-08-05 RX ADMIN — SODIUM CHLORIDE, SODIUM GLUCONATE, SODIUM ACETATE, POTASSIUM CHLORIDE, MAGNESIUM CHLORIDE, SODIUM PHOSPHATE, DIBASIC, AND POTASSIUM PHOSPHATE 150 ML/HR: .53; .5; .37; .037; .03; .012; .00082 INJECTION, SOLUTION INTRAVENOUS at 06:01

## 2022-08-05 RX ADMIN — PIPERACILLIN AND TAZOBACTAM 3.38 G: 36; 4.5 INJECTION, POWDER, FOR SOLUTION INTRAVENOUS at 05:58

## 2022-08-05 NOTE — PROGRESS NOTES
Pt away at MRI, unable to obtain diet hx at this time  Per RN report, pt reportedly does not tolerate sugar substitutes, was requesting something other than water to drink  Will defer sugar sweetened beverages to RN to obtain from nourishment station as medically appropriate  Continue with CCD 2, advance from surgical soft to regular/CCD 2 as medically appropriate  Davdi supplementation not appropriate due to reported intolerance to sugar substitutes which are found in supplement  Ensure max and glucerna also contain sugar substitutes  Will consider alternatives with pt as appropriate

## 2022-08-05 NOTE — ASSESSMENT & PLAN NOTE
Both sets of blood culture is growing polymicrobial  Id consult appreciated, antibiotic adjusted  Follow repeat blood culture which will be drawn on 08/06/2022   Follow echocardiogram to rule out vegetation

## 2022-08-05 NOTE — UTILIZATION REVIEW
Initial Clinical Review    Admission: Date/Time/Statement:   Admission Orders (From admission, onward)     Ordered        08/04/22 1547  INPATIENT ADMISSION  Once                      Orders Placed This Encounter   Procedures    INPATIENT ADMISSION     Standing Status:   Standing     Number of Occurrences:   1     Order Specific Question:   Level of Care     Answer:   Med Surg [16]     Order Specific Question:   Estimated length of stay     Answer:   More than 2 Midnights     Order Specific Question:   Certification     Answer:   I certify that inpatient services are medically necessary for this patient for a duration of greater than two midnights  See H&P and MD Progress Notes for additional information about the patient's course of treatment  ED Arrival Information     Expected   -    Arrival   8/4/2022 12:14    Acuity   Emergent            Means of arrival   Walk-In    Escorted by   Self    Service   Hospitalist    Admission type   Emergency            Arrival complaint   Dizziness, Feels like she is going to pass out           Chief Complaint   Patient presents with    Weakness - Generalized     Presents due to dizziness, weakness, unknown fever, states she has "holes in bilateral feet" for over a year and has not been seen        Initial Presentation: 51 y o  female W/PMHX: iron deficiency anemia, hgb 7 6-11, has previously required transfusions, c/w iron supplements, T1DM on insulin pump, diabetic foot ulcers,CHOPART AMPUTATION LEFT  FOOT: (Left)-Achilles tenotomy left foot,   to ED from home, admitted as inpatient, due to Sepsis  Presented with vomiting/diarhea, abdominal pain and chills  Onset of s/s since last night  Exam: Febrile, Tachycardia, amputation of left foot, and amputation of toes on rt  Left foot, erythema left foot, b/l foot ulcers    See images below, ED work up reveals:  Leukocytosis, HgB 8 9, HcT 30 4, hyponatremia 133, hypomagnesemia, hypophosphatemia, hypoalbuminemia, elevated lactic acidosis, gastroenteritis,  Cellulitis left foot, cellulitis right foot, ,  B/l foot xray w/o osseous abnormality or osteomyelitis, Plan: yousif iron panel, folic acid, vit W31, podiatry consult, MRI, doppler study, CT abdomen pelvis, IV ABX, IV hydration, pending BC, pending  Procal, probiotics , trend serial labs with repletion as needed, DVT PPX, prn analgesia and antiemetics  Accu ck with ssi  Left foot:      Right midfoot  Date: 8/5/22   Day 2: Bacteremia: ABX adjusted by ID, f/u BC pending 8/6, ECHO pending r/o vegetation results pending, procal 56 5, bet MRI left foot concerning for osteomyelitis,  aterial doppler shows PAD which remained stable, General surgery consult for BKA, electrolyte imbalance resolved,  + BC growing bacteremia multi organism ID following, Hydrouteronephrosis urology consulted, vasquez placed, reassess US  Tomorrow, gastroenteritis condition improved, C/W pts insulin pump Dexcom levemir 58U hs, ssi with accu ck, T-Max 101 6 obese, MM dry, left foot amputation, rt toes amputated  C/W trending serial labs with repletion as needed, trend fever curve, DVT PPX  Prn analgesia  ID consult:  Infectious disease is being consulted for diagnostic work up and antibiotic management  Sepsis, POA  Patient initially presented with leukocytosis along with tachycardia and later developed fever  This is due to Polymicrobial bacteremia  2 of 2 BC on admission +, Fever curve has down trended but tachycardia continues  Antibiotics adjusted: D/C Zoysn  IV, Ceftriaxone 2 gm iv q 24 H, with oral flagyl 500mg Q8H, repeat B/C x 2 sets tomorrow  ECHO pending, MRI b/l feet pending, trend fever curve, and VS, ABN UA, and [place] uteronephrosis, grossly abn UA on admission, CT imaging in setting of bacteriemia notable for obstructive pathology  Pending UA C/S, trend I/O, urology consult pending, B/L diabetic foot ulcers, wound c/s pending, poorly controlled T1DM     On evaluation, patient reports that her symptoms really set in on Thursday and that prompted her to come in  She has centrally had body aches, fever, abdominal discomfort and diarrhea  She denied having any overt urinary symptoms but does report frequent urination in the evening  Denies having any prosthetic devices in place  She denies ever having any history of neurogenic bladder are having to straight cath or Cage catheter  She reported recent improvement in her sugars using a pump and constant monitoring device  She reports some improvement to her symptoms now being here in the hospital on antibiotics  Wound care: following pt and has skin care plan:    Skin care plans:  1-Hydraguard to bilateral sacrum, buttock and heels BID and PRN  2-Elevate heels to offload pressure  3-Ehob cushion in chair when out of bed  4-Moisturize skin daily with skin nourishing cream   5-Turn/reposition q2h or when medically stable for pressure re-distribution on skin  6-Cleanse bilateral foot wounds with normal saline, apply small amount of Hydrogel to wound beds and cover with an Allevyn bordered foam  Pt has Podiatry consult, may have new wound orders after seen by podiatry      podiatry consult note: uncontrolled DMT1, Chopart's amp of the LLE, with underlying osteomyelitis, Sepsis 2/2 to Osteomyelitis, Charcot foot of the RT with colaspase and cuboidal ulceration  Assessment/ Plan: source sepsis left parts amputation site  MRI was reviewed and there are clear signs with T1-T2 changes indicating osteomyelitis of the distal part of the calcaneus  Unfortunately this amputation is an unstable amputation inherently  With the pull of the Achilles remaining unrestrained, the main complication with this type of amputation is either an ulceration over the talar head or distal calcaneus    With this type of ulceration with the combine findings osteomyelitis, there are no further options for reconstruction on the left lower extremity, she would benefit from a below-knee amputation  She would benefit from a general surgery consult versus transfer as necessary to accomplish this surgery  Continue antibiotics per ID , Orders are in for local wound care,  right foot does only have T2 changes overlying the cuboid, if this ulceration was exhibiting signs of infection along with these findings, we will be very concerned about having osteomyelitis of the cuboid as well  She does have an unstable Charcot foot of the right with previous hallux amputation with substantial midfoot collapse  For functional ambulation and lifestyle, she will need Charcot reconstruction on an outpatient basis  With an A1c of 14 however she currently is not a candidate of this time to reconstruct her right lower extremity  She will need to be seen by me in [address] at [facility] for total contact casting of the right lower extremity to offload the sub cuboid ulceration  She is very young to be having such significant pedal pathology, and likely the main contributing factors her severely uncontrolled diabetes  Urology consult: vasquez to be placed  Repeat US in AM likely experiencing neurogenic bladder 2/2 to elevated BG levels, Uncontrolled DM, full consult tomorrow d/t pt unavailable d/t testing today       ED Triage Vitals [08/04/22 1230]   Temperature Pulse Respirations Blood Pressure SpO2   100 °F (37 8 °C) (!) 136 19 153/63 98 %      Temp Source Heart Rate Source Patient Position - Orthostatic VS BP Location FiO2 (%)   Temporal Monitor Lying Right arm --      Pain Score       3          Wt Readings from Last 1 Encounters:   08/05/22 109 kg (240 lb)     Additional Vital Signs:   Date/Time Temp Pulse Resp BP MAP (mmHg) SpO2 O2 Device Patient Position - Orthostatic VS   08/05/22 15:01:06 101 6 °F (38 7 °C) Abnormal  -- -- 114/67 83 -- -- --   08/05/22 1403 -- 106 Abnormal  -- 127/71 -- -- -- --   08/05/22 07:48:29 98 8 °F (37 1 °C) 106 Abnormal  16 127/71 90 97 % -- --   08/05/22 0132 99 1 °F (37 3 °C) 117 Abnormal  19 135/67 90 93 % None (Room air) Lying   08/04/22 23:52:40 100 7 °F (38 2 °C) Abnormal  118 Abnormal  -- 140/66 91 95 % -- --   08/04/22 23:01:29 101 4 °F (38 6 °C) Abnormal  128 Abnormal  21 171/90 Abnormal  117 97 % -- Lying   08/04/22 1930 -- -- -- -- -- 100 % None (Room air) --   08/04/22 1615 99 4 °F (37 4 °C) 99 21 92/53 70 97 % None (Room air) --   08/04/22 1600 -- 96 16 89/52 Abnormal  67 95 % None (Room air) --   08/04/22 1545 -- 111 Abnormal  20 85/48 Abnormal  61 Abnormal  98 % None (Room air) --   08/04/22 1530 -- 97 16 84/42 Abnormal  59 Abnormal  96 % None (Room air) --   08/04/22 1515 -- 103 18 87/49 Abnormal  65 96 % None (Room air) --   08/04/22 1500 -- 109 Abnormal  20 97/49 Abnormal  71 99 % -- --   08/04/22 1445 -- 107 Abnormal  21 109/55 75 98 % -- --   08/04/22 1430 -- 109 Abnormal  22 113/59 80 98 % None (Room air) --   08/04/22 1400 -- 115 Abnormal  20 113/53 76 98 % None (Room air) --   08/04/22 1345 -- 114 Abnormal  19 115/57 82 97 % None (Room air) --   08/04/22 1315 -- 114 Abnormal  20 110/54 78 96 % None (Room air) --   08/04/22 1300 -- 125 Abnormal  24 Abnormal  100/51 69 97 % None (Room air) --   08/04/22 1245 -- 121 Abnormal  23 Abnormal  106/48 Abnormal  69 96 % None (Room air) --   08/04/22 1241 -- -- -- -- -- -- None (Room air) --           Pertinent Labs/Diagnostic Test Results:  8/6/22 ECHO: pending  8/5/22EKG:   VAS lower limb arterial duplex, complete bilateral   Final Result by Tao Gonzalez MD (08/05 2878)Impression:  RIGHT LOWER LIMB:  Diffuse disease noted throughout the femoral-popliteal arteries without  significant focal stenosis    Ankle/Brachial index:  1 31  which is in the normal category (Prior 1 58 )  PVR/ PPG tracings are normal   Metatarsal and great toe pressure not obtained due to amputation        LEFT LOWER LIMB:  Diffuse disease noted throughout the femoral-popliteal arteries without  significant focal stenosis  Ankle/Brachial index:  0 91  which is in the normal category (Prior 1 37  )  PVR/ PPG tracings are normal   Metatarsal and great toe pressure not obtained due to amputation     Compared to previous study on 10/28/20 , there is no significant change  MRI ankle/heel right  wo contrast   Final Result by Alex Hyatt MD (08/05 4974)   Charcot arthropathy with a plantar skin ulceration at the level of the cuboid  There is mild hyperemia in this region without T1 replacement signal to indicate osteomyelitis at this time  No abscess collection identified in this unenhanced  Workstation performed: [id number]         MRI ankle/heel left  wo contrast   Final Result by Alex Hyatt MD (08/05 9594)   Status post Chopart amputation with prominent plantar skin ulceration and T1 marrow replacement signal at the distal aspect of the calcaneus with corresponding T2 signal changes as above and concerning for osteomyelitis  No evidence of    drainable abscess in this unenhanced study  The study was marked in [facility] for immediate notification  Workstation performed: [id number]         CT abdomen pelvis w contrast   Final Result by Moises Lehman DO (08/04 2159)      Bilateral hydroureteronephrosis without evidence of obstructing stone  Findings may represent recently passed stone versus infection  Left-sided cystic adnexa  Liquid stool within the colon which may represent diarrheal illness      The study was marked in [employer] for immediate notification  Workstation performed: [id number]         XR chest 1 view portable   Final Result by Betsy Smith MD (08/04 2958)      No acute cardiopulmonary disease  Workstation performed: [id number]         XR foot 3+ vw right   Final Result by Betsy Smith MD (08/04 7228)      No acute osseous abnormality nor osteomyelitis              Workstation performed: [id number]         XR foot 2 vw left   Final Result by Betsy Smith MD (08/04 1458)      No acute osseous abnormality nor osteomyelitis              Workstation performed: [id number]         US right upper quadrant    (Results Pending)     Results from last 7 days   Lab Units 08/04/22  1242   SARS-COV-2  Negative     Results from last 7 days   Lab Units 08/04/22  1242   WBC Thousand/uL 12 83*   HEMOGLOBIN g/dL 8 9*   HEMATOCRIT % 30 4*   PLATELETS Thousands/uL 296   BANDS PCT % 6         Results from last 7 days   Lab Units 08/05/22  0535 08/04/22  1245 08/04/22  1242   SODIUM mmol/L 135 133*  --    POTASSIUM mmol/L 3 7 3 9  --    CHLORIDE mmol/L 102 99  --    CO2 mmol/L 22 21  --    ANION GAP mmol/L 11 13  --    BUN mg/dL 17 19  --    CREATININE mg/dL 1 27 1 25  --    EGFR ml/min/1 73sq m 50 51  --    CALCIUM mg/dL 7 9* 8 5  --    MAGNESIUM mg/dL 2 1  --  1 2*   PHOSPHORUS mg/dL 3 2  --  2 5*     Results from last 7 days   Lab Units 08/05/22  0535 08/04/22  1245   AST U/L 12 17   ALT U/L 14 18   ALK PHOS U/L 104 121*   TOTAL PROTEIN g/dL 6 6 7 3   ALBUMIN g/dL 2 3* 2 5*   TOTAL BILIRUBIN mg/dL 1 01* 0 83     Results from last 7 days   Lab Units 08/05/22  1228 08/05/22  0750 08/04/22  2157 08/04/22  1242   POC GLUCOSE mg/dl 254* 237* 120 221*     Results from last 7 days   Lab Units 08/05/22  0535 08/04/22  1245   GLUCOSE RANDOM mg/dL 227* 228*           Results from last 7 days   Lab Units 08/04/22  1242   PH RENETTA  7 463*   PCO2 RENETTA mm Hg 29 8*   PO2 RENETTA mm Hg 144 3*   HCO3 RENETTA mmol/L 20 9*   BASE EXC RENETTA mmol/L -2 3   O2 CONTENT RENETTA ml/dL 13 2   O2 HGB, VENOUS % 96 4*             Results from last 7 days   Lab Units 08/04/22  1932 08/04/22  1429 08/04/22  1242   HS TNI 0HR ng/L  --   --  6   HS TNI 2HR ng/L  --  12  --    HSTNI D2 ng/L  --  6  --    HS TNI 4HR ng/L 11  --   --    HSTNI D4 ng/L 5  --   --          Results from last 7 days   Lab Units 08/04/22  1242   PROTIME seconds 14 0   INR  1 07   PTT seconds 23     Results from last 7 days   Lab Units 08/04/22  1245   TSH 3RD GENERATON uIU/mL 1 996     Results from last 7 days   Lab Units 08/05/22  0535 08/04/22  1242   PROCALCITONIN ng/ml 56 43* 0 55*     Results from last 7 days   Lab Units 08/04/22  1242   LACTIC ACID mmol/L 1 9             Results from last 7 days   Lab Units 08/04/22  1242   NT-PRO BNP pg/mL 175*     Results from last 7 days   Lab Units 08/04/22  1242   FERRITIN ng/mL 40         Results from last 7 days   Lab Units 08/04/22  1242   LIPASE u/L 53*     Results from last 7 days   Lab Units 08/04/22  1245   CRP mg/L 111 6*             Results from last 7 days   Lab Units 08/04/22  1245   CLARITY UA  Clear   COLOR UA  Yellow   SPEC GRAV UA  1 015   PH UA  6 5   GLUCOSE UA mg/dl Negative   KETONES UA mg/dl Negative   BLOOD UA  Trace-Intact*   PROTEIN UA mg/dl 30 (1+)*   NITRITE UA  Negative   BILIRUBIN UA  Small*   UROBILINOGEN UA E U /dl 0 2   LEUKOCYTES UA  Moderate*   WBC UA /hpf 30-50*   RBC UA /hpf 4-10*   BACTERIA UA /hpf Occasional   EPITHELIAL CELLS WET PREP /hpf Occasional     Results from last 7 days   Lab Units 08/04/22  1242   INFLUENZA A PCR  Negative   INFLUENZA B PCR  Negative   RSV PCR  Negative               Results from last 7 days   Lab Units 08/04/22  1839 08/04/22  1245 08/04/22  1242   GRAM STAIN RESULT  Rare Disintegrating polys*  2+ Gram positive cocci in pairs and chains*  1+ Gram negative rods*  --  Gram positive cocci in pairs and chains*  Gram negative rods*  Gram positive cocci in pairs and chains*   URINE CULTURE   --  Culture results to follow    --    WOUND CULTURE  1+ Growth of Gram Negative Alexandru*  --   --                ED Treatment:   Medication Administration from 08/04/2022 1211 to 08/04/2022 1654       Date/Time Order Dose Route Action     08/04/2022 1244 sodium chloride 0 9 % bolus 1,000 mL 1,000 mL Intravenous New Bag     08/04/2022 1310 sodium chloride 0 9 % bolus 1,000 mL 1,000 mL Intravenous New Bag     08/04/2022 1354 sodium chloride 0 9 % bolus 1,000 mL 1,000 mL Intravenous New Bag     08/04/2022 1429 sodium chloride 0 9 % bolus 1,000 mL 1,000 mL Intravenous New Bag     08/04/2022 1309 piperacillin-tazobactam (ZOSYN) 3 375 g in sodium chloride 0 9 % 100 mL IVPB 3 375 g Intravenous New Bag     08/04/2022 1257 acetaminophen (TYLENOL) tablet 650 mg 650 mg Oral Given     08/04/2022 1451 magnesium sulfate IVPB (premix) SOLN 1 g 1 g Intravenous New Bag     08/04/2022 1451 potassium-sodium phosphates (PHOS-NAK) packet 1 packet 1 packet Oral Given        Past Medical History:   Diagnosis Date    Charcot's joint of foot, left     Diabetes mellitus [facility] [facility] )     Osteomyelitis of foot, right, acute ([facility] [facility] )      Present on Admission:   Sepsis ([facility] [facility] )   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 due to underlying condition, limited to breakdown of skin ([facility] [address] )   Cellulitis of left foot   Diabetic ulcer of left midfoot associated with diabetes mellitus due to underlying condition, with fat layer exposed ([facility] [address] [facility] )   Gastroenteritis   Iron deficiency anemia   Electrolyte imbalance      Admitting Diagnosis: Hypomagnesemia [E83 42]  Dizziness [R42]  Hypophosphatemia [E83 39]  Weakness generalized [R53 1]  Anemia [D64 9]  Near syncope [R55]  Open wound of foot, unspecified laterality, initial encounter [S91 309A]  Age/Sex: 51 y o  female  Admission Orders:skin care 2 x time, wound care daily, elevate heels off of bed, daily wts, turn q2H,  Daily wt,  Scd, up and oob, I/O, elevate HOB 30 degrees,  Orthostatic B/P, Up with assistance     Scheduled Medications:  cefTRIAXone, 2,000 mg, Intravenous, Q24H  ferrous sulfate, 325 mg, Oral, Daily With Breakfast  gabapentin, 300 mg, Oral, TID  heparin (porcine), 5,000 Units, Subcutaneous, Q8H ALEX  metroNIDAZOLE, 500 mg, Oral, Q8H ALEX  saccharomyces boulardii, 250 mg, Oral, BID      Continuous IV Infusions:  multi-electrolyte, 150 mL/hr, Intravenous, Continuous      PRN Meds:  acetaminophen, 650 mg, Oral, Q6H PRN  ondansetron, 4 mg, Intravenous, Q4H PRN  perflutren lipid microsphere, 2 mL/min, Intravenous, Once in imaging  sodium chloride (PF), 3 mL, Intravenous, Q1H PRN        IP CONSULT TO CASE MANAGEMENT  IP CONSULT TO PODIATRY  IP CONSULT TO INFECTIOUS DISEASES  IP CONSULT TO UROLOGY    Network Utilization Review Department  ATTENTION: Please call with any questions or concerns to [phone] and carefully listen to the prompts so that you are directed to the right person  All voicemails are confidential   Jennyfer Grand all requests for admission clinical reviews, approved or denied determinations and any other requests to dedicated fax number below belonging to the campus where the patient is receiving treatment   List of dedicated fax numbers for the Facilities:  [address] DENIALS (Administrative/Medical Necessity) [phone]   [address] (Maternity/NICU/Pediatrics) [phone]   [address]  [address] [place] [address] [id number] [place] [address] [address] [facility] [place] [address] [phone]

## 2022-08-05 NOTE — CONSULTS
Consultation - Infectious Disease   Sumeet Rashid 51 y o  female MRN: [id number]  Unit/Bed#: -01 Encounter: [id number]      Inpatient consult to Infectious Diseases  Consult performed by: Saúl Callejas MD  Consult ordered by: Marguerite Sherman MD        REQUIRED DOCUMENTATION:   1  This service was provided via Telemedicine  2  Provider located at [place]  3  TeleMed provider: Saúl Callejas MD   4  Identify all parties in room with patient during tele consult: RN  5  After connecting through [employer], patient was identified by name and date of birth and assistant checked wristband  Patient was then informed that this was a Telemedicine visit and that the exam was being conducted confidentially over secure lines  My office door was closed  No one else was in the room  Patient acknowledged consent and understanding of privacy and security of the Telemedicine visit, and gave us permission to have the assistant stay in the room in order to assist with the history and to conduct the exam   I informed the patient that I have reviewed their record in Epic and presented the opportunity for them to ask any questions regarding the visit today  The patient agreed to participate  Assessment/Recommendations   1  Sepsis, POA  Patient initially presented with leukocytosis along with tachycardia and later developed fever  This is due to problem 2  Fever curve has down trended but tachycardia continues  Antibiotics adjusted as below  Continue to trend fever curve/vitals  Repeat CBC/chemistry tomorrow  Repeat blood cultures tomorrow  Follow-up pending blood cultures  Additional imaging as below  Additional supportive care as per primary  Additional interventions pending clinical course    2  Polymicrobial bacteremia  Two of 2 blood cultures on admission have returned positive  PCR polymicrobial with GBS, Proteus and Bacteroides    Cultures reviewed in the past and patient has isolated similar organisms from her foot wounds  Suspected source is 4  Other consideration for 3 given CT but patient without urinary symptoms  She is without any prosthetic devices  Reports having some discomfort in her foot wounds and very mild lower back discomfort, likely musculoskeletal and possibly related to 3  Discontinue Zosyn  Switch to ceftriaxone 2 g Q 24 with oral Flagyl 500 q 8 based on cultures  Repeat blood cultures tomorrow x2 sets  Would obtain 2D echo  MRI of the right and left feet are pending  Podiatry evaluation pending  Urology evaluation pending  Continue to trend fever curve/vitals  Repeat CBC/chemistry tomorrow  Will further adjust antibiotics based on culture data  Duration of therapy ultimately pending clinical course/interventions  Monitor for any further back complaints, consider imaging if needed  Additional interventions pending clinical course    3  Abnormal UA and hydroureteronephrosis  UA on admission grossly abnormal   Patient however without any urinary symptoms  CT imaging obtained in the setting of bacteremia notable for obstructive pathology  Uncertain if this is contributing to 2  Possibly a complication of 5  No overt CVA tenderness but some lower back discomfort  Antibiotics as above  Follow-up pending urine culture  Follow-up pending blood cultures  Repeat blood cultures ordered  Continue to trend fever curve/WBC  Monitor urine output and back exam  Urology evaluation pending    4  Bilateral diabetic foot ulcers with cellulitis of the left  Patient has known bilateral diabetic foot ulcers and has not seen Podiatry in about a year  Prior cultures reviewed  Suspected source of the above  She particularly seems to have cellulitic changes, tenderness and purulent drainage from the left  X-rays without bony changes as of yet  Antibiotics as above  Follow-up wound culture    Follow-up podiatry evaluation  Follow-up MR imaging  Continue to trend fever curve/WBC    5   Poorly controlled diabetes and obesity  Hemoglobin A1c noted to be 14  Likely risk factor for the above  BMI noted to be 42  Additional glucose management and supportive care as per primary  Above plan discussed in detail with the patient and with primary service  ID consult service will re-evaluate this patient again over the course of admission as needed  Please contact ID attending on call if questions in the interim  History     Reason for Consult:  Bacteremia    HPI: Lora Hdez is a 51y o  year old female with poorly controlled type 1 diabetes and chronic foot wounds and previously required amputation  Chart reviewed in prior discharge summaries noted with episodes of wound infections as well as DKA  She presents this time with vomiting, diarrhea, lower abdominal discomfort and chills  Reportedly symptoms sudden in onset  Patient on presentation noted to be tachycardic and had leukocytosis  On exam she was noted to have erythema specifically around the left foot ulceration  Creatinine unremarkable  X-rays of both feet were taken without any acute bony findings  Chest x-ray was without any pulmonary findings  Patient was ultimately admitted as she met sepsis criteria on admission  She was started on antibiotics  UA noted to be grossly abnormal   Cultures were collected including urine and blood and CT imaging of the abdomen obtained  No other events noted overnight  Patient did eventually develop a fever yesterday to 101  Blood cultures so far returned positive with Gram-positive cocci and PCR testing detected Bacteroides, Proteus and group B strep  Prior wound cultures reviewed in our system and patient has had similar organisms from her wounds in the past   Clinical images reviewed the patient's foot wounds along with prior images in our system  On chart review it seems she has not seen any providers in our system since 2021   No other recent culture data are visits available in Care everywhere  CT imaging of the abdomen ultimately showed bilateral hydroureteronephrosis without any obstructing stone  Liquid stool also noted in the colon likely from diarrheal illness  Urology and Podiatry have been consulted  She remains tachycardic  Chemistry otherwise unremarkable  Differential on CBC unremarkable  On evaluation, patient reports that her symptoms really set in on Thursday and that prompted her to come in  She has centrally had body aches, fever, abdominal discomfort and diarrhea  She denied having any overt urinary symptoms but does report frequent urination in the evening  Denies having any prosthetic devices in place  She denies ever having any history of neurogenic bladder are having to straight cath or Cage catheter  She reported recent improvement in her sugars using a pump and constant monitoring device  She reports some improvement to her symptoms now being here in the hospital on antibiotics  Infectious disease is being consulted for diagnostic work up and antibiotic management  Review of Systems  Pertinent positives and negatives as noted in HPI  Rest complete 12 point system-based review of systems is otherwise negative      PAST MEDICAL HISTORY:  Past Medical History:   Diagnosis Date    Charcot's joint of foot, left     [facility] [address] [facility]     Osteomyelitis of foot, right, acute ([facility] [address] )      Past Surgical History:   Procedure Laterality Date    FOOT AMPUTATION Left 10/30/2020    Procedure: CHOPART AMPUTATION LEFT  FOOT:;  Surgeon: aMx Subramanian DPM;  Location:  MAIN OR;  Service: Podiatry    FOOT CAPSULE RELEASE W/ PERCUTANEOUS HEEL CORD LENGTHENING, TIBIAL TENDON TRANSFER Left 10/30/2020    Procedure: Achilles tenotomy left foot;  Surgeon: Max Subramanian DPM;  Location:  MAIN OR;  Service: Podiatry    FOOT SURGERY Right     right first toe amputation  2019       FAMILY HISTORY:  Non-contributory    SOCIAL HISTORY:  Social History   /Civil Union  Social History     Substance and Sexual Activity   Alcohol Use Never     Social History     Substance and Sexual Activity   Drug Use Never     Social History     Tobacco Use   Smoking Status Never Smoker   Smokeless Tobacco Never Used       ALLERGIES:  Allergies   Allergen Reactions    Clindamycin Other (See Comments)     CHEST PRESSURE, FEELS LIKE A HEART ATTACK PER PT       MEDICATIONS:  All current active medications have been reviewed  Physical Exam     Temp:  [98 8 °F (37 1 °C)-101 4 °F (38 6 °C)] 98 8 °F (37 1 °C)  HR:  [] 106  Resp:  [16-24] 16  BP: ()/(42-90) 127/71  SpO2:  [93 %-100 %] 97 %  Temp (24hrs), Av 9 °F (37 7 °C), Min:98 8 °F (37 1 °C), Max:101 4 °F (38 6 °C)  Current: Temperature: 98 8 °F (37 1 °C)    Intake/Output Summary (Last 24 hours) at 2022 0911  Last data filed at 2022 1551  Gross per 24 hour   Intake 4200 ml   Output --   Net 4200 ml         Physical exam findings reported by bedside and primary medical team staff    General Appearance:  Appearing well, nontoxic, and in no distress, appears stated age; able to provide adequate history  Head:  Normocephalic, without obvious abnormality, atraumatic   Eyes:  PERRL, conjunctiva pink and sclera anicteric, both eyes   Nose: Nares normal, mucosa normal, no drainage   Throat: Oropharynx moist without lesions; lips, mucosa, and tongue normal   Neck: Supple, symmetrical, trachea midline, no adenopathy, no tenderness/mass/nodules   Back:   Symmetric, no curvature, ROM normal, no CVA tenderness; reporting some very mild discomfort just at the tailbone     Lungs:   Clear to auscultation bilaterally, no audible wheezes, rhonchi and rales, respirations unlabored   Chest Wall:  No tenderness or deformity   Heart:  Regular rate and rhythm, S1, S2 normal, no murmur, rub or gallop   Abdomen:   Soft, non-tender, non-distended, positive bowel sounds    No CVA tenderness   Extremities: Extremities normal, atraumatic, no cyanosis, clubbing or edema   Skin: Diabetic ulcerations examined with Nursing  The right foot wound has some bleeding noted around it but no obvious cellulitic changes  There does seem to be a significant amount of callus formation under raising the wound itself  Charcot foot deformity present  The left remaining stump has an ulceration present with purulent drainage noted at the bed of the wound  On review with nursing there is warmth along the top of the wound with some bogginess at the 2:00 And 10:00 Positions  Lymph nodes: Cervical, supraclavicular, and axillary nodes normal   Neurologic: Alert and oriented times 3, freely moving all of her extremities against gravity  Invasive Devices:   Peripheral IV 08/04/22 Right Antecubital (Active)   Site Assessment WDL 08/04/22 1930   Dressing Type Transparent 08/04/22 1930   Line Status Flushed 08/04/22 1930   Dressing Status Clean;Dry; Intact 08/04/22 1930   Dressing Change Due 08/08/22 08/04/22 1930       Peripheral IV 08/04/22 Right Hand (Active)   Site Assessment [facility] 08/04/22 2200   Dressing Type Transparent 08/04/22 2200   Line Status Flushed 08/04/22 2200   Dressing Status Clean;Dry; Intact 08/04/22 2200   Dressing Change Due 08/08/22 08/04/22 2200       Labs, Imaging, & Other Studies     Lab Results:    I have personally reviewed pertinent labs      Results from last 7 days   Lab Units 08/04/22  1242   WBC Thousand/uL 12 83*   HEMOGLOBIN g/dL 8 9*   PLATELETS Thousands/uL 296     Results from last 7 days   Lab Units 08/05/22  0535 08/04/22  1245   POTASSIUM mmol/L 3 7 3 9   CHLORIDE mmol/L 102 99   CO2 mmol/L 22 21   BUN mg/dL 17 19   CREATININE mg/dL 1 27 1 25   EGFR ml/min/1 73sq m 50 51   CALCIUM mg/dL 7 9* 8 5   AST U/L 12 17   ALT U/L 14 18   ALK PHOS U/L 104 121*     Results from last 7 days   Lab Units 08/04/22  1242   GRAM STAIN RESULT  Gram positive cocci in pairs and chains*  Gram positive cocci in pairs and chains*       Imaging Studies:   I have personally reviewed pertinent imaging study reports and images in PACS  EKG, Pathology, and Other Studies:   I have personally reviewed pertinent reports  Counseling/Coordination of care: Total 70 minutes communication with the patient via telehealth  Labs, medical tests and imaging studies were independently and extensively reviewed by me as noted above in HPI and old records were obtained and summarized as noted above in HPI  My recommendations were discussed with the patient in detail who verbalized understanding

## 2022-08-05 NOTE — WOUND OSTOMY CARE
Consult Note - Wound   Cloyce Sosa 51 y o  female MRN: [id number]  Unit/Bed#: -01 Encounter: [id number]      History and Present Illness:  51year old female with bilateral diabetic foot ulcers with cellulitis of the left  She has not seen Podiatry in about one year per MD [facility] Iron deficiency anemia,Type 2 DM with neurologic complication  Acute kidney injury,Morbid Obesity,s/p amputation  Had VAS, MRI,wound culture, ID, podiatry,urology consult  Assessment Findings:   1)Left and right plantar foot ulcers,scant bloody drainage when telfa and dsd removed  Photo and details documented below  Pt states she had been using neosporin at home to wounds  Wound 08/05/22 Foot Left;Plantar (Active)   Wound Image   08/05/22 1317   Wound Description Drainage;Fragile;Pink;Pale;Swelling 08/05/22 1317   Ursula-wound Assessment Dry;Callus 08/05/22 1317   Wound Length (cm) 2 cm 08/05/22 1317   Wound Width (cm) 2 5 cm 08/05/22 1317   Wound Depth (cm) 0 8 cm 08/05/22 1317   Wound Surface Area (cm^2) 5 cm^2 08/05/22 1317   Wound Volume (cm^3) 4 cm^3 08/05/22 1317   Calculated Wound Volume (cm^3) 4 cm^3 08/05/22 1317   Drainage Amount Scant 08/05/22 1317   Drainage Description Bloody 08/05/22 1317   Non-staged Wound Description Full thickness 08/05/22 1317   Treatments Cleansed;Site care 08/05/22 1317   Dressing Foam, Silicon (eg  Allevyn, etc); Hydrogel 08/05/22 1317   Dressing Changed New 08/05/22 1317   Patient Tolerance Tolerated well 08/05/22 1317   Dressing Status Clean;Dry; Intact 08/05/22 1317       Wound 08/05/22 Foot Right;Plantar (Active)   Wound Image   08/05/22 1318   Wound Description Drainage;Fragile;Pink 08/05/22 1318   Ursula-wound Assessment Dry;Callus 08/05/22 1318   Wound Length (cm) 1 5 cm 08/05/22 1318   Wound Width (cm) 1 5 cm 08/05/22 1318   Wound Depth (cm) 0 6 cm 08/05/22 1318   Wound Surface Area (cm^2) 2 25 cm^2 08/05/22 1318   Wound Volume (cm^3) 1 35 cm^3 08/05/22 1318   Calculated Wound Volume (cm^3) 1 35 cm^3 08/05/22 1318   Drainage Amount Scant 08/05/22 1318   Drainage Description Bloody 08/05/22 1318   Non-staged Wound Description Full thickness 08/05/22 1318   Treatments Cleansed;Site care;Elevated 08/05/22 1318   Dressing Foam, Silicon (eg  Allevyn, etc); Hydrogel 08/05/22 1318   Dressing Changed New 08/05/22 1318   Patient Tolerance Tolerated well 08/05/22 1318   Dressing Status Clean;Dry; Intact 08/05/22 1318           Skin care plans:  1-Hydraguard to bilateral sacrum, buttock and heels BID and PRN  2-Elevate heels to offload pressure  3-Ehob cushion in chair when out of bed  4-Moisturize skin daily with skin nourishing cream   5-Turn/reposition q2h or when medically stable for pressure re-distribution on skin  6-Cleanse bilateral foot wounds with normal saline, apply small amount of Hydrogel to wound beds and cover with an Allevyn bordered foam  Pt has Podiatry consult, may have new wound orders after seen by podiatry        Call or tigertext with any questions  Wound Care will continue to follow    Angus Liriano Rn

## 2022-08-05 NOTE — PHYSICAL THERAPY NOTE
Physical Therapy Screen    Patient Name: Mila Garcia    LCHGN'Q Date: 8/5/2022     Problem List  Principal Problem:    Sepsis ([facility] [address] )  Active Problems:    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 due to underlying condition, limited to breakdown of skin ([facility] [address] )    Iron deficiency anemia    Cellulitis of left foot    Diabetic ulcer of left midfoot associated with diabetes mellitus due to underlying condition, with fat layer exposed ([facility] [address] )    Gastroenteritis    Electrolyte imbalance       Past Medical History  Past Medical History:   Diagnosis Date    Charcot's joint of foot, left     Diabetes mellitus ([facility] [address] )     [facility]         Past Surgical History  Past Surgical History:   Procedure Laterality Date    FOOT AMPUTATION Left 10/30/2020    Procedure: CHOPART AMPUTATION LEFT  FOOT:;  Surgeon: Madie Purcell DPM;  Location:  MAIN OR;  Service: Podiatry    FOOT CAPSULE RELEASE W/ PERCUTANEOUS HEEL CORD LENGTHENING, TIBIAL TENDON TRANSFER Left 10/30/2020    Procedure: Achilles tenotomy left foot;  Surgeon: Madie Purcell DPM;  Location:  MAIN OR;  Service: Podiatry    FOOT SURGERY Right     right first toe amputation  2019 08/05/22 1242   PT Last Visit   PT Visit Date 08/05/22   Note Type   Note type Screen       Received order for PT consult  Chart reviewed  Pt admitted with diagnosis sepsis  Spoke with RN, Tyler Santana who reports patient plans to decline therapy services  Spoke with patient who reports she is at her PLOF of modified independent at this time with use of RW and left knee walker attachment  She reports ambulating in room without difficulty  Observed patient ambulating with left knee walker attachment on bariatric RW coming out of bathroom with nursing without difficulty  Pt reports no concerns with returning home upon discharge   Will D/C PT services at this time as patient is at her PLOF of modified independent without acute care PT services warranted  Should patient's status change, please re-consult      Carmina James, PT,DPT

## 2022-08-05 NOTE — ASSESSMENT & PLAN NOTE
Bilateral as per imaging  Urology consult appreciated, recommends to place Cage, and reassess with ultrasound in 48-72 hours to see any improvement  Cage placed

## 2022-08-05 NOTE — TELEPHONE ENCOUNTER
Multiple attempts to get patient down  First phone call made @1752hrs no answer  Second phone call @[id number], RN stated that PCA will bring patient down to department and will call when they are on their way  Third phone call after shift change 2016hrs, no answer

## 2022-08-05 NOTE — ASSESSMENT & PLAN NOTE
Antibiotic adjusted since blood culture positive  ID recommendation appreciated  MRI of the foot shows suspected osteomyelitis, podiatry recommendation appreciated, recommends to discuss with General surgery versus vascular surgery for possible BKA    Arterial duplex shows peripheral arterial disease, which remained stable

## 2022-08-05 NOTE — CONSULTS
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  3  Sepsis on admission 2/2 #2  4  Charcot foot of the right with collapse and cuboidal ulceration  PLAN:    · The source of her sepsis is the left show parts amputation site  MRI was reviewed and there are clear signs with T1-T2 changes indicating osteomyelitis of the distal part of the calcaneus  Unfortunately this amputation is an unstable amputation inherently  With the pull of the Achilles remaining unrestrained, the main complication with this type of amputation is either an ulceration over the talar head or distal calcaneus  · With this type of ulceration with the combine findings osteomyelitis, there are no further options for reconstruction on the left lower extremity, she would benefit from a below-knee amputation  She would benefit from a general surgery consult versus transfer as necessary to accomplish this surgery  · Continue antibiotics per ID   · Orders are in for local wound care  · Thankfully the right foot does only have T2 changes overlying the cuboid, if this ulceration was exhibiting signs of infection along with these findings, we will be very concerned about having osteomyelitis of the cuboid as well  She does have an unstable Charcot foot of the right with previous hallux amputation with substantial midfoot collapse  · For functional ambulation and lifestyle, she will need Charcot reconstruction on an outpatient basis    With an A1c of 14 however she currently is not a candidate of this time to reconstruct her right lower extremity  She will need to be seen by me in [address] at [facility] for total contact casting of the right lower extremity to offload the sub cuboid ulceration  · She is very young to be having such significant pedal pathology, and likely the main contributing factors her severely uncontrolled diabetes  · Rest of care per primary team       REQUIRED DOCUMENTATION:     1  This service was provided via Telemedicine  2  Provider located at [facility]  3  TeleMed provider: Jhonatan Christensen DPM   4  Identify all parties in room with patient during tele consult:  Patient, nurse  5  Patient was then informed that this was a Telemedicine visit and that the exam was being conducted confidentially over secure lines  My office door was closed  No one else was in the room  Patient acknowledged consent and understanding of privacy and security of the Telemedicine visit, and gave us permission to have the assistant stay in the room in order to assist with the history and to conduct the exam   I informed the patient that I have reviewed their record in Epic and presented the opportunity for them to ask any questions regarding the visit today  The patient agreed to participate  SUBJECTIVE    History of Present Illness: Alex Garcia is a 51 y o  female with past medical history of uncontrolled diabetes with prior b/l foot amputation who presents with ulcerations on the bottom of her left foot and also on the right foot , She has not seen podiatry in over a year  Review of Systems:    Constitutional: Negative  HENT: Negative  Eyes: Negative  Respiratory: Negative  Cardiovascular: Negative  Gastrointestinal: Negative  Musculoskeletal:  Negative   Skin:  As above   Neurological:  Negative   Psych: Negative       Past Medical and Surgical History:     Past Medical History:   Diagnosis Date    Charcot's joint of foot, left     Diabetes mellitus ([address] )     Osteomyelitis of foot, right, acute [facility])        Past Surgical History:   Procedure Laterality Date    FOOT AMPUTATION Left 10/30/2020    Procedure: CHOPART AMPUTATION LEFT  FOOT:;  Surgeon: Kajal Caballero DPM;  Location:  MAIN OR;  Service: Podiatry    FOOT CAPSULE RELEASE W/ PERCUTANEOUS HEEL CORD LENGTHENING, TIBIAL TENDON TRANSFER Left 10/30/2020    Procedure: Achilles tenotomy left foot;  Surgeon: Kajal Caballero DPM;  Location:  MAIN OR;  Service: Podiatry    FOOT SURGERY Right     right first toe amputation  2019       Meds/Allergies:    Medications Prior to Admission   Medication    ferrous sulfate 325 (65 Fe) mg tablet    polyethylene glycol (MIRALAX) 17 g packet    ACCU-CHEK FASTCLIX LANCETS MISC    gabapentin (NEURONTIN) 300 mg capsule    Syringe/Needle, Disp, 30G X 1/2" 1 ML MISC       Allergies   Allergen Reactions    Clindamycin Other (See Comments)     CHEST PRESSURE, FEELS LIKE A HEART ATTACK PER PT       Social History:     Marital Status: /Civil Union    Substance Use History:   Social History     Substance and Sexual Activity   Alcohol Use Never     Social History     Tobacco Use   Smoking Status Never Smoker   Smokeless Tobacco Never Used     Social History     Substance and Sexual Activity   Drug Use Never       Family History:    Family History   Problem Relation Age of Onset    Diabetes Brother     Hyperlipidemia Brother     Hypertension Brother     Diabetes Mother     Hypertension Father     Diabetes Sister          OBJECTIVE:    Vitals:   Blood Pressure: 127/71 (08/05/22 1403)  Pulse: (!) 106 (08/05/22 1403)  Temperature: 98 8 °F (37 1 °C) (08/05/22 0748)  Temp Source: Oral (08/05/22 0132)  Respirations: 16 (08/05/22 0748)  Height: 5' 3" (160 cm) (08/05/22 1403)  Weight - Scale: 109 kg (240 lb) (08/05/22 1403)  SpO2: 97 % (08/05/22 0748)    Physical Exam  Vitals reviewed  Constitutional:       Appearance: Normal appearance  HENT:      Head: Normocephalic and atraumatic  Nose: Nose normal       Mouth/Throat:      Mouth: Mucous membranes are moist    Eyes:      Pupils: Pupils are equal, round, and reactive to light  Pulmonary:      Effort: Pulmonary effort is normal    Musculoskeletal:      Comments: Manual muscle testing is intact to bilateral lower extremities, there is an ulceration on the plantar aspect of the left foot overlying the distal aspect of the calcaneus    There is also an ulceration on the plantar aspect of the right cuboid with no direct probe to bone, cellulitis has drastically improved   Neurological:      General: No focal deficit present  Mental Status: She is alert and oriented to person, place, and time      :     General Appearance: Alert, cooperative, no distress  HEENT: Head normocephalic, atraumatic, without obvious abnormality  Heart: Normal rate and rhythm  Lungs: Non-labored breathing  No respiratory distress  Abdomen: Without distension  Psychiatric: AAOx3  Lower Extremity:  Vascular:     Skin is receiving perfusion    Musculoskeletal:  MMT is 5/5 in all muscle compartments bilaterally  Dermatological:  There is an ulceration on the plantar aspect of the left foot there is severe overload visible  Cellulitis is improved   -there is ulceration plantar aspect of the right foot with also severe overload overlying the cuboid with severe callus formation to the periwound  There is a show parts amputation left and a collapsed Charcot foot on the right  Neurological:  Gross sensation is diminished  absent  Protective sensation is absent  Patient Reports numbness and/or paresthesias          Additional Data:     Lab Results: I have personally reviewed pertinent labs including:    Results from last 7 days   Lab Units 08/04/22  1242   WBC Thousand/uL 12 83*   HEMOGLOBIN g/dL 8 9*   HEMATOCRIT % 30 4*   PLATELETS Thousands/uL 296   LYMPHO PCT % 5*   MONO PCT % 0*   EOS PCT % 1     Results from last 7 days   Lab Units 08/05/22  0535   POTASSIUM mmol/L 3 7   CHLORIDE mmol/L 102   CO2 mmol/L 22   BUN mg/dL 17   CREATININE mg/dL 1 27   CALCIUM mg/dL 7 9*   ALK PHOS U/L 104   ALT U/L 14   AST U/L 12     Results from last 7 days   Lab Units 08/04/22  1242   INR  1 07       Cultures: I have personally reviewed pertinent cultures including:    Results from last 7 days   Lab Units 08/04/22  1839 08/04/22  1245 08/04/22  1242   GRAM STAIN RESULT  Rare Disintegrating polys*  2+ Gram positive cocci in pairs and chains*  1+ Gram negative rods*  --  Gram positive cocci in pairs and chains*  Gram negative rods*  Gram positive cocci in pairs and chains*   URINE CULTURE   --  Culture results to follow  --    WOUND CULTURE  1+ Growth of Gram Negative Alexandru*  --   --            Imaging: I have personally reviewed pertinent films in PACS  EKG, Pathology, and Other Studies: I have personally reviewed pertinent reports  ** Please Note: Portions of the record may have been created with voice recognition software  Occasional wrong word or "sound a like" substitutions may have occurred due to the inherent limitations of voice recognition software  Read the chart carefully and recognize, using context, where substitutions have occurred   **

## 2022-08-05 NOTE — ASSESSMENT & PLAN NOTE
MRI shows:Status post Chopart amputation with prominent plantar skin ulceration and T1 marrow replacement signal at the distal aspect of the calcaneus with corresponding T2 signal changes as above and concerning for osteomyelitis    Arterial duplex shows peripheral arterial disease which remained stable  Id consult appreciated  Podiatry consult appreciated, recommends to consult with General surgery versus vascular surgery for possible BKA

## 2022-08-05 NOTE — OCCUPATIONAL THERAPY NOTE
Occupational Therapy Screen       Patient Name: Jeanette Cedeno  KHDMT'H Date: 8/5/2022  Problem List  Principal Problem:    Sepsis [facility])  Active Problems:    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 due to underlying condition, limited to breakdown of skin ([address] )    Iron deficiency anemia    Cellulitis of left foot    Diabetic ulcer of left midfoot associated with diabetes mellitus due to underlying condition, with fat layer exposed ([address] )    Gastroenteritis    Electrolyte imbalance          08/05/22 1243   Note Type   Note type Screen       OT orders received  Chart review completed  Pt admitted to [address] [place] [address] 8/4/2022 with Dx: Sepsis  Pt observed to be ambulating in room with knee walker @ Mod I with no difficulty noted  Spoke with Pt who reports no concerns for returning home and completing ADLs/functional tasks  Pt does not require any further acute OT services  D/c effective 8/5/2022  If new concerns arise, please re-consult         Aden Matthew OTR/L

## 2022-08-05 NOTE — PLAN OF CARE
Problem: Potential for Falls  Goal: Patient will remain free of falls  Description: INTERVENTIONS:  - Educate patient/family on patient safety including physical limitations  - Instruct patient to call for assistance with activity   - Consult OT/PT to assist with strengthening/mobility   - Keep Call bell within reach  - Keep bed low and locked with side rails adjusted as appropriate  - Keep care items and personal belongings within reach  - Initiate and maintain comfort rounds  - Make Fall Risk Sign visible to staff  - Offer Toileting every  Hours, in advance of need  - Initiate/Maintain alarm  - Obtain necessary fall risk management equipment:   - Apply yellow socks and bracelet for high fall risk patients  - Consider moving patient to room near nurses station  Outcome: Progressing     Problem: PAIN - ADULT  Goal: Verbalizes/displays adequate comfort level or baseline comfort level  Description: Interventions:  - Encourage patient to monitor pain and request assistance  - Assess pain using appropriate pain scale  - Administer analgesics based on type and severity of pain and evaluate response  - Implement non-pharmacological measures as appropriate and evaluate response  - Consider cultural and social influences on pain and pain management  - Notify physician/advanced practitioner if interventions unsuccessful or patient reports new pain  Outcome: Progressing     Problem: INFECTION - ADULT  Goal: Absence or prevention of progression during hospitalization  Description: INTERVENTIONS:  - Assess and monitor for signs and symptoms of infection  - Monitor lab/diagnostic results  - Monitor all insertion sites, i e  indwelling lines, tubes, and drains  - Monitor endotracheal if appropriate and nasal secretions for changes in amount and color  - [place] appropriate cooling/warming therapies per order  - Administer medications as ordered  - Instruct and encourage patient and family to use good hand hygiene technique  - Identify and instruct in appropriate isolation precautions for identified infection/condition  Outcome: Progressing  Goal: Absence of fever/infection during neutropenic period  Description: INTERVENTIONS:  - Monitor WBC    Outcome: Progressing     Problem: SAFETY ADULT  Goal: Patient will remain free of falls  Description: INTERVENTIONS:  - Educate patient/family on patient safety including physical limitations  - Instruct patient to call for assistance with activity   - Consult OT/PT to assist with strengthening/mobility   - Keep Call bell within reach  - Keep bed low and locked with side rails adjusted as appropriate  - Keep care items and personal belongings within reach  - Initiate and maintain comfort rounds  - Make Fall Risk Sign visible to staff  - Offer Toileting every  Hours, in advance of need  - Initiate/Maintain alarm  - Obtain necessary fall risk management equipment:   - Apply yellow socks and bracelet for high fall risk patients  - Consider moving patient to room near nurses station  Outcome: Progressing  Goal: Maintain or return to baseline ADL function  Description: INTERVENTIONS:  -  Assess patient's ability to carry out ADLs; assess patient's baseline for ADL function and identify physical deficits which impact ability to perform ADLs (bathing, care of mouth/teeth, toileting, grooming, dressing, etc )  - Assess/evaluate cause of self-care deficits   - Assess range of motion  - Assess patient's mobility; develop plan if impaired  - Assess patient's need for assistive devices and provide as appropriate  - Encourage maximum independence but intervene and supervise when necessary  - Involve family in performance of ADLs  - Assess for home care needs following discharge   - Consider OT consult to assist with ADL evaluation and planning for discharge  - Provide patient education as appropriate  Outcome: Progressing  Goal: Maintains/Returns to pre admission functional level  Description: INTERVENTIONS:  - Perform BMAT or MOVE assessment daily    - Set and communicate daily mobility goal to care team and patient/family/caregiver  - Collaborate with rehabilitation services on mobility goals if consulted  - Perform Range of Motion  times a day  - Reposition patient every  hours  - Dangle patient  times a day  - Stand patient  times a day  - Ambulate patient  times a day  - Out of bed to chair  times a day   - Out of bed for meals  times a day  - Out of bed for toileting  - Record patient progress and toleration of activity level   Outcome: Progressing     Problem: DISCHARGE PLANNING  Goal: Discharge to home or other facility with appropriate resources  Description: INTERVENTIONS:  - Identify barriers to discharge w/patient and caregiver  - Arrange for needed discharge resources and transportation as appropriate  - Identify discharge learning needs (meds, wound care, etc )  - Arrange for interpretive services to assist at discharge as needed  - Refer to Case Management Department for coordinating discharge planning if the patient needs post-hospital services based on physician/advanced practitioner order or complex needs related to functional status, cognitive ability, or social support system  Outcome: Progressing     Problem: Knowledge Deficit  Goal: Patient/family/caregiver demonstrates understanding of disease process, treatment plan, medications, and discharge instructions  Description: Complete learning assessment and assess knowledge base    Interventions:  - Provide teaching at level of understanding  - Provide teaching via preferred learning methods  Outcome: Progressing     Problem: METABOLIC, FLUID AND ELECTROLYTES - ADULT  Goal: Electrolytes maintained within normal limits  Description: INTERVENTIONS:  - Monitor labs and assess patient for signs and symptoms of electrolyte imbalances  - Administer electrolyte replacement as ordered  - Monitor response to electrolyte replacements, including repeat lab results as appropriate  - Instruct patient on fluid and nutrition as appropriate  Outcome: Progressing  Goal: Fluid balance maintained  Description: INTERVENTIONS:  - Monitor labs   - Monitor I/O and WT  - Instruct patient on fluid and nutrition as appropriate  - Assess for signs & symptoms of volume excess or deficit  Outcome: Progressing     Problem: SKIN/TISSUE INTEGRITY - ADULT  Goal: Skin Integrity remains intact(Skin Breakdown Prevention)  Description: Assess:  -Perform [place] assessment every   -Clean and moisturize skin every   -Inspect skin when repositioning, toileting, and assisting with ADLS  -Assess under medical devices such as  every   -Assess extremities for adequate circulation and sensation     Bed Management:  -Have minimal linens on bed & keep smooth, unwrinkled  -Change linens as needed when moist or perspiring  -Avoid sitting or lying in one position for more than  hours while in bed  -Keep HOB at degrees     Toileting:  -Offer bedside commode  -Assess for incontinence every   -Use incontinent care products after each incontinent episode such as     Activity:  -Mobilize patient  times a day  -Encourage activity and walks on unit  -Encourage or provide ROM exercises   -Turn and reposition patient every  Hours  -Use appropriate equipment to lift or move patient in bed  -Instruct/ Assist with weight shifting every  when out of bed in chair  -Consider limitation of chair time  hour intervals    Skin Care:  -Avoid use of baby powder, tape, friction and shearing, hot water or constrictive clothing  -Relieve pressure over bony prominences using   -Do not massage red bony areas    Next Steps:  -Teach patient strategies to minimize risks such as    -Consider consults to  interdisciplinary teams such as   Outcome: Progressing  Goal: Incision(s), wounds(s) or drain site(s) healing without S/S of infection  Description: INTERVENTIONS  - Assess and document dressing, incision, wound bed, drain sites and surrounding tissue  - Provide patient and family education  - Perform skin care/dressing changes every   Outcome: Progressing  Goal: Pressure injury heals and does not worsen  Description: Interventions:  - Implement low air loss mattress or specialty surface (Criteria met)  - Apply silicone foam dressing  - Instruct/assist with weight shifting every  minutes when in chair   - Limit chair time to  hour intervals  - Use special pressure reducing interventions such as  when in chair   - Apply fecal or urinary incontinence containment device   - Perform passive or active ROM every   - Turn and reposition patient & offload bony prominences every  hours   - Utilize friction reducing device or surface for transfers   - Consider consults to  interdisciplinary teams such as   - Use incontinent care products after each incontinent episode such as   - Consider nutrition services referral as needed  Outcome: Progressing     Problem: HEMATOLOGIC - ADULT  Goal: Maintains hematologic stability  Description: INTERVENTIONS  - Assess for signs and symptoms of bleeding or hemorrhage  - Monitor labs  - Administer supportive blood products/factors as ordered and appropriate  Outcome: Progressing     Problem: MOBILITY - ADULT  Goal: Maintain or return to baseline ADL function  Description: INTERVENTIONS:  -  Assess patient's ability to carry out ADLs; assess patient's baseline for ADL function and identify physical deficits which impact ability to perform ADLs (bathing, care of mouth/teeth, toileting, grooming, dressing, etc )  - Assess/evaluate cause of self-care deficits   - Assess range of motion  - Assess patient's mobility; develop plan if impaired  - Assess patient's need for assistive devices and provide as appropriate  - Encourage maximum independence but intervene and supervise when necessary  - Involve family in performance of ADLs  - Assess for home care needs following discharge   - Consider OT consult to assist with ADL evaluation and planning for discharge  - Provide patient education as appropriate  Outcome: Progressing  Goal: Maintains/Returns to pre admission functional level  Description: INTERVENTIONS:  - Perform BMAT or MOVE assessment daily    - Set and communicate daily mobility goal to care team and patient/family/caregiver  - Collaborate with rehabilitation services on mobility goals if consulted  - Perform Range of Motion  times a day  - Reposition patient every  hours  - Dangle patient  times a day  - Stand patient  times a day  - Ambulate patient  times a day  - Out of bed to chair  times a day   - Out of bed for meal times a day  - Out of bed for toileting  - Record patient progress and toleration of activity level   Outcome: Progressing     Problem: Nutrition/Hydration-ADULT  Goal: Nutrient/Hydration intake appropriate for improving, restoring or maintaining nutritional needs  Description: Monitor and assess patient's nutrition/hydration status for malnutrition  Collaborate with interdisciplinary team and initiate plan and interventions as ordered  Monitor patient's weight and dietary intake as ordered or per policy  Utilize nutrition screening tool and intervene as necessary  Determine patient's food preferences and provide high-protein, high-caloric foods as appropriate       INTERVENTIONS:  - Monitor oral intake, urinary output, labs, and treatment plans  - Assess nutrition and hydration status and recommend course of action  - Evaluate amount of meals eaten  - Assist patient with eating if necessary   - Allow adequate time for meals  - Recommend/ encourage appropriate diets, oral nutritional supplements, and vitamin/mineral supplements  - Order, calculate, and assess calorie counts as needed  - Recommend, monitor, and adjust tube feedings and TPN/PPN based on assessed needs  - Assess need for intravenous fluids  - Provide specific nutrition/hydration education as appropriate  - Include patient/family/caregiver in decisions related to nutrition  Outcome: Progressing

## 2022-08-05 NOTE — ASSESSMENT & PLAN NOTE
Lab Results   Component Value Date    HGBA1C 14 0 (H) 09/07/2020           Recent Labs     08/04/22  2157 08/05/22  0750 08/05/22  1228 08/05/22  1618   POCGLU 120 237* 254* 255*       Blood Sugar Average: Last 72 hrs:  (P) 217 4   Continue patient's insulin pump (Dexcom G6-patient use insulin as per up to 150 units subQ daily via insulin pump as well as Levemir 58 units subQ at nighttime)-as per her own endocrinologist last note, note left in patient chart  follow hypoglycemia precaution  Continue IV antibiotic, follow wound care, podiatry recommendation  MRI of the foot shows early signs of osteomyelitis, podiatry consult appreciated, patient will need left BKA    Patient also has PAD recommends to consult with General surgery versus transfer the patient-will try to transfer the patient to tertiary center for multispecialty evaluation from Podiatry, vascular  Below MRI of foot to rule out osteomyelitis  Patient had history CHOPART AMPUTATION LEFT  FOOT: (Left)-Achilles tenotomy left foot (Left- on 10/30/20)

## 2022-08-05 NOTE — ASSESSMENT & PLAN NOTE
Unknown etiology-suspect renal in origin as well as diabetic foot  Blood culture is growing bacteremia, multi organism-id consult appreciated  Patient met criteria with tachycardia, tachypnea, elevated WBC and elevated lactic acid  Also having gastroenteritis most likely the cause  Continue IV antibiotic, IV hydration, follow-up blood culture, procalcitonin, trend lactic acid  Follow CT scan abdomen pelvis  Patient also has diabetic foot ulcer, clinically looks infected, follow MRI of the foot  Also follow-up podiatry consult

## 2022-08-05 NOTE — ASSESSMENT & PLAN NOTE
Lab Results   Component Value Date    HGBA1C 14 0 (H) 09/07/2020           Recent Labs     08/04/22  2157 08/05/22  0750 08/05/22  1228 08/05/22  1618   POCGLU 120 237* 254* 255*       Blood Sugar Average: Last 72 hrs:  (P) 217 4   Patient is on insulin pump, continue patient's home insulin pump patient feel of the form  MRI of the foot shows Charcot   Arterial duplex shows PAD, remained stable compared to 2 years ago  Follow-up podiatry recommendation

## 2022-08-05 NOTE — CONSULTS
Consultation - Urology  Shira Ochoa 51 y o  female MRN: [id number]  Unit/Bed#: -01 Encounter: [id number]    Assessment:  51 y o  female w/ b/l hydronephrosis, distended bladder  CTAP pertinent impression:   - Bilateral hydroureteronephrosis without evidence of obstructing stone    Findings may represent recently passed stone versus infection      - Sepsis POA - Febrile with Tmax 101 6, tachycardia (106 - 128), episodes of HTN and hypotension x last 24 hours, on room air  - WBC 12 83 yesterday   - Iron deficiency anemia Hgb 8 9 - baseline 7 6 - 11 0  - Cr 1 27 (1 25), BUN 17 (19)  - eGFR 50 (51)  - Blood cultures x 2 in process  - UA with moderate leuks, protein, bili, occult blood   - Urine culture results in process   - PVRs 19, 32, 32  - Uncontrolled diabetic w/ neuropathic disease, prior amputations - glucose 227 (228)    Plan:  - Recommendation made to insert vasquez catheter as there is possibility of development of neurogenic bladder second to being uncontrolled diabetic, patient refused  - PVRs have been ordered and based on low residuals at this time we can proceed without Vasquez   - US previously ordered for Monday - will keep to monitor  - Need outpatient investigation of ureters eventually but non-urgent, office will be tasked with follow up, possible retrograde pyelography once resolution of acute illness  - Monitor labs/vitals - fever and bacteremia noted, not of  source  - Follow up urine culture  - Co-morbidities per primary team  _______________________________________________________________  Physician Requesting Consult: Emiliano Hernandez MD    Additional consultants: ID    Reason for Consult / Principal Problem: hydroureteronephrosis    HPI: Shira Ochoa is a 51y o  year old female with PMH significant for uncontrolled diabetes, with neuropathic disease, prior amputations, osteomyelitis who presented to [facility] ED with vomiting, diarrhea, abdominal discomfort, b/l lower extremity wounds, weakness, and chills  She noted in the ED her blood sugars had been in the 120s recently with A1c around 7 or 8  POC glucose in ED was 221  CTAP performed due to sepsis showed hydroureteronephrosis without obstructing stone leading to urology consult  Patient denies any issues with urination and states she believes she is getting transferred for BKA so does not want a Vasquez to be placed here just to get sent to another facility  Patient also questioning need as she has been urinating without issue and PVRs are noted to be low  Explained imaging to patient and the reason for vasquez recommendation and she verbalizes understanding  She denies dysuria, burning, urgency, frequency, hematuria, flank pain, or other urologic symptoms  Historical Information   Past Medical History:   Diagnosis Date    Charcot's joint of foot, left     Diabetes mellitus ([facility] [address] )     Osteomyelitis of foot, right, acute ([facility] [address] )      Past Surgical History:   Procedure Laterality Date    FOOT AMPUTATION Left 10/30/2020    Procedure: CHOPART AMPUTATION LEFT  FOOT:;  Surgeon: Hernandez Delacruz DPM;  Location:  MAIN OR;  Service: Podiatry    FOOT CAPSULE RELEASE W/ PERCUTANEOUS HEEL CORD LENGTHENING, TIBIAL TENDON TRANSFER Left 10/30/2020    Procedure: Achilles tenotomy left foot;  Surgeon: Hernandez Delacruz DPM;  Location:  MAIN OR;  Service: Podiatry    FOOT SURGERY Right     right first toe amputation  2019     Social History   Social History     Substance and Sexual Activity   Alcohol Use Never     Social History     Substance and Sexual Activity   Drug Use Never     Social History     Tobacco Use   Smoking Status Never Smoker   Smokeless Tobacco Never Used     Family History: non-contributory    Meds/Allergies   Home meds:   Prior to Admission medications    Medication Sig Start Date End Date Taking?  Authorizing Provider   ferrous sulfate 325 (65 Fe) mg tablet Take 325 mg by mouth daily with breakfast   Yes Historical Provider, MD   polyethylene glycol (MIRALAX) 17 g packet Take 17 g by mouth daily   Yes Historical Provider, MD   ACCU-CHEK FASTCLIX LANCETS MISC by Does not apply route    Historical Provider, MD   gabapentin (NEURONTIN) 300 mg capsule Take 1 capsule (300 mg total) by mouth 3 (three) times a day 11/7/20 4/13/21  Lilian Rush MD   Syringe/Needle, Disp, 30G X 1/2" 1 ML MISC by Does not apply route    Historical Provider, MD   insulin glargine (LANTUS) 100 units/mL subcutaneous injection Continue prior to admission dose  Patient not taking: Reported on 8/4/2022 9/9/20 8/4/22  KIRA Dorman     Scheduled Meds:  Current Facility-Administered Medications   Medication Dose Route Frequency Provider Last Rate    acetaminophen  650 mg Oral Q6H PRN Lilian Rush MD      cefTRIAXone  2,000 mg Intravenous Q24H Lilian Rush MD      ferrous sulfate  325 mg Oral Daily With Breakfast Brenda Whitley MD      gabapentin  300 mg Oral TID Lilian Rush MD      heparin (porcine)  5,000 Units Subcutaneous Q8H [facility] KIRA Pino      metroNIDAZOLE  500 mg Oral Q8H [facility] Brenda Long MD      multi-electrolyte  150 mL/hr Intravenous Continuous Lilian Rush  mL/hr (08/05/22 0601)    ondansetron  4 mg Intravenous Q4H PRN Lilian Rush MD      perflutren lipid microsphere  2 mL/min Intravenous Once in imaging Ana Lozano DO      saccharomyces boulardii  250 mg Oral BID Lilian Rush MD      sodium chloride (PF)  3 mL Intravenous Q1H PRN Sven Whitley MD       Continuous Infusions:multi-electrolyte, 150 mL/hr, Last Rate: 150 mL/hr (08/05/22 0601)      PRN Meds:    acetaminophen    ondansetron    perflutren lipid microsphere    sodium chloride (PF)    ALLERGIES:   Allergies   Allergen Reactions    Clindamycin Other (See Comments)     CHEST PRESSURE, FEELS LIKE A HEART ATTACK PER PT       Review of Systems:  General: positive for  - fatigue  negative for - chills or fever  Cardiovascular: no chest pain or dyspnea on exertion  Respiratory: no cough, shortness of breath, or wheezing  Gastrointestinal: positive for - abdominal pain, diarrhea and nausea/vomiting  Genitourinary: no dysuria, frequency, urgency, trouble voiding, retention, or hematuria  Musculoskeletal: positive for - prior amputation of right toe, left foot  negative for - deformity, tenderness  Neurological: negative for - confusion, dizziness or headaches  Hematological and Lymphatic: negative  Dermatological: appreciated wound images in chart, being managed by medicine - possible transfer for BKA  Psychological: negative    Objective   Vitals:  Blood pressure 114/67, pulse (!) 106, temperature (!) 101 6 °F (38 7 °C), resp  rate 16, height 5' 3" (1 6 m), weight 109 kg (240 lb), SpO2 97 %  Body mass index is 42 51 kg/m²  I/Os:  I/O       08/03 0701  08/04 0700 08/04 0701  08/05 0700 08/05 0701  08/06 0700    P  O    100    IV Piggyback  4200     Total Intake(mL/kg)  4200 (38 5) 100 (0 9)    Net  +4200 +100           Unmeasured Urine Occurrence  1 x     Unmeasured Emesis Occurrence  2 x         Invasive Lines/Tubes:  Invasive Devices  Report    Peripheral Intravenous Line  Duration           Peripheral IV 08/04/22 Right Antecubital 1 day    Peripheral IV 08/04/22 Right Hand <1 day              Physical Exam  Constitutional:       General: She is not in acute distress  Appearance: She is obese  She is not ill-appearing  HENT:      Head: Normocephalic and atraumatic  Right Ear: External ear normal       Left Ear: External ear normal       Nose: Nose normal       Mouth/Throat:      Mouth: Mucous membranes are moist       Pharynx: Oropharynx is clear  Eyes:      Pupils: Pupils are equal, round, and reactive to light  Cardiovascular:      Rate and Rhythm: Tachycardia present  Pulmonary:      Effort: Pulmonary effort is normal  No respiratory distress  Abdominal:      General: Abdomen is flat  There is no distension  Tenderness: There is no abdominal tenderness  Genitourinary:     Comments: No suprapubic tenderness  Skin:     General: Skin is warm and dry  Comments: Images appreciated on chart from this visit  Neurological:      General: No focal deficit present  Mental Status: She is alert  Mental status is at baseline  Psychiatric:         Mood and Affect: Mood normal          Behavior: Behavior normal      Lab Results and Cultures:   COVID:   Last COVID19 Screening Values     None         CBC:   Results from last 7 days   Lab Units 08/04/22  1242   WBC Thousand/uL 12 83*   HEMOGLOBIN g/dL 8 9*   HEMATOCRIT % 30 4*   PLATELETS Thousands/uL 296     BMP/CMP:  Results from last 7 days   Lab Units 08/05/22  0535 08/04/22  1245   POTASSIUM mmol/L 3 7 3 9   CHLORIDE mmol/L 102 99   CO2 mmol/L 22 21   BUN mg/dL 17 19   CREATININE mg/dL 1 27 1 25   CALCIUM mg/dL 7 9* 8 5     Coags:   Results from last 7 days   Lab Units 08/04/22  1242   INR  1 07   PTT seconds 23     Lipid panel:     HgbA1c:   Lab Results   Component Value Date    HGBA1C 14 0 (H) 09/07/2020    HGBA1C 12 1 (H) 12/03/2019     Urinalysis:   Lab Results   Component Value Date    COLORU Yellow 08/04/2022    CLARITYU Clear 08/04/2022    SPECGRAV 1 015 08/04/2022    PHUR 6 5 08/04/2022    LEUKOCYTESUR Moderate (A) 08/04/2022    NITRITE Negative 08/04/2022    GLUCOSEU Negative 08/04/2022    KETONESU Negative 08/04/2022    BILIRUBINUR Small (A) 08/04/2022    BLOODU Trace-Intact (A) 08/04/2022   ,   Urine Culture:   Lab Results   Component Value Date    URINECX Culture results to follow  08/04/2022     Wound Culure:   Lab Results   Component Value Date    WOUNDCULT 1+ Growth of Gram Negative Alexandru (A) 08/04/2022     Blood Culture:   Lab Results   Component Value Date    BLOODCX No Growth After 5 Days  10/27/2020       Imaging Studies: I have personally reviewed pertinent reports      EKG, Pathology, and Other Studies: I have personally reviewed pertinent reports  VTE Prophylaxis: Heparin     Code Status: Level 1 - Full Code  Advance Directive and Living Will:      Power of :    POLST:      Counseling / Coordination of Care  Counseling/Coordination of Care: Total floor / unit time spent today 40 minutes  Greater than 50% of total time was spent with the patient and / or family counseling and / or coordination of care  A description of the counseling / coordination of care: labs, notes, history, discussion with urology team, history/physical from patient, assessment, plan, discussion with patient       Bernardino Perez, [place]  8/5/2022

## 2022-08-05 NOTE — PROGRESS NOTES
[address]  Progress Note - Rama Lawler 1975, 51 y o  female MRN: [id number]  Unit/Bed#: -[place] Encounter: [id number]  Primary Care Provider: Mariela Matute MD   Date and time admitted to hospital: 8/4/2022 12:23 PM    Bacteremia  Assessment & Plan  Both sets of blood culture is growing polymicrobial  Id consult appreciated, antibiotic adjusted  Follow repeat blood culture which will be drawn on 08/06/2022   Follow echocardiogram to rule out vegetation    Osteomyelitis of left foot ([address] )  Assessment & Plan  MRI shows:Status post Chopart amputation with prominent plantar skin ulceration and T1 marrow replacement signal at the distal aspect of the calcaneus with corresponding T2 signal changes as above and concerning for osteomyelitis    Arterial duplex shows peripheral arterial disease which remained stable  Id consult appreciated  Podiatry consult appreciated, recommends to consult with General surgery versus vascular surgery for possible BKA      Electrolyte imbalance  Assessment & Plan  Resolved    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 due to underlying condition, limited to breakdown of skin [facility])  Assessment & Plan  Lab Results   Component Value Date    HGBA1C 14 0 (H) 09/07/2020           Recent Labs     08/04/22  2157 08/05/22  0750 08/05/22  1228 08/05/22  1618   POCGLU 120 237* 254* 255*       Blood Sugar Average: Last 72 hrs:  (P) 217 4   Patient is on insulin pump, continue patient's home insulin pump patient feel of the form  MRI of the foot shows Charcot   Arterial duplex shows PAD, remained stable compared to 2 years ago  Follow-up podiatry recommendation    * Sepsis [facility])  Assessment & Plan  Unknown etiology-suspect renal in origin as well as diabetic foot  Blood culture is growing bacteremia, multi organism-id consult appreciated  Patient met criteria with tachycardia, tachypnea, elevated WBC and elevated lactic acid  Also having gastroenteritis most likely the cause  Continue IV antibiotic, IV hydration, follow-up blood culture, procalcitonin, trend lactic acid  Follow CT scan abdomen pelvis  Patient also has diabetic foot ulcer, clinically looks infected, follow MRI of the foot  Also follow-up podiatry consult    Hydroureteronephrosis  Assessment & Plan  Bilateral as per imaging  Urology consult appreciated, recommends to place Cage, and reassess with ultrasound in 48-72 hours to see any improvement  Cage placed    Gastroenteritis  Assessment & Plan  Unknown etiology  Denies any sick contact  Condition improved    Diabetic ulcer of left midfoot associated with diabetes mellitus due to underlying condition, with fat layer exposed [facility])  Assessment & Plan  Lab Results   Component Value Date    HGBA1C 14 0 (H) 09/07/2020           Recent Labs     08/04/22  2157 08/05/22  0750 08/05/22  1228 08/05/22  1618   POCGLU 120 237* 254* 255*       Blood Sugar Average: Last 72 hrs:  (P) 217 4   Continue patient's insulin pump (Dexcom G6-patient use insulin as per up to 150 units subQ daily via insulin pump as well as Levemir 58 units subQ at nighttime)-as per her own endocrinologist last note, note left in patient chart  follow hypoglycemia precaution  Continue IV antibiotic, follow wound care, podiatry recommendation  MRI of the foot shows early signs of osteomyelitis, podiatry consult appreciated, patient will need left BKA  Patient also has PAD recommends to consult with General surgery versus transfer the patient-will try to transfer the patient to tertiary center for multispecialty evaluation from Podiatry, vascular  Below MRI of foot to rule out osteomyelitis  Patient had history CHOPART AMPUTATION LEFT  FOOT: (Left)-Achilles tenotomy left foot (Left- on 10/30/20)    Cellulitis of left foot  Assessment & Plan  Antibiotic adjusted since blood culture positive    ID recommendation appreciated  MRI of the foot shows suspected osteomyelitis, podiatry recommendation appreciated, recommends to discuss with General surgery versus vascular surgery for possible BKA  Arterial duplex shows peripheral arterial disease, which remained stable          Iron deficiency anemia  Assessment & Plan  Hemoglobin varies from 7 6-11  Patient previously received blood transfusion  Patient and iron panel done in 2020, recheck iron panel, folic acid and vitamin B12  Continue iron supplement        VTE Pharmacologic Prophylaxis: VTE Score: 6 High Risk (Score >/= 5) - Pharmacological DVT Prophylaxis Ordered: heparin  Sequential Compression Devices Ordered  Patient Centered Rounds: I performed bedside rounds with nursing staff today  Discussions with Specialists or Other Care Team Provider:  Podiatry, Infectious Disease    Education and Discussions with Family / Patient: With patient  Time Spent for Care: 20 minutes  More than 50% of total time spent on counseling and coordination of care as described above  Current Length of Stay: 1 day(s)  Current Patient Status: Inpatient   Certification Statement: The patient will continue to require additional inpatient hospital stay due to To monitor above condition  Discharge Plan: Anticipate discharge in >72 hrs to Many transfer to tertiary center    Code Status: Level 1 - Full Code    Subjective:   Seen and evaluated during the round  Denies any significant complaint denies any nausea, vomiting, diarrhea  Denies any fever  Objective:     Vitals:   Temp (24hrs), Av 3 °F (37 9 °C), Min:98 8 °F (37 1 °C), Max:101 6 °F (38 7 °C)    Temp:  [98 8 °F (37 1 °C)-101 6 °F (38 7 °C)] 101 6 °F (38 7 °C)  HR:  [106-128] 106  Resp:  [16-21] 16  BP: (114-171)/(66-90) 114/67  SpO2:  [93 %-100 %] 97 %  Body mass index is 42 51 kg/m²  Input and Output Summary (last 24 hours):      Intake/Output Summary (Last 24 hours) at 2022 1656  Last data filed at 2022 0956  Gross per 24 hour   Intake 100 ml   Output --   Net 100 ml Physical Exam:   Physical Exam  Vitals and nursing note reviewed  Exam conducted with a chaperone present  Constitutional:       Appearance: Normal appearance  She is obese  She is not ill-appearing or diaphoretic  HENT:      Head: Normocephalic  Nose: Nose normal  No congestion or rhinorrhea  Mouth/Throat:      Mouth: Mucous membranes are dry  Pharynx: Oropharynx is clear  No posterior oropharyngeal erythema  Eyes:      General: No scleral icterus  Left eye: No discharge  Extraocular Movements: Extraocular movements intact  Conjunctiva/sclera: Conjunctivae normal       Pupils: Pupils are equal, round, and reactive to light  Neck:      Vascular: No carotid bruit  Cardiovascular:      Rate and Rhythm: Normal rate  Heart sounds: Normal heart sounds  No murmur heard  No friction rub  No gallop  Pulmonary:      Effort: Pulmonary effort is normal  No respiratory distress  Breath sounds: No stridor  No wheezing or rhonchi  Abdominal:      General: Abdomen is flat  Bowel sounds are normal  There is no distension  Palpations: There is no mass  Tenderness: There is no abdominal tenderness  There is no rebound  Hernia: No hernia is present  Musculoskeletal:         General: Deformity (Left foot amputation, right toes amputation) present  No tenderness or signs of injury  Cervical back: Normal range of motion  No rigidity or tenderness  Right lower leg: No edema  Left lower leg: No edema  Comments: Foot ulcer as documented in imaging section   Lymphadenopathy:      Cervical: No cervical adenopathy  Skin:     General: Skin is warm  Neurological:      General: No focal deficit present  Mental Status: She is alert and oriented to person, place, and time  Cranial Nerves: No cranial nerve deficit  Sensory: No sensory deficit  Motor: No weakness        Coordination: Coordination normal    Psychiatric: Mood and Affect: Mood normal          Additional Data:     Labs:  Results from last 7 days   Lab Units 08/04/22  1242   WBC Thousand/uL 12 83*   HEMOGLOBIN g/dL 8 9*   HEMATOCRIT % 30 4*   PLATELETS Thousands/uL 296   BANDS PCT % 6   LYMPHO PCT % 5*   MONO PCT % 0*   EOS PCT % 1     Results from last 7 days   Lab Units 08/05/22  0535   SODIUM mmol/L 135   POTASSIUM mmol/L 3 7   CHLORIDE mmol/L 102   CO2 mmol/L 22   BUN mg/dL 17   CREATININE mg/dL 1 27   ANION GAP mmol/L 11   CALCIUM mg/dL 7 9*   ALBUMIN g/dL 2 3*   TOTAL BILIRUBIN mg/dL 1 01*   ALK PHOS U/L 104   ALT U/L 14   AST U/L 12   GLUCOSE RANDOM mg/dL 227*     Results from last 7 days   Lab Units 08/04/22  1242   INR  1 07     Results from last 7 days   Lab Units 08/05/22  1618 08/05/22  1228 08/05/22  0750 08/04/22  2157 08/04/22  1242   POC GLUCOSE mg/dl 255* 254* 237* 120 221*         Results from last 7 days   Lab Units 08/05/22  0535 08/04/22  1242   LACTIC ACID mmol/L  --  1 9   PROCALCITONIN ng/ml 56 43* 0 55*       Lines/Drains:  Invasive Devices  Report    Peripheral Intravenous Line  Duration           Peripheral IV 08/04/22 Right Antecubital 1 day    Peripheral IV 08/04/22 Right Hand <1 day                      Imaging: Reviewed radiology reports from this admission including: ultrasound(s), ECHO and MRI of the foot    Recent Cultures (last 7 days):   Results from last 7 days   Lab Units 08/04/22  1839 08/04/22  1245 08/04/22  1242   GRAM STAIN RESULT  Rare Disintegrating polys*  2+ Gram positive cocci in pairs and chains*  1+ Gram negative rods*  --  Gram positive cocci in pairs and chains*  Gram negative rods*  Gram positive cocci in pairs and chains*   URINE CULTURE   --  Culture results to follow    --    WOUND CULTURE  1+ Growth of Gram Negative Alexandru*  --   --        Last 24 Hours Medication List:   Current Facility-Administered Medications   Medication Dose Route Frequency Provider Last Rate    acetaminophen  650 mg Oral Q6H PRN Brenda AMY Whitley MD      cefTRIAXone  2,000 mg Intravenous Q24H Bia Whitley MD 2,000 mg (08/05/22 1606)    ferrous sulfate  325 mg Oral Daily With Breakfast Brenda Whitely MD      gabapentin  300 mg Oral TID Skinny Salamanca MD      heparin (porcine)  5,000 Units Subcutaneous Q8H [address] Shereen S Chata, CRNP      insulin aspart  150 Units Subcutaneous Insulin Pump Once PRN Skinny Salamanca MD      insulin detemir  58 Units Subcutaneous HS Brenda Whitley MD      metroNIDAZOLE  500 mg Oral [facility] Skinny Salamanca MD      multi-electrolyte  150 mL/hr Intravenous Continuous Bia Whitley  mL/hr (08/05/22 1545)    ondansetron  4 mg Intravenous Q4H PRN Bia Whitley MD      perflutren lipid microsphere  2 mL/min Intravenous Once in imaging Ana Lolasharda DO      saccharomyces boulardii  250 mg Oral BID Skinny Salamanca MD      sodium chloride (PF)  3 mL Intravenous Q1H PRN Skinny Salamanca MD          Today, Patient Was Seen By: Skinny Salamanca MD    **Please Note: This note may have been constructed using a voice recognition system  **

## 2022-08-05 NOTE — PLAN OF CARE
Problem: SKIN/TISSUE INTEGRITY - ADULT  Goal: Skin Integrity remains intact(Skin Breakdown Prevention)  Description: Assess:  -Perform Corbin assessment every   -Clean and moisturize skin every   -Inspect skin when repositioning, toileting, and assisting with ADLS  -Assess under medical devices such as  every   -Assess extremities for adequate circulation and sensation     Bed Management:  -Have minimal linens on bed & keep smooth, unwrinkled  -Change linens as needed when moist or perspiring  -Avoid sitting or lying in one position for more than  hours while in bed  -Keep HOB at degrees     Toileting:  -Offer bedside commode  -Assess for incontinence every   -Use incontinent care products after each incontinent episode such as     Activity:  -Mobilize patient  times a day  -Encourage activity and walks on unit  -Encourage or provide ROM exercises   -Turn and reposition patient every  Hours  -Use appropriate equipment to lift or move patient in bed  -Instruct/ Assist with weight shifting every  when out of bed in chair  -Consider limitation of chair time  hour intervals    Skin Care:  -Avoid use of baby powder, tape, friction and shearing, hot water or constrictive clothing  -Relieve pressure over bony prominences using   -Do not massage red bony areas    Next Steps:  -Teach patient strategies to minimize risks such as    -Consider consults to  interdisciplinary teams such as   Outcome: Not Progressing  Goal: Incision(s), wounds(s) or drain site(s) healing without S/S of infection  Description: INTERVENTIONS  - Assess and document dressing, incision, wound bed, drain sites and surrounding tissue  - Provide patient and family education  - Perform skin care/dressing changes every   Outcome: Not Progressing  Goal: Pressure injury heals and does not worsen  Description: Interventions:  - Implement low air loss mattress or specialty surface (Criteria met)  - Apply silicone foam dressing  - Instruct/assist with weight shifting every  minutes when in chair   - Limit chair time to  hour intervals  - Use special pressure reducing interventions such as hen in chair   - Apply fecal or urinary incontinence containment device   - Perform passive or active ROM every   - Turn and reposition patient & offload bony prominences every  hours   - Utilize friction reducing device or surface for transfers   - Consider consults to  interdisciplinary teams such as   - Use incontinent care products after each incontinent episode such as   - Consider nutrition services referral as needed  Outcome: Not Progressing     Problem: HEMATOLOGIC - ADULT  Goal: Maintains hematologic stability  Description: INTERVENTIONS  - Assess for signs and symptoms of bleeding or hemorrhage  - Monitor labs  - Administer supportive blood products/factors as ordered and appropriate  Outcome: Not Progressing

## 2022-08-05 NOTE — QUICK NOTE
Discussed patient with Evon Aguilar with Urology  Will place a vasquez at this time and repeat US in the AM as patient is likely experiencing neurogenic bladder second to elevated sugars, uncontrolled diabetes  Patient was in and out of room for tests today and needed assistance from nursing at other times there were attempts to see her   She was unable to formally be seen but consult will follow tomorrow AM  Plan discussed with medicine team      Deonte Bal PA-C  8/5/2022 Noted erythema of vulva , treated today with topical medications

## 2022-08-06 ENCOUNTER — APPOINTMENT (INPATIENT)
Dept: ULTRASOUND IMAGING | Facility: HOSPITAL | Age: 47
DRG: 872 | End: 2022-08-06
Payer: COMMERCIAL

## 2022-08-06 PROBLEM — E87.8 ELECTROLYTE IMBALANCE: Status: RESOLVED | Noted: 2022-08-04 | Resolved: 2022-08-06

## 2022-08-06 PROBLEM — K82.8 GALLBLADDER SLUDGE: Status: ACTIVE | Noted: 2022-08-06

## 2022-08-06 PROBLEM — K52.9 GASTROENTERITIS: Status: RESOLVED | Noted: 2022-08-04 | Resolved: 2022-08-06

## 2022-08-06 LAB
ALBUMIN SERPL BCP-MCNC: 2.1 G/DL (ref 3.5–5)
ALP SERPL-CCNC: 106 U/L (ref 46–116)
ALT SERPL W P-5'-P-CCNC: 13 U/L (ref 12–78)
ANION GAP SERPL CALCULATED.3IONS-SCNC: 8 MMOL/L (ref 4–13)
AST SERPL W P-5'-P-CCNC: 13 U/L (ref 5–45)
BACTERIA UR CULT: ABNORMAL
BACTERIA UR CULT: ABNORMAL
BASOPHILS # BLD AUTO: 0.01 THOUSANDS/ΜL (ref 0–0.1)
BASOPHILS NFR BLD AUTO: 0 % (ref 0–1)
BILIRUB SERPL-MCNC: 0.4 MG/DL (ref 0.2–1)
BUN SERPL-MCNC: 15 MG/DL (ref 5–25)
CALCIUM ALBUM COR SERPL-MCNC: 9.3 MG/DL (ref 8.3–10.1)
CALCIUM SERPL-MCNC: 7.8 MG/DL (ref 8.3–10.1)
CHLORIDE SERPL-SCNC: 102 MMOL/L (ref 96–108)
CO2 SERPL-SCNC: 26 MMOL/L (ref 21–32)
CREAT SERPL-MCNC: 1.2 MG/DL (ref 0.6–1.3)
EOSINOPHIL # BLD AUTO: 0.1 THOUSAND/ΜL (ref 0–0.61)
EOSINOPHIL NFR BLD AUTO: 2 % (ref 0–6)
ERYTHROCYTE [DISTWIDTH] IN BLOOD BY AUTOMATED COUNT: 17.1 % (ref 11.6–15.1)
GFR SERPL CREATININE-BSD FRML MDRD: 53 ML/MIN/1.73SQ M
GLUCOSE SERPL-MCNC: 125 MG/DL (ref 65–140)
GLUCOSE SERPL-MCNC: 140 MG/DL (ref 65–140)
GLUCOSE SERPL-MCNC: 178 MG/DL (ref 65–140)
GLUCOSE SERPL-MCNC: 181 MG/DL (ref 65–140)
GLUCOSE SERPL-MCNC: 190 MG/DL (ref 65–140)
GLUCOSE SERPL-MCNC: 240 MG/DL (ref 65–140)
HCT VFR BLD AUTO: 24 % (ref 34.8–46.1)
HCT VFR BLD AUTO: 27.8 % (ref 34.8–46.1)
HGB BLD-MCNC: 7.3 G/DL (ref 11.5–15.4)
HGB BLD-MCNC: 8.2 G/DL (ref 11.5–15.4)
IMM GRANULOCYTES # BLD AUTO: 0.06 THOUSAND/UL (ref 0–0.2)
IMM GRANULOCYTES NFR BLD AUTO: 1 % (ref 0–2)
LYMPHOCYTES # BLD AUTO: 0.92 THOUSANDS/ΜL (ref 0.6–4.47)
LYMPHOCYTES NFR BLD AUTO: 15 % (ref 14–44)
MCH RBC QN AUTO: 21.9 PG (ref 26.8–34.3)
MCHC RBC AUTO-ENTMCNC: 30.4 G/DL (ref 31.4–37.4)
MCV RBC AUTO: 72 FL (ref 82–98)
MONOCYTES # BLD AUTO: 0.3 THOUSAND/ΜL (ref 0.17–1.22)
MONOCYTES NFR BLD AUTO: 5 % (ref 4–12)
NEUTROPHILS # BLD AUTO: 4.83 THOUSANDS/ΜL (ref 1.85–7.62)
NEUTS SEG NFR BLD AUTO: 77 % (ref 43–75)
NRBC BLD AUTO-RTO: 0 /100 WBCS
PLATELET # BLD AUTO: 214 THOUSANDS/UL (ref 149–390)
PMV BLD AUTO: 10.5 FL (ref 8.9–12.7)
POTASSIUM SERPL-SCNC: 3.6 MMOL/L (ref 3.5–5.3)
PROT SERPL-MCNC: 6.4 G/DL (ref 6.4–8.4)
RBC # BLD AUTO: 3.33 MILLION/UL (ref 3.81–5.12)
SODIUM SERPL-SCNC: 136 MMOL/L (ref 135–147)
WBC # BLD AUTO: 6.22 THOUSAND/UL (ref 4.31–10.16)

## 2022-08-06 PROCEDURE — 87040 BLOOD CULTURE FOR BACTERIA: CPT | Performed by: FAMILY MEDICINE

## 2022-08-06 PROCEDURE — 82948 REAGENT STRIP/BLOOD GLUCOSE: CPT

## 2022-08-06 PROCEDURE — 85014 HEMATOCRIT: CPT | Performed by: FAMILY MEDICINE

## 2022-08-06 PROCEDURE — 99252 IP/OBS CONSLTJ NEW/EST SF 35: CPT

## 2022-08-06 PROCEDURE — 99233 SBSQ HOSP IP/OBS HIGH 50: CPT | Performed by: FAMILY MEDICINE

## 2022-08-06 PROCEDURE — 85025 COMPLETE CBC W/AUTO DIFF WBC: CPT | Performed by: FAMILY MEDICINE

## 2022-08-06 PROCEDURE — 99253 IP/OBS CNSLTJ NEW/EST LOW 45: CPT

## 2022-08-06 PROCEDURE — 85018 HEMOGLOBIN: CPT | Performed by: FAMILY MEDICINE

## 2022-08-06 PROCEDURE — 80053 COMPREHEN METABOLIC PANEL: CPT | Performed by: FAMILY MEDICINE

## 2022-08-06 PROCEDURE — 76705 ECHO EXAM OF ABDOMEN: CPT

## 2022-08-06 RX ADMIN — HEPARIN SODIUM 5000 UNITS: 5000 INJECTION INTRAVENOUS; SUBCUTANEOUS at 09:10

## 2022-08-06 RX ADMIN — Medication 250 MG: at 09:10

## 2022-08-06 RX ADMIN — CEFTRIAXONE 2000 MG: 2 INJECTION, SOLUTION INTRAVENOUS at 17:00

## 2022-08-06 RX ADMIN — ACETAMINOPHEN 650 MG: 325 TABLET ORAL at 17:15

## 2022-08-06 RX ADMIN — FERROUS SULFATE TAB 325 MG (65 MG ELEMENTAL FE) 325 MG: 325 (65 FE) TAB at 08:30

## 2022-08-06 RX ADMIN — METRONIDAZOLE 500 MG: 500 TABLET ORAL at 16:53

## 2022-08-06 RX ADMIN — GABAPENTIN 300 MG: 300 CAPSULE ORAL at 09:10

## 2022-08-06 RX ADMIN — ONDANSETRON 4 MG: 2 INJECTION INTRAMUSCULAR; INTRAVENOUS at 06:09

## 2022-08-06 RX ADMIN — GABAPENTIN 300 MG: 300 CAPSULE ORAL at 16:53

## 2022-08-06 RX ADMIN — METRONIDAZOLE 500 MG: 500 TABLET ORAL at 09:09

## 2022-08-06 RX ADMIN — Medication 250 MG: at 16:54

## 2022-08-06 RX ADMIN — METRONIDAZOLE 500 MG: 500 TABLET ORAL at 01:00

## 2022-08-06 NOTE — PLAN OF CARE
Problem: Potential for Falls  Goal: Patient will remain free of falls  Description: INTERVENTIONS:  - Educate patient/family on patient safety including physical limitations  - Instruct patient to call for assistance with activity   - Consult OT/PT to assist with strengthening/mobility   - Keep Call bell within reach  - Keep bed low and locked with side rails adjusted as appropriate  - Keep care items and personal belongings within reach  - Initiate and maintain comfort rounds  - Make Fall Risk Sign visible to staff  - Apply yellow socks and bracelet for high fall risk patients  - Consider moving patient to room near nurses station  Outcome: Progressing     Problem: PAIN - ADULT  Goal: Verbalizes/displays adequate comfort level or baseline comfort level  Description: Interventions:  - Encourage patient to monitor pain and request assistance  - Assess pain using appropriate pain scale  - Administer analgesics based on type and severity of pain and evaluate response  - Implement non-pharmacological measures as appropriate and evaluate response  - Consider cultural and social influences on pain and pain management  - Notify physician/advanced practitioner if interventions unsuccessful or patient reports new pain  Outcome: Progressing     Problem: INFECTION - ADULT  Goal: Absence or prevention of progression during hospitalization  Description: INTERVENTIONS:  - Assess and monitor for signs and symptoms of infection  - Monitor lab/diagnostic results  - Monitor all insertion sites, i e  indwelling lines, tubes, and drains  - Monitor endotracheal if appropriate and nasal secretions for changes in amount and color  - [place] appropriate cooling/warming therapies per order  - Administer medications as ordered  - Instruct and encourage patient and family to use good hand hygiene technique  - Identify and instruct in appropriate isolation precautions for identified infection/condition  Outcome: Progressing  Goal: Absence of fever/infection during neutropenic period  Description: INTERVENTIONS:  - Monitor WBC    Outcome: Progressing     Problem: SAFETY ADULT  Goal: Patient will remain free of falls  Description: INTERVENTIONS:  - Educate patient/family on patient safety including physical limitations  - Instruct patient to call for assistance with activity   - Consult OT/PT to assist with strengthening/mobility   - Keep Call bell within reach  - Keep bed low and locked with side rails adjusted as appropriate  - Keep care items and personal belongings within reach  - Initiate and maintain comfort rounds  - Make Fall Risk Sign visible to staff  - Apply yellow socks and bracelet for high fall risk patients  - Consider moving patient to room near nurses station  Outcome: Progressing  Goal: Maintain or return to baseline ADL function  Description: INTERVENTIONS:  -  Assess patient's ability to carry out ADLs; assess patient's baseline for ADL function and identify physical deficits which impact ability to perform ADLs (bathing, care of mouth/teeth, toileting, grooming, dressing, etc )  - Assess/evaluate cause of self-care deficits   - Assess range of motion  - Assess patient's mobility; develop plan if impaired  - Assess patient's need for assistive devices and provide as appropriate  - Encourage maximum independence but intervene and supervise when necessary  - Involve family in performance of ADLs  - Assess for home care needs following discharge   - Consider OT consult to assist with ADL evaluation and planning for discharge  - Provide patient education as appropriate  Outcome: Progressing  Goal: Maintains/Returns to pre admission functional level  Description: INTERVENTIONS:  - Perform BMAT or MOVE assessment daily    - Set and communicate daily mobility goal to care team and patient/family/caregiver     - Collaborate with rehabilitation services on mobility goals if consulted  - Out of bed for toileting  - Record patient progress and toleration of activity level   Outcome: Progressing     Problem: DISCHARGE PLANNING  Goal: Discharge to home or other facility with appropriate resources  Description: INTERVENTIONS:  - Identify barriers to discharge w/patient and caregiver  - Arrange for needed discharge resources and transportation as appropriate  - Identify discharge learning needs (meds, wound care, etc )  - Arrange for interpretive services to assist at discharge as needed  - Refer to Case Management Department for coordinating discharge planning if the patient needs post-hospital services based on physician/advanced practitioner order or complex needs related to functional status, cognitive ability, or social support system  Outcome: Progressing     Problem: Knowledge Deficit  Goal: Patient/family/caregiver demonstrates understanding of disease process, treatment plan, medications, and discharge instructions  Description: Complete learning assessment and assess knowledge base    Interventions:  - Provide teaching at level of understanding  - Provide teaching via preferred learning methods  Outcome: Progressing     Problem: METABOLIC, FLUID AND ELECTROLYTES - ADULT  Goal: Electrolytes maintained within normal limits  Description: INTERVENTIONS:  - Monitor labs and assess patient for signs and symptoms of electrolyte imbalances  - Administer electrolyte replacement as ordered  - Monitor response to electrolyte replacements, including repeat lab results as appropriate  - Instruct patient on fluid and nutrition as appropriate  Outcome: Progressing  Goal: Fluid balance maintained  Description: INTERVENTIONS:  - Monitor labs   - Monitor I/O and WT  - Instruct patient on fluid and nutrition as appropriate  - Assess for signs & symptoms of volume excess or deficit  Outcome: Progressing     Problem: SKIN/TISSUE INTEGRITY - ADULT  Goal: Skin Integrity remains intact(Skin Breakdown Prevention)  Description: Assess:  -Perform Corbin assessment every -Clean and moisturize skin every    -Inspect skin when repositioning, toileting, and assisting with ADLS  -Assess under medical devices such as   every    -Assess extremities for adequate circulation and sensation     Bed Management:  -Have minimal linens on bed & keep smooth, unwrinkled  -Change linens as needed when moist or perspiring  -Avoid sitting or lying in one position for more than   hours while in bed  -Keep HOB at  degrees     Toileting:  -Offer bedside commode  -Assess for incontinence every    -Use incontinent care products after each incontinent episode such as      Activity:  -Mobilize patient   times a day  -Encourage activity and walks on unit  -Encourage or provide ROM exercises   -Turn and reposition patient every   Hours  -Use appropriate equipment to lift or move patient in bed  -Instruct/ Assist with weight shifting every   when out of bed in chair  -Consider limitation of chair time   hour intervals    Skin Care:  -Avoid use of baby powder, tape, friction and shearing, hot water or constrictive clothing  -Relieve pressure over bony prominences using     -Do not massage red bony areas    Next Steps:  -Teach patient strategies to minimize risks such as     -Consider consults to  interdisciplinary teams such as    Outcome: Progressing  Goal: Incision(s), wounds(s) or drain site(s) healing without S/S of infection  Description: INTERVENTIONS  - Assess and document dressing, incision, wound bed, drain sites and surrounding tissue  - Provide patient and family education  - Perform skin care/dressing changes every    Outcome: Progressing  Goal: Pressure injury heals and does not worsen  Description: Interventions:  - Implement low air loss mattress or specialty surface (Criteria met)  - Apply silicone foam dressing  - Instruct/assist with weight shifting every   minutes when in chair   - Limit chair time to    hour intervals  - Use special pressure reducing interventions such as   when in chair - Apply fecal or urinary incontinence containment device   - Perform passive or active ROM every    - Turn and reposition patient & offload bony prominences every   hours   - Utilize friction reducing device or surface for transfers   - Consider consults to  interdisciplinary teams such as    - Use incontinent care products after each incontinent episode such as    - Consider nutrition services referral as needed  Outcome: Progressing     Problem: HEMATOLOGIC - ADULT  Goal: Maintains hematologic stability  Description: INTERVENTIONS  - Assess for signs and symptoms of bleeding or hemorrhage  - Monitor labs  - Administer supportive blood products/factors as ordered and appropriate  Outcome: Progressing     Problem: MOBILITY - ADULT  Goal: Maintain or return to baseline ADL function  Description: INTERVENTIONS:  -  Assess patient's ability to carry out ADLs; assess patient's baseline for ADL function and identify physical deficits which impact ability to perform ADLs (bathing, care of mouth/teeth, toileting, grooming, dressing, etc )  - Assess/evaluate cause of self-care deficits   - Assess range of motion  - Assess patient's mobility; develop plan if impaired  - Assess patient's need for assistive devices and provide as appropriate  - Encourage maximum independence but intervene and supervise when necessary  - Involve family in performance of ADLs  - Assess for home care needs following discharge   - Consider OT consult to assist with ADL evaluation and planning for discharge  - Provide patient education as appropriate  Outcome: Progressing  Goal: Maintains/Returns to pre admission functional level  Description: INTERVENTIONS:  - Perform BMAT or MOVE assessment daily    - Set and communicate daily mobility goal to care team and patient/family/caregiver     - Collaborate with rehabilitation services on mobility goals if consulted   - Out of bed for toileting  - Record patient progress and toleration of activity level Outcome: Progressing     Problem: Nutrition/Hydration-ADULT  Goal: Nutrient/Hydration intake appropriate for improving, restoring or maintaining nutritional needs  Description: Monitor and assess patient's nutrition/hydration status for malnutrition  Collaborate with interdisciplinary team and initiate plan and interventions as ordered  Monitor patient's weight and dietary intake as ordered or per policy  Utilize nutrition screening tool and intervene as necessary  Determine patient's food preferences and provide high-protein, high-caloric foods as appropriate       INTERVENTIONS:  - Monitor oral intake, urinary output, labs, and treatment plans  - Assess nutrition and hydration status and recommend course of action  - Evaluate amount of meals eaten  - Assist patient with eating if necessary   - Allow adequate time for meals  - Recommend/ encourage appropriate diets, oral nutritional supplements, and vitamin/mineral supplements  - Order, calculate, and assess calorie counts as needed  - Recommend, monitor, and adjust tube feedings and TPN/PPN based on assessed needs  - Assess need for intravenous fluids  - Provide specific nutrition/hydration education as appropriate  - Include patient/family/caregiver in decisions related to nutrition  Outcome: Progressing

## 2022-08-06 NOTE — ASSESSMENT & PLAN NOTE
Unknown etiology-suspect renal in origin as well as diabetic foot  Blood culture is growing bacteremia, multi organism-id consult appreciated  Patient met criteria with tachycardia, tachypnea, elevated WBC and elevated lactic acid  Continue IV antibiotic  Blood culture shows growth of polymicrobial agents,  CT scan abdomen pelvis-bilateral hydroureteronephrosis,  MRI of the left foot shows osteomyelitis  Id consult appreciated-continue IV antibiotic, follow 2D echo for vegetation, follow repeat blood culture from 08/06/2022   Podiatry recommendation appreciated, recommends to consult with General surgery versus transfer the patient to tertiary center for BKA  Discussed with surgical team at [address] [place] [address], who does not do BKA over here  Attempted to transfer the patient to [facility] the case with on-call general surgery, who recommends, if patient hemodynamically stable and not urgent, kidney schedule outpatient BKA  Discussed with on-call ID again regarding general surgery recommendations, recommends to monitor patient at this campus at this time, and re-evaluate the patient in a m , if patient is spiking fever then patient will need to transfer to tertiary center for BKA for source control

## 2022-08-06 NOTE — CASE MANAGEMENT
Case Management Assessment & Discharge Planning Note    Patient name Cheryl Crespo  Location /-29 MRN [id number]  : 1975 Date 2022       Current Admission Date: 2022  Current Admission Diagnosis:Sepsis [facility])   Patient Active Problem List    Diagnosis Date Noted    Hydroureteronephrosis 2022    Osteomyelitis of left foot ([facility] ) 2022    Bacteremia 2022    [facility]    Gastroenteritis 2022    Electrolyte imbalance 2022    Cellulitis of left foot 2021    Diabetic ulcer of left midfoot associated with diabetes mellitus due to underlying condition, with fat layer exposed ([facility] ) 2021    S/P amputation 2020    Acute blood loss as cause of postoperative anemia 2020    Morbid obesity ([facility] ) 10/28/2020    Iron deficiency anemia 10/28/2020    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 due to underlying condition, limited to breakdown of skin ([facility] ) 10/27/2020    Diabetic ketoacidosis without coma associated with type 1 diabetes mellitus ([facility] ) 2020    Non-healing wound of left foot 2020    [facility] kidney [facility] 2020    Open wound of left foot 2019    Controlled type 2 diabetes mellitus with neurologic complication, with long-term current use of insulin ([facility] ) 2019      LOS (days): 2  Geometric Mean LOS (GMLOS) (days): 3 50  Days to GMLOS:1 7     OBJECTIVE:    Risk of Unplanned Readmission Score: 13 94         Current admission status: Inpatient       Preferred Pharmacy:   [address] [address]  Phone: [phone] Fax: [phone]    Primary Care Provider: Roxanne Becker MD    Primary Insurance:   Secondary Insurance:     ASSESSMENT:  [address] Representative - Spouse   Primary Phone: [phone] (Mobile) Advance Directives  Does patient have a [address] [place] [address] Avenue?: No  Does patient currently have a Health Care decision maker?: Yes, please see Health Care Proxy section (, Beatriz Orestes)  Does patient have Advance Directives?: No  Primary Contact: kemal               Patient Information  Admitted from[de-identified] Home  Mental Status: Alert  During Assessment patient was accompanied by: Not accompanied during assessment  Assessment information provided by[de-identified] Patient  Primary Caregiver: Self  Support Systems: Spouse/significant other, Family members, [address] [place], [place] [address]y 18 entry access options   Select all that apply : Ramp  Type of Current Residence: 2 story home  Upon entering residence, is there a bedroom on the main floor (no further steps)?: No  A bedroom is located on the following floor levels of residence (select all that apply):: 2nd Floor  Upon entering residence, is there a bathroom on the main floor (no further steps)?: Yes  Number of steps to 2nd floor from main floor: 6  In the last 12 months, was there a time when you were not able to pay the mortgage or rent on time?: No  In the last 12 months, how many places have you lived?: 1  In the last 12 months, was there a time when you did not have a steady place to sleep or slept in a shelter (including now)?: No  Living Arrangements: Lives w/ Spouse/significant other    Activities of Daily Living Prior to Admission  Functional Status: Independent  Completes ADLs independently?: Yes  Ambulates independently?: Yes  Does patient use assisted devices?: Yes  Assisted Devices (DME) used: [employer]  Does patient currently own DME?: Yes  What DME does the patient currently own?: [employer] Mach[employer] (with knee attatchment)  Does patient have a history of Outpatient Therapy (PT/OT)?: No  Does the patient have a history of Short-Term Rehab?: No  Does patient have a history of HHC?: Yes (advantage)  [facility]: No         Patient Information Continued  Income Source: Employed  Does patient have prescription coverage?: Yes  Within the past 12 months, you worried that your food would run out before you got the money to buy more : Never true  Within the past 12 months, the food you bought just didn't last and you didn't have money to get more : Never true  Food insecurity resource given?: No  Does patient receive dialysis treatments?: No  Does patient have a history of substance abuse?: No  Does patient have a history of Mental Health Diagnosis?: No         Means of Transportation  Means of Transport to Appts[de-identified] Drives Self  In the past 12 months, has lack of transportation kept you from medical appointments or from getting medications?: No  In the past 12 months, has lack of transportation kept you from meetings, work, or from getting things needed for daily living?: No        DISCHARGE DETAILS:    Discharge planning discussed with[de-identified] patient  Freedom of Choice: Yes     CM contacted family/caregiver?: No- see comments (declined)             Contacts  Patient Contacts: Germán Almanzar,   Relationship to Patient[de-identified] Family            Pt sts she is still employee full time, as she has to carry the health insurance for her family      Pt is agreeable to transfer to [facility],   Pt is tearful during assessment, sts her daughter is getting  8/19/22 and she "will be there!"

## 2022-08-06 NOTE — PROGRESS NOTES
[address]  Progress Note - Marguerite Ugalde 1975, 51 y o  female MRN: [id number]  Unit/Bed#: -[facility] Encounter: [id number]  Primary Care Provider: Rodrick Rivera MD   Date and time admitted to hospital: 8/4/2022 12:23 PM    Bacteremia  Assessment & Plan  Both sets of blood culture is growing polymicrobial  Id consult appreciated, antibiotic adjusted  Follow repeat blood culture which will be drawn on 08/06/2022   Follow echocardiogram to rule out vegetation    Osteomyelitis of left foot ([address] )  Assessment & Plan  MRI shows:Status post Chopart amputation with prominent plantar skin ulceration and T1 marrow replacement signal at the distal aspect of the calcaneus with corresponding T2 signal changes as above and concerning for osteomyelitis    Arterial duplex shows peripheral arterial disease which remained stable  Id consult appreciated  Podiatry consult appreciated, recommends to consult with General surgery versus vascular surgery for possible BKA  Attempted to transfer the patient to [facility] the case with on-call general surgery, who recommends, if patient hemodynamically stable and not urgent, kidney schedule outpatient BKA  Discussed with on-call ID again regarding general surgery recommendations, recommends to monitor patient at this campus at this time, and re-evaluate the patient in a m , if patient is spiking fever then patient will need to transfer to tertiary center for BKA for source control      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 due to underlying condition, limited to breakdown of skin [facility])  Assessment & Plan  Lab Results   Component Value Date    HGBA1C 7 1 (H) 08/04/2022           Recent Labs     08/05/22  2106 08/06/22  0100 08/06/22  0837 08/06/22  1058   POCGLU 270* 178* 140 181*       Blood Sugar Average: Last 72 hrs:  (P) 206 [id number]   Patient is on insulin pump, continue patient's home insulin pump patient feel of the form  MRI of the foot shows Charcot   Arterial duplex shows PAD, remained stable compared to 2 years ago  Podiatry recommendation appreciated-needs outpatient Charcot out reconstruction    * Sepsis [facility])  Assessment & Plan  Unknown etiology-suspect renal in origin as well as diabetic foot  Blood culture is growing bacteremia, multi organism-id consult appreciated  Patient met criteria with tachycardia, tachypnea, elevated WBC and elevated lactic acid  Continue IV antibiotic  Blood culture shows growth of polymicrobial agents,  CT scan abdomen pelvis-bilateral hydroureteronephrosis,  MRI of the left foot shows osteomyelitis  Id consult appreciated-continue IV antibiotic, follow 2D echo for vegetation, follow repeat blood culture from 08/06/2022   Podiatry recommendation appreciated, recommends to consult with General surgery versus transfer the patient to tertiary center for BKA  Discussed with surgical team at [address] [place] [address], who does not do BKA over here  Attempted to transfer the patient to [facility] the case with on-call general surgery, who recommends, if patient hemodynamically stable and not urgent, kidney schedule outpatient BKA  Discussed with on-call ID again regarding general surgery recommendations, recommends to monitor patient at this campus at this time, and re-evaluate the patient in a m , if patient is spiking fever then patient will need to transfer to tertiary center for BKA for source control  Electrolyte imbalance-resolved as of 8/6/2022  Assessment & Plan  Resolved    Gallbladder sludge  Assessment & Plan  As per ultrasound  Continue to monitor    Hydroureteronephrosis  Assessment & Plan  Bilateral as per imaging  Urology consult appreciated, recommends to place Cage, and reassess with ultrasound in 48-72 hours to see any improvement  Initial plan was to place Cage, which patient was refusing    Ultrasound of right upper quadrant shows improvement and hydronephrosis on right side of the kidney  Diabetic ulcer of left midfoot associated with diabetes mellitus due to underlying condition, with fat layer exposed [facility])  Assessment & Plan  Lab Results   Component Value Date    HGBA1C 7 1 (H) 08/04/2022           Recent Labs     08/05/22  2106 08/06/22  0100 08/06/22  0837 08/06/22  1058   POCGLU 270* 178* 140 181*       Blood Sugar Average: Last 72 hrs:  (P) 206 [id number]   Continue patient's insulin pump (Dexcom G6-patient use insulin as per up to 150 units subQ daily via insulin pump as well as Levemir 58 units subQ at nighttime)-as per her own endocrinologist last note, note left in patient chart  follow hypoglycemia precaution  Continue IV antibiotic, follow wound care, podiatry recommendation  MRI of the foot shows early signs of osteomyelitis, podiatry consult appreciated, patient will need left BKA  Patient also has PAD recommends to consult with General surgery versus transfer the patient-will try to transfer the patient to tertiary center for multispecialty evaluation from Podiatry, vascular    Patient had history CHOPART AMPUTATION LEFT  FOOT: (Left)-Achilles tenotomy left foot (Left- on 10/30/20)  Attempted to transfer the patient to [facility] the case with on-call general surgery, who recommends, if patient hemodynamically stable and not urgent, kidney schedule outpatient BKA  Discussed with on-call ID again regarding general surgery recommendations, recommends to monitor patient at this campus at this time, and re-evaluate the patient in a m , if patient is spiking fever then patient will need to transfer to tertiary center for BKA for source control  Cellulitis of left foot  Assessment & Plan  Antibiotic adjusted since blood culture positive    ID recommendation appreciated  MRI of the foot shows suspected osteomyelitis, podiatry recommendation appreciated, recommends to discuss with General surgery versus vascular surgery for possible BKA  Arterial duplex shows peripheral arterial disease, which remained stable    Attempted to transfer the patient to [facility] the case with on-call general surgery, who recommends, if patient hemodynamically stable and not urgent, kidney schedule outpatient BKA  Discussed with on-call ID again regarding general surgery recommendations, recommends to monitor patient at this campus at this time, and re-evaluate the patient in a m , if patient is spiking fever then patient will need to transfer to tertiary center for BKA for source control  Iron deficiency anemia  Assessment & Plan  Hemoglobin varies from 7 6-11  Hemoglobin dropped to 7 3, recheck, if less than 7, transfuse  Patient previously received blood transfusion  Patient and iron panel done in 2020,   Continue iron supplement  Lab Results   Component Value Date    IRON 29 (L) 08/04/2022    TIBC 324 08/04/2022    FERRITIN 40 08/04/2022     Lab Results   Component Value Date    [id number] 409 08/04/2022     Lab Results   Component Value Date    FOLATE 12 3 08/04/2022             VTE Pharmacologic Prophylaxis: VTE Score: 6 Patient was on heparin, remain on hold due to anemia    Patient Centered Rounds: I performed bedside rounds with nursing staff today  Discussions with Specialists or Other Care Team Provider:  ID, general surgery at [facility] and Discussions with Family / Patient: Updated  () at bedside  Time Spent for Care: 15 minutes  More than 50% of total time spent on counseling and coordination of care as described above      Current Length of Stay: 2 day(s)  Current Patient Status: Inpatient   Certification Statement: The patient will continue to require additional inpatient hospital stay due to To monitor above condition  Discharge Plan: Anticipate discharge in 48-72 hrs to To be determined    Code Status: Level 1 - Full Code    Subjective:   Seen and evaluated during the round  Resting comfortably  Denies any significant complaint  Objective:     Vitals:   Temp (24hrs), Av 1 °F (37 3 °C), Min:98 °F (36 7 °C), Max:100 2 °F (37 9 °C)    Temp:  [98 °F (36 7 °C)-100 2 °F (37 9 °C)] 100 2 °F (37 9 °C)  HR:  [90-95] 95  Resp:  [16-19] 19  BP: (126-131)/(68-71) 131/71  SpO2:  [92 %-95 %] 94 %  Body mass index is 43 44 kg/m²  Input and Output Summary (last 24 hours): Intake/Output Summary (Last 24 hours) at 2022 1510  Last data filed at 2022 0500  Gross per 24 hour   Intake 1300 ml   Output --   Net 1300 ml       Physical Exam:   Physical Exam  Vitals and nursing note reviewed  Exam conducted with a chaperone present  Constitutional:       Appearance: Normal appearance  She is obese  She is not ill-appearing or diaphoretic  HENT:      Head: Normocephalic and atraumatic  Nose: Nose normal  No congestion  Mouth/Throat:      Mouth: Mucous membranes are moist       Pharynx: Oropharynx is clear  No oropharyngeal exudate or posterior oropharyngeal erythema  Eyes:      General: No scleral icterus  Conjunctiva/sclera: Conjunctivae normal       Pupils: Pupils are equal, round, and reactive to light  Neck:      Vascular: No carotid bruit  Cardiovascular:      Rate and Rhythm: Normal rate  Heart sounds: Normal heart sounds  No murmur heard  No friction rub  No gallop  Pulmonary:      Effort: Pulmonary effort is normal  No respiratory distress  Breath sounds: No stridor  No wheezing or rhonchi  Abdominal:      General: Abdomen is flat  Bowel sounds are normal  There is no distension  Palpations: There is no mass  Tenderness: There is no abdominal tenderness  Hernia: No hernia is present  Musculoskeletal:         General: Deformity (Five left foot amputation) present  No swelling, tenderness or signs of injury  Cervical back: Normal range of motion  No rigidity or tenderness        Comments: Foot ulcer as documented in imaging section   Lymphadenopathy:      Cervical: No cervical adenopathy  Skin:     General: Skin is warm  Coloration: Skin is not pale  Findings: Erythema ( left foot) present  No bruising  Neurological:      General: No focal deficit present  Mental Status: She is alert and oriented to person, place, and time  Cranial Nerves: No cranial nerve deficit  Sensory: No sensory deficit  Motor: No weakness        Coordination: Coordination normal    Psychiatric:         Mood and Affect: Mood normal          Additional Data:     Labs:  Results from last 7 days   Lab Units 08/06/22  0607 08/04/22  1242   WBC Thousand/uL 6 22 12 83*   HEMOGLOBIN g/dL 7 3* 8 9*   HEMATOCRIT % 24 0* 30 4*   PLATELETS Thousands/uL 214 296   BANDS PCT %  --  6   NEUTROS PCT % 77*  --    LYMPHS PCT % 15  --    LYMPHO PCT %  --  5*   MONOS PCT % 5  --    MONO PCT %  --  0*   EOS PCT % 2 1     Results from last 7 days   Lab Units 08/06/22  0607   SODIUM mmol/L 136   POTASSIUM mmol/L 3 6   CHLORIDE mmol/L 102   CO2 mmol/L 26   BUN mg/dL 15   CREATININE mg/dL 1 20   ANION GAP mmol/L 8   CALCIUM mg/dL 7 8*   ALBUMIN g/dL 2 1*   TOTAL BILIRUBIN mg/dL 0 40   ALK PHOS U/L 106   ALT U/L 13   AST U/L 13   GLUCOSE RANDOM mg/dL 125     Results from last 7 days   Lab Units 08/04/22  1242   INR  1 07     Results from last 7 days   Lab Units 08/06/22  1058 08/06/22  0837 08/06/22  0100 08/05/22  2106 08/05/22  1618 08/05/22  1228 08/05/22  0750 08/04/22  2157 08/04/22  1242   POC GLUCOSE mg/dl 181* 140 178* 270* 255* 254* 237* 120 221*     Results from last 7 days   Lab Units 08/04/22  2107   HEMOGLOBIN A1C % 7 1*     Results from last 7 days   Lab Units 08/05/22  0535 08/04/22  1242   LACTIC ACID mmol/L  --  1 9   PROCALCITONIN ng/ml 56 43* 0 55*       Lines/Drains:  Invasive Devices  Report    Peripheral Intravenous Line  Duration           Peripheral IV 08/04/22 Right Antecubital 2 days Peripheral IV 08/04/22 Right Hand 1 day          Line  Duration           Pump Device Insulin pump Right Abdomen 2 days                      Imaging: Reviewed radiology reports from this admission including: ultrasound(s)    Recent Cultures (last 7 days):   Results from last 7 days   Lab Units 08/04/22  1839 08/04/22  1245 08/04/22  1242   BLOOD CULTURE   --   --  Proteus mirabilis*  Streptococcus agalactiae (Group B)*  Proteus mirabilis*  Streptococcus agalactiae (Group B)*   GRAM STAIN RESULT  Rare Disintegrating polys*  2+ Gram positive cocci in pairs and chains*  1+ Gram negative rods*  --  Gram positive cocci in pairs and chains*  Gram negative rods*  Gram positive cocci in pairs and chains*  Gram negative rods*   URINE CULTURE   --  60,000-69,000 cfu/ml Escherichia coli*  10,000-19,000 cfu/ml   --    WOUND CULTURE  1+ Growth of Proteus mirabilis*  2+ Growth of Staphylococcus aureus*  --   --        Last 24 Hours Medication List:   Current Facility-Administered Medications   Medication Dose Route Frequency Provider Last Rate    acetaminophen  650 mg Oral Q6H PRN Mustapha Whitley MD      cefTRIAXone  2,000 mg Intravenous Q24H Mustapha Whitley MD 2,000 mg (08/05/22 1606)    ferrous sulfate  325 mg Oral Daily With Breakfast Brenda Whitley MD      gabapentin  300 mg Oral TID Jen Andres MD      heparin (porcine)  5,000 Units Subcutaneous Q8H [address] KIRA Pino      insulin detemir  58 Units Subcutaneous HS Brenda Whitley MD      insulin lispro  150 Units Subcutaneous Once PRN Jen Andres MD      metroNIDAZOLE  500 mg Oral Q8H [address] Brenda Whitley MD      ondansetron  4 mg Intravenous Q4H PRN Jen Andres MD      perflutren lipid microsphere  2 mL/min Intravenous Once in imaging Ana Lozano DO      saccharomyces boulardii  250 mg Oral BID Jen Andres MD      sodium chloride (PF)  3 mL Intravenous Q1H PRN Jen Andres MD          Today, Patient Was Seen By: Bijan Garibay MD    **Please Note: This note may have been constructed using a voice recognition system  **

## 2022-08-06 NOTE — NURSING NOTE
Patient became nausea during blood work this AM  She wished to defer 2nd set of blood cultures until feeling better  Patient was treated with [facility]  Day nurse made aware that 2nd set needs to be collected

## 2022-08-06 NOTE — TRANSPORTATION MEDICAL NECESSITY
Section I - General Information    Name of Patient: Jing Alcantar                 : 1975    Medicare #:   Transport Date: 22 (PCS is valid for round trips on this date and for all repetitive trips in the 60-day range as noted below )  [facility]                                                         Destination: [facility]  Is the pt's stay covered under Medicare Part A (PPS/DRG)   []     Closest appropriate facility? If no, why is transport to more distant facility required? Yes  If hospice pt, is this transport related to pt's terminal illness? NA       Section II - Medical Necessity Questionnaire  Ambulance transportation is medically necessary only if other means of transport are contraindicated or would be potentially harmful to the patient  To meet this requirement, the patient must either be "bed confined" or suffer from a condition such that transport by means other than ambulance is contraindicated by the patient's condition  The following questions must be answered by the medical professional signing below for this form to be valid:    1)  Describe the MEDICAL CONDITION (physical and/or mental) of this patient AT [address] [place] [address] that requires the patient to be transported in an ambulance and why transport by other means is contraindicated by the patient's condition:pt being transferred to a higher level of care    2) Is the patient "bed confined" as defined below? No  To be "be confined" the patient must satisfy all three of the following conditions: (1) unable to get up from bed without Assistance; AND (2) unable to ambulate; AND (3) unable to sit in a chair or wheelchair  3) Can this patient safely be transported by car or wheelchair van (i e , seated during transport without a medical attendant or monitoring)?    No    4) In addition to completing questions 1-3 above, please check any of the following conditions that apply*:   *Note: supporting documentation for any boxes checked must be maintained in the patient's medical records  If hosp-hosp transfer, describe services needed at 2nd facility not available at 1st facility? Moderate/severe pain on movement   Other(specify) transfered to a higher level of care      Section III - Signature of Physician or Healthcare Professional  I certify that the above information is true and correct based on my evaluation of this patient, and represent that the patient requires transport by ambulance and that other forms of transport are contraindicated  I understand that this information will be used by the Centers for Medicare and Medicaid Services (CMS) to support the determination of medical necessity for ambulance services, and I represent that I have personal knowledge of the patient's condition at time of transport  []  If this box is checked, I also certify that the patient is physically or mentally incapable of signing the ambulance service's claim and that the institution with which I am affiliated has furnished care, services, or assistance to the patient  My signature below is made on behalf of the patient pursuant to 42 CFR §424 36(b)(4)  In accordance with 42 CFR §424 37, the specific reason(s) that the patient is physically or mentally incapable of signing the claim form is as follows: rod rhodes  Signature of Physician* or Healthcare Professional______________________________________________________________  Signature Date 08/06/22 (For scheduled repetitive transports, this form is not valid for transports performed more than 60 days after this date)    Printed Name & Credentials of Physician or Healthcare Professional (MD, DO, RN, etc )________________________________  *Form must be signed by patient's attending physician for scheduled, repetitive transports   For non-repetitive, unscheduled ambulance transports, if unable to obtain the signature of the attending physician, any of the following may sign (choose appropriate option below)  [] Physician Assistant []  Clinical Nurse Specialist []  Registered Nurse  []  Nurse Practitioner  [] Discharge Planner

## 2022-08-06 NOTE — ASSESSMENT & PLAN NOTE
Antibiotic adjusted since blood culture positive  ID recommendation appreciated  MRI of the foot shows suspected osteomyelitis, podiatry recommendation appreciated, recommends to discuss with General surgery versus vascular surgery for possible BKA  Arterial duplex shows peripheral arterial disease, which remained stable    Attempted to transfer the patient to [facility] the case with on-call general surgery, who recommends, if patient hemodynamically stable and not urgent, kidney schedule outpatient BKA  Discussed with on-call ID again regarding general surgery recommendations, recommends to monitor patient at this campus at this time, and re-evaluate the patient in a m , if patient is spiking fever then patient will need to transfer to tertiary center for BKA for source control

## 2022-08-06 NOTE — ASSESSMENT & PLAN NOTE
Lab Results   Component Value Date    HGBA1C 7 1 (H) 08/04/2022           Recent Labs     08/05/22  2106 08/06/22  0100 08/06/22  0837 08/06/22  1058   POCGLU 270* 178* 140 181*       Blood Sugar Average: Last 72 hrs:  (P) 206 [id number]   Continue patient's insulin pump (Dexcom G6-patient use insulin as per up to 150 units subQ daily via insulin pump as well as Levemir 58 units subQ at nighttime)-as per her own endocrinologist last note, note left in patient chart  follow hypoglycemia precaution  Continue IV antibiotic, follow wound care, podiatry recommendation  MRI of the foot shows early signs of osteomyelitis, podiatry consult appreciated, patient will need left BKA  Patient also has PAD recommends to consult with General surgery versus transfer the patient-will try to transfer the patient to tertiary center for multispecialty evaluation from Podiatry, vascular    Patient had history CHOPART AMPUTATION LEFT  FOOT: (Left)-Achilles tenotomy left foot (Left- on 10/30/20)  Attempted to transfer the patient to [facility] the case with on-call general surgery, who recommends, if patient hemodynamically stable and not urgent, kidney schedule outpatient BKA  Discussed with on-call ID again regarding general surgery recommendations, recommends to monitor patient at this campus at this time, and re-evaluate the patient in a m , if patient is spiking fever then patient will need to transfer to tertiary center for BKA for source control

## 2022-08-06 NOTE — PLAN OF CARE
Problem: SKIN/TISSUE INTEGRITY - ADULT  Goal: Incision(s), wounds(s) or drain site(s) healing without S/S of infection  Description: INTERVENTIONS  - Assess and document dressing, incision, wound bed, drain sites and surrounding tissue  - Provide patient and family education  - Perform skin care/dressing changes every shift  Outcome: Not Progressing     Problem: SKIN/TISSUE INTEGRITY - ADULT  Goal: Incision(s), wounds(s) or drain site(s) healing without S/S of infection  Description: INTERVENTIONS  - Assess and document dressing, incision, wound bed, drain sites and surrounding tissue  - Provide patient and family education  - Perform skin care/dressing changes every shift  Outcome: Not Progressing

## 2022-08-06 NOTE — ASSESSMENT & PLAN NOTE
Lab Results   Component Value Date    HGBA1C 7 1 (H) 08/04/2022           Recent Labs     08/05/22  2106 08/06/22  0100 08/06/22  0837 08/06/22  1058   POCGLU 270* 178* 140 181*       Blood Sugar Average: Last 72 hrs:  (P) 206 [id number]   Patient is on insulin pump, continue patient's home insulin pump patient feel of the form  MRI of the foot shows Charcot   Arterial duplex shows PAD, remained stable compared to 2 years ago  Podiatry recommendation appreciated-needs outpatient Charcot out reconstruction

## 2022-08-06 NOTE — ASSESSMENT & PLAN NOTE
Bilateral as per imaging  Urology consult appreciated, recommends to place Cage, and reassess with ultrasound in 48-72 hours to see any improvement  Initial plan was to place Cage, which patient was refusing  Ultrasound of right upper quadrant shows improvement and hydronephrosis on right side of the kidney

## 2022-08-06 NOTE — NURSING NOTE
Spoke with patient about order for vasquez catheter placement, patient is currently refusing vasquez and would wish to speak with urology prior to placement  Patient voiding in bathroom and postvoid residuals through the day were low

## 2022-08-06 NOTE — ASSESSMENT & PLAN NOTE
MRI shows:Status post Chopart amputation with prominent plantar skin ulceration and T1 marrow replacement signal at the distal aspect of the calcaneus with corresponding T2 signal changes as above and concerning for osteomyelitis    Arterial duplex shows peripheral arterial disease which remained stable  Id consult appreciated  Podiatry consult appreciated, recommends to consult with General surgery versus vascular surgery for possible BKA  Attempted to transfer the patient to [facility] the case with on-call general surgery, who recommends, if patient hemodynamically stable and not urgent, kidney schedule outpatient BKA  Discussed with on-call ID again regarding general surgery recommendations, recommends to monitor patient at this campus at this time, and re-evaluate the patient in a m , if patient is spiking fever then patient will need to transfer to tertiary center for BKA for source control

## 2022-08-06 NOTE — ASSESSMENT & PLAN NOTE
Hemoglobin varies from 7 6-11  Hemoglobin dropped to 7 3, recheck, if less than 7, transfuse  Patient previously received blood transfusion  Patient and iron panel done in 2020,   Continue iron supplement  Lab Results   Component Value Date    IRON 29 (L) 08/04/2022    TIBC 324 08/04/2022    FERRITIN 40 08/04/2022     Lab Results   Component Value Date    [id number] 409 08/04/2022     Lab Results   Component Value Date    FOLATE 12 3 08/04/2022

## 2022-08-07 LAB
ABO GROUP BLD: NORMAL
ALBUMIN SERPL BCP-MCNC: 2.2 G/DL (ref 3.5–5)
ALP SERPL-CCNC: 165 U/L (ref 46–116)
ALT SERPL W P-5'-P-CCNC: 13 U/L (ref 12–78)
ANION GAP SERPL CALCULATED.3IONS-SCNC: 10 MMOL/L (ref 4–13)
AST SERPL W P-5'-P-CCNC: 16 U/L (ref 5–45)
ATRIAL RATE: 129 BPM
BASOPHILS # BLD AUTO: 0.03 THOUSANDS/ΜL (ref 0–0.1)
BASOPHILS NFR BLD AUTO: 1 % (ref 0–1)
BILIRUB SERPL-MCNC: 0.29 MG/DL (ref 0.2–1)
BLD GP AB SCN SERPL QL: NEGATIVE
BUN SERPL-MCNC: 13 MG/DL (ref 5–25)
CALCIUM ALBUM COR SERPL-MCNC: 9.8 MG/DL (ref 8.3–10.1)
CALCIUM SERPL-MCNC: 8.4 MG/DL (ref 8.3–10.1)
CHLORIDE SERPL-SCNC: 103 MMOL/L (ref 96–108)
CO2 SERPL-SCNC: 25 MMOL/L (ref 21–32)
CREAT SERPL-MCNC: 1.13 MG/DL (ref 0.6–1.3)
EOSINOPHIL # BLD AUTO: 0.14 THOUSAND/ΜL (ref 0–0.61)
EOSINOPHIL NFR BLD AUTO: 2 % (ref 0–6)
ERYTHROCYTE [DISTWIDTH] IN BLOOD BY AUTOMATED COUNT: 17.1 % (ref 11.6–15.1)
FERRITIN SERPL-MCNC: 79 NG/ML (ref 8–388)
GFR SERPL CREATININE-BSD FRML MDRD: 58 ML/MIN/1.73SQ M
GLUCOSE SERPL-MCNC: 110 MG/DL (ref 65–140)
GLUCOSE SERPL-MCNC: 130 MG/DL (ref 65–140)
GLUCOSE SERPL-MCNC: 137 MG/DL (ref 65–140)
GLUCOSE SERPL-MCNC: 147 MG/DL (ref 65–140)
GLUCOSE SERPL-MCNC: 167 MG/DL (ref 65–140)
HCT VFR BLD AUTO: 24.8 % (ref 34.8–46.1)
HGB BLD-MCNC: 7.3 G/DL (ref 11.5–15.4)
IMM GRANULOCYTES # BLD AUTO: 0.12 THOUSAND/UL (ref 0–0.2)
IMM GRANULOCYTES NFR BLD AUTO: 2 % (ref 0–2)
IRON SATN MFR SERPL: 10 % (ref 15–50)
IRON SERPL-MCNC: 23 UG/DL (ref 50–170)
LYMPHOCYTES # BLD AUTO: 1.33 THOUSANDS/ΜL (ref 0.6–4.47)
LYMPHOCYTES NFR BLD AUTO: 22 % (ref 14–44)
MCH RBC QN AUTO: 21.5 PG (ref 26.8–34.3)
MCHC RBC AUTO-ENTMCNC: 29.4 G/DL (ref 31.4–37.4)
MCV RBC AUTO: 73 FL (ref 82–98)
MONOCYTES # BLD AUTO: 0.43 THOUSAND/ΜL (ref 0.17–1.22)
MONOCYTES NFR BLD AUTO: 7 % (ref 4–12)
NEUTROPHILS # BLD AUTO: 3.94 THOUSANDS/ΜL (ref 1.85–7.62)
NEUTS SEG NFR BLD AUTO: 66 % (ref 43–75)
NRBC BLD AUTO-RTO: 0 /100 WBCS
P AXIS: 66 DEGREES
PLATELET # BLD AUTO: 223 THOUSANDS/UL (ref 149–390)
PMV BLD AUTO: 10.1 FL (ref 8.9–12.7)
POTASSIUM SERPL-SCNC: 3.4 MMOL/L (ref 3.5–5.3)
PR INTERVAL: 130 MS
PROT SERPL-MCNC: 7 G/DL (ref 6.4–8.4)
QRS AXIS: 75 DEGREES
QRSD INTERVAL: 82 MS
QT INTERVAL: 314 MS
QTC INTERVAL: 460 MS
RBC # BLD AUTO: 3.39 MILLION/UL (ref 3.81–5.12)
RH BLD: POSITIVE
SODIUM SERPL-SCNC: 138 MMOL/L (ref 135–147)
SPECIMEN EXPIRATION DATE: NORMAL
T WAVE AXIS: 27 DEGREES
TIBC SERPL-MCNC: 241 UG/DL (ref 250–450)
VENTRICULAR RATE: 129 BPM
WBC # BLD AUTO: 5.99 THOUSAND/UL (ref 4.31–10.16)

## 2022-08-07 PROCEDURE — 99232 SBSQ HOSP IP/OBS MODERATE 35: CPT | Performed by: FAMILY MEDICINE

## 2022-08-07 PROCEDURE — 86923 COMPATIBILITY TEST ELECTRIC: CPT

## 2022-08-07 PROCEDURE — 93010 ELECTROCARDIOGRAM REPORT: CPT | Performed by: INTERNAL MEDICINE

## 2022-08-07 PROCEDURE — 83550 IRON BINDING TEST: CPT | Performed by: FAMILY MEDICINE

## 2022-08-07 PROCEDURE — 83540 ASSAY OF IRON: CPT | Performed by: FAMILY MEDICINE

## 2022-08-07 PROCEDURE — P9016 RBC LEUKOCYTES REDUCED: HCPCS

## 2022-08-07 PROCEDURE — 86901 BLOOD TYPING SEROLOGIC RH(D): CPT | Performed by: FAMILY MEDICINE

## 2022-08-07 PROCEDURE — 86850 RBC ANTIBODY SCREEN: CPT | Performed by: FAMILY MEDICINE

## 2022-08-07 PROCEDURE — 82948 REAGENT STRIP/BLOOD GLUCOSE: CPT

## 2022-08-07 PROCEDURE — 86900 BLOOD TYPING SEROLOGIC ABO: CPT | Performed by: FAMILY MEDICINE

## 2022-08-07 PROCEDURE — 85025 COMPLETE CBC W/AUTO DIFF WBC: CPT | Performed by: FAMILY MEDICINE

## 2022-08-07 PROCEDURE — 82728 ASSAY OF FERRITIN: CPT | Performed by: FAMILY MEDICINE

## 2022-08-07 PROCEDURE — 30233N1 TRANSFUSION OF NONAUTOLOGOUS RED BLOOD CELLS INTO PERIPHERAL VEIN, PERCUTANEOUS APPROACH: ICD-10-PCS | Performed by: FAMILY MEDICINE

## 2022-08-07 PROCEDURE — 80053 COMPREHEN METABOLIC PANEL: CPT | Performed by: FAMILY MEDICINE

## 2022-08-07 RX ORDER — POTASSIUM CHLORIDE 20 MEQ/1
20 TABLET, EXTENDED RELEASE ORAL ONCE
Status: COMPLETED | OUTPATIENT
Start: 2022-08-07 | End: 2022-08-07

## 2022-08-07 RX ADMIN — FERROUS SULFATE TAB 325 MG (65 MG ELEMENTAL FE) 325 MG: 325 (65 FE) TAB at 08:30

## 2022-08-07 RX ADMIN — GABAPENTIN 300 MG: 300 CAPSULE ORAL at 16:03

## 2022-08-07 RX ADMIN — Medication 250 MG: at 17:32

## 2022-08-07 RX ADMIN — METRONIDAZOLE 500 MG: 500 TABLET ORAL at 21:10

## 2022-08-07 RX ADMIN — Medication 250 MG: at 08:34

## 2022-08-07 RX ADMIN — METRONIDAZOLE 500 MG: 500 TABLET ORAL at 14:13

## 2022-08-07 RX ADMIN — GABAPENTIN 300 MG: 300 CAPSULE ORAL at 08:34

## 2022-08-07 RX ADMIN — POTASSIUM CHLORIDE 20 MEQ: 1500 TABLET, EXTENDED RELEASE ORAL at 10:48

## 2022-08-07 RX ADMIN — CEFTRIAXONE 2000 MG: 2 INJECTION, SOLUTION INTRAVENOUS at 16:33

## 2022-08-07 RX ADMIN — METRONIDAZOLE 500 MG: 500 TABLET ORAL at 05:42

## 2022-08-07 RX ADMIN — GABAPENTIN 300 MG: 300 CAPSULE ORAL at 21:10

## 2022-08-07 NOTE — ASSESSMENT & PLAN NOTE
Both sets of blood culture is growing polymicrobial  Id consult appreciated, antibiotic adjusted  Follow repeat blood culture drawn on 08/06/2022   Follow echocardiogram to rule out vegetation

## 2022-08-07 NOTE — PROGRESS NOTES
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, with fat layer exposed [facility])  Assessment & Plan  Lab Results   Component Value Date    HGBA1C 7 1 (H) 08/04/2022           Recent Labs     08/06/22  1058 08/06/22  1612 08/06/22  2130 08/07/22  0712   POCGLU 181* 190* 240* 137       Blood Sugar Average: Last 72 hrs:  (P) 330 [id number]   Continue patient's insulin pump (Dexcom G6-patient use insulin as per up to 150 units subQ daily via insulin pump as well as Levemir 58 units subQ at nighttime)-as per her own endocrinologist last note, note left in patient chart  follow hypoglycemia precaution  Continue IV antibiotic, follow wound care, podiatry recommendation  MRI of the foot shows early signs of osteomyelitis, podiatry consult appreciated, patient will need left BKA  Patient also has PAD recommends to consult with General surgery versus transfer the patient-will try to transfer the patient to tertiary center for multispecialty evaluation from Podiatry, vascular    Patient had history CHOPART AMPUTATION LEFT  FOOT: (Left)-Achilles tenotomy left foot (Left- on 10/30/20)  Attempted to transfer the patient to [facility] the case with on-call general surgery, who recommends, if patient hemodynamically stable and not urgent, kidney schedule outpatient BKA  Discussed with on-call ID again regarding general surgery recommendations, recommends to monitor patient at this campus at this time, and re-evaluate the patient in a m , if patient is spiking fever then patient will need to transfer to tertiary center for BKA for source control      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 due to underlying condition, limited to breakdown of skin [facility])  Assessment & Plan  Lab Results   Component Value Date    HGBA1C 7 1 (H) 08/04/2022           Recent Labs     08/06/22  1058 08/06/22  1612 08/06/22  2130 08/07/22  0712   POCGLU 181* 190* 240* 137       Blood Sugar Average: Last 72 hrs:  (P) 457 [id number]   Patient is on insulin pump, continue patient's home insulin pump   MRI of the foot shows Charcot   Arterial duplex shows PAD, remained stable compared to 2 years ago  Podiatry recommendation appreciated-needs outpatient  reconstruction    Acute on chronic anemia  Assessment & Plan  Hemoglobin varies from 7 6-11  Hemoglobin dropped to 7 3, recheck, will transfuse 1 unit of PRBC today and recheck CBC tomorrow   Patient previously received blood transfusion  Patient and iron panel done in 2020,   Continue iron supplement  Lab Results   Component Value Date    IRON 29 (L) 08/04/2022    TIBC 324 08/04/2022    FERRITIN 40 08/04/2022     Lab Results   Component Value Date    [id number] 409 08/04/2022     Lab Results   Component Value Date    FOLATE 12 3 08/04/2022         Bacteremia  Assessment & Plan  Both sets of blood culture is growing polymicrobial  Id consult appreciated, antibiotic adjusted  Follow repeat blood culture drawn on 08/06/2022   Follow echocardiogram to rule out vegetation    Gallbladder sludge  Assessment & Plan  As per ultrasound  Continue to monitor    Hydroureteronephrosis  Assessment & Plan  Bilateral as per imaging  Urology consult appreciated, recommends to place Cage, and reassess with ultrasound in 48-72 hours to see any improvement  Initial plan was to place Cage, which patient was refusing  Ultrasound of right upper quadrant shows improvement and hydronephrosis on right side of the kidney  Cellulitis of left foot  Assessment & Plan  Antibiotic adjusted since blood culture positive    ID recommendation appreciated  MRI of the foot shows suspected osteomyelitis, podiatry recommendation appreciated, recommends to discuss with General surgery versus vascular surgery for possible BKA  Arterial duplex shows peripheral arterial disease, which remained stable    Attempted to transfer the patient to [facility] the case with on-call general surgery, who recommends, if patient hemodynamically stable and not urgent, kidney schedule outpatient BKA  Discussed with on-call ID again regarding general surgery recommendations, recommends to monitor patient at this campus at this time, and re-evaluate the patient in a m , if patient is spiking fever then patient will need to transfer to tertiary center for BKA for source control  VTE Pharmacologic Prophylaxis:   Pharmacologic: Pharmacologic VTE Prophylaxis contraindicated due to Acute on chronic anemia  Mechanical VTE Prophylaxis in Place: Yes    Patient Centered Rounds: I have performed bedside rounds with nursing staff today  Discussions with Specialists or Other Care Team Provider:  None    Education and Discussions with Family / Patient:  Discussed with patient bedside    Time Spent for Care: 30 minutes  More than 50% of total time spent on counseling and coordination of care as described above  Current Length of Stay: 3 day(s)    Current Patient Status: Inpatient   Certification Statement: The patient will continue to require additional inpatient hospital stay due to Osteomyelitis of the foot    Discharge Plan:  Continue to monitor clinically  If patient has recurrent bacteremia or fevers will need to be transferred to [address] [id number] for definitive treatment with BKA    Code Status: Level 1 - Full Code      Subjective:   Patient denies any chest pain or shortness of breath or abdominal pain  She is very interested in proceeding with surgery  Fortunately had low-grade fever in the last 24 hours T-max of 100 2°      Objective:     Vitals:   Temp (24hrs), Av 9 °F (37 2 °C), Min:98 2 °F (36 8 °C), Max:100 2 °F (37 9 °C)    Temp:  [98 2 °F (36 8 °C)-100 2 °F (37 9 °C)] 98 2 °F (36 8 °C)  HR:  [83-95] 83  Resp:  [17-19] 19  BP: (130-151)/(69-85) 151/85  SpO2:  [85 %-96 %] 94 %  Body mass index is 43 33 kg/m²  Input and Output Summary (last 24 hours): Intake/Output Summary (Last 24 hours) at 8/7/2022 1029  Last data filed at 8/6/2022 1801  Gross per 24 hour   Intake 120 ml   Output 400 ml   Net -280 ml       Physical Exam:     Physical Exam  Vitals and nursing note reviewed  Constitutional:       Appearance: Normal appearance  HENT:      Head: Normocephalic and atraumatic  Right Ear: External ear normal       Left Ear: External ear normal       Nose: Nose normal       Mouth/Throat:      Pharynx: Oropharynx is clear  Cardiovascular:      Rate and Rhythm: Normal rate and regular rhythm  Heart sounds: Normal heart sounds  Pulmonary:      Effort: Pulmonary effort is normal       Breath sounds: Normal breath sounds  Abdominal:      General: Bowel sounds are normal       Palpations: Abdomen is soft  Tenderness: There is no abdominal tenderness  Musculoskeletal:      Cervical back: Normal range of motion and neck supple  Comments: As per images   Skin:     General: Skin is warm and dry  Capillary Refill: Capillary refill takes less than 2 seconds  Neurological:      General: No focal deficit present  Mental Status: She is alert and oriented to person, place, and time     Psychiatric:         Mood and Affect: Mood normal            Additional Data:     Labs:    Results from last 7 days   Lab Units 08/07/22  0446 08/06/22  0607 08/04/22  1242   WBC Thousand/uL 5 99   < > 12 83*   HEMOGLOBIN g/dL 7 3*   < > 8 9*   HEMATOCRIT % 24 8*   < > 30 4*   PLATELETS Thousands/uL 223   < > 296   BANDS PCT %  --   --  6   NEUTROS PCT % 66   < >  --    LYMPHS PCT % 22   < >  --    LYMPHO PCT %  --   --  5*   MONOS PCT % 7   < >  --    MONO PCT %  --   -- 0*   EOS PCT % 2   < > 1    < > = values in this interval not displayed  Results from last 7 days   Lab Units 08/07/22  0446   SODIUM mmol/L 138   POTASSIUM mmol/L 3 4*   CHLORIDE mmol/L 103   CO2 mmol/L 25   BUN mg/dL 13   CREATININE mg/dL 1 13   ANION GAP mmol/L 10   CALCIUM mg/dL 8 4   ALBUMIN g/dL 2 2*   TOTAL BILIRUBIN mg/dL 0 29   ALK PHOS U/L 165*   ALT U/L 13   AST U/L 16   GLUCOSE RANDOM mg/dL 130     Results from last 7 days   Lab Units 08/04/22  1242   INR  1 07     Results from last 7 days   Lab Units 08/07/22  0712 08/06/22  2130 08/06/22  1612 08/06/22  1058 08/06/22  0837 08/06/22  0100 08/05/22  2106 08/05/22  1618 08/05/22  1228 08/05/22  0750 08/04/22  2157 08/04/22  1242   POC GLUCOSE mg/dl 137 240* 190* 181* 140 178* 270* 255* 254* 237* 120 221*     Results from last 7 days   Lab Units 08/04/22  2107   HEMOGLOBIN A1C % 7 1*     Results from last 7 days   Lab Units 08/05/22  0535 08/04/22  1242   LACTIC ACID mmol/L  --  1 9   PROCALCITONIN ng/ml 56 43* 0 55*           * I Have Reviewed All Lab Data Listed Above  * Additional Pertinent Lab Tests Reviewed: [facility] Admission Reviewed    Imaging:    Imaging Reports Reviewed Today Include:  MRI bilateral feet  Imaging Personally Reviewed by Myself Includes:  None    Recent Cultures (last 7 days):     Results from last 7 days   Lab Units 08/06/22  0926 08/06/22  0608 08/04/22  1839 08/04/22  1245 08/04/22  1242   BLOOD CULTURE  Received in Microbiology Lab  Culture in Progress  Received in Microbiology Lab  Culture in Progress    --   --  Proteus mirabilis*  Streptococcus agalactiae (Group B)*  Proteus mirabilis*  Streptococcus agalactiae (Group B)*   GRAM STAIN RESULT   --   --  Rare Disintegrating polys*  2+ Gram positive cocci in pairs and chains*  1+ Gram negative rods*  --  Gram positive cocci in pairs and chains*  Gram negative rods*  Gram positive cocci in pairs and chains*  Gram negative rods*   URINE CULTURE --   --   --  60,000-69,000 cfu/ml Escherichia coli*  10,000-19,000 cfu/ml   --    WOUND CULTURE   --   --  1+ Growth of Proteus mirabilis*  2+ Growth of Staphylococcus aureus*  --   --        Last 24 Hours Medication List:   Current Facility-Administered Medications   Medication Dose Route Frequency Provider Last Rate    acetaminophen  650 mg Oral Q6H PRN Paz Bey MD      cefTRIAXone  2,000 mg Intravenous Q24H Lorri Caridad Whitley MD 2,000 mg (08/06/22 1700)    ferrous sulfate  325 mg Oral Daily With Breakfast Brenda Whitley MD      gabapentin  300 mg Oral TID Paz Bey MD      insulin lispro  150 Units Subcutaneous Once PRN Paz Bey MD      metroNIDAZOLE  500 mg Oral Q8H [address] Brenda Whitley MD      ondansetron  4 mg Intravenous Q4H PRN Paz Bey MD      perflutren lipid microsphere  2 mL/min Intravenous Once in imaging Crystal Marta, DO      potassium chloride  20 mEq Oral Once Izaiah Rhoades MD      saccharomyces boulardii  250 mg Oral BID Paz Bey MD      sodium chloride (PF)  3 mL Intravenous Q1H PRN Paz Bey MD          Today, Patient Was Seen By: Izaiah Rhoades MD    ** Please Note: Dictation voice to text software may have been used in the creation of this document   **

## 2022-08-07 NOTE — PLAN OF CARE
Problem: Potential for Falls  Goal: Patient will remain free of falls  Description: INTERVENTIONS:  - Educate patient/family on patient safety including physical limitations  - Instruct patient to call for assistance with activity   - Consult OT/PT to assist with strengthening/mobility   - Keep Call bell within reach  - Keep bed low and locked with side rails adjusted as appropriate  - Keep care items and personal belongings within reach  - Initiate and maintain comfort rounds  - Make Fall Risk Sign visible to staff  - Offer Toileting every 2 Hours, in advance of need  - Initiate/Maintain alarm  - Obtain necessary fall risk management equipment  - Apply yellow socks and bracelet for high fall risk patients  - Consider moving patient to room near nurses station  Outcome: Progressing     Problem: PAIN - ADULT  Goal: Verbalizes/displays adequate comfort level or baseline comfort level  Description: Interventions:  - Encourage patient to monitor pain and request assistance  - Assess pain using appropriate pain scale  - Administer analgesics based on type and severity of pain and evaluate response  - Implement non-pharmacological measures as appropriate and evaluate response  - Consider cultural and social influences on pain and pain management  - Notify physician/advanced practitioner if interventions unsuccessful or patient reports new pain  Outcome: Progressing     Problem: SAFETY ADULT  Goal: Patient will remain free of falls  Description: INTERVENTIONS:  - Educate patient/family on patient safety including physical limitations  - Instruct patient to call for assistance with activity   - Consult OT/PT to assist with strengthening/mobility   - Keep Call bell within reach  - Keep bed low and locked with side rails adjusted as appropriate  - Keep care items and personal belongings within reach  - Initiate and maintain comfort rounds  - Make Fall Risk Sign visible to staff  - Offer Toileting every 2 Hours, in advance of need  - Initiate/Maintain alarm  - Obtain necessary fall risk management equipment  - Apply yellow socks and bracelet for high fall risk patients  - Consider moving patient to room near nurses station  Outcome: Progressing

## 2022-08-07 NOTE — ASSESSMENT & PLAN NOTE
Antibiotic adjusted since blood culture positive  ID recommendation appreciated  MRI of the foot shows suspected osteomyelitis, podiatry recommendation appreciated, recommends to discuss with General surgery versus vascular surgery for possible BKA  Arterial duplex shows peripheral arterial disease, which remained stable    Attempted to transfer the patient to [facility] the case with on-call general surgery, who recommends, if patient hemodynamically stable and not urgent, kidney schedule outpatient BKA  Discussed with on-call ID again regarding general surgery recommendations, recommends to monitor patient at this campus at this time, and re-evaluate the patient in a m , if patient is spiking fever then patient will need to transfer to tertiary center for BKA for source control

## 2022-08-07 NOTE — ASSESSMENT & PLAN NOTE
Hemoglobin varies from 7 6-11  Hemoglobin dropped to 7 3, recheck, will transfuse 1 unit of PRBC today and recheck CBC tomorrow   Patient previously received blood transfusion  Patient and iron panel done in 2020,   Continue iron supplement  Lab Results   Component Value Date    IRON 29 (L) 08/04/2022    TIBC 324 08/04/2022    FERRITIN 40 08/04/2022     Lab Results   Component Value Date    [id number] 409 08/04/2022     Lab Results   Component Value Date    FOLATE 12 3 08/04/2022

## 2022-08-07 NOTE — ASSESSMENT & PLAN NOTE
Lab Results   Component Value Date    HGBA1C 7 1 (H) 08/04/2022           Recent Labs     08/06/22  1058 08/06/22  1612 08/06/22  2130 08/07/22  0712   POCGLU 181* 190* 240* 137       Blood Sugar Average: Last 72 hrs:  (P) 899 [id number]   Patient is on insulin pump, continue patient's home insulin pump   MRI of the foot shows Charcot   Arterial duplex shows PAD, remained stable compared to 2 years ago  Podiatry recommendation appreciated-needs outpatient  reconstruction

## 2022-08-07 NOTE — ASSESSMENT & PLAN NOTE
Lab Results   Component Value Date    HGBA1C 7 1 (H) 08/04/2022           Recent Labs     08/06/22  1058 08/06/22  1612 08/06/22  2130 08/07/22  0712   POCGLU 181* 190* 240* 137       Blood Sugar Average: Last 72 hrs:  (P) 287 [id number]   Continue patient's insulin pump (Dexcom G6-patient use insulin as per up to 150 units subQ daily via insulin pump as well as Levemir 58 units subQ at nighttime)-as per her own endocrinologist last note, note left in patient chart  follow hypoglycemia precaution  Continue IV antibiotic, follow wound care, podiatry recommendation  MRI of the foot shows early signs of osteomyelitis, podiatry consult appreciated, patient will need left BKA  Patient also has PAD recommends to consult with General surgery versus transfer the patient-will try to transfer the patient to tertiary center for multispecialty evaluation from Podiatry, vascular    Patient had history CHOPART AMPUTATION LEFT  FOOT: (Left)-Achilles tenotomy left foot (Left- on 10/30/20)  Attempted to transfer the patient to [facility] the case with on-call general surgery, who recommends, if patient hemodynamically stable and not urgent, kidney schedule outpatient BKA  Discussed with on-call ID again regarding general surgery recommendations, recommends to monitor patient at this campus at this time, and re-evaluate the patient in a m , if patient is spiking fever then patient will need to transfer to tertiary center for BKA for source control

## 2022-08-08 ENCOUNTER — HOSPITAL ENCOUNTER (INPATIENT)
Facility: HOSPITAL | Age: 47
LOS: 2 days | Discharge: HOME | DRG: 872 | End: 2022-08-10
Attending: STUDENT IN AN ORGANIZED HEALTH CARE EDUCATION/TRAINING PROGRAM | Admitting: STUDENT IN AN ORGANIZED HEALTH CARE EDUCATION/TRAINING PROGRAM
Payer: COMMERCIAL

## 2022-08-08 ENCOUNTER — APPOINTMENT (INPATIENT)
Dept: ULTRASOUND IMAGING | Facility: HOSPITAL | Age: 47
DRG: 872 | End: 2022-08-08
Payer: COMMERCIAL

## 2022-08-08 VITALS
DIASTOLIC BLOOD PRESSURE: 93 MMHG | SYSTOLIC BLOOD PRESSURE: 158 MMHG | HEIGHT: 63 IN | WEIGHT: 244.6 LBS | TEMPERATURE: 98.1 F | BODY MASS INDEX: 43.34 KG/M2 | OXYGEN SATURATION: 93 % | RESPIRATION RATE: 18 BRPM | HEART RATE: 79 BPM

## 2022-08-08 DIAGNOSIS — M86.9 OSTEOMYELITIS OF LEFT FOOT, UNSPECIFIED TYPE (HCC): Primary | ICD-10-CM

## 2022-08-08 DIAGNOSIS — R78.81 BACTEREMIA: ICD-10-CM

## 2022-08-08 DIAGNOSIS — K81.9 CHOLECYSTITIS: ICD-10-CM

## 2022-08-08 LAB
ABO GROUP BLD BPU: NORMAL
ALBUMIN SERPL BCP-MCNC: 2.3 G/DL (ref 3.5–5)
ALP SERPL-CCNC: 170 U/L (ref 46–116)
ALT SERPL W P-5'-P-CCNC: 14 U/L (ref 12–78)
ANION GAP SERPL CALCULATED.3IONS-SCNC: 9 MMOL/L (ref 4–13)
AORTIC ROOT: 2.8 CM
AORTIC VALVE MEAN VELOCITY: 11 M/S
APICAL FOUR CHAMBER EJECTION FRACTION: 57 %
AST SERPL W P-5'-P-CCNC: 20 U/L (ref 5–45)
AV LVOT MEAN GRADIENT: 1 MMHG
AV LVOT PEAK GRADIENT: 3 MMHG
AV MEAN GRADIENT: 6 MMHG
AV PEAK GRADIENT: 12 MMHG
AV VELOCITY RATIO: 0.49
BACTERIA BLD CULT: ABNORMAL
BILIRUB SERPL-MCNC: 0.36 MG/DL (ref 0.2–1)
BPU ID: NORMAL
BUN SERPL-MCNC: 13 MG/DL (ref 5–25)
CALCIUM ALBUM COR SERPL-MCNC: 10.2 MG/DL (ref 8.3–10.1)
CALCIUM SERPL-MCNC: 8.8 MG/DL (ref 8.3–10.1)
CHLORIDE SERPL-SCNC: 102 MMOL/L (ref 96–108)
CO2 SERPL-SCNC: 26 MMOL/L (ref 21–32)
CREAT SERPL-MCNC: 1.02 MG/DL (ref 0.6–1.3)
CROSSMATCH: NORMAL
DOP CALC AO PEAK VEL: 1.74 M/S
DOP CALC AO VTI: 26.86 CM
DOP CALC LVOT PEAK VEL VTI: 14.43 CM
DOP CALC LVOT PEAK VEL: 0.85 M/S
E WAVE DECELERATION TIME: 122 MS
ERYTHROCYTE [DISTWIDTH] IN BLOOD BY AUTOMATED COUNT: 19.4 % (ref 11.6–15.1)
FLUAV RNA RESP QL NAA+PROBE: NEGATIVE
FLUBV RNA RESP QL NAA+PROBE: NEGATIVE
FRACTIONAL SHORTENING: 26 (ref 28–44)
GFR SERPL CREATININE-BSD FRML MDRD: 65 ML/MIN/1.73SQ M
GLUCOSE SERPL-MCNC: 143 MG/DL (ref 65–140)
GLUCOSE SERPL-MCNC: 228 MG/DL (ref 65–140)
GLUCOSE SERPL-MCNC: 291 MG/DL (ref 65–140)
GLUCOSE SERPL-MCNC: 351 MG/DL (ref 65–140)
GLUCOSE SERPL-MCNC: 88 MG/DL (ref 65–140)
GLUCOSE SERPL-MCNC: 99 MG/DL (ref 65–140)
GRAM STN SPEC: ABNORMAL
GRAM STN SPEC: ABNORMAL
HCT VFR BLD AUTO: 29.1 % (ref 34.8–46.1)
HGB BLD-MCNC: 8.9 G/DL (ref 11.5–15.4)
INTERVENTRICULAR SEPTUM IN DIASTOLE (PARASTERNAL SHORT AXIS VIEW): 1.2 CM
INTERVENTRICULAR SEPTUM: 1.2 CM (ref 0.6–1.1)
IVC: 2 MM
LAAS-AP2: 11.6 CM2
LAAS-AP4: 18.6 CM2
LEFT ATRIUM SIZE: 3.6 CM
LEFT INTERNAL DIMENSION IN SYSTOLE: 3.1 CM (ref 2.1–4)
LEFT VENTRICLE DIASTOLIC VOLUME (MOD BIPLANE): 54 ML
LEFT VENTRICLE SYSTOLIC VOLUME (MOD BIPLANE): 27 ML
LEFT VENTRICULAR INTERNAL DIMENSION IN DIASTOLE: 4.2 CM (ref 3.5–6)
LEFT VENTRICULAR POSTERIOR WALL IN END DIASTOLE: 1.2 CM
LEFT VENTRICULAR STROKE VOLUME: 40 ML
LV EF: 49 %
LVSV (TEICH): 40 ML
MCH RBC QN AUTO: 21.7 PG (ref 26.8–34.3)
MCHC RBC AUTO-ENTMCNC: 30.6 G/DL (ref 31.4–37.4)
MCV RBC AUTO: 71 FL (ref 82–98)
MV E'TISSUE VEL-LAT: 13 CM/S
MV E'TISSUE VEL-SEP: 15 CM/S
MV PEAK A VEL: 0.68 M/S
MV PEAK E VEL: 96 CM/S
MV STENOSIS PRESSURE HALF TIME: 35 MS
MV VALVE AREA P 1/2 METHOD: 6.29
PLATELET # BLD AUTO: 268 THOUSANDS/UL (ref 149–390)
PMV BLD AUTO: 10.1 FL (ref 8.9–12.7)
POTASSIUM SERPL-SCNC: 3.7 MMOL/L (ref 3.5–5.3)
PROT SERPL-MCNC: 7.5 G/DL (ref 6.4–8.4)
PV PEAK GRADIENT: 7 MMHG
RBC # BLD AUTO: 4.11 MILLION/UL (ref 3.81–5.12)
RIGHT ATRIUM AREA SYSTOLE A4C: 17.4 CM2
RIGHT VENTRICLE ID DIMENSION: 3.6 CM
RSV RNA RESP QL NAA+PROBE: NEGATIVE
SARS-COV-2 RNA RESP QL NAA+PROBE: NEGATIVE
SL CV LEFT ATRIUM LENGTH A2C: 3.8 CM
SL CV LV EF: 55
SL CV PED ECHO LEFT VENTRICLE DIASTOLIC VOLUME (MOD BIPLANE) 2D: 78 ML
SL CV PED ECHO LEFT VENTRICLE SYSTOLIC VOLUME (MOD BIPLANE) 2D: 39 ML
SODIUM SERPL-SCNC: 137 MMOL/L (ref 135–147)
TR MAX PG: 19 MMHG
TR PEAK VELOCITY: 2.2 M/S
TRICUSPID VALVE PEAK REGURGITATION VELOCITY: 2.17 M/S
UNIT DISPENSE STATUS: NORMAL
UNIT PRODUCT CODE: NORMAL
UNIT PRODUCT VOLUME: 350 ML
UNIT RH: NORMAL
WBC # BLD AUTO: 7.71 THOUSAND/UL (ref 4.31–10.16)

## 2022-08-08 PROCEDURE — 99223 1ST HOSP IP/OBS HIGH 75: CPT | Performed by: STUDENT IN AN ORGANIZED HEALTH CARE EDUCATION/TRAINING PROGRAM

## 2022-08-08 PROCEDURE — 76770 US EXAM ABDO BACK WALL COMP: CPT

## 2022-08-08 PROCEDURE — 0241U HB NFCT DS VIR RESP RNA 4 TRGT: CPT | Performed by: PHYSICIAN ASSISTANT

## 2022-08-08 PROCEDURE — 99239 HOSP IP/OBS DSCHRG MGMT >30: CPT | Performed by: FAMILY MEDICINE

## 2022-08-08 PROCEDURE — NC001 PR NO CHARGE: Performed by: SURGERY

## 2022-08-08 PROCEDURE — 82948 REAGENT STRIP/BLOOD GLUCOSE: CPT

## 2022-08-08 PROCEDURE — 80053 COMPREHEN METABOLIC PANEL: CPT | Performed by: FAMILY MEDICINE

## 2022-08-08 PROCEDURE — 93306 TTE W/DOPPLER COMPLETE: CPT | Performed by: INTERNAL MEDICINE

## 2022-08-08 PROCEDURE — 85027 COMPLETE CBC AUTOMATED: CPT | Performed by: FAMILY MEDICINE

## 2022-08-08 RX ORDER — DOCUSATE SODIUM 100 MG/1
100 CAPSULE, LIQUID FILLED ORAL 2 TIMES DAILY
Status: DISCONTINUED | OUTPATIENT
Start: 2022-08-08 | End: 2022-08-10 | Stop reason: HOSPADM

## 2022-08-08 RX ORDER — SACCHAROMYCES BOULARDII 250 MG
250 CAPSULE ORAL 2 TIMES DAILY
Status: CANCELLED | OUTPATIENT
Start: 2022-08-08

## 2022-08-08 RX ORDER — INSULIN GLARGINE 100 [IU]/ML
20 INJECTION, SOLUTION SUBCUTANEOUS EVERY 12 HOURS SCHEDULED
Status: DISCONTINUED | OUTPATIENT
Start: 2022-08-08 | End: 2022-08-09

## 2022-08-08 RX ORDER — SODIUM CHLORIDE 9 MG/ML
3 INJECTION INTRAVENOUS
Status: CANCELLED | OUTPATIENT
Start: 2022-08-08

## 2022-08-08 RX ORDER — GABAPENTIN 300 MG/1
300 CAPSULE ORAL 3 TIMES DAILY
Status: CANCELLED | OUTPATIENT
Start: 2022-08-08

## 2022-08-08 RX ORDER — CEFTRIAXONE 2 G/50ML
2000 INJECTION, SOLUTION INTRAVENOUS EVERY 24 HOURS
Status: CANCELLED | OUTPATIENT
Start: 2022-08-08

## 2022-08-08 RX ORDER — INSULIN LISPRO 100 [IU]/ML
15 INJECTION, SOLUTION INTRAVENOUS; SUBCUTANEOUS
Status: DISCONTINUED | OUTPATIENT
Start: 2022-08-08 | End: 2022-08-10 | Stop reason: HOSPADM

## 2022-08-08 RX ORDER — SACCHAROMYCES BOULARDII 250 MG
250 CAPSULE ORAL 2 TIMES DAILY
Status: DISCONTINUED | OUTPATIENT
Start: 2022-08-09 | End: 2022-08-10 | Stop reason: HOSPADM

## 2022-08-08 RX ORDER — ACETAMINOPHEN 325 MG/1
650 TABLET ORAL EVERY 6 HOURS PRN
Status: DISCONTINUED | OUTPATIENT
Start: 2022-08-08 | End: 2022-08-10 | Stop reason: HOSPADM

## 2022-08-08 RX ORDER — METRONIDAZOLE 500 MG/1
500 TABLET ORAL EVERY 8 HOURS SCHEDULED
Status: CANCELLED | OUTPATIENT
Start: 2022-08-08

## 2022-08-08 RX ORDER — FERROUS SULFATE 325(65) MG
325 TABLET ORAL
Status: CANCELLED | OUTPATIENT
Start: 2022-08-09

## 2022-08-08 RX ORDER — INSULIN GLARGINE 100 [IU]/ML
25 INJECTION, SOLUTION SUBCUTANEOUS EVERY 12 HOURS SCHEDULED
Status: DISCONTINUED | OUTPATIENT
Start: 2022-08-08 | End: 2022-08-08

## 2022-08-08 RX ORDER — INSULIN LISPRO 100 [IU]/ML
4-20 INJECTION, SOLUTION INTRAVENOUS; SUBCUTANEOUS
Status: DISCONTINUED | OUTPATIENT
Start: 2022-08-09 | End: 2022-08-10 | Stop reason: HOSPADM

## 2022-08-08 RX ORDER — ACETAMINOPHEN 325 MG/1
650 TABLET ORAL EVERY 6 HOURS PRN
Status: CANCELLED | OUTPATIENT
Start: 2022-08-08

## 2022-08-08 RX ORDER — INSULIN LISPRO 100 [IU]/ML
150 INJECTION, SOLUTION INTRAVENOUS; SUBCUTANEOUS ONCE AS NEEDED
Status: DISCONTINUED | OUTPATIENT
Start: 2022-08-08 | End: 2022-08-08

## 2022-08-08 RX ORDER — ONDANSETRON 2 MG/ML
4 INJECTION INTRAMUSCULAR; INTRAVENOUS EVERY 4 HOURS PRN
Status: DISCONTINUED | OUTPATIENT
Start: 2022-08-08 | End: 2022-08-10 | Stop reason: HOSPADM

## 2022-08-08 RX ORDER — INSULIN LISPRO 100 [IU]/ML
150 INJECTION, SOLUTION INTRAVENOUS; SUBCUTANEOUS ONCE AS NEEDED
Status: CANCELLED | OUTPATIENT
Start: 2022-08-08

## 2022-08-08 RX ORDER — ONDANSETRON 2 MG/ML
4 INJECTION INTRAMUSCULAR; INTRAVENOUS EVERY 4 HOURS PRN
Status: CANCELLED | OUTPATIENT
Start: 2022-08-08

## 2022-08-08 RX ORDER — FERROUS SULFATE 325(65) MG
325 TABLET ORAL
Status: DISCONTINUED | OUTPATIENT
Start: 2022-08-09 | End: 2022-08-10 | Stop reason: HOSPADM

## 2022-08-08 RX ORDER — METRONIDAZOLE 500 MG/1
500 TABLET ORAL EVERY 8 HOURS SCHEDULED
Status: DISCONTINUED | OUTPATIENT
Start: 2022-08-08 | End: 2022-08-10 | Stop reason: HOSPADM

## 2022-08-08 RX ORDER — SODIUM CHLORIDE 9 MG/ML
3 INJECTION INTRAVENOUS
Status: DISCONTINUED | OUTPATIENT
Start: 2022-08-08 | End: 2022-08-10 | Stop reason: HOSPADM

## 2022-08-08 RX ORDER — GABAPENTIN 300 MG/1
300 CAPSULE ORAL 3 TIMES DAILY
Status: DISCONTINUED | OUTPATIENT
Start: 2022-08-08 | End: 2022-08-10 | Stop reason: HOSPADM

## 2022-08-08 RX ADMIN — METRONIDAZOLE 500 MG: 500 TABLET ORAL at 21:16

## 2022-08-08 RX ADMIN — METRONIDAZOLE 500 MG: 500 TABLET ORAL at 13:45

## 2022-08-08 RX ADMIN — CEFTRIAXONE 2000 MG: 2 INJECTION, SOLUTION INTRAVENOUS at 16:21

## 2022-08-08 RX ADMIN — GABAPENTIN 300 MG: 300 CAPSULE ORAL at 08:24

## 2022-08-08 RX ADMIN — GABAPENTIN 300 MG: 300 CAPSULE ORAL at 21:16

## 2022-08-08 RX ADMIN — DOCUSATE SODIUM 100 MG: 100 CAPSULE, LIQUID FILLED ORAL at 21:16

## 2022-08-08 RX ADMIN — METRONIDAZOLE 500 MG: 500 TABLET ORAL at 06:10

## 2022-08-08 RX ADMIN — GABAPENTIN 300 MG: 300 CAPSULE ORAL at 16:22

## 2022-08-08 RX ADMIN — FERROUS SULFATE TAB 325 MG (65 MG ELEMENTAL FE) 325 MG: 325 (65 FE) TAB at 08:24

## 2022-08-08 RX ADMIN — INSULIN GLARGINE 20 UNITS: 100 INJECTION, SOLUTION SUBCUTANEOUS at 21:24

## 2022-08-08 NOTE — PLAN OF CARE
Problem: Potential for Falls  Goal: Patient will remain free of falls  Description: INTERVENTIONS:  - Educate patient/family on patient safety including physical limitations  - Instruct patient to call for assistance with activity   - Consult OT/PT to assist with strengthening/mobility   - Keep Call bell within reach  - Keep bed low and locked with side rails adjusted as appropriate  - Keep care items and personal belongings within reach  - Initiate and maintain comfort rounds  - Make Fall Risk Sign visible to staff  - Obtain necessary fall risk management equipment:  - Apply yellow socks and bracelet for high fall risk patients  - Consider moving patient to room near nurses station  Outcome: Progressing     Problem: PAIN - ADULT  Goal: Verbalizes/displays adequate comfort level or baseline comfort level  Description: Interventions:  - Encourage patient to monitor pain and request assistance  - Assess pain using appropriate pain scale  - Administer analgesics based on type and severity of pain and evaluate response  - Implement non-pharmacological measures as appropriate and evaluate response  - Consider cultural and social influences on pain and pain management  - Notify physician/advanced practitioner if interventions unsuccessful or patient reports new pain  Outcome: Progressing     Problem: INFECTION - ADULT  Goal: Absence or prevention of progression during hospitalization  Description: INTERVENTIONS:  - Assess and monitor for signs and symptoms of infection  - Monitor lab/diagnostic results  - Monitor all insertion sites, i e  indwelling lines, tubes, and drains  - Monitor endotracheal if appropriate and nasal secretions for changes in amount and color  - [place] appropriate cooling/warming therapies per order  - Administer medications as ordered  - Instruct and encourage patient and family to use good hand hygiene technique  - Identify and instruct in appropriate isolation precautions for identified infection/condition  Outcome: Progressing  Goal: Absence of fever/infection during neutropenic period  Description: INTERVENTIONS:  - Monitor WBC    Outcome: Progressing     Problem: SAFETY ADULT  Goal: Patient will remain free of falls  Description: INTERVENTIONS:  - Educate patient/family on patient safety including physical limitations  - Instruct patient to call for assistance with activity   - Consult OT/PT to assist with strengthening/mobility   - Keep Call bell within reach  - Keep bed low and locked with side rails adjusted as appropriate  - Keep care items and personal belongings within reach  - Initiate and maintain comfort rounds  - Make Fall Risk Sign visible to staff  -encourage ambulation  - Apply yellow socks and bracelet for high fall risk patients  - Consider moving patient to room near nurses station  Outcome: Progressing  Goal: Maintain or return to baseline ADL function  Description: INTERVENTIONS:  -  Assess patient's ability to carry out ADLs; assess patient's baseline for ADL function and identify physical deficits which impact ability to perform ADLs (bathing, care of mouth/teeth, toileting, grooming, dressing, etc )  - Assess/evaluate cause of self-care deficits   - Assess range of motion  - Assess patient's mobility; develop plan if impaired  - Assess patient's need for assistive devices and provide as appropriate  - Encourage maximum independence but intervene and supervise when necessary  - Involve family in performance of ADLs  - Assess for home care needs following discharge   - Consider OT consult to assist with ADL evaluation and planning for discharge  - Provide patient education as appropriate  Outcome: Progressing  Goal: Maintains/Returns to pre admission functional level  Description: INTERVENTIONS:  - Perform BMAT or MOVE assessment daily    - Set and communicate daily mobility goal to care team and patient/family/caregiver     - Collaborate with rehabilitation services on mobility goals if consulted  -encourage ambulation 4 times a day  - Out of bed for toileting  - Record patient progress and toleration of activity level   Outcome: Progressing     Problem: DISCHARGE PLANNING  Goal: Discharge to home or other facility with appropriate resources  Description: INTERVENTIONS:  - Identify barriers to discharge w/patient and caregiver  - Arrange for needed discharge resources and transportation as appropriate  - Identify discharge learning needs (meds, wound care, etc )  - Arrange for interpretive services to assist at discharge as needed  - Refer to Case Management Department for coordinating discharge planning if the patient needs post-hospital services based on physician/advanced practitioner order or complex needs related to functional status, cognitive ability, or social support system  Outcome: Progressing     Problem: Knowledge Deficit  Goal: Patient/family/caregiver demonstrates understanding of disease process, treatment plan, medications, and discharge instructions  Description: Complete learning assessment and assess knowledge base    Interventions:  - Provide teaching at level of understanding  - Provide teaching via preferred learning methods  Outcome: Progressing     Problem: METABOLIC, FLUID AND ELECTROLYTES - ADULT  Goal: Electrolytes maintained within normal limits  Description: INTERVENTIONS:  - Monitor labs and assess patient for signs and symptoms of electrolyte imbalances  - Administer electrolyte replacement as ordered  - Monitor response to electrolyte replacements, including repeat lab results as appropriate  - Instruct patient on fluid and nutrition as appropriate  Outcome: Progressing  Goal: Fluid balance maintained  Description: INTERVENTIONS:  - Monitor labs   - Monitor I/O and WT  - Instruct patient on fluid and nutrition as appropriate  - Assess for signs & symptoms of volume excess or deficit  Outcome: Progressing     Problem: SKIN/TISSUE INTEGRITY - ADULT  Goal: Skin Integrity remains intact(Skin Breakdown Prevention)  Description: Assess:  -Perform [place] assessment every shift  -Clean and moisturize skin every shift  -Inspect skin when repositioning, toileting, and assisting with ADLS  -Assess extremities for adequate circulation and sensation     Bed Management:  -Have minimal linens on bed & keep smooth, unwrinkled  -Change linens as needed when moist or perspiring  -Avoid sitting or lying in one position for more than 2 hours while in bed  -Keep HOB at 30 degrees     Toileting:  -Offer bedside commode    Activity:  -Mobilize patient 3 times a day  -Encourage activity and walks on unit  -Encourage or provide ROM exercises   -Turn and reposition patient every  2 Hours  -Use appropriate equipment to lift or move patient in bed  -Instruct/ Assist with weight shifting every 2 when out of bed in chair  -Consider limitation of chair time 2 hour intervals    Skin Care:  -Avoid use of baby powder, tape, friction and shearing, hot water or constrictive clothing  -Relieve pressure over bony prominences using repositioning  -Do not massage red bony areas    Outcome: Progressing  Goal: Incision(s), wounds(s) or drain site(s) healing without S/S of infection  Description: INTERVENTIONS  - Assess and document dressing, incision, wound bed, drain sites and surrounding tissue  - Provide patient and family education  - Perform skin care/dressing changes every shif  Outcome: Progressing  Goal: Pressure injury heals and does not worsen  Description: Interventions:  - Implement low air loss mattress or specialty surface (Criteria met)  - Apply silicone foam dressing  - Instruct/assist with weight shifting every 2 hours minutes when in chair   - Limit chair time to 2 hour intervals  - Use special pressure reducing interventions such as weight shifting when in chair   - Apply fecal or urinary incontinence containment device   - Perform passive or active ROM every 2 hours  - Turn and reposition patient & offload bony prominences every 2 hours hours   - Utilize friction reducing device or surface for transfers   - Consider nutrition services referral as needed  Outcome: Progressing     Problem: HEMATOLOGIC - ADULT  Goal: Maintains hematologic stability  Description: INTERVENTIONS  - Assess for signs and symptoms of bleeding or hemorrhage  - Monitor labs  - Administer supportive blood products/factors as ordered and appropriate  Outcome: Progressing     Problem: MOBILITY - ADULT  Goal: Maintain or return to baseline ADL function  Description: INTERVENTIONS:  -  Assess patient's ability to carry out ADLs; assess patient's baseline for ADL function and identify physical deficits which impact ability to perform ADLs (bathing, care of mouth/teeth, toileting, grooming, dressing, etc )  - Assess/evaluate cause of self-care deficits   - Assess range of motion  - Assess patient's mobility; develop plan if impaired  - Assess patient's need for assistive devices and provide as appropriate  - Encourage maximum independence but intervene and supervise when necessary  - Involve family in performance of ADLs  - Assess for home care needs following discharge   - Consider OT consult to assist with ADL evaluation and planning for discharge  - Provide patient education as appropriate  Outcome: Progressing  Goal: Maintains/Returns to pre admission functional level  Description: INTERVENTIONS:  - Perform BMAT or MOVE assessment daily    - Set and communicate daily mobility goal to care team and patient/family/caregiver  - Collaborate with rehabilitation services on mobility goals if consulted  - Perform Range of Motion 3 times a day  - Reposition patient every 2 hours    - Ambulate patient 3 times a day  - Out of bed to chair 3 times a day   - Out of bed for meals 3 times a day  - Out of bed for toileting  - Record patient progress and toleration of activity level   Outcome: Progressing     Problem: Nutrition/Hydration-ADULT  Goal: Nutrient/Hydration intake appropriate for improving, restoring or maintaining nutritional needs  Description: Monitor and assess patient's nutrition/hydration status for malnutrition  Collaborate with interdisciplinary team and initiate plan and interventions as ordered  Monitor patient's weight and dietary intake as ordered or per policy  Utilize nutrition screening tool and intervene as necessary  Determine patient's food preferences and provide high-protein, high-caloric foods as appropriate       INTERVENTIONS:  - Monitor oral intake, urinary output, labs, and treatment plans  - Assess nutrition and hydration status and recommend course of action  - Evaluate amount of meals eaten  - Assist patient with eating if necessary   - Allow adequate time for meals  - Recommend/ encourage appropriate diets, oral nutritional supplements, and vitamin/mineral supplements  - Order, calculate, and assess calorie counts as needed  - Recommend, monitor, and adjust tube feedings and TPN/PPN based on assessed needs  - Assess need for intravenous fluids  - Provide specific nutrition/hydration education as appropriate  - Include patient/family/caregiver in decisions related to nutrition  Outcome: Progressing

## 2022-08-08 NOTE — EMTALA/ACUTE CARE TRANSFER
[facility]  [address] [facility] [address]  Dept: [phone]      ACUTE CARE TRANSFER CONSENT    NAME Sim Montalvo                                         1975                              MRN [id number]    I have been informed of my rights regarding examination, treatment, and transfer   by Dr Skinny Salamanca MD    Benefits:   to be evaluated by specialist for coordinated care    Risks:   condition may get worse, transportation may get delayed, procedure may get delayed, procedure may get postponed  Consent for Transfer:  I acknowledge that my medical condition has been evaluated and explained to me by the treating physician or other qualified medical person and/or my attending physician, who has recommended that I be transferred to the service of  Dr Isael Hermosillo at [facility]  The above potential benefits of such transfer, the potential risks associated with such transfer, and the probable risks of not being transferred have been explained to me, and I fully understand them  The doctor has explained that, in my case, the benefits of transfer outweigh the risks  I agree to be transferred  I authorize the performance of emergency medical procedures and treatments upon me in both transit and upon arrival at the receiving facility  Additionally, I authorize the release of any and all medical records to the receiving facility and request they be transported with me, if possible  I understand that the safest mode of transportation during a medical emergency is an ambulance and that the Hospital advocates the use of this mode of transport  Risks of traveling to the receiving facility by car, including absence of medical control, life sustaining equipment, such as oxygen, and medical personnel has been explained to me and I fully understand them      (KIKI CORRECT BOX BELOW)  [  ]  I consent to the stated transfer and to be transported by ambulance/helicopter  [  ]  I consent to the stated transfer, but refuse transportation by ambulance and accept full responsibility for my transportation by car  I understand the risks of non-ambulance transfers and I exonerate the Hospital and its staff from any deterioration in my condition that results from this refusal     X___________________________________________    DATE  22  TIME________  Signature of patient or legally responsible individual signing on patient behalf           RELATIONSHIP TO PATIENT_________________________          Provider Certification    NAME Avery Collier                                         1975                              MRN [id number]    A medical screening exam was performed on the above named patient  Based on the examination:    Condition Necessitating Transfer osteomyelitis of left foot, need left BKA    Patient Condition:   stable    Reason for Transfer:   osteomyelitis of left    Transfer Requirements: Facility     · Space available and qualified personnel available for treatment as acknowledged by    Dr Prince Phillips at [address] [facility]  · Agreed to accept transfer and to provide appropriate medical treatment as acknowledged by          · Appropriate medical records of the examination and treatment of the patient are provided at the time of transfer   [address] _______  · Transfer will be performed by qualified personnel from    and appropriate transfer equipment as required, including the use of necessary and appropriate life support measures      Provider Certification: I have examined the patient and explained the following risks and benefits of being transferred/refusing transfer to the patient/family:  condition may get worse, transportation may get delayed, procedure may get delayed, procedure may get postponed      Based on these reasonable risks and benefits to the patient and/or the unborn child(arnav), and based upon the information available at the time of the patients examination, I certify that the medical benefits reasonably to be expected from the provision of appropriate medical treatments at another medical facility outweigh the increasing risks, if any, to the individuals medical condition, and in the case of labor to the unborn child, from effecting the transfer      X_____Brenda Whitley MD_______________________________________ DATE 08/08/22        TIME_______      ORIGINAL - SEND TO MEDICAL RECORDS   COPY - SEND WITH PATIENT DURING TRANSFER

## 2022-08-08 NOTE — PLAN OF CARE
Problem: Potential for Falls  Goal: Patient will remain free of falls  Description: INTERVENTIONS:  - Educate patient/family on patient safety including physical limitations  - Instruct patient to call for assistance with activity   - Consult OT/PT to assist with strengthening/mobility   - Keep Call bell within reach  - Keep bed low and locked with side rails adjusted as appropriate  - Keep care items and personal belongings within reach  - Initiate and maintain comfort rounds  - Make Fall Risk Sign visible to staff  - Apply yellow socks and bracelet for high fall risk patients  - Consider moving patient to room near nurses station  Outcome: Progressing     Problem: PAIN - ADULT  Goal: Verbalizes/displays adequate comfort level or baseline comfort level  Description: Interventions:  - Encourage patient to monitor pain and request assistance  - Assess pain using appropriate pain scale  - Administer analgesics based on type and severity of pain and evaluate response  - Implement non-pharmacological measures as appropriate and evaluate response  - Consider cultural and social influences on pain and pain management  - Notify physician/advanced practitioner if interventions unsuccessful or patient reports new pain  Outcome: Progressing     Problem: INFECTION - ADULT  Goal: Absence or prevention of progression during hospitalization  Description: INTERVENTIONS:  - Assess and monitor for signs and symptoms of infection  - Monitor lab/diagnostic results  - Monitor all insertion sites, i e  indwelling lines, tubes, and drains  - Monitor endotracheal if appropriate and nasal secretions for changes in amount and color  - [place] appropriate cooling/warming therapies per order  - Administer medications as ordered  - Instruct and encourage patient and family to use good hand hygiene technique  - Identify and instruct in appropriate isolation precautions for identified infection/condition  Outcome: Progressing  Goal: Absence of fever/infection during neutropenic period  Description: INTERVENTIONS:  - Monitor WBC    Outcome: Progressing     Problem: SAFETY ADULT  Goal: Patient will remain free of falls  Description: INTERVENTIONS:  - Educate patient/family on patient safety including physical limitations  - Instruct patient to call for assistance with activity   - Consult OT/PT to assist with strengthening/mobility   - Keep Call bell within reach  - Keep bed low and locked with side rails adjusted as appropriate  - Keep care items and personal belongings within reach  - Initiate and maintain comfort rounds  - Make Fall Risk Sign visible to staff  - Apply yellow socks and bracelet for high fall risk patients  - Consider moving patient to room near nurses station  Outcome: Progressing  Goal: Maintain or return to baseline ADL function  Description: INTERVENTIONS:  -  Assess patient's ability to carry out ADLs; assess patient's baseline for ADL function and identify physical deficits which impact ability to perform ADLs (bathing, care of mouth/teeth, toileting, grooming, dressing, etc )  - Assess/evaluate cause of self-care deficits   - Assess range of motion  - Assess patient's mobility; develop plan if impaired  - Assess patient's need for assistive devices and provide as appropriate  - Encourage maximum independence but intervene and supervise when necessary  - Involve family in performance of ADLs  - Assess for home care needs following discharge   - Consider OT consult to assist with ADL evaluation and planning for discharge  - Provide patient education as appropriate  Outcome: Progressing  Goal: Maintains/Returns to pre admission functional level  Description: INTERVENTIONS:  - Perform BMAT or MOVE assessment daily    - Set and communicate daily mobility goal to care team and patient/family/caregiver     - Collaborate with rehabilitation services on mobility goals if consulted  - Out of bed for toileting  - Record patient progress and toleration of activity level   Outcome: Progressing     Problem: DISCHARGE PLANNING  Goal: Discharge to home or other facility with appropriate resources  Description: INTERVENTIONS:  - Identify barriers to discharge w/patient and caregiver  - Arrange for needed discharge resources and transportation as appropriate  - Identify discharge learning needs (meds, wound care, etc )  - Arrange for interpretive services to assist at discharge as needed  - Refer to Case Management Department for coordinating discharge planning if the patient needs post-hospital services based on physician/advanced practitioner order or complex needs related to functional status, cognitive ability, or social support system  Outcome: Progressing     Problem: Knowledge Deficit  Goal: Patient/family/caregiver demonstrates understanding of disease process, treatment plan, medications, and discharge instructions  Description: Complete learning assessment and assess knowledge base    Interventions:  - Provide teaching at level of understanding  - Provide teaching via preferred learning methods  Outcome: Progressing     Problem: METABOLIC, FLUID AND ELECTROLYTES - ADULT  Goal: Electrolytes maintained within normal limits  Description: INTERVENTIONS:  - Monitor labs and assess patient for signs and symptoms of electrolyte imbalances  - Administer electrolyte replacement as ordered  - Monitor response to electrolyte replacements, including repeat lab results as appropriate  - Instruct patient on fluid and nutrition as appropriate  Outcome: Progressing  Goal: Fluid balance maintained  Description: INTERVENTIONS:  - Monitor labs   - Monitor I/O and WT  - Instruct patient on fluid and nutrition as appropriate  - Assess for signs & symptoms of volume excess or deficit  Outcome: Progressing     Problem: SKIN/TISSUE INTEGRITY - ADULT  Goal: Skin Integrity remains intact(Skin Breakdown Prevention)  Description: Assess:  -Perform [place] assessment every -Clean and moisturize skin every    -Inspect skin when repositioning, toileting, and assisting with ADLS  -Assess under medical devices such as   every    -Assess extremities for adequate circulation and sensation     Bed Management:  -Have minimal linens on bed & keep smooth, unwrinkled  -Change linens as needed when moist or perspiring  -Avoid sitting or lying in one position for more than   hours while in bed  -Keep HOB at  degrees     Toileting:  -Offer bedside commode  -Assess for incontinence every    -Use incontinent care products after each incontinent episode such as      Activity:  -Mobilize patient   times a day  -Encourage activity and walks on unit  -Encourage or provide ROM exercises   -Turn and reposition patient every   Hours  -Use appropriate equipment to lift or move patient in bed  -Instruct/ Assist with weight shifting every   when out of bed in chair  -Consider limitation of chair time   hour intervals    Skin Care:  -Avoid use of baby powder, tape, friction and shearing, hot water or constrictive clothing  -Relieve pressure over bony prominences using    -Do not massage red bony areas    Next Steps:  -Teach patient strategies to minimize risks such as     -Consider consults to  interdisciplinary teams such as    Outcome: Progressing  Goal: Incision(s), wounds(s) or drain site(s) healing without S/S of infection  Description: INTERVENTIONS  - Assess and document dressing, incision, wound bed, drain sites and surrounding tissue  - Provide patient and family education  - Perform skin care/dressing changes every    Outcome: Progressing  Goal: Pressure injury heals and does not worsen  Description: Interventions:  - Implement low air loss mattress or specialty surface (Criteria met)  - Apply silicone foam dressing  - Instruct/assist with weight shifting every   minutes when in chair   - Limit chair time to    hour intervals  - Use special pressure reducing interventions such as   when in chair   - Apply fecal or urinary incontinence containment device   - Perform passive or active ROM every    - Turn and reposition patient & offload bony prominences every   hours   - Utilize friction reducing device or surface for transfers   - Consider consults to  interdisciplinary teams such as    - Use incontinent care products after each incontinent episode such as    - Consider nutrition services referral as needed  Outcome: Progressing     Problem: HEMATOLOGIC - ADULT  Goal: Maintains hematologic stability  Description: INTERVENTIONS  - Assess for signs and symptoms of bleeding or hemorrhage  - Monitor labs  - Administer supportive blood products/factors as ordered and appropriate  Outcome: Progressing     Problem: MOBILITY - ADULT  Goal: Maintain or return to baseline ADL function  Description: INTERVENTIONS:  -  Assess patient's ability to carry out ADLs; assess patient's baseline for ADL function and identify physical deficits which impact ability to perform ADLs (bathing, care of mouth/teeth, toileting, grooming, dressing, etc )  - Assess/evaluate cause of self-care deficits   - Assess range of motion  - Assess patient's mobility; develop plan if impaired  - Assess patient's need for assistive devices and provide as appropriate  - Encourage maximum independence but intervene and supervise when necessary  - Involve family in performance of ADLs  - Assess for home care needs following discharge   - Consider OT consult to assist with ADL evaluation and planning for discharge  - Provide patient education as appropriate  Outcome: Progressing  Goal: Maintains/Returns to pre admission functional level  Description: INTERVENTIONS:  - Perform BMAT or MOVE assessment daily    - Set and communicate daily mobility goal to care team and patient/family/caregiver     - Collaborate with rehabilitation services on mobility goals if consulted  - Out of bed for toileting  - Record patient progress and toleration of activity level Outcome: Progressing     Problem: Nutrition/Hydration-ADULT  Goal: Nutrient/Hydration intake appropriate for improving, restoring or maintaining nutritional needs  Description: Monitor and assess patient's nutrition/hydration status for malnutrition  Collaborate with interdisciplinary team and initiate plan and interventions as ordered  Monitor patient's weight and dietary intake as ordered or per policy  Utilize nutrition screening tool and intervene as necessary  Determine patient's food preferences and provide high-protein, high-caloric foods as appropriate       INTERVENTIONS:  - Monitor oral intake, urinary output, labs, and treatment plans  - Assess nutrition and hydration status and recommend course of action  - Evaluate amount of meals eaten  - Assist patient with eating if necessary   - Allow adequate time for meals  - Recommend/ encourage appropriate diets, oral nutritional supplements, and vitamin/mineral supplements  - Order, calculate, and assess calorie counts as needed  - Recommend, monitor, and adjust tube feedings and TPN/PPN based on assessed needs  - Assess need for intravenous fluids  - Provide specific nutrition/hydration education as appropriate  - Include patient/family/caregiver in decisions related to nutrition  Outcome: Progressing

## 2022-08-08 NOTE — ASSESSMENT & PLAN NOTE
Bilateral as per imaging  Urology consult appreciated, recommends to place Cage, and reassess with ultrasound in 48-72 hours to see any improvement  Initial plan was to place Cage, which patient was refusing-reports [facility] will discuss this issue [facility]   Ultrasound of right upper quadrant shows improvement and hydronephrosis on right side of the kidney

## 2022-08-08 NOTE — DISCHARGE SUMMARY
[address]  Discharge- Shira Ochoa 1975, 51 y o  female MRN: [id number]  Unit/Bed#: -[place] Encounter: [id number]  Primary Care Provider: Jay Oakley MD   Date and time admitted to hospital: 8/4/2022 12:23 PM    Bacteremia  Assessment & Plan  Both sets of blood culture is growing polymicrobial  Id consult appreciated, antibiotic adjusted   repeat blood culture drawn on 08/06/2022 -no growth in 24 hours  echocardiogram to rule out vegetation-no vegetation    Osteomyelitis of left foot ([address] )  Assessment & Plan  MRI shows:Status post Chopart amputation with prominent plantar skin ulceration and T1 marrow replacement signal at the distal aspect of the calcaneus with corresponding T2 signal changes as above and concerning for osteomyelitis    Arterial duplex shows peripheral arterial disease which remained stable  Id consult appreciated  Podiatry consult appreciated, recommends to consult with General surgery versus vascular surgery for possible BKA      Patient be transferred to [facility] for possible BKA    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 due to underlying condition, limited to breakdown of skin [facility])  Assessment & Plan  Lab Results   Component Value Date    HGBA1C 7 1 (H) 08/04/2022           Recent Labs     08/07/22  2051 08/08/22  0726 08/08/22  0803 08/08/22  1132   POCGLU 110 88 99 228*       Blood Sugar Average: Last 72 hrs:  (P) 182 5625   Patient is on insulin pump, continue patient's home insulin pump   MRI of the foot shows Charcot -will need outpatient follow-up with podiatry for Charcot reconstruction  Arterial duplex shows PAD, remained stable compared to 2 years ago  Podiatry recommendation appreciated-needs outpatient  reconstruction    * Sepsis [facility])  Assessment & Plan  Unknown etiology-suspect renal in origin as well as diabetic foot  Patient met criteria with tachycardia, tachypnea, elevated WBC and elevated lactic acid  Continue IV antibiotic  Blood culture shows growth of polymicrobial agents,  CT scan abdomen pelvis-bilateral hydroureteronephrosis,  MRI of the left foot shows osteomyelitis  Id consult appreciated-repeat blood culture from 08/06/2022, shows no growth in 24 hours, today echo did not show any vegetations  Podiatry recommendation appreciated, recommends to consult with General surgery versus transfer the patient to tertiary center for BKA  Patient be transferred to [facility] under slim service with General surgery consultation, both accepting physician is aware about the case  Gallbladder sludge  Assessment & Plan  As per ultrasound  Continue to monitor    Hydroureteronephrosis  Assessment & Plan  Bilateral as per imaging  Urology consult appreciated, recommends to place Cage, and reassess with ultrasound in 48-72 hours to see any improvement  Initial plan was to place Cage, which patient was refusing-reports she will discuss this issue after going to [facility] with specialist   Ultrasound of right upper quadrant shows improvement and hydronephrosis on right side of the kidney  Diabetic ulcer of left midfoot associated with diabetes mellitus due to underlying condition, with fat layer exposed [facility])  Assessment & Plan  Lab Results   Component Value Date    HGBA1C 7 1 (H) 08/04/2022           Recent Labs     08/07/22  2051 08/08/22  0726 08/08/22  0803 08/08/22  1132   POCGLU 110 88 99 228*       Blood Sugar Average: Last 72 hrs:  (P) 182 5625   Continue patient's insulin pump (Dexcom G6-patient use insulin as per up to 150 units subQ daily via insulin pump as well as Levemir 58 units subQ at nighttime)-as per her own endocrinologist last note, note left in patient chart  follow hypoglycemia precaution  Continue IV antibiotic, follow wound care, podiatry recommendation  MRI of the foot shows early signs of osteomyelitis, podiatry consult appreciated, patient will need left BKA  Patient also has PAD recommends to consult with General surgery versus transfer the patient-will try to transfer the patient to tertiary center for multispecialty evaluation from Podiatry, vascular    Patient had history CHOPART AMPUTATION LEFT  FOOT: (Left)-Achilles tenotomy left foot (Left- on 10/30/20)    Patient be transferred to [facility] for possible left BKA    Cellulitis of left foot  Assessment & Plan  Antibiotic adjusted since blood culture positive  ID recommendation appreciated  MRI of the foot shows suspected osteomyelitis, podiatry recommendation appreciated, recommends to discuss with General surgery versus vascular surgery for possible BKA    Arterial duplex shows peripheral arterial disease, which remained stable  Continue IV antibiotic  Patient be transferred to [facility] for possible left BKA        Acute on chronic anemia  Assessment & Plan  Hemoglobin varies from 7 6-11  Hemoglobin dropped to 7 3, recheck, status post transfuse 1 unit of PRBC    Patient previously received blood transfusion  Patient and iron panel done in 2020,   Continue iron supplement  Lab Results   Component Value Date    IRON 23 (L) 08/07/2022    TIBC 241 (L) 08/07/2022    FERRITIN 79 08/07/2022     Lab Results   Component Value Date    SWHLOCCK19 409 08/04/2022     Lab Results   Component Value Date    FOLATE 12 3 08/04/2022             Medical Problems             Resolved Problems  Date Reviewed: 8/7/2022          Resolved    Gastroenteritis 8/6/2022     Resolved by  Jen Andres MD    Electrolyte imbalance 8/6/2022     Resolved by  Jen Andres MD              Discharging Physician / Practitioner: Jen Andres MD  PCP: Lauren Lemon MD  Admission Date:   Admission Orders (From admission, onward)     Ordered        08/04/22 1547  INPATIENT ADMISSION  Once                      Discharge/Transfer Date: 08/08/22    Disposition:   Transfer to:  [facility]  Reason for Transfer:  Patient need left BKA for osteomyelitis  Accepting Provider at [facility] [place]: Dr Celia Diaz , (Dr Patricia Dozier is also aware about this patient)    Consultations During Hospital Stay:  · Infectious disease  · Podiatry  · Urology    Procedures Performed:   XR chest 1 view portable    Result Date: 8/4/2022  Narrative: CHEST INDICATION:   sob  COMPARISON:  7/31/2020 EXAM PERFORMED/VIEWS:  XR CHEST PORTABLE FINDINGS: Cardiomediastinal silhouette appears unremarkable  The lungs are clear  No pneumothorax or pleural effusion  Osseous structures appear within normal limits for patient age  Impression: No acute cardiopulmonary disease  Workstation performed: [id number]     XR foot 2 vw left    Result Date: 8/4/2022  Narrative: LEFT FOOT INDICATION:   r/o osteomyelitis  COMPARISON:  None VIEWS:  XR FOOT 2 VW LEFT FINDINGS: Previous partial left foot amputation  Remaining bony remnant no bony destruction nor periosteal reaction  Increased distal soft tissue swelling and decreased ulcer  There is no acute fracture or dislocation  No significant degenerative changes  No lytic or blastic osseous lesion  Impression: No acute osseous abnormality nor osteomyelitis  Workstation performed: [id number]     XR foot 3+ vw right    Result Date: 8/4/2022  Narrative: RIGHT FOOT INDICATION:   r/o osteomyelitis  COMPARISON:  None VIEWS:  XR FOOT 3+ VW RIGHT FINDINGS: Distal 1st metatarsal and great toe amputation  Midfoot neuropathic arthropathy  Vascular calcifications  Suggestion of plantar soft tissue ulcer with intact appearing overlying bony architecture without bony destruction nor periosteal reaction  There is no acute fracture or dislocation  Calcaneal spur No lytic or blastic osseous lesion  Impression: No acute osseous abnormality nor osteomyelitis   Workstation performed: [id number]     MRI ankle/heel right  wo contrast    Result Date: 8/5/2022  Narrative: MRI ANKLE/HEEL RIGHT WO CONTRAST INDICATION:   MRI foot/forefoot toes right wo contrast  COMPARISON:  Right foot plain film dated 422 TECHNIQUE:   MR sequences were obtained of the ankle including: Localizers, coronal STIR, coronal T1, axial T2 fat sat, axial PD, sagittal T2 fat sat, sagittal T1 sequences were obtained  Gadolinium was not used  FINDINGS: Subcutaneous Tissues: Plantar skin ulceration identified in the region of cuboid without gross evidence of abscess collection in this unenhanced study  Tiny soft tissue focus of susceptibility artifacts suggestive of previous intervention or penetrating  injury at the site of skin ulceration  No findings to indicate soft tissue emphysema on correlative plain films  Joint Effusion: None  TENDONS: Achilles tendon: Normal  Peroneus brevis and longus: Normal  Posterior tibialis, flexor digitorum longus, flexor hallucis longus: Normal  Anterior tibialis, extensor digitorum longus, extensor hallucis longus:  Normal  LIGAMENTS: Lateral collateral ligament complex:  Normal  Distal tibiofibular syndesmosis:  Normal  Medial collateral ligament complex:  Normal  Plantar Fascia:  Normal  Articular Surfaces:  Normal  Sinus Tarsi:  Normal  Tarsal Tunnel: Unremarkable  Bones:  Charcot arthropathy with rocker-bottom deformity  Mild marrow edema involving the cuboid without significant T1 replacement signal to indicate osteomyelitis at this time  Musculature:  Normal      Impression: Charcot arthropathy with a plantar skin ulceration at the level of the cuboid  There is mild hyperemia in this region without T1 replacement signal to indicate osteomyelitis at this time  No abscess collection identified in this unenhanced   Workstation performed: [id number]     MRI ankle/heel left  wo contrast    Result Date: 8/5/2022  Narrative: MRI ANKLE/HEEL LEFT WO CONTRAST INDICATION:   MRI foot/forefoot toes left wo contrast  COMPARISON:  Plain film dated 8/4/2022 TECHNIQUE:   MR sequences were obtained of the ankle including: Localizers, coronal STIR, coronal T1, axial T2 fat sat, axial PD, sagittal T2 fat sat, sagittal T1 sequences were obtained  Gadolinium was not used  FINDINGS: Subcutaneous Tissues: Normal  Joint Effusion: None  TENDONS: Achilles tendon: Partial resection at the critical zone  Peroneus brevis and longus: Normal  Posterior tibialis, flexor digitorum longus, flexor hallucis longus: Normal  Anterior tibialis, extensor digitorum longus, extensor hallucis longus:  Normal  LIGAMENTS: Lateral collateral ligament complex:  Normal  Distal tibiofibular syndesmosis:  Normal  Medial collateral ligament complex:  Normal  Plantar Fascia:  Normal  Articular Surfaces:  Normal  Sinus Tarsi:  Normal  Tarsal Tunnel: Unremarkable  Bones:  Patient is status post Chopart amputation  Prominent plantar skin ulceration at the level of the distal calcaneus with small sinus tract noted on series 6 image 9  There is some T1 replacement signal at the distal calcaneus measuring up to 9 mm from the cortical surface with corresponding T2 signal changes measuring up to 9 mm well  Findings are concerning for osteomyelitis  No drainable abscess collection identified Musculature:  Normal      Impression: Status post Chopart amputation with prominent plantar skin ulceration and T1 marrow replacement signal at the distal aspect of the calcaneus with corresponding T2 signal changes as above and concerning for osteomyelitis  No evidence of drainable abscess in this unenhanced study  The study was marked in [facility] for immediate notification  Workstation performed: [id number]     VAS lower limb arterial duplex, complete bilateral    Result Date: 8/5/2022  Narrative:  [facility] REPORT CLINICAL: Indications:  Patient presents with bilateral diabetic ulcer on feet  Patient uses a walker and denies claudication  Operative History: Left transmetatarsal amputation Right toe amputation Risk Factors The patient has history of Diabetes (IDDM)   Clinical Right Pressure: IV present, Left Pressure:  127/ mm Hg  FINDINGS:  Right                  Impression       Waveform   PSV (cm/s)  Post  Tibial                            Triphasic              Ant  Tibial                             Triphasic              Common Femoral Artery  Diffuse Disease                    179  Prox Profunda                                              98  Prox SFA                                                  114  Mid SFA                                                   132  Dist SFA                                                   96  Proximal Pop                                               89  Distal Pop                                                 70  Tibioperoneal                                              77  Dist Post Tibial                                           90  Dist  Ant  Tibial                                          63  Dist Peroneal                                              50   Left                   Impression       Waveform   PSV (cm/s)  Post  Tibial                            Triphasic              Ant  Tibial                             Triphasic              Common Femoral Artery  Diffuse Disease                    130  Prox Profunda                                              78  Prox SFA                                                  125  Mid SFA                                                   112  Dist SFA                                                  112  Proximal Pop                                              117  Distal Pop                                                 83  Tibioperoneal                                              78  Dist Post Tibial                                          104  Dist  Ant   Tibial                                         103  Dist Peroneal          Not visualized                             CONCLUSION: Impression: RIGHT LOWER LIMB: Diffuse disease noted throughout the femoral-popliteal arteries without significant focal stenosis  Ankle/Brachial index:  1 31  which is in the normal category (Prior 1 58 ) PVR/ PPG tracings are normal  Metatarsal and great toe pressure not obtained due to amputation   LEFT LOWER LIMB: Diffuse disease noted throughout the femoral-popliteal arteries without significant focal stenosis  Ankle/Brachial index:  0 91  which is in the normal category (Prior 1 37  ) PVR/ PPG tracings are normal  Metatarsal and great toe pressure not obtained due to amputation  Compared to previous study on 10/28/20 , there is no significant change  SIGNATURE: Electronically Signed by: Mustapha Pond on 2022-08-05 03:38:08 PM    US right upper quadrant    Result Date: 8/6/2022  Narrative: RIGHT UPPER QUADRANT ULTRASOUND INDICATION:     sepsis, gallbladder sludge  Sludge versus stone noted on prior CT  COMPARISON:  CT from 8/4/2022; renal ultrasound from 11/3/2020 TECHNIQUE:   Real-time ultrasound of the right upper quadrant was performed with a curvilinear transducer with both volumetric sweeps and still imaging techniques  FINDINGS: PANCREAS:  Pancreas is partially obscured by bowel gas    AORTA AND IVC:  Visualized portions are normal for patient age  LIVER: Size:  Enlarged  The liver measures 20 0 cm in the midclavicular line  This may be related to Riedel's type morphology Contour:  Surface contour is smooth  Parenchyma:  Mild increased echogenicity is seen suggesting fatty infiltration which is heterogeneous probably related to artifact  Periportal echogenicity is still visible  No liver mass identified  Limited imaging of the main portal vein shows it to be patent and hepatopetal   Portal venous flow is somewhat phasic  BILIARY: Low level echoes in the gallbladder suggests sludge  No shadowing stone can be identified  No intrahepatic biliary dilatation  CBD measures 5 0 mm  No choledocholithiasis  KIDNEY: Right kidney measures 12 4 x 4 7 x 4 9 cm   Volume 150 6 mL Mild hydronephrosis noted on CT appears to have resolved in the right kidney  ASCITES:   None  Impression: Gallbladder sludge without stones  No secondary signs of cholecystitis  Mild increased echogenicity of liver  Enlarged right lobe may be related to Riedel's variant and/or mild fatty infiltration  Workstation performed: [id number]     CT abdomen pelvis w contrast    Result Date: 8/4/2022  Narrative: CT ABDOMEN AND PELVIS WITH IV CONTRAST INDICATION:   Sepsis sepsis  COMPARISON:  None  TECHNIQUE:  CT examination of the abdomen and pelvis was performed  Axial, sagittal, and coronal 2D reformatted images were created from the source data and submitted for interpretation  Radiation dose length product (DLP) for this visit:  1163 mGy-cm   This examination, like all CT scans performed in the [facility], was performed utilizing techniques to minimize radiation dose exposure, including the use of iterative reconstruction and automated exposure control  IV Contrast:  85 mL of iohexol (OMNIPAQUE) Enteric Contrast:  Enteric contrast was not administered  FINDINGS: ABDOMEN LOWER CHEST: 2 mm pulmonary nodules in the right upper lobe are seen  Based on current Fleischner Society 2017 Guidelines on incidental pulmonary nodule, no routine follow-up is needed if the patient is low risk  If the patient is high risk, optional follow-up chest CT at 12 months can be considered  LIVER/BILIARY TREE:  Unremarkable  GALLBLADDER:  Increased density within the gallbladder which may represent sludge versus stones SPLEEN:  Unremarkable  PANCREAS:  Unremarkable  ADRENAL GLANDS:  Unremarkable  KIDNEYS/URETERS:  Bilateral hydroureteronephrosis without evidence of obstructing stone  Delayed bilateral nephrograms are seen  STOMACH AND BOWEL:  There is colonic diverticulosis without evidence of acute diverticulitis  APPENDIX:  No findings to suggest appendicitis  ABDOMINOPELVIC CAVITY:  No ascites  No pneumoperitoneum  No lymphadenopathy  VESSELS:  Unremarkable for patient's age  PELVIS REPRODUCTIVE ORGANS:  Left-sided cystic adnexa measuring 2 5 cm URINARY BLADDER:  Unremarkable  ABDOMINAL WALL/INGUINAL REGIONS:  Unremarkable  OSSEOUS STRUCTURES:  No acute fracture or destructive osseous lesion  Impression: Bilateral hydroureteronephrosis without evidence of obstructing stone  Findings may represent recently passed stone versus infection  Left-sided cystic adnexa  Liquid stool within the colon which may represent diarrheal illness The study was marked in EPIC for immediate notification  Workstation performed: [id number]     Echo complete w/ contrast if indicated    Result Date: 8/8/2022  · Narrative:   Left Ventricle: Left ventricular cavity size is normal  Wall thickness is mildly increased  The left ventricular ejection fraction is 55%  Systolic function is normal  Wall motion is normal  Diastolic function is normal    Mitral Valve: There is trace regurgitation    Tricuspid Valve: There is trace regurgitation    Pulmonic Valve: There is trace regurgitation    No prior study available for comparison     ·     Significant Findings / Test Results:   Lab Results   Component Value Date    WBC 7 71 08/08/2022    HGB 8 9 (L) 08/08/2022    HCT 29 1 (L) 08/08/2022    MCV 71 (L) 08/08/2022     08/08/2022   ·   Lab Results   Component Value Date    SODIUM 137 08/08/2022    K 3 7 08/08/2022     08/08/2022    CO2 26 08/08/2022    AGAP 9 08/08/2022    BUN 13 08/08/2022    CREATININE 1 02 08/08/2022    GLUC 143 (H) 08/08/2022    CALCIUM 8 8 08/08/2022    AST 20 08/08/2022    ALT 14 08/08/2022    ALKPHOS 170 (H) 08/08/2022    TP 7 5 08/08/2022    TBILI 0 36 08/08/2022    EGFR 65 08/08/2022   ·   Collected Updated Procedure Result Status Patient Facility Result Comment    08/06/2022 0926 08/07/2022 1603 Blood culture [[id number]]   Blood from Arm, Right    Preliminary result [address]  Component Value   Blood Culture No Growth at 24 hrs  P             08/06/2022 0608 08/07/2022 1603 Blood culture [[id number]]   Blood from Arm, Left    Preliminary result [address]  Component Value   Blood Culture No Growth at 24 hrs  P             08/04/2022 1839 08/08/2022 0825 Wound culture and Gram stain [[id number]]    (Abnormal)   Wound from Foot, Left    Preliminary result [address]  Component Value   Wound Culture 2+ Growth of Proteus mirabilis Abnormal  P    This organism has been edited  The previous result was Gram Negative Alexandru on 8/5/2022 at 1245 EDT      2+ Growth of Staphylococcus aureus Abnormal  P    2+ Growth of P    Mixed Skin Chantell   Gram Stain Result Rare Disintegrating polys Abnormal  P    2+ Gram positive cocci in pairs and chains Abnormal  P    1+ Gram negative rods Abnormal  P         Susceptibility      Proteus mirabilis (1)    Antibiotic Interpretation Microscan  Method Status    Ampicillin ($$) Susceptible <=8 00 ug/ml YULIYA Preliminary    Aztreonam ($$$)  Susceptible <=4 ug/ml YULIYA Preliminary    Cefazolin ($) Susceptible <=2 00 ug/ml YULIYA Preliminary    Ciprofloxacin ($)  Susceptible <=0 25 ug/ml YULIYA Preliminary    Gentamicin ($$) Susceptible <=2 ug/ml YULIYA Preliminary    Levofloxacin ($) Susceptible <=0 50 ug/ml YULIYA Preliminary    Tetracycline Resistant >8 ug/ml YULIYA Preliminary    Tobramycin ($) Susceptible <=2 ug/ml YULIYA Preliminary    Trimethoprim + Sulfamethoxazole ($$$) Susceptible <=0 5/9 5 ug/ml YULIYA Preliminary      Staphylococcus aureus (3)    Antibiotic Interpretation Microscan  Method Status    Ampicillin ($$) Resistant >8 00 ug/ml YULIYA Preliminary    Cefazolin ($) Susceptible <=4 00 ug/ml YULIYA Preliminary    Clindamycin ($) Susceptible <=0 25 ug/ml YULIYA Preliminary    Erythromycin ($$$$) Susceptible <=0 25 ug/ml YULIYA Preliminary    Gentamicin ($$) Susceptible <=1 ug/ml YULIYA Preliminary    Oxacillin Susceptible 0 50 ug/ml YULIYA Preliminary    Tetracycline Susceptible <=2 ug/ml YULIYA Preliminary    Trimethoprim + Sulfamethoxazole ($$$) Susceptible <=0 5/9 5 ug/ml YULIYA Preliminary    Vancomycin ($) Susceptible 1 00 ug/ml YULIYA Preliminary     Condensed View         08/04/2022 1245 08/06/2022 1321 Urine culture [[id number]]    (Abnormal)   Urine, Clean Catch    Final result [address]  Component Value   Urine Culture 60,000-69,000 cfu/ml Escherichia coli Abnormal     10,000-19,000 cfu/ml     Mixed Contaminants X2         Susceptibility      Escherichia coli (1)    Antibiotic Interpretation Microscan  Method Status    Ampicillin ($$) Susceptible <=8 00 ug/ml YULIYA Final    Aztreonam ($$$)  Susceptible <=4 ug/ml YULIYA Final    Cefazolin ($) Susceptible <=2 00 ug/ml YULIYA Final    Ciprofloxacin ($)  Susceptible <=0 25 ug/ml YULIYA Final    Gentamicin ($$) Susceptible <=2 ug/ml YULIYA Final    Levofloxacin ($) Susceptible <=0 50 ug/ml YULIYA Final    Nitrofurantoin Susceptible <=32 ug/ml YULIYA Final    Tetracycline Susceptible <=4 ug/ml YULIYA Final    Tobramycin ($) Susceptible <=2 ug/ml YULIYA Final    Trimethoprim + Sulfamethoxazole ($$$) Susceptible <=0 5/9 5 ug/ml YULIYA Final          08/04/2022 1242 08/08/2022 0944 Blood culture #1 [[id number]]    (Abnormal)   Blood from Arm, Left    Preliminary result [address]  Component Value   Blood Culture Proteus mirabilis Abnormal  P    This organism has been edited  The previous result was Proteus species on 8/6/2022 at 1027 EDT  Beta Hemolytic Streptococcus Group B Abnormal  P    This organism has been edited  The previous result was Beta Hemolytic Streptococcus Group B on 8/6/2022 at 1027 EDT  This organism has been edited  The previous result was Streptococcus agalactiae (Group B) on 8/8/2022 at [address] EDT      Morganella morganii Abnormal  P    see related culture for Identification and/or Susceptibilitiy   Gram Stain Result Gram positive cocci in pairs and chains Abnormal  P    Refer to Blood Culture Identification Panel results   This is an appended report  These results have been appended to a previously preliminary verified report  Gram negative rods Abnormal  P    Refer to Blood Culture Identification Panel results   This is an appended report  These results have been appended to a previously preliminary verified report  Susceptibility      Proteus mirabilis (1)    Antibiotic Interpretation Microscan  Method Status    Ampicillin ($$) Susceptible <=8 00 ug/ml YULIYA Preliminary    Aztreonam ($$$)  Susceptible <=4 ug/ml YULIYA Preliminary    Cefazolin ($) Susceptible <=2 00 ug/ml YUILYA Preliminary    Ciprofloxacin ($)  Susceptible <=0 25 ug/ml YULIYA Preliminary    Gentamicin ($$) Susceptible <=2 ug/ml YULIYA Preliminary    Levofloxacin ($) Susceptible <=0 50 ug/ml YULIYA Preliminary    Tetracycline Resistant >8 ug/ml YULIYA Preliminary    Tobramycin ($) Susceptible <=2 ug/ml YULIYA Preliminary    Trimethoprim + Sulfamethoxazole ($$$) Susceptible <=0 5/9 5 ug/ml YULIYA Preliminary      Beta Hemolytic Streptococcus Group B (2)    Antibiotic Interpretation Microscan  Method Status    Penicillin Susceptible 0 064 ug/ml YULIYA Preliminary    Ceftriaxone ($$) Susceptible 0 09 ug/ml YULIYA Preliminary    Vancomycin ($) Susceptible 0 38 ug/ml YULIYA Preliminary     Condensed View         08/04/2022 1242 08/08/2022 0944 Blood culture #2 [[id number]]    (Abnormal)   Blood from Arm, Right    Final result [address]  Component Value   Blood Culture Proteus mirabilis Abnormal     see related culture for Identification and/or Susceptibilitiy   This organism has been edited  The previous result was Proteus species on 8/6/2022 at 1037 EDT  Streptococcus agalactiae (Group B) Abnormal     see related culture for Identification and/or Susceptibilitiy   This organism has been edited  The previous result was Beta Hemolytic Streptococcus Group B on 8/6/2022 at 1037 EDT      Morganella morganii Abnormal    Gram Stain Result Gram positive cocci in pairs and chains Abnormal     This is an appended report  These results have been appended to a previously preliminary verified report  Gram negative rods Abnormal     This is an appended report  These results have been appended to a previously preliminary verified report  Susceptibility      Proteus mirabilis (1)    Antibiotic Interpretation Microscan  Method Status    ZID Performed  Yes  YULIYA Final      Morganella morganii (3)    Antibiotic Interpretation Microscan  Method Status    ZID Performed  Yes  YULIYA Final    Amoxicillin + Clavulanate Resistant >16/8 ug/ml YULIYA Final    Ampicillin ($$) Resistant >16 00 ug/ml YULIYA Final    Ampicillin + Sulbactam ($) Intermediate 16/8 ug/ml YULIYA Final    Aztreonam ($$$)  Susceptible <=4 ug/ml YLUIYA Final    Cefazolin ($) Resistant >16 00 ug/ml YULIYA Final    Ceftazidime ($$) Susceptible <=1 ug/ml YULIYA Final    Ceftriaxone ($$) Susceptible <=1 00 ug/ml YULIYA Final    Cefuroxime ($$) Resistant 16 ug/ml YULIYA Final    Ciprofloxacin ($)  Intermediate 0 50 ug/ml YULIYA Final    Ertapenem ($$$) Susceptible <=0 5 ug/ml YULIYA Final    Gentamicin ($$) Susceptible <=2 ug/ml YULIYA Final    Imipenem Resistant 4 ug/ml YULIYA Final    Levofloxacin ($) Susceptible <=0 50 ug/ml YULIYA Final    Meropenem ($$) Susceptible <=1 00 ug/ml YULIYA Final    Piperacillin + Tazobactam ($$$) Susceptible <=8 ug/ml YULIYA Final    Tetracycline Resistant >8 ug/ml YULIYA Final    Tobramycin ($) Susceptible <=2 ug/ml YULIYA Final    Trimethoprim + Sulfamethoxazole ($$$) Resistant >2/38 ug/ml YULIYA Final     Condensed View         08/04/2022 1242 08/04/2022 1342 FLU/RSV/COVID - if FLU/RSV clinically relevant [[id number]]    Nares from Nose    Final result [id number] [facility] Guidelines URL to browser: [email or web address]   SARS-CoV-2 assay is a Nucleic Acid Amplification assay intended for the qualitative detection of nucleic acid from SARS-CoV-2 in nasopharyngeal   swabs  Results are for the presumptive identification of SARS-CoV-2 RNA  Positive results are indicative of infection with SARS-CoV-2, the virus   causing COVID-19, but do not rule out bacterial infection or co-infection   with other viruses  Laboratories within the [place] and its   territories are required to report all positive results to the appropriate   public health authorities  Negative results do not preclude SARS-CoV-2   infection and should not be used as the sole basis for treatment or other   patient management decisions  Negative results must be combined with   clinical observations, patient history, and epidemiological information  This test has not been FDA cleared or approved  This test has been authorized by FDA under an Emergency Use Authorization   (EUA)  This test is only authorized for the duration of time the   declaration that circumstances exist justifying the authorization of the   emergency use of an in vitro diagnostic tests for detection of SARS-CoV-2   virus and/or diagnosis of COVID-19 infection under section 564(b)(1) of   the Act, 21 U  S C  448CSK-3(O)(0), unless the authorization is terminated   or revoked sooner  The test has been validated but independent review by FDA   and CLIA is pending  Test performed using [employer] GeneXpert: This RT-PCR assay targets N2,   a region unique to SARS-CoV-2  A conserved region in the E-gene was chosen   for pan-Sarbecovirus detection which includes SARS-CoV-2      Component Value   SARS-CoV-2 Negative   INFLUENZA A PCR Negative   INFLUENZA B PCR Negative   RSV PCR Negative          08/04/2022 1242 08/08/2022 0944 Blood Culture Identification Panel [[id number]]    (Abnormal)   Blood from Arm, Left    Preliminary result [address] Film Array panel tests for 11 gram positive organisms, 15 gram negative organisms, 7 yeast species and 10 resistance genes  Component Value   Bacteroides fragilis Detected Abnormal  P    Metronidazole 500 mg q8h recommended; consider adding ceftriaxone due to high likelihood of polymicrobial infection    Proteus Detected Abnormal  P    Ceftriaxone recommended; consider cefepime if critically ill    Streptococcus agalactiae (Group B) Detected Abnormal  P    Penicillin or cefazolin recommended                    Incidental Findings:   · Gallbladder sludge without stones   · Osteomyelitis of left foot  · Bilateral ureteral hydronephrosis    Test Results Pending at Discharge (will require follow up): · Blood culture     Outpatient Tests Requested:  · None    Complications:  None    Reason for Admission:  Nausea vomiting, fever    Hospital Course: Adrian Dominguez is a 51 y o  female patient who originally presented to the hospital on 8/4/2022 due to sepsis secondary to most likely osteomyelitis as well as hydroureteronephrosis  Patient started on IV hydration as well as IV antibiotic  Later on antibiotic adjusted as per ID recommendation  Patient was evaluated by ID, Podiatry, Urology due to bilateral hydroureteronephrosis  ID and podiatry recommendation to transfer the patient to tertiary center for left BKA  Patient be transfer to [facility] for left BKA  Follow-up blood culture, and patient will need to re-evaluate by ID  Patient also evaluated by Urology for bilateral hydroureteronephrosis, initially patient refuse to place Cage while remain in [facility], but reports he she will discuss this issue after going to [facility] with the specialist     Patient be transferred to [facility], accepting physician is Dr Leonor Byrne    Please see above list of diagnoses and related plan for additional information  Condition at Discharge: stable    Discharge Day Visit / Exam:   Subjective:  Seen and evaluated during the round  Resting comfortably    Denies any significant complaint  Vitals: Blood Pressure: 153/85 (08/08/22 1257)  Pulse: 80 (08/08/22 1257)  Temperature: 98 1 °F (36 7 °C) (08/08/22 0728)  Temp Source: Oral (08/07/22 1637)  Respirations: 21 (08/08/22 0728)  Height: 5' 3" (160 cm) (08/05/22 1403)  Weight - Scale: 111 kg (244 lb 9 6 oz) (08/07/22 0600)  SpO2: 92 % (08/08/22 1257)  Exam:   Physical Exam  Vitals and nursing note reviewed  Constitutional:       Appearance: Normal appearance  She is obese  She is not ill-appearing or diaphoretic  HENT:      Nose: Nose normal  No congestion or rhinorrhea  Mouth/Throat:      Mouth: Mucous membranes are dry  Pharynx: Oropharynx is clear  No posterior oropharyngeal erythema  Eyes:      General: No scleral icterus  Extraocular Movements: Extraocular movements intact  Conjunctiva/sclera: Conjunctivae normal       Pupils: Pupils are equal, round, and reactive to light  Neck:      Vascular: No carotid bruit  Cardiovascular:      Rate and Rhythm: Normal rate  Heart sounds: Normal heart sounds  No murmur heard  No friction rub  No gallop  Pulmonary:      Effort: Pulmonary effort is normal  No respiratory distress  Breath sounds: No stridor  No wheezing or rhonchi  Abdominal:      General: Abdomen is flat  Bowel sounds are normal  There is no distension  Palpations: There is no mass  Tenderness: There is no abdominal tenderness  Hernia: No hernia is present  Musculoskeletal:         General: Deformity (Amputation of left foot, amputation of few toes on the right) present  No signs of injury  Cervical back: Normal range of motion  No rigidity or tenderness  Left lower leg: No edema  Comments: Patient has documented ulcer of the diabetic foot in both feet as documented in imaging section   Lymphadenopathy:      Cervical: No cervical adenopathy  Skin:     General: Skin is warm  Neurological:      General: No focal deficit present        Mental Status: She is alert and oriented to person, place, and time  Cranial Nerves: No cranial nerve deficit  Sensory: No sensory deficit  Motor: No weakness  Coordination: Coordination normal    Psychiatric:         Mood and Affect: Mood normal          Discussion with Family: With patient  Discharge Statement:  I spent >35 minutes discharging the patient  This time was spent on the day of discharge  I had direct contact with the patient on the day of discharge  Greater than 50% of the total time was spent examining patient, answering all patient questions, arranging and discussing plan of care with patient as well as directly providing post-discharge instructions  Additional time then spent on discharge activities      ** Please Note: This note may have been constructed using a voice recognition system **

## 2022-08-08 NOTE — ASSESSMENT & PLAN NOTE
Unknown etiology-suspect renal in origin as well as diabetic foot  Patient met criteria with tachycardia, tachypnea, elevated WBC and elevated lactic acid  Continue IV antibiotic  Blood culture shows growth of polymicrobial agents,  CT scan abdomen pelvis-bilateral hydroureteronephrosis,  MRI of the left foot shows osteomyelitis  Id consult appreciated-repeat blood culture from 08/06/2022, shows no growth in 24 hours, today echo did not show any vegetations  Podiatry recommendation appreciated, recommends to consult with General surgery versus transfer the patient to tertiary center for BKA  Patient be transferred to [facility] under slim service with General surgery consultation, both accepting physician is aware about the case

## 2022-08-08 NOTE — NURSING NOTE
Peripheral IVs remain in place CDI dressings  Report called to receiving facility to [facility]  All questions answered  Pt belongings reviewed and sent with pt  Pt's personal insulin pump currently running at time of transfer

## 2022-08-08 NOTE — ASSESSMENT & PLAN NOTE
MRI shows:Status post Chopart amputation with prominent plantar skin ulceration and T1 marrow replacement signal at the distal aspect of the calcaneus with corresponding T2 signal changes as above and concerning for osteomyelitis    Arterial duplex shows peripheral arterial disease which remained stable  Id consult appreciated  Podiatry consult appreciated, recommends to consult with General surgery versus vascular surgery for possible BKA      Patient be transferred to [facility] for possible BKA

## 2022-08-08 NOTE — ASSESSMENT & PLAN NOTE
Hemoglobin 8 9, did require 1 unit PRBC transfused  No evidence of bleed, not on anticoagulation or antiplatelets prior to admission  Iron panel consistent with iron deficiency

## 2022-08-08 NOTE — ASSESSMENT & PLAN NOTE
Both sets of blood culture is growing polymicrobial  Id consult appreciated, antibiotic adjusted   repeat blood culture drawn on 08/06/2022 -no growth in 24 hours  echocardiogram to rule out vegetation-no vegetation

## 2022-08-08 NOTE — ASSESSMENT & PLAN NOTE
Lab Results   Component Value Date    HGBA1C 7 1 (H) 08/04/2022     Prior A1c controlled  Currently uses insulin pump, reviewed patient insulin pump, uses about 100 units daily over past 7 days  Will plan to transition to basal bolus given potential surgery  Start patient on lantus 20 units BID, humalog 15 units with meals  Sliding scale, Accu-Cheks

## 2022-08-08 NOTE — ASSESSMENT & PLAN NOTE
Lab Results   Component Value Date    HGBA1C 7 1 (H) 08/04/2022           Recent Labs     08/07/22 2051 08/08/22  0726 08/08/22  0803 08/08/22  1132   POCGLU 110 88 99 228*       Blood Sugar Average: Last 72 hrs:  (P) 182 5625   Continue patient's insulin pump (Dexcom G6-patient use insulin as per up to 150 units subQ daily via insulin pump as well as Levemir 58 units subQ at nighttime)-as per her own endocrinologist last note, note left in patient chart  follow hypoglycemia precaution  Continue IV antibiotic, follow wound care, podiatry recommendation  MRI of the foot shows early signs of osteomyelitis, podiatry consult appreciated, patient will need left BKA    Patient also has PAD recommends to consult with General surgery versus transfer the patient-will try to transfer the patient to tertiary center for multispecialty evaluation from Podiatry, vascular    Patient had history CHOPART AMPUTATION LEFT  FOOT: (Left)-Achilles tenotomy left foot (Left- on 10/30/20)    Patient be transferred to [facility] for possible left BKA

## 2022-08-08 NOTE — ASSESSMENT & PLAN NOTE
2/2 blood cultures were initially positive on 08/04 for Proteus is consistent with wound cultures  Repeat blood cultures on 08/06 currently negative at 48 hours  ID is consulted, and following  Currently on IV Rocephin

## 2022-08-08 NOTE — ASSESSMENT & PLAN NOTE
Hemoglobin varies from 7 6-11  Hemoglobin dropped to 7 3, recheck, status post transfuse 1 unit of PRBC    Patient previously received blood transfusion  Patient and iron panel done in 2020,   Continue iron supplement  Lab Results   Component Value Date    IRON 23 (L) 08/07/2022    TIBC 241 (L) 08/07/2022    FERRITIN 79 08/07/2022     Lab Results   Component Value Date    [id number] 409 08/04/2022     Lab Results   Component Value Date    FOLATE 12 3 08/04/2022

## 2022-08-08 NOTE — ASSESSMENT & PLAN NOTE
Renal function stable, initial imaging noted hydroureteronephrosis  Previously urology was consulted, with recommendations for Cage though patient has declined  Hydronephrosis appeared to resolve on repeat ultrasound on 08/08

## 2022-08-08 NOTE — ASSESSMENT & PLAN NOTE
MRI shows:Status post Chopart amputation with prominent plantar skin ulceration and T1 marrow replacement signal at the distal aspect of the calcaneus with corresponding T2 signal changes as above and concerning for osteomyelitis    Arterial duplex shows peripheral arterial disease which remained stable  Id consult appreciated  Podiatry consult appreciated, recommends to consult with General surgery versus vascular surgery for possible BKA      Patient be transferred to [facility] for possible BKA

## 2022-08-08 NOTE — ASSESSMENT & PLAN NOTE
Antibiotic adjusted since blood culture positive  ID recommendation appreciated  MRI of the foot shows suspected osteomyelitis, podiatry recommendation appreciated, recommends to discuss with General surgery versus vascular surgery for possible BKA    Arterial duplex shows peripheral arterial disease, which remained stable  Continue IV antibiotic  Patient be transferred to [facility] for possible left BKA

## 2022-08-08 NOTE — ASSESSMENT & PLAN NOTE
Patient initially admitted at [address] [place] [address] on 08/04 found to be septic, with known left lower extremity ulcer and wound concerning for osteomyelitis with cellulitis  Podiatry evaluated patient recommending left BKA  Transferred to [facility] for surgery evaluation for likely left BKA  Imaging reviewed consistent with osteomyelitis  Currently on IV Rocephin and flagyl  Plan to consult surgery, Infectious Disease

## 2022-08-08 NOTE — H&P
Leatha 47  H&P- Jing Alcantar 1975, 51 y o  female MRN: [id number]  Unit/Bed#: [place] [address] -01 Encounter: [id number]  Primary Care Provider: Ruth Ann Patrick MD   Date and time admitted to hospital: No admission date for patient encounter      * Osteomyelitis of left foot [facility])  Assessment & Plan  Patient initially admitted at [address] [place] [address] on 08/04 found to be septic, with known left lower extremity ulcer and wound concerning for osteomyelitis with cellulitis  Podiatry evaluated patient recommending left BKA  Transferred to [facility] for surgery evaluation for likely left BKA  Imaging reviewed consistent with osteomyelitis  Currently on IV Rocephin and flagyl  Plan to consult surgery, Infectious Disease    Gallbladder sludge  Assessment & Plan  Noted on previous imaging, without symptoms    Bacteremia  Assessment & Plan  2/2 blood cultures were initially positive on 08/04 for Proteus is consistent with wound cultures  Repeat blood cultures on 08/06 currently negative at 48 hours  ID is consulted, and following  Currently on IV Rocephin      Hydroureteronephrosis  Assessment & Plan  Renal function stable, initial imaging noted hydroureteronephrosis  Previously urology was consulted, with recommendations for Cage though patient has declined  Hydronephrosis appeared to resolve on repeat ultrasound on 08/08    Acute on chronic anemia  Assessment & Plan  Hemoglobin 8 9, did require 1 unit PRBC transfused  No evidence of bleed, not on anticoagulation or antiplatelets prior to admission  Iron panel consistent with iron deficiency    Controlled type 2 diabetes mellitus with neurologic complication, with long-term current use of insulin ([facility])  Assessment & Plan  Lab Results   Component Value Date    HGBA1C 7 1 (H) 08/04/2022     Prior A1c controlled  Currently uses insulin pump, reviewed patient insulin pump, uses about 100 units daily over past 7 days  Will plan to transition to basal bolus given potential surgery  Start patient on lantus 20 units BID, humalog 15 units with meals  Sliding scale, Accu-Cheks    VTE Prophylaxis: Heparin  / sequential compression device   Code Status: FC  POLST: There is no POLST form on file for this patient (pre-hospital)  Discussion with family: patient    Anticipated Length of Stay:  Patient will be admitted on an Inpatient basis with an anticipated length of stay of 2 midnights  Justification for Hospital Stay: Surgery evaluation for BKA    Total Time for Visit, including Counseling / Coordination of Care: 45 minutes  Greater than 50% of this total time spent on direct patient counseling and coordination of care  Chief Complaint:   Transfer for Surgery Evaluation    History of Present Illness: Foreign Melendez is a 51 y o  female who presented to [address] [place] [address] initially on 08/04 initial symptoms of nausea vomiting and diarrhea found to be with sepsis  Initially suspected due to left lower extremity with diabetic ulcer  Wound cultures grew Proteus, blood cultures mentioned did also grow Proteus bacteremia  Evaluate by Infectious Disease and Podiatry and treated with IV antibiotics  Repeat blood cultures have been negative  MRI of the foot was completed, showing osteomyelitis  After Podiatry evaluated, suspect that patient's foot also had unstable Charcot foot with prior hallux amputation  It was recommended for patient to be evaluated for BKA  And patient was transfer from [facility] to [facility]  Denies any fevers, chills, nausea or vomiting  Review of Systems:    Review of Systems   Constitutional: Negative for activity change, appetite change, chills and fever  HENT: Negative for congestion, facial swelling, sinus pain and sore throat  Respiratory: Negative for cough, shortness of breath and wheezing  Cardiovascular: Negative for chest pain, palpitations and leg swelling     Gastrointestinal: Negative for abdominal distention, abdominal pain, constipation, diarrhea, nausea and vomiting  Endocrine: Negative for cold intolerance, heat intolerance, polydipsia, polyphagia and polyuria  Genitourinary: Negative for dysuria, flank pain and urgency  Skin: Negative for color change and pallor  Neurological: Negative for dizziness, syncope, weakness, light-headedness and headaches  Psychiatric/Behavioral: Negative for agitation, confusion and dysphoric mood  Past Medical and Surgical History:     Past Medical History:   Diagnosis Date    Charcot's joint of foot, left     Diabetes mellitus ([facility] )     Osteomyelitis of foot, right, acute ([facility] )        Past Surgical History:   Procedure Laterality Date    FOOT AMPUTATION Left 10/30/2020    Procedure: CHOPART AMPUTATION LEFT  FOOT:;  Surgeon: Kj Mondragon DPM;  Location:  MAIN OR;  Service: Podiatry    FOOT CAPSULE RELEASE W/ PERCUTANEOUS HEEL CORD LENGTHENING, TIBIAL TENDON TRANSFER Left 10/30/2020    Procedure: Achilles tenotomy left foot;  Surgeon: Kj Mondragon DPM;  Location:  MAIN OR;  Service: Podiatry    FOOT SURGERY Right     right first toe amputation  2019       Meds/Allergies:    Prior to Admission medications    Medication Sig Start Date End Date Taking?  Authorizing Provider   ACCU-CHEK FASTCLIX LANCETS MISC by Does not apply route    Historical Provider, MD   ferrous sulfate 325 (65 Fe) mg tablet Take 325 mg by mouth daily with breakfast    Historical Provider, MD   gabapentin (NEURONTIN) 300 mg capsule Take 1 capsule (300 mg total) by mouth 3 (three) times a day 11/7/20 4/13/21  Binu Borrero MD   polyethylene glycol (MIRALAX) 17 g packet Take 17 g by mouth daily    Historical Provider, MD   Syringe/Needle, Disp, 30G X 1/2" 1 ML MISC by Does not apply route    Historical Provider, MD   insulin glargine (LANTUS) 100 units/mL subcutaneous injection Continue prior to admission dose  Patient not taking: Reported on 8/4/2022 9/9/20 8/4/22  [address]     I have reviewed home medications using allscripts  Allergies: Allergies   Allergen Reactions    Clindamycin Other (See Comments)     CHEST PRESSURE, FEELS LIKE A HEART ATTACK PER PT       Social History:     Marital Status: /Civil Union    Occupation:   Patient Pre-hospital Living Situation: home  Patient Pre-hospital Level of Mobility: amb  Patient Pre-hospital Diet Restrictions: diabetic  Substance Use History:   Social History     Substance and Sexual Activity   Alcohol Use Never     Social History     Tobacco Use   Smoking Status Never Smoker   Smokeless Tobacco Never Used     Social History     Substance and Sexual Activity   Drug Use Never       Family History:    Family History   Problem Relation Age of Onset    Diabetes Brother     Hyperlipidemia Brother     Hypertension Brother     Diabetes Mother     Hypertension Father     Diabetes Sister        Physical Exam:     Vitals:        Physical Exam  Vitals and nursing note reviewed  Constitutional:       Appearance: Normal appearance  She is normal weight  HENT:      Head: Normocephalic and atraumatic  Eyes:      General: No scleral icterus  Conjunctiva/sclera: Conjunctivae normal    Cardiovascular:      Rate and Rhythm: Normal rate and regular rhythm  Heart sounds: Normal heart sounds  Pulmonary:      Effort: Pulmonary effort is normal  No respiratory distress  Breath sounds: No wheezing  Abdominal:      General: Bowel sounds are normal  There is no distension  Palpations: Abdomen is soft  Musculoskeletal:         General: Deformity (left and right foot) present  No swelling  Right lower leg: No edema  Left lower leg: No edema  Skin:     General: Skin is warm and dry  Neurological:      General: No focal deficit present  Mental Status: She is alert and oriented to person, place, and time  Mental status is at baseline           Additional Data:     Lab Results: I have personally reviewed pertinent reports  Results from last 7 days   Lab Units 08/08/22  0847 08/07/22  0446 08/06/22  0607 08/04/22  1242   WBC Thousand/uL 7 71 5 99   < > 12 83*   HEMOGLOBIN g/dL 8 9* 7 3*   < > 8 9*   HEMATOCRIT % 29 1* 24 8*   < > 30 4*   PLATELETS Thousands/uL 268 223   < > 296   BANDS PCT %  --   --   --  6   NEUTROS PCT %  --  66   < >  --    LYMPHS PCT %  --  22   < >  --    LYMPHO PCT %  --   --   --  5*   MONOS PCT %  --  7   < >  --    MONO PCT %  --   --   --  0*   EOS PCT %  --  2   < > 1    < > = values in this interval not displayed  Results from last 7 days   Lab Units 08/08/22  0847   SODIUM mmol/L 137   POTASSIUM mmol/L 3 7   CHLORIDE mmol/L 102   CO2 mmol/L 26   BUN mg/dL 13   CREATININE mg/dL 1 02   ANION GAP mmol/L 9   CALCIUM mg/dL 8 8   ALBUMIN g/dL 2 3*   TOTAL BILIRUBIN mg/dL 0 36   ALK PHOS U/L 170*   ALT U/L 14   AST U/L 20   GLUCOSE RANDOM mg/dL 143*     Results from last 7 days   Lab Units 08/04/22  1242   INR  1 07     Results from last 7 days   Lab Units 08/08/22  1636 08/08/22  1132 08/08/22  0803 08/08/22  0726 08/07/22  2051 08/07/22  1554 08/07/22  1102 08/07/22  0712 08/06/22  2130 08/06/22  1612 08/06/22  1058 08/06/22  0837   POC GLUCOSE mg/dl 291* 228* 99 88 110 167* 147* 137 240* 190* 181* 140     Results from last 7 days   Lab Units 08/04/22  2107   HEMOGLOBIN A1C % 7 1*     Results from last 7 days   Lab Units 08/05/22  0535 08/04/22  1242   LACTIC ACID mmol/L  --  1 9   PROCALCITONIN ng/ml 56 43* 0 55*       Imaging: I have personally reviewed pertinent reports  No orders to display       EKG, Pathology, and Other Studies Reviewed on Admission:   · EKG: sinus tachycardia    Allscripts / Epic Records Reviewed: Yes     ** Please Note: This note has been constructed using a voice recognition system   **

## 2022-08-08 NOTE — ASSESSMENT & PLAN NOTE
Lab Results   Component Value Date    HGBA1C 7 1 (H) 08/04/2022           Recent Labs     08/07/22 2051 08/08/22  0726 08/08/22  0803 08/08/22  1132   POCGLU 110 88 99 228*       Blood Sugar Average: Last 72 hrs:  (P) 182 5698   Patient is on insulin pump, continue patient's home insulin pump   MRI of the foot shows Charcot -will need outpatient follow-up with podiatry for Charcot reconstruction  Arterial duplex shows PAD, remained stable compared to 2 years ago  Podiatry recommendation appreciated-needs outpatient  reconstruction

## 2022-08-09 LAB
ALBUMIN SERPL BCP-MCNC: 2.4 G/DL (ref 3.5–5)
ALP SERPL-CCNC: 177 U/L (ref 46–116)
ALT SERPL W P-5'-P-CCNC: 18 U/L (ref 12–78)
ANION GAP SERPL CALCULATED.3IONS-SCNC: 9 MMOL/L (ref 4–13)
ANISOCYTOSIS BLD QL SMEAR: PRESENT
AST SERPL W P-5'-P-CCNC: 13 U/L (ref 5–45)
B FRAGILIS DNA BLD POS QL NAA+NON-PROBE: DETECTED
BACTERIA BLD CULT: ABNORMAL
BACTERIA WND AEROBE CULT: ABNORMAL
BASOPHILS # BLD MANUAL: 0 THOUSAND/UL (ref 0–0.1)
BASOPHILS NFR MAR MANUAL: 0 % (ref 0–1)
BILIRUB SERPL-MCNC: 0.36 MG/DL (ref 0.2–1)
BUN SERPL-MCNC: 16 MG/DL (ref 5–25)
CALCIUM ALBUM COR SERPL-MCNC: 10.5 MG/DL (ref 8.3–10.1)
CALCIUM SERPL-MCNC: 9.2 MG/DL (ref 8.3–10.1)
CHLORIDE SERPL-SCNC: 98 MMOL/L (ref 96–108)
CO2 SERPL-SCNC: 26 MMOL/L (ref 21–32)
CREAT SERPL-MCNC: 1.1 MG/DL (ref 0.6–1.3)
EOSINOPHIL # BLD MANUAL: 0.35 THOUSAND/UL (ref 0–0.4)
EOSINOPHIL NFR BLD MANUAL: 5 % (ref 0–6)
ERYTHROCYTE [DISTWIDTH] IN BLOOD BY AUTOMATED COUNT: 18.8 % (ref 11.6–15.1)
GFR SERPL CREATININE-BSD FRML MDRD: 59 ML/MIN/1.73SQ M
GLUCOSE SERPL-MCNC: 141 MG/DL (ref 65–140)
GLUCOSE SERPL-MCNC: 199 MG/DL (ref 65–140)
GLUCOSE SERPL-MCNC: 238 MG/DL (ref 65–140)
GLUCOSE SERPL-MCNC: 357 MG/DL (ref 65–140)
GLUCOSE SERPL-MCNC: 361 MG/DL (ref 65–140)
GP B STREP DNA BLD POS QL NAA+NON-PROBE: DETECTED
GRAM STN SPEC: ABNORMAL
HCT VFR BLD AUTO: 31.7 % (ref 34.8–46.1)
HGB BLD-MCNC: 9.6 G/DL (ref 11.5–15.4)
LYMPHOCYTES # BLD AUTO: 2.29 THOUSAND/UL (ref 0.6–4.47)
LYMPHOCYTES # BLD AUTO: 33 % (ref 14–44)
MCH RBC QN AUTO: 21.5 PG (ref 26.8–34.3)
MCHC RBC AUTO-ENTMCNC: 30.3 G/DL (ref 31.4–37.4)
MCV RBC AUTO: 71 FL (ref 82–98)
MONOCYTES # BLD AUTO: 0.21 THOUSAND/UL (ref 0–1.22)
MONOCYTES NFR BLD: 3 % (ref 4–12)
NEUTROPHILS # BLD MANUAL: 4.09 THOUSAND/UL (ref 1.85–7.62)
NEUTS SEG NFR BLD AUTO: 59 % (ref 43–75)
PLATELET # BLD AUTO: 293 THOUSANDS/UL (ref 149–390)
PLATELET BLD QL SMEAR: ADEQUATE
PMV BLD AUTO: 10.4 FL (ref 8.9–12.7)
POTASSIUM SERPL-SCNC: 4.3 MMOL/L (ref 3.5–5.3)
PROT SERPL-MCNC: 8 G/DL (ref 6.4–8.4)
PROTEUS SP DNA BLD POS QL NAA+NON-PROBE: DETECTED
RBC # BLD AUTO: 4.47 MILLION/UL (ref 3.81–5.12)
SODIUM SERPL-SCNC: 133 MMOL/L (ref 135–147)
WBC # BLD AUTO: 6.94 THOUSAND/UL (ref 4.31–10.16)

## 2022-08-09 PROCEDURE — 99232 SBSQ HOSP IP/OBS MODERATE 35: CPT | Performed by: SURGERY

## 2022-08-09 PROCEDURE — 85027 COMPLETE CBC AUTOMATED: CPT | Performed by: STUDENT IN AN ORGANIZED HEALTH CARE EDUCATION/TRAINING PROGRAM

## 2022-08-09 PROCEDURE — 85025 COMPLETE CBC W/AUTO DIFF WBC: CPT | Performed by: STUDENT IN AN ORGANIZED HEALTH CARE EDUCATION/TRAINING PROGRAM

## 2022-08-09 PROCEDURE — 82948 REAGENT STRIP/BLOOD GLUCOSE: CPT

## 2022-08-09 PROCEDURE — 99232 SBSQ HOSP IP/OBS MODERATE 35: CPT | Performed by: PHYSICIAN ASSISTANT

## 2022-08-09 PROCEDURE — 80053 COMPREHEN METABOLIC PANEL: CPT | Performed by: STUDENT IN AN ORGANIZED HEALTH CARE EDUCATION/TRAINING PROGRAM

## 2022-08-09 PROCEDURE — 99255 IP/OBS CONSLTJ NEW/EST HI 80: CPT | Performed by: STUDENT IN AN ORGANIZED HEALTH CARE EDUCATION/TRAINING PROGRAM

## 2022-08-09 PROCEDURE — 85007 BL SMEAR W/DIFF WBC COUNT: CPT | Performed by: STUDENT IN AN ORGANIZED HEALTH CARE EDUCATION/TRAINING PROGRAM

## 2022-08-09 RX ORDER — ONDANSETRON 2 MG/ML
4 INJECTION INTRAMUSCULAR; INTRAVENOUS ONCE AS NEEDED
Status: CANCELLED | OUTPATIENT
Start: 2022-08-09

## 2022-08-09 RX ORDER — INSULIN GLARGINE 100 [IU]/ML
30 INJECTION, SOLUTION SUBCUTANEOUS EVERY 12 HOURS SCHEDULED
Status: DISCONTINUED | OUTPATIENT
Start: 2022-08-09 | End: 2022-08-10 | Stop reason: HOSPADM

## 2022-08-09 RX ORDER — SODIUM CHLORIDE 9 MG/ML
125 INJECTION, SOLUTION INTRAVENOUS CONTINUOUS
Status: CANCELLED | OUTPATIENT
Start: 2022-08-09

## 2022-08-09 RX ORDER — HYDROMORPHONE HCL/PF 1 MG/ML
0.5 SYRINGE (ML) INJECTION
Status: CANCELLED | OUTPATIENT
Start: 2022-08-09

## 2022-08-09 RX ORDER — MEPERIDINE HYDROCHLORIDE 25 MG/ML
12.5 INJECTION INTRAMUSCULAR; INTRAVENOUS; SUBCUTANEOUS
Status: CANCELLED | OUTPATIENT
Start: 2022-08-09

## 2022-08-09 RX ORDER — FENTANYL CITRATE/PF 50 MCG/ML
25 SYRINGE (ML) INJECTION
Status: CANCELLED | OUTPATIENT
Start: 2022-08-09

## 2022-08-09 RX ORDER — POLYETHYLENE GLYCOL 3350 17 G/17G
17 POWDER, FOR SOLUTION ORAL DAILY
Status: DISCONTINUED | OUTPATIENT
Start: 2022-08-09 | End: 2022-08-10 | Stop reason: HOSPADM

## 2022-08-09 RX ADMIN — METRONIDAZOLE 500 MG: 500 TABLET ORAL at 13:06

## 2022-08-09 RX ADMIN — GABAPENTIN 300 MG: 300 CAPSULE ORAL at 08:01

## 2022-08-09 RX ADMIN — INSULIN LISPRO 15 UNITS: 100 INJECTION, SOLUTION INTRAVENOUS; SUBCUTANEOUS at 17:49

## 2022-08-09 RX ADMIN — INSULIN LISPRO 8 UNITS: 100 INJECTION, SOLUTION INTRAVENOUS; SUBCUTANEOUS at 12:32

## 2022-08-09 RX ADMIN — INSULIN GLARGINE 20 UNITS: 100 INJECTION, SOLUTION SUBCUTANEOUS at 08:15

## 2022-08-09 RX ADMIN — GABAPENTIN 300 MG: 300 CAPSULE ORAL at 22:15

## 2022-08-09 RX ADMIN — Medication 250 MG: at 17:49

## 2022-08-09 RX ADMIN — GABAPENTIN 300 MG: 300 CAPSULE ORAL at 16:24

## 2022-08-09 RX ADMIN — CEFEPIME HYDROCHLORIDE 2000 MG: 2 INJECTION, POWDER, FOR SOLUTION INTRAVENOUS at 11:12

## 2022-08-09 RX ADMIN — METRONIDAZOLE 500 MG: 500 TABLET ORAL at 22:15

## 2022-08-09 RX ADMIN — INSULIN GLARGINE 30 UNITS: 100 INJECTION, SOLUTION SUBCUTANEOUS at 22:16

## 2022-08-09 RX ADMIN — DOCUSATE SODIUM 100 MG: 100 CAPSULE, LIQUID FILLED ORAL at 08:01

## 2022-08-09 RX ADMIN — INSULIN LISPRO 15 UNITS: 100 INJECTION, SOLUTION INTRAVENOUS; SUBCUTANEOUS at 08:15

## 2022-08-09 RX ADMIN — METRONIDAZOLE 500 MG: 500 TABLET ORAL at 05:17

## 2022-08-09 RX ADMIN — Medication 250 MG: at 08:00

## 2022-08-09 RX ADMIN — DOCUSATE SODIUM 100 MG: 100 CAPSULE, LIQUID FILLED ORAL at 17:49

## 2022-08-09 RX ADMIN — POLYETHYLENE GLYCOL 3350 17 G: 17 POWDER, FOR SOLUTION ORAL at 09:11

## 2022-08-09 RX ADMIN — INSULIN LISPRO 16 UNITS: 100 INJECTION, SOLUTION INTRAVENOUS; SUBCUTANEOUS at 08:13

## 2022-08-09 RX ADMIN — INSULIN LISPRO 15 UNITS: 100 INJECTION, SOLUTION INTRAVENOUS; SUBCUTANEOUS at 12:32

## 2022-08-09 RX ADMIN — CEFTRIAXONE SODIUM 2000 MG: 10 INJECTION, POWDER, FOR SOLUTION INTRAVENOUS at 22:15

## 2022-08-09 NOTE — UTILIZATION REVIEW
Initial Clinical Review    Admission: Date/Time/Statement:   Admission Orders (From admission, onward)     Ordered        08/08/22 1859  Inpatient Admission  Once                      Orders Placed This Encounter   Procedures    Inpatient Admission     Standing Status:   Standing     Number of Occurrences:   1     Order Specific Question:   Level of Care     Answer:   Med Surg [16]     Order Specific Question:   Estimated length of stay     Answer:   More than 2 Midnights     Order Specific Question:   Certification     Answer:   I certify that inpatient services are medically necessary for this patient for a duration of greater than two midnights  See H&P and MD Progress Notes for additional information about the patient's course of treatment  Initial Presentation: 51 y o  female presents as a transfer from [address] [place] [address] to [facility] for surgical evaluation for L BKA  Pt was originally admitted with N/V/D and found to be septic from LLE diabetic ulcer with Proteus in wound and in blood cultures  She was treated with IV antibiotics  MRI Foot showed OM  Podiatry notes Charcot foot with prior hallux amputation  PMH: IDDM, hydroureteronephrosis, Anemia, GB sludge  She is admitted to INPATIENT status with OM L foot - Surgery consult for L BKA  Bacteremia  - ID Consult, IV antibiotics  Acute on chronic anemia - stable post 1 u PRBC transfusion  IDDM - SSI cover  8/8 Surgery Consult - Sepsis and bacteremia with L heel ulcer and OM  Will do L BKA, IV antibiotics continues  Date: 8/9   Day 2:   Monitor glucose and managing  Feels depressed with potential loss of her leg  Daughter's wedding is in 1 week  Otherwise stable at this point  Surgery date TBD       8/9 ID Consult - Sepsis, bacteremia, OM  Will stop IV Ceftriaxone and start IV Cefepime and oral Flagyl  L plantar ulceration with distal calcaneal OM - will have L BKA    R plantar foot ulceration - possible surgery for Charcot reconstruction in future  IDDM - good glucose control, will not use insulin pump        ED Triage Vitals   Temperature Pulse Respirations Blood Pressure SpO2   08/08/22 1825 08/08/22 1825 08/08/22 2126 08/08/22 1825 08/08/22 1825   98 9 °F (37 2 °C) 89 22 163/75 92 %      Temp Source Heart Rate Source Patient Position - Orthostatic VS BP Location FiO2 (%)   08/08/22 2146 -- 08/08/22 2146 08/08/22 2146 --   Oral  Lying Right arm       Pain Score       08/08/22 2019       No Pain          Wt Readings from Last 1 Encounters:   08/09/22 106 kg (233 lb 11 oz)     Additional Vital Signs:   08/09/22 09:25:20 98 2 °F (36 8 °C) 92 20 169/87 114 96 % -- --   08/09/22 0900 -- -- -- -- -- -- None (Room air) --   08/08/22 2146 98 3 °F (36 8 °C) 89 22 172/87 Abnormal  115 95 % None (Room air) Lying   08/08/22 21:26:59 98 3 °F (36 8 °C) 89 22 172/87 Abnormal  115 95 % -- --     Pertinent Labs/Diagnostic Test Results:     Results from last 7 days   Lab Units 08/08/22  2115 08/04/22  1242   SARS-COV-2  Negative Negative     Results from last 7 days   Lab Units 08/09/22  0511 08/08/22  0847 08/07/22  0446 08/06/22  1641 08/06/22  0607 08/04/22  1242   WBC Thousand/uL 6 94 7 71 5 99  --  6 22 12 83*   HEMOGLOBIN g/dL 9 6* 8 9* 7 3* 8 2* 7 3* 8 9*   HEMATOCRIT % 31 7* 29 1* 24 8* 27 8* 24 0* 30 4*   PLATELETS Thousands/uL 293 268 223  --  214 296   NEUTROS ABS Thousands/µL  --   --  3 94  --  4 83  --    BANDS PCT %  --   --   --   --   --  6         Results from last 7 days   Lab Units 08/09/22  0511 08/08/22  0847 08/07/22  0446 08/06/22  0607 08/05/22  0535 08/04/22  1245 08/04/22  1242   SODIUM mmol/L 133* 137 138 136 135   < >  --    POTASSIUM mmol/L 4 3 3 7 3 4* 3 6 3 7   < >  --    CHLORIDE mmol/L 98 102 103 102 102   < >  --    CO2 mmol/L 26 26 25 26 22   < >  --    ANION GAP mmol/L 9 9 10 8 11   < >  --    BUN mg/dL 16 13 13 15 17   < >  --    CREATININE mg/dL 1 10 1 02 1 13 1 20 1 27   < >  --    EGFR ml/min/1 73sq m 59 65 58 53 50   < >  --    CALCIUM mg/dL 9 2 8 8 8 4 7 8* 7 9*   < >  --    MAGNESIUM mg/dL  --   --   --   --  2 1  --  1 2*   PHOSPHORUS mg/dL  --   --   --   --  3 2  --  2 5*    < > = values in this interval not displayed       Results from last 7 days   Lab Units 08/09/22  0511 08/08/22  0847 08/07/22  0446 08/06/22  0607 08/05/22  0535   AST U/L 13 20 16 13 12   ALT U/L 18 14 13 13 14   ALK PHOS U/L 177* 170* 165* 106 104   TOTAL PROTEIN g/dL 8 0 7 5 7 0 6 4 6 6   ALBUMIN g/dL 2 4* 2 3* 2 2* 2 1* 2 3*   TOTAL BILIRUBIN mg/dL 0 36 0 36 0 29 0 40 1 01*     Results from last 7 days   Lab Units 08/09/22  1102 08/09/22  0813 08/08/22  2124 08/08/22  1636 08/08/22  1132 08/08/22  0803 08/08/22  0726 08/07/22  2051 08/07/22  1554 08/07/22  1102 08/07/22  0712 08/06/22  2130   POC GLUCOSE mg/dl 238* 357* 351* 291* 228* 99 88 110 167* 147* 137 240*     Results from last 7 days   Lab Units 08/09/22  0511 08/08/22  0847 08/07/22  0446 08/06/22  0607 08/05/22  0535 08/04/22  1245   GLUCOSE RANDOM mg/dL 361* 143* 130 125 227* 228*         Results from last 7 days   Lab Units 08/04/22  2107   HEMOGLOBIN A1C % 7 1*   EAG mg/dl 157     BETA-HYDROXYBUTYRATE   Date Value Ref Range Status   09/07/2020 6 2 (H) <0 6 mmol/L Final          Results from last 7 days   Lab Units 08/04/22  1242   PH RENETTA  7 463*   PCO2 RENETTA mm Hg 29 8*   PO2 RENETTA mm Hg 144 3*   HCO3 RENETTA mmol/L 20 9*   BASE EXC RENETTA mmol/L -2 3   O2 CONTENT RENETTA ml/dL 13 2   O2 HGB, VENOUS % 96 4*             Results from last 7 days   Lab Units 08/04/22  1932 08/04/22  1429 08/04/22  1242   HS TNI 0HR ng/L  --   --  6   HS TNI 2HR ng/L  --  12  --    HSTNI D2 ng/L  --  6  --    HS TNI 4HR ng/L 11  --   --    HSTNI D4 ng/L 5  --   --          Results from last 7 days   Lab Units 08/04/22  1242   PROTIME seconds 14 0   INR  1 07   PTT seconds 23     Results from last 7 days   Lab Units 08/04/22  1245   TSH 3RD GENERATON uIU/mL 1 996     Results from last 7 days   Lab Units 08/05/22  0535 08/04/22  1242   PROCALCITONIN ng/ml 56 43* 0 55*     Results from last 7 days   Lab Units 08/04/22  1242   LACTIC ACID mmol/L 1 9             Results from last 7 days   Lab Units 08/04/22  1242   NT-PRO BNP pg/mL 175*     Results from last 7 days   Lab Units 08/07/22  0446 08/04/22  1242   FERRITIN ng/mL 79 40         Results from last 7 days   Lab Units 08/04/22  1242   LIPASE u/L 53*     Results from last 7 days   Lab Units 08/04/22  1245   CRP mg/L 111 6*             Results from last 7 days   Lab Units 08/04/22  1245   CLARITY UA  Clear   COLOR UA  Yellow   SPEC GRAV UA  1 015   PH UA  6 5   GLUCOSE UA mg/dl Negative   KETONES UA mg/dl Negative   BLOOD UA  Trace-Intact*   PROTEIN UA mg/dl 30 (1+)*   NITRITE UA  Negative   BILIRUBIN UA  Small*   UROBILINOGEN UA E U /dl 0 2   LEUKOCYTES UA  Moderate*   WBC UA /hpf 30-50*   RBC UA /hpf 4-10*   BACTERIA UA /hpf Occasional   EPITHELIAL CELLS WET PREP /hpf Occasional     Results from last 7 days   Lab Units 08/08/22  2115 08/04/22  1242   INFLUENZA A PCR  Negative Negative   INFLUENZA B PCR  Negative Negative   RSV PCR  Negative Negative     Results from last 7 days   Lab Units 08/06/22  0926 08/06/22  0608 08/04/22  1839 08/04/22  1245 08/04/22  1242   BLOOD CULTURE  No Growth at 48 hrs   No Growth at 48 hrs   --   --  Proteus mirabilis*  Beta Hemolytic Streptococcus Group B*  Morganella morganii*  Bacteroides fragilis*  Proteus mirabilis*  Streptococcus agalactiae (Group B)*  Morganella morganii*   GRAM STAIN RESULT   --   --  Rare Disintegrating polys*  2+ Gram positive cocci in pairs and chains*  1+ Gram negative rods*  --  Gram positive cocci in pairs and chains*  Gram negative rods*  Gram positive cocci in pairs and chains*  Gram negative rods*   URINE CULTURE   --   --   --  60,000-69,000 cfu/ml Escherichia coli*  10,000-19,000 cfu/ml   --    WOUND CULTURE   --   --  2+ Growth of Proteus mirabilis*  1+ Growth of Morganella morganii* 2+ Growth of Staphylococcus aureus*  2+ Growth of   --   --        Past Medical History:   Diagnosis Date    Charcot's joint of foot, left     Diabetes mellitus (HCC)     Osteomyelitis of foot, right, acute ([facility] )      Present on Admission:   Bacteremia   Gallbladder sludge   Osteomyelitis of left foot (HCC)   Hydroureteronephrosis   Acute on chronic anemia      Admitting Diagnosis: Sepsis ([facility] ) [A41 9]  Age/Sex: 51 y o  female  Admission Orders:  Scheduled Medications:  cefepime, 2,000 mg, Intravenous, Q12H  docusate sodium, 100 mg, Oral, BID  ferrous sulfate, 325 mg, Oral, Daily With Breakfast  gabapentin, 300 mg, Oral, TID  insulin glargine, 30 Units, Subcutaneous, Q12H ALEX  insulin lispro, 15 Units, Subcutaneous, TID With Meals  insulin lispro, 4-20 Units, Subcutaneous, TID AC  metroNIDAZOLE, 500 mg, Oral, Q8H ALEX  polyethylene glycol, 17 g, Oral, Daily  saccharomyces boulardii, 250 mg, Oral, BID      Continuous IV Infusions:     PRN Meds:  acetaminophen, 650 mg, Oral, Q6H PRN  ondansetron, 4 mg, Intravenous, Q4H PRN  perflutren lipid microsphere, 2 mL/min, Intravenous, Once in imaging  sodium chloride (PF), 3 mL, Intravenous, Q1H PRN    Up w/ assist   POC GLUCOSE AC/HS WITH SSI COVERAGE   Wound care to heels and buttocks  PRN transfusion parameters  IP CONSULT TO ACUTE CARE SURGERY  IP CONSULT TO INFECTIOUS DISEASES    Network Utilization Review Department  ATTENTION: Please call with any questions or concerns to [phone] and carefully listen to the prompts so that you are directed to the right person  All voicemails are confidential   Lynette Kussmaul all requests for admission clinical reviews, approved or denied determinations and any other requests to dedicated fax number below belonging to the campus where the patient is receiving treatment   List of dedicated fax numbers for the Facilities:  FACILITY NAME UR FAX NUMBER   ADMISSION DENIALS (Administrative/Medical Necessity) [phone]   PARENT CHILD HEALTH (Maternity/NICU/Pediatrics) [address] [facility] [address] [facility]  [phone]   [address]as [address] [place] [address] [address] [place] [address] [facility] [address] [facility] [address] [facility] [phone]

## 2022-08-09 NOTE — CONSULTS
Consultation - Infectious Disease   Demi Jorgenika Rashid 51 y o  female MRN: [id number]  Unit/Bed#: [facility] 2 -02 Encounter: [id number]    IMPRESSION & RECOMMENDATIONS:   1  Sepsis  Present on arrival  Tachycardia and leukocytosis  With development of fever  Secondary to polymicrobial bacteremia from polymicrobial L foot infection with underlying calcaneal osteomyelitis  No other clear source appreciated  UA was abnormal with low colony growth of e coli on culture, however given her lack of  symptoms I suspect this is asymptomatic bacteriuria  Gallbladder with sludge on abdominal imaging but no signs of acute cholecystitis  Despite being systemically ill she has remained hemodynamically stable and non toxic  Today she is afebrile  Her WBC count has trended down   -antibiotic as below  -monitor CBC and BMP  -monitor vitals  -supportive care    2  Polymicrobial bacteremia  Secondary to L plantar foot ulceration with underlying distal calcaneal osteomyelitis  Blood cultures with growth of proteus, GBS, bacteroides fragilis, and morganella morganii  Patient has been receiving IV Ceftriaxone and PO flagyl which she has been tolerating without difficulty  Her repeat blood cultures are negative >48 hours  Given there is growth of citrobacter in her foot wound, and she isn't undergoing BKA yet, I would recommend broadening antibiotic coverage to continue treatment of both the bacteremia and foot ulcer  I will transition her to Cefepime/Flagyl now   -stop IV ceftriaxone  -continue oral Flagyl  -start IV cefepime, 2g q12 hours  -monitor CBC and BMP  -monitor vitals    3  Polymicrobial L plantar foot ulceration with underlying distal calcaneal osteomyelitis  In setting of high blood sugars  Wound culture with growth of proteus, citrobacter freundii, and MSSA  Patient with previous Chopart amputation  MRI confirming distal calcaneal osteomyelitis  Given instability of foot it has been recommended she proceed with L BKA  General surgery has assessed and are in process of scheduling, however patient is refusing surgical intervention until after a family wedding next week  If patient does not have immediate BKA she will need to consider risk/benifit of extended course of IV antibiotic vs PO antibiotic    -antibiotic as above  -monitor CBC and BMP  -local wound care per podiatry  -anticipate BKA with general surgery  -continue follow up with general surgery    4  R plantar foot ulceration  Fortunately MRI imaging did not suggest underlying osteomyelitis  Podiatry is following closely and she will ultimatly require charcot reconstruction in the future    -serial R foot exams  -local wound care per podiatry  -continue close follow up with podiatry     5  Type 2 diabetes mellitus with long term insulin use  Patient's previous HbA1c was 14%  It improved to 7 1% on 8/4/2022  Elevated blood glucose is risk factor for wounds and infection  She has had previous diabetic foot ulcers require substantial B/L feet amputations  Suspect patient's high sugars have been contributing to her wounds for a significant amount of time  Recommend tight glycemic control  Patient sent her insulin pump home and glucose is being management by primary service   -blood glucose management per primary service    6  Allergy to clindamycin  Patient reports reaction of chest pressure  We will avoid this antibiotic for now   -monitor patient for adverse medication reactions    7  Morbid obesity  BMI = 41 4  We will continue to follow along with the patient  Above plan was discussed in detail with patient at the bedside, and her  on speaker phone  While she is refusing BKA today she is agreeable to scheduling in near future after her family wedding next week and is agreeable to staying inpatient for now for ongoing bacteremia treatment  Above plan was discussed in detail with SLIM      HISTORY OF PRESENT ILLNESS:  Reason for Consult: osteomyelitis of L foot, unspecified type; bacteremia  HPI: Cheryl Crespo is a 51y o  year old female who presented to the [address] [place] [address] ED on 8/4/2022 with dizziness, weakness, chills, and fever  She reported she had B/L foot plantar ulcers which had not been assessed in over a year  Upon arrival to the ED the patient's temperature was 100F  HR was 136  RR was 19  O2 saturation was 98% on room air  BP Was 153/63  Patient's WBC count was 12 83  Lactic acid was 1 9  Creatinine was 1 25 with GFR = 51  Glucose was 228  Alk phos was slightly elevated  UA with moderate leukocytes, protein, small bilirubin, tract blood, 4-10 RBC, 30-50 WBC, and occasional bacteria and epithelial cells  The urine was sent for culture  COVID-19/Flu/RSV PCR was negative  Blood cultures were sent  L plantar foot wound culture was sent  She was taken for a chest xray which showed no acute infectious cardiopulmonary findings  She completed B/L foot xrays which showed plantar foot ulcerations with soft tissue swelling  Patient was started on Zosyn  She was admitted for additional medical management  After her admission she was continued on Zosyn as her antibiotic regimen  She completed an MRI of the L foot which suggested distal calcaneal osteomyelitis  Podiatry was consulted and given instability of her L foot it was determined she would require L BKA  Wound culture and blood culture results were preliminarily polymicrobial  ID was consulted and transitioned patient's antibiotic regimen to ceftriaxone and Flagyl  Patient was then transferred to [facility] for general surgery evaluation  The patient continues on ceftriaxone and flagyl  General surgery in process of scheduling patient's BKA  We have been asked for formal consult for osteomyelitis of L foot, unspecified type, and bacteremia  The patient has DMII, charcot deformities, morbid obesity, and anemia   She has a history of L foot chopart amputation, cellulitis, R foot toe amputations, and diabetic foot ulcers  She has an allergy to clindamycin  REVIEW OF SYSTEMS:  Patient reports she's upset that she was transferred down to [facility], tells me she knows she needs surgery but she has a family wedding next week, on the 16th, and she is not agreeable to any intervention before then, in fear she will miss the wedding  She feels that if her blood infection is gone she should be able to do the surgery later  She tells me that she and  are experienced with PICC and that she will come back for ongoing care after the wedding  She reports she overall feels well today  She denies fevers, chills, sweats, shakes, nausea, vomiting, abdominal pain, cough, difficulty breathing, chest pain, and SOB  A complete 12 point system-based review of systems is otherwise negative      PAST MEDICAL HISTORY:  Past Medical History:   Diagnosis Date    Charcot's joint of foot, left     Diabetes mellitus ([facility] )     Osteomyelitis of foot, right, acute ([facility] )      Past Surgical History:   Procedure Laterality Date    FOOT AMPUTATION Left 10/30/2020    Procedure: CHOPART AMPUTATION LEFT  FOOT:;  Surgeon: Dorina Francis DPM;  Location:  MAIN OR;  Service: Podiatry    FOOT CAPSULE RELEASE W/ PERCUTANEOUS HEEL CORD LENGTHENING, TIBIAL TENDON TRANSFER Left 10/30/2020    Procedure: Achilles tenotomy left foot;  Surgeon: Dorina Francis DPM;  Location:  MAIN OR;  Service: Podiatry    FOOT SURGERY Right     right first toe amputation  2019     FAMILY HISTORY:  Non-contributory    SOCIAL HISTORY:  Social History   Social History     Substance and Sexual Activity   Alcohol Use Never     Social History     Substance and Sexual Activity   Drug Use Never     Social History     Tobacco Use   Smoking Status Never Smoker   Smokeless Tobacco Never Used     ALLERGIES:  Allergies   Allergen Reactions    Clindamycin Other (See Comments)     CHEST PRESSURE, FEELS LIKE A HEART ATTACK PER PT     MEDICATIONS:  All current active medications have been reviewed  ANTIBIOTICS:  Ceftriaxone 5  Flagyl 5  Antibiotics 6    PHYSICAL EXAM:  Temp:  [98 1 °F (36 7 °C)-98 9 °F (37 2 °C)] 98 3 °F (36 8 °C)  HR:  [79-89] 89  Resp:  [18-22] 22  BP: (153-172)/(75-93) 172/87  SpO2:  [89 %-97 %] 95 %  Temp (24hrs), Av 3 °F (36 8 °C), Min:98 1 °F (36 7 °C), Max:98 9 °F (37 2 °C)  Current: Temperature: 98 3 °F (36 8 °C)  No intake or output data in the 24 hours ending 22 0634    General Appearance:  Appearing well, nontoxic, and in no distress  She appears comfortable sitting up in bed  Head:  Normocephalic, without obvious abnormality, atraumatic  Eyes:  Conjunctiva pink and sclera anicteric, both eyes  Nose: Nares normal, mucosa normal, no drainage  Throat: Oropharynx moist without lesions  Neck: Supple, symmetrical, no adenopathy, no tenderness/mass/nodules  Back:   Symmetric, ROM normal, no CVA tenderness  Lungs:   Clear to auscultation bilaterally, respirations unlabored on room air  Chest Wall:  No tenderness or deformity  Heart:  RRR; no murmur, rub or gallop  Abdomen:   Soft, obese, non-tender, non-distended, positive bowel sounds throughout  Extremities: R foot dressing intact, no breakthrough drainage, no spreading erythema  L foot with dressing and protective foam intact, no breakthrough drainage, no spreading erythema from dressing site  Skin: No rashes noted on exposed skin  Lymph nodes: Cervical, supraclavicular nodes normal    Neurologic: Alert and oriented times 3, follows commands, speech is organized and appropriate, symmetric facial movement  LABS, IMAGING, & OTHER STUDIES:  Lab Results:  I have personally reviewed pertinent labs    Results from last 7 days   Lab Units 22  0511 22  0847 22  0446   WBC Thousand/uL 6 94 7 71 5 99   HEMOGLOBIN g/dL 9 6* 8 9* 7 3*   PLATELETS Thousands/uL 293 268 223     Results from last 7 days   Lab Units 22  0847 226 22  5993 POTASSIUM mmol/L 3 7 3 4* 3 6   CHLORIDE mmol/L 102 103 102   CO2 mmol/L 26 25 26   BUN mg/dL 13 13 15   CREATININE mg/dL 1 02 1 13 1 20   EGFR ml/min/1 73sq m 65 58 53   CALCIUM mg/dL 8 8 8 4 7 8*   AST U/L 20 16 13   ALT U/L 14 13 13   ALK PHOS U/L 170* 165* 106     Results from last 7 days   Lab Units 08/06/22  0926 08/06/22  0608 08/04/22  1839 08/04/22  1245 08/04/22  1242   BLOOD CULTURE  No Growth at 48 hrs  No Growth at 48 hrs   --   --  Proteus mirabilis*  Beta Hemolytic Streptococcus Group B*  Morganella morganii*  Bacteroides fragilis*  Proteus mirabilis*  Streptococcus agalactiae (Group B)*  Morganella morganii*   GRAM STAIN RESULT   --   --  Rare Disintegrating polys*  2+ Gram positive cocci in pairs and chains*  1+ Gram negative rods*  --  Gram positive cocci in pairs and chains*  Gram negative rods*  Gram positive cocci in pairs and chains*  Gram negative rods*   URINE CULTURE   --   --   --  60,000-69,000 cfu/ml Escherichia coli*  10,000-19,000 cfu/ml   --    WOUND CULTURE   --   --  2+ Growth of Proteus mirabilis*  1+ Growth of Citrobacter freundii*  2+ Growth of Staphylococcus aureus*  2+ Growth of   --   --      Results from last 7 days   Lab Units 08/05/22  0535 08/04/22  1242   PROCALCITONIN ng/ml 56 43* 0 55*     Results from last 7 days   Lab Units 08/04/22  1245   CRP mg/L 111 6*     Results from last 7 days   Lab Units 08/07/22  0446 08/04/22  1242   FERRITIN ng/mL 79 40     Imaging Studies:   I have personally reviewed pertinent imaging study reports and images in PACS  [facility] I personally reviewed this study which showed resolution of prior hydronephrosis  US RIGHT UPPER QUADRANT 8/6/2022 - I personally reviewed this study which showed gallbladder sludge  Patient has fatty liver  Previous R sided hydronephrosis is resolved      MRI ANKLE/HEEL RIGHT WO CONTRAST 8/5/2022 - I personally reviewed this study which showed R foot with significant charcot arthropathy  There is a plantar soft tissue ulceration without abscess  There is marrow edema involving the cuboid  MRI ANKLE/HEEL LEFT WO CONTRAST 8/5/2022 - I personally reviewed this study which showed patient is s/p chopart amputation  There is a plantar soft tissue ulceration with underlying distal calcaneal marrow replacement  No abscess  CT ABDOMEN PELVIS W CONTRAST 8/4/2022 - I personally reviewed this study which showed B/L hydronephrosis  No obvious obstructing stones  XR CHEST 1 VIEW PORTABLE 8/4/2022 - I personally reviewed this image which showed no acute infectious cardiopulmonary findings  XR FOOT 3+ VW RIGHT 8/4/2022 - I personally reviewed this image which showed a plantar soft tissue ulcer  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  Patient is s/p distal 1st metatarsal and great toe amputation  XR FOOT 2 VW LEFT 8/4/2022 - I personally reviewed this image which showed patient's is s/p chopart amputation  There is plantar ulcer with soft tissue swelling  Other Studies:   I have personally reviewed pertinent reports:    08/08/2022 2115 08/08/2022 2225 COVID/FLU/RSV [[id number]]    Nares from Nose    Final result [place] [address] - copy/paste COVID Guidelines URL to browser: [email or web address]   SARS-CoV-2 assay is a Nucleic Acid Amplification assay intended for the   qualitative detection of nucleic acid from SARS-CoV-2 in nasopharyngeal   swabs  Results are for the presumptive identification of SARS-CoV-2 RNA  Positive results are indicative of infection with SARS-CoV-2, the virus   causing COVID-19, but do not rule out bacterial infection or co-infection   with other viruses  Laboratories within the [place] and its   territories are required to report all positive results to the appropriate   public health authorities   Negative results do not preclude SARS-CoV-2 infection and should not be used as the sole basis for treatment or other   patient management decisions  Negative results must be combined with   clinical observations, patient history, and epidemiological information  This test has not been FDA cleared or approved  This test has been authorized by FDA under an Emergency Use Authorization   (EUA)  This test is only authorized for the duration of time the   declaration that circumstances exist justifying the authorization of the   emergency use of an in vitro diagnostic tests for detection of SARS-CoV-2   virus and/or diagnosis of COVID-19 infection under section 564(b)(1) of   the Act, 21 U  S C  066XXS-1(P)(6), unless the authorization is terminated   or revoked sooner  The test has been validated but independent review by FDA   and CLIA is pending  Test performed using [employer] Gene[employer]pert: This RT-PCR assay targets N2,   a region unique to SARS-CoV-2  A conserved region in the E-gene was chosen   for pan-Sarbecovirus detection which includes SARS-CoV-2      Component Value   SARS-CoV-2 Negative    INFLUENZA A PCR Negative    INFLUENZA B PCR Negative    RSV PCR Negative

## 2022-08-09 NOTE — ASSESSMENT & PLAN NOTE
· Patient initially admitted at [address] [place] [address] on 08/04 found to be septic, with known left lower extremity ulcer and wound concerning for osteomyelitis with cellulitis  · Podiatry evaluated patient recommending left BKA  · Transferred to [facility] for surgery evaluation for likely left BKA-plan for possible BKA on Thursday  · General surgery following  · Infectious Disease following, brought and antibiotics today to cefepime and Flagyl  · Will need postop PT/OT consult

## 2022-08-09 NOTE — PLAN OF CARE
Problem: PAIN - ADULT  Goal: Verbalizes/displays adequate comfort level or baseline comfort level  Description: Interventions:  - Encourage patient to monitor pain and request assistance  - Assess pain using appropriate pain scale  - Administer analgesics based on type and severity of pain and evaluate response  - Implement non-pharmacological measures as appropriate and evaluate response  - Consider cultural and social influences on pain and pain management  - Notify physician/advanced practitioner if interventions unsuccessful or patient reports new pain  Outcome: Progressing     Problem: INFECTION - ADULT  Goal: Absence or prevention of progression during hospitalization  Description: INTERVENTIONS:  - Assess and monitor for signs and symptoms of infection  - Monitor lab/diagnostic results  - Monitor all insertion sites, i e  indwelling lines, tubes, and drains  - Monitor endotracheal if appropriate and nasal secretions for changes in amount and color  - [place] appropriate cooling/warming therapies per order  - Administer medications as ordered  - Instruct and encourage patient and family to use good hand hygiene technique  - Identify and instruct in appropriate isolation precautions for identified infection/condition  Outcome: Progressing     Problem: SAFETY ADULT  Goal: Patient will remain free of falls  Description: INTERVENTIONS:  - Educate patient/family on patient safety including physical limitations  - Instruct patient to call for assistance with activity   - Consult OT/PT to assist with strengthening/mobility   - Keep Call bell within reach  - Keep bed low and locked with side rails adjusted as appropriate  - Keep care items and personal belongings within reach  - Initiate and maintain comfort rounds  - Apply yellow socks and bracelet for high fall risk patients  - Consider moving patient to room near nurses station  Outcome: Progressing  Goal: Maintain or return to baseline ADL function  Description: INTERVENTIONS:  -  Assess patient's ability to carry out ADLs; assess patient's baseline for ADL function and identify physical deficits which impact ability to perform ADLs (bathing, care of mouth/teeth, toileting, grooming, dressing, etc )  - Assess/evaluate cause of self-care deficits   - Assess range of motion  - Assess patient's mobility; develop plan if impaired  - Assess patient's need for assistive devices and provide as appropriate  - Encourage maximum independence but intervene and supervise when necessary  - Involve family in performance of ADLs  - Assess for home care needs following discharge   - Consider OT consult to assist with ADL evaluation and planning for discharge  - Provide patient education as appropriate  Outcome: Progressing     Problem: Knowledge Deficit  Goal: Patient/family/caregiver demonstrates understanding of disease process, treatment plan, medications, and discharge instructions  Description: Complete learning assessment and assess knowledge base    Interventions:  - Provide teaching at level of understanding  - Provide teaching via preferred learning methods  Outcome: Progressing     Problem: GASTROINTESTINAL - ADULT  Goal: Maintains or returns to baseline bowel function  Description: INTERVENTIONS:  - Assess bowel function  - Encourage oral fluids to ensure adequate hydration  - Administer IV fluids if ordered to ensure adequate hydration  - Administer ordered medications as needed  - Encourage mobilization and activity  - Consider nutritional services referral to assist patient with adequate nutrition and appropriate food choices  Outcome: Progressing     Problem: GENITOURINARY - ADULT  Goal: Maintains or returns to baseline urinary function  Description: INTERVENTIONS:  - Assess urinary function  - Encourage oral fluids to ensure adequate hydration if ordered  - Administer IV fluids as ordered to ensure adequate hydration  - Administer ordered medications as needed  - Offer frequent toileting  - Follow urinary retention protocol if ordered  Outcome: Progressing  Goal: Absence of urinary retention  Description: INTERVENTIONS:  - Assess patient's ability to void and empty bladder  - Monitor I/O  - Bladder scan as needed  - Discuss with physician/AP medications to alleviate retention as needed  - Discuss catheterization for long term situations as appropriate  Outcome: Progressing     Problem: METABOLIC, FLUID AND ELECTROLYTES - ADULT  Goal: Glucose maintained within target range  Description: INTERVENTIONS:  - Monitor Blood Glucose as ordered  - Assess for signs and symptoms of hyperglycemia and hypoglycemia  - Administer ordered medications to maintain glucose within target range  - Assess nutritional intake and initiate nutrition service referral as needed  Outcome: Progressing     Problem: SKIN/TISSUE INTEGRITY - ADULT  Goal: Skin Integrity remains intact(Skin Breakdown Prevention)  Description: Assess:  -Perform Corbin assessment every shift  -Clean and moisturize skin every shift  -Inspect skin when repositioning, toileting, and assisting with ADLS  -Assess under medical devices such as IV every shift  -Assess extremities for adequate circulation and sensation     Bed Management:  -Have minimal linens on bed & keep smooth, unwrinkled  -Change linens as needed when moist or perspiring  -Avoid sitting or lying in one position for more than 2 hours while in bed  -Keep HOB at 30 degrees     Toileting:  -Offer bedside commode  -Assess for incontinence every shift  -Use incontinent care products after each incontinent episode such as foam cleanser    Activity:  -Mobilize patient 2 times a day  -Encourage activity and walks on unit  -Encourage or provide ROM exercises   -Turn and reposition patient every 2 Hours  -Use appropriate equipment to lift or move patient in bed  -Instruct/ Assist with weight shifting every hour when out of bed in chair  -Consider limitation of chair time 2 hour intervals    Skin Care:  -Avoid use of baby powder, tape, friction and shearing, hot water or constrictive clothing  -Relieve pressure over bony prominences using pillows  -Do not massage red bony areas    Next Steps:  -Teach patient strategies to minimize risks such as skin breakdown   -Consider consults to  interdisciplinary teams such as wound care  Outcome: Progressing  Goal: Incision(s), wounds(s) or drain site(s) healing without S/S of infection  Description: INTERVENTIONS  - Assess and document dressing, incision, wound bed, drain sites and surrounding tissue  - Provide patient and family education  - Perform skin care/dressing changes every shift  Outcome: Progressing     Problem: MOBILITY - ADULT  Goal: Maintain or return to baseline ADL function  Description: INTERVENTIONS:  -  Assess patient's ability to carry out ADLs; assess patient's baseline for ADL function and identify physical deficits which impact ability to perform ADLs (bathing, care of mouth/teeth, toileting, grooming, dressing, etc )  - Assess/evaluate cause of self-care deficits   - Assess range of motion  - Assess patient's mobility; develop plan if impaired  - Assess patient's need for assistive devices and provide as appropriate  - Encourage maximum independence but intervene and supervise when necessary  - Involve family in performance of ADLs  - Assess for home care needs following discharge   - Consider OT consult to assist with ADL evaluation and planning for discharge  - Provide patient education as appropriate  Outcome: Progressing  Goal: Maintains/Returns to pre admission functional level  Description: INTERVENTIONS:  - Perform BMAT or MOVE assessment daily    - Set and communicate daily mobility goal to care team and patient/family/caregiver     - Collaborate with rehabilitation services on mobility goals if consulted  - Out of bed to chair 3 times a day   - Out of bed for meals 2 times a day  - Out of bed for toileting  - Record patient progress and toleration of activity level   Outcome: Progressing     Problem: Potential for Falls  Goal: Patient will remain free of falls  Description: INTERVENTIONS:  - Educate patient/family on patient safety including physical limitations  - Instruct patient to call for assistance with activity   - Consult OT/PT to assist with strengthening/mobility   - Keep Call bell within reach  - Keep bed low and locked with side rails adjusted as appropriate  - Keep care items and personal belongings within reach  - Initiate and maintain comfort rounds  - Apply yellow socks and bracelet for high fall risk patients  - Consider moving patient to room near nurses station  Outcome: Progressing

## 2022-08-09 NOTE — PROGRESS NOTES
[address] [facility]  Progress Note - Srinivas Drake 1975, 51 y o  female MRN: [id number]  Unit/Bed#: Metsa 68 2 -02 Encounter: [id number]  Primary Care Provider: Faviola Farooq MD   Date and time admitted to hospital: 8/8/2022  6:20 PM    * Osteomyelitis of left foot [facility])  Assessment & Plan  · Patient initially admitted at [address] [place] [address] on 08/04 found to be septic, with known left lower extremity ulcer and wound concerning for osteomyelitis with cellulitis  · Podiatry evaluated patient recommending left BKA  · Transferred to [facility] for surgery evaluation for likely left BKA-plan for possible BKA on Thursday  · General surgery following  · Infectious Disease following, brought and antibiotics today to cefepime and Flagyl  · Will need postop PT/OT consult    Gallbladder sludge  Assessment & Plan  · Noted on previous imaging, without symptoms    Bacteremia  Assessment & Plan  · 2/2 blood cultures were initially positive on 08/04 for Proteus is consistent with wound cultures  · Additionally blood cultures positive for GBS, bacteroides and Morganella  · Repeat blood cultures on 08/06 currently negative at 48 hours  · ID following  · Continue IV cefepime and Flagyl  · Echocardiogram completed without evidence of endocarditis      Hydroureteronephrosis  Assessment & Plan  · Renal function stable, initial imaging noted hydroureteronephrosis  · Previously urology was consulted, with recommendations for Cage though patient has declined  · Hydronephrosis appeared to resolve on repeat ultrasound on 08/08    Acute on chronic anemia  Assessment & Plan  · Hemoglobin 8 9, did require 1 unit PRBC transfused during admission  · Hemoglobin stable at 9 6 today  · No evidence of bleed, not on anticoagulation or antiplatelets prior to admission  · Iron panel consistent with iron deficiency-refusing iron to have this due to constipation, will start stronger bowel regimen    Controlled type 2 diabetes mellitus with neurologic complication, with long-term current use of insulin [facility])  Assessment & Plan  Lab Results   Component Value Date    HGBA1C 7 1 (H) 2022     · Prior A1c controlled  · Currently uses insulin pump, reviewed patient insulin pump, uses about 100 units daily over past 7 days  · Continue plan for basal/bolus insulin he with plan for surgery  · Will increase Lantus from 20 units b i d  To 30 units b i d  Given persistently elevated blood glucose  · Continue Humalog 15 units t i d  With meals  · Sliding scale, Accu-Cheks        VTE Pharmacologic Prophylaxis:   High Risk (Score >/= 5) - Pharmacological DVT Prophylaxis Contraindicated  Sequential Compression Devices Ordered  Patient Centered Rounds: I performed bedside rounds with nursing staff today  Discussions with Specialists or Other Care Team Provider:  Infectious Disease, General surgery    Education and Discussions with Family / Patient: Spoke with patient at bedside  Time Spent for Care: 30 minutes  More than 50% of total time spent on counseling and coordination of care as described above  Current Length of Stay: 1 day(s)  Current Patient Status: Inpatient   Certification Statement: The patient will continue to require additional inpatient hospital stay due to Bacteremia, osteomyelitis of foot pending BKA  Discharge Plan: Anticipate discharge in >72 hrs to discharge location to be determined pending rehab evaluations  Code Status: Level 1 - Full Code    Subjective:   Patient reports she is upset that her blood glucose is very high this morning  Feeling depressed that she will likely lose 1 of her legs  Also very worried that her daughter's wedding is in I week and she does not want to be in the hospital   Denies any chest pain or shortness of breath  Denies any pain in her foot      Objective:     Vitals:   Temp (24hrs), Av 4 °F (36 9 °C), Min:98 1 °F (36 7 °C), Max:98 9 °F (37 2 °C)    Temp:  [98 1 °F (36 7 °C)-98 9 °F (37 2 °C)] 98 2 °F (36 8 °C)  HR:  [79-92] 92  Resp:  [18-22] 20  BP: (153-172)/(75-93) 169/87  SpO2:  [92 %-96 %] 96 %  Body mass index is 41 4 kg/m²  Input and Output Summary (last 24 hours):   No intake or output data in the 24 hours ending 08/09/22 1219    Physical Exam:   Physical Exam  Vitals reviewed  Constitutional:       General: She is not in acute distress  HENT:      Head: Normocephalic and atraumatic  Eyes:      General: No scleral icterus  Conjunctiva/sclera: Conjunctivae normal    Cardiovascular:      Rate and Rhythm: Normal rate and regular rhythm  Heart sounds: No murmur heard  Pulmonary:      Effort: Pulmonary effort is normal  No respiratory distress  Breath sounds: Normal breath sounds  Abdominal:      General: Bowel sounds are normal  There is no distension  Palpations: Abdomen is soft  Tenderness: There is no abdominal tenderness  Musculoskeletal:      Cervical back: Neck supple  Right lower leg: No edema  Left lower leg: No edema  Skin:     General: Skin is warm and dry  Comments: Bilateral foot wounds with bilateral amputations   Neurological:      Mental Status: She is alert and oriented to person, place, and time  Psychiatric:         Mood and Affect: Mood normal          Behavior: Behavior normal           Additional Data:     Labs:  Results from last 7 days   Lab Units 08/09/22  0511 08/08/22  0847 08/07/22  0446 08/06/22  0607 08/04/22  1242   WBC Thousand/uL 6 94   < > 5 99   < > 12 83*   HEMOGLOBIN g/dL 9 6*   < > 7 3*   < > 8 9*   HEMATOCRIT % 31 7*   < > 24 8*   < > 30 4*   PLATELETS Thousands/uL 293   < > 223   < > 296   BANDS PCT %  --   --   --   --  6   NEUTROS PCT %  --   --  66   < >  --    LYMPHS PCT %  --   --  22   < >  --    LYMPHO PCT % 33  --   --   --  5*   MONOS PCT %  --   --  7   < >  --    MONO PCT % 3*  --   --   --  0*   EOS PCT % 5  --  2   < > 1    < > = values in this interval not displayed       Results from last 7 days   Lab Units 08/09/22  0511   SODIUM mmol/L 133*   POTASSIUM mmol/L 4 3   CHLORIDE mmol/L 98   CO2 mmol/L 26   BUN mg/dL 16   CREATININE mg/dL 1 10   ANION GAP mmol/L 9   CALCIUM mg/dL 9 2   ALBUMIN g/dL 2 4*   TOTAL BILIRUBIN mg/dL 0 36   ALK PHOS U/L 177*   ALT U/L 18   AST U/L 13   GLUCOSE RANDOM mg/dL 361*     Results from last 7 days   Lab Units 08/04/22  1242   INR  1 07     Results from last 7 days   Lab Units 08/09/22  1102 08/09/22  0813 08/08/22  2124 08/08/22  1636 08/08/22  1132 08/08/22  0803 08/08/22  0726 08/07/22  2051 08/07/22  1554 08/07/22  1102 08/07/22  0712 08/06/22  2130   POC GLUCOSE mg/dl 238* 357* 351* 291* 228* 99 88 110 167* 147* 137 240*     Results from last 7 days   Lab Units 08/04/22  2107   HEMOGLOBIN A1C % 7 1*     Results from last 7 days   Lab Units 08/05/22  0535 08/04/22  1242   LACTIC ACID mmol/L  --  1 9   PROCALCITONIN ng/ml 56 43* 0 55*       Lines/Drains:  Invasive Devices  Report    Peripheral Intravenous Line  Duration           Peripheral IV 08/04/22 Right Hand 4 days                      Imaging: No pertinent imaging reviewed  Recent Cultures (last 7 days):   Results from last 7 days   Lab Units 08/06/22  0926 08/06/22  0608 08/04/22  1839 08/04/22  1245 08/04/22  1242   BLOOD CULTURE  No Growth at 48 hrs   No Growth at 48 hrs   --   --  Proteus mirabilis*  Beta Hemolytic Streptococcus Group B*  Morganella morganii*  Bacteroides fragilis*  Proteus mirabilis*  Streptococcus agalactiae (Group B)*  Morganella morganii*   GRAM STAIN RESULT   --   --  Rare Disintegrating polys*  2+ Gram positive cocci in pairs and chains*  1+ Gram negative rods*  --  Gram positive cocci in pairs and chains*  Gram negative rods*  Gram positive cocci in pairs and chains*  Gram negative rods*   URINE CULTURE   --   --   --  60,000-69,000 cfu/ml Escherichia coli*  10,000-19,000 cfu/ml   --    WOUND CULTURE   --   --  2+ Growth of Proteus mirabilis*  1+ Growth of Citrobacter freundii*  2+ Growth of Staphylococcus aureus*  2+ Growth of   --   --        Last 24 Hours Medication List:   Current Facility-Administered Medications   Medication Dose Route Frequency Provider Last Rate    acetaminophen  650 mg Oral Q6H PRN Whitney Castillo MD      cefepime  2,000 mg Intravenous Q12H KIRA Osorio 2,000 mg (08/09/22 1112)    docusate sodium  100 mg Oral BID America Mckinley PA-C      ferrous sulfate  325 mg Oral Daily With Breakfast Whitney Castillo MD      gabapentin  300 mg Oral TID Whitney Castillo MD      insulin glargine  30 Units Subcutaneous Q12H [facility] Claudeen Barber, PA-C      insulin lispro  15 Units Subcutaneous TID With Meals Whitney Castillo MD      insulin lispro  4-20 Units Subcutaneous TID AC Billy Coleman MD      metroNIDAZOLE  500 mg Oral [facility] Billy Coleman MD      ondansetron  4 mg Intravenous Q4H PRN Whitney Castillo MD      perflutren lipid microsphere  2 mL/min Intravenous Once in imaging Whitney Castillo MD      polyethylene glycol  17 g Oral Daily Claudeen Barber, PA-C      saccharomyces boulardii  250 mg Oral BID Whitney Castillo MD      sodium chloride (PF)  3 mL Intravenous Q1H PRN Whitney Castillo MD          Today, Patient Was Seen By: Jade Johnston PA-C    **Please Note: This note may have been constructed using a voice recognition system  **

## 2022-08-09 NOTE — ASSESSMENT & PLAN NOTE
· 2/2 blood cultures were initially positive on 08/04 for Proteus is consistent with wound cultures  · Additionally blood cultures positive for GBS, bacteroides and Morganella  · Repeat blood cultures on 08/06 currently negative at 48 hours  · ID following  · Continue IV cefepime and Flagyl  · Echocardiogram completed without evidence of endocarditis

## 2022-08-09 NOTE — ASSESSMENT & PLAN NOTE
Lab Results   Component Value Date    HGBA1C 7 1 (H) 08/04/2022     · Prior A1c controlled  · Currently uses insulin pump, reviewed patient insulin pump, uses about 100 units daily over past 7 days  · Continue plan for basal/bolus insulin he with plan for surgery  · Will increase Lantus from 20 units b i d  To 30 units b i d  Given persistently elevated blood glucose  · Continue Humalog 15 units t i d   With meals  · Sliding scale, Accu-Cheks

## 2022-08-09 NOTE — ASSESSMENT & PLAN NOTE
· Renal function stable, initial imaging noted hydroureteronephrosis  · Previously urology was consulted, with recommendations for Cage though patient has declined  · Hydronephrosis appeared to resolve on repeat ultrasound on 08/08

## 2022-08-09 NOTE — PLAN OF CARE
Problem: PAIN - ADULT  Goal: Verbalizes/displays adequate comfort level or baseline comfort level  Description: Interventions:  - Encourage patient to monitor pain and request assistance  - Assess pain using appropriate pain scale  - Administer analgesics based on type and severity of pain and evaluate response  - Implement non-pharmacological measures as appropriate and evaluate response  - Consider cultural and social influences on pain and pain management  - Notify physician/advanced practitioner if interventions unsuccessful or patient reports new pain  Outcome: Progressing     Problem: INFECTION - ADULT  Goal: Absence or prevention of progression during hospitalization  Description: INTERVENTIONS:  - Assess and monitor for signs and symptoms of infection  - Monitor lab/diagnostic results  - Monitor all insertion sites, i e  indwelling lines, tubes, and drains  - Monitor endotracheal if appropriate and nasal secretions for changes in amount and color  - [place] appropriate cooling/warming therapies per order  - Administer medications as ordered  - Instruct and encourage patient and family to use good hand hygiene technique  - Identify and instruct in appropriate isolation precautions for identified infection/condition  Outcome: Progressing     Problem: SAFETY ADULT  Goal: Patient will remain free of falls  Description: INTERVENTIONS:  - Educate patient/family on patient safety including physical limitations  - Instruct patient to call for assistance with activity   - Consult OT/PT to assist with strengthening/mobility   - Keep Call bell within reach  - Keep bed low and locked with side rails adjusted as appropriate  - Keep care items and personal belongings within reach  - Initiate and maintain comfort rounds  - Apply yellow socks and bracelet for high fall risk patients  - Consider moving patient to room near nurses station  Outcome: Progressing  Goal: Maintain or return to baseline ADL function  Description: INTERVENTIONS:  -  Assess patient's ability to carry out ADLs; assess patient's baseline for ADL function and identify physical deficits which impact ability to perform ADLs (bathing, care of mouth/teeth, toileting, grooming, dressing, etc )  - Assess/evaluate cause of self-care deficits   - Assess range of motion  - Assess patient's mobility; develop plan if impaired  - Assess patient's need for assistive devices and provide as appropriate  - Encourage maximum independence but intervene and supervise when necessary  - Involve family in performance of ADLs  - Assess for home care needs following discharge   - Consider OT consult to assist with ADL evaluation and planning for discharge  - Provide patient education as appropriate  Outcome: Progressing     Problem: Knowledge Deficit  Goal: Patient/family/caregiver demonstrates understanding of disease process, treatment plan, medications, and discharge instructions  Description: Complete learning assessment and assess knowledge base    Interventions:  - Provide teaching at level of understanding  - Provide teaching via preferred learning methods  Outcome: Progressing     Problem: GASTROINTESTINAL - ADULT  Goal: Maintains or returns to baseline bowel function  Description: INTERVENTIONS:  - Assess bowel function  - Encourage oral fluids to ensure adequate hydration  - Administer IV fluids if ordered to ensure adequate hydration  - Administer ordered medications as needed  - Encourage mobilization and activity  - Consider nutritional services referral to assist patient with adequate nutrition and appropriate food choices  Outcome: Progressing     Problem: GENITOURINARY - ADULT  Goal: Maintains or returns to baseline urinary function  Description: INTERVENTIONS:  - Assess urinary function  - Encourage oral fluids to ensure adequate hydration if ordered  - Administer IV fluids as ordered to ensure adequate hydration  - Administer ordered medications as needed  - Offer frequent toileting  - Follow urinary retention protocol if ordered  Outcome: Progressing  Goal: Absence of urinary retention  Description: INTERVENTIONS:  - Assess patient's ability to void and empty bladder  - Monitor I/O  - Bladder scan as needed  - Discuss with physician/AP medications to alleviate retention as needed  - Discuss catheterization for long term situations as appropriate  Outcome: Progressing     Problem: METABOLIC, FLUID AND ELECTROLYTES - ADULT  Goal: Glucose maintained within target range  Description: INTERVENTIONS:  - Monitor Blood Glucose as ordered  - Assess for signs and symptoms of hyperglycemia and hypoglycemia  - Administer ordered medications to maintain glucose within target range  - Assess nutritional intake and initiate nutrition service referral as needed  Outcome: Progressing     Problem: SKIN/TISSUE INTEGRITY - ADULT  Goal: Skin Integrity remains intact(Skin Breakdown Prevention)  Description: Assess:  -Perform Corbin assessment every shift  -Clean and moisturize skin every shift  -Inspect skin when repositioning, toileting, and assisting with ADLS  -Assess under medical devices such as IV every shift  -Assess extremities for adequate circulation and sensation     Bed Management:  -Have minimal linens on bed & keep smooth, unwrinkled  -Change linens as needed when moist or perspiring  -Avoid sitting or lying in one position for more than 2 hours while in bed  -Keep HOB at 30 degrees     Toileting:  -Offer bedside commode  -Assess for incontinence every shift  -Use incontinent care products after each incontinent episode such as foam cleanser    Activity:  -Mobilize patient 2 times a day  -Encourage activity and walks on unit  -Encourage or provide ROM exercises   -Turn and reposition patient every 2 Hours  -Use appropriate equipment to lift or move patient in bed  -Instruct/ Assist with weight shifting every hour when out of bed in chair  -Consider limitation of chair time 2 hour intervals    Skin Care:  -Avoid use of baby powder, tape, friction and shearing, hot water or constrictive clothing  -Relieve pressure over bony prominences using pillows  -Do not massage red bony areas    Next Steps:  -Teach patient strategies to minimize risks such as skin breakdown   -Consider consults to  interdisciplinary teams such as wound care  Outcome: Progressing  Goal: Incision(s), wounds(s) or drain site(s) healing without S/S of infection  Description: INTERVENTIONS  - Assess and document dressing, incision, wound bed, drain sites and surrounding tissue  - Provide patient and family education  - Perform skin care/dressing changes every shift  Outcome: Progressing

## 2022-08-09 NOTE — NURSING NOTE
Patient removed insulin pump at bedside  Patient will be sending it home tonight with spouse  Will continue to monitor

## 2022-08-09 NOTE — PROGRESS NOTES
Progress Note - General Surgery   Dariusz Gabriela Rashid 51 y o  female MRN: [id number]  Unit/Bed#: Metsa 68 2 -02 Encounter: [id number]    Assessment:  52 yo F with PMH of DM, R foot osteomyelitis s/p R 1st digit amputation and L charcot foot s/p chopart amputation who presents with sepsis and bacteremia 2/2 left heel ulcer with osteomyelitis  General surgery consulted for L BKA    Vitals normal on room air  UOP not recorded    WBC pending (from 7 71)  Hb pending (from 8 9)  Cr pending (from 1 02)    Plan: Will discuss timing of L BKA  Continue antibiotics per ID recommendations, rocephin/flagyl currently  Appreciate podiatry recommendations  Rest of care per primary    Subjective/Objective     Subjective: No acute events overnight, no new complaints    Objective:     Blood pressure (!) 172/87, pulse 89, temperature 98 3 °F (36 8 °C), temperature source Oral, resp  rate 22, SpO2 95 %  ,There is no height or weight on file to calculate BMI      No intake or output data in the 24 hours ending 08/09/22 0555    Invasive Devices  Report    Peripheral Intravenous Line  Duration           Peripheral IV 08/04/22 Right Hand 4 days                Physical Exam:  Gen:    NAD  CV:      warm, well-perfused  Lungs: No respiratory distress  Abd:     soft, NT/ND  Ext:      L chopart amputation, L heel wound cleanly dressed, R 1st toe amputation  Neuro: A&Ox3

## 2022-08-09 NOTE — PROGRESS NOTES
Progress Note - General Surgery   Terrance Boss Gay 51 y o  female MRN: [id number]  Unit/Bed#: Metsa 68 2 -02 Encounter: [id number]    Assessment:  52 yo F with PMH of DM, R foot osteomyelitis s/p R 1st digit amputation and L charcot foot s/p chopart amputation who presents with sepsis and bacteremia 2/2 left heel ulcer with osteomyelitis  General surgery consulted for L BKA       Plan:  Patient requesting BKA be performed after her daughters wedding, if cleared by primary team we will follow up patient after her wedding  Outpatient antibiotics per primary team/ ID  Continue antibiotics per ID recommendations  Appreciate podiatry recommendations  Rest of care per primary  Surgery will sign off, available as needed       Subjective/Objective     Subjective:   No acute events overnight  Feeling stronger  Objective:     Blood pressure 126/61, pulse 89, temperature 98 4 °F (36 9 °C), resp  rate 20, weight 106 kg (233 lb 11 oz), SpO2 95 %  ,Body mass index is 41 4 kg/m²      No intake or output data in the 24 hours ending 08/09/22 1938    Invasive Devices  Report    Peripheral Intravenous Line  Duration           Peripheral IV 08/04/22 Right Hand 4 days                Physical Exam:   Gen: NAD, Comfortable  Neuro: A&O, No focal deficits  Head: Normal Cephalic, Atraumatic  Eye: EOMI, PERRLA, No scleral icterus  Neck: Supple, No JVD, Midline trachea  CV: RRR, Cap refill <2 sec  Pulm: Normal work of breathing, no respiratory distress  Abd: Soft, Non-Distended, Non-Tender  Ext: feet wrapped  Skin: warm, dry, intact

## 2022-08-09 NOTE — CONSULTS
Consultation - General Surgery   Nanda Rashid 51 y o  female MRN: [id number]  Unit/Bed#: [place] 2 -02 Encounter: [id number]    Assessment/Plan     Assessment:  50 yo F with PMH of DM, R foot osteomyelitis s/p R 1st digit amputation and L charcot foot s/p chopart amputation who presents with sepsis and bacteremia 2/2 left heel ulcer with osteomyelitis  General surgery consulted for L BKA    Vitals normal on room air    WBC 7 71  Hb 8 9  Cr 1 02    Plan: Will discuss timing of L BKA  Continue antibiotics per ID recommendations, rocephin/flagyl currently  Appreciate podiatry recommendations  Rest of care per primary    History of Present Illness     HPI:  Benigno Ortiz is a 51 y o  female who presents with sepsis and bacteremia secondary to a left heel ulcer with osteomyelitis  Patient reports she started to feel generally unwell last Wednesday  She describes nausea, vomiting, chills and night sweats  She reports that this gradually worsened over 2 days causing her to seek medical attention  Upon presentation, she was found to be septic and bacteremic with a known left heel ulcer  She was started on antibiotics and evaluated by both ID and podiatry  They have recommended more proximal amputation  She was transferred to [facility] for general surgery evaluation for left BKA  She reports a history diabetes  She also has a history of previous R 1st toe osteomyelitis now s/p R 1st digit amp and left charcot foot s/p chopart amputation  Of note, she reports her daughter is getting  next week and she is adamant about being able to attend this  Inpatient consult to Acute Care Surgery  Consult performed by: Emil Lozoya MD  Consult ordered by: Alessia Carrillo MD         Review of Systems   Constitutional: Positive for chills, diaphoresis and fatigue  HENT: Negative  Eyes: Negative  Respiratory: Negative  Cardiovascular: Negative      Gastrointestinal: Positive for nausea and vomiting  Endocrine: Negative  Genitourinary: Negative  Musculoskeletal: Negative  Skin: Positive for wound (Left heel wound)  Neurological: Negative  Psychiatric/Behavioral: Negative             Historical Information   Past Medical History:   Diagnosis Date    Charcot's joint of foot, left     Diabetes mellitus ([facility] [address] )     Osteomyelitis of foot, right, acute ([facility] [address] )      Past Surgical History:   Procedure Laterality Date    FOOT AMPUTATION Left 10/30/2020    Procedure: CHOPART AMPUTATION LEFT  FOOT:;  Surgeon: Servando Bronson DPM;  Location:  MAIN OR;  Service: Podiatry    FOOT CAPSULE RELEASE W/ PERCUTANEOUS HEEL CORD LENGTHENING, TIBIAL TENDON TRANSFER Left 10/30/2020    Procedure: Achilles tenotomy left foot;  Surgeon: Servando Bronson DPM;  Location:  MAIN OR;  Service: Podiatry    FOOT SURGERY Right     right first toe amputation  2019     Social History   Social History     Substance and Sexual Activity   Alcohol Use Never     Social History     Substance and Sexual Activity   Drug Use Never     E-Cigarette/Vaping    E-Cigarette Use Never User      E-Cigarette/Vaping Substances    Nicotine No     THC No     CBD No     Flavoring No     Other No     Unknown No      Social History     Tobacco Use   Smoking Status Never Smoker   Smokeless Tobacco Never Used     Family History:   Family History   Problem Relation Age of Onset    Diabetes Brother     Hyperlipidemia Brother     Hypertension Brother     Diabetes Mother     Hypertension Father     Diabetes Sister        Meds/Allergies   current meds:   Current Facility-Administered Medications   Medication Dose Route Frequency    acetaminophen (TYLENOL) tablet 650 mg  650 mg Oral Q6H PRN    [START ON 8/9/2022] cefTRIAXone (ROCEPHIN) 2,000 mg in dextrose 5 % 50 mL IVPB  2,000 mg Intravenous Q24H    docusate sodium (COLACE) capsule 100 mg  100 mg Oral BID    [START ON 8/9/2022] ferrous sulfate tablet 325 mg  325 mg Oral Daily With Breakfast    gabapentin (NEURONTIN) capsule 300 mg  300 mg Oral TID    insulin glargine (LANTUS) subcutaneous injection 20 Units 0 2 mL  20 Units Subcutaneous Q12H [address]    insulin lispro (HumaLOG) 100 units/mL subcutaneous injection 15 Units  15 Units Subcutaneous TID With Meals    [START ON 8/9/2022] insulin lispro (HumaLOG) 100 units/mL subcutaneous injection 4-20 Units  4-20 Units Subcutaneous TID AC    metroNIDAZOLE (FLAGYL) tablet 500 mg  500 mg Oral Q8H [address]    ondansetron (ZOFRAN) injection 4 mg  4 mg Intravenous Q4H PRN    perflutren lipid microsphere (DEFINITY) injection  2 mL/min Intravenous Once in imaging    [START ON 8/9/2022] saccharomyces boulardii (FLORASTOR) capsule 250 mg  250 mg Oral BID    sodium chloride (PF) 0 9 % injection 3 mL  3 mL Intravenous Q1H PRN    and PTA meds:   Prior to Admission Medications   Prescriptions Last Dose Informant Patient Reported? Taking?    ACCU-CHEK FASTCLIX LANCETS MISC   Yes No   Sig: by Does not apply route   Syringe/Needle, Disp, 30G X 1/2" 1 ML MISC   Yes No   Sig: by Does not apply route   ferrous sulfate 325 (65 Fe) mg tablet   Yes No   Sig: Take 325 mg by mouth daily with breakfast   gabapentin (NEURONTIN) 300 mg capsule   No No   Sig: Take 1 capsule (300 mg total) by mouth 3 (three) times a day   polyethylene glycol (MIRALAX) 17 g packet   Yes No   Sig: Take 17 g by mouth daily      Facility-Administered Medications: None     Allergies   Allergen Reactions    Clindamycin Other (See Comments)     CHEST PRESSURE, FEELS LIKE A HEART ATTACK PER PT       Objective   First Vitals:   Blood Pressure: 163/75 (08/08/22 1825)  Pulse: 89 (08/08/22 1825)  Temperature: 98 9 °F (37 2 °C) (08/08/22 1825)  SpO2: 92 % (08/08/22 1825)    Current Vitals:   Blood Pressure: 163/75 (08/08/22 1825)  Pulse: 89 (08/08/22 1825)  Temperature: 98 9 °F (37 2 °C) (08/08/22 1825)  SpO2: 92 % (08/08/22 1825)    No intake or output data in the 24 hours ending 08/08/22 2035    Invasive Devices  Report    Peripheral Intravenous Line  Duration           Peripheral IV 08/04/22 Right Hand 3 days    Peripheral IV 08/07/22 Left;Upper;Ventral (anterior) Arm 1 day          Line  Duration           Pump Device Insulin pump Right Abdomen 4 days                Physical Exam  Constitutional:       Appearance: Normal appearance  HENT:      Head: Normocephalic and atraumatic  Right Ear: External ear normal       Left Ear: External ear normal       Nose: Nose normal       Mouth/Throat:      Mouth: Mucous membranes are moist       Pharynx: Oropharynx is clear  Eyes:      Extraocular Movements: Extraocular movements intact  Conjunctiva/sclera: Conjunctivae normal       Pupils: Pupils are equal, round, and reactive to light  Cardiovascular:      Rate and Rhythm: Normal rate and regular rhythm  Pulses: Normal pulses  Pulmonary:      Effort: Pulmonary effort is normal    Abdominal:      General: Abdomen is flat  Palpations: Abdomen is soft  Tenderness: There is no abdominal tenderness  Musculoskeletal:      Cervical back: Normal range of motion  Comments: L chopart amputation, R 1st toe amputation   Skin:     Comments: 3cm wound of left heel, scant serous drainage   Neurological:      General: No focal deficit present  Mental Status: She is alert and oriented to person, place, and time  Psychiatric:         Mood and Affect: Mood normal          Behavior: Behavior normal             Lab Results: I have personally reviewed pertinent lab results  Imaging: I have personally reviewed pertinent reports  EKG, Pathology, and Other Studies: I have personally reviewed pertinent reports

## 2022-08-09 NOTE — ASSESSMENT & PLAN NOTE
· Hemoglobin 8 9, did require 1 unit PRBC transfused during admission  · Hemoglobin stable at 9 6 today  · No evidence of bleed, not on anticoagulation or antiplatelets prior to admission  · Iron panel consistent with iron deficiency-refusing iron to have this due to constipation, will start stronger bowel regimen

## 2022-08-09 NOTE — UTILIZATION REVIEW
Inpatient Admission Authorization Request   NOTIFICATION OF INPATIENT ADMISSION/INPATIENT AUTHORIZATION REQUEST   SERVICING FACILITY:   [address] [place] [address]  Tiffani Lopez [address] [facility], [address]  Tax ID: [id number]  NPI: [id number]  Place of Service: Inpatient [address] [facility]  [address]  Place of Service Code: 24     ATTENDING PROVIDER:  Attending Name and NPI#: nAdi Wakefield Md [[id number]]  Address: Tiffani Lopez  [facility] [address]  Phone: [phone]     UTILIZATION REVIEW CONTACT:  Luisa Monahan, Utilization   Network Utilization Review Department  Phone: [phone]  Fax [phone]  Email: [email or web address]     PHYSICIAN ADVISORY SERVICES:  FOR LSRE-EE-ENSO REVIEW - MEDICAL NECESSITY DENIAL  Phone: [phone]  Fax: [phone]  Email: [email or web address]     TYPE OF REQUEST:  Inpatient Status     ADMISSION INFORMATION:  ADMISSION DATE/TIME: 8/4/22  3:47 PM  PATIENT DIAGNOSIS CODE/DESCRIPTION:  Hypomagnesemia [E83 42]  Dizziness [R42]  Hypophosphatemia [E83 39]  Weakness generalized [R53 1]  Anemia [D64 9]  Near syncope [R55]  Open wound of foot, unspecified laterality, initial encounter [X25 498B]  DISCHARGE DATE/TIME: 8/8/2022  5:10 PM   IMPORTANT INFORMATION:  Please contact Kely Bain directly with any questions or concerns regarding this request  Department voicemails are confidential     Send requests for admission clinical reviews, concurrent reviews, approvals, and administrative denials due to lack of clinical to fax [phone]

## 2022-08-10 VITALS
SYSTOLIC BLOOD PRESSURE: 138 MMHG | DIASTOLIC BLOOD PRESSURE: 86 MMHG | TEMPERATURE: 98 F | WEIGHT: 229.72 LBS | HEART RATE: 79 BPM | RESPIRATION RATE: 18 BRPM | OXYGEN SATURATION: 90 % | BODY MASS INDEX: 40.69 KG/M2

## 2022-08-10 LAB
GLUCOSE SERPL-MCNC: 243 MG/DL (ref 65–140)
GLUCOSE SERPL-MCNC: 269 MG/DL (ref 65–140)

## 2022-08-10 PROCEDURE — 99232 SBSQ HOSP IP/OBS MODERATE 35: CPT | Performed by: STUDENT IN AN ORGANIZED HEALTH CARE EDUCATION/TRAINING PROGRAM

## 2022-08-10 PROCEDURE — 99239 HOSP IP/OBS DSCHRG MGMT >30: CPT | Performed by: STUDENT IN AN ORGANIZED HEALTH CARE EDUCATION/TRAINING PROGRAM

## 2022-08-10 PROCEDURE — 99232 SBSQ HOSP IP/OBS MODERATE 35: CPT | Performed by: SURGERY

## 2022-08-10 PROCEDURE — 82948 REAGENT STRIP/BLOOD GLUCOSE: CPT

## 2022-08-10 RX ORDER — AMOXICILLIN AND CLAVULANATE POTASSIUM 875; 125 MG/1; MG/1
1 TABLET, FILM COATED ORAL EVERY 12 HOURS SCHEDULED
Qty: 60 TABLET | Refills: 0 | Status: SHIPPED | OUTPATIENT
Start: 2022-08-10 | End: 2022-09-06

## 2022-08-10 RX ORDER — LEVOFLOXACIN 750 MG/1
750 TABLET ORAL EVERY 24 HOURS
Qty: 30 TABLET | Refills: 0 | Status: SHIPPED | OUTPATIENT
Start: 2022-08-10 | End: 2022-09-06

## 2022-08-10 RX ADMIN — DOCUSATE SODIUM 100 MG: 100 CAPSULE, LIQUID FILLED ORAL at 08:58

## 2022-08-10 RX ADMIN — INSULIN LISPRO 15 UNITS: 100 INJECTION, SOLUTION INTRAVENOUS; SUBCUTANEOUS at 08:59

## 2022-08-10 RX ADMIN — GABAPENTIN 300 MG: 300 CAPSULE ORAL at 08:58

## 2022-08-10 RX ADMIN — INSULIN GLARGINE 30 UNITS: 100 INJECTION, SOLUTION SUBCUTANEOUS at 08:58

## 2022-08-10 RX ADMIN — Medication 250 MG: at 08:58

## 2022-08-10 RX ADMIN — METRONIDAZOLE 500 MG: 500 TABLET ORAL at 05:53

## 2022-08-10 RX ADMIN — INSULIN LISPRO 8 UNITS: 100 INJECTION, SOLUTION INTRAVENOUS; SUBCUTANEOUS at 11:34

## 2022-08-10 RX ADMIN — FERROUS SULFATE TAB 325 MG (65 MG ELEMENTAL FE) 325 MG: 325 (65 FE) TAB at 09:01

## 2022-08-10 RX ADMIN — INSULIN LISPRO 8 UNITS: 100 INJECTION, SOLUTION INTRAVENOUS; SUBCUTANEOUS at 08:58

## 2022-08-10 RX ADMIN — POLYETHYLENE GLYCOL 3350 17 G: 17 POWDER, FOR SOLUTION ORAL at 08:58

## 2022-08-10 NOTE — PROGRESS NOTES
Progress Note - Infectious Disease   Jess Kam Rashid 51 y o  female MRN: [id number]  Unit/Bed#: [place] 2 -02 Encounter: [id number]    Impression/Plan:  1  Sepsis  Present on arrival  Tachycardia and leukocytosis  With development of fever  Secondary to polymicrobial bacteremia from polymicrobial L foot infection with underlying calcaneal osteomyelitis  No other clear source appreciated  UA was abnormal with low colony growth of e coli on culture, however given her lack of  symptoms I suspect this is asymptomatic bacteriuria  Gallbladder with sludge on abdominal imaging but no signs of acute cholecystitis  Despite being systemically ill she has remained hemodynamically stable and non toxic  Today she is afebrile  Her WBC count has trended down   -antibiotic as below  -monitor CBC and BMP  -monitor vitals  -supportive care     2  Polymicrobial bacteremia  Secondary to L plantar foot ulceration with underlying distal calcaneal osteomyelitis  Blood cultures with growth of proteus, GBS, bacteroides fragilis, and morganella morganii  TTE without valvular vegetation  Patient has been receiving IV Ceftriaxone and PO flagyl which she has been tolerating without difficulty  Her repeat blood cultures are negative >72 hours  The patient is currently receiving IV ceftriaxone and oral Flagyl which she is tolerating without difficulty  I will continue this for now  Patient likely being discharged without BKA surgery  She will need to remain on antibiotic treatment at home until she returns for the BKA  Can discharge on oral Levaquin and Augmentin   Infectious disease will need to be re-consulted on patient when she returns to the hospital for her surgery so that we can continue ongoing bacteremia treatment    -continue IV ceftriaxone while inpatient  -continue oral Flagyl while inpatient  -okay to discharge on PO Augmentin/Levaquin for ongoing treatment of bacteremia and L foot  -patient will need re-consult for ID upon returning to the hospital for her BKA so we can continue to manage her antibiotics for bacteremia   -monitor CBC and BMP  -monitor vitals  -I will ask the outpatient infectious disease office to touch base with patient at home to ensure she is tolerating the antibiotic and she will need to complete weekly CBCD and CMP while on the antibiotic treatment     3  Polymicrobial L plantar foot ulceration with underlying distal calcaneal osteomyelitis  In setting of high blood sugars  Wound culture with growth of proteus, morganella morgani, and MSSA  Patient with previous Chopart amputation  MRI confirming distal calcaneal osteomyelitis  Given instability of foot it has been recommended she proceed with L BKA  General surgery has assessed  Per patient she will be leaving the hospital today to attend a family wedding and will be returning on 8/24/2022 for the BKA surgery    -antibiotic as above  -monitor CBC and BMP  -local wound care per podiatry  -anticipate BKA tomorrow with general surgery  -continue follow up with general surgery     4  R plantar foot ulceration  Fortunately MRI imaging did not suggest underlying osteomyelitis  Podiatry is following closely and she will ultimatly require charcot reconstruction in the future    -serial R foot exams  -local wound care per podiatry  -continue close follow up with podiatry      5  Type 2 diabetes mellitus with long term insulin use  Patient's previous HbA1c was 14%  It improved to 7 1% on 8/4/2022  Elevated blood glucose is risk factor for wounds and infection  She has had previous diabetic foot ulcers require substantial B/L feet amputations  Suspect patient's high sugars have been contributing to her wounds for a significant amount of time  Recommend tight glycemic control  Patient sent her insulin pump home and glucose is being management by primary service   -blood glucose management per primary service     6  Allergy to clindamycin   Patient reports reaction of chest pressure  We will avoid this antibiotic for now   -monitor patient for adverse medication reactions     7  Morbid obesity  BMI = 40 69  Above plan was discussed in detail with patient at the bedside  Above plan was discussed in detail with SLIM  Antibiotics:  Ceftriaxone 6  Flagyl 6  Antibiotics 7    Subjective:  Patient reports she's feeling fine today  She reports her  is coming to pick her up because she was told she can leave at 1:30 today  She is planning on returning for her surgery on the Wednesday after the family wedding  She has no fever, chills, sweats, shakes; no nausea, vomiting, abdominal pain, diarrhea, or dysuria; no cough, shortness of breath, or chest pain  No new symptoms  Objective:  Vitals:  Temp:  [98 °F (36 7 °C)-98 4 °F (36 9 °C)] 98 °F (36 7 °C)  HR:  [79-92] 79  Resp:  [20] 20  BP: (108-169)/(44-87) 138/86  SpO2:  [90 %-96 %] 90 %  Temp (24hrs), Av 3 °F (36 8 °C), Min:98 °F (36 7 °C), Max:98 4 °F (36 9 °C)  Current: Temperature: 98 °F (36 7 °C)    Physical Exam:   General Appearance:  Alert, interactive, nontoxic, no acute distress  She appears comfortable sitting up in bed  Throat: Oropharynx moist without lesions  Lungs:   Clear to auscultation bilaterally; no wheezes, rhonchi or rales; respirations unlabored on room air  Heart:  RRR; no murmur, rub or gallop  Abdomen:   Soft, obese, non-tender, non-distended, positive bowel sounds  Extremities: L foot dressing and protective foam intact, no breakthrough drainage  R foot dressing and protective foam intact, no breakthrough drainage  Skin: No new rashes noted on exposed skin       Labs, Imaging, & Other studies:   All pertinent labs and imaging studies were personally reviewed  Results from last 7 days   Lab Units 22  0511 22  0847 22  0446   WBC Thousand/uL 6 94 7 71 5 99   HEMOGLOBIN g/dL 9 6* 8 9* 7 3*   PLATELETS Thousands/uL 293 268 223     Results from last 7 days   Lab Units 08/09/22  0511 08/08/22  0847 08/07/22  0446   POTASSIUM mmol/L 4 3 3 7 3 4*   CHLORIDE mmol/L 98 102 103   CO2 mmol/L 26 26 25   BUN mg/dL 16 13 13   CREATININE mg/dL 1 10 1 02 1 13   EGFR ml/min/1 73sq m 59 65 58   CALCIUM mg/dL 9 2 8 8 8 4   AST U/L 13 20 16   ALT U/L 18 14 13   ALK PHOS U/L 177* 170* 165*     Results from last 7 days   Lab Units 08/06/22  0926 08/06/22  0608 08/04/22  1839 08/04/22  1245 08/04/22  1242   BLOOD CULTURE  No Growth at 72 hrs   No Growth at 72 hrs   --   --  Proteus mirabilis*  Beta Hemolytic Streptococcus Group B*  Morganella morganii*  Bacteroides fragilis*  Proteus mirabilis*  Streptococcus agalactiae (Group B)*  Morganella morganii*   GRAM STAIN RESULT   --   --  Rare Disintegrating polys*  2+ Gram positive cocci in pairs and chains*  1+ Gram negative rods*  --  Gram positive cocci in pairs and chains*  Gram negative rods*  Gram positive cocci in pairs and chains*  Gram negative rods*   URINE CULTURE   --   --   --  60,000-69,000 cfu/ml Escherichia coli*  10,000-19,000 cfu/ml   --    WOUND CULTURE   --   --  2+ Growth of Proteus mirabilis*  1+ Growth of Morganella morganii*  2+ Growth of Staphylococcus aureus*  2+ Growth of   --   --      Results from last 7 days   Lab Units 08/05/22  0535 08/04/22  1242   PROCALCITONIN ng/ml 56 43* 0 55*     Results from last 7 days   Lab Units 08/04/22  1245   CRP mg/L 111 6*     Results from last 7 days   Lab Units 08/07/22  0446 08/04/22  1242   FERRITIN ng/mL 79 40

## 2022-08-10 NOTE — ASSESSMENT & PLAN NOTE
· Noted on previous imaging, without symptoms  · Incidental finding, no follow-up needed at this time

## 2022-08-10 NOTE — PLAN OF CARE
Problem: PAIN - ADULT  Goal: Verbalizes/displays adequate comfort level or baseline comfort level  Description: Interventions:  - Encourage patient to monitor pain and request assistance  - Assess pain using appropriate pain scale  - Administer analgesics based on type and severity of pain and evaluate response  - Implement non-pharmacological measures as appropriate and evaluate response  - Consider cultural and social influences on pain and pain management  - Notify physician/advanced practitioner if interventions unsuccessful or patient reports new pain  Outcome: Progressing     Problem: INFECTION - ADULT  Goal: Absence or prevention of progression during hospitalization  Description: INTERVENTIONS:  - Assess and monitor for signs and symptoms of infection  - Monitor lab/diagnostic results  - Monitor all insertion sites, i e  indwelling lines, tubes, and drains  - Monitor endotracheal if appropriate and nasal secretions for changes in amount and color  - [place] appropriate cooling/warming therapies per order  - Administer medications as ordered  - Instruct and encourage patient and family to use good hand hygiene technique  - Identify and instruct in appropriate isolation precautions for identified infection/condition  Outcome: Progressing     Problem: SAFETY ADULT  Goal: Patient will remain free of falls  Description: INTERVENTIONS:  - Educate patient/family on patient safety including physical limitations  - Instruct patient to call for assistance with activity   - Consult OT/PT to assist with strengthening/mobility   - Keep Call bell within reach  - Keep bed low and locked with side rails adjusted as appropriate  - Keep care items and personal belongings within reach  - Initiate and maintain comfort rounds  - Apply yellow socks and bracelet for high fall risk patients  - Consider moving patient to room near nurses station  Outcome: Progressing  Goal: Maintain or return to baseline ADL function  Description: INTERVENTIONS:  -  Assess patient's ability to carry out ADLs; assess patient's baseline for ADL function and identify physical deficits which impact ability to perform ADLs (bathing, care of mouth/teeth, toileting, grooming, dressing, etc )  - Assess/evaluate cause of self-care deficits   - Assess range of motion  - Assess patient's mobility; develop plan if impaired  - Assess patient's need for assistive devices and provide as appropriate  - Encourage maximum independence but intervene and supervise when necessary  - Involve family in performance of ADLs  - Assess for home care needs following discharge   - Consider OT consult to assist with ADL evaluation and planning for discharge  - Provide patient education as appropriate  Outcome: Progressing     Problem: Knowledge Deficit  Goal: Patient/family/caregiver demonstrates understanding of disease process, treatment plan, medications, and discharge instructions  Description: Complete learning assessment and assess knowledge base    Interventions:  - Provide teaching at level of understanding  - Provide teaching via preferred learning methods  Outcome: Progressing     Problem: GASTROINTESTINAL - ADULT  Goal: Maintains or returns to baseline bowel function  Description: INTERVENTIONS:  - Assess bowel function  - Encourage oral fluids to ensure adequate hydration  - Administer IV fluids if ordered to ensure adequate hydration  - Administer ordered medications as needed  - Encourage mobilization and activity  - Consider nutritional services referral to assist patient with adequate nutrition and appropriate food choices  Outcome: Progressing     Problem: GENITOURINARY - ADULT  Goal: Maintains or returns to baseline urinary function  Description: INTERVENTIONS:  - Assess urinary function  - Encourage oral fluids to ensure adequate hydration if ordered  - Administer IV fluids as ordered to ensure adequate hydration  - Administer ordered medications as needed  - Offer frequent toileting  - Follow urinary retention protocol if ordered  Outcome: Progressing  Goal: Absence of urinary retention  Description: INTERVENTIONS:  - Assess patient's ability to void and empty bladder  - Monitor I/O  - Bladder scan as needed  - Discuss with physician/AP medications to alleviate retention as needed  - Discuss catheterization for long term situations as appropriate  Outcome: Progressing     Problem: METABOLIC, FLUID AND ELECTROLYTES - ADULT  Goal: Glucose maintained within target range  Description: INTERVENTIONS:  - Monitor Blood Glucose as ordered  - Assess for signs and symptoms of hyperglycemia and hypoglycemia  - Administer ordered medications to maintain glucose within target range  - Assess nutritional intake and initiate nutrition service referral as needed  Outcome: Progressing     Problem: SKIN/TISSUE INTEGRITY - ADULT  Goal: Skin Integrity remains intact(Skin Breakdown Prevention)  Description: Assess:  -Perform [place] assessment every shift  -Clean and moisturize skin every shift  -Inspect skin when repositioning, toileting, and assisting with ADLS  -Assess under medical devices such as IV every shift  -Assess extremities for adequate circulation and sensation     Bed Management:  -Have minimal linens on bed & keep smooth, unwrinkled  -Change linens as needed when moist or perspiring  -Avoid sitting or lying in one position for more than 2 hours while in bed  -Keep HOB at 30 degrees     Toileting:  -Offer bedside commode  -Assess for incontinence every shift  -Use incontinent care products after each incontinent episode such as foam cleanser    Activity:  -Mobilize patient 2 times a day  -Encourage activity and walks on unit  -Encourage or provide ROM exercises   -Turn and reposition patient every 2 Hours  -Use appropriate equipment to lift or move patient in bed  -Instruct/ Assist with weight shifting every hour when out of bed in chair  -Consider limitation of chair time 2 hour intervals    Skin Care:  -Avoid use of baby powder, tape, friction and shearing, hot water or constrictive clothing  -Relieve pressure over bony prominences using pillows  -Do not massage red bony areas    Next Steps:  -Teach patient strategies to minimize risks such as skin breakdown   -Consider consults to  interdisciplinary teams such as wound care  Outcome: Progressing  Goal: Incision(s), wounds(s) or drain site(s) healing without S/S of infection  Description: INTERVENTIONS  - Assess and document dressing, incision, wound bed, drain sites and surrounding tissue  - Provide patient and family education  - Perform skin care/dressing changes every shift  Outcome: Progressing     Problem: MOBILITY - ADULT  Goal: Maintain or return to baseline ADL function  Description: INTERVENTIONS:  -  Assess patient's ability to carry out ADLs; assess patient's baseline for ADL function and identify physical deficits which impact ability to perform ADLs (bathing, care of mouth/teeth, toileting, grooming, dressing, etc )  - Assess/evaluate cause of self-care deficits   - Assess range of motion  - Assess patient's mobility; develop plan if impaired  - Assess patient's need for assistive devices and provide as appropriate  - Encourage maximum independence but intervene and supervise when necessary  - Involve family in performance of ADLs  - Assess for home care needs following discharge   - Consider OT consult to assist with ADL evaluation and planning for discharge  - Provide patient education as appropriate  Outcome: Progressing  Goal: Maintains/Returns to pre admission functional level  Description: INTERVENTIONS:  - Perform BMAT or MOVE assessment daily    - Set and communicate daily mobility goal to care team and patient/family/caregiver     - Collaborate with rehabilitation services on mobility goals if consulted  - Out of bed for toileting  - Record patient progress and toleration of activity level   Outcome: Progressing     Problem: Potential for Falls  Goal: Patient will remain free of falls  Description: INTERVENTIONS:  - Educate patient/family on patient safety including physical limitations  - Instruct patient to call for assistance with activity   - Consult OT/PT to assist with strengthening/mobility   - Keep Call bell within reach  - Keep bed low and locked with side rails adjusted as appropriate  - Keep care items and personal belongings within reach  - Initiate and maintain comfort rounds  - Apply yellow socks and bracelet for high fall risk patients  - Consider moving patient to room near nurses station  Outcome: Progressing

## 2022-08-10 NOTE — ASSESSMENT & PLAN NOTE
Lab Results   Component Value Date    HGBA1C 7 1 (H) 08/04/2022     · Prior A1c controlled  · Currently uses insulin pump, reviewed patient insulin pump, uses about 100 units daily over past 7 days  · Recommend continue using insulin pump on discharge  · Once patient is readmitted, will likely need Lantus 30 units b i d , Humalog 15 units with meals t i d

## 2022-08-10 NOTE — PLAN OF CARE
Problem: PAIN - ADULT  Goal: Verbalizes/displays adequate comfort level or baseline comfort level  Description: Interventions:  - Encourage patient to monitor pain and request assistance  - Assess pain using appropriate pain scale  - Administer analgesics based on type and severity of pain and evaluate response  - Implement non-pharmacological measures as appropriate and evaluate response  - Consider cultural and social influences on pain and pain management  - Notify physician/advanced practitioner if interventions unsuccessful or patient reports new pain  Outcome: Progressing     Problem: INFECTION - ADULT  Goal: Absence or prevention of progression during hospitalization  Description: INTERVENTIONS:  - Assess and monitor for signs and symptoms of infection  - Monitor lab/diagnostic results  - Monitor all insertion sites, i e  indwelling lines, tubes, and drains  - Monitor endotracheal if appropriate and nasal secretions for changes in amount and color  - [place] appropriate cooling/warming therapies per order  - Administer medications as ordered  - Instruct and encourage patient and family to use good hand hygiene technique  - Identify and instruct in appropriate isolation precautions for identified infection/condition  Outcome: Progressing     Problem: SAFETY ADULT  Goal: Patient will remain free of falls  Description: INTERVENTIONS:  - Educate patient/family on patient safety including physical limitations  - Instruct patient to call for assistance with activity   - Consult OT/PT to assist with strengthening/mobility   - Keep Call bell within reach  - Keep bed low and locked with side rails adjusted as appropriate  - Keep care items and personal belongings within reach  - Initiate and maintain comfort rounds  - Apply yellow socks and bracelet for high fall risk patients  - Consider moving patient to room near nurses station  Outcome: Progressing  Goal: Maintain or return to baseline ADL function  Description: INTERVENTIONS:  -  Assess patient's ability to carry out ADLs; assess patient's baseline for ADL function and identify physical deficits which impact ability to perform ADLs (bathing, care of mouth/teeth, toileting, grooming, dressing, etc )  - Assess/evaluate cause of self-care deficits   - Assess range of motion  - Assess patient's mobility; develop plan if impaired  - Assess patient's need for assistive devices and provide as appropriate  - Encourage maximum independence but intervene and supervise when necessary  - Involve family in performance of ADLs  - Assess for home care needs following discharge   - Consider OT consult to assist with ADL evaluation and planning for discharge  - Provide patient education as appropriate  Outcome: Progressing     Problem: Knowledge Deficit  Goal: Patient/family/caregiver demonstrates understanding of disease process, treatment plan, medications, and discharge instructions  Description: Complete learning assessment and assess knowledge base    Interventions:  - Provide teaching at level of understanding  - Provide teaching via preferred learning methods  Outcome: Progressing     Problem: GASTROINTESTINAL - ADULT  Goal: Maintains or returns to baseline bowel function  Description: INTERVENTIONS:  - Assess bowel function  - Encourage oral fluids to ensure adequate hydration  - Administer IV fluids if ordered to ensure adequate hydration  - Administer ordered medications as needed  - Encourage mobilization and activity  - Consider nutritional services referral to assist patient with adequate nutrition and appropriate food choices  Outcome: Progressing     Problem: GENITOURINARY - ADULT  Goal: Maintains or returns to baseline urinary function  Description: INTERVENTIONS:  - Assess urinary function  - Encourage oral fluids to ensure adequate hydration if ordered  - Administer IV fluids as ordered to ensure adequate hydration  - Administer ordered medications as needed  - Offer frequent toileting  - Follow urinary retention protocol if ordered  Outcome: Progressing  Goal: Absence of urinary retention  Description: INTERVENTIONS:  - Assess patient's ability to void and empty bladder  - Monitor I/O  - Bladder scan as needed  - Discuss with physician/AP medications to alleviate retention as needed  - Discuss catheterization for long term situations as appropriate  Outcome: Progressing     Problem: METABOLIC, FLUID AND ELECTROLYTES - ADULT  Goal: Glucose maintained within target range  Description: INTERVENTIONS:  - Monitor Blood Glucose as ordered  - Assess for signs and symptoms of hyperglycemia and hypoglycemia  - Administer ordered medications to maintain glucose within target range  - Assess nutritional intake and initiate nutrition service referral as needed  Outcome: Progressing

## 2022-08-10 NOTE — DISCHARGE SUMMARY
[facility]  Discharge- [address] [place] [address] 1975, 51 y o  female MRN: [id number]  Unit/Bed#: [facility] 68 2 ite [place] 87 218-02 Encounter: [id number]  Primary Care Provider: Sol Beck MD   Date and time admitted to hospital: 8/8/2022  6:20 PM    * Osteomyelitis of left foot [facility])  Assessment & Plan  · Patient initially admitted at [address] [place] [address] on 08/04 found to be septic, with known left lower extremity ulcer and wound concerning for osteomyelitis with cellulitis  · Podiatry evaluated patient recommending left BKA  · Transferred to [facility] for surgery evaluation for likely left BKA-plan for possible BKA on Thursday  · General surgery following  · Will discharge patient on Levaquin, Augmentin until seen by General surgery for plan BKA  · Patient currently planning for August 24th with general surgery  · Unable to complete surgery now due to plan wedding of her daughter    Gallbladder sludge  Assessment & Plan  · Noted on previous imaging, without symptoms  · Incidental finding, no follow-up needed at this time    Bacteremia  Assessment & Plan  · 2/2 blood cultures were initially positive on 08/04 for Proteus is consistent with wound cultures  · Additionally blood cultures positive for GBS, bacteroides and Morganella  · Repeat blood cultures on 08/06 currently negative at 48 hours  · ID following  · Continue IV cefepime and Flagyl  · Echocardiogram completed without evidence of endocarditis  · Okay to discharge on Augmentin and Levaquin, patient to be re-evaluated by ID once BKA completed      Controlled type 2 diabetes mellitus with neurologic complication, with long-term current use of insulin ([facility])  Assessment & Plan  Lab Results   Component Value Date    HGBA1C 7 1 (H) 08/04/2022     · Prior A1c controlled  · Currently uses insulin pump, reviewed patient insulin pump, uses about 100 units daily over past 7 days  · Recommend continue using insulin pump on discharge  · Once patient is readmitted, will likely need Lantus 30 units b i d , Humalog 15 units with meals t i d  Discharging Physician / Practitioner: Obed Fay MD  PCP: Sol Beck MD  Admission Date:   Admission Orders (From admission, onward)     Ordered        08/08/22 1859  Inpatient Admission  Once                      Discharge Date: 08/10/22    Medical Problems             Resolved Problems  Date Reviewed: 8/10/2022   None                 Consultations During Hospital Stay:  · General surgery  · Infectious disease    Procedures Performed:   · None    Significant Findings / Test Results:   US kidney and bladder    Result Date: 8/8/2022  · Impression: Hydronephrosis appears to have resolved in both kidneys  Workstation performed: [id number]   ·     Incidental Findings:   · none     Test Results Pending at Discharge (will require follow up):   · none     Outpatient Tests Requested:  · none    Complications:  none    Reason for Admission: Evaluate for BKA    Hospital Course: Mike Jones is a 51 y o  female who presented to [address] [place] [address] initially on 08/04 initial symptoms of nausea vomiting and diarrhea found to be with sepsis  Initially suspected due to left lower extremity with diabetic ulcer  Wound cultures grew Proteus, blood cultures mentioned did also grow Proteus bacteremia  Evaluate by Infectious Disease and Podiatry and treated with IV antibiotics  Repeat blood cultures have been negative  MRI of the foot was completed, showing osteomyelitis  After Podiatry evaluated, suspect that patient's foot also had unstable Charcot foot with prior hallux amputation  It was recommended for patient to be evaluated for BKA  And patient was transfer from [facility] to [address] [place] [address]  Here she was evaluated by General surgery and initially offered BKA, though patient decline with family wedding scheduled sometime next week    Has a blood per cultures were negative, patient was not septic it appears that her infection was controlled  Discussed with Infectious Disease, patient was okay to be discharged to follow with General surgery for plan BKA outpatient  Patient to continue Levaquin, Augmentin on discharge without and date until BKA with planned to be reevaluated by Infectious Disease  Patient was discharged home         Please see above list of diagnoses and related plan for additional information  Condition at Discharge: fair     Discharge Day Visit / Exam:     Subjective:  Patient seen examined today at bedside  She denies any fevers, chills  States that she is doing well with no reports of pain  Discussed discharge planning and follow-up with general surgery outpatient  Vitals: Blood Pressure: 138/86 (08/10/22 0733)  Pulse: 79 (08/10/22 0733)  Temperature: 98 °F (36 7 °C) (08/10/22 0733)  Temp Source: Oral (08/10/22 0733)  Respirations: 18 (08/10/22 0733)  Weight - Scale: 104 kg (229 lb 11 5 oz) (08/10/22 0600)  SpO2: 90 % (08/10/22 0733)  Exam:   Physical Exam  Vitals reviewed  Constitutional:       General: She is not in acute distress  HENT:      Head: Normocephalic and atraumatic  Eyes:      General: No scleral icterus  Conjunctiva/sclera: Conjunctivae normal    Cardiovascular:      Rate and Rhythm: Normal rate and regular rhythm  Heart sounds: No murmur heard  Pulmonary:      Effort: Pulmonary effort is normal  No respiratory distress  Breath sounds: Normal breath sounds  Abdominal:      General: Bowel sounds are normal  There is no distension  Palpations: Abdomen is soft  Tenderness: There is no abdominal tenderness  Musculoskeletal:      Cervical back: Neck supple  Right lower leg: No edema  Left lower leg: No edema  Skin:     General: Skin is warm and dry  Comments: Bilateral foot wounds with bilateral amputations   Neurological:      Mental Status: She is alert and oriented to person, place, and time     Psychiatric:         Mood and Affect: Mood normal  Behavior: Behavior normal          Discussion with Family: patient,  on phone    Discharge instructions/Information to patient and family:   See after visit summary for information provided to patient and family  Provisions for Follow-Up Care:  See after visit summary for information related to follow-up care and any pertinent home health orders  Disposition:     Home    Planned Readmission: none     Discharge Statement:  I spent 35 minutes discharging the patient  This time was spent on the day of discharge  I had direct contact with the patient on the day of discharge  Greater than 50% of the total time was spent examining patient, answering all patient questions, arranging and discussing plan of care with patient as well as directly providing post-discharge instructions  Additional time then spent on discharge activities  Discharge Medications:  See after visit summary for reconciled discharge medications provided to patient and family        ** Please Note: This note has been constructed using a voice recognition system **

## 2022-08-10 NOTE — ASSESSMENT & PLAN NOTE
· Patient initially admitted at [facility] on 08/04 found to be septic, with known left lower extremity ulcer and wound concerning for osteomyelitis with cellulitis  · Podiatry evaluated patient recommending left BKA  · Transferred to [facility] for surgery evaluation for likely left BKA-plan for possible BKA on Thursday  · General surgery following  · Will discharge patient on Levaquin, Augmentin until seen by General surgery for plan BKA  · Patient currently planning for August 24th with general surgery  · Unable to complete surgery now due to plan wedding of her daughter

## 2022-08-10 NOTE — WOUND OSTOMY CARE
Progress Note - Wound   Dariela Back Gay 51 y o  female MRN: [id number]  Unit/Bed#: [facility] 2 -02 Encounter: [id number]        Assessment:   Patient is seen for wound care follow-up  Patient is a 50 yo female with b/l diabetic ulcers  PMH: DM, R foot osteomyelitis s/p R 1st digit amputation and L charcot foot s/p chopart amputation who presents with sepsis and bacteremia 2/2 left heel ulcer with osteomyelitis  Patient being discharged likely today  Patient report no issues with incontinence and pain w/ buttocks  Patient reports no wounds on buttocks Patient refused turning  Podiatry managing b/l foot wounds  Surgery consulted for possible L BKA  Orders listed below and wound care will sign off, call or tiger text with questions  Bedside nurse updated of findings and orders         Patient seen with Ember Ahn RN, BSN, Valentina Harrell RN, BSN

## 2022-08-10 NOTE — DISCHARGE INSTR - LAB
PLEASE CALL THE OFFICE REGARDING APPOINTMENT AND TO DO YOUR SURGICAL PAPERWORK  FOLLOWUP THIS WEEK OR NEXT WEEK AS PER DR Thais Harrell MD   Specialties: Surgery (General Surgery)   Phone: [phone] [address]   [address] [place] [address], [address] Phone   [phone] Fax   [phone]

## 2022-08-10 NOTE — ASSESSMENT & PLAN NOTE
· 2/2 blood cultures were initially positive on 08/04 for Proteus is consistent with wound cultures  · Additionally blood cultures positive for GBS, bacteroides and Morganella  · Repeat blood cultures on 08/06 currently negative at 48 hours  · ID following  · Continue IV cefepime and Flagyl  · Echocardiogram completed without evidence of endocarditis  · Okay to discharge on Augmentin and Levaquin, patient to be re-evaluated by ID once BKA completed

## 2022-08-10 NOTE — NURSING NOTE
Patient instructed to follow up with surgeon/Dr Lainez after discharge  Surgeon's phone number and address on avs   avs reviewed with patient and pt verbalized understanding, pt to resume her insulin pump  Pt prescriptions sent to her home pharmacy and pt to obtain  Pt aware to remain on antibiotics with no end date, and to follow up with surgery for end date at this time as pt need to be scheduled for surgical intervention

## 2022-08-11 DIAGNOSIS — M86.9 OSTEOMYELITIS OF LEFT FOOT, UNSPECIFIED TYPE (HCC): Primary | ICD-10-CM

## 2022-08-11 LAB
BACTERIA BLD CULT: NORMAL
BACTERIA BLD CULT: NORMAL

## 2022-08-11 NOTE — PROGRESS NOTES
Attempted to reach out to pt regarding her labs that will need to be completed for nolvia, however mailbox was full and unable to leave message  Will try again and also mail to pt home

## 2022-08-15 ENCOUNTER — OFFICE VISIT (OUTPATIENT)
Dept: SURGERY | Facility: CLINIC | Age: 47
End: 2022-08-15
Payer: COMMERCIAL

## 2022-08-15 VITALS
SYSTOLIC BLOOD PRESSURE: 150 MMHG | DIASTOLIC BLOOD PRESSURE: 80 MMHG | TEMPERATURE: 97.4 F | HEIGHT: 63 IN | OXYGEN SATURATION: 98 % | HEART RATE: 93 BPM | WEIGHT: 232.4 LBS | BODY MASS INDEX: 41.18 KG/M2

## 2022-08-15 DIAGNOSIS — E11.40 CONTROLLED TYPE 2 DIABETES MELLITUS WITH DIABETIC NEUROPATHY, WITH LONG-TERM CURRENT USE OF INSULIN (HCC): ICD-10-CM

## 2022-08-15 DIAGNOSIS — E66.01 MORBID OBESITY (HCC): ICD-10-CM

## 2022-08-15 DIAGNOSIS — S81.802A NON-HEALING WOUND OF LEFT LOWER EXTREMITY: ICD-10-CM

## 2022-08-15 DIAGNOSIS — M86.9 OSTEOMYELITIS OF LEFT FOOT, UNSPECIFIED TYPE (HCC): Primary | ICD-10-CM

## 2022-08-15 DIAGNOSIS — Z79.4 CONTROLLED TYPE 2 DIABETES MELLITUS WITH DIABETIC NEUROPATHY, WITH LONG-TERM CURRENT USE OF INSULIN (HCC): ICD-10-CM

## 2022-08-15 PROCEDURE — 99214 OFFICE O/P EST MOD 30 MIN: CPT | Performed by: SURGERY

## 2022-08-15 RX ORDER — INSULIN ASPART INJECTION 100 [IU]/ML
INJECTION, SOLUTION SUBCUTANEOUS
COMMUNITY
Start: 2022-08-01

## 2022-08-15 NOTE — H&P (VIEW-ONLY)
Assessment/Plan:   Marguerite Ugalde is a 51 y  o female who is here for   Chief Complaint   Patient presents with   [address] left below-knee amputation for a nonsalvageable Charcot's diabetic foot  LEFT      Plan:  Left below knee amputation  Patient is aware the need for rehab, postoperative management, the risks benefits and alternatives and difficulties of wound healing  She has come to  with the need for an amputation and will proceed after her daughter's wedding which is this week  She knows to come to the office sooner or to the ER if she develops signs or symptoms of active infection or sepsis  We discussed those symptoms  High risk for postoperative wound complications secondary to diabetes, obesity and comorbid conditions  She understands  She may result in an above knee amputation if this becomes a nonhealing wound  Positioning: supine    Post Op Pain Management:   Norco    - Patient has been instructed to avoid herbs or non-directed vitamins the week prior to surgery to ensure no drug interactions with perioperative surgical and anesthetic medications  - Patient should continue beta-blocker medication up through and including the day of surgery but hold any other hypertensive medications, including diuretics, unless instructed by PCP or anesthesia  - Patient should continue his statin medication up through and including the day of surgery   - Hold metformin , If on this medication, the morning of surgery and do not resume until 48 hours AFTER surgery to avoid risk of lactic acidosis  Do not resume if eGFR is < 30  - Insulin Management:If on Insulin, patient advised to call PCP for explicit instructions  In general, will need to take one-half normal dose am of surgery but pt advised to consult PCP before making any changes     - Patient has been instructed to avoid aspirin containing medications or non-steroidal anti-inflammatory drugs for SEVEN days preceding surgery  Preoperative Clearance: None          _______________________________________________________  CC:Consult (Discuss BKA)    HPI:  Carl Galloway is a 51 y  o female who was referred for evaluation of Consult (Discuss BKA)    Currently patient reports repeated management of left Charcot's foot  Recently admitted for sepsis and evaluation for eventual below knee amputation  She was discharged on antibiotics in order to get through her daughter's wedding so that she could be at her daughter's wedding with her leg intact and has agreed now to proceed with amputation  She denies fevers, chills or systemic symptoms        Reports: infected    Location: lower extremity      ROS:  General ROS: negative  negative for - chills, fatigue, fever or night sweats, weight loss  Respiratory ROS: no cough, shortness of breath, or wheezing  Cardiovascular ROS: no chest pain or dyspnea on exertion  Genito-Urinary ROS: no dysuria, trouble voiding, or hematuria  Musculoskeletal ROS: negative for - gait disturbance, joint pain or muscle pain  Neurological ROS: no TIA or stroke symptoms  Skin ROS: See HPI  GI ROS: see HPI  Skin ROS: no new rashes or lesions   Lymphatic ROS: no new adenopathy noted by pt     GYN ROS: see HPI, no new GYN history or bleeding noted  Psy ROS: no new mental or behavioral disturbances       Patient Active Problem List   Diagnosis    Open wound of left foot    Controlled type 2 diabetes mellitus with neurologic complication, with long-term current use of insulin ([address] )    Diabetic ketoacidosis without coma associated with type 1 diabetes mellitus ([address] )    Non-healing wound of left foot    Acute kidney injury ([address] )    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 due to underlying condition, limited to breakdown of skin ([address] )    Morbid obesity ([address] )    Acute on chronic anemia    Acute blood loss as cause of postoperative anemia    S/P amputation  Cellulitis of left foot    Diabetic ulcer of left midfoot associated with diabetes mellitus due to underlying condition, with fat layer exposed ([place] [address] )    Sepsis ([place] [address] )    Hydroureteronephrosis    Osteomyelitis of left foot ([place] [address] )    Bacteremia    Gallbladder sludge         Allergies:  Clindamycin      Current Outpatient Medications:     ACCU-CHEK FASTCLIX LANCETS MISC, by Does not apply route, Disp: , Rfl:     amoxicillin-clavulanate (AUGMENTIN) 875-125 mg per tablet, Take 1 tablet by mouth every 12 (twelve) hours, Disp: 60 tablet, Rfl: 0    ferrous sulfate 325 (65 Fe) mg tablet, Take 325 mg by mouth daily with breakfast, Disp: , Rfl:     Fiasp 100 UNIT/ML SOLN, INJECT UP  UNITS SUBCUTANEOUSLY DAILY VIA INSULIN PUMP, Disp: , Rfl:     gabapentin (NEURONTIN) 300 mg capsule, Take 1 capsule (300 mg total) by mouth 3 (three) times a day, Disp: 90 capsule, Rfl: 0    levofloxacin (LEVAQUIN) 750 mg tablet, Take 1 tablet (750 mg total) by mouth every 24 hours, Disp: 30 tablet, Rfl: 0    polyethylene glycol (MIRALAX) 17 g packet, Take 17 g by mouth daily, Disp: , Rfl:     Syringe/Needle, Disp, 30G X 1/2" 1 ML MISC, by Does not apply route, Disp: , Rfl:     Past Medical History:   Diagnosis Date    Charcot's joint of foot, left     Diabetes mellitus ([place] [address] )     Osteomyelitis of foot, right, acute ([place] [address] )        Past Surgical History:   Procedure Laterality Date    FOOT AMPUTATION Left 10/30/2020    Procedure: CHOPART AMPUTATION LEFT  FOOT:;  Surgeon: Bharath Boswell DPM;  Location:  MAIN OR;  Service: Podiatry    FOOT CAPSULE RELEASE W/ PERCUTANEOUS HEEL CORD LENGTHENING, TIBIAL TENDON TRANSFER Left 10/30/2020    Procedure: Achilles tenotomy left foot;  Surgeon: Bharath Boswell DPM;  Location:  MAIN OR;  Service: Podiatry    FOOT SURGERY Right     right first toe amputation  2019       Family History   Problem Relation Age of Onset    Diabetes Brother     Hyperlipidemia Brother     Hypertension Brother     Diabetes Mother     Hypertension Father     Diabetes Sister         reports that she has never smoked  She has never used smokeless tobacco  She reports that she does not drink alcohol and does not use drugs  Vitals:    08/15/22 1327   BP: 150/80   Pulse: 93   Temp: (!) 97 4 °F (36 3 °C)   SpO2: 98%        PHYSICAL EXAM  General Appearance:    Alert, cooperative, no distress,    Head:    Normocephalic without obvious abnormality   Eyes:    PERRL, conjunctiva/corneas clear     Neck:   Supple, no adenopathy, no JVD   Back:     Symmetric, no spinal or CVA tenderness   Lungs:     Clear to auscultation bilaterally, no wheezing or rhonchi   Heart:    Regular rate and rhythm, S1 and S2 normal, no murmur   Abdomen:     Benign, no rebound or guarding  Extremities:   Extremities normal  No clubbing, cyanosis or edema   Psych:   Normal Affect, AOx3  Neurologic:  Skin:   CNII-XII intact  Strength symmetric, speech intact    Warm, dry, intact, no visible rashes or lesions except as follows:  Left lower extremity she is in a brace with a partial removal of her foot     See hospital pictures for wounds  Right lower extremity also has some nonhealing wounds also documented in media in her recent hospitalization  Some portions of this record may have been generated with voice recognition software  There may be translation, syntax,  or grammatical errors  Occasional wrong word or "sound-a-like" substitutions may have occurred due to the inherent limitations of the voice recognition software  Read the chart carefully and recognize, using context, where substitutions may have occurred  If you have any questions, please contact the dictating provider for clarification or correction, as needed  This encounter has been coded by a non-certified coder         Chon Patel MD    Date: 8/15/2022 Time: 1:54 PM

## 2022-08-15 NOTE — PROGRESS NOTES
Assessment/Plan:   Maria Isabel Ayers is a 51 y  o female who is here for   Chief Complaint   Patient presents with   [address] left below-knee amputation for a nonsalvageable Charcot's diabetic foot  LEFT      Plan:  Left below knee amputation  Patient is aware the need for rehab, postoperative management, the risks benefits and alternatives and difficulties of wound healing  She has come to  with the need for an amputation and will proceed after her daughter's wedding which is this week  She knows to come to the office sooner or to the ER if she develops signs or symptoms of active infection or sepsis  We discussed those symptoms  High risk for postoperative wound complications secondary to diabetes, obesity and comorbid conditions  She understands  She may result in an above knee amputation if this becomes a nonhealing wound  Positioning: supine    Post Op Pain Management:   Norco    - Patient has been instructed to avoid herbs or non-directed vitamins the week prior to surgery to ensure no drug interactions with perioperative surgical and anesthetic medications  - Patient should continue beta-blocker medication up through and including the day of surgery but hold any other hypertensive medications, including diuretics, unless instructed by PCP or anesthesia  - Patient should continue his statin medication up through and including the day of surgery   - Hold metformin , If on this medication, the morning of surgery and do not resume until 48 hours AFTER surgery to avoid risk of lactic acidosis  Do not resume if eGFR is < 30  - Insulin Management:If on Insulin, patient advised to call PCP for explicit instructions  In general, will need to take one-half normal dose am of surgery but pt advised to consult PCP before making any changes     - Patient has been instructed to avoid aspirin containing medications or non-steroidal anti-inflammatory drugs for SEVEN days preceding surgery  Preoperative Clearance: None          _______________________________________________________  CC:Consult (Discuss BKA)    HPI:  Alex Garcia is a 51 y  o female who was referred for evaluation of Consult (Discuss BKA)    Currently patient reports repeated management of left Charcot's foot  Recently admitted for sepsis and evaluation for eventual below knee amputation  She was discharged on antibiotics in order to get through her daughter's wedding so that she could be at her daughter's wedding with her leg intact and has agreed now to proceed with amputation  She denies fevers, chills or systemic symptoms        Reports: infected    Location: lower extremity      ROS:  General ROS: negative  negative for - chills, fatigue, fever or night sweats, weight loss  Respiratory ROS: no cough, shortness of breath, or wheezing  Cardiovascular ROS: no chest pain or dyspnea on exertion  Genito-Urinary ROS: no dysuria, trouble voiding, or hematuria  Musculoskeletal ROS: negative for - gait disturbance, joint pain or muscle pain  Neurological ROS: no TIA or stroke symptoms  Skin ROS: See HPI  GI ROS: see HPI  Skin ROS: no new rashes or lesions   Lymphatic ROS: no new adenopathy noted by pt     GYN ROS: see HPI, no new GYN history or bleeding noted  Psy ROS: no new mental or behavioral disturbances       Patient Active Problem List   Diagnosis    Open wound of left foot    Controlled type 2 diabetes mellitus with neurologic complication, with long-term current use of insulin ([address] )    Diabetic ketoacidosis without coma associated with type 1 diabetes mellitus ([address] )    Non-healing wound of left foot    Acute kidney injury ([address] )    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 due to underlying condition, limited to breakdown of skin ([address] )    Morbid obesity ([address] )    Acute on chronic anemia    Acute blood loss as cause of postoperative anemia    S/P amputation  Cellulitis of left foot    Diabetic ulcer of left midfoot associated with diabetes mellitus due to underlying condition, with fat layer exposed ([place] [address] )    Sepsis ([place] [address] )    Hydroureteronephrosis    Osteomyelitis of left foot ([place] [address] )    Bacteremia    Gallbladder sludge         Allergies:  Clindamycin      Current Outpatient Medications:     ACCU-CHEK FASTCLIX LANCETS MISC, by Does not apply route, Disp: , Rfl:     amoxicillin-clavulanate (AUGMENTIN) 875-125 mg per tablet, Take 1 tablet by mouth every 12 (twelve) hours, Disp: 60 tablet, Rfl: 0    ferrous sulfate 325 (65 Fe) mg tablet, Take 325 mg by mouth daily with breakfast, Disp: , Rfl:     Fiasp 100 UNIT/ML SOLN, INJECT UP  UNITS SUBCUTANEOUSLY DAILY VIA INSULIN PUMP, Disp: , Rfl:     gabapentin (NEURONTIN) 300 mg capsule, Take 1 capsule (300 mg total) by mouth 3 (three) times a day, Disp: 90 capsule, Rfl: 0    levofloxacin (LEVAQUIN) 750 mg tablet, Take 1 tablet (750 mg total) by mouth every 24 hours, Disp: 30 tablet, Rfl: 0    polyethylene glycol (MIRALAX) 17 g packet, Take 17 g by mouth daily, Disp: , Rfl:     Syringe/Needle, Disp, 30G X 1/2" 1 ML MISC, by Does not apply route, Disp: , Rfl:     Past Medical History:   Diagnosis Date    Charcot's joint of foot, left     Diabetes mellitus ([place] [address] )     Osteomyelitis of foot, right, acute ([place] [address] )        Past Surgical History:   Procedure Laterality Date    FOOT AMPUTATION Left 10/30/2020    Procedure: CHOPART AMPUTATION LEFT  FOOT:;  Surgeon: Jean Pierre Zamora DPM;  Location:  MAIN OR;  Service: Podiatry    FOOT CAPSULE RELEASE W/ PERCUTANEOUS HEEL CORD LENGTHENING, TIBIAL TENDON TRANSFER Left 10/30/2020    Procedure: Achilles tenotomy left foot;  Surgeon: Jean Pierre Zamora DPM;  Location:  MAIN OR;  Service: Podiatry    FOOT SURGERY Right     right first toe amputation  2019       Family History   Problem Relation Age of Onset    Diabetes Brother     Hyperlipidemia Brother     Hypertension Brother     Diabetes Mother     Hypertension Father     Diabetes Sister         reports that she has never smoked  She has never used smokeless tobacco  She reports that she does not drink alcohol and does not use drugs  Vitals:    08/15/22 1327   BP: 150/80   Pulse: 93   Temp: (!) 97 4 °F (36 3 °C)   SpO2: 98%        PHYSICAL EXAM  General Appearance:    Alert, cooperative, no distress,    Head:    Normocephalic without obvious abnormality   Eyes:    PERRL, conjunctiva/corneas clear     Neck:   Supple, no adenopathy, no JVD   Back:     Symmetric, no spinal or CVA tenderness   Lungs:     Clear to auscultation bilaterally, no wheezing or rhonchi   Heart:    Regular rate and rhythm, S1 and S2 normal, no murmur   Abdomen:     Benign, no rebound or guarding  Extremities:   Extremities normal  No clubbing, cyanosis or edema   Psych:   Normal Affect, AOx3  Neurologic:  Skin:   CNII-XII intact  Strength symmetric, speech intact    Warm, dry, intact, no visible rashes or lesions except as follows:  Left lower extremity she is in a brace with a partial removal of her foot     See hospital pictures for wounds  Right lower extremity also has some nonhealing wounds also documented in media in her recent hospitalization  Some portions of this record may have been generated with voice recognition software  There may be translation, syntax,  or grammatical errors  Occasional wrong word or "sound-a-like" substitutions may have occurred due to the inherent limitations of the voice recognition software  Read the chart carefully and recognize, using context, where substitutions may have occurred  If you have any questions, please contact the dictating provider for clarification or correction, as needed  This encounter has been coded by a non-certified coder         Yamilex Palacios MD    Date: 8/15/2022 Time: 1:54 PM

## 2022-08-19 ENCOUNTER — TELEPHONE (OUTPATIENT)
Dept: SURGERY | Facility: CLINIC | Age: 47
End: 2022-08-19

## 2022-08-19 NOTE — TELEPHONE ENCOUNTER
Unable to reach patient  Left message to call the office  I need to know if she has her covid shot or not  If she does not she will need a COVID test since her surgery is inpatient

## 2022-08-23 ENCOUNTER — TELEPHONE (OUTPATIENT)
Dept: SURGERY | Facility: CLINIC | Age: 47
End: 2022-08-23

## 2022-08-23 DIAGNOSIS — N17.9 AKI (ACUTE KIDNEY INJURY) (HCC): Primary | ICD-10-CM

## 2022-08-23 NOTE — TELEPHONE ENCOUNTER
Received faxed in-patient auth from [facility]      Approved    CPT #[id number]    #[id number]

## 2022-08-25 ENCOUNTER — ANESTHESIA EVENT (OUTPATIENT)
Dept: PERIOP | Facility: HOSPITAL | Age: 47
DRG: 041 | End: 2022-08-25
Payer: COMMERCIAL

## 2022-08-25 NOTE — PRE-PROCEDURE INSTRUCTIONS
Pre-Surgery Instructions:   Medication Instructions    amoxicillin-clavulanate (AUGMENTIN) 875-125 mg per tablet Take day of surgery   Fiasp 100 UNIT/ML SOLN Via Insulin Pump Per MD    gabapentin (NEURONTIN) 300 mg capsule Uses PRN- OK to take day of surgery    levofloxacin (LEVAQUIN) 750 mg tablet Take day of surgery   polyethylene glycol (MIRALAX) 17 g packet Hold day of surgery  Pre op and bathing instructions reviewed  Pt has hibiclens  Pt  Verbalized understanding of current visitor restrictions  Pt  Verbalized an understanding of all instructions reviewed and offers no concerns at this time  Instructed to avoid all ASA/NSAIDs and OTC Vit/Supp from now until after surgery per anesthesia guidelines   Tylenol ok prn  DOS meds with a few sips of H2O

## 2022-08-30 NOTE — PROGRESS NOTES
Pt has an insulin pump and dexcom to monitor blood sugar  Pt was instructed by her endocrinologist to set her insulin pump to "activity", where basal rate remains steady and does not go up  or down (set at 25 units/hr)  Endocrinologist instructions reviewed with, and were ok'd by Dr Delmi Monreal in anesthesia  Anesthesia to address any necessary changes in the morning as per Dr Delmi Monreal

## 2022-08-31 ENCOUNTER — ANESTHESIA (OUTPATIENT)
Dept: PERIOP | Facility: HOSPITAL | Age: 47
DRG: 041 | End: 2022-08-31
Payer: COMMERCIAL

## 2022-08-31 ENCOUNTER — HOSPITAL ENCOUNTER (INPATIENT)
Facility: HOSPITAL | Age: 47
LOS: 6 days | Discharge: NURSING, REHAB OR LONG-TERM CARE | DRG: 041 | End: 2022-09-06
Attending: SURGERY | Admitting: SURGERY
Payer: COMMERCIAL

## 2022-08-31 DIAGNOSIS — Z79.4 CONTROLLED TYPE 2 DIABETES MELLITUS WITH DIABETIC NEUROPATHY, WITH LONG-TERM CURRENT USE OF INSULIN (HCC): ICD-10-CM

## 2022-08-31 DIAGNOSIS — M86.9 OSTEOMYELITIS OF LEFT FOOT, UNSPECIFIED TYPE (HCC): ICD-10-CM

## 2022-08-31 DIAGNOSIS — Z79.4 CONTROLLED TYPE 2 DIABETES MELLITUS WITH DIABETIC POLYNEUROPATHY, WITH LONG-TERM CURRENT USE OF INSULIN (HCC): ICD-10-CM

## 2022-08-31 DIAGNOSIS — E11.42 CONTROLLED TYPE 2 DIABETES MELLITUS WITH DIABETIC POLYNEUROPATHY, WITH LONG-TERM CURRENT USE OF INSULIN (HCC): ICD-10-CM

## 2022-08-31 DIAGNOSIS — E11.40 CONTROLLED TYPE 2 DIABETES MELLITUS WITH DIABETIC NEUROPATHY, WITH LONG-TERM CURRENT USE OF INSULIN (HCC): ICD-10-CM

## 2022-08-31 DIAGNOSIS — L97.425 DIABETIC ULCER OF LEFT MIDFOOT ASSOCIATED WITH TYPE 2 DIABETES MELLITUS, WITH MUSCLE INVOLVEMENT WITHOUT EVIDENCE OF NECROSIS (HCC): ICD-10-CM

## 2022-08-31 DIAGNOSIS — S91.302S OPEN WOUND OF LEFT FOOT, SEQUELA: Primary | ICD-10-CM

## 2022-08-31 DIAGNOSIS — E11.621 DIABETIC ULCER OF LEFT MIDFOOT ASSOCIATED WITH TYPE 2 DIABETES MELLITUS, WITH MUSCLE INVOLVEMENT WITHOUT EVIDENCE OF NECROSIS (HCC): ICD-10-CM

## 2022-08-31 DIAGNOSIS — E08.621 DIABETIC ULCER OF RIGHT MIDFOOT ASSOCIATED WITH DIABETES MELLITUS DUE TO UNDERLYING CONDITION, LIMITED TO BREAKDOWN OF SKIN (HCC): ICD-10-CM

## 2022-08-31 DIAGNOSIS — L97.411 DIABETIC ULCER OF RIGHT MIDFOOT ASSOCIATED WITH DIABETES MELLITUS DUE TO UNDERLYING CONDITION, LIMITED TO BREAKDOWN OF SKIN (HCC): ICD-10-CM

## 2022-08-31 DIAGNOSIS — Z89.9 S/P AMPUTATION: ICD-10-CM

## 2022-08-31 PROBLEM — E10.9 DIABETES MELLITUS TYPE 1 (HCC): Status: ACTIVE | Noted: 2022-08-31

## 2022-08-31 LAB
ABO GROUP BLD: NORMAL
BLD GP AB SCN SERPL QL: NEGATIVE
EXT PREGNANCY TEST URINE: NEGATIVE
EXT. CONTROL: NORMAL
GLUCOSE SERPL-MCNC: 105 MG/DL (ref 65–140)
GLUCOSE SERPL-MCNC: 133 MG/DL (ref 65–140)
GLUCOSE SERPL-MCNC: 140 MG/DL (ref 65–140)
RH BLD: POSITIVE
SPECIMEN EXPIRATION DATE: NORMAL

## 2022-08-31 PROCEDURE — 86900 BLOOD TYPING SEROLOGIC ABO: CPT | Performed by: ANESTHESIOLOGY

## 2022-08-31 PROCEDURE — 0Y6J0Z2 DETACHMENT AT LEFT LOWER LEG, MID, OPEN APPROACH: ICD-10-PCS | Performed by: SURGERY

## 2022-08-31 PROCEDURE — 82948 REAGENT STRIP/BLOOD GLUCOSE: CPT

## 2022-08-31 PROCEDURE — 88307 TISSUE EXAM BY PATHOLOGIST: CPT | Performed by: PATHOLOGY

## 2022-08-31 PROCEDURE — 86850 RBC ANTIBODY SCREEN: CPT | Performed by: ANESTHESIOLOGY

## 2022-08-31 PROCEDURE — 88311 DECALCIFY TISSUE: CPT | Performed by: PATHOLOGY

## 2022-08-31 PROCEDURE — 81025 URINE PREGNANCY TEST: CPT | Performed by: SURGERY

## 2022-08-31 PROCEDURE — 27880 AMPUTATION OF LOWER LEG: CPT | Performed by: SURGERY

## 2022-08-31 PROCEDURE — NC001 PR NO CHARGE: Performed by: SURGERY

## 2022-08-31 PROCEDURE — 86901 BLOOD TYPING SEROLOGIC RH(D): CPT | Performed by: ANESTHESIOLOGY

## 2022-08-31 RX ORDER — HYDROMORPHONE HCL/PF 1 MG/ML
0.5 SYRINGE (ML) INJECTION
Status: DISCONTINUED | OUTPATIENT
Start: 2022-08-31 | End: 2022-08-31 | Stop reason: HOSPADM

## 2022-08-31 RX ORDER — PROMETHAZINE HYDROCHLORIDE 25 MG/ML
6.25 INJECTION, SOLUTION INTRAMUSCULAR; INTRAVENOUS ONCE
Status: COMPLETED | OUTPATIENT
Start: 2022-08-31 | End: 2022-08-31

## 2022-08-31 RX ORDER — ONDANSETRON 2 MG/ML
INJECTION INTRAMUSCULAR; INTRAVENOUS AS NEEDED
Status: DISCONTINUED | OUTPATIENT
Start: 2022-08-31 | End: 2022-08-31

## 2022-08-31 RX ORDER — ROPIVACAINE HYDROCHLORIDE 5 MG/ML
INJECTION, SOLUTION EPIDURAL; INFILTRATION; PERINEURAL
Status: COMPLETED | OUTPATIENT
Start: 2022-08-31 | End: 2022-08-31

## 2022-08-31 RX ORDER — HYDROCODONE BITARTRATE AND ACETAMINOPHEN 5; 325 MG/1; MG/1
1 TABLET ORAL EVERY 6 HOURS PRN
Qty: 5 TABLET | Refills: 0 | Status: SHIPPED | OUTPATIENT
Start: 2022-08-31 | End: 2022-09-06

## 2022-08-31 RX ORDER — MAGNESIUM HYDROXIDE 1200 MG/15ML
LIQUID ORAL AS NEEDED
Status: DISCONTINUED | OUTPATIENT
Start: 2022-08-31 | End: 2022-08-31 | Stop reason: HOSPADM

## 2022-08-31 RX ORDER — GLYCOPYRROLATE 0.2 MG/ML
INJECTION INTRAMUSCULAR; INTRAVENOUS AS NEEDED
Status: DISCONTINUED | OUTPATIENT
Start: 2022-08-31 | End: 2022-08-31

## 2022-08-31 RX ORDER — FENTANYL CITRATE/PF 50 MCG/ML
25 SYRINGE (ML) INJECTION
Status: DISCONTINUED | OUTPATIENT
Start: 2022-08-31 | End: 2022-08-31 | Stop reason: HOSPADM

## 2022-08-31 RX ORDER — ONDANSETRON 2 MG/ML
4 INJECTION INTRAMUSCULAR; INTRAVENOUS ONCE AS NEEDED
Status: DISCONTINUED | OUTPATIENT
Start: 2022-08-31 | End: 2022-08-31 | Stop reason: HOSPADM

## 2022-08-31 RX ORDER — FENTANYL CITRATE 50 UG/ML
INJECTION, SOLUTION INTRAMUSCULAR; INTRAVENOUS
Status: COMPLETED | OUTPATIENT
Start: 2022-08-31 | End: 2022-08-31

## 2022-08-31 RX ORDER — SODIUM CHLORIDE 9 MG/ML
125 INJECTION, SOLUTION INTRAVENOUS CONTINUOUS
Status: DISCONTINUED | OUTPATIENT
Start: 2022-08-31 | End: 2022-08-31 | Stop reason: SDUPTHER

## 2022-08-31 RX ORDER — MEPERIDINE HYDROCHLORIDE 25 MG/ML
12.5 INJECTION INTRAMUSCULAR; INTRAVENOUS; SUBCUTANEOUS
Status: DISCONTINUED | OUTPATIENT
Start: 2022-08-31 | End: 2022-08-31 | Stop reason: HOSPADM

## 2022-08-31 RX ORDER — ACETAMINOPHEN 325 MG/1
650 TABLET ORAL EVERY 6 HOURS PRN
Status: DISCONTINUED | OUTPATIENT
Start: 2022-08-31 | End: 2022-09-01

## 2022-08-31 RX ORDER — OXYCODONE HYDROCHLORIDE 5 MG/1
5 TABLET ORAL EVERY 4 HOURS PRN
Status: DISCONTINUED | OUTPATIENT
Start: 2022-08-31 | End: 2022-09-06 | Stop reason: HOSPADM

## 2022-08-31 RX ORDER — ALBUMIN, HUMAN INJ 5% 5 %
SOLUTION INTRAVENOUS CONTINUOUS PRN
Status: DISCONTINUED | OUTPATIENT
Start: 2022-08-31 | End: 2022-08-31

## 2022-08-31 RX ORDER — NEOSTIGMINE METHYLSULFATE 1 MG/ML
INJECTION INTRAVENOUS AS NEEDED
Status: DISCONTINUED | OUTPATIENT
Start: 2022-08-31 | End: 2022-08-31

## 2022-08-31 RX ORDER — SODIUM CHLORIDE 9 MG/ML
60 INJECTION, SOLUTION INTRAVENOUS CONTINUOUS
Status: DISCONTINUED | OUTPATIENT
Start: 2022-08-31 | End: 2022-09-01

## 2022-08-31 RX ORDER — GABAPENTIN 300 MG/1
300 CAPSULE ORAL 3 TIMES DAILY
Status: DISCONTINUED | OUTPATIENT
Start: 2022-08-31 | End: 2022-09-06 | Stop reason: HOSPADM

## 2022-08-31 RX ORDER — CEFAZOLIN SODIUM 2 G/50ML
SOLUTION INTRAVENOUS AS NEEDED
Status: DISCONTINUED | OUTPATIENT
Start: 2022-08-31 | End: 2022-08-31

## 2022-08-31 RX ORDER — LIDOCAINE HYDROCHLORIDE 20 MG/ML
INJECTION, SOLUTION EPIDURAL; INFILTRATION; INTRACAUDAL; PERINEURAL AS NEEDED
Status: DISCONTINUED | OUTPATIENT
Start: 2022-08-31 | End: 2022-08-31

## 2022-08-31 RX ORDER — ROCURONIUM BROMIDE 10 MG/ML
INJECTION, SOLUTION INTRAVENOUS AS NEEDED
Status: DISCONTINUED | OUTPATIENT
Start: 2022-08-31 | End: 2022-08-31

## 2022-08-31 RX ORDER — OXYCODONE HYDROCHLORIDE 10 MG/1
10 TABLET ORAL EVERY 4 HOURS PRN
Status: DISCONTINUED | OUTPATIENT
Start: 2022-08-31 | End: 2022-09-02

## 2022-08-31 RX ORDER — CEFAZOLIN SODIUM 2 G/50ML
2000 SOLUTION INTRAVENOUS ONCE
Status: DISCONTINUED | OUTPATIENT
Start: 2022-08-31 | End: 2022-08-31 | Stop reason: HOSPADM

## 2022-08-31 RX ORDER — ENOXAPARIN SODIUM 100 MG/ML
40 INJECTION SUBCUTANEOUS DAILY
Status: DISCONTINUED | OUTPATIENT
Start: 2022-09-01 | End: 2022-09-06 | Stop reason: HOSPADM

## 2022-08-31 RX ORDER — MIDAZOLAM HYDROCHLORIDE 2 MG/2ML
INJECTION, SOLUTION INTRAMUSCULAR; INTRAVENOUS
Status: COMPLETED | OUTPATIENT
Start: 2022-08-31 | End: 2022-08-31

## 2022-08-31 RX ORDER — PROPOFOL 10 MG/ML
INJECTION, EMULSION INTRAVENOUS AS NEEDED
Status: DISCONTINUED | OUTPATIENT
Start: 2022-08-31 | End: 2022-08-31

## 2022-08-31 RX ADMIN — SODIUM CHLORIDE 125 ML/HR: 0.9 INJECTION, SOLUTION INTRAVENOUS at 10:56

## 2022-08-31 RX ADMIN — SODIUM CHLORIDE 60 ML/HR: 0.9 INJECTION, SOLUTION INTRAVENOUS at 17:41

## 2022-08-31 RX ADMIN — LIDOCAINE HYDROCHLORIDE 40 MG: 20 INJECTION, SOLUTION EPIDURAL; INFILTRATION; INTRACAUDAL at 12:31

## 2022-08-31 RX ADMIN — ROPIVACAINE HYDROCHLORIDE 20 ML: 5 INJECTION, SOLUTION EPIDURAL; INFILTRATION; PERINEURAL at 12:07

## 2022-08-31 RX ADMIN — PHENYLEPHRINE HYDROCHLORIDE 30 MCG/MIN: 10 INJECTION INTRAVENOUS at 13:19

## 2022-08-31 RX ADMIN — ROCURONIUM BROMIDE 30 MG: 10 INJECTION, SOLUTION INTRAVENOUS at 12:33

## 2022-08-31 RX ADMIN — FENTANYL CITRATE 100 MCG: 50 INJECTION INTRAMUSCULAR; INTRAVENOUS at 11:57

## 2022-08-31 RX ADMIN — GABAPENTIN 300 MG: 300 CAPSULE ORAL at 21:31

## 2022-08-31 RX ADMIN — SODIUM CHLORIDE: 0.9 INJECTION, SOLUTION INTRAVENOUS at 14:11

## 2022-08-31 RX ADMIN — PROMETHAZINE HYDROCHLORIDE 6.25 MG: 25 INJECTION INTRAMUSCULAR; INTRAVENOUS at 14:43

## 2022-08-31 RX ADMIN — CEFAZOLIN SODIUM 2000 MG: 2 SOLUTION INTRAVENOUS at 12:40

## 2022-08-31 RX ADMIN — MIDAZOLAM 4 MG: 1 INJECTION INTRAMUSCULAR; INTRAVENOUS at 11:57

## 2022-08-31 RX ADMIN — GLYCOPYRROLATE 0.6 MCG: 0.2 INJECTION, SOLUTION INTRAMUSCULAR; INTRAVENOUS at 14:10

## 2022-08-31 RX ADMIN — ONDANSETRON 4 MG: 2 INJECTION INTRAMUSCULAR; INTRAVENOUS at 14:09

## 2022-08-31 RX ADMIN — ROPIVACAINE HYDROCHLORIDE 20 ML: 5 INJECTION, SOLUTION EPIDURAL; INFILTRATION; PERINEURAL at 12:21

## 2022-08-31 RX ADMIN — ALBUMIN (HUMAN): 12.5 INJECTION, SOLUTION INTRAVENOUS at 13:12

## 2022-08-31 RX ADMIN — PROPOFOL 200 MG: 10 INJECTION, EMULSION INTRAVENOUS at 12:31

## 2022-08-31 RX ADMIN — NEOSTIGMINE METHYLSULFATE 3 MG: 1 INJECTION INTRAVENOUS at 14:10

## 2022-08-31 RX ADMIN — SODIUM CHLORIDE 125 ML/HR: 0.9 INJECTION, SOLUTION INTRAVENOUS at 15:43

## 2022-08-31 NOTE — ANESTHESIA PROCEDURE NOTES
Peripheral Block    Patient location during procedure: holding area  Start time: 8/31/2022 12:20 PM  Reason for block: at surgeon's request and post-op pain management  Staffing  Anesthesiologist: Theresa Templeton DO  Preanesthetic Checklist  Completed: patient identified, IV checked, site marked, risks and benefits discussed, surgical consent, monitors and equipment checked, pre-op evaluation and timeout performed  Peripheral Block  Patient position: right lateral decubitus  Prep: ChloraPrep  Patient monitoring: heart rate, cardiac monitor, continuous pulse ox and frequent blood pressure checks  Block type: sciatic  Laterality: left  Injection technique: single-shot  Procedures: nerve stimulatorropivacaine (NAROPIN) 0 5 % - Perineural   20 mL - 8/31/2022 12:21:00 PM  Needle  Needle type: Stimuplex   Needle gauge: 22 G  Needle length: 10 cm  Needle localization: anatomical landmarks and nerve stimulator  Assessment  Injection assessment: incremental injection, negative aspiration for heme and no paresthesia on injection  Paresthesia pain: none  Heart rate change: no  Slow fractionated injection: yes  Post-procedure:  site cleaned  patient tolerated the procedure well with no immediate complications

## 2022-08-31 NOTE — OP NOTE
AMPUTATION BELOW KNEE (BKA)  Postoperative Note  PATIENT NAME: Alex Garcia  : 1975  MRN: [id number]  AL OR ROOM 02    Surgery Date: 2022    Pre operative diagnosis:   Osteomyelitis of left foot, unspecified type [facility]) [M86 9]    Operative Indications:  Infected or Ischemic left Lower Extremity      Informed Consent:  The risks, benefits, and alternatives to the surgery were discussed with the patient and with the family prior to surgery if available, personally by Dr Fady Franklin  If the consent was obtained by the physician assistant or other representative, the consent was reviewed once again personally by the operating physician  Common complications particular for this procedure as well as unusual complications were discussed, including but not limited to:  bleeding, wound infection, prolonged wound healing, open wounds, reoperation, additional surgery for nonhealing wound including an above knee amputation, possible reoperation  A  was used if necessary  The patient expressed understanding of the issues and discussed and wished and consented to the procedure to proceed  All questions were answered  Dr Fady Franklin personally discussed the informed consent with this patient  Operative Findings:  Left non healing foot couns     Post operative diagnosis :and findings  Post-Op Diagnosis Codes:     * Osteomyelitis of left foot, unspecified type ([facility] [address] ) [M86 9]    Procedure:   Procedure(s):  AMPUTATION BELOW KNEE (BKA)    Mid Below Knee Amputation     Surgeon(s) and Role:     * Marilee Noonan MD - Primary     * Amanda Camarillo MD - Assisting    The assistant was medically necessary for surgical safety the case including suturing, retraction, and hemostasis  A qualified resident was available  I provided direct and immediate supervision  I was present for the entire procedure       Drains:  * No LDAs found *    Specimens:  ID Type Source Tests Collected by Time Destination   1 : left lower leg - morgue Tissue Leg, Left TISSUE EXAM Caroline Dougherty MD 8/31/2022 1346        Estimated Blood Loss:   Minimal    Anesthesia Type:   Choice     Procedure: The patient was brought to the holding area and identified  Identification of the correct leg for amputation was carefully confirmed with the patient and fellow staff members and marked  The patient was then brought to the operating room and underwent general endotracheal anesthesia  The leg was prepped and draped in a sterile fashion  The leg was marked in the standard 1/3-2/3 position staying above the obvious area of necrosis or infection  Care was taken to stay above the area of chronic infection  The area of infection was isolated out using a dressing and Coban  An incision was made along the appropriate location  This was carried up to the tibia and fibula and they were isolated out using Harmonic scalpel and cautery  The muscle was delineated along the gastrocnemius down to the lower edge of the flap  The major vessels were suture ligated using Vicryl and/or silk sutures  A laparotomy pad was placed behind the tibia and the periosteal elevator used to elevate the periosteum  The saw was then used to come across the tibia  A saw was then used to come across the fibula after a Ray-Kelby had been placed behind it  The rough edges were secured using the bone cutter or rounger as needed  A file was used to smooth the edges of the bone  A smooth muscle knife was used to come across the anterior aspect of the gastrocnemius to the lower end of the incision  The leg was removed and sent to pathology for evaluation  Hemostasis was assured using Vicryl sutures as well as cautery and Harmonic scalpel  The muscle was trimmed to make the appropriate sized flap  Again the bone was filed smooth back behind the flap  The muscle was trimmed for the  flap  The deeper layers were closed using interrupted 2-0 Vicryls sutures  The superficial layers were closed using interrupted 2-0 and 3-0 Vicryl sutures  The skin was closed using staples  The skin was dressed with antibiotic or silver impregnated dressing, fluffs, and a Coban wrap going just behind and above the knee  There was excellent viability of the flap at the time of closure and no evidence of infection in the flap  Sponge count and needle count and instrument count were correct x2, and RFA wanding for sponges was also negative at the end of the procedure prior to closure  The patient tolerated the procedure well was taken to recovery room in stable condition  Some portions of this records may have been generated with voice recognition software  There may be translation, syntax,  or grammatical errors  Occasional wrong word or "sound-a-like" substitutions may have occurred due to the inherent limitations of the voice recognition software  Read the chart carefully and recognize, using context, where substations may have occurred  If you have any questions, please contact the dictating provider for clarification or correction, as needed       Complications: None    Condition: Stable to PACU    SIGNATURE: Ysabel Fu MD   DATE: August 31, 2022   TIME: 2:27 PM

## 2022-08-31 NOTE — ANESTHESIA POSTPROCEDURE EVALUATION
Post-Op Assessment Note    CV Status:  Stable  Pain Score: 2    Pain management: adequate     Mental Status:  Alert and awake   Hydration Status:  Euvolemic   PONV Controlled:  Controlled   Airway Patency:  Patent   Two or more mitigation strategies used for obstructive sleep apnea   Post Op Vitals Reviewed: Yes      Staff: Anesthesiologist, CRNA         No complications documented      BP      Temp      Pulse     Resp      SpO2      /74 (BP Location: Left arm)   Pulse 91   Temp 97 5 °F (36 4 °C) (Temporal)   Resp 16   Ht 5' 3" (1 6 m)   Wt 104 kg (229 lb 0 9 oz)   LMP 08/28/2022 (Exact Date)   SpO2 100%   BMI 40 58 kg/m²

## 2022-08-31 NOTE — ANESTHESIA PROCEDURE NOTES
Peripheral Block    Patient location during procedure: holding area  Start time: 8/31/2022 11:57 AM  Reason for block: at surgeon's request and post-op pain management  Staffing  Anesthesiologist: Denise Og DO  Preanesthetic Checklist  Completed: patient identified, IV checked, site marked, risks and benefits discussed, surgical consent, monitors and equipment checked, pre-op evaluation and timeout performed  Peripheral Block  Patient position: supine  Prep: ChloraPrep  Patient monitoring: heart rate, cardiac monitor, continuous pulse ox and frequent blood pressure checks  Block type: femoral  Laterality: left  Injection technique: single-shot  Procedures: ultrasound guided, Ultrasound guidance required for the procedure to increase accuracy and safety of medication placement and decrease risk of complications    Ultrasound permanent image savedropivacaine (NAROPIN) 0 5 % - Perineural   20 mL - 8/31/2022 12:07:00 PM  fentaNYL 50 mcg/mL - Intravenous   100 mcg - 8/31/2022 11:57:00 AM  midazolam (VERSED) 2 mg/2 mL - Intravenous   4 mg - 8/31/2022 11:57:00 AM  Needle  Needle type: Stimuplex   Needle gauge: 22 G  Needle length: 10 cm  Needle localization: ultrasound guidance and nerve stimulator  Assessment  Injection assessment: incremental injection, local visualized surrounding nerve on ultrasound, negative aspiration for heme and no paresthesia on injection  Paresthesia pain: none  Heart rate change: no  Slow fractionated injection: yes  Post-procedure:  site cleaned  patient tolerated the procedure well with no immediate complications

## 2022-08-31 NOTE — INTERVAL H&P NOTE
H&P reviewed  After examining the patient I find no changes in the patients condition since the H&P had been written      Vitals:    08/31/22 1141   BP: 140/69   Pulse: 100   Resp: 16   Temp:    SpO2: 100%

## 2022-08-31 NOTE — H&P
Assessment/Plan:   Nemo Harrell is a 49 y  o female who is here for   No chief complaint on file  Plan left below-knee amputation for a nonsalvageable Charcot's diabetic foot  LEFT the left site was marked and agreed with everybody in the room, in the holding area, and with the patient  Plan:  Left below knee amputation  Patient is aware the need for rehab, postoperative management, the risks benefits and alternatives and difficulties of wound healing  She has come to  with the need for an amputation and will proceed after her daughter's wedding which is this week  She knows to come to the office sooner or to the ER if she develops signs or symptoms of active infection or sepsis  We discussed those symptoms  High risk for postoperative wound complications secondary to diabetes, obesity and comorbid conditions  She understands  She may result in an above knee amputation if this becomes a nonhealing wound  Positioning: supine    Post Op Pain Management:   Norco    - Patient has been instructed to avoid herbs or non-directed vitamins the week prior to surgery to ensure no drug interactions with perioperative surgical and anesthetic medications  - Patient should continue beta-blocker medication up through and including the day of surgery but hold any other hypertensive medications, including diuretics, unless instructed by PCP or anesthesia  - Patient should continue his statin medication up through and including the day of surgery   - Hold metformin , If on this medication, the morning of surgery and do not resume until 48 hours AFTER surgery to avoid risk of lactic acidosis  Do not resume if eGFR is < 30  - Insulin Management:If on Insulin, patient advised to call PCP for explicit instructions  In general, will need to take one-half normal dose am of surgery but pt advised to consult PCP before making any changes     - Patient has been instructed to avoid aspirin containing medications or non-steroidal anti-inflammatory drugs for SEVEN days preceding surgery  Preoperative Clearance: None          _______________________________________________________  CC:No chief complaint on file  Jacque Spencer HPI:  Enrrique Pepper is a 49 y  o female who was referred for evaluation of No chief complaint on file       Currently patient reports repeated management of left Charcot's foot  Recently admitted for sepsis and evaluation for eventual below knee amputation  She was discharged on antibiotics in order to get through her daughter's wedding so that she could be at her daughter's wedding with her leg intact and has agreed now to proceed with amputation  She denies fevers, chills or systemic symptoms        Reports: infected    Location: lower extremity      ROS:  General ROS: negative  negative for - chills, fatigue, fever or night sweats, weight loss  Respiratory ROS: no cough, shortness of breath, or wheezing  Cardiovascular ROS: no chest pain or dyspnea on exertion  Genito-Urinary ROS: no dysuria, trouble voiding, or hematuria  Musculoskeletal ROS: negative for - gait disturbance, joint pain or muscle pain  Neurological ROS: no TIA or stroke symptoms  Skin ROS: See HPI  GI ROS: see HPI  Skin ROS: no new rashes or lesions   Lymphatic ROS: no new adenopathy noted by pt     GYN ROS: see HPI, no new GYN history or bleeding noted  Psy ROS: no new mental or behavioral disturbances       Patient Active Problem List   Diagnosis    Open wound of left foot    Controlled type 2 diabetes mellitus with neurologic complication, with long-term current use of insulin ([address] )    Diabetic ketoacidosis without coma associated with type 1 diabetes mellitus ([address] )    Non-healing wound of left foot    Acute kidney injury ([address] )    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 due to underlying condition, limited to breakdown of skin ([address] )    Morbid obesity ([address] )    Acute on chronic anemia    Acute blood loss as cause of postoperative anemia    S/P amputation    Cellulitis of left foot    Diabetic ulcer of left midfoot associated with diabetes mellitus due to underlying condition, with fat layer exposed ([place] [address] )    Sepsis ([place] [address] )    Hydroureteronephrosis    Osteomyelitis of left foot ([place] [address] )    Bacteremia    Gallbladder sludge    Diabetes mellitus type 1 (HCC)         Allergies:  Clindamycin      Current Facility-Administered Medications:     ceFAZolin (ANCEF) IVPB (premix in dextrose) 2,000 mg 50 mL, 2,000 mg, Intravenous, Once, [employer], TODD    sodium chloride 0 9 % infusion, 125 mL/hr, Intravenous, Continuous, Ray Lipscomb DO, Last Rate: 125 mL/hr at 08/31/22 1056, 125 mL/hr at 08/31/22 1056    sodium chloride 0 9 % irrigation solution, , , PRN, Kelvin Esteban MD, 3,000 mL at 08/31/22 1202    Past Medical History:   Diagnosis Date    Charcot's joint of foot, left     [facility] mellitus ([facility])     Insulin pump    History of transfusion     8-4-22    Osteomyelitis of foot, right, acute ([place] [address] )        Past Surgical History:   Procedure Laterality Date    FOOT AMPUTATION Left 10/30/2020    Procedure: CHOPART AMPUTATION LEFT  FOOT:;  Surgeon: Cande Aguilera DPM;  Location:  MAIN OR;  Service: Podiatry    FOOT CAPSULE RELEASE W/ PERCUTANEOUS HEEL CORD LENGTHENING, TIBIAL TENDON TRANSFER Left 10/30/2020    Procedure: Achilles tenotomy left foot;  Surgeon: Cande Aguilera DPM;  Location:  MAIN OR;  Service: Podiatry    FOOT SURGERY Right     right first toe amputation  2019       Family History   Problem Relation Age of Onset    Diabetes Brother     Hyperlipidemia Brother     Hypertension Brother     Diabetes Mother     Hypertension Father     Diabetes Sister         reports that she has never smoked  She has never used smokeless tobacco  She reports that she does not drink alcohol and does not use drugs      Vitals:    08/31/22 1141   BP: 140/69   Pulse: 100   Resp: 16   Temp:    SpO2: 100%        PHYSICAL EXAM  General Appearance:    Alert, cooperative, no distress,    Head:    Normocephalic without obvious abnormality   Eyes:    PERRL, conjunctiva/corneas clear     Neck:   Supple, no adenopathy, no JVD   Back:     Symmetric, no spinal or CVA tenderness   Lungs:     Clear to auscultation bilaterally, no wheezing or rhonchi   Heart:    Regular rate and rhythm, S1 and S2 normal, no murmur   Abdomen:     Benign, no rebound or guarding  Extremities:   Extremities normal  No clubbing, cyanosis or edema   Psych:   Normal Affect, AOx3  Neurologic:  Skin:   CNII-XII intact  Strength symmetric, speech intact    Warm, dry, intact, no visible rashes or lesions except as follows:  Left lower extremity she is in a brace with a partial removal of her foot     See hospital pictures for wounds  Right lower extremity also has some nonhealing wounds also documented in media in her recent hospitalization  Some portions of this record may have been generated with voice recognition software  There may be translation, syntax,  or grammatical errors  Occasional wrong word or "sound-a-like" substitutions may have occurred due to the inherent limitations of the voice recognition software  Read the chart carefully and recognize, using context, where substitutions may have occurred  If you have any questions, please contact the dictating provider for clarification or correction, as needed  This encounter has been coded by a non-certified coder  No name on file      Date: 8/31/2022 Time: 12:12 PM

## 2022-08-31 NOTE — PLAN OF CARE
Problem: MOBILITY - ADULT  Goal: Maintain or return to baseline ADL function  Description: INTERVENTIONS:  -  Assess patient's ability to carry out ADLs; assess patient's baseline for ADL function and identify physical deficits which impact ability to perform ADLs (bathing, care of mouth/teeth, toileting, grooming, dressing, etc )  - Assess/evaluate cause of self-care deficits   - Assess range of motion  - Assess patient's mobility; develop plan if impaired  - Assess patient's need for assistive devices and provide as appropriate  - Encourage maximum independence but intervene and supervise when necessary  - Involve family in performance of ADLs  - Assess for home care needs following discharge   - Consider OT consult to assist with ADL evaluation and planning for discharge  - Provide patient education as appropriate  Outcome: Progressing  Goal: Maintains/Returns to pre admission functional level  Description: INTERVENTIONS:  - Perform BMAT or MOVE assessment daily    - Set and communicate daily mobility goal to care team and patient/family/caregiver  - Collaborate with rehabilitation services on mobility goals if consulted  - Perform Range of Motion times a day  - Reposition patient everyhours    - Dangle patient  times a day  - Stand patient times a day  - Ambulate patient times a day  - Out of bed to chair  times a day   - Out of bed for meals imes a day  - Out of bed for toileting  - Record patient progress and toleration of activity level   Outcome: Progressing     Problem: Potential for Falls  Goal: Patient will remain free of falls  Description: INTERVENTIONS:  - Educate patient/family on patient safety including physical limitations  - Instruct patient to call for assistance with activity   - Consult OT/PT to assist with strengthening/mobility   - Keep Call bell within reach  - Keep bed low and locked with side rails adjusted as appropriate  - Keep care items and personal belongings within reach  - Initiate and maintain comfort rounds  - Make Fall Risk Sign visible to staff  - Offer Toileting every Hours, in advance of need  - Initiate/Maintain alarm  - Obtain necessary fall risk management equipment:  - Apply yellow socks and bracelet for high fall risk patients  - Consider moving patient to room near nurses station  Outcome: Progressing     Problem: PAIN - ADULT  Goal: Verbalizes/displays adequate comfort level or baseline comfort level  Description: Interventions:  - Encourage patient to monitor pain and request assistance  - Assess pain using appropriate pain scale  - Administer analgesics based on type and severity of pain and evaluate response  - Implement non-pharmacological measures as appropriate and evaluate response  - Consider cultural and social influences on pain and pain management  - Notify physician/advanced practitioner if interventions unsuccessful or patient reports new pain  Outcome: Progressing     Problem: INFECTION - ADULT  Goal: Absence or prevention of progression during hospitalization  Description: INTERVENTIONS:  - Assess and monitor for signs and symptoms of infection  - Monitor lab/diagnostic results  - Monitor all insertion sites, i e  indwelling lines, tubes, and drains  - Monitor endotracheal if appropriate and nasal secretions for changes in amount and color  - [place] appropriate cooling/warming therapies per order  - Administer medications as ordered  - Instruct and encourage patient and family to use good hand hygiene technique  - Identify and instruct in appropriate isolation precautions for identified infection/condition  Outcome: Progressing  Goal: Absence of fever/infection during neutropenic period  Description: INTERVENTIONS:  - Monitor WBC    Outcome: Progressing     Problem: SAFETY ADULT  Goal: Maintain or return to baseline ADL function  Description: INTERVENTIONS:  -  Assess patient's ability to carry out ADLs; assess patient's baseline for ADL function and identify physical deficits which impact ability to perform ADLs (bathing, care of mouth/teeth, toileting, grooming, dressing, etc )  - Assess/evaluate cause of self-care deficits   - Assess range of motion  - Assess patient's mobility; develop plan if impaired  - Assess patient's need for assistive devices and provide as appropriate  - Encourage maximum independence but intervene and supervise when necessary  - Involve family in performance of ADLs  - Assess for home care needs following discharge   - Consider OT consult to assist with ADL evaluation and planning for discharge  - Provide patient education as appropriate  Outcome: Progressing  Goal: Maintains/Returns to pre admission functional level  Description: INTERVENTIONS:  - Perform BMAT or MOVE assessment daily    - Set and communicate daily mobility goal to care team and patient/family/caregiver  - Collaborate with rehabilitation services on mobility goals if consulted  - Perform Range of Motion times a day  - Reposition patient every hours    - Dangle patient times a day  - Stand patient  times a day  - Ambulate patient times a day  - Out of bed to chair  times a day   - Out of bed for meals times a day  - Out of bed for toileting  - Record patient progress and toleration of activity level   Outcome: Progressing  Goal: Patient will remain free of falls  Description: INTERVENTIONS:  - Educate patient/family on patient safety including physical limitations  - Instruct patient to call for assistance with activity   - Consult OT/PT to assist with strengthening/mobility   - Keep Call bell within reach  - Keep bed low and locked with side rails adjusted as appropriate  - Keep care items and personal belongings within reach  - Initiate and maintain comfort rounds  - Make Fall Risk Sign visible to staff  - Offer Toileting every  Hours, in advance of need  - Initiate/Maintainalarm  - Obtain necessary fall risk management equipment:   - Apply yellow socks and bracelet for high fall risk patients  - Consider moving patient to room near nurses station  Outcome: Progressing     Problem: DISCHARGE PLANNING  Goal: Discharge to home or other facility with appropriate resources  Description: INTERVENTIONS:  - Identify barriers to discharge w/patient and caregiver  - Arrange for needed discharge resources and transportation as appropriate  - Identify discharge learning needs (meds, wound care, etc )  - Arrange for interpretive services to assist at discharge as needed  - Refer to Case Management Department for coordinating discharge planning if the patient needs post-hospital services based on physician/advanced practitioner order or complex needs related to functional status, cognitive ability, or social support system  Outcome: Progressing     Problem: Knowledge Deficit  Goal: Patient/family/caregiver demonstrates understanding of disease process, treatment plan, medications, and discharge instructions  Description: Complete learning assessment and assess knowledge base    Interventions:  - Provide teaching at level of understanding  - Provide teaching via preferred learning methods  Outcome: Progressing     Problem: SKIN/TISSUE INTEGRITY - ADULT  Goal: Skin Integrity remains intact(Skin Breakdown Prevention)  Description: Assess:  -Perform [place] assessment every   -Clean and moisturize skin every   -Inspect skin when repositioning, toileting, and assisting with ADLS  -Assess under medical devices such asevery   -Assess extremities for adequate circulation and sensation     Bed Management:  -Have minimal linens on bed & keep smooth, unwrinkled  -Change linens as needed when moist or perspiring  -Avoid sitting or lying in one position for more than hours while in bed  -Keep HOB at degrees     Toileting:  -Offer bedside commode  -Assess for incontinence every  -Use incontinent care products after each incontinent episode such as     Activity:  -Mobilize patient  times a day  -Encourage activity and walks on unit  -Encourage or provide ROM exercises   -Turn and reposition patient every Hours  -Use appropriate equipment to lift or move patient in bed  -Instruct/ Assist with weight shifting every  when out of bed in chair  -Consider limitation of chair time hour intervals    Skin Care:  -Avoid use of baby powder, tape, friction and shearing, hot water or constrictive clothing  -Relieve pressure over bony prominences using  -Do not massage red bony areas    Next Steps:  -Teach patient strategies to minimize risks such as  -Consider consults to  interdisciplinary teams such as   Outcome: Progressing  Goal: Incision(s), wounds(s) or drain site(s) healing without S/S of infection  Description: INTERVENTIONS  - Assess and document dressing, incision, wound bed, drain sites and surrounding tissue  - Provide patient and family education  - Perform skin care/dressing changes every   Outcome: Progressing  Goal: Pressure injury heals and does not worsen  Description: Interventions:  - Implement low air loss mattress or specialty surface (Criteria met)  - Apply silicone foam dressing  - Instruct/assist with weight shifting everminutes when in chair   - Limit chair time tohour intervals  - Use special pressure reducing interventions such aswhen in chair   - Apply fecal or urinary incontinence containment device   - Perform passive or active ROM every  - Turn and reposition patient & offload bony prominences every  hours   - Utilize friction reducing device or surface for transfers   - Consider consults to  interdisciplinary teams such a  - Use incontinent care products after each incontinent episode such as  - Consider nutrition services referral as needed  Outcome: Progressing

## 2022-08-31 NOTE — ANESTHESIA PREPROCEDURE EVALUATION
Procedure:  AMPUTATION BELOW KNEE (BKA) (Left Leg Lower)    Relevant Problems   ENDO  has a dexcom attached     MO BMI 40    (+) Controlled type 2 diabetes mellitus with neurologic complication, with long-term current use of insulin (HCC)   (+) Diabetes mellitus type 1 (HCC)      /RENAL   (+) Acute kidney injury ([facility] [address] )   (+) Hydroureteronephrosis      HEMATOLOGY   (+) Acute blood loss as cause of postoperative anemia   (+) Acute on chronic anemia      MUSCULOSKELETAL  chr osteomyelitis       PULMONARY (within normal limits)      Other   (+) Morbid obesity (HCC)   (+) Non-healing wound of left foot   (+) Osteomyelitis of left foot (HCC)   (+) S/P amputation        Physical Exam    Airway    Mallampati score: II  TM Distance: >3 FB  Neck ROM: full     Dental       Cardiovascular  Rhythm: regular, Rate: normal, Cardiovascular exam normal    Pulmonary  Pulmonary exam normal Breath sounds clear to auscultation,     Other Findings        Anesthesia Plan  ASA Score- 3     Anesthesia Type- general with ASA Monitors  Additional Monitors:   Airway Plan: ETT  Comment: Fem/sci block    Plan to leave dexcom attached during procedure   Plan Factors-    Chart reviewed  Patient summary reviewed  Induction- intravenous  Postoperative Plan-     Informed Consent- Anesthetic plan and risks discussed with patient  I personally reviewed this patient with the CRNA  Discussed and agreed on the Anesthesia Plan with the CRNA  Romeo Valenzuela

## 2022-09-01 ENCOUNTER — TELEPHONE (OUTPATIENT)
Dept: SURGERY | Facility: CLINIC | Age: 47
End: 2022-09-01

## 2022-09-01 LAB — GLUCOSE SERPL-MCNC: 162 MG/DL (ref 65–140)

## 2022-09-01 PROCEDURE — 97163 PT EVAL HIGH COMPLEX 45 MIN: CPT

## 2022-09-01 PROCEDURE — 82948 REAGENT STRIP/BLOOD GLUCOSE: CPT

## 2022-09-01 PROCEDURE — 99253 IP/OBS CNSLTJ NEW/EST LOW 45: CPT | Performed by: INTERNAL MEDICINE

## 2022-09-01 RX ORDER — ACETAMINOPHEN 325 MG/1
975 TABLET ORAL EVERY 6 HOURS SCHEDULED
Status: DISCONTINUED | OUTPATIENT
Start: 2022-09-01 | End: 2022-09-06 | Stop reason: HOSPADM

## 2022-09-01 RX ORDER — HYDROMORPHONE HCL/PF 1 MG/ML
0.5 SYRINGE (ML) INJECTION
Status: DISCONTINUED | OUTPATIENT
Start: 2022-09-01 | End: 2022-09-06 | Stop reason: HOSPADM

## 2022-09-01 RX ADMIN — OXYCODONE HYDROCHLORIDE 10 MG: 10 TABLET ORAL at 12:59

## 2022-09-01 RX ADMIN — HYDROMORPHONE HYDROCHLORIDE 0.5 MG: 1 INJECTION, SOLUTION INTRAMUSCULAR; INTRAVENOUS; SUBCUTANEOUS at 20:37

## 2022-09-01 RX ADMIN — OXYCODONE HYDROCHLORIDE 10 MG: 10 TABLET ORAL at 08:30

## 2022-09-01 RX ADMIN — OXYCODONE HYDROCHLORIDE 10 MG: 10 TABLET ORAL at 19:07

## 2022-09-01 RX ADMIN — OXYCODONE HYDROCHLORIDE 5 MG: 5 TABLET ORAL at 02:56

## 2022-09-01 RX ADMIN — GABAPENTIN 300 MG: 300 CAPSULE ORAL at 17:00

## 2022-09-01 RX ADMIN — ACETAMINOPHEN 975 MG: 325 TABLET ORAL at 20:36

## 2022-09-01 RX ADMIN — GABAPENTIN 300 MG: 300 CAPSULE ORAL at 20:09

## 2022-09-01 RX ADMIN — GABAPENTIN 300 MG: 300 CAPSULE ORAL at 08:30

## 2022-09-01 NOTE — CONSULTS
Consultation - Terrance Boss Gay 49 y o  female MRN: [id number]    Unit/Bed#: E2 -01 Encounter: [id number]      Assessment/Plan     Assessment:   51yof with hx of T1DM and OM, s/p BKA  Plan:   1  T1DM, on CSII: She demonstrates that she can self manage her insulin pump  She does use Fiasp in her pump and should continue to use this with her home supplies  She does have supplies and her  assists as needed with pump site changes  Recommend having FSBGs in chart for monitoring and she is agreeable to this, but dosing will be based on her Dexcom CGM    History of Present Illness      HPI: Linda Pressley is a 49y o  year old female seen in consultation for T1DM  She is s/p BKA for osteomyelitis  She is doing well and now awaiting rehab  She is awake and alert  She has long standing type 1 diabetes and follows in [place] for diabetes care  She ahs used other pumps in the past     She has T1dm with Dexcom and [employer] software since Jan 2022  She has good knowledge of insulin pump use and is aware of sleeping an exercise modes on her pump for when she needs them  Current pump site on right lower quad and dexcom sensor on right flank    Placed a new site and new Dexcom sensor on 8/30/22  Has Fiasp for use in her pump  Sites changes every 2-3days  Current settings:  Basal MN-3a 1 7 units per hour  3a-7am 2 5  units per hour  7a-10am 2 1  units per hour  10a-9pm 2 1  units per hour  9pm-MN 1 7  units per hour    Basal about 49 6units    Bolus   CHO ratio  MN-10am 1unit:5g  10am-9pm 1unit:4 5g  9pm-MN 1:5    Insulin sensitivity:1unit:20mg/dL  active insulin 5h        Inpatient consult to Endocrinology  Consult performed by: Kym Galeas MD  Consult ordered by: Logan Kaur PA-C          Review of Systems   Constitutional: Negative for fatigue  HENT: Negative for hearing loss and voice change  Eyes: Negative for visual disturbance  Musculoskeletal: Positive for gait problem  Neurological: Negative for tremors and weakness  Psychiatric/Behavioral: The patient is not nervous/anxious          Historical Information   Past Medical History:   Diagnosis Date    Charcot's joint of foot, left     Diabetes mellitus (HCC)     Insulin pump    History of transfusion     8-4-22    Osteomyelitis of foot, right, acute (HCC)      Past Surgical History:   Procedure Laterality Date    FOOT AMPUTATION Left 10/30/2020    Procedure: CHOPART AMPUTATION LEFT  FOOT:;  Surgeon: Gallito Bishop DPM;  [facility];  Service: Podiatry    FOOT CAPSULE RELEASE W/ PERCUTANEOUS HEEL CORD LENGTHENING, TIBIAL TENDON TRANSFER Left 10/30/2020    Procedure: Achilles tenotomy left foot;  Surgeon: Gallito Bishop DPM;  [facility];  Service: Podiatry    FOOT SURGERY Right     right first toe amputation  2019     Social History   Social History     Substance and Sexual Activity   Alcohol Use Never     Social History     Substance and Sexual Activity   Drug Use Never     Social History     Tobacco Use   Smoking Status Never Smoker   Smokeless Tobacco Never Used     E-Cigarette/Vaping    E-Cigarette Use Never User      E-Cigarette/Vaping Substances    Nicotine No     THC No     CBD No     Flavoring No     Other No     Unknown No       Family History:   Family History   Problem Relation Age of Onset    Diabetes Brother     Hyperlipidemia Brother     Hypertension Brother     Diabetes Mother     Hypertension Father     Diabetes Sister        Meds/Allergies   current meds:   Current Facility-Administered Medications   Medication Dose Route Frequency    acetaminophen (TYLENOL) tablet 650 mg  650 mg Oral Q6H PRN    enoxaparin (LOVENOX) subcutaneous injection 40 mg  40 mg Subcutaneous Daily    gabapentin (NEURONTIN) capsule 300 mg  300 mg Oral TID    oxyCODONE (ROXICODONE) immediate release tablet 10 mg  10 mg Oral Q4H PRN    oxyCODONE (ROXICODONE) IR tablet 5 mg  5 mg Oral Q4H PRN Allergies   Allergen Reactions    Clindamycin Other (See Comments)     CHEST PRESSURE, FEELS LIKE A HEART ATTACK PER PT       Objective   Vitals: Blood pressure 126/62, pulse (!) 111, temperature 98 1 °F (36 7 °C), temperature source Temporal, resp  rate 16, height 5' 3" (1 6 m), weight 104 kg (229 lb 0 9 oz), last menstrual period 08/28/2022, SpO2 100 %  No intake or output data in the 24 hours ending 09/01/22 1707  Invasive Devices  Report    Peripheral Intravenous Line  Duration           Peripheral IV 08/31/22 Left;Proximal;Ventral (anterior) Forearm 1 day    Peripheral IV 08/31/22 Right Forearm 1 day                Physical Exam     Physical Exam   Gen: appears well-developed and well-nourished  No apparent distress  Head: Normocephalic and atraumatic  Eyes: no stare or proptosis, no periorbital edema  E/N/M nl facies, hearing grossly intact  Neck: range of motion nl  Pulmonary/Chest: breathing  comfortably, no accessory muscle use, effort normal    Musculoskeletal: moves upper extremities  Left BKA dressing in place  Neurological: alert and oriented to person, place, and time  No upper ext tremor appreciated  Skin: does not appear diaphoretic, no facial plethora  Psychiatric: normal mood and affect; behavior is normal; no gross lapses in memory, answer questions appropriately      Lab Results: I have personally reviewed pertinent reports  Lab Results   Component Value Date    HGBA1C 7 1 (H) 08/04/2022     Lab Results   Component Value Date    SODIUM 133 (L) 08/09/2022    K 4 3 08/09/2022    CL 98 08/09/2022    CO2 26 08/09/2022    BUN 16 08/09/2022    CREATININE 1 10 08/09/2022    GLUC 361 (H) 08/09/2022    CALCIUM 9 2 08/09/2022       Imaging Studies: I have personally reviewed pertinent reports      EKG, Pathology, and Other Studies:   VTE Prophylaxis:     Code Status: Level 1 - Full Code  Advance Directive and Living Will:      Power of :    POLST:      Counseling / Coordination of Care  Total floor / unit time spent today 40 minutes  Greater than 50% of total time was spent with the patient counseling and coordination of care   A description of the counseling / coordination of care: reviewing her knowledge of insulin pump use, recency of site changes, self management and medication needs

## 2022-09-01 NOTE — PHYSICAL THERAPY NOTE
PT EVALUATION    Pt  Name: Robb Menjivar  Pt  Age: 49 y o  MRN: [id number]  LENGTH OF STAY: 1      Admitting Diagnoses:   Osteomyelitis of left foot, unspecified type ([facility] ) [M86 9]    Past Medical History:   Diagnosis Date    Charcot's joint of foot, left     Diabetes mellitus ([facility] )     Insulin pump    History of transfusion     8-4-22    Osteomyelitis of foot, right, acute [facility])        Past Surgical History:   Procedure Laterality Date    FOOT AMPUTATION Left 10/30/2020    Procedure: CHOPART AMPUTATION LEFT  FOOT:;  Surgeon: Gallito Bishop DPM;  Location:  MAIN OR;  Service: Podiatry    FOOT CAPSULE RELEASE W/ PERCUTANEOUS HEEL CORD LENGTHENING, TIBIAL TENDON TRANSFER Left 10/30/2020    Procedure: Achilles tenotomy left foot;  Surgeon: Gallito Bishop DPM;  Location:  MAIN OR;  Service: Podiatry    FOOT SURGERY Right     right first toe amputation  2019       Imaging Studies:  No orders to display         09/01/22 1140   PT Last Visit   PT Visit Date 09/01/22   Note Type   Note type Evaluation   Pain Assessment   Pain Score 9   Pain Location/Orientation Orientation: Left; Location: Leg  (stump)   Hospital Pain Intervention(s) Repositioned; Ambulation/increased activity; Emotional support; Rest   Restrictions/Precautions   Weight Bearing Precautions Per Order Yes   LLE Weight Bearing Per Order NWB  (acute BKA)   Braces or Orthoses LE Immobilizer  (LLE)   Other Precautions Fall Risk;Pain;WBS   Home Living   Type of [address] [place] [address] Two level;Bed/bath upstairs; Able to live on main level with bedroom/bathroom; Performs ADLs on one level;Ramped entrance; Other (Comment)  (1st flr set up possible w/ access to a full bath & recliner lift chair)   Bathroom Shower/Tub Tub/shower unit   Bathroom Toilet Standard   Bathroom Equipment Grab bars in shower; Shower chair   Home Equipment Walker;Electric scooter;Grab bars; Other (Comment)  (padded knee support walker attachment or knee sling walker attachment) Additional Comments Pt reports she may stay at her sister's house if needed-> 1SH, no FELISA, walk-in shower   Prior Function   Level of [place] Independent with ADLs and functional mobility  (w/ RW + knee support walker attachment)   Lives With Spouse  (who works)   Receives Help From   [address] in the last 6 months 0   Vocational Part time employment   Comments (+) ; (+) home alone when her  works   General   Additional Pertinent History h/o L TMA 10/30/2020; h/o R hallux amputation 2019   Family/Caregiver Present No   Cognition   Overall Cognitive Status WFL   Arousal/Participation Alert   Orientation Level Oriented X4   Following Commands Follows all commands and directions without difficulty   Comments pt pleasant & cooperative   Subjective   Subjective Pt agreeable to PT eval    RUE Assessment   RUE Assessment WNL   LUE Assessment   LUE Assessment WNL   RLE Assessment   RLE Assessment WFL  (4/5 grossly)   LLE Assessment   LLE Assessment WFL  (3+/5 grossly except knee 3-/5)   Coordination   Movements are Fluid and Coordinated 1   Sensation X  (phantom limb)   Bed Mobility   Supine to Sit 5  Supervision   Additional items HOB elevated; Bedrails; Increased time required;Verbal cues   Sit to Supine 5  Supervision   Additional items Increased time required;Verbal cues;LE management   Additional Comments cues for techniques & safety   Transfers   Sit to Stand 5  Supervision   Additional items Increased time required;Verbal cues   Stand to Sit 5  Supervision   Additional items Increased time required;Verbal cues   Additional Comments cues for hand placement & absence of L leg/foot   Ambulation/Elevation   Gait pattern Decreased foot clearance; Excessively slow  (hop to gait pattern)   Gait Assistance 4  Minimal assist   Additional items Assist x 1;Verbal cues; Tactile cues   Assistive Device Rolling walker   Distance 10'x1   Ambulation/Elevation Additional Comments unsteady gait but no gross LOB noted; Dec amb tolerance 2* to pain & fatigue   Balance   Static Sitting Good   Dynamic Sitting Fair +   Static Standing Fair -   Dynamic Standing Poor +   Ambulatory Poor +   Endurance Deficit   Endurance Deficit Yes   Endurance Deficit Description pain & fatigue   Activity Tolerance   Activity Tolerance Patient limited by fatigue;Patient limited by pain;Treatment limited secondary to medical complications (Comment)   Nurse Made Aware IBIS Kauffman   Assessment   Prognosis Good   Problem List Decreased strength;Decreased endurance; Impaired balance;Decreased mobility; Impaired sensation;Pain   Assessment Pt  49 y  o female admitted for Non-healing wound of left TMA w/ Osteomyelitis of left foot, unspecified type ([facility] ) (M86 9)  S/p L BKA on 8/31/22  Pt referred to PT for mobility assessment & D/C planning  Please see above for information re: home set-up & PLOF as well as objective findings during PT assessment  On eval, pt functioning below baseline hence will continue skilled PT to improve function & safety  Pt require minAx1 for most functional mobility + cues for techniques & safety  Pt able to ambulate w/ RW w/ hop to gait pattern but w/ dec amb tolerance 2* to pain, weakness & fatigue  No gross LOB noted  The patient's AM-PAC Basic Mobility Inpatient Short Form Raw Score is 16  A Raw score of less than or equal to 16 suggests the patient may benefit from discharge to post-acute rehabilitation services  Please also refer to the recommendation of the Physical Therapist for safe discharge planning  From PT standpoint, due to above mentioned deficits, inaccessible home, (+) times home alone & high risk for falls, pt will benefit from inpt rehab at D/C  No SOB & dizziness reported t/o session  Pt instructed on BKA positioning & desensitization techniques w/ good understanding   Nsg staff most recent vital signs as follows: /59 (BP Location: Left arm)   Pulse 100   Temp 99 °F (37 2 °C) (Temporal)   Resp 16   Ht 5' 3" (1 6 m)   Wt 104 kg (229 lb 0 9 oz)   LMP 08/28/2022 (Exact Date)   SpO2 92%   BMI 40 58 kg/m²   At end of session, pt back in bed in stable condition, call bell & phone in reach, bed alarm activated  Fall precautions reinforced w/ good understanding  CM to follow  Nsg staff to continue to mobilized pt (OOB in chair for all meals & ambulate in room/unit) as tolerated to prevent further decline in function  Nsg notified  Barriers to Discharge Inaccessible home environment;Decreased caregiver support   Barriers to Discharge Comments (+) stairs; home alone during the day   Goals   Patient Goals to go to rehab   STG Expiration Date 09/11/22   Short Term Goal #1 Goals to be met in 10 days; pt will be able to: 1) inc strength & balance by 1/2 grade to improve overall functional mobility & dec fall risk; 2) inc bed mobility to I for pt to be able to get in/OOB safely w/ proper techniques 100% of the time, to dec caregiver burden & safely function at home; 3) inc transfers to modified I for pt to transition safely from one surface to another w/o % of the time, to dec caregiver burden & safely function at home; 4) inc amb w/ RW approx  150' w/ modified I for pt to ambulate household distances w/o any % of the time, to dec caregiver burden & safely function at home; 5) negotiate stairs w/ S w/ appropriate technique for inc safety during stair mgt inside/outside of home & dec caregiver burden; 6) modified I w/ w/c mobility & mgt on level surface approx  >350'; 7) pt/caregiver ed   PT Treatment Day 0   Plan   Treatment/Interventions Functional transfer training;LE strengthening/ROM; Elevations; Therapeutic exercise; Endurance training;Patient/family training;Bed mobility;Gait training;Spoke to nursing;OT;Spoke to case management   PT Frequency 3-5x/wk   Recommendation   PT Discharge Recommendation Post acute rehabilitation services   AM-PAC Basic Mobility Inpatient Turning in Bed Without Bedrails 3   Lying on Back to Sitting on Edge of Flat Bed 3   Moving Bed to Chair 3   Standing Up From Chair 3   Walk in Room 3   Climb 3-5 Stairs 1   Basic Mobility Inpatient Raw Score 16   Basic Mobility Standardized Score 38 32   Highest Level Of Mobility   -HLM Goal 5: Stand one or more mins   -HLM Achieved 6: Walk 10 steps or more   End of Consult   Patient Position at End of Consult Supine;Bed/Chair alarm activated; All needs within reach   End of Consult Comments Pt in stable condition  All needs in reach  Bed alarm activated     Hx/personal factors: co-morbidities, inaccessible home, dec caregiver support, use of AD, pain, WB restrictions, and fall risk  Examination: dec mobility, dec balance, dec endurance, dec amb, risk for falls, pain, WB restrictions  Clinical: unpredictable (ongoing medical status, abnormal lab values, risk for falls, and pain mgt)  Complexity: high    Watt Pih, PT

## 2022-09-01 NOTE — PROGRESS NOTES
Progress Note - General Surgery   Franck Rashid 49 y o  female MRN: [id number]  Unit/Bed#: E2 -01 Encounter: [id number]    Assessment:  Patient is a 52 y  o  female who presented with chronic nonhealing left foot wound complicated by osteomyelitis, now status post below-the-knee amputation on 8/31     Afebrile, VSS     Refused labs yesterday     UOP x 2 recorded        Plan:  -regular diet  -knee immobilizer  -stump check 9/2, today  -endocrine recs appreciated  -pain and nausea control as needed  -PT OT  -incentive spirometry  -encourage ambulation  -DVT prophylaxis      Subjective/Objective     Subjective:   No acute events overnight  Some pain  -N  -V      Objective:     Blood pressure 126/62, pulse (!) 111, temperature 98 1 °F (36 7 °C), temperature source Temporal, resp  rate 16, height 5' 3" (1 6 m), weight 104 kg (229 lb 0 9 oz), last menstrual period 08/28/2022, SpO2 100 %  ,Body mass index is 40 58 kg/m²      No intake or output data in the 24 hours ending 09/01/22 1704    Invasive Devices  Report    Peripheral Intravenous Line  Duration           Peripheral IV 08/31/22 Left;Proximal;Ventral (anterior) Forearm 1 day    Peripheral IV 08/31/22 Right Forearm 1 day                Physical Exam:   Gen: NAD, Comfortable  Neuro: A&O, No focal deficits  Head: Normal Cephalic, Atraumatic  Eye: EOMI, PERRLA, No scleral icterus  Neck: Supple, No JVD, Midline trachea  CV: RRR, Cap refill <2 sec  Pulm: Normal work of breathing, no respiratory distress  Abd: Soft, Non-Distended, Non-Tender  Ext: dressing dry  Skin: warm, dry, intact

## 2022-09-01 NOTE — CASE MANAGEMENT
Case Management Assessment & Discharge Planning Note    Patient name Shahla Delacruz  Location [facility] 2 /E2 MS 46-* MRN [id number]  : 1975 Date 2022       Current Admission Date: 2022  Current Admission Diagnosis:Non-healing wound of left foot   Patient Active Problem List    Diagnosis Date Noted    Diabetes mellitus type 1 ([address] ) 2022    Gallbladder sludge 2022    Hydroureteronephrosis 2022    Osteomyelitis of left foot ([address] ) 2022    Bacteremia 2022    Sepsis ([facility] ) 2022    Cellulitis of left foot 2021    Diabetic ulcer of left midfoot associated with diabetes mellitus due to underlying condition, with fat layer exposed ([facility] ) 2021    S/P amputation 2020    Acute blood loss as cause of postoperative anemia 2020    Morbid obesity ([facility] ) 10/28/2020    Acute on chronic anemia 10/28/2020    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 due to underlying condition, limited to breakdown of skin ([facility] ) 10/27/2020    Diabetic ketoacidosis without coma associated with type 1 diabetes mellitus ([facility] ) 2020    Non-healing wound of left foot 2020    Acute kidney injury ([facility] [facility] ) 2020    Open wound of left foot 2019    Controlled type 2 diabetes mellitus with neurologic complication, with long-term current use of insulin ([facility] ) 2019      LOS (days): 1  Geometric Mean LOS (GMLOS) (days):   Days to GMLOS:     OBJECTIVE:  PATIENT READMITTED TO HOSPITAL  Risk of Unplanned Readmission Score: 9 44         Current admission status: Inpatient       Preferred Pharmacy:   [id number] [address] [address]  Phone: [phone] Fax: [phone]    Primary Care Provider: Mallory Alexander MD    Primary Insurance: BLUE CROSS  Secondary Insurance:     ASSESSMENT:  [address] Representative - Spouse   Primary Phone: [phone] (Mobile)               Advance Directives  Does patient have a [address]?: No  Was patient offered paperwork?: Yes (declined)  Does patient currently have a Health Care decision maker?: Yes, please see Health Care Proxy section  Does patient have Advance Directives?: No  Was patient offered paperwork?: Yes (declined)  Primary Contact: Anatoliy Conner (spouse) [phone]         Readmission Root Cause  30 Day Readmission: Yes  Who directed you to return to the hospital?: Specialist  Did you understand whom to contact if you had questions or problems?: Yes  Did you get your prescriptions before you left the hospital?: Yes  Were you able to get your prescriptions filled when you left the hospital?: Yes  Did you take your medications as prescribed?: Yes  Were you able to get to your follow-up appointments?: Yes  During previous admission, was a post-acute recommendation made?: No  Patient was readmitted due to: Non-healing wound of left foot  Action Plan: Surgery consulted  Pt received L BKA  PT/OT consulted  Rehab placement    Patient Information  Admitted from[de-identified] Home  Mental Status: Alert  During Assessment patient was accompanied by: Not accompanied during assessment  Assessment information provided by[de-identified] Patient  Primary Caregiver: Self  Support Systems: Spouse/significant other, Family members, [address] of Residence: [address] do you live in?: [facility] entry access options   Select all that apply : Ramp  Type of Current Residence: [address] home  Upon entering residence, is there a bedroom on the main floor (no further steps)?: No  A bedroom is located on the following floor levels of residence (select all that apply):: 2nd Floor  Upon entering residence, is there a bathroom on the main floor (no further steps)?: Yes  Number of steps to 2nd floor from main floor: 6  In the last 12 months, was there a time when you were not able to pay the mortgage or rent on time?: No  In the last 12 months, how many places have you lived?: 1  In the last 12 months, was there a time when you did not have a steady place to sleep or slept in a shelter (including now)?: No  Homeless/housing insecurity resource given?: N/A  Living Arrangements: Lives w/ Spouse/significant other  Is patient a ?: No    Activities of Daily Living Prior to Admission  Functional Status: Independent  Completes ADLs independently?: Yes  Ambulates independently?: No  Level of ambulatory dependence: Assistance (uses AD)  Does patient use assisted devices?: Yes  Assisted Devices (DME) used: [employer] (w/ knee attachment)  Does patient currently own DME?: Yes  What DME does the patient currently own?: [employer] (w/ knee attachment)  Does patient have a history of Outpatient Therapy (PT/OT)?: No  Does the patient have a history of Short-Term Rehab?: No  Does patient have a history of HHC?: Yes ([facility])  Does patient currently have [facility]?: No         Patient Information Continued  Income Source: Employed  Does patient have prescription coverage?: Yes  Within the past 12 months, you worried that your food would run out before you got the money to buy more : Never true  Within the past 12 months, the food you bought just didn't last and you didn't have money to get more : Never true  Food insecurity resource given?: N/A  Does patient receive dialysis treatments?: No  Does patient have a history of substance abuse?: No  Does patient have a history of Mental Health Diagnosis?: No         Means of Transportation  [facility] D[facility] Self  In the past 12 months, has lack of transportation kept you from medical appointments or from getting medications?: No  In the past 12 months, has lack of transportation kept you from meetings, work, or from getting things needed for daily living?: No  Was application for public transport provided?: N/A        DISCHARGE DETAILS:    Discharge planning discussed with[de-identified] Patient  Freedom of Choice: Yes  Comments - Freedom of Choice: Pt would like ot go for rehab at [facility]  Referral placed  CM contacted family/caregiver?: No- see comments (declined a need at this time)  Were Treatment Team discharge recommendations reviewed with patient/caregiver?: Yes                         Other Referral/Resources/Interventions Provided:  Interventions: Short Term Rehab                                                      Additional Comments: CM met with pt at the bedside  Pt reports living in a two story home with bedroom on the 2nd floor  She reports utilizing a RW w/ a knee attachment  CM discussed rehab with the pt and pt reports she would like to go for rehab at [facility]  Referral placed in Aidin  Pt reports that if [facility] could not take she would be open to further discussion as she wants to go to IP rehab  CM will continue to follow

## 2022-09-01 NOTE — QUICK NOTE
Post op check:  General surgery    Assessment   Patient is a 49 y o  female who presented with chronic nonhealing left foot wound complicated by osteomyelitis, now status post below-the-knee amputation  Plan:  -regular diet  -glucose control; home insulin pump  -pain and nausea control  -incentive spirometry  -DVT prophylaxis  -PT OT    Subjective: Pt feeling well  Pain is well controlled  Denies fevers/chills, chest pain, sob  Voiding freely and having bowel movements  Objective:  @VITALS    No intake/output data recorded  Scheduled Meds:  Current Facility-Administered Medications   Medication Dose Route Frequency Provider Last Rate    acetaminophen  650 mg Oral Q6H PRN Alisha Gonzalez MD      [START ON 9/1/2022] enoxaparin  40 mg Subcutaneous Daily Alisha Gonzalez MD      gabapentin  300 mg Oral TID Alisha Gonzalez MD      oxyCODONE  10 mg Oral Q4H PRN Alisha Gonzalez MD      oxyCODONE  5 mg Oral Q4H PRN Alisha Gonzalez MD      sodium chloride  60 mL/hr Intravenous Continuous Alisha Gonzalez MD 60 mL/hr (08/31/22 1741)     Continuous Infusions:sodium chloride, 60 mL/hr, Last Rate: 60 mL/hr (08/31/22 1741)      PRN Meds:   acetaminophen    oxyCODONE    oxyCODONE      Physical Exam   Gen:  Well-developed, female in NAD  HEENT: EOMI  CV: RRR,   Lungs: Normal respiratory effort  Abd: soft, nontender, nondistended  Extremities:  Left lower extremity with knee immobilizer and Ace bandage in place, no saturation appreciated  Neuro:  AxO x3      Krishan Mederos MD

## 2022-09-01 NOTE — PLAN OF CARE
Problem: PAIN - ADULT  Goal: Verbalizes/displays adequate comfort level or baseline comfort level  Description: Interventions:  - Encourage patient to monitor pain and request assistance  - Assess pain using appropriate pain scale  - Administer analgesics based on type and severity of pain and evaluate response  - Implement non-pharmacological measures as appropriate and evaluate response  - Consider cultural and social influences on pain and pain management  - Notify physician/advanced practitioner if interventions unsuccessful or patient reports new pain  Outcome: Progressing     Problem: INFECTION - ADULT  Goal: Absence or prevention of progression during hospitalization  Description: INTERVENTIONS:  - Assess and monitor for signs and symptoms of infection  - Monitor lab/diagnostic results  - Monitor all insertion sites, i e  indwelling lines, tubes, and drains  - Monitor endotracheal if appropriate and nasal secretions for changes in amount and color  - [place] appropriate cooling/warming therapies per order  - Administer medications as ordered  - Instruct and encourage patient and family to use good hand hygiene technique  - Identify and instruct in appropriate isolation precautions for identified infection/condition  Outcome: Progressing     Problem: SAFETY ADULT  Goal: Patient will remain free of falls  Description: INTERVENTIONS:  - Educate patient/family on patient safety including physical limitations  - Instruct patient to call for assistance with activity   - Consult OT/PT to assist with strengthening/mobility   - Keep Call bell within reach  - Keep bed low and locked with side rails adjusted as appropriate  - Keep care items and personal belongings within reach  - Initiate and maintain comfort rounds  - Make Fall Risk Sign visible to staff  - Offer Toileting every 2  Hours, in advance of need  - Initiate/Maintain bed alarm  - Obtain necessary fall risk management equipment: walker, bed pan, call bells  - Apply yellow socks and bracelet for high fall risk patients  - Consider moving patient to room near nurses station  Outcome: Progressing     Problem: DISCHARGE PLANNING  Goal: Discharge to home or other facility with appropriate resources  Description: INTERVENTIONS:  - Identify barriers to discharge w/patient and caregiver  - Arrange for needed discharge resources and transportation as appropriate  - Identify discharge learning needs (meds, wound care, etc )  - Arrange for interpretive services to assist at discharge as needed  - Refer to Case Management Department for coordinating discharge planning if the patient needs post-hospital services based on physician/advanced practitioner order or complex needs related to functional status, cognitive ability, or social support system  Outcome: Progressing

## 2022-09-01 NOTE — PROGRESS NOTES
General Surgery  Progress Note   Lora Hdez 49 y o  female MRN: [id number]  Unit/Bed#: E2 -01 Encounter: [id number]    Assessment:  Patient is a 49 y o  female who presented with chronic nonhealing left foot wound complicated by osteomyelitis, now status post below-the-knee amputation  Afebrile, VSS    Refusing Morning labs    UOP:  Not recorded, patient reports multiple voids    Plan:  -regular diet  -knee immobilizer  -stump check   -endocrine consult for insulin pump  -pain and nausea control as needed  -PT OT  -incentive spirometry  -encourage ambulation  -DVT prophylaxis    Subjective/Objective     Subjective: Patient seen and examined at bedside, in no acute distress  No acute events overnight  Patient's pain is well controlled  Pt denies nausea or vomiting  Passing gas and stool  Voiding freely  Ambulating with walker    Objective:     Vitals:Blood pressure 93/55, pulse 103, temperature 97 8 °F (36 6 °C), temperature source Temporal, resp  rate 16, height 5' 3" (1 6 m), weight 104 kg (229 lb 0 9 oz), last menstrual period 2022, SpO2 98 %  ,Body mass index is 40 58 kg/m²  Temp (24hrs), Av 9 °F (36 6 °C), Min:97 5 °F (36 4 °C), Max:98 2 °F (36 8 °C)  Current: Temperature: 97 8 °F (36 6 °C)      Intake/Output Summary (Last 24 hours) at 2022  Last data filed at 2022 1533  Gross per 24 hour   Intake 2250 ml   Output --   Net 2250 ml       Invasive Devices  Report    Peripheral Intravenous Line  Duration           Peripheral IV 22 Left;Proximal;Ventral (anterior) Forearm <1 day    Peripheral IV 22 Right Forearm <1 day                Physical Exam:  General: No acute distress, alert and oriented  CV: Well perfused, regular rate and rhythm  Lungs: Normal work of breathing, no increased respiratory effort  Abdomen: Soft, non-tender, non-distended     Extremities:  Left lower extremity with knee immobilizer and Ace bandage in place without saturation  Skin: Warm, dry    Lab Results: Results: I have personally reviewed all pertinent laboratory/tests results  VTE Prophylaxis: Sequential compression device (Venodyne)  and Enoxaparin (Lovenox)    Sabino Oates MD  8/31/2022

## 2022-09-01 NOTE — PLAN OF CARE
Problem: MOBILITY - ADULT  Goal: Maintain or return to baseline ADL function  Description: INTERVENTIONS:  -  Assess patient's ability to carry out ADLs; assess patient's baseline for ADL function and identify physical deficits which impact ability to perform ADLs (bathing, care of mouth/teeth, toileting, grooming, dressing, etc )  - Assess/evaluate cause of self-care deficits   - Assess range of motion  - Assess patient's mobility; develop plan if impaired  - Assess patient's need for assistive devices and provide as appropriate  - Encourage maximum independence but intervene and supervise when necessary  - Involve family in performance of ADLs  - Assess for home care needs following discharge   - Consider OT consult to assist with ADL evaluation and planning for discharge  - Provide patient education as appropriate  Outcome: Progressing  Goal: Maintains/Returns to pre admission functional level  Description: INTERVENTIONS:  - Perform BMAT or MOVE assessment daily    - Set and communicate daily mobility goal to care team and patient/family/caregiver  - Collaborate with rehabilitation services on mobility goals if consulted  - Perform Range of Motiontimes a day  - Reposition patient every  hours    - Dangle patient  times a day  - Stand patient  times a day  - Ambulate patient  times a day  - Out of bed to chair times a day   - Out of bed for meals times a day  - Out of bed for toileting  - Record patient progress and toleration of activity level   Outcome: Progressing     Problem: Potential for Falls  Goal: Patient will remain free of falls  Description: INTERVENTIONS:  - Educate patient/family on patient safety including physical limitations  - Instruct patient to call for assistance with activity   - Consult OT/PT to assist with strengthening/mobility   - Keep Call bell within reach  - Keep bed low and locked with side rails adjusted as appropriate  - Keep care items and personal belongings within reach  - Initiate and maintain comfort rounds  - Make Fall Risk Sign visible to staff  - Offer Toileting every Hours, in advance of need  - Initiate/Maintainalarm  - Obtain necessary fall risk management equipment:   - Apply yellow socks and bracelet for high fall risk patients  - Consider moving patient to room near nurses station  Outcome: Progressing     Problem: PAIN - ADULT  Goal: Verbalizes/displays adequate comfort level or baseline comfort level  Description: Interventions:  - Encourage patient to monitor pain and request assistance  - Assess pain using appropriate pain scale  - Administer analgesics based on type and severity of pain and evaluate response  - Implement non-pharmacological measures as appropriate and evaluate response  - Consider cultural and social influences on pain and pain management  - Notify physician/advanced practitioner if interventions unsuccessful or patient reports new pain  Outcome: Progressing     Problem: INFECTION - ADULT  Goal: Absence or prevention of progression during hospitalization  Description: INTERVENTIONS:  - Assess and monitor for signs and symptoms of infection  - Monitor lab/diagnostic results  - Monitor all insertion sites, i e  indwelling lines, tubes, and drains  - Monitor endotracheal if appropriate and nasal secretions for changes in amount and color  - [place] appropriate cooling/warming therapies per order  - Administer medications as ordered  - Instruct and encourage patient and family to use good hand hygiene technique  - Identify and instruct in appropriate isolation precautions for identified infection/condition  Outcome: Progressing  Goal: Absence of fever/infection during neutropenic period  Description: INTERVENTIONS:  - Monitor WBC    Outcome: Progressing     Problem: SAFETY ADULT  Goal: Maintain or return to baseline ADL function  Description: INTERVENTIONS:  -  Assess patient's ability to carry out ADLs; assess patient's baseline for ADL function and identify physical deficits which impact ability to perform ADLs (bathing, care of mouth/teeth, toileting, grooming, dressing, etc )  - Assess/evaluate cause of self-care deficits   - Assess range of motion  - Assess patient's mobility; develop plan if impaired  - Assess patient's need for assistive devices and provide as appropriate  - Encourage maximum independence but intervene and supervise when necessary  - Involve family in performance of ADLs  - Assess for home care needs following discharge   - Consider OT consult to assist with ADL evaluation and planning for discharge  - Provide patient education as appropriate  Outcome: Progressing  Goal: Maintains/Returns to pre admission functional level  Description: INTERVENTIONS:  - Perform BMAT or MOVE assessment daily    - Set and communicate daily mobility goal to care team and patient/family/caregiver  - Collaborate with rehabilitation services on mobility goals if consulted  - Perform Range of Motion times a day  - Reposition patient everyhours    - Dangle patientimes a day  - Stand patienttimes a day  - Ambulate patient  times a day  - Out of bed to chair times a day   - Out of bed for meals  times a day  - Out of bed for toileting  - Record patient progress and toleration of activity level   Outcome: Progressing  Goal: Patient will remain free of falls  Description: INTERVENTIONS:  - Educate patient/family on patient safety including physical limitations  - Instruct patient to call for assistance with activity   - Consult OT/PT to assist with strengthening/mobility   - Keep Call bell within reach  - Keep bed low and locked with side rails adjusted as appropriate  - Keep care items and personal belongings within reach  - Initiate and maintain comfort rounds  - Make Fall Risk Sign visible to staff  - Offer Toileting every  Hours, in advance of need  - Initiate/Maintain alarm  - Obtain necessary fall risk management equipment: - Apply yellow socks and bracelet for high fall risk patients  - Consider moving patient to room near nurses station  Outcome: Progressing

## 2022-09-01 NOTE — TELEPHONE ENCOUNTER
S/P = BKA (Left leg lower) = 8/31/2022    Unable to reach patient, she is still in-patient  Path pending

## 2022-09-01 NOTE — PLAN OF CARE
Problem: PHYSICAL THERAPY ADULT  Goal: Performs mobility at highest level of function for planned discharge setting  See evaluation for individualized goals  Description: Treatment/Interventions: Functional transfer training, LE strengthening/ROM, Elevations, Therapeutic exercise, Endurance training, Patient/family training, Bed mobility, Gait training, Spoke to nursing, OT, Spoke to case management          See flowsheet documentation for full assessment, interventions and recommendations  Note: Prognosis: Good  Problem List: Decreased strength, Decreased endurance, Impaired balance, Decreased mobility, Impaired sensation, Pain  Assessment: Pt  49 y  o female admitted for Non-healing wound of left TMA w/ [facility]omyelitis of left foot, [facility] (M[facility] 9)  S/p L BKA on 8/31/22  Pt referred to PT for mobility assessment & D/C planning  Please see above for information re: home set-up & PLOF as well as objective findings during PT assessment  On eval, pt functioning below baseline hence will continue skilled PT to improve function & safety  Pt require minAx1 for most functional mobility + cues for techniques & safety  Pt able to ambulate w/ RW w/ hop to gait pattern but w/ dec amb tolerance 2* to pain, weakness & fatigue  No gross LOB noted  The patient's AM-PAC Basic Mobility Inpatient Short Form Raw Score is 16  A Raw score of less than or equal to 16 suggests the patient may benefit from discharge to post-acute rehabilitation services  Please also refer to the recommendation of the Physical Therapist for safe discharge planning  From PT standpoint, due to above mentioned deficits, inaccessible home, (+) times home alone & high risk for falls, pt will benefit from inpt rehab at D/C  No SOB & dizziness reported t/o session  Pt instructed on BKA positioning & desensitization techniques w/ good understanding   Nsg staff most recent vital signs as follows: /59 (BP Location: Left arm)   Pulse 100 Temp 99 °F (37 2 °C) (Temporal)   Resp 16   Ht 5' 3" (1 6 m)   Wt 104 kg (229 lb 0 9 oz)   LMP 08/28/2022 (Exact Date)   SpO2 92%   BMI 40 58 kg/m²   At end of session, pt back in bed in stable condition, call bell & phone in reach, bed alarm activated  Fall precautions reinforced w/ good understanding  CM to follow  Nsg staff to continue to mobilized pt (OOB in chair for all meals & ambulate in room/unit) as tolerated to prevent further decline in function  Nsg notified  Barriers to Discharge: Inaccessible home environment, Decreased caregiver support  Barriers to Discharge Comments: (+) stairs; home alone during the day  PT Discharge Recommendation: Post acute rehabilitation services    See flowsheet documentation for full assessment

## 2022-09-01 NOTE — UTILIZATION REVIEW
Initial Clinical Review    Elective inpatient surgical procedure  Age/Sex: 49 y o  female with osteomyelitis L ft, chronic non healing wound  Surgery Date:8/31/22 @8553  Procedure: AMPUTATION BELOW KNEE (BKA)-Left  Anesthesia: general  Operative Findings: Left non healing foot couns     POD#1 Progress Note:   LLE with knee immobilizer and ACE bandage in place w/o saturation   For stump check tomorrow   Pain L leg  9/10 per nursing noted, receiving prn po narcotic analgesics and scheduled analgesics  Pt refused labs this am   Pt voiding, afebrile  VSS   Reg diet  Endocrinology consult placed today for home insulin pump   PT/OT evals   Admission Orders: Date/Time/Statement:   Admission Orders (From admission, onward)     Ordered        08/31/22 1429  Inpatient Admission  Once                      Orders Placed This Encounter   Procedures    Inpatient Admission     Standing Status:   Standing     Number of Occurrences:   1     Order Specific Question:   Level of Care     Answer:   Med Surg [16]     Order Specific Question:   Estimated length of stay     Answer:   More than 2 Midnights     Order Specific Question:   Certification     Answer:   I certify that inpatient services are medically necessary for this patient for a duration of greater than two midnights  See H&P and MD Progress Notes for additional information about the patient's course of treatment       Vital Signs: /59 (BP Location: Left arm)   Pulse 100   Temp 99 °F (37 2 °C) (Temporal)   Resp 16   Ht 5' 3" (1 6 m)   Wt 104 kg (229 lb 0 9 oz)   LMP 08/28/2022 (Exact Date)   SpO2 92%   BMI 40 58 kg/m²     Pertinent Labs/Diagnostic Test Results:               Results from last 7 days   Lab Units 08/31/22  1440 08/31/22  1332 08/31/22  1018   POC GLUCOSE mg/dl 140 133 105                 BETA-HYDROXYBUTYRATE   Date Value Ref Range Status   09/07/2020 6 2 (H) <0 6 mmol/L Final                    Diet: level 2 carb diet  Mobility: ambulate TID  DVT Prophylaxis: ambulation , SCD, Lovenox    Medications/Pain Control:   Scheduled Medications:  enoxaparin, 40 mg, Subcutaneous, Daily  gabapentin, 300 mg, Oral, TID    promethazine (PHENERGAN) injection 6 25 mg  Dose: 6 25 mg  Freq: Once Route: IM  Indications of Use: NAUSEA AND VOMITING  Start: 08/31/22 1445 End: 08/31/22 1443    Continuous IV Infusions:    sodium chloride 0 9 % infusion  Rate: 125 mL/hr Dose: 125 mL/hr  Freq: Continuous Route: IV  Indications of Use: IV Hydration  Last Dose: Stopped (08/31/22 1740)  Start: 08/31/22 1015 End: 08/31/22 2021  PRN Meds:  acetaminophen, 650 mg, Oral, Q6H PRN  oxyCODONE, 10 mg, Oral, Q4H PRN x1 9/1  oxyCODONE, 5 mg, Oral, Q4H PRN x1 9/1        Network Utilization Review Department  ATTENTION: Please call with any questions or concerns to [phone] and carefully listen to the prompts so that you are directed to the right person  All voicemails are confidential   Jeff Gaitan all requests for admission clinical reviews, approved or denied determinations and any other requests to dedicated fax number below belonging to the campus where the patient is receiving treatment   List of dedicated fax numbers for the Facilities:  [address] DENIALS (Administrative/Medical Necessity) [phone]   [address] (Maternity/NICU/Pediatrics) [phone]   [address]  [phone]   Bri Vargas 50 [facility] [address] [id number] [place] [address] Maryann Joe [place] [phone] [facility]  [address] [place] [address] [phone]

## 2022-09-02 LAB
ANION GAP SERPL CALCULATED.3IONS-SCNC: 6 MMOL/L (ref 4–13)
BASOPHILS # BLD AUTO: 0.02 THOUSANDS/ΜL (ref 0–0.1)
BASOPHILS NFR BLD AUTO: 0 % (ref 0–1)
BUN SERPL-MCNC: 18 MG/DL (ref 5–25)
CALCIUM SERPL-MCNC: 8.5 MG/DL (ref 8.3–10.1)
CHLORIDE SERPL-SCNC: 106 MMOL/L (ref 96–108)
CO2 SERPL-SCNC: 27 MMOL/L (ref 21–32)
CREAT SERPL-MCNC: 1.15 MG/DL (ref 0.6–1.3)
DME PARACHUTE DELIVERY DATE REQUESTED: NORMAL
DME PARACHUTE ITEM DESCRIPTION: NORMAL
DME PARACHUTE ORDER STATUS: NORMAL
DME PARACHUTE SUPPLIER NAME: NORMAL
DME PARACHUTE SUPPLIER PHONE: NORMAL
EOSINOPHIL # BLD AUTO: 0.23 THOUSAND/ΜL (ref 0–0.61)
EOSINOPHIL NFR BLD AUTO: 3 % (ref 0–6)
ERYTHROCYTE [DISTWIDTH] IN BLOOD BY AUTOMATED COUNT: 19.6 % (ref 11.6–15.1)
GFR SERPL CREATININE-BSD FRML MDRD: 56 ML/MIN/1.73SQ M
GLUCOSE SERPL-MCNC: 155 MG/DL (ref 65–140)
GLUCOSE SERPL-MCNC: 166 MG/DL (ref 65–140)
GLUCOSE SERPL-MCNC: 168 MG/DL (ref 65–140)
GLUCOSE SERPL-MCNC: 272 MG/DL (ref 65–140)
GLUCOSE SERPL-MCNC: 277 MG/DL (ref 65–140)
HCT VFR BLD AUTO: 26 % (ref 34.8–46.1)
HGB BLD-MCNC: 7.7 G/DL (ref 11.5–15.4)
IMM GRANULOCYTES # BLD AUTO: 0.08 THOUSAND/UL (ref 0–0.2)
IMM GRANULOCYTES NFR BLD AUTO: 1 % (ref 0–2)
LYMPHOCYTES # BLD AUTO: 2.2 THOUSANDS/ΜL (ref 0.6–4.47)
LYMPHOCYTES NFR BLD AUTO: 27 % (ref 14–44)
MCH RBC QN AUTO: 22.5 PG (ref 26.8–34.3)
MCHC RBC AUTO-ENTMCNC: 29.6 G/DL (ref 31.4–37.4)
MCV RBC AUTO: 76 FL (ref 82–98)
MONOCYTES # BLD AUTO: 0.54 THOUSAND/ΜL (ref 0.17–1.22)
MONOCYTES NFR BLD AUTO: 7 % (ref 4–12)
NEUTROPHILS # BLD AUTO: 5.24 THOUSANDS/ΜL (ref 1.85–7.62)
NEUTS SEG NFR BLD AUTO: 62 % (ref 43–75)
NRBC BLD AUTO-RTO: 0 /100 WBCS
PLATELET # BLD AUTO: 200 THOUSANDS/UL (ref 149–390)
PMV BLD AUTO: 10.4 FL (ref 8.9–12.7)
POTASSIUM SERPL-SCNC: 3.7 MMOL/L (ref 3.5–5.3)
RBC # BLD AUTO: 3.42 MILLION/UL (ref 3.81–5.12)
SODIUM SERPL-SCNC: 139 MMOL/L (ref 135–147)
WBC # BLD AUTO: 8.31 THOUSAND/UL (ref 4.31–10.16)

## 2022-09-02 PROCEDURE — 82948 REAGENT STRIP/BLOOD GLUCOSE: CPT

## 2022-09-02 PROCEDURE — 97167 OT EVAL HIGH COMPLEX 60 MIN: CPT

## 2022-09-02 PROCEDURE — 80048 BASIC METABOLIC PNL TOTAL CA: CPT | Performed by: STUDENT IN AN ORGANIZED HEALTH CARE EDUCATION/TRAINING PROGRAM

## 2022-09-02 PROCEDURE — 97116 GAIT TRAINING THERAPY: CPT

## 2022-09-02 PROCEDURE — 97530 THERAPEUTIC ACTIVITIES: CPT

## 2022-09-02 PROCEDURE — 85025 COMPLETE CBC W/AUTO DIFF WBC: CPT | Performed by: STUDENT IN AN ORGANIZED HEALTH CARE EDUCATION/TRAINING PROGRAM

## 2022-09-02 PROCEDURE — NC001 PR NO CHARGE: Performed by: SURGERY

## 2022-09-02 RX ORDER — OXYCODONE HYDROCHLORIDE 10 MG/1
10 TABLET ORAL EVERY 4 HOURS PRN
Status: DISCONTINUED | OUTPATIENT
Start: 2022-09-02 | End: 2022-09-06 | Stop reason: HOSPADM

## 2022-09-02 RX ORDER — DOCUSATE SODIUM 100 MG/1
100 CAPSULE, LIQUID FILLED ORAL 2 TIMES DAILY
Status: DISCONTINUED | OUTPATIENT
Start: 2022-09-02 | End: 2022-09-06 | Stop reason: HOSPADM

## 2022-09-02 RX ORDER — SENNOSIDES 8.6 MG
1 TABLET ORAL
Status: DISCONTINUED | OUTPATIENT
Start: 2022-09-02 | End: 2022-09-06 | Stop reason: HOSPADM

## 2022-09-02 RX ORDER — POTASSIUM CHLORIDE 20 MEQ/1
40 TABLET, EXTENDED RELEASE ORAL ONCE
Status: COMPLETED | OUTPATIENT
Start: 2022-09-02 | End: 2022-09-02

## 2022-09-02 RX ORDER — POLYETHYLENE GLYCOL 3350 17 G/17G
17 POWDER, FOR SOLUTION ORAL DAILY PRN
Status: DISCONTINUED | OUTPATIENT
Start: 2022-09-02 | End: 2022-09-06 | Stop reason: HOSPADM

## 2022-09-02 RX ADMIN — DOCUSATE SODIUM 100 MG: 100 CAPSULE, LIQUID FILLED ORAL at 22:34

## 2022-09-02 RX ADMIN — ACETAMINOPHEN 975 MG: 325 TABLET ORAL at 05:00

## 2022-09-02 RX ADMIN — POTASSIUM CHLORIDE 40 MEQ: 1500 TABLET, EXTENDED RELEASE ORAL at 14:00

## 2022-09-02 RX ADMIN — GABAPENTIN 300 MG: 300 CAPSULE ORAL at 16:25

## 2022-09-02 RX ADMIN — GABAPENTIN 300 MG: 300 CAPSULE ORAL at 08:42

## 2022-09-02 RX ADMIN — ENOXAPARIN SODIUM 40 MG: 40 INJECTION SUBCUTANEOUS at 08:42

## 2022-09-02 RX ADMIN — OXYCODONE HYDROCHLORIDE 5 MG: 5 TABLET ORAL at 20:10

## 2022-09-02 RX ADMIN — OXYCODONE HYDROCHLORIDE 5 MG: 5 TABLET ORAL at 05:03

## 2022-09-02 RX ADMIN — ACETAMINOPHEN 975 MG: 325 TABLET ORAL at 17:52

## 2022-09-02 RX ADMIN — ACETAMINOPHEN 975 MG: 325 TABLET ORAL at 23:28

## 2022-09-02 RX ADMIN — SENNOSIDES 8.6 MG: 8.6 TABLET, FILM COATED ORAL at 22:34

## 2022-09-02 RX ADMIN — ACETAMINOPHEN 975 MG: 325 TABLET ORAL at 12:31

## 2022-09-02 RX ADMIN — GABAPENTIN 300 MG: 300 CAPSULE ORAL at 20:08

## 2022-09-02 NOTE — PLAN OF CARE
Problem: PAIN - ADULT  Goal: Verbalizes/displays adequate comfort level or baseline comfort level  Description: Interventions:  - Encourage patient to monitor pain and request assistance  - Assess pain using appropriate pain scale  - Administer analgesics based on type and severity of pain and evaluate response  - Implement non-pharmacological measures as appropriate and evaluate response  - Consider cultural and social influences on pain and pain management  - Notify physician/advanced practitioner if interventions unsuccessful or patient reports new pain  Outcome: Progressing     Problem: INFECTION - ADULT  Goal: Absence or prevention of progression during hospitalization  Description: INTERVENTIONS:  - Assess and monitor for signs and symptoms of infection  - Monitor lab/diagnostic results  - Monitor all insertion sites, i e  indwelling lines, tubes, and drains  - Monitor endotracheal if appropriate and nasal secretions for changes in amount and color  - [place] appropriate cooling/warming therapies per order  - Administer medications as ordered  - Instruct and encourage patient and family to use good hand hygiene technique  - Identify and instruct in appropriate isolation precautions for identified infection/condition  Outcome: Progressing  Goal: Absence of fever/infection during neutropenic period  Description: INTERVENTIONS:  - Monitor WBC    Outcome: Progressing     Problem: Potential for Falls  Goal: Patient will remain free of falls  Description: INTERVENTIONS:  - Educate patient/family on patient safety including physical limitations  - Instruct patient to call for assistance with activity   - Consult OT/PT to assist with strengthening/mobility   - Keep Call bell within reach  - Keep bed low and locked with side rails adjusted as appropriate  - Keep care items and personal belongings within reach  - Initiate and maintain comfort rounds  - Make Fall Risk Sign visible to staff  - Offer Toileting every 2 Hours, in advance of need  - Initiate/Maintain bed alarm  - Obtain necessary fall risk management equipment: wheel chair  Problem: DISCHARGE PLANNING  Goal: Discharge to home or other facility with appropriate resources  Description: INTERVENTIONS:  - Identify barriers to discharge w/patient and caregiver  - Arrange for needed discharge resources and transportation as appropriate  - Identify discharge learning needs (meds, wound care, etc )  - Arrange for interpretive services to assist at discharge as needed  - Refer to Case Management Department for coordinating discharge planning if the patient needs post-hospital services based on physician/advanced practitioner order or complex needs related to functional status, cognitive ability, or social support system  Outcome: Progressing     Problem: SKIN/TISSUE INTEGRITY - ADULT  Goal: Incision(s), wounds(s) or drain site(s) healing without S/S of infection  Description: INTERVENTIONS  - Assess and document dressing, incision, wound bed, drain sites and surrounding tissue  - Provide patient and family education  - Perform skin care/dressing changes every 6 hours  Outcome: Progressing     - Apply yellow socks and bracelet for high fall risk patients  - Consider moving patient to room near nurses station  Outcome: Progressing  Problem: METABOLIC, FLUID AND ELECTROLYTES - ADULT  Goal: Glucose maintained within target range  Description: INTERVENTIONS:  - Monitor Blood Glucose as ordered  - Assess for signs and symptoms of hyperglycemia and hypoglycemia  - Administer ordered medications to maintain glucose within target range  - Assess nutritional intake and initiate nutrition service referral as needed  Outcome: Progressing

## 2022-09-02 NOTE — PLAN OF CARE
Problem: OCCUPATIONAL THERAPY ADULT  Goal: Performs self-care activities at highest level of function for planned discharge setting  See evaluation for individualized goals  Description: Treatment Interventions: ADL retraining, Functional transfer training, UE strengthening/ROM, Endurance training, Patient/family training, Equipment evaluation/education, Compensatory technique education          See flowsheet documentation for full assessment, interventions and recommendations  Note: Limitation: Decreased ADL status, Decreased UE strength, Decreased endurance, Decreased high-level ADLs  Prognosis: Good  Assessment: Pt is a 48y/o female admitted to the hospital 2* chronic L foot wound  Pt noted with OM, requiring a s/p L BKA(8/31)  Pt is currently NWB with her L LE, having a knee immobilizer on  Pt with [facility] Charcot joint, DM, OM, R foot sx, L foot sx  PTA pt states independence with all aspects of her ADLs, transfers, ambulation--with RW(using knee attachment); sleeps in liftchair, +, +home alone, neg falls  During initial eval, pt demonstrated slight deficits with her functional balance, functional mobility, ADL status, transfer safety, b/l UE strength, and activity tolerance(currently fair=15-20mins)  Pt would benefit from continued OT tx for the above deficits  3-5xwk/1-2wks       OT Discharge Recommendation:  (str vs  home therapy pending progress)

## 2022-09-02 NOTE — PLAN OF CARE
Problem: MOBILITY - ADULT  Goal: Maintain or return to baseline ADL function  Description: INTERVENTIONS:  -  Assess patient's ability to carry out ADLs; assess patient's baseline for ADL function and identify physical deficits which impact ability to perform ADLs (bathing, care of mouth/teeth, toileting, grooming, dressing, etc )  - Assess/evaluate cause of self-care deficits   - Assess range of motion  - Assess patient's mobility; develop plan if impaired  - Assess patient's need for assistive devices and provide as appropriate  - Encourage maximum independence but intervene and supervise when necessary  - Involve family in performance of ADLs  - Assess for home care needs following discharge   - Consider OT consult to assist with ADL evaluation and planning for discharge  - Provide patient education as appropriate  Outcome: Progressing  Goal: Maintains/Returns to pre admission functional level  Description: INTERVENTIONS:  - Perform BMAT or MOVE assessment daily    - Set and communicate daily mobility goal to care team and patient/family/caregiver  - Collaborate with rehabilitation services on mobility goals if consulted  - Perform Range of Motion 3 times a day  - Reposition patient every 2 hours    - Dangle patient 3 times a day  - Stand patient 3 times a day  - Ambulate patient 3 times a day  - Out of bed to chair 3 times a day   - Out of bed for meals 3 times a day  - Out of bed for toileting  - Record patient progress and toleration of activity level   Outcome: Progressing     Problem: Potential for Falls  Goal: Patient will remain free of falls  Description: INTERVENTIONS:  - Educate patient/family on patient safety including physical limitations  - Instruct patient to call for assistance with activity   - Consult OT/PT to assist with strengthening/mobility   - Keep Call bell within reach  - Keep bed low and locked with side rails adjusted as appropriate  - Keep care items and personal belongings within reach  - Initiate and maintain comfort rounds  - Make Fall Risk Sign visible to staff  - Offer Toileting every 2 Hours, in advance of need  - Initiate/Maintain bed alarm  - Obtain necessary fall risk management equipment  - Apply yellow socks and bracelet for high fall risk patients  - Consider moving patient to room near nurses station  Outcome: Progressing     Problem: PAIN - ADULT  Goal: Verbalizes/displays adequate comfort level or baseline comfort level  Description: Interventions:  - Encourage patient to monitor pain and request assistance  - Assess pain using appropriate pain scale  - Administer analgesics based on type and severity of pain and evaluate response  - Implement non-pharmacological measures as appropriate and evaluate response  - Consider cultural and social influences on pain and pain management  - Notify physician/advanced practitioner if interventions unsuccessful or patient reports new pain  Outcome: Progressing     Problem: INFECTION - ADULT  Goal: Absence or prevention of progression during hospitalization  Description: INTERVENTIONS:  - Assess and monitor for signs and symptoms of infection  - Monitor lab/diagnostic results  - Monitor all insertion sites, i e  indwelling lines, tubes, and drains  - Monitor endotracheal if appropriate and nasal secretions for changes in amount and color  - [place] appropriate cooling/warming therapies per order  - Administer medications as ordered  - Instruct and encourage patient and family to use good hand hygiene technique  - Identify and instruct in appropriate isolation precautions for identified infection/condition  Outcome: Progressing  Goal: Absence of fever/infection during neutropenic period  Description: INTERVENTIONS:  - Monitor WBC    Outcome: Progressing     Problem: SAFETY ADULT  Goal: Maintain or return to baseline ADL function  Description: INTERVENTIONS:  -  Assess patient's ability to carry out ADLs; assess patient's baseline for ADL function and identify physical deficits which impact ability to perform ADLs (bathing, care of mouth/teeth, toileting, grooming, dressing, etc )  - Assess/evaluate cause of self-care deficits   - Assess range of motion  - Assess patient's mobility; develop plan if impaired  - Assess patient's need for assistive devices and provide as appropriate  - Encourage maximum independence but intervene and supervise when necessary  - Involve family in performance of ADLs  - Assess for home care needs following discharge   - Consider OT consult to assist with ADL evaluation and planning for discharge  - Provide patient education as appropriate  Outcome: Progressing  Goal: Maintains/Returns to pre admission functional level  Description: INTERVENTIONS:  - Perform BMAT or MOVE assessment daily    - Set and communicate daily mobility goal to care team and patient/family/caregiver  - Collaborate with rehabilitation services on mobility goals if consulted  - Perform Range of Motion 3 times a day  - Reposition patient every 2 hours    - Dangle patient 3 times a day  - Stand patient 3 times a day  - Ambulate patient 3 times a day  - Out of bed to chair 3 times a day   - Out of bed for meals 3 times a day  - Out of bed for toileting  - Record patient progress and toleration of activity level   Outcome: Progressing  Goal: Patient will remain free of falls  Description: INTERVENTIONS:  - Educate patient/family on patient safety including physical limitations  - Instruct patient to call for assistance with activity   - Consult OT/PT to assist with strengthening/mobility   - Keep Call bell within reach  - Keep bed low and locked with side rails adjusted as appropriate  - Keep care items and personal belongings within reach  - Initiate and maintain comfort rounds  - Make Fall Risk Sign visible to staff  - Offer Toileting every 2 Hours, in advance of need  - Initiate/Maintain bed alarm  - Obtain necessary fall risk management equipment  - Apply yellow socks and bracelet for high fall risk patients  - Consider moving patient to room near nurses station  Outcome: Progressing     Problem: DISCHARGE PLANNING  Goal: Discharge to home or other facility with appropriate resources  Description: INTERVENTIONS:  - Identify barriers to discharge w/patient and caregiver  - Arrange for needed discharge resources and transportation as appropriate  - Identify discharge learning needs (meds, wound care, etc )  - Arrange for interpretive services to assist at discharge as needed  - Refer to Case Management Department for coordinating discharge planning if the patient needs post-hospital services based on physician/advanced practitioner order or complex needs related to functional status, cognitive ability, or social support system  Outcome: Progressing     Problem: Knowledge Deficit  Goal: Patient/family/caregiver demonstrates understanding of disease process, treatment plan, medications, and discharge instructions  Description: Complete learning assessment and assess knowledge base    Interventions:  - Provide teaching at level of understanding  - Provide teaching via preferred learning methods  Outcome: Progressing     Problem: SKIN/TISSUE INTEGRITY - ADULT  Goal: Skin Integrity remains intact(Skin Breakdown Prevention)  Description: Assess:  -Perform [place] assessment every shift  -Clean and moisturize skin as needed  -Inspect skin when repositioning, toileting, and assisting with ADLS  -Assess under medical devices every shift  -Assess extremities for adequate circulation and sensation     Bed Management:  -Have minimal linens on bed & keep smooth, unwrinkled  -Change linens as needed when moist or perspiring  -Avoid sitting or lying in one position for more than 2 hours while in bed  -Keep HOB at 30 degrees     Toileting:  -Offer bedside commode  -Assess for incontinence every hour  -Use incontinent care products after each incontinent episode Activity:  -Mobilize patient 3 times a day  -Encourage activity and walks on unit  -Encourage or provide ROM exercises   -Turn and reposition patient every 2 Hours  -Use appropriate equipment to lift or move patient in bed  -Instruct/ Assist with weight shifting every hour when out of bed in chair  -Consider limitation of chair time 2 hour intervals    Skin Care:  -Avoid use of baby powder, tape, friction and shearing, hot water or constrictive clothing  -Relieve pressure over bony prominences   -Do not massage red bony areas    Next Steps:  -Teach patient strategies to minimize risks    -Consider consults to  interdisciplinary teams  Outcome: Progressing  Goal: Incision(s), wounds(s) or drain site(s) healing without S/S of infection  Description: INTERVENTIONS  - Assess and document dressing, incision, wound bed, drain sites and surrounding tissue  - Provide patient and family education  - Perform skin care/dressing changes as ordered  Outcome: Progressing  Goal: Pressure injury heals and does not worsen  Description: Interventions:  - Implement low air loss mattress or specialty surface (Criteria met)  - Apply silicone foam dressing  - Instruct/assist with weight shifting every 15 minutes when in chair   - Limit chair time to 2 hour intervals  - Use special pressure reducing interventions when in chair   - Apply fecal or urinary incontinence containment device   - Perform passive or active ROM every hour  - Turn and reposition patient & offload bony prominences every 2 hours   - Utilize friction reducing device or surface for transfers   - Consider consults to  interdisciplinary teams   - Use incontinent care products after each incontinent episode   - Consider nutrition services referral as needed  Outcome: Progressing     Problem: Prexisting or High Potential for Compromised Skin Integrity  Goal: Skin integrity is maintained or improved  Description: INTERVENTIONS:  - Identify patients at risk for skin breakdown  - Assess and monitor skin integrity  - Assess and monitor nutrition and hydration status  - Monitor labs   - Assess for incontinence   - Turn and reposition patient  - Assist with mobility/ambulation  - Relieve pressure over bony prominences  - Avoid friction and shearing  - Provide appropriate hygiene as needed including keeping skin clean and dry  - Evaluate need for skin moisturizer/barrier cream  - Collaborate with interdisciplinary team   - Patient/family teaching  - Consider wound care consult   Outcome: Progressing     Problem: Nutrition/Hydration-ADULT  Goal: Nutrient/Hydration intake appropriate for improving, restoring or maintaining nutritional needs  Description: Monitor and assess patient's nutrition/hydration status for malnutrition  Collaborate with interdisciplinary team and initiate plan and interventions as ordered  Monitor patient's weight and dietary intake as ordered or per policy  Utilize nutrition screening tool and intervene as necessary  Determine patient's food preferences and provide high-protein, high-caloric foods as appropriate       INTERVENTIONS:  - Monitor oral intake, urinary output, labs, and treatment plans  - Assess nutrition and hydration status and recommend course of action  - Evaluate amount of meals eaten  - Assist patient with eating if necessary   - Allow adequate time for meals  - Recommend/ encourage appropriate diets, oral nutritional supplements, and vitamin/mineral supplements  - Order, calculate, and assess calorie counts as needed  - Recommend, monitor, and adjust tube feedings and TPN/PPN based on assessed needs  - Assess need for intravenous fluids  - Provide specific nutrition/hydration education as appropriate  - Include patient/family/caregiver in decisions related to nutrition  Outcome: Progressing

## 2022-09-02 NOTE — OCCUPATIONAL THERAPY NOTE
Occupational Therapy Evaluation(aohz=[id number])     Patient Name: Lora Hdez  DFEEM'V Date: 9/2/2022  Problem List  Principal Problem:    Non-healing wound of left foot  Active Problems:    Open wound of left foot    Controlled type 2 diabetes mellitus with neurologic complication, with long-term current use of insulin ([facility] )    Morbid obesity ([facility] )    Acute on chronic anemia    Cellulitis of left foot    Diabetes mellitus type 1 ([facility] )    Past Medical History  Past Medical History:   Diagnosis Date    Charcot's joint of foot, left     Diabetes mellitus (HCC)     Insulin pump    History of transfusion     8-4-22    Osteomyelitis of foot, right, acute ([facility] )      Past Surgical History  Past Surgical History:   Procedure Laterality Date    FOOT AMPUTATION Left 10/30/2020    Procedure: CHOPART AMPUTATION LEFT  FOOT:;  Surgeon: Adwoa Bailey DPM;  Location: [facility];  Service: Podiatry    FOOT CAPSULE RELEASE W/ PERCUTANEOUS HEEL CORD LENGTHENING, TIBIAL TENDON TRANSFER Left 10/30/2020    Procedure: Achilles tenotomy left foot;  Surgeon: Adwoa Bailey DPM;  Location: [facility];  Service: Podiatry    FOOT SURGERY Right     right first toe amputation  2019 09/02/22 1138   Note Type   Note type Evaluation   Restrictions/Precautions   Weight Bearing Precautions Per Order Yes   RLE Weight Bearing Per Order   (R foot wound; pt states currently no WB restrictions)   LLE Weight Bearing Per Order NWB   Braces or Orthoses LE Immobilizer   Other Precautions Fall Risk;Pain; Chair Alarm; Bed Alarm   Pain Assessment   Pain Assessment Tool 0-10   Pain Score 8   Pain Location/Orientation Orientation: Left; Location: Leg   Home Living   Type of [address] [place] [address] Two level; Able to live on main level with bedroom/bathroom; Ramped entrance   Bathroom Shower/Tub Tub/shower unit   [employer] Grab bars in shower; Shower chair   Home Equipment Walker;Electric scooter  (knee support attachment for walker)   Prior Function   Lives With Spouse   ADL Assistance Independent   Falls in the last 6 months 0   Lifestyle   Autonomy PTA pt states independence with all aspects of her ADLs, transfers, ambulation--with RW(using knee attachment); sleeps in liftchair, +, +home alone, neg falls   Reciprocal Relationships supportive family   Service to Others homemaker   Intrinsic Gratification watching TV   Psychosocial   Psychosocial (WDL) WDL   Subjective   Subjective "I'm nervous about going home "   ADL   Where Assessed Edge of bed   Eating Assistance 6  Modified independent   Grooming Assistance 6  Modified Independent   UB Bathing Assistance 5  Supervision/Setup   LB Bathing Assistance 4  Minimal Assistance   [address] 5  Supervision/Setup   Bed Mobility   Rolling R 5  Supervision   Rolling L 5  Supervision   Supine to Sit 5  Supervision   Additional items Increased time required;HOB elevated   Transfers   Sit to Stand 5  Supervision   Additional items Increased time required;Verbal cues   Stand to Sit 5  Supervision   Additional items Increased time required;Verbal cues   Functional Mobility   Functional Mobility 5  Supervision   Additional items Rolling walker   Balance   Static Sitting Good   Dynamic Sitting Fair +   Static Standing Fair   Dynamic Standing Fair -   Activity Tolerance   Activity Tolerance Patient limited by fatigue;Patient limited by pain   Medical Staff Made Aware nsg   RUE Assessment   RUE Assessment WFL   RUE Strength   RUE Overall Strength Within Functional Limits - able to perform ADL tasks with strength  (4+/5 throughout)   LUE Assessment   LUE Assessment WFL   LUE Strength   LUE Overall Strength Within Functional Limits - able to perform ADL tasks with strength  (4+/5 throughout)   Hand Function   Gross Motor Coordination Functional   Fine Motor Coordination Functional   Sensation   Light Touch No apparent deficits   Proprioception   Proprioception No apparent deficits   Vision-Basic Assessment   Current Vision   (glasses)   Vision - Complex Assessment   Acuity Able to read clock/calendar on wall without difficulty   Perception   Inattention/Neglect Appears intact   Cognition   Overall Cognitive Status WFL   Arousal/Participation Alert   Attention Within functional limits   Orientation Level Oriented X4   Memory Within functional limits   Following Commands Follows all commands and directions without difficulty   Assessment   Limitation Decreased ADL status; Decreased UE strength;Decreased endurance;Decreased high-level ADLs   Prognosis Good   Assessment Pt is a 48y/o female admitted to the hospital 2* chronic L foot wound  Pt noted with OM, requiring a s/p L BKA(8/31)  Pt is currently NWB with her L LE, having a knee immobilizer on  Pt with PMH Charcot joint, DM, OM, R foot sx, L foot sx  PTA pt states independence with all aspects of her ADLs, transfers, ambulation--with RW(using knee attachment); sleeps in liftchair, +, +home alone, neg falls  During initial eval, pt demonstrated slight deficits with her functional balance, functional mobility, ADL status, transfer safety, b/l UE strength, and activity tolerance(currently fair=15-20mins)  Pt would benefit from continued OT tx for the above deficits  3-5xwk/1-2wks  Goals   Patient Goals "to get my leg better"   STG Time Frame   (1-7 days)   Short Term Goal #1 Pt will demonstrate mod I with their sit-stand transfers to assist with completion of their LE dressing  Short Term Goal #2 Pt will demonstrate proper walker/transfer safety(stand-pivot transfers) 100% of the time  Short Term Goal  Pt will demonstrate mod I with their UE and LE bathing/dresssing  LTG Time Frame   (7-14 days)   Long Term Goal #1 Pt will demonstrate improved b/l UE strength by 1/2 MM grade to assist with ADLs/transfers  Long Term Goal #2 Pt will demonstrate g/g- balance with all functional activities     Long Term Goal Pt will independently verbalize 2-3 potential fall risks/transfer safety hazards and their appropriate compensation techniques  Plan   Treatment Interventions ADL retraining;Functional transfer training;UE strengthening/ROM; Endurance training;Patient/family training;Equipment evaluation/education; Compensatory technique education   Goal Expiration Date 09/16/22   OT Treatment Day 0   OT Frequency 3-5x/wk   Recommendation   OT Discharge Recommendation   (str vs  home therapy pending progress)   AM-PAC Daily Activity Inpatient   Lower Body Dressing 3   Bathing 3   Toileting 4   Upper Body Dressing 4   Grooming 4   Eating 4   Daily Activity Raw Score 22   Daily Activity Standardized Score (Calc for Raw Score >=11) 47  [address] [place]   Following a Speech/Presentation 4   Understanding Ordinary Conversation 4   Taking Medications 4   Remembering Where Things Are Placed or Put Away 4   Remembering List of 4-5 Errands 4   Taking Care of Complicated Tasks 4   Applied Cognition Raw Score 24   Applied Cognition Standardized Score 62 21   Leeanne Sacks, OT

## 2022-09-02 NOTE — OCCUPATIONAL THERAPY NOTE
Occupational Therapy Evaluation     Patient Name: Paz Valel  YPUUP'Q Date: 9/2/2022  Problem List  Principal Problem:    Non-healing wound of left foot  Active Problems:    Open wound of left foot    Controlled type 2 diabetes mellitus with neurologic complication, with long-term current use of insulin ([facility] [address] )    Morbid obesity ([facility] [address] )    Acute on chronic anemia    Cellulitis of left foot    Diabetes mellitus type 1 ([facility] [address] )    Past Medical History  Past Medical History:   Diagnosis Date    Charcot's joint of foot, left     Diabetes mellitus (HCC)     Insulin pump    History of transfusion     8-4-22    Osteomyelitis of foot, right, acute ([facility] [address] )      Past Surgical History  Past Surgical History:   Procedure Laterality Date    FOOT AMPUTATION Left 10/30/2020    Procedure: CHOPART AMPUTATION LEFT  FOOT:;  Surgeon: Bharath Boswell DPM;  Location:  MAIN OR;  Service: Podiatry    FOOT CAPSULE RELEASE W/ PERCUTANEOUS HEEL CORD LENGTHENING, TIBIAL TENDON TRANSFER Left 10/30/2020    Procedure: Achilles tenotomy left foot;  Surgeon: Bharath Boswell DPM;  Location: [facility];  Service: Podiatry    FOOT SURGERY Right     right first toe amputation  2019 09/02/22 1138   Note Type   Note type Evaluation   Restrictions/Precautions   Weight Bearing Precautions Per Order Yes   RLE Weight Bearing Per Order   (R foot wound; pt states currently no WB restrictions)   LLE Weight Bearing Per Order NWB   Braces or Orthoses LE Immobilizer   Other Precautions Fall Risk;Pain; Chair Alarm; Bed Alarm   Pain Assessment   Pain Assessment Tool 0-10   Pain Score 8   Pain Location/Orientation Orientation: Left; Location: Leg   Home Living   Type of [address] [place] [address] Two level; Able to live on main level with bedroom/bathroom; Ramped entrance   Bathroom Shower/Tub Tub/shower unit   [facility] in [facility]; Shower chair   Home Equipment Walker;Electric scooter  (knee support attachment for walker)   Prior Function   Lives With Spouse   ADL Assistance Independent   Falls in the last 6 months 0   Lifestyle   Autonomy PTA pt states independence with all aspects of her ADLs, transfers, ambulation--with RW(using knee attachment); sleeps in liftchair, +, +home alone, neg falls   Reciprocal Relationships supportive family   Service to Others homemaker   Intrinsic Gratification watching TV   Psychosocial   Psychosocial (WDL) WDL   Subjective   Subjective "I'm nervous about going home "   ADL   Where Assessed Edge of bed   Eating Assistance 6  Modified independent   Grooming Assistance 6  Modified Independent   UB Bathing Assistance 5  Supervision/Setup   LB Bathing Assistance 4  Minimal Assistance   [address] 5  Supervision/Setup   Bed Mobility   Rolling R 5  Supervision   Rolling L 5  Supervision   Supine to Sit 5  Supervision   Additional items Increased time required;HOB elevated   Transfers   Sit to Stand 5  Supervision   Additional items Increased time required;Verbal cues   Stand to Sit 5  Supervision   Additional items Increased time required;Verbal cues   Functional Mobility   Functional Mobility 5  Supervision   Additional items Rolling walker   Balance   Static Sitting Good   Dynamic Sitting Fair +   Static Standing Fair   Dynamic Standing Fair -   Activity Tolerance   Activity Tolerance Patient limited by fatigue;Patient limited by pain   Medical Staff Made Aware nsg   RUE Assessment   RUE Assessment WFL   RUE Strength   RUE Overall Strength Within Functional Limits - able to perform ADL tasks with strength  (4+/5 throughout)   LUE Assessment   LUE Assessment WFL   LUE Strength   LUE Overall Strength Within Functional Limits - able to perform ADL tasks with strength  (4+/5 throughout)   Hand Function   Gross Motor Coordination Functional   Fine Motor Coordination Functional   Sensation   Light Touch No apparent deficits   Proprioception   Proprioception No apparent deficits Vision-Basic Assessment   Current Vision   (glasses)   Vision - Complex Assessment   Acuity Able to read clock/calendar on wall without difficulty   Perception   Inattention/Neglect Appears intact   Cognition   Overall Cognitive Status WFL   Arousal/Participation Alert   Attention Within functional limits   Orientation Level Oriented X4   Memory Within functional limits   Following Commands Follows all commands and directions without difficulty   Assessment   Limitation Decreased ADL status; Decreased UE strength;Decreased endurance;Decreased high-level ADLs   Prognosis Good   Assessment Pt is a 46y/o female admitted to the hospital 2* chronic L foot wound  Pt noted with OM, requiring a s/p L BKA(8/31)  Pt is currently NWB with her L LE, having a knee immobilizer on  Pt with PMH Charcot joint, DM, OM, R foot sx, L foot sx  PTA pt states independence with all aspects of her ADLs, transfers, ambulation--with RW(using knee attachment); sleeps in liftchair, +, +home alone, neg falls  During initial eval, pt demonstrated slight deficits with her functional balance, functional mobility, ADL status, transfer safety, b/l UE strength, and activity tolerance(currently fair=15-20mins)  Pt would benefit from continued OT tx for the above deficits  3-5xwk/1-2wks  Goals   Patient Goals "to get my leg better"   STG Time Frame   (1-7 days)   Short Term Goal #1 Pt will demonstrate mod I with their sit-stand transfers to assist with completion of their LE dressing  Short Term Goal #2 Pt will demonstrate proper walker/transfer safety(stand-pivot transfers) 100% of the time  Short Term Goal  Pt will demonstrate mod I with their UE and LE bathing/dresssing  LTG Time Frame   (7-14 days)   Long Term Goal #1 Pt will demonstrate improved b/l UE strength by 1/2 MM grade to assist with ADLs/transfers  Long Term Goal #2 Pt will demonstrate g/g- balance with all functional activities     Long Term Goal Pt will independently verbalize 2-3 potential fall risks/transfer safety hazards and their appropriate compensation techniques  Plan   Treatment Interventions ADL retraining;Functional transfer training;UE strengthening/ROM; Endurance training;Patient/family training;Equipment evaluation/education; Compensatory technique education   Goal Expiration Date 09/16/22   OT Treatment Day 0   OT Frequency 3-5x/wk   Recommendation   OT Discharge Recommendation   (str vs  home therapy pending progress)   Equipment Recommended   (w/c-18inch(width), elevating leg rests)   AM-PAC Daily Activity Inpatient   Lower Body Dressing 3   Bathing 3   Toileting 4   Upper Body Dressing 4   Grooming 4   Eating 4   Daily Activity Raw Score 22   Daily Activity Standardized Score (Calc for Raw Score >=11) 47  [address] [facility]   Following a Speech/Presentation 4   Understanding Ordinary Conversation 4   Taking Medications 4   Remembering Where Things Are Placed or Put Away 4   Remembering List of 4-5 Errands 4   Taking Care of Complicated Tasks 4   Applied Cognition Raw Score 24   Applied Cognition Standardized Score 62 21   Joseph Smith, OT

## 2022-09-02 NOTE — QUICK NOTE
BKA dressing take down POD#2    Knee immobilizer removed  OR dressing clean and dry, no sanguinous strike-through noted  Incision is clean with minimal scabbing, no active bleeding, staples intact  Dried scabs lightly cleansed  Xeroform gauze applied to incision, covered with ABD, kerlix wrap, and ACE wrap on moderate compression  Knee immobilizer replaced                Patient tolerated well    Sarika Victor PA-C  09/02/22  12:27 PM

## 2022-09-02 NOTE — PHYSICAL THERAPY NOTE
PHYSICAL THERAPY NOTE          Patient Name: Cheryl Crespo  HYGFU'N Date: 9/2/2022 09/02/22 1601   Note Type   Note Type Treatment   Pain Assessment   Pain Assessment Tool 0-10   Pain Score No Pain   Restrictions/Precautions   LLE Weight Bearing Per Order NWB   Braces or Orthoses LE Immobilizer   Other Precautions Impulsive;Pain; Fall Risk;Bed Alarm   General   Chart Reviewed Yes   Family/Caregiver Present No   Cognition   Overall Cognitive Status WFL   Arousal/Participation Alert; Cooperative   Attention Within functional limits   Orientation Level Oriented X4   Memory Within functional limits   Following Commands Follows all commands and directions without difficulty   Subjective   Subjective The Dr  says and thinks I should go to rehab  I a little worried about going home as I don;y know if I will be able to manage everything at home  Bed Mobility   Supine to Sit 5  Supervision   Additional items Assist x 1; Increased time required; Bedrails   Sit to Supine 5  Supervision   Additional items HOB elevated; Increased time required   Transfers   Sit to Stand 5  Supervision   Additional items Assist x 1; Increased time required;Verbal cues   Stand to Sit 4  Minimal assistance   Additional items Assist x 1; Increased time required;Verbal cues   Additional Comments cues for hand placement turning backing up to bed cues for ease of descent  Ambulation/Elevation   Gait pattern Forward Flexion; Short stride  (hop to gait excessively fast at times, unsteady)   Gait Assistance 4  Minimal assist   Additional items Assist x 1;Verbal cues   Assistive Device Rolling walker   Distance 20' x3   Balance   Static Sitting Good   Dynamic Sitting Fair +   Static Standing Fair   Dynamic Standing Fair -   Ambulatory Poor +   Endurance Deficit   Endurance Deficit Description fatgiue, sob   Activity Tolerance   Activity Tolerance Patient limited by fatigue   Equipment Use   Comments redressed pt 's R foot wound with 5x9 and kerlix as dressing fell off during mobility training  Discussed with p's RN Yandy Peter prior to dressing wound  pt ed on positioning of L to avoid flexed positions for prolonged periods of time, discussed home Needs and first floor set-up if pt d/c's to home and answered pt's questions regarding rehab and home care/ needs  Assessment   Prognosis Good   Problem List Decreased strength;Decreased endurance; Impaired balance;Decreased mobility; Impaired sensation;Pain;Decreased skin integrity; Decreased safety awareness   Assessment Pt  Seen for PT treatment session  Pt  Is requiring min assist x1 for ambulation and stand to sit transfers verbal cues for safe transfer and mobility techniques  Cues for hand placement, ease of descent and cues to slow down during gait training  increased fatigue and sob noted with mobility training  Pt  Progressed with ambulation distances to 20' x3, rest breaks due to fatigue and unsteadiness, however no gross LOB noted  Pt  Is functioning below baseline level of mobility, remains at increased risk for falls due to deficits as noted  and will benefit from continued PT upon d/c in order to maximize functional outcomes, mobility and independence  Goals   Patient Goals To go to rehab and get stronger and more functional    STG Expiration Date 09/11/22   PT Treatment Day 1   Plan   Treatment/Interventions Functional transfer training; Therapeutic exercise; Endurance training;Patient/family training;Equipment eval/education; Bed mobility;Gait training;Spoke to nursing   Progress Slow progress, decreased activity tolerance   PT Frequency 3-5x/wk   Recommendation   PT Discharge Recommendation Post acute rehabilitation services   Additional Comments if pt progresses well and achieves inpt PT goals pt will require w/c with elevating and swing away leg rests, Rw, BSC HHPT, HHOT, VNA for nursing     AM-PAC Basic Mobility Inpatient   Turning in Bed Without Bedrails 3   Lying on Back to Sitting on Edge of Flat Bed 3   Moving Bed to Chair 3   Standing Up From Chair 3   Walk in Room 3   Climb 3-5 Stairs 1   Basic Mobility Inpatient Raw Score 16   Basic Mobility Standardized Score 38 32   Highest Level Of Mobility   -HLM Goal 5: Stand one or more mins   -HLM Achieved 6: Walk 10 steps or more   Education   Education Provided Mobility training;Assistive device   Patient Reinforcement needed;Demonstrates acceptance/verbal understanding   End of Consult   Patient Position at End of Consult Supine;Bed/Chair alarm activated; All needs within reach   [place], [place]

## 2022-09-02 NOTE — WOUND OSTOMY CARE
Consult Note - Wound   Bairon Silver 49 y o  female MRN: [id number]  Unit/Bed#: E2 -01 Encounter: [id number]      History and Present Illness:  49year old female presented to the hospital with non-salvageable Charcot's diabetic foot for elective left BKA (done 8/31/22)  Patient's history significant for Charcot's foot, DM  Assessment Findings:   Patient agreeable to assessment  She is able to turn in bed independently  However, she reports tenderness to coccyx area--no redness at this time--preventative foam dressing applied  Continent of bowel and bladder  Left lower extremity with dressing dry and intact per surgery team--immobilizer in place  Preventative foam dressings applied to lateral and medial left upper thigh under immobilizer bars  Buttocks and right heel intact  1   Right plantar foot diabetic ulcer--pink/red granulation tissue  Ursula-wound with callus/hyperkeratosis extending approximately 1 cm from wound circumferentially  There is no ursula-wound redness or induration  Patient denies foot pain  Moderate serosanguinous/tan drainage on old dressing when removed  Patient had been following at the wound center  However, has not been there most recently-- changing right foot dressings  See flowsheet for wound details  Wound Care Plan:   1-Encourage/remind patient to turn and reposition every 2 hours while in bed and weight shift frequently while in the chair to re-distribute pressure on skin  Provide assistance as needed  2-Elevate heels off of bed/chair surface to offload pressure  3-Offloading air cushion in chair when out of bed  4-Moisturize skin daily with skin nourishing lotion  5-Apply Allevyn Life foam dressing to midline sacrum and left lateral/medial thigh under immobilizer bar for prevention  Cole with P   Peel back at least daily for skin assessment and re-apply  Change dressing every 3 days and PRN    6-Right foot--cleanse with normal saline, pat dry  Apply Maxorb Ag to wound bed  Cover with 4x4s, ABD and wrap with lilian  Change dressing every other day  Wound care team to follow  Plan of care reviewed with primary RN  Patient not receptive to having VNA for assistance with wound care and supplies  Strongly encouraged her to follow-up at the wound center  Wound 09/01/22 Diabetic Ulcer Foot Right;Plantar (Active)   Wound Image   09/02/22 0906   Wound Description Pink;Granulation tissue 09/02/22 0906   Rusula-wound Assessment Callus;Dry 09/02/22 0906   Wound Length (cm) 1 8 cm 09/02/22 0906   Wound Width (cm) 1 7 cm 09/02/22 0906   Wound Depth (cm) 0 2 cm 09/02/22 0906   Wound Surface Area (cm^2) 3 06 cm^2 09/02/22 0906   Wound Volume (cm^3) 0 612 cm^3 09/02/22 0906   Calculated Wound Volume (cm^3) 0 61 cm^3 09/02/22 0906   Change in Wound Size % 54 81 09/02/22 0906   Drainage Amount Moderate 09/02/22 0906   Drainage Description Serosanguineous; Tan 09/02/22 0906   Non-staged Wound Description Full thickness 09/02/22 0906   Treatments Cleansed;Elevated 09/02/22 0906   Dressing Calcium Alginate with Silver;ABD;Gauze 09/02/22 0906   Dressing Changed Changed 09/02/22 0906   Patient Tolerance Tolerated well 09/02/22 0906   Dressing Status Clean;Dry; Intact 09/02/22 1221 Param JANSENN, RN, [employer]

## 2022-09-02 NOTE — DISCHARGE INSTR - OTHER ORDERS
Wound Care Plan:   1-Turn and reposition every 2 hours while in bed and weight shift frequently while in the chair to re-distribute pressure on skin  2-Elevate heels off of bed/chair surface to offload pressure  3-Offloading air cushion in chair when out of bed  4-Moisturize skin daily with skin nourishing lotion  5-Apply Allevyn Life foam dressing to midline sacrum and left lateral/medial thigh under immobilizer bar for prevention  Change dressing every 3 days and PRN  6-Right foot--cleanse with normal saline, pat dry  Apply Maxorb Ag to wound bed  Cover with 4x4s, ABD and wrap with lilian  Change dressing every other day  Follow-up at the [address]

## 2022-09-02 NOTE — PLAN OF CARE
Problem: PHYSICAL THERAPY ADULT  Goal: Performs mobility at highest level of function for planned discharge setting  See evaluation for individualized goals  Description: Treatment/Interventions: Functional transfer training, LE strengthening/ROM, Elevations, Therapeutic exercise, Endurance training, Patient/family training, Bed mobility, Gait training, Spoke to nursing, OT, Spoke to case management          See flowsheet documentation for full assessment, interventions and recommendations  Outcome: Progressing  Note: Prognosis: Good  Problem List: Decreased strength, Decreased endurance, Impaired balance, Decreased mobility, Impaired sensation, Pain, Decreased skin integrity, Decreased safety awareness  Assessment: Pt  Seen for PT treatment session  Pt  Is requiring min assist x1 for ambulation and stand to sit transfers verbal cues for safe transfer and mobility techniques  Cues for hand placement, ease of descent and cues to slow down during gait training  increased fatigue and sob noted with mobility training  Pt  Progressed with ambulation distances to 20' x3, rest breaks due to fatigue and unsteadiness, however no gross LOB noted  Pt  Is functioning below baseline level of mobility, remains at increased risk for falls due to deficits as noted  and will benefit from continued PT upon d/c in order to maximize functional outcomes, mobility and independence  Barriers to Discharge: Inaccessible home environment, Decreased caregiver support  Barriers to Discharge Comments: (+) stairs; home alone during the day  PT Discharge Recommendation: Post acute rehabilitation services    See flowsheet documentation for full assessment

## 2022-09-03 LAB
GLUCOSE SERPL-MCNC: 158 MG/DL (ref 65–140)
GLUCOSE SERPL-MCNC: 184 MG/DL (ref 65–140)
GLUCOSE SERPL-MCNC: 197 MG/DL (ref 65–140)
GLUCOSE SERPL-MCNC: 201 MG/DL (ref 65–140)

## 2022-09-03 PROCEDURE — 99024 POSTOP FOLLOW-UP VISIT: CPT | Performed by: SURGERY

## 2022-09-03 PROCEDURE — 82948 REAGENT STRIP/BLOOD GLUCOSE: CPT

## 2022-09-03 RX ORDER — FLUCONAZOLE 100 MG/1
150 TABLET ORAL ONCE
Status: COMPLETED | OUTPATIENT
Start: 2022-09-03 | End: 2022-09-03

## 2022-09-03 RX ADMIN — FLUCONAZOLE 150 MG: 100 TABLET ORAL at 05:04

## 2022-09-03 RX ADMIN — DOCUSATE SODIUM 100 MG: 100 CAPSULE, LIQUID FILLED ORAL at 17:15

## 2022-09-03 RX ADMIN — GABAPENTIN 300 MG: 300 CAPSULE ORAL at 08:43

## 2022-09-03 RX ADMIN — SENNOSIDES 8.6 MG: 8.6 TABLET, FILM COATED ORAL at 21:04

## 2022-09-03 RX ADMIN — GABAPENTIN 300 MG: 300 CAPSULE ORAL at 21:04

## 2022-09-03 RX ADMIN — ACETAMINOPHEN 975 MG: 325 TABLET ORAL at 05:03

## 2022-09-03 RX ADMIN — GABAPENTIN 300 MG: 300 CAPSULE ORAL at 16:14

## 2022-09-03 RX ADMIN — OXYCODONE HYDROCHLORIDE 10 MG: 10 TABLET ORAL at 16:14

## 2022-09-03 RX ADMIN — ACETAMINOPHEN 975 MG: 325 TABLET ORAL at 12:01

## 2022-09-03 RX ADMIN — OXYCODONE HYDROCHLORIDE 10 MG: 10 TABLET ORAL at 08:49

## 2022-09-03 RX ADMIN — ENOXAPARIN SODIUM 40 MG: 40 INJECTION SUBCUTANEOUS at 08:43

## 2022-09-03 RX ADMIN — OXYCODONE HYDROCHLORIDE 10 MG: 10 TABLET ORAL at 21:04

## 2022-09-03 RX ADMIN — DOCUSATE SODIUM 100 MG: 100 CAPSULE, LIQUID FILLED ORAL at 08:43

## 2022-09-03 NOTE — PLAN OF CARE
Problem: MOBILITY - ADULT  Goal: Maintain or return to baseline ADL function  Description: INTERVENTIONS:  -  Assess patient's ability to carry out ADLs; assess patient's baseline for ADL function and identify physical deficits which impact ability to perform ADLs (bathing, care of mouth/teeth, toileting, grooming, dressing, etc )  - Assess/evaluate cause of self-care deficits   - Assess range of motion  - Assess patient's mobility; develop plan if impaired  - Assess patient's need for assistive devices and provide as appropriate  - Encourage maximum independence but intervene and supervise when necessary  - Involve family in performance of ADLs  - Assess for home care needs following discharge   - Consider OT consult to assist with ADL evaluation and planning for discharge  - Provide patient education as appropriate  Outcome: Progressing  Goal: Maintains/Returns to pre admission functional level  Description: INTERVENTIONS:  - Perform BMAT or MOVE assessment daily    - Set and communicate daily mobility goal to care team and patient/family/caregiver  - Collaborate with rehabilitation services on mobility goals if consulted  - Perform Range of Motion 3 times a day  - Reposition patient every 2 hours    - Dangle patient 3 times a day  - Stand patient 3 times a day  - Ambulate patient 3 times a day  - Out of bed to chair 3 times a day   - Out of bed for meals 3 times a day  - Out of bed for toileting  - Record patient progress and toleration of activity level   Outcome: Progressing     Problem: Potential for Falls  Goal: Patient will remain free of falls  Description: INTERVENTIONS:  - Educate patient/family on patient safety including physical limitations  - Instruct patient to call for assistance with activity   - Consult OT/PT to assist with strengthening/mobility   - Keep Call bell within reach  - Keep bed low and locked with side rails adjusted as appropriate  - Keep care items and personal belongings within reach  - Initiate and maintain comfort rounds  - Make Fall Risk Sign visible to staff  - Offer Toileting every 2 Hours, in advance of need  - Initiate/Maintain bed alarm  - Obtain necessary fall risk management equipment  - Apply yellow socks and bracelet for high fall risk patients  - Consider moving patient to room near nurses station  Outcome: Progressing     Problem: PAIN - ADULT  Goal: Verbalizes/displays adequate comfort level or baseline comfort level  Description: Interventions:  - Encourage patient to monitor pain and request assistance  - Assess pain using appropriate pain scale  - Administer analgesics based on type and severity of pain and evaluate response  - Implement non-pharmacological measures as appropriate and evaluate response  - Consider cultural and social influences on pain and pain management  - Notify physician/advanced practitioner if interventions unsuccessful or patient reports new pain  Outcome: Progressing     Problem: INFECTION - ADULT  Goal: Absence or prevention of progression during hospitalization  Description: INTERVENTIONS:  - Assess and monitor for signs and symptoms of infection  - Monitor lab/diagnostic results  - Monitor all insertion sites, i e  indwelling lines, tubes, and drains  - Monitor endotracheal if appropriate and nasal secretions for changes in amount and color  - [place] appropriate cooling/warming therapies per order  - Administer medications as ordered  - Instruct and encourage patient and family to use good hand hygiene technique  - Identify and instruct in appropriate isolation precautions for identified infection/condition  Outcome: Progressing  Goal: Absence of fever/infection during neutropenic period  Description: INTERVENTIONS:  - Monitor WBC    Outcome: Progressing     Problem: SAFETY ADULT  Goal: Maintain or return to baseline ADL function  Description: INTERVENTIONS:  -  Assess patient's ability to carry out ADLs; assess patient's baseline for ADL function and identify physical deficits which impact ability to perform ADLs (bathing, care of mouth/teeth, toileting, grooming, dressing, etc )  - Assess/evaluate cause of self-care deficits   - Assess range of motion  - Assess patient's mobility; develop plan if impaired  - Assess patient's need for assistive devices and provide as appropriate  - Encourage maximum independence but intervene and supervise when necessary  - Involve family in performance of ADLs  - Assess for home care needs following discharge   - Consider OT consult to assist with ADL evaluation and planning for discharge  - Provide patient education as appropriate  Outcome: Progressing  Goal: Maintains/Returns to pre admission functional level  Description: INTERVENTIONS:  - Perform BMAT or MOVE assessment daily    - Set and communicate daily mobility goal to care team and patient/family/caregiver  - Collaborate with rehabilitation services on mobility goals if consulted  - Perform Range of Motion 3 times a day  - Reposition patient every 2 hours    - Dangle patient 3 times a day  - Stand patient 3 times a day  - Ambulate patient 3 times a day  - Out of bed to chair 3 times a day   - Out of bed for meals 3 times a day  - Out of bed for toileting  - Record patient progress and toleration of activity level   Outcome: Progressing  Goal: Patient will remain free of falls  Description: INTERVENTIONS:  - Educate patient/family on patient safety including physical limitations  - Instruct patient to call for assistance with activity   - Consult OT/PT to assist with strengthening/mobility   - Keep Call bell within reach  - Keep bed low and locked with side rails adjusted as appropriate  - Keep care items and personal belongings within reach  - Initiate and maintain comfort rounds  - Make Fall Risk Sign visible to staff  - Offer Toileting every 2 Hours, in advance of need  - Initiate/Maintain bed alarm  - Obtain necessary fall risk management equipment  - Apply yellow socks and bracelet for high fall risk patients  - Consider moving patient to room near nurses station  Outcome: Progressing     Problem: DISCHARGE PLANNING  Goal: Discharge to home or other facility with appropriate resources  Description: INTERVENTIONS:  - Identify barriers to discharge w/patient and caregiver  - Arrange for needed discharge resources and transportation as appropriate  - Identify discharge learning needs (meds, wound care, etc )  - Arrange for interpretive services to assist at discharge as needed  - Refer to Case Management Department for coordinating discharge planning if the patient needs post-hospital services based on physician/advanced practitioner order or complex needs related to functional status, cognitive ability, or social support system  Outcome: Progressing     Problem: Knowledge Deficit  Goal: Patient/family/caregiver demonstrates understanding of disease process, treatment plan, medications, and discharge instructions  Description: Complete learning assessment and assess knowledge base    Interventions:  - Provide teaching at level of understanding  - Provide teaching via preferred learning methods  Outcome: Progressing     Problem: SKIN/TISSUE INTEGRITY - ADULT  Goal: Skin Integrity remains intact(Skin Breakdown Prevention)  Description: Assess:  -Perform [place] assessment every shift  -Clean and moisturize skin as needed  -Inspect skin when repositioning, toileting, and assisting with ADLS  -Assess under medical devices every shift  -Assess extremities for adequate circulation and sensation     Bed Management:  -Have minimal linens on bed & keep smooth, unwrinkled  -Change linens as needed when moist or perspiring  -Avoid sitting or lying in one position for more than 2 hours while in bed  -Keep HOB at 30 degrees     Toileting:  -Offer bedside commode  -Assess for incontinence every hour  -Use incontinent care products after each incontinent episode Activity:  -Mobilize patient 3 times a day  -Encourage activity and walks on unit  -Encourage or provide ROM exercises   -Turn and reposition patient every 2 Hours  -Use appropriate equipment to lift or move patient in bed  -Instruct/ Assist with weight shifting every hour when out of bed in chair  -Consider limitation of chair time 2 hour intervals    Skin Care:  -Avoid use of baby powder, tape, friction and shearing, hot water or constrictive clothing  -Relieve pressure over bony prominences   -Do not massage red bony areas    Next Steps:  -Teach patient strategies to minimize risks    -Consider consults to  interdisciplinary teams   Outcome: Progressing  Goal: Incision(s), wounds(s) or drain site(s) healing without S/S of infection  Description: INTERVENTIONS  - Assess and document dressing, incision, wound bed, drain sites and surrounding tissue  - Provide patient and family education  - Perform skin care/dressing changes as ordered  Outcome: Progressing  Goal: Pressure injury heals and does not worsen  Description: Interventions:  - Implement low air loss mattress or specialty surface (Criteria met)  - Apply silicone foam dressing  - Instruct/assist with weight shifting every 15 minutes when in chair   - Limit chair time to 2 hour intervals  - Use special pressure reducing interventions when in chair   - Apply fecal or urinary incontinence containment device   - Perform passive or active ROM every hour  - Turn and reposition patient & offload bony prominences every 2 hours   - Utilize friction reducing device or surface for transfers   - Consider consults to  interdisciplinary teams   - Use incontinent care products after each incontinent episode   - Consider nutrition services referral as needed  Outcome: Progressing     Problem: Prexisting or High Potential for Compromised Skin Integrity  Goal: Skin integrity is maintained or improved  Description: INTERVENTIONS:  - Identify patients at risk for skin breakdown  - Assess and monitor skin integrity  - Assess and monitor nutrition and hydration status  - Monitor labs   - Assess for incontinence   - Turn and reposition patient  - Assist with mobility/ambulation  - Relieve pressure over bony prominences  - Avoid friction and shearing  - Provide appropriate hygiene as needed including keeping skin clean and dry  - Evaluate need for skin moisturizer/barrier cream  - Collaborate with interdisciplinary team   - Patient/family teaching  - Consider wound care consult   Outcome: Progressing     Problem: Nutrition/Hydration-ADULT  Goal: Nutrient/Hydration intake appropriate for improving, restoring or maintaining nutritional needs  Description: Monitor and assess patient's nutrition/hydration status for malnutrition  Collaborate with interdisciplinary team and initiate plan and interventions as ordered  Monitor patient's weight and dietary intake as ordered or per policy  Utilize nutrition screening tool and intervene as necessary  Determine patient's food preferences and provide high-protein, high-caloric foods as appropriate       INTERVENTIONS:  - Monitor oral intake, urinary output, labs, and treatment plans  - Assess nutrition and hydration status and recommend course of action  - Evaluate amount of meals eaten  - Assist patient with eating if necessary   - Allow adequate time for meals  - Recommend/ encourage appropriate diets, oral nutritional supplements, and vitamin/mineral supplements  - Order, calculate, and assess calorie counts as needed  - Recommend, monitor, and adjust tube feedings and TPN/PPN based on assessed needs  - Assess need for intravenous fluids  - Provide specific nutrition/hydration education as appropriate  - Include patient/family/caregiver in decisions related to nutrition  Outcome: Progressing     Problem: METABOLIC, FLUID AND ELECTROLYTES - ADULT  Goal: Glucose maintained within target range  Description: INTERVENTIONS:  - Monitor Blood Glucose as ordered  - Assess for signs and symptoms of hyperglycemia and hypoglycemia  - Administer ordered medications to maintain glucose within target range  - Assess nutritional intake and initiate nutrition service referral as needed  Outcome: Progressing

## 2022-09-03 NOTE — CASE MANAGEMENT
Case Management Discharge Planning Note    Patient name Naz Freire  Location [facility] /E2 MS 46-* MRN [id number]  : 1975 Date 9/3/2022       Current Admission Date: 2022  Current Admission Diagnosis:Non-healing wound of left foot   Patient Active Problem List    Diagnosis Date Noted    Diabetes mellitus type 1 ([facility] [address] ) 2022    Gallbladder sludge 2022    Hydroureteronephrosis 2022    Osteomyelitis of left foot ([address] ) 2022    Bacteremia 2022    Sepsis ([facility] [address] ) 2022    Cellulitis of left foot 2021    Diabetic ulcer of left midfoot associated with diabetes mellitus due to underlying condition, with fat layer exposed ([facility] [address] ) 2021    S/P amputation 2020    Acute blood loss as cause of postoperative anemia 2020    Morbid obesity ([facility] [address] ) 10/28/2020    Acute on chronic anemia 10/28/2020    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 due to underlying condition, limited to breakdown of skin ([facility] [address] ) 10/27/2020    Diabetic ketoacidosis without coma associated with type 1 diabetes mellitus ([facility] [address] ) 2020    Non-healing wound of left foot 2020    Acute kidney injury ([facility] [address] ) 2020    Open wound of left foot 2019    Controlled type 2 diabetes mellitus with neurologic complication, with long-term current use of insulin ([facility] [address] ) 2019      LOS (days): 3  Geometric Mean LOS (GMLOS) (days): 2 90  Days to GMLOS:0 1     OBJECTIVE:  Risk of Unplanned Readmission Score: 11 45         Current admission status: Inpatient   Preferred Pharmacy:   [facility] [address] [id number]  Phone: [phone] Fax: [phone]    Primary Care Provider: Freddy Aiken MD    Primary Insurance: BLUE CROSS  Secondary Insurance:     DISCHARGE DETAILS:     Per Susan Apodaca accepted pending bed availability   This rehab should have beds available next week  Once there is an accepting facility, insurance auth can be submitted

## 2022-09-03 NOTE — PROGRESS NOTES
Progress Note - General Surgery   Abdifatah Gibbons Gay 49 y o  female MRN: [id number]  Unit/Bed#: E2 -01 Encounter: [id number]    Assessment:  Patient is a 52 y  o  female who presented with chronic nonhealing left foot wound complicated by osteomyelitis, now status post below-the-knee amputation on 8/31    Plan:  · Local wound care for BKA site  · Knee immobilizer   · PT/OT  · Pain control  · Dispo planning  · Bowel regimen  · Appreciated wound care recommendations  · Tight glucose control appreciate endocrine recommendations  · Please tigertext on call [facility] surgery resident or AP with any questions    Subjective/Objective     Subjective:   No acute events overnight  Anxious about rehab  Asking for bowel regimen  States pain is controlled with p r n  Medication  Objective:     Blood pressure 143/66, pulse 104, temperature 97 7 °F (36 5 °C), temperature source Temporal, resp  rate 20, height 5' 3" (1 6 m), weight 104 kg (229 lb 0 9 oz), last menstrual period 08/28/2022, SpO2 97 %  ,Body mass index is 40 58 kg/m²  No intake or output data in the 24 hours ending 09/03/22 0611    Invasive Devices  Report    Peripheral Intravenous Line  Duration           Peripheral IV 08/31/22 Left;Proximal;Ventral (anterior) Forearm 2 days    Peripheral IV 08/31/22 Right Forearm 2 days          Line  Duration           Pump Device Insulin pump Medial;Right;Upper Abdomen 2 days                Physical Exam:   Gen:  NAD  HEENT: NCAT  MMM  CV: well perfused, pulses palpable  Lungs: Normal respiratory effort  Abd: soft, nt/nd,  Skin: warm/ dry  Extremities: no peripheral edema, no cyanosis, BKA dressing clean dry and intact  Neuro:  AxO x3

## 2022-09-03 NOTE — PLAN OF CARE
Problem: MOBILITY - ADULT  Goal: Maintain or return to baseline ADL function  Description: INTERVENTIONS:  -  Assess patient's ability to carry out ADLs; assess patient's baseline for ADL function and identify physical deficits which impact ability to perform ADLs (bathing, care of mouth/teeth, toileting, grooming, dressing, etc )  - Assess/evaluate cause of self-care deficits   - Assess range of motion  - Assess patient's mobility; develop plan if impaired  - Assess patient's need for assistive devices and provide as appropriate  - Encourage maximum independence but intervene and supervise when necessary  - Involve family in performance of ADLs  - Assess for home care needs following discharge   - Consider OT consult to assist with ADL evaluation and planning for discharge  - Provide patient education as appropriate  Outcome: Progressing  Goal: Maintains/Returns to pre admission functional level  Description: INTERVENTIONS:  - Perform BMAT or MOVE assessment daily    - Set and communicate daily mobility goal to care team and patient/family/caregiver  - Collaborate with rehabilitation services on mobility goals if consulted  - Perform Range of Motion 2 times a day  - Reposition patient every 2 hours    - Dangle patient 2 times a day  - Stand patient 2 times a day  - Ambulate patient 2 times a day  - Out of bed to chair 2 times a day   - Out of bed for meals 2 times a day  - Out of bed for toileting  - Record patient progress and toleration of activity level   Outcome: Progressing     Problem: Potential for Falls  Goal: Patient will remain free of falls  Description: INTERVENTIONS:  - Educate patient/family on patient safety including physical limitations  - Instruct patient to call for assistance with activity   - Consult OT/PT to assist with strengthening/mobility   - Keep Call bell within reach  - Keep bed low and locked with side rails adjusted as appropriate  - Keep care items and personal belongings within reach  - Initiate and maintain comfort rounds  - Make Fall Risk Sign visible to staff  - Offer Toileting every 2 Hours, in advance of need  - Initiate/Maintain bed alarm  - Obtain necessary fall risk management equipment  - Apply yellow socks and bracelet for high fall risk patients  - Consider moving patient to room near nurses station  Outcome: Progressing     Problem: PAIN - ADULT  Goal: Verbalizes/displays adequate comfort level or baseline comfort level  Description: Interventions:  - Encourage patient to monitor pain and request assistance  - Assess pain using appropriate pain scale  - Administer analgesics based on type and severity of pain and evaluate response  - Implement non-pharmacological measures as appropriate and evaluate response  - Consider cultural and social influences on pain and pain management  - Notify physician/advanced practitioner if interventions unsuccessful or patient reports new pain  Outcome: Progressing     Problem: INFECTION - ADULT  Goal: Absence or prevention of progression during hospitalization  Description: INTERVENTIONS:  - Assess and monitor for signs and symptoms of infection  - Monitor lab/diagnostic results  - Monitor all insertion sites, i e  indwelling lines, tubes, and drains  - Monitor endotracheal if appropriate and nasal secretions for changes in amount and color  - [place] appropriate cooling/warming therapies per order  - Administer medications as ordered  - Instruct and encourage patient and family to use good hand hygiene technique  - Identify and instruct in appropriate isolation precautions for identified infection/condition  Outcome: Progressing  Goal: Absence of fever/infection during neutropenic period  Description: INTERVENTIONS:  - Monitor WBC    Outcome: Progressing     Problem: SAFETY ADULT  Goal: Maintain or return to baseline ADL function  Description: INTERVENTIONS:  -  Assess patient's ability to carry out ADLs; assess patient's baseline for ADL function and identify physical deficits which impact ability to perform ADLs (bathing, care of mouth/teeth, toileting, grooming, dressing, etc )  - Assess/evaluate cause of self-care deficits   - Assess range of motion  - Assess patient's mobility; develop plan if impaired  - Assess patient's need for assistive devices and provide as appropriate  - Encourage maximum independence but intervene and supervise when necessary  - Involve family in performance of ADLs  - Assess for home care needs following discharge   - Consider OT consult to assist with ADL evaluation and planning for discharge  - Provide patient education as appropriate  Outcome: Progressing  Goal: Maintains/Returns to pre admission functional level  Description: INTERVENTIONS:  - Perform BMAT or MOVE assessment daily    - Set and communicate daily mobility goal to care team and patient/family/caregiver  - Collaborate with rehabilitation services on mobility goals if consulted  - Perform Range of Motion 2 times a day  - Reposition patient every 2 hours    - Dangle patient 2 times a day  - Stand patient 2 times a day  - Ambulate patient 2 times a day  - Out of bed to chair 2 times a day   - Out of bed for meals 2 times a day  - Out of bed for toileting  - Record patient progress and toleration of activity level   Outcome: Progressing  Goal: Patient will remain free of falls  Description: INTERVENTIONS:  - Educate patient/family on patient safety including physical limitations  - Instruct patient to call for assistance with activity   - Consult OT/PT to assist with strengthening/mobility   - Keep Call bell within reach  - Keep bed low and locked with side rails adjusted as appropriate  - Keep care items and personal belongings within reach  - Initiate and maintain comfort rounds  - Make Fall Risk Sign visible to staff  - Offer Toileting every 2 Hours, in advance of need  - Initiate/Maintain bed alarm  - Obtain necessary fall risk management equipment  - Apply yellow socks and bracelet for high fall risk patients  - Consider moving patient to room near nurses station  Outcome: Progressing     Problem: DISCHARGE PLANNING  Goal: Discharge to home or other facility with appropriate resources  Description: INTERVENTIONS:  - Identify barriers to discharge w/patient and caregiver  - Arrange for needed discharge resources and transportation as appropriate  - Identify discharge learning needs (meds, wound care, etc )  - Arrange for interpretive services to assist at discharge as needed  - Refer to Case Management Department for coordinating discharge planning if the patient needs post-hospital services based on physician/advanced practitioner order or complex needs related to functional status, cognitive ability, or social support system  Outcome: Progressing     Problem: Knowledge Deficit  Goal: Patient/family/caregiver demonstrates understanding of disease process, treatment plan, medications, and discharge instructions  Description: Complete learning assessment and assess knowledge base    Interventions:  - Provide teaching at level of understanding  - Provide teaching via preferred learning methods  Outcome: Progressing     Problem: SKIN/TISSUE INTEGRITY - ADULT  Goal: Skin Integrity remains intact(Skin Breakdown Prevention)  Description: Assess:  -Perform Corbin assessment   -Clean and moisturize skin  -Inspect skin when repositioning, toileting, and assisting with ADLS  -Assess under medical devices  -Assess extremities for adequate circulation and sensation     Bed Management:  -Have minimal linens on bed & keep smooth, unwrinkled  -Change linens as needed when moist or perspiring  -Avoid sitting or lying in one position for more than 2 hours while in bed  -Keep HOB at 45 degrees     Toileting:  -Offer bedside commode  -Assess for incontinence  -Use incontinent care products after each incontinent episode    Activity:  -Mobilize patient 3 times a day  -Encourage activity and walks on unit  -Encourage or provide ROM exercises   -Turn and reposition patient every 45 Hours  -Use appropriate equipment to lift or move patient in bed  -Instruct/ Assist with weight shifting every hour when out of bed in chair  -Consider limitation of chair time 2 hour intervals    Skin Care:  -Avoid use of baby powder, tape, friction and shearing, hot water or constrictive clothing  -Relieve pressure over bony prominences  -Do not massage red bony areas    Next Steps:  -Teach patient strategies to minimize risks   -Consider consults to  interdisciplinary teams  Outcome: Progressing  Goal: Incision(s), wounds(s) or drain site(s) healing without S/S of infection  Description: INTERVENTIONS  - Assess and document dressing, incision, wound bed, drain sites and surrounding tissue  - Provide patient and family education  - Perform skin care/dressing changes  Outcome: Progressing  Goal: Pressure injury heals and does not worsen  Description: Interventions:  - Implement low air loss mattress or specialty surface (Criteria met)  - Apply silicone foam dressing  - Instruct/assist with weight shifting every 60 minutes when in chair   - Limit chair time to 2 hour intervals  - Use special pressure reducing interventions such as hour when in chair   - Apply fecal or urinary incontinence containment device   - Perform passive or active ROM  - Turn and reposition patient & offload bony prominences every 2 hours   - Utilize friction reducing device or surface for transfers   - Consider consults to  interdisciplinary teams  - Use incontinent care products after each incontinent episode  - Consider nutrition services referral as needed  Outcome: Progressing     Problem: METABOLIC, FLUID AND ELECTROLYTES - ADULT  Goal: Glucose maintained within target range  Description: INTERVENTIONS:  - Monitor Blood Glucose as ordered  - Assess for signs and symptoms of hyperglycemia and hypoglycemia  - Administer ordered medications to maintain glucose within target range  - Assess nutritional intake and initiate nutrition service referral as needed  Outcome: Progressing     Problem: Prexisting or High Potential for Compromised Skin Integrity  Goal: Skin integrity is maintained or improved  Description: INTERVENTIONS:  - Identify patients at risk for skin breakdown  - Assess and monitor skin integrity  - Assess and monitor nutrition and hydration status  - Monitor labs   - Assess for incontinence   - Turn and reposition patient  - Assist with mobility/ambulation  - Relieve pressure over bony prominences  - Avoid friction and shearing  - Provide appropriate hygiene as needed including keeping skin clean and dry  - Evaluate need for skin moisturizer/barrier cream  - Collaborate with interdisciplinary team   - Patient/family teaching  - Consider wound care consult   Outcome: Progressing     Problem: Nutrition/Hydration-ADULT  Goal: Nutrient/Hydration intake appropriate for improving, restoring or maintaining nutritional needs  Description: Monitor and assess patient's nutrition/hydration status for malnutrition  Collaborate with interdisciplinary team and initiate plan and interventions as ordered  Monitor patient's weight and dietary intake as ordered or per policy  Utilize nutrition screening tool and intervene as necessary  Determine patient's food preferences and provide high-protein, high-caloric foods as appropriate       INTERVENTIONS:  - Monitor oral intake, urinary output, labs, and treatment plans  - Assess nutrition and hydration status and recommend course of action  - Evaluate amount of meals eaten  - Assist patient with eating if necessary   - Allow adequate time for meals  - Recommend/ encourage appropriate diets, oral nutritional supplements, and vitamin/mineral supplements  - Order, calculate, and assess calorie counts as needed  - Recommend, monitor, and adjust tube feedings and TPN/PPN based on assessed needs  - Assess need for intravenous fluids  - Provide specific nutrition/hydration education as appropriate  - Include patient/family/caregiver in decisions related to nutrition  Outcome: Progressing

## 2022-09-04 LAB
GLUCOSE SERPL-MCNC: 139 MG/DL (ref 65–140)
GLUCOSE SERPL-MCNC: 150 MG/DL (ref 65–140)
GLUCOSE SERPL-MCNC: 185 MG/DL (ref 65–140)
GLUCOSE SERPL-MCNC: 209 MG/DL (ref 65–140)

## 2022-09-04 PROCEDURE — 82948 REAGENT STRIP/BLOOD GLUCOSE: CPT

## 2022-09-04 PROCEDURE — 99024 POSTOP FOLLOW-UP VISIT: CPT | Performed by: SURGERY

## 2022-09-04 RX ADMIN — ACETAMINOPHEN 975 MG: 325 TABLET ORAL at 17:21

## 2022-09-04 RX ADMIN — OXYCODONE HYDROCHLORIDE 10 MG: 10 TABLET ORAL at 05:49

## 2022-09-04 RX ADMIN — GABAPENTIN 300 MG: 300 CAPSULE ORAL at 15:23

## 2022-09-04 RX ADMIN — OXYCODONE HYDROCHLORIDE 10 MG: 10 TABLET ORAL at 17:21

## 2022-09-04 RX ADMIN — DOCUSATE SODIUM 100 MG: 100 CAPSULE, LIQUID FILLED ORAL at 17:21

## 2022-09-04 RX ADMIN — GABAPENTIN 300 MG: 300 CAPSULE ORAL at 22:09

## 2022-09-04 RX ADMIN — ENOXAPARIN SODIUM 40 MG: 40 INJECTION SUBCUTANEOUS at 09:16

## 2022-09-04 RX ADMIN — DOCUSATE SODIUM 100 MG: 100 CAPSULE, LIQUID FILLED ORAL at 09:16

## 2022-09-04 RX ADMIN — OXYCODONE HYDROCHLORIDE 10 MG: 10 TABLET ORAL at 12:31

## 2022-09-04 RX ADMIN — SENNOSIDES 8.6 MG: 8.6 TABLET, FILM COATED ORAL at 22:09

## 2022-09-04 RX ADMIN — ACETAMINOPHEN 975 MG: 325 TABLET ORAL at 00:30

## 2022-09-04 RX ADMIN — ACETAMINOPHEN 975 MG: 325 TABLET ORAL at 12:31

## 2022-09-04 RX ADMIN — OXYCODONE HYDROCHLORIDE 10 MG: 10 TABLET ORAL at 22:09

## 2022-09-04 RX ADMIN — ACETAMINOPHEN 975 MG: 325 TABLET ORAL at 05:46

## 2022-09-04 RX ADMIN — GABAPENTIN 300 MG: 300 CAPSULE ORAL at 09:16

## 2022-09-04 NOTE — PLAN OF CARE
Problem: MOBILITY - ADULT  Goal: Maintain or return to baseline ADL function  Description: INTERVENTIONS:  -  Assess patient's ability to carry out ADLs; assess patient's baseline for ADL function and identify physical deficits which impact ability to perform ADLs (bathing, care of mouth/teeth, toileting, grooming, dressing, etc )  - Assess/evaluate cause of self-care deficits   - Assess range of motion  - Assess patient's mobility; develop plan if impaired  - Assess patient's need for assistive devices and provide as appropriate  - Encourage maximum independence but intervene and supervise when necessary  - Involve family in performance of ADLs  - Assess for home care needs following discharge   - Consider OT consult to assist with ADL evaluation and planning for discharge  - Provide patient education as appropriate  Outcome: Progressing  Goal: Maintains/Returns to pre admission functional level  Description: INTERVENTIONS:  - Perform BMAT or MOVE assessment daily    - Set and communicate daily mobility goal to care team and patient/family/caregiver  - Collaborate with rehabilitation services on mobility goals if consulted  - Perform Range of Motion 3 times a day  - Reposition patient every 2 hours    - Dangle patient 3 times a day  - Stand patient 3 times a day  - Ambulate patient 3 times a day  - Out of bed to chair 3 times a day   - Out of bed for meals 3 times a day  - Out of bed for toileting  - Record patient progress and toleration of activity level   Outcome: Progressing     Problem: Potential for Falls  Goal: Patient will remain free of falls  Description: INTERVENTIONS:  - Educate patient/family on patient safety including physical limitations  - Instruct patient to call for assistance with activity   - Consult OT/PT to assist with strengthening/mobility   - Keep Call bell within reach  - Keep bed low and locked with side rails adjusted as appropriate  - Keep care items and personal belongings within reach  - Initiate and maintain comfort rounds  - Make Fall Risk Sign visible to staff  - Offer Toileting every 2 Hours, in advance of need  - Initiate/Maintain bed/chair alarm  - Obtain necessary fall risk management equipment: walker   - Apply yellow socks and bracelet for high fall risk patients  - Consider moving patient to room near nurses station  Outcome: Progressing     Problem: PAIN - ADULT  Goal: Verbalizes/displays adequate comfort level or baseline comfort level  Description: Interventions:  - Encourage patient to monitor pain and request assistance  - Assess pain using appropriate pain scale  - Administer analgesics based on type and severity of pain and evaluate response  - Implement non-pharmacological measures as appropriate and evaluate response  - Consider cultural and social influences on pain and pain management  - Notify physician/advanced practitioner if interventions unsuccessful or patient reports new pain  Outcome: Progressing     Problem: INFECTION - ADULT  Goal: Absence or prevention of progression during hospitalization  Description: INTERVENTIONS:  - Assess and monitor for signs and symptoms of infection  - Monitor lab/diagnostic results  - Monitor all insertion sites, i e  indwelling lines, tubes, and drains  - Monitor endotracheal if appropriate and nasal secretions for changes in amount and color  - [place] appropriate cooling/warming therapies per order  - Administer medications as ordered  - Instruct and encourage patient and family to use good hand hygiene technique  - Identify and instruct in appropriate isolation precautions for identified infection/condition  Outcome: Progressing  Goal: Absence of fever/infection during neutropenic period  Description: INTERVENTIONS:  - Monitor WBC    Outcome: Progressing     Problem: SAFETY ADULT  Goal: Maintain or return to baseline ADL function  Description: INTERVENTIONS:  -  Assess patient's ability to carry out ADLs; assess patient's baseline for ADL function and identify physical deficits which impact ability to perform ADLs (bathing, care of mouth/teeth, toileting, grooming, dressing, etc )  - Assess/evaluate cause of self-care deficits   - Assess range of motion  - Assess patient's mobility; develop plan if impaired  - Assess patient's need for assistive devices and provide as appropriate  - Encourage maximum independence but intervene and supervise when necessary  - Involve family in performance of ADLs  - Assess for home care needs following discharge   - Consider OT consult to assist with ADL evaluation and planning for discharge  - Provide patient education as appropriate  Outcome: Progressing  Goal: Maintains/Returns to pre admission functional level  Description: INTERVENTIONS:  - Perform BMAT or MOVE assessment daily    - Set and communicate daily mobility goal to care team and patient/family/caregiver  - Collaborate with rehabilitation services on mobility goals if consulted  - Perform Range of Motion 3 times a day  - Reposition patient every 2 hours    - Dangle patient 3 times a day  - Stand patient 3 times a day  - Ambulate patient 3 times a day  - Out of bed to chair 3 times a day   - Out of bed for meals 3 times a day  - Out of bed for toileting  - Record patient progress and toleration of activity level   Outcome: Progressing  Goal: Patient will remain free of falls  Description: INTERVENTIONS:  - Educate patient/family on patient safety including physical limitations  - Instruct patient to call for assistance with activity   - Consult OT/PT to assist with strengthening/mobility   - Keep Call bell within reach  - Keep bed low and locked with side rails adjusted as appropriate  - Keep care items and personal belongings within reach  - Initiate and maintain comfort rounds  - Make Fall Risk Sign visible to staff  - Offer Toileting every 2 Hours, in advance of need  - Initiate/Maintain bed/chair alarm  - Obtain necessary fall risk management equipment: walker   - Apply yellow socks and bracelet for high fall risk patients  - Consider moving patient to room near nurses station  Outcome: Progressing     Problem: DISCHARGE PLANNING  Goal: Discharge to home or other facility with appropriate resources  Description: INTERVENTIONS:  - Identify barriers to discharge w/patient and caregiver  - Arrange for needed discharge resources and transportation as appropriate  - Identify discharge learning needs (meds, wound care, etc )  - Arrange for interpretive services to assist at discharge as needed  - Refer to Case Management Department for coordinating discharge planning if the patient needs post-hospital services based on physician/advanced practitioner order or complex needs related to functional status, cognitive ability, or social support system  Outcome: Progressing     Problem: Knowledge Deficit  Goal: Patient/family/caregiver demonstrates understanding of disease process, treatment plan, medications, and discharge instructions  Description: Complete learning assessment and assess knowledge base    Interventions:  - Provide teaching at level of understanding  - Provide teaching via preferred learning methods  Outcome: Progressing     Problem: SKIN/TISSUE INTEGRITY - ADULT  Goal: Skin Integrity remains intact(Skin Breakdown Prevention)  Description: Assess:  -Perform Corbin assessment every shift   -Clean and moisturize skin every shift  -Inspect skin when repositioning, toileting, and assisting with ADLS    Bed Management:  -Have minimal linens on bed & keep smooth, unwrinkled  -Change linens as needed when moist or perspiring  -Avoid sitting or lying in one position for more than 2 hours while in bed  -Keep HOB at 30 degrees     Toileting:  -Offer bedside commode  -Assess for incontinence every hour     Activity:  -Mobilize patient 3 times a day  -Encourage activity and walks on unit  -Encourage or provide ROM exercises   -Turn and reposition patient every 2 Hours  -Use appropriate equipment to lift or move patient in bed  -Instruct/ Assist with weight shifting every 5-15 when out of bed in chair  -Consider limitation of chair time 1-2 hour intervals    Skin Care:  -Avoid use of baby powder, tape, friction and shearing, hot water or constrictive clothing  -Relieve pressure over bony prominences using [place]  -Do not massage red bony areas  Outcome: Progressing  Goal: Incision(s), wounds(s) or drain site(s) healing without S/S of infection  Description: INTERVENTIONS  - Assess and document dressing, incision, wound bed, drain sites and surrounding tissue  - Provide patient and family education  - Perform skin care/dressing changes every shift  Outcome: Progressing  Goal: Pressure injury heals and does not worsen  Description: Interventions:  - Implement low air loss mattress or specialty surface (Criteria met)  - Apply silicone foam dressing  - Instruct/assist with weight shifting every 5-15 minutes when in chair   - Limit chair time to 1-2 hour intervals  - Perform passive or active ROM every shift  - Turn and reposition patient & offload bony prominences every 2 hours   - Utilize friction reducing device or surface for transfers   Outcome: Progressing     Problem: Prexisting or High Potential for Compromised Skin Integrity  Goal: Skin integrity is maintained or improved  Description: INTERVENTIONS:  - Identify patients at risk for skin breakdown  - Assess and monitor skin integrity  - Assess and monitor nutrition and hydration status  - Monitor labs   - Assess for incontinence   - Turn and reposition patient  - Assist with mobility/ambulation  - Relieve pressure over bony prominences  - Avoid friction and shearing  - Provide appropriate hygiene as needed including keeping skin clean and dry  - Evaluate need for skin moisturizer/barrier cream  - Collaborate with interdisciplinary team   - Patient/family teaching  - Consider wound care consult   Outcome: Progressing     Problem: Nutrition/Hydration-ADULT  Goal: Nutrient/Hydration intake appropriate for improving, restoring or maintaining nutritional needs  Description: Monitor and assess patient's nutrition/hydration status for malnutrition  Collaborate with interdisciplinary team and initiate plan and interventions as ordered  Monitor patient's weight and dietary intake as ordered or per policy  Utilize nutrition screening tool and intervene as necessary  Determine patient's food preferences and provide high-protein, high-caloric foods as appropriate       INTERVENTIONS:  - Monitor oral intake, urinary output, labs, and treatment plans  - Assess nutrition and hydration status and recommend course of action  - Evaluate amount of meals eaten  - Assist patient with eating if necessary   - Allow adequate time for meals  - Recommend/ encourage appropriate diets, oral nutritional supplements, and vitamin/mineral supplements  - Order, calculate, and assess calorie counts as needed  - Recommend, monitor, and adjust tube feedings and TPN/PPN based on assessed needs  - Assess need for intravenous fluids  - Provide specific nutrition/hydration education as appropriate  - Include patient/family/caregiver in decisions related to nutrition  Outcome: Progressing     Problem: METABOLIC, FLUID AND ELECTROLYTES - ADULT  Goal: Glucose maintained within target range  Description: INTERVENTIONS:  - Monitor Blood Glucose as ordered  - Assess for signs and symptoms of hyperglycemia and hypoglycemia  - Administer ordered medications to maintain glucose within target range  - Assess nutritional intake and initiate nutrition service referral as needed  Outcome: Progressing

## 2022-09-04 NOTE — PROGRESS NOTES
General Surgery  Progress Note   Eddy Rashid 49 y o  female MRN: [id number]  Unit/Bed#: E2 -01 Encounter: [id number]    Assessment:  Patient is a 52 y  o  female who presented with chronic nonhealing left foot wound complicated by osteomyelitis, now status post below-the-knee amputation on     Afebrile, VSS  No new labs    Plan:  -local wound care for BKA site  -knee immobilizer  -PT/OT  -pain and nausea control as needed  -dispo planning per CM; accepted to rehab pending bed availability  -appreciate wound care recommendations  -bowel regimen  -tight glucose control; appreciate endocrine recs    Subjective/Objective     Subjective: Patient seen and examined at bedside, in no acute distress  No acute events overnight  Patient's pain is well controlled  Pt denies nausea or vomiting  Passing gas and stool  Objective:     Vitals:Blood pressure 126/59, pulse 95, temperature 97 8 °F (36 6 °C), temperature source Temporal, resp  rate 22, height 5' 3" (1 6 m), weight 104 kg (229 lb 0 9 oz), last menstrual period 2022, SpO2 97 %  ,Body mass index is 40 58 kg/m²  Temp (24hrs), Av 5 °F (36 4 °C), Min:97 1 °F (36 2 °C), Max:97 8 °F (36 6 °C)  Current: Temperature: 97 8 °F (36 6 °C)    No intake or output data in the 24 hours ending 22 0827    Invasive Devices  Report    Peripheral Intravenous Line  Duration           Peripheral IV 22 Left;Proximal;Ventral (anterior) Forearm 3 days    Peripheral IV 22 Right Forearm 3 days          Line  Duration           Pump Device Insulin pump Medial;Right;Upper Abdomen 3 days                Physical Exam:  General: No acute distress, alert and oriented  CV: Well perfused, regular rate and rhythm  Lungs: Normal work of breathing, no increased respiratory effort  Abdomen: Soft, non-tender, non-distended  Extremities:  Left BKA site with dressing and knee immobilizer in place; clean dry and intact without saturation    Right foot wound with dressing in place; clean dry and intact without saturation  Skin: Warm, dry    Lab Results: Results: I have personally reviewed all pertinent laboratory/tests results  VTE Prophylaxis: Sequential compression device (Venodyne)  and Enoxaparin (Lovenox)    Alejandro Wylie MD  9/4/2022

## 2022-09-05 LAB
GLUCOSE SERPL-MCNC: 148 MG/DL (ref 65–140)
GLUCOSE SERPL-MCNC: 173 MG/DL (ref 65–140)
GLUCOSE SERPL-MCNC: 180 MG/DL (ref 65–140)
GLUCOSE SERPL-MCNC: 229 MG/DL (ref 65–140)

## 2022-09-05 PROCEDURE — 97530 THERAPEUTIC ACTIVITIES: CPT

## 2022-09-05 PROCEDURE — 97116 GAIT TRAINING THERAPY: CPT

## 2022-09-05 PROCEDURE — 99024 POSTOP FOLLOW-UP VISIT: CPT | Performed by: SURGERY

## 2022-09-05 PROCEDURE — 97110 THERAPEUTIC EXERCISES: CPT

## 2022-09-05 PROCEDURE — 82948 REAGENT STRIP/BLOOD GLUCOSE: CPT

## 2022-09-05 RX ADMIN — ACETAMINOPHEN 975 MG: 325 TABLET ORAL at 06:29

## 2022-09-05 RX ADMIN — DOCUSATE SODIUM 100 MG: 100 CAPSULE, LIQUID FILLED ORAL at 08:13

## 2022-09-05 RX ADMIN — GABAPENTIN 300 MG: 300 CAPSULE ORAL at 16:29

## 2022-09-05 RX ADMIN — GABAPENTIN 300 MG: 300 CAPSULE ORAL at 21:01

## 2022-09-05 RX ADMIN — GABAPENTIN 300 MG: 300 CAPSULE ORAL at 08:13

## 2022-09-05 RX ADMIN — ENOXAPARIN SODIUM 40 MG: 40 INJECTION SUBCUTANEOUS at 08:13

## 2022-09-05 RX ADMIN — OXYCODONE HYDROCHLORIDE 10 MG: 10 TABLET ORAL at 16:29

## 2022-09-05 RX ADMIN — ACETAMINOPHEN 975 MG: 325 TABLET ORAL at 11:08

## 2022-09-05 RX ADMIN — OXYCODONE HYDROCHLORIDE 5 MG: 5 TABLET ORAL at 08:14

## 2022-09-05 RX ADMIN — ACETAMINOPHEN 975 MG: 325 TABLET ORAL at 17:27

## 2022-09-05 RX ADMIN — DOCUSATE SODIUM 100 MG: 100 CAPSULE, LIQUID FILLED ORAL at 17:27

## 2022-09-05 RX ADMIN — SENNOSIDES 8.6 MG: 8.6 TABLET, FILM COATED ORAL at 21:01

## 2022-09-05 RX ADMIN — ACETAMINOPHEN 975 MG: 325 TABLET ORAL at 01:03

## 2022-09-05 NOTE — PLAN OF CARE
Problem: PAIN - ADULT  Goal: Verbalizes/displays adequate comfort level or baseline comfort level  Description: Interventions:  - Encourage patient to monitor pain and request assistance  - Assess pain using appropriate pain scale  - Administer analgesics based on type and severity of pain and evaluate response  - Implement non-pharmacological measures as appropriate and evaluate response  - Consider cultural and social influences on pain and pain management  - Notify physician/advanced practitioner if interventions unsuccessful or patient reports new pain  Outcome: Progressing     Problem: MOBILITY - ADULT  Goal: Maintain or return to baseline ADL function  Description: INTERVENTIONS:  -  Assess patient's ability to carry out ADLs; assess patient's baseline for ADL function and identify physical deficits which impact ability to perform ADLs (bathing, care of mouth/teeth, toileting, grooming, dressing, etc )  - Assess/evaluate cause of self-care deficits   - Assess range of motion  - Assess patient's mobility; develop plan if impaired  - Assess patient's need for assistive devices and provide as appropriate  - Encourage maximum independence but intervene and supervise when necessary  - Involve family in performance of ADLs  - Assess for home care needs following discharge   - Consider OT consult to assist with ADL evaluation and planning for discharge  - Provide patient education as appropriate  Outcome: Progressing  Goal: Maintains/Returns to pre admission functional level  Description: INTERVENTIONS:  - Perform BMAT or MOVE assessment daily    - Set and communicate daily mobility goal to care team and patient/family/caregiver  - Collaborate with rehabilitation services on mobility goals if consulted  - Perform Range of Motion 3  times a day    Problem: Knowledge Deficit  Goal: Patient/family/caregiver demonstrates understanding of disease process, treatment plan, medications, and discharge instructions  Description: Complete learning assessment and assess knowledge base    Interventions:  - Provide teaching at level of understanding  - Provide teaching via preferred learning methods  Outcome: Progressing     Problem: DISCHARGE PLANNING  Goal: Discharge to home or other facility with appropriate resources  Description: INTERVENTIONS:  - Identify barriers to discharge w/patient and caregiver  - Arrange for needed discharge resources and transportation as appropriate  - Identify discharge learning needs (meds, wound care, etc )  - Arrange for interpretive services to assist at discharge as needed  - Refer to Case Management Department for coordinating discharge planning if the patient needs post-hospital services based on physician/advanced practitioner order or complex needs related to functional status, cognitive ability, or social support system  Outcome: Progressing     - Ambulate patient 3  times a day  - Out of bed to chair 3 times a day   - Out of bed for meals 3 times a day  - Out of bed for toileting  - Record patient progress and toleration of activity level   Outcome: Progressing

## 2022-09-05 NOTE — PROGRESS NOTES
Progress Note - General Surgery   Rachel Rashid 49 y o  female MRN: [id number]  Unit/Bed#: E2 -01 Encounter: [id number]    Assessment:  Patient is a 52 y  o  female who presented with chronic nonhealing left foot wound complicated by osteomyelitis, now status post below-the-knee amputation on 8/31    Plan:  · Dispo planning- pending placement  · Local wound care for BKA site-see below for wound check this a m   · Knee immobilizer   · PT/OT  · Pain control  · Appreciated wound care recommendations  · Tight glucose control appreciate endocrine recommendations  · Please tigertext on call [facility] surgery resident or AP with any questions    Subjective/Objective     Subjective:   NAEO  Some soreness this morning  Objective:     Blood pressure 115/58, pulse 97, temperature 97 6 °F (36 4 °C), temperature source Temporal, resp  rate 20, height 5' 3" (1 6 m), weight 104 kg (229 lb 0 9 oz), last menstrual period 08/28/2022, SpO2 97 %  ,Body mass index is 40 58 kg/m²  No intake or output data in the 24 hours ending 09/06/22 0513    Invasive Devices  Report    Peripheral Intravenous Line  Duration           Peripheral IV 08/31/22 Left;Proximal;Ventral (anterior) Forearm 5 days          Line  Duration           Pump Device Insulin pump Medial;Right;Upper Abdomen 5 days                Physical Exam:   Gen:  NAD  HEENT: NCAT  MMM  CV: well perfused, pulses palpable  Lungs: Normal respiratory effort  Abd: soft, nt/nd,  Skin: warm/ dry  Extremities: no peripheral edema, no cyanosis, stump dressings cdi, knee immobilizer in place  Neuro:  AxO x3

## 2022-09-05 NOTE — PHYSICAL THERAPY NOTE
Physical Therapy Treatment Note     09/05/22 1436   PT Last Visit   PT Visit Date 09/05/22   Note Type   Note Type Treatment   Pain Assessment   Pain Assessment Tool 0-10   Pain Score 6   Pain Location/Orientation Orientation: Left; Location: Leg   Restrictions/Precautions   Weight Bearing Precautions Per Order Yes   RLE Weight Bearing Per Order Other  (Pt  reported wound on RLE however pt  reported and no WBing restrictions found in chart)   LLE Weight Bearing Per Order (S)  NWB  (L BKA)   Braces or Orthoses LE Immobilizer   Other Precautions Pain;WBS;Fall Risk;Bed Alarm   General   Chart Reviewed Yes   Family/Caregiver Present No   Cognition   Overall Cognitive Status WFL   Subjective   Subjective Pt  in supine upon entry  Agreeable to PT   Bed Mobility   Supine to Sit 5  Supervision   Additional items Assist x 1;Bedrails; Increased time required   Sit to Supine 5  Supervision   Additional items Assist x 1;HOB elevated; Bedrails; Increased time required   Transfers   Sit to Stand 5  Supervision   Additional items Assist x 1; Increased time required;Verbal cues   Stand to Sit 5  Supervision   Additional items Assist x 1; Increased time required;Verbal cues;Armrests   Stand pivot 5  Supervision   Additional items Assist x 1; Increased time required;Verbal cues   Ambulation/Elevation   Gait pattern Forward Flexion;Decreased foot clearance; Inconsistent manan; Foward flexed; Short stride  (Hopping)   Gait Assistance 4  Minimal assist   Additional items Assist x 1;Verbal cues   Assistive Device Rolling walker   Distance 32ft, 40ft   Balance   Static Sitting Good   Dynamic Sitting Fair +   Static Standing Fair   Dynamic Standing Poor +   Ambulatory Poor +   Endurance Deficit   Endurance Deficit Yes   Endurance Deficit Description fatigue   Activity Tolerance   Activity Tolerance Patient tolerated treatment well   Nurse Made Aware yes   Exercises   THR Supine;Bilateral;AROM;20 reps   Assessment   Prognosis Good   Problem List Decreased strength;Decreased range of motion;Decreased endurance; Impaired balance;Decreased mobility;Pain; Impaired tone   Assessment Pt  did not have KI on upon entry and t/o session  Pt  educated in the need of having knee and hip in extension to avoid any contracture  Pt  given cues to avoid excess trunk forward flexion and to slow her pace down and to push through UEs to get an improved hop  Cues for hand plalcement given for STS transfers  Increased effort noted to supine to sit transfer and also for LLE Slime in supine  Increased pain noted when standing up and with Slime  Pt  reported to have steps to enter the house but she may have [address]  Will continue to follow during the stay to maximize functional mobiltiy  pt  required Min A and VCs for safe ambualtion  noted decreased endurance and UE fatigue with ambulation  Pt  reported fatigue at the end of session  Barriers to Discharge Inaccessible home environment;Decreased caregiver support   Barriers to Discharge Comments + stairs to enter, home alone during the day   Goals   Patient Goals Go to rehab   STG Expiration Date 09/11/22   PT Treatment Day 2   Plan   Treatment/Interventions Functional transfer training;LE strengthening/ROM; Therapeutic exercise;Patient/family training;Equipment eval/education; Bed mobility;Gait training;Spoke to nursing   Progress Progressing toward goals   PT Frequency 3-5x/wk   Recommendation   PT Discharge Recommendation Post acute rehabilitation services   Equipment Recommended Wheelchair;Walker   Wheelchair Package Recommended Standard   Change or Add item to Wheelchair Package? Yes, Change Item   What would you like to CHANGE?  Different Leg Rest Type (Elevating Leg Rests)   AM-PAC Basic Mobility Inpatient   Turning in Bed Without Bedrails 3   Lying on Back to Sitting on Edge of Flat Bed 3   Moving Bed to Chair 3   Standing Up From Chair 3   Walk in Room 3   Climb 3-5 Stairs 1   Basic Mobility Inpatient Raw Score 16   Basic Mobility Standardized Score 38 32   Highest Level Of Mobility   -HLM Goal 5: Stand one or more mins   JH-HLM Achieved 7: Walk 25 feet or more   End of Consult   Patient Position at End of Consult Supine; All needs within reach;Bed/Chair alarm activated         Roman Boyle PTA    An AM-PAC basic mobility standardized score less than 42 9 suggest the patient may benefit from discharge to post-acute rehab services

## 2022-09-05 NOTE — PLAN OF CARE
Problem: PHYSICAL THERAPY ADULT  Goal: Performs mobility at highest level of function for planned discharge setting  See evaluation for individualized goals  Description: Treatment/Interventions: Functional transfer training, LE strengthening/ROM, Elevations, Therapeutic exercise, Endurance training, Patient/family training, Bed mobility, Gait training, Spoke to nursing, OT, Spoke to case management          See flowsheet documentation for full assessment, interventions and recommendations  Outcome: Progressing  Note: Prognosis: Good  Problem List: Decreased strength, Decreased range of motion, Decreased endurance, Impaired balance, Decreased mobility, Pain, Impaired tone  Assessment: Pt  did not have KI on upon entry and t/o session  Pt  educated in the need of having knee and hip in extension to avoid any contracture  Pt  given cues to avoid excess trunk forward flexion and to slow her pace down and to push through UEs to get an improved hop  Cues for hand plalcement given for STS transfers  Increased effort noted to supine to sit transfer and also for LLE Slime in supine  Increased pain noted when standing up and with Slime  Pt  reported to have steps to enter the house but she may have [address]  Will continue to follow during the stay to maximize functional mobiltiy  pt  required Min A and VCs for safe ambualtion  noted decreased endurance and UE fatigue with ambulation  Pt  reported fatigue at the end of session  Barriers to Discharge: Inaccessible home environment, Decreased caregiver support  Barriers to Discharge Comments: + stairs to enter, home alone during the day  PT Discharge Recommendation: Post acute rehabilitation services    See flowsheet documentation for full assessment

## 2022-09-06 VITALS
HEART RATE: 99 BPM | DIASTOLIC BLOOD PRESSURE: 68 MMHG | BODY MASS INDEX: 40.59 KG/M2 | WEIGHT: 229.06 LBS | TEMPERATURE: 97.5 F | RESPIRATION RATE: 19 BRPM | OXYGEN SATURATION: 96 % | HEIGHT: 63 IN | SYSTOLIC BLOOD PRESSURE: 141 MMHG

## 2022-09-06 LAB
DME PARACHUTE DELIVERY DATE REQUESTED: NORMAL
DME PARACHUTE ITEM DESCRIPTION: NORMAL
DME PARACHUTE ORDER STATUS: NORMAL
DME PARACHUTE SUPPLIER NAME: NORMAL
DME PARACHUTE SUPPLIER PHONE: NORMAL
FLUAV RNA RESP QL NAA+PROBE: NEGATIVE
FLUBV RNA RESP QL NAA+PROBE: NEGATIVE
GLUCOSE SERPL-MCNC: 110 MG/DL (ref 65–140)
GLUCOSE SERPL-MCNC: 110 MG/DL (ref 65–140)
RSV RNA RESP QL NAA+PROBE: NEGATIVE
SARS-COV-2 RNA RESP QL NAA+PROBE: NEGATIVE

## 2022-09-06 PROCEDURE — 97535 SELF CARE MNGMENT TRAINING: CPT

## 2022-09-06 PROCEDURE — 0241U HB NFCT DS VIR RESP RNA 4 TRGT: CPT

## 2022-09-06 PROCEDURE — NC001 PR NO CHARGE: Performed by: SURGERY

## 2022-09-06 PROCEDURE — 97110 THERAPEUTIC EXERCISES: CPT

## 2022-09-06 PROCEDURE — 82948 REAGENT STRIP/BLOOD GLUCOSE: CPT

## 2022-09-06 PROCEDURE — 97530 THERAPEUTIC ACTIVITIES: CPT

## 2022-09-06 RX ORDER — OXYCODONE HYDROCHLORIDE 5 MG/1
10 TABLET ORAL EVERY 4 HOURS PRN
Qty: 15 TABLET | Refills: 0 | Status: SHIPPED | OUTPATIENT
Start: 2022-09-06 | End: 2022-09-16

## 2022-09-06 RX ORDER — GABAPENTIN 300 MG/1
300 CAPSULE ORAL 3 TIMES DAILY
Qty: 90 CAPSULE | Refills: 0
Start: 2022-09-06 | End: 2022-09-06 | Stop reason: SDUPTHER

## 2022-09-06 RX ORDER — DOCUSATE SODIUM 100 MG/1
100 CAPSULE, LIQUID FILLED ORAL 2 TIMES DAILY
Refills: 0
Start: 2022-09-06

## 2022-09-06 RX ORDER — GABAPENTIN 300 MG/1
300 CAPSULE ORAL 3 TIMES DAILY
Qty: 90 CAPSULE | Refills: 0
Start: 2022-09-06 | End: 2022-10-06

## 2022-09-06 RX ORDER — ACETAMINOPHEN 325 MG/1
975 TABLET ORAL EVERY 6 HOURS SCHEDULED
Refills: 0
Start: 2022-09-06

## 2022-09-06 RX ORDER — SENNOSIDES 8.6 MG
8.6 TABLET ORAL
Qty: 30 TABLET | Refills: 2
Start: 2022-09-06 | End: 2023-09-06

## 2022-09-06 RX ADMIN — ACETAMINOPHEN 975 MG: 325 TABLET ORAL at 18:24

## 2022-09-06 RX ADMIN — ACETAMINOPHEN 975 MG: 325 TABLET ORAL at 05:09

## 2022-09-06 RX ADMIN — GABAPENTIN 300 MG: 300 CAPSULE ORAL at 16:24

## 2022-09-06 RX ADMIN — ACETAMINOPHEN 975 MG: 325 TABLET ORAL at 12:08

## 2022-09-06 RX ADMIN — DOCUSATE SODIUM 100 MG: 100 CAPSULE, LIQUID FILLED ORAL at 18:24

## 2022-09-06 RX ADMIN — OXYCODONE HYDROCHLORIDE 10 MG: 10 TABLET ORAL at 05:09

## 2022-09-06 RX ADMIN — OXYCODONE HYDROCHLORIDE 5 MG: 5 TABLET ORAL at 15:30

## 2022-09-06 RX ADMIN — GABAPENTIN 300 MG: 300 CAPSULE ORAL at 09:49

## 2022-09-06 RX ADMIN — ENOXAPARIN SODIUM 40 MG: 40 INJECTION SUBCUTANEOUS at 09:49

## 2022-09-06 RX ADMIN — DOCUSATE SODIUM 100 MG: 100 CAPSULE, LIQUID FILLED ORAL at 09:49

## 2022-09-06 NOTE — OCCUPATIONAL THERAPY NOTE
Occupational Therapy Progress Note(time=1110-1200)     Patient Name: Alex ELLIS Date: 9/6/2022  Problem List  Principal Problem:    Non-healing wound of left foot  Active Problems:    Open wound of left foot    Controlled type 2 diabetes mellitus with neurologic complication, with long-term current use of insulin (HCC)    Morbid obesity (HCC)    Acute on chronic anemia    Cellulitis of left foot    Diabetes mellitus type 1 ([facility] [address] )          09/06/22 1110   Note Type   Note Type Treatment   Restrictions/Precautions   Weight Bearing Precautions Per Order Yes   RLE Weight Bearing Per Order Other  (Pt  reported wound on RLE however pt  reported and no WBing restrictions found in chart)   LLE Weight Bearing Per Order NWB   Other Precautions Fall Risk;Pain   Pain Assessment   Pain Assessment Tool FLACC   Pain Rating: FLACC (Rest) - Face 0   Pain Rating: FLACC (Rest) - Legs 0   Pain Rating: FLACC (Rest) - Activity 0   Pain Rating: FLACC (Rest) - Cry 1   Pain Rating: FLACC (Rest) - Consolability 0   Score: FLACC (Rest) 1   ADL   Where Assessed Edge of bed   Eating Assistance 6  Modified independent   Grooming Assistance 6  Modified Independent   UB Bathing Assistance 5  Supervision/Setup   LB Bathing Assistance 4  Minimal Assistance   UB Dressing Assistance 5  Supervision/Setup   LB Dressing Assistance 4  Minimal Assistance   Bed Mobility   Rolling R 6  Modified independent   Rolling L 6  Modified independent   Supine to Sit 6  Modified independent   Transfers   Sit to Stand 5  Supervision   Additional items Increased time required;Verbal cues   Stand to Sit 5  Supervision   Additional items Increased time required;Verbal cues   Functional Mobility   Functional Mobility 5  Supervision   Additional items Rolling walker   Therapeutic Excerise-Strength   UE Strength   (b/l UE AROM exercises(i e "w/c pushups")1 set, 10reps)   Cognition   Overall Cognitive Status [facility]   Arousal/Participation Alert   Attention Within functional limits   Orientation Level Oriented X4   Memory Within functional limits   Following Commands Follows all commands and directions without difficulty   Activity Tolerance   Activity Tolerance Patient limited by pain   Medical Staff Made Aware LORELEI matias, MD   Assessment   Assessment Pt seen for 55min tx session with focus on functional balance, functional mobility, ADL status, transfer safety, b/l UE strengthening, and education  Pt able to tolerate OOB mobility; sitting balance=f+/f, standing balance=f/f-  Pt with hx R foot wound, but currently states no WB restrictions  Pt demonstrate good cognitive/ADL status  Reviewed L LE positioning(importance of knee extension), pressure relief techniques, b/l UE HEP, and w/c use/mobility  Pt able to demonstrate good carryover with all information reviewed  Pt would benefit from continue OT tx to improve her overall level of independence  Will continue  Plan   Treatment Interventions ADL retraining;Functional transfer training;UE strengthening/ROM; Endurance training;Patient/family training;Neuromuscular reeducation; Compensatory technique education;Continued evaluation   Goal Expiration Date 09/16/22   OT Treatment Day 1   OT Frequency 3-5x/wk   Recommendation   OT Discharge Recommendation Post acute rehabilitation services   AM-PAC Daily Activity Inpatient   Lower Body Dressing 3   Bathing 3   Toileting 3   Upper Body Dressing 4   Grooming 4   Eating 4   Daily Activity Raw Score 21   Daily Activity Standardized Score (Calc for Raw Score >=11) 44 27   AM-PAC Applied Cognition Inpatient   Following a Speech/Presentation 4   Understanding Ordinary Conversation 4   Taking Medications 4   Remembering Where Things Are Placed or Put Away 4   Remembering List of 4-5 Errands 4   Taking Care of Complicated Tasks 4   Applied Cognition Raw Score 24   Applied Cognition Standardized Score 62 21   Silviano Rudd, OT

## 2022-09-06 NOTE — PLAN OF CARE
Problem: OCCUPATIONAL THERAPY ADULT  Goal: Performs self-care activities at highest level of function for planned discharge setting  See evaluation for individualized goals  Description: Treatment Interventions: ADL retraining, Functional transfer training, UE strengthening/ROM, Endurance training, Patient/family training, Neuromuscular reeducation, Compensatory technique education, Continued evaluation  Equipment Recommended:  (w/c-18inch(width), elevating leg rests)       See flowsheet documentation for full assessment, interventions and recommendations  Outcome: Progressing  Note: Limitation: Decreased ADL status, Decreased UE strength, Decreased endurance, Decreased high-level ADLs  Prognosis: Good  Assessment: Pt seen for 55min tx session with focus on functional balance, functional mobility, ADL status, transfer safety, b/l UE strengthening, and education  Pt able to tolerate OOB mobility; sitting balance=f+/f, standing balance=f/f-  Pt with hx R foot wound, but currently states no WB restrictions  Pt demonstrate good cognitive/ADL status  Reviewed L LE positioning(importance of knee extension), pressure relief techniques, b/l UE HEP, and w/c use/mobility  Pt able to demonstrate good carryover with all information reviewed  Pt would benefit from continue OT tx to improve her overall level of independence  Will continue       OT Discharge Recommendation: Post acute rehabilitation services

## 2022-09-06 NOTE — DISCHARGE INSTRUCTIONS
Patient has been managing her own insulin pump with Fiasp insulin while in the hospital   Recommend having FSBGs in chart for monitoring and she is agreeable to this, but dosing will be based on her Dexcom CGM  Right foot wound care: Change every other day  Flush wound with saline and apply Maxorb  Cover with 4x4, Abd pad and then wrap with Rachell  Discharge Instructions    1  General: Please make sure you are scheduled for 2-week post op appointment  2  Wound care:  Bandage/Dressing - You may shower the day after surgery (no baths or swimming until you are seen back in the office for your post op)  You may redress the incisions  Keep the incision clean and dry  The Left BKA dressing should be changed daily and when soiled with gauze 4x4, an ABD pad and overlying ACE bandage  Wound care right foot wound are attached  Please contact your surgeon if your incisions have redness, extreme tenderness, or signs of infections (Pain, swelling redness, odor or green/yellow discharge around incisions  You should also contact your surgeon if the wound has persistent, active bleeding  3  Water: Unless there are drain tubes left in place, the wound can be rinsed with water  You may shower with staples, glue or steri-strips and rinse wound with soapy water but do not scrub incision pat dry when you are done  Do not bathe or use a pool or hot tub until cleared by the physician  4  Activity: As tolerated, no strenuous activity for the first 2-4 weeks (unless advised differently by a provider)  Please KEEP THE KNEE IMOBILZER IN PLACE WHEN SLEEPING  We encourage you to use the provided incentive spirometer  If you develop gas pain, ambulation (walking) will help pass the gas from anesthesia  If you have severe gas pain, you may take GAS-X       5  Diet: You may resume a regular diet as tolerated  You may want to start out with a bland diet (soup, crackers, etc )        6  Medications: Resume all your previous medications, unless told otherwise by the doctor  Tylenol and ibuprofen are ok to use, unless you are taking any narcotic pain medication containing Tylenol (such as Percocet, Vicodin, or anything containing acetaminophen)  Do not take Tylenol if you're taking these medications  If you become constipated, you may take Miralax or a stool softener  If you continue to have constipation you may take Milk of Magnesium  All of these remedies are over the counter  If taking narcotic pain medication, do not take on an empty stomach  7  Driving: You will need a  home from the hospital  Do not drive or make any important decisions while on narcotic pain medication or 24 hours after surgery  Generally, you may drive when you are off all narcotic pain medications, and you can turn in your seat comfortably to check your blind spot  8  Constipation: Patients often experience constipation after surgery  You may take Miralax or a stool softener (Colace)  If you continue to have constipation you may take Milk of Magnesium, Senokot, Dulcolax, etc  You may also take a suppository unless you have had anorectal surgery such as a procedure on your hemorrhoids  If you experience significant nausea or vomiting after abdominal surgery, call the office before trying any of these medications  9  Call the office: If you are experiencing any of the following: fevers above 100 5, significant nausea or vomiting, if the wound develops drainage and/or is excessive redness around the wound, or if you have significant diarrhea or other worsening symptoms, or severe pain  A provider is on call 24 hours a day

## 2022-09-06 NOTE — DISCHARGE SUMMARY
Discharge Summary - Naz Freire 49 y o  female MRN: [id number]    Unit/Bed#: E2 -01 Encounter: [id number]    Admission Date:   Admission Orders (From admission, onward)     Ordered        08/31/22 1429  Inpatient Admission  Once                        Admitting Diagnosis: Osteomyelitis of left foot, unspecified type ([facility] [address] ) [M86 9]    HPI: Naz Freire is a 48-year-old female with a past medical history of diabetes and left Charcot's diabetic foot for which she has undergone multiple operations most recently a TMA  Given her multiple conservative operations and ongoing infection it was determined that the foot was nonsalvageable and required below-knee amputation  Procedures Performed: No orders of the defined types were placed in this encounter  Summary of Hospital Course:  Patient underwent elective left BKA and preoperative left lower extremity nerve block on 08/31  The operation went without complication and the patient was admitted to the floor postoperatively  On postop day 1 patient was refusing labs and Initially she did have some tachycardia in the low 100s which was intermittent and likely due to pain  By postop day 2 tachycardia was improving and CBC revealed hemoglobin of 7 7 consistent with her baseline  Throughout her stay she remained afebrile and tachycardia resolved  Her pain was progressively improving  Multiple stump checks per performed revealing a well-healing BKA incision  Given her history of diabetes in use of insulin pump at home endocrinology was consulted to assist in blood glucose monitoring and continued insulin management with her home pump  By 9/6 patient was medically clear for discharge and was discharged to an inpatient rehab facility      Significant Findings, Care, Treatment and Services Provided:  BKA on 63/50    Complications:  None    Discharge Diagnosis:  Nonsalvageable left Charcot foot    Medical Problems             Resolved Problems Date Reviewed: 8/31/2022   None                 Condition at Discharge: stable         Discharge instructions/Information to patient and family:   See after visit summary for information provided to patient and family  Provisions for Follow-Up Care:  See after visit summary for information related to follow-up care and any pertinent home health orders  PCP: Woo Stevens MD    Disposition: See After Visit Summary for discharge disposition information  Planned Readmission: No      Discharge Statement   I spent 30 minutes discharging the patient  This time was spent on the day of discharge  I had direct contact with the patient on the day of discharge  Additional documentation is required if more than 30 minutes were spent on discharge  Discharge Medications:  See after visit summary for reconciled discharge medications provided to patient and family

## 2022-09-06 NOTE — PLAN OF CARE
Problem: MOBILITY - ADULT  Goal: Maintains/Returns to pre admission functional level  Description: INTERVENTIONS:  - Perform BMAT or MOVE assessment daily    - Set and communicate daily mobility goal to care team and patient/family/caregiver     - Collaborate with rehabilitation services on mobility goals if consulted  -- Record patient progress and toleration of activity level   Outcome: Progressing     Problem: MOBILITY - ADULT  Goal: Maintain or return to baseline ADL function  Description: INTERVENTIONS:  -  Assess patient's ability to carry out ADLs; assess patient's baseline for ADL function and identify physical deficits which impact ability to perform ADLs (bathing, care of mouth/teeth, toileting, grooming, dressing, etc )  - Assess/evaluate cause of self-care deficits   - Assess range of motion  - Assess patient's mobility; develop plan if impaired  - Assess patient's need for assistive devices and provide as appropriate  - Encourage maximum independence but intervene and supervise when necessary  - Involve family in performance of ADLs  - Assess for home care needs following discharge   - Consider OT consult to assist with ADL evaluation and planning for discharge  - Provide patient education as appropriate  Outcome: Progressing     Problem: Potential for Falls  Goal: Patient will remain free of falls  Description: INTERVENTIONS:  - Educate patient/family on patient safety including physical limitations  - Instruct patient to call for assistance with activity   - Consult OT/PT to assist with strengthening/mobility   - Keep Call bell within reach  - Keep bed low and locked with side rails adjusted as appropriate  - Keep care items and personal belongings within reach  - Initiate and maintain comfort rounds  - Make Fall Risk Sign visible to staff  - Offer Toileting every 2 Hours, in advance of need  - Initiate/Maintain bed alarm  - Obtain necessary fall risk management equipment: bed alarm, skid socks, call bell within reach    - Apply yellow socks and bracelet for high fall risk patients  - Consider moving patient to room near nurses station  Outcome: Progressing     Problem: PAIN - ADULT  Goal: Verbalizes/displays adequate comfort level or baseline comfort level  Description: Interventions:  - Encourage patient to monitor pain and request assistance  - Assess pain using appropriate pain scale  - Administer analgesics based on type and severity of pain and evaluate response  - Implement non-pharmacological measures as appropriate and evaluate response  - Consider cultural and social influences on pain and pain management  - Notify physician/advanced practitioner if interventions unsuccessful or patient reports new pain  Outcome: Progressing     Problem: INFECTION - ADULT  Goal: Absence or prevention of progression during hospitalization  Description: INTERVENTIONS:  - Assess and monitor for signs and symptoms of infection  - Monitor lab/diagnostic results  - Monitor all insertion sites, i e  indwelling lines, tubes, and drains  - Monitor endotracheal if appropriate and nasal secretions for changes in amount and color  - [place] appropriate cooling/warming therapies per order  - Administer medications as ordered  - Instruct and encourage patient and family to use good hand hygiene technique  - Identify and instruct in appropriate isolation precautions for identified infection/condition  Outcome: Progressing

## 2022-09-06 NOTE — PLAN OF CARE
Problem: MOBILITY - ADULT  Goal: Maintain or return to baseline ADL function  Description: INTERVENTIONS:  -  Assess patient's ability to carry out ADLs; assess patient's baseline for ADL function and identify physical deficits which impact ability to perform ADLs (bathing, care of mouth/teeth, toileting, grooming, dressing, etc )  - Assess/evaluate cause of self-care deficits   - Assess range of motion  - Assess patient's mobility; develop plan if impaired  - Assess patient's need for assistive devices and provide as appropriate  - Encourage maximum independence but intervene and supervise when necessary  - Involve family in performance of ADLs  - Assess for home care needs following discharge   - Consider OT consult to assist with ADL evaluation and planning for discharge  - Provide patient education as appropriate  Outcome: Progressing  Goal: Maintains/Returns to pre admission functional level  Description: INTERVENTIONS:  - Perform BMAT or MOVE assessment daily    - Set and communicate daily mobility goal to care team and patient/family/caregiver  - Collaborate with rehabilitation services on mobility goals if consulted  - Perform Range of Motion 3 times a day  - Reposition patient every 2 hours    - Dangle patient 3 times a day  - Stand patient 3 times a day  - Ambulate patient 3 times a day  - Out of bed to chair 3 times a day   - Out of bed for meals 3 times a day  - Out of bed for toileting  - Record patient progress and toleration of activity level   Outcome: Progressing     Problem: Potential for Falls  Goal: Patient will remain free of falls  Description: INTERVENTIONS:  - Educate patient/family on patient safety including physical limitations  - Instruct patient to call for assistance with activity   - Consult OT/PT to assist with strengthening/mobility   - Keep Call bell within reach  - Keep bed low and locked with side rails adjusted as appropriate  - Keep care items and personal belongings within reach  - Initiate and maintain comfort rounds  - Make Fall Risk Sign visible to staff  - Offer Toileting every 2 Hours, in advance of need  - Initiate/Maintain bed alarm  - Obtain necessary fall risk management equipment  - Apply yellow socks and bracelet for high fall risk patients  - Consider moving patient to room near nurses station  Outcome: Progressing     Problem: PAIN - ADULT  Goal: Verbalizes/displays adequate comfort level or baseline comfort level  Description: Interventions:  - Encourage patient to monitor pain and request assistance  - Assess pain using appropriate pain scale  - Administer analgesics based on type and severity of pain and evaluate response  - Implement non-pharmacological measures as appropriate and evaluate response  - Consider cultural and social influences on pain and pain management  - Notify physician/advanced practitioner if interventions unsuccessful or patient reports new pain  Outcome: Progressing     Problem: INFECTION - ADULT  Goal: Absence or prevention of progression during hospitalization  Description: INTERVENTIONS:  - Assess and monitor for signs and symptoms of infection  - Monitor lab/diagnostic results  - Monitor all insertion sites, i e  indwelling lines, tubes, and drains  - Monitor endotracheal if appropriate and nasal secretions for changes in amount and color  - [place] appropriate cooling/warming therapies per order  - Administer medications as ordered  - Instruct and encourage patient and family to use good hand hygiene technique  - Identify and instruct in appropriate isolation precautions for identified infection/condition  Outcome: Progressing  Goal: Absence of fever/infection during neutropenic period  Description: INTERVENTIONS:  - Monitor WBC    Outcome: Progressing     Problem: SAFETY ADULT  Goal: Maintain or return to baseline ADL function  Description: INTERVENTIONS:  -  Assess patient's ability to carry out ADLs; assess patient's baseline for ADL function and identify physical deficits which impact ability to perform ADLs (bathing, care of mouth/teeth, toileting, grooming, dressing, etc )  - Assess/evaluate cause of self-care deficits   - Assess range of motion  - Assess patient's mobility; develop plan if impaired  - Assess patient's need for assistive devices and provide as appropriate  - Encourage maximum independence but intervene and supervise when necessary  - Involve family in performance of ADLs  - Assess for home care needs following discharge   - Consider OT consult to assist with ADL evaluation and planning for discharge  - Provide patient education as appropriate  Outcome: Progressing  Goal: Maintains/Returns to pre admission functional level  Description: INTERVENTIONS:  - Perform BMAT or MOVE assessment daily    - Set and communicate daily mobility goal to care team and patient/family/caregiver  - Collaborate with rehabilitation services on mobility goals if consulted  - Perform Range of Motion 3 times a day  - Reposition patient every 2 hours    - Dangle patient 3 times a day  - Stand patient 3 times a day  - Ambulate patient 3 times a day  - Out of bed to chair 3 times a day   - Out of bed for meals 3 times a day  - Out of bed for toileting  - Record patient progress and toleration of activity level   Outcome: Progressing  Goal: Patient will remain free of falls  Description: INTERVENTIONS:  - Educate patient/family on patient safety including physical limitations  - Instruct patient to call for assistance with activity   - Consult OT/PT to assist with strengthening/mobility   - Keep Call bell within reach  - Keep bed low and locked with side rails adjusted as appropriate  - Keep care items and personal belongings within reach  - Initiate and maintain comfort rounds  - Make Fall Risk Sign visible to staff  - Offer Toileting every 2 Hours, in advance of need  - Initiate/Maintain bed alarm  - Obtain necessary fall risk management equipment  - Apply yellow socks and bracelet for high fall risk patients  - Consider moving patient to room near nurses station  Outcome: Progressing     Problem: DISCHARGE PLANNING  Goal: Discharge to home or other facility with appropriate resources  Description: INTERVENTIONS:  - Identify barriers to discharge w/patient and caregiver  - Arrange for needed discharge resources and transportation as appropriate  - Identify discharge learning needs (meds, wound care, etc )  - Arrange for interpretive services to assist at discharge as needed  - Refer to Case Management Department for coordinating discharge planning if the patient needs post-hospital services based on physician/advanced practitioner order or complex needs related to functional status, cognitive ability, or social support system  Outcome: Progressing     Problem: Knowledge Deficit  Goal: Patient/family/caregiver demonstrates understanding of disease process, treatment plan, medications, and discharge instructions  Description: Complete learning assessment and assess knowledge base    Interventions:  - Provide teaching at level of understanding  - Provide teaching via preferred learning methods  Outcome: Progressing     Problem: SKIN/TISSUE INTEGRITY - ADULT  Goal: Skin Integrity remains intact(Skin Breakdown Prevention)  Description: Assess:  -Perform [place] assessment every shift  -Clean and moisturize skin as needed  -Inspect skin when repositioning, toileting, and assisting with ADLS  -Assess under medical devices every shift  -Assess extremities for adequate circulation and sensation     Bed Management:  -Have minimal linens on bed & keep smooth, unwrinkled  -Change linens as needed when moist or perspiring  -Avoid sitting or lying in one position for more than 2 hours while in bed  -Keep HOB at 30 degrees     Toileting:  -Offer bedside commode  -Assess for incontinence every hour  -Use incontinent care products after each incontinent episode Activity:  -Mobilize patient 3 times a day  -Encourage activity and walks on unit  -Encourage or provide ROM exercises   -Turn and reposition patient every 2 Hours  -Use appropriate equipment to lift or move patient in bed  -Instruct/ Assist with weight shifting every hour when out of bed in chair  -Consider limitation of chair time 2 hour intervals    Skin Care:  -Avoid use of baby powder, tape, friction and shearing, hot water or constrictive clothing  -Relieve pressure over bony prominences   -Do not massage red bony areas    Next Steps:  -Teach patient strategies to minimize risks    -Consider consults to  interdisciplinary teams   Outcome: Progressing  Goal: Incision(s), wounds(s) or drain site(s) healing without S/S of infection  Description: INTERVENTIONS  - Assess and document dressing, incision, wound bed, drain sites and surrounding tissue  - Provide patient and family education  - Perform skin care/dressing changes as ordered  Outcome: Progressing  Goal: Pressure injury heals and does not worsen  Description: Interventions:  - Implement low air loss mattress or specialty surface (Criteria met)  - Apply silicone foam dressing  - Instruct/assist with weight shifting every 15 minutes when in chair   - Limit chair time to 2 hour intervals  - Use special pressure reducing interventions when in chair   - Apply fecal or urinary incontinence containment device   - Perform passive or active ROM every hour  - Turn and reposition patient & offload bony prominences every 2 hours   - Utilize friction reducing device or surface for transfers   - Consider consults to  interdisciplinary teams   - Use incontinent care products after each incontinent episode   - Consider nutrition services referral as needed  Outcome: Progressing     Problem: Prexisting or High Potential for Compromised Skin Integrity  Goal: Skin integrity is maintained or improved  Description: INTERVENTIONS:  - Identify patients at risk for skin breakdown  - Assess and monitor skin integrity  - Assess and monitor nutrition and hydration status  - Monitor labs   - Assess for incontinence   - Turn and reposition patient  - Assist with mobility/ambulation  - Relieve pressure over bony prominences  - Avoid friction and shearing  - Provide appropriate hygiene as needed including keeping skin clean and dry  - Evaluate need for skin moisturizer/barrier cream  - Collaborate with interdisciplinary team   - Patient/family teaching  - Consider wound care consult   Outcome: Progressing     Problem: Nutrition/Hydration-ADULT  Goal: Nutrient/Hydration intake appropriate for improving, restoring or maintaining nutritional needs  Description: Monitor and assess patient's nutrition/hydration status for malnutrition  Collaborate with interdisciplinary team and initiate plan and interventions as ordered  Monitor patient's weight and dietary intake as ordered or per policy  Utilize nutrition screening tool and intervene as necessary  Determine patient's food preferences and provide high-protein, high-caloric foods as appropriate       INTERVENTIONS:  - Monitor oral intake, urinary output, labs, and treatment plans  - Assess nutrition and hydration status and recommend course of action  - Evaluate amount of meals eaten  - Assist patient with eating if necessary   - Allow adequate time for meals  - Recommend/ encourage appropriate diets, oral nutritional supplements, and vitamin/mineral supplements  - Order, calculate, and assess calorie counts as needed  - Recommend, monitor, and adjust tube feedings and TPN/PPN based on assessed needs  - Assess need for intravenous fluids  - Provide specific nutrition/hydration education as appropriate  - Include patient/family/caregiver in decisions related to nutrition  Outcome: Progressing     Problem: METABOLIC, FLUID AND ELECTROLYTES - ADULT  Goal: Glucose maintained within target range  Description: INTERVENTIONS:  - Monitor Blood Glucose as ordered  - Assess for signs and symptoms of hyperglycemia and hypoglycemia  - Administer ordered medications to maintain glucose within target range  - Assess nutritional intake and initiate nutrition service referral as needed  Outcome: Progressing

## 2022-09-06 NOTE — CASE MANAGEMENT
Case Management Progress Note    Patient name Paz Valle  Location [facility] 2 /E2 -* MRN [id number]  : 1975 Date 2022       LOS (days): 6  Geometric Mean LOS (GMLOS) (days): 3 90  Days to GMLOS:-2        OBJECTIVE:        Current admission status: Inpatient  Preferred Pharmacy:   [facility] [address] [address]  Phone: [phone] Fax: [phone]    Primary Care Provider: Birgit Geiger MD    Primary Insurance: BLUE CROSS  Secondary Insurance:     PROGRESS NOTE:    [facility] was without a bed and did not respond with an update regarding beds this morning  [facility] is able to accept pending insurance authorization  CM requested OT to meet with pt at new notes were needed  As soon as the new note is in, insurance authorization will be initiated  Pt has been supervision with physical therapy  CM had educated pt on the fact that insurance might not auth IP rehab due to the pt's current status  Pt reported understanding but still felt as though she needed to go to IP rehab vs  [facility] or OP

## 2022-09-06 NOTE — CASE MANAGEMENT
Case Management Discharge Planning Note    Patient name Cathryn Simon  Location [facility] 2 /E2 MS 46-* MRN [id number]  : 1975 Date 2022       Current Admission Date: 2022  Current Admission Diagnosis:Non-healing wound of left foot   Patient Active Problem List    Diagnosis Date Noted    Diabetes mellitus type 1 ([facility] [address] ) 2022    Gallbladder sludge 2022    Hydroureteronephrosis 2022    Osteomyelitis of left foot ([facility] [address] ) 2022    Bacteremia 2022    Sepsis ([facility] [address] ) 2022    Cellulitis of left foot 2021    Diabetic ulcer of left midfoot associated with diabetes mellitus due to underlying condition, with fat layer exposed ([facility] [address] ) 2021    S/P amputation 2020    Acute blood loss as cause of postoperative anemia 2020    Morbid obesity ([facility] [address] ) 10/28/2020    Acute on chronic anemia 10/28/2020    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 due to underlying condition, limited to breakdown of skin ([facility] [address] ) 10/27/2020    Diabetic ketoacidosis without coma associated with type 1 diabetes mellitus ([facility] [address] ) 2020    Non-healing wound of left foot 2020    Acute kidney injury ([facility] [address] ) 2020    Open wound of left foot 2019    Controlled type 2 diabetes mellitus with neurologic complication, with long-term current use of insulin ([facility] [address] ) 2019      LOS (days): 6  Geometric Mean LOS (GMLOS) (days): 3 90  Days to GMLOS:-2 1     OBJECTIVE:  Risk of Unplanned Readmission Score: 10 92         Current admission status: Inpatient   Preferred Pharmacy:   [facility] [address] [address]  Phone: [phone] Fax: [phone]    Primary Care Provider: Woo Stevens MD    Primary Insurance: BLUE CROSS  Secondary Insurance:     DISCHARGE DETAILS:    Discharge planning discussed with[de-identified] Patient and spouse  Freedom of Choice: Yes  Comments - Freedom of Choice: Pt would like to go to [facility] [place]  First choice was [facility] but there was no bed availability  Pt reported agreement with [facility] who had bed availability  CM contacted family/caregiver?: Yes  Were Treatment Team discharge recommendations reviewed with patient/caregiver?: Yes  Did patient/caregiver verbalize understanding of patient care needs?: N/A- going to facility  Were patient/caregiver advised of the risks associated with not following Treatment Team discharge recommendations?: Yes    Contacts  Patient Contacts: Kaleigh Farooq,   Relationship to Patient[de-identified] Family  Contact Method: Phone  Phone Number: [phone]  Reason/Outcome: Discharge Planning, [address]         Is the patient interested in [facility] at discharge?: No    DME Referral Provided  Referral made for DME?: No    Other Referral/Resources/Interventions Provided:  Interventions: Short Term Rehab         Treatment Team Recommendation: Short Term Rehab  Discharge Destination Plan[de-identified] Short Term Rehab  Transport at Discharge : Wheelchair van                    Transfer Mode: Wheelchair  Accompanied by: EMS personnel              Additional Comments: CM met with pt at the bedside and spoke with her and the spouse  Pt remains wanting to go to [facility] [place]  This CM did discuss other options if insurance were to deny IP rehab  Auth was submitted for [facility] for IP rehab  Authorization was approved  This CM discussed [facility] transport with the pt  Pt reports understanding the OOP expense and is agreeable to [facility] transport  Awaiting  time      Accepting Facility Name, [facility] : [address] [facility]  Receiving Facility/Agency Phone Number: [phone]  Facility/Agency Fax Number: [phone]

## 2022-09-06 NOTE — CASE MANAGEMENT
Case Management Progress Note    Patient name Paz Valle  Location [facility] /E2 -* MRN [id number]  : 1975 Date 2022       LOS (days): 6  Geometric Mean LOS (GMLOS) (days): 3 90  Days to GMLOS:-2 2        OBJECTIVE:        Current admission status: Inpatient  Preferred Pharmacy:   [facility] [place] [id number] [address] [id number]  Phone: [phone] Fax: [phone]    Primary Care Provider: Birgit Geiger MD    Primary Insurance: BLUE CROSS  Secondary Insurance:     PROGRESS NOTE:      Unable to secure  time for discharge today  Updated surgery resident  Transport request submitted for tomorrow  Requesting  time of 1100am  CM will continue to follow

## 2022-09-08 PROCEDURE — 88311 DECALCIFY TISSUE: CPT | Performed by: PATHOLOGY

## 2022-09-08 PROCEDURE — 88307 TISSUE EXAM BY PATHOLOGIST: CPT | Performed by: PATHOLOGY

## 2022-09-08 NOTE — QUICK NOTE
Patient admitted s/p BKA due to left Charcot's foot    Recently admitted for sepsis and evaluation for eventual below knee amputation

## 2022-09-09 NOTE — TELEPHONE ENCOUNTER
Patient was discharged from the hospital 9/6 and sent to rehab  She is currently still in rehab  She stated she is doing well though  Denies having any problems or F/V/N/C  Path was discussed with patient, as she was not made aware in the hospital while she was still there  Post op appt confirmed with patient  Final Diagnosis   A  Left lower leg:  - Compatible with chronic osteomyelitis

## 2022-09-14 NOTE — PROGRESS NOTES
Assessment/Plan:   Kalyn Mosley is a 49 y  o female who comes in today for postoperative check after   A below-knee amputation on August 31, 2022  She did go to rehabilitation postoperatively  Currently:    Pathology: Reviewed with patient, all questions answered  Patient is pleased with the outcome of surgery and is doing well  She will need to be proved scented and fitted for a prosthetic probably in about 6 weeks and there is full wound healing  Plan:  Physical therapy and PM&R consult for prosthetic    Compression stocking and fitting  Outdoor physical therapy will be of great benefit to this patient I strongly recommended  Postoperative restrictions reviewed  All questions answered  May shower  ____________________________________________________________    HPI:  Kalyn Mosley is a 49 y  o female who comes in today for postoperative check after recent surgery  Currently doing well without problems, no fever or chills,no nausea and no vomiting  Reports still at rehab but planning discharge in the next 72 hours  Doing well       ROS:  General ROS: negative for - chills, fatigue, fever or night sweats, weight loss  Respiratory ROS: no cough, shortness of breath, or wheezing  Cardiovascular ROS: no chest pain or dyspnea on exertion  Genito-Urinary ROS: no dysuria, trouble voiding, or hematuria  Musculoskeletal ROS: negative for - gait disturbance, joint pain or muscle pain  Neurological ROS: no TIA or stroke symptoms  GI ROS: see HPI  Skin ROS: no new rashes or lesions   Lymphatic ROS: no new adenopathy noted by pt     GYN ROS: see HPI, no new GYN history or bleeding noted  Psy ROS: no new mental or behavioral disturbances       Patient Active Problem List   Diagnosis    Open wound of left foot    Controlled type 2 diabetes mellitus with neurologic complication, with long-term current use of insulin ([address] )    Diabetic ketoacidosis without coma associated with type 1 diabetes mellitus ([facility] )    Non-healing wound of left foot    Acute kidney injury ([place] [address] )    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 due to underlying condition, limited to breakdown of skin ([place] [address] )    [facility]  )    Acute on chronic anemia    Acute blood loss as cause of postoperative anemia    S/P amputation    Cellulitis of left foot    Diabetic ulcer of left midfoot associated with diabetes mellitus due to underlying condition, with fat layer exposed ([place] [address] )    Sepsis ([place] [address] )    Hydroureteronephrosis    Osteomyelitis of left foot ([place] [address] )    Bacteremia    Gallbladder sludge    Diabetes mellitus type 1 (HCC)         Allergies:  Clindamycin      Current Outpatient Medications:     ACCU-CHEK FASTCLIX LANCETS MISC, by Does not apply route, Disp: , Rfl:     acetaminophen (TYLENOL) 325 mg tablet, Take 3 tablets (975 mg total) by mouth every 6 (six) hours, Disp: , Rfl: 0    docusate sodium (COLACE) 100 mg capsule, Take 1 capsule (100 mg total) by mouth 2 (two) times a day, Disp: , Rfl: 0    Fiasp 100 UNIT/ML SOLN, INJECT UP  UNITS SUBCUTANEOUSLY DAILY VIA INSULIN PUMP, Disp: , Rfl:     gabapentin (NEURONTIN) 300 mg capsule, Take 1 capsule (300 mg total) by mouth 3 (three) times a day, Disp: 90 capsule, Rfl: 0    oxyCODONE (ROXICODONE) 5 immediate release tablet, Take 2 tablets (10 mg total) by mouth every 4 (four) hours as needed for severe pain for up to 10 days Max Daily Amount: 60 mg, Disp: 15 tablet, Rfl: 0    PATIENT MAINTAINED INSULIN PUMP, Inject 1 each under the skin every 8 (eight) hours for 30 doses, Disp: 1 each, Rfl: 0    polyethylene glycol (MIRALAX) 17 g packet, Take 17 g by mouth daily, Disp: , Rfl:     senna (SENOKOT) 8 6 mg, Take 1 tablet (8 6 mg total) by mouth daily at bedtime, Disp: 30 tablet, Rfl: 2    Syringe/Needle, Disp, 30G X 1/2" 1 ML MISC, by Does not apply route, Disp: , Rfl:     Past Medical History:   Diagnosis Date    Charcot's joint of foot, left     Diabetes mellitus ([facility] )     Insulin pump    History of transfusion     8-4-22    Osteomyelitis of foot, right, acute ([facility] )        Past Surgical History:   Procedure Laterality Date    FOOT AMPUTATION Left 10/30/2020    Procedure: CHOPART AMPUTATION LEFT  FOOT:;  Surgeon: Donnie Rome DPM;  Location:  MAIN OR;  Service: Podiatry    FOOT CAPSULE RELEASE W/ PERCUTANEOUS HEEL CORD LENGTHENING, TIBIAL TENDON TRANSFER Left 10/30/2020    Procedure: Achilles tenotomy left foot;  Surgeon: Donnie Rome DPM;  Location:  MAIN OR;  Service: Podiatry    FOOT SURGERY Right     right first toe amputation  2019    [place] AMPUTATE LOWER LEG AT KNEE Left 8/31/2022    Procedure: AMPUTATION BELOW KNEE (BKA); Surgeon: Jocy Donis MD;  [facility];  Service: General       Family History   Problem Relation Age of Onset    Diabetes Brother     Hyperlipidemia Brother     Hypertension Brother     Diabetes Mother     Hypertension Father     Diabetes Sister         reports that she has never smoked  She has never used smokeless tobacco  She reports previous alcohol use  She reports that she does not use drugs  Invalid input(s):  EOSPCT          Invalid input(s): LABALBU    Imaging: No new pertinent imaging studies  Vitals:    09/16/22 0953   BP: 122/70   Pulse: 100   Temp: (!) 96 5 °F (35 8 °C)        PHYSICAL EXAM  General: normal, cooperative, no distress  Incision: clean, dry, and intact and healing well  Staples removed  Steri-Strips applied  May shower      Some portions of this record may have been generated with voice recognition software  There may be translation, syntax,  or grammatical errors  Occasional wrong word or "sound-a-like" substitutions may have occurred due to the inherent limitations of the voice recognition software  Read the chart carefully and recognize, using context, where substitutions may have occurred   If you have any questions, please contact the dictating provider for clarification or correction, as needed  This encounter has been coded by a non-certified coder         Charmayne Honey, MD    Date: 9/16/2022 Time: 10:24 AM

## 2022-09-16 ENCOUNTER — OFFICE VISIT (OUTPATIENT)
Dept: SURGERY | Facility: CLINIC | Age: 47
End: 2022-09-16

## 2022-09-16 VITALS
SYSTOLIC BLOOD PRESSURE: 122 MMHG | BODY MASS INDEX: 39.87 KG/M2 | TEMPERATURE: 96.5 F | DIASTOLIC BLOOD PRESSURE: 70 MMHG | HEART RATE: 100 BPM | WEIGHT: 225 LBS | HEIGHT: 63 IN

## 2022-09-16 DIAGNOSIS — Z89.9 S/P AMPUTATION: Primary | ICD-10-CM

## 2022-09-16 PROCEDURE — 99024 POSTOP FOLLOW-UP VISIT: CPT | Performed by: SURGERY

## 2022-09-28 ENCOUNTER — HOSPITAL ENCOUNTER (OUTPATIENT)
Dept: MRI IMAGING | Facility: HOSPITAL | Age: 47
Discharge: HOME | End: 2022-09-28
Payer: COMMERCIAL

## 2022-09-28 DIAGNOSIS — L97.509 TYPE 1 DIABETES MELLITUS WITH FOOT ULCER (HCC): ICD-10-CM

## 2022-09-28 DIAGNOSIS — E11.610 CHARCOT FOOT DUE TO DIABETES MELLITUS (HCC): ICD-10-CM

## 2022-09-28 DIAGNOSIS — E10.621 TYPE 1 DIABETES MELLITUS WITH FOOT ULCER (HCC): ICD-10-CM

## 2022-09-28 PROCEDURE — 73718 MRI LOWER EXTREMITY W/O DYE: CPT

## 2022-10-11 PROBLEM — A41.9 SEPSIS (HCC): Status: RESOLVED | Noted: 2022-08-04 | Resolved: 2022-10-11

## 2022-10-13 ENCOUNTER — OFFICE VISIT (OUTPATIENT)
Dept: ENDOCRINOLOGY | Facility: CLINIC | Age: 47
End: 2022-10-13
Payer: COMMERCIAL

## 2022-10-13 VITALS
HEART RATE: 95 BPM | HEIGHT: 63 IN | BODY MASS INDEX: 39.86 KG/M2 | SYSTOLIC BLOOD PRESSURE: 126 MMHG | DIASTOLIC BLOOD PRESSURE: 72 MMHG

## 2022-10-13 DIAGNOSIS — Z96.41 INSULIN PUMP IN PLACE: ICD-10-CM

## 2022-10-13 DIAGNOSIS — E10.42 TYPE 1 DIABETES MELLITUS WITH DIABETIC POLYNEUROPATHY (HCC): ICD-10-CM

## 2022-10-13 DIAGNOSIS — R80.9 MICROALBUMINURIA: ICD-10-CM

## 2022-10-13 DIAGNOSIS — E10.42 TYPE 1 DIABETES MELLITUS WITH DIABETIC POLYNEUROPATHY (HCC): Primary | ICD-10-CM

## 2022-10-13 PROCEDURE — 99214 OFFICE O/P EST MOD 30 MIN: CPT | Performed by: STUDENT IN AN ORGANIZED HEALTH CARE EDUCATION/TRAINING PROGRAM

## 2022-10-13 PROCEDURE — 95251 CONT GLUC MNTR ANALYSIS I&R: CPT | Performed by: STUDENT IN AN ORGANIZED HEALTH CARE EDUCATION/TRAINING PROGRAM

## 2022-10-13 RX ORDER — BLOOD-GLUCOSE SENSOR
EACH MISCELLANEOUS
COMMUNITY
Start: 2022-10-06

## 2022-10-13 RX ORDER — LOSARTAN POTASSIUM 25 MG/1
25 TABLET ORAL EVERY MORNING
Qty: 90 TABLET | Refills: 1 | Status: SHIPPED | OUTPATIENT
Start: 2022-10-13

## 2022-10-13 RX ORDER — BLOOD-GLUCOSE TRANSMITTER
EACH MISCELLANEOUS
COMMUNITY
Start: 2022-09-11

## 2022-10-13 RX ORDER — INSULIN ASPART 100 [IU]/ML
INJECTION, SOLUTION INTRAVENOUS; SUBCUTANEOUS
COMMUNITY
Start: 2022-09-30

## 2022-10-13 RX ORDER — LOSARTAN POTASSIUM 25 MG/1
25 TABLET ORAL EVERY MORNING
COMMUNITY
Start: 2022-09-21 | End: 2022-10-13 | Stop reason: SDUPTHER

## 2022-10-13 RX ORDER — GLUCAGON INJECTION, SOLUTION 1 MG/.2ML
INJECTION, SOLUTION SUBCUTANEOUS
Qty: 0.4 ML | Refills: 1 | Status: SHIPPED | OUTPATIENT
Start: 2022-10-13 | End: 2022-10-20

## 2022-10-13 RX ORDER — INSULIN GLARGINE 100 [IU]/ML
50 INJECTION, SOLUTION SUBCUTANEOUS
Qty: 6 ML | Refills: 1 | Status: SHIPPED | OUTPATIENT
Start: 2022-10-13

## 2022-10-13 RX ORDER — GLUCAGON INJECTION, SOLUTION 1 MG/.2ML
INJECTION, SOLUTION SUBCUTANEOUS
Qty: 0.4 ML | Refills: 1 | Status: SHIPPED | OUTPATIENT
Start: 2022-10-13 | End: 2022-10-13

## 2022-10-13 RX ORDER — PEN NEEDLE, DIABETIC 32GX 5/32"
NEEDLE, DISPOSABLE MISCELLANEOUS 4 TIMES DAILY
Qty: 100 EACH | Refills: 1 | Status: SHIPPED | OUTPATIENT
Start: 2022-10-13

## 2022-10-13 RX ORDER — BLOOD-GLUCOSE SENSOR
EACH MISCELLANEOUS
COMMUNITY
Start: 2022-09-11
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
## 2022-10-13 NOTE — PATIENT INSTRUCTIONS
Check sugars 4x daily    Before meals, bedtime    Goal Blood Sugars:   Premeal , even better <110  2hr after a meal <180, even better <140  A1C <7%, even better <6 5%      Aim for 45g carbohydrates with meals  15-20g with snacks    Goal exercise is 30 minutes daily, most days of the week    Please have labs done before next visit    Bring your meter or logbook with you each visit    Return appointment 3 months

## 2022-10-20 RX ORDER — GLUCAGON 3 MG/1
POWDER NASAL
Qty: 2 EACH | Refills: 2 | Status: SHIPPED | OUTPATIENT
Start: 2022-10-20 | End: 2022-10-24 | Stop reason: SDUPTHER

## 2022-10-20 NOTE — TELEPHONE ENCOUNTER
Requested medication(s) are due for refill today: yes  Patient has already received a courtesy refill: No  Other reason request has been forwarded to provider: failed protocol check--not on formulary

## 2022-10-24 DIAGNOSIS — E10.42 TYPE 1 DIABETES MELLITUS WITH DIABETIC POLYNEUROPATHY (HCC): ICD-10-CM

## 2022-10-24 RX ORDER — GLUCAGON 3 MG/1
POWDER NASAL
Qty: 2 EACH | Refills: 2 | Status: SHIPPED | OUTPATIENT
Start: 2022-10-24

## 2023-03-03 DIAGNOSIS — E10.42 TYPE 1 DIABETES MELLITUS WITH DIABETIC POLYNEUROPATHY (HCC): Primary | ICD-10-CM

## 2023-03-03 RX ORDER — BLOOD-GLUCOSE TRANSMITTER
1 EACH MISCELLANEOUS DAILY
Qty: 1 EACH | Refills: 1 | Status: SHIPPED | OUTPATIENT
Start: 2023-03-03

## 2023-03-07 ENCOUNTER — OFFICE VISIT (OUTPATIENT)
Dept: ENDOCRINOLOGY | Facility: CLINIC | Age: 48
End: 2023-03-07

## 2023-03-07 VITALS
DIASTOLIC BLOOD PRESSURE: 62 MMHG | BODY MASS INDEX: 45.18 KG/M2 | HEART RATE: 94 BPM | HEIGHT: 63 IN | WEIGHT: 255 LBS | SYSTOLIC BLOOD PRESSURE: 114 MMHG

## 2023-03-07 DIAGNOSIS — E11.42 CONTROLLED TYPE 2 DIABETES MELLITUS WITH DIABETIC POLYNEUROPATHY, WITH LONG-TERM CURRENT USE OF INSULIN (HCC): ICD-10-CM

## 2023-03-07 DIAGNOSIS — Z79.4 CONTROLLED TYPE 2 DIABETES MELLITUS WITH DIABETIC POLYNEUROPATHY, WITH LONG-TERM CURRENT USE OF INSULIN (HCC): ICD-10-CM

## 2023-03-07 DIAGNOSIS — D50.9 IRON DEFICIENCY ANEMIA, UNSPECIFIED IRON DEFICIENCY ANEMIA TYPE: ICD-10-CM

## 2023-03-07 DIAGNOSIS — E10.42 TYPE 1 DIABETES MELLITUS WITH DIABETIC POLYNEUROPATHY (HCC): Primary | ICD-10-CM

## 2023-03-07 DIAGNOSIS — L97.425 DIABETIC ULCER OF LEFT MIDFOOT ASSOCIATED WITH TYPE 2 DIABETES MELLITUS, WITH MUSCLE INVOLVEMENT WITHOUT EVIDENCE OF NECROSIS (HCC): ICD-10-CM

## 2023-03-07 DIAGNOSIS — L97.509 TYPE 1 DIABETES MELLITUS WITH FOOT ULCER (HCC): ICD-10-CM

## 2023-03-07 DIAGNOSIS — E11.621 DIABETIC ULCER OF LEFT MIDFOOT ASSOCIATED WITH TYPE 2 DIABETES MELLITUS, WITH MUSCLE INVOLVEMENT WITHOUT EVIDENCE OF NECROSIS (HCC): ICD-10-CM

## 2023-03-07 DIAGNOSIS — E10.621 TYPE 1 DIABETES MELLITUS WITH FOOT ULCER (HCC): ICD-10-CM

## 2023-03-07 DIAGNOSIS — E10.10 DIABETIC KETOACIDOSIS WITHOUT COMA ASSOCIATED WITH TYPE 1 DIABETES MELLITUS (HCC): ICD-10-CM

## 2023-03-07 LAB — SL AMB POCT HEMOGLOBIN AIC: 7 (ref ?–6.5)

## 2023-03-07 RX ORDER — GABAPENTIN 300 MG/1
300 CAPSULE ORAL 3 TIMES DAILY
Qty: 270 CAPSULE | Refills: 0 | Status: SHIPPED | OUTPATIENT
Start: 2023-03-07 | End: 2023-04-06

## 2023-03-07 RX ORDER — OXYCODONE AND ACETAMINOPHEN TABLETS 10; 300 MG/1; MG/1
1 TABLET ORAL EVERY 4 HOURS PRN
COMMUNITY

## 2023-03-07 RX ORDER — METFORMIN HYDROCHLORIDE 500 MG/1
500 TABLET, EXTENDED RELEASE ORAL 2 TIMES DAILY WITH MEALS
Qty: 180 TABLET | Refills: 1 | Status: SHIPPED | OUTPATIENT
Start: 2023-03-07

## 2023-03-07 RX ORDER — LOSARTAN POTASSIUM 25 MG/1
12.5 TABLET ORAL DAILY
Qty: 45 TABLET | Refills: 1 | Status: SHIPPED | OUTPATIENT
Start: 2023-03-07

## 2023-03-07 NOTE — PROGRESS NOTES
Veda Martin Gay 49 y o  female MRN: [id number]    Encounter: [id number]      Assessment/Plan   Problem List Items Addressed This Visit        Endocrine    Controlled type 2 diabetes mellitus with neurologic complication, with long-term current use of insulin (HCC)    Relevant Medications    metFORMIN (GLUCOPHAGE-XR) 500 mg 24 hr tablet    gabapentin (NEURONTIN) 300 mg capsule    Diabetic ketoacidosis without coma associated with type 1 diabetes mellitus ([facility] [address] )    Relevant Medications    metFORMIN (GLUCOPHAGE-XR) 500 mg 24 hr tablet    Diabetes mellitus type 1 ([facility] [address] ) - Primary       Lab Results   Component Value Date    HGBA1C 7 0 (A) 03/07/2023 ·   Patient's A1c is demonstrating acceptable control, although her goal would be < or = 6 5 if achievable without significant lows  · BG targets and A1c goal reviewed today  · Diet - comfortable with carb counting and has been better about bolus entry 10-15 minute before start of meal    · Exercise - patient intends to become more active ASAP once postoperative limitations are lifted  To resume physical therapy soon  · Pump adjustments - none, continue same settings  · Pharmacotherapy - suspect patient has some degree of insulin resistance in addition to Type 1 DM  Will trial addition of metformin at low dose of 500mg BID  Potential adverse effects discussed, including diarrhea, although patient struggles with constipation currently so hopefully this will not be an issue  · Additional considerations - with history of microalbuminuria, will add ARB to medication regimen  Previously prescribed losartan at 25mg daily although she is not certain she is taking  SBP ranging from 100s - 132  Will trial low dose of losartan 12 5mg daily for now and up-titrate if able  · Labs - due for CMP, urine microalbumin, C-peptide, lipid panel, T4, TSH prior to next visit  · Return to office in 3 months            Relevant Medications    metFORMIN (GLUCOPHAGE-XR) 500 mg 24 hr tablet    losartan (COZAAR) 25 mg tablet    Other Relevant Orders    Lipid panel Lab Collect Lab Collect    C-peptide Lab Collect    TSH, 3rd generation Lab Collect    T4, free Lab Collect    Microalbumin / creatinine urine ratio Lab Collect    POCT hemoglobin A1c (Completed)    Comprehensive metabolic panel Lab Collect       Other    Iron deficiency anemia     · Total iron 29, saturation 7%, Ferrtin level 14 with Hgb 10 0  · This should improve following recovery from surgery - some blood loss anemia was suspected  · Recommended iron supplementation over the counter at 1 tablet every other day with colace  Suggested proferrin brand may be associated with less side effects, greater absorbability  · Plan to repeat CBC, iron panel in several months  Other Visit Diagnoses     Diabetic ulcer of left midfoot associated with type 2 diabetes mellitus, with muscle involvement without evidence of necrosis (HCC)        Relevant Medications    oxyCODONE-acetaminophen (LYNOX)  MG per tablet    metFORMIN (GLUCOPHAGE-XR) 500 mg 24 hr tablet    gabapentin (NEURONTIN) 300 mg capsule          CC: Diabetes    History of Present Illness     HPI:  Aliya De is here for routine follow up for type 1 DM  She reports recent foot surgery 2/14  Cast will be removed late March  PT will hopefully be resumed after this  She has been more sedentary than usual for this reason  She has noticed her BG trends have increased a bit due to this, especially her post prandial values  Unfortunately we are unable to review formal pump or CGM report due to IT issues, but BG trends are recalled from memory, and pump settings and trends are able to be extracted from the device itself  BG fasting - 90s - 150s, average 120 - 130  Before lunch - 150 - 200  Before dinner - 150 - 200  Evening - 130 - 150  Near Hypoglycemia episodes 2-3 times a week overnight - BG goes into 70s   True hypoglycemia about once a month, mid 50s at lowest    1-2 meals a week she reports post prandial highs > 250, as well as several post prandial highs > 180 throughout the course of the week  Krishna Nunez does have hypoglycemia awareness  When BG is under 70 she feels lightheaded  Her dog is also aware when she is hypoglycemic or hyperglycemic  She and her  are able to respond to pump or CGM alarms when needed  Insulin pump: Tandem Tslimx2 Control IQ settings  CGM: Dexcom  Current Insulin pump settings:  Basal rate:              Basal MN-3a   1 7 units per hour              3a-7am            2 5 units per hour              7a-10am          2 1 units per hour              10a-9pm          2 1 units per hour              9pm-MN          1 7 units per hour  Insulin to carb ratio:              MN-10am 1unit:5g              10am-9pm 1unit:4 5g              9pm-MN 1:5  Insulin sensitivity factor: 20  AIT: 5h    Insulin daily averages (over 30 days):  Basal 61 68 units (58 2%)  Food bolus 29 72 (28%)  Correction 14 56 (13 7%)  Total insulin average 105 97 units  Food bolus 29 72 (28%)    A1c - 7 0 point of care today  Review of Systems   Constitutional: Negative for chills and fever  HENT: Negative for ear pain and sore throat  Eyes: Negative for pain and visual disturbance  Respiratory: Negative for cough and shortness of breath  Cardiovascular: Negative for chest pain and palpitations  Gastrointestinal: Positive for constipation  Negative for abdominal pain and vomiting  Genitourinary: Negative for dysuria and hematuria  Musculoskeletal: Negative for arthralgias and back pain  Skin: Negative for color change and rash  Neurological: Negative for seizures and syncope  All other systems reviewed and are negative         Historical Information   Past Medical History:   Diagnosis Date   • Charcot's joint of foot, left    • Diabetes mellitus (HCC)     Insulin pump   • History of transfusion     8-4-22   • Osteomyelitis of foot, right, acute ([facility] [address] ) Past Surgical History:   Procedure Laterality Date   • FOOT AMPUTATION Left 10/30/2020    Procedure: CHOPART AMPUTATION LEFT  FOOT:;  Surgeon: Raven Hayes DPM;  Location:  MAIN OR;  Service: Podiatry   • FOOT CAPSULE RELEASE W/ PERCUTANEOUS HEEL CORD LENGTHENING, TIBIAL TENDON TRANSFER Left 10/30/2020    Procedure: Achilles tenotomy left foot;  Surgeon: Raven Hayes DPM;  Location:  MAIN OR;  Service: Podiatry   • FOOT SURGERY Right     right first toe amputation  2019   • SC DISARTICULATION KNEE Left 8/31/2022    Procedure: AMPUTATION BELOW KNEE (BKA);   Surgeon: Brittaney Camarillo MD;  Location: [facility];  Service: General     Social History   Social History     Substance and Sexual Activity   Alcohol Use Not Currently     Social History     Substance and Sexual Activity   Drug Use Never     Social History     Tobacco Use   Smoking Status Never   Smokeless Tobacco Never     Family History:   Family History   Problem Relation Age of Onset   • Diabetes Mother    • Stroke Mother    • Hypertension Father    • Diabetes Sister    • Diabetes Brother    • Hyperlipidemia Brother    • Hypertension Brother        Meds/Allergies   Current Outpatient Medications   Medication Sig Dispense Refill   • ACCU-CHEK FASTCLIX LANCETS MISC by Does not apply route     • acetaminophen (TYLENOL) 325 mg tablet Take 3 tablets (975 mg total) by mouth every 6 (six) hours  0   • Continuous Blood Gluc Sensor (Dexcom G6 Sensor) MISC CHANGE SENSOR EVERY 10 DAYS     • Continuous Blood Gluc Sensor (Dexcom G6 Sensor) MISC CHANGE SENSOR EVERY 10 DAYS     • Continuous Blood Gluc Transmit (Dexcom G6 Transmitter) MISC 1 Units by Device route in the morning Use as directed 1 each 1   • docusate sodium (COLACE) 100 mg capsule Take 1 capsule (100 mg total) by mouth 2 (two) times a day  0   • gabapentin (NEURONTIN) 300 mg capsule Take 1 capsule (300 mg total) by mouth 3 (three) times a day 90 capsule 0   • Glucagon (Baqsimi Two Pack) 3 MG/DOSE POWD Push plunger into nose for treatment of severe hypoglycemia 2 each 2   • Insulin Glargine Solostar (Lantus SoloStar) 100 UNIT/ML SOPN Inject 0 5 mL (50 Units total) under the skin daily at bedtime Back up insulin for pump failure 6 mL 1   • Insulin Pen Needle (BD Pen Needle Kim U/F) 32G X 4 MM MISC Use 4 (four) times a day 100 each 1   • losartan (COZAAR) 25 mg tablet Take 1 tablet (25 mg total) by mouth every morning 90 tablet 1   • NovoLOG 100 UNIT/ML injection UP TO 150U SUBCUTANEOUSLY DAILY VIA INSULIN PUMP     • oxyCODONE-acetaminophen (LYNOX)  MG per tablet Take 1 tablet by mouth every 4 (four) hours as needed for moderate pain     • polyethylene glycol (MIRALAX) 17 g packet Take 17 g by mouth daily     • senna (SENOKOT) 8 6 mg Take 1 tablet (8 6 mg total) by mouth daily at bedtime 30 tablet 2   • Syringe/Needle, Disp, 30G X 1/2" 1 ML MISC by Does not apply route     • Fiasp 100 UNIT/ML SOLN INJECT UP  UNITS SUBCUTANEOUSLY DAILY VIA INSULIN PUMP (Patient not taking: Reported on 3/7/2023)       No current facility-administered medications for this visit  Allergies   Allergen Reactions   • Clindamycin Other (See Comments)     CHEST PRESSURE, FEELS LIKE A HEART ATTACK PER PT       Objective   Vitals: Blood pressure 114/62, pulse 94, height 5' 3" (1 6 m), weight 116 kg (255 lb)  Physical Exam  Vitals reviewed  Constitutional:       General: She is not in acute distress  Appearance: She is obese  She is not ill-appearing  HENT:      Head: Normocephalic  Mouth/Throat:      Mouth: Mucous membranes are moist    Cardiovascular:      Rate and Rhythm: Normal rate and regular rhythm  Pulmonary:      Effort: Pulmonary effort is normal       Breath sounds: Normal breath sounds  Musculoskeletal:      Comments: Left BKA, right foot in surgical shoe and cast at present  Ambulates with cane  Skin:     General: Skin is warm and dry     Neurological:      Mental Status: She is oriented to person, place, and time  Psychiatric:         Mood and Affect: Mood normal          The history was obtained from the review of the chart, patient  Lab Results:   Lab Results   Component Value Date/Time    Hemoglobin A1C 7 1 (H) 08/04/2022 09:07 PM    WBC 8 31 09/02/2022 06:23 AM    WBC 6 94 08/09/2022 05:11 AM    WBC 7 71 08/08/2022 08:47 AM    Hemoglobin 7 7 (L) 09/02/2022 06:23 AM    Hemoglobin 9 6 (L) 08/09/2022 05:11 AM    Hemoglobin 8 9 (L) 08/08/2022 08:47 AM    Hematocrit 26 0 (L) 09/02/2022 06:23 AM    Hematocrit 31 7 (L) 08/09/2022 05:11 AM    Hematocrit 29 1 (L) 08/08/2022 08:47 AM    MCV 76 (L) 09/02/2022 06:23 AM    MCV 71 (L) 08/09/2022 05:11 AM    MCV 71 (L) 08/08/2022 08:47 AM    Platelets 920 91/70/6160 06:23 AM    Platelets 386 93/53/5861 05:11 AM    Platelets 697 99/91/9579 08:47 AM    BUN 18 09/02/2022 06:23 AM    BUN 16 08/09/2022 05:11 AM    BUN 13 08/08/2022 08:47 AM    Potassium 3 7 09/02/2022 06:23 AM    Potassium 4 3 08/09/2022 05:11 AM    Potassium 3 7 08/08/2022 08:47 AM    Chloride 106 09/02/2022 06:23 AM    Chloride 98 08/09/2022 05:11 AM    Chloride 102 08/08/2022 08:47 AM    CO2 27 09/02/2022 06:23 AM    CO2 26 08/09/2022 05:11 AM    CO2 26 08/08/2022 08:47 AM    Creatinine 1 15 09/02/2022 06:23 AM    Creatinine 1 10 08/09/2022 05:11 AM    Creatinine 1 02 08/08/2022 08:47 AM    AST 13 08/09/2022 05:11 AM    AST 20 08/08/2022 08:47 AM    AST 16 08/07/2022 04:46 AM    ALT 18 08/09/2022 05:11 AM    ALT 14 08/08/2022 08:47 AM    ALT 13 08/07/2022 04:46 AM    Albumin 2 4 (L) 08/09/2022 05:11 AM    Albumin 2 3 (L) 08/08/2022 08:47 AM    Albumin 2 2 (L) 08/07/2022 04:46 AM       Point of care A1c today = 7 0    Imaging Studies: n/a    Portions of the record may have been created with voice recognition software  Occasional wrong word or "sound a like" substitutions may have occurred due to the inherent limitations of voice recognition software   Read the chart carefully and recognize, using context, where substitutions have occurred

## 2023-03-07 NOTE — PATIENT INSTRUCTIONS
Proferrin = iron supplement  Can take every other day  Would recommend taking with colace to limit potential increase in constipation

## 2023-03-07 NOTE — ASSESSMENT & PLAN NOTE
· Total iron 29, saturation 7%, Ferrtin level 14 with Hgb 10 0  · This should improve following recovery from surgery - some blood loss anemia was suspected  · Recommended iron supplementation over the counter at 1 tablet every other day with colace  Suggested proferrin brand may be associated with less side effects, greater absorbability  · Plan to repeat CBC, iron panel in several months

## 2023-03-07 NOTE — ASSESSMENT & PLAN NOTE
Lab Results   Component Value Date    HGBA1C 7 0 (A) 03/07/2023   · Patient's A1c is demonstrating acceptable control, although her goal would be < or = 6 5 if achievable without significant lows  · BG targets and A1c goal reviewed today  · Diet - comfortable with carb counting and has been better about bolus entry 10-15 minute before start of meal    · Exercise - patient intends to become more active ASAP once postoperative limitations are lifted  To resume physical therapy soon  · Pump adjustments - none, continue same settings  · Pharmacotherapy - suspect patient has some degree of insulin resistance in addition to Type 1 DM  Will trial addition of metformin at low dose of 500mg BID  Potential adverse effects discussed, including diarrhea, although patient struggles with constipation currently so hopefully this will not be an issue  · Additional considerations - with history of microalbuminuria, will add ARB to medication regimen  Previously prescribed losartan at 25mg daily although she is not certain she is taking  SBP ranging from 100s - 132  Will trial low dose of losartan 12 5mg daily for now and up-titrate if able  · Labs - due for CMP, urine microalbumin, C-peptide, lipid panel, T4, TSH prior to next visit  · Return to office in 3 months

## 2023-05-25 ENCOUNTER — TELEPHONE (OUTPATIENT)
Dept: ENDOCRINOLOGY | Facility: CLINIC | Age: 48
End: 2023-05-25

## 2023-06-13 ENCOUNTER — LAB (OUTPATIENT)
Dept: LAB | Facility: CLINIC | Age: 48
End: 2023-06-13
Payer: COMMERCIAL

## 2023-06-13 ENCOUNTER — OFFICE VISIT (OUTPATIENT)
Dept: ENDOCRINOLOGY | Facility: CLINIC | Age: 48
End: 2023-06-13
Payer: COMMERCIAL

## 2023-06-13 VITALS
WEIGHT: 243 LBS | SYSTOLIC BLOOD PRESSURE: 120 MMHG | DIASTOLIC BLOOD PRESSURE: 68 MMHG | HEART RATE: 89 BPM | BODY MASS INDEX: 43.05 KG/M2 | HEIGHT: 63 IN

## 2023-06-13 DIAGNOSIS — E10.42 TYPE 1 DIABETES MELLITUS WITH DIABETIC POLYNEUROPATHY (HCC): Primary | ICD-10-CM

## 2023-06-13 DIAGNOSIS — Z96.41 INSULIN PUMP IN PLACE: ICD-10-CM

## 2023-06-13 DIAGNOSIS — L97.509 TYPE 1 DIABETES MELLITUS WITH FOOT ULCER (HCC): ICD-10-CM

## 2023-06-13 DIAGNOSIS — E10.621 TYPE 1 DIABETES MELLITUS WITH FOOT ULCER (HCC): ICD-10-CM

## 2023-06-13 DIAGNOSIS — E10.42 TYPE 1 DIABETES MELLITUS WITH DIABETIC POLYNEUROPATHY (HCC): ICD-10-CM

## 2023-06-13 DIAGNOSIS — R80.9 MICROALBUMINURIA: ICD-10-CM

## 2023-06-13 LAB
ALBUMIN SERPL BCP-MCNC: 3.2 G/DL (ref 3.5–5)
ALP SERPL-CCNC: 159 U/L (ref 46–116)
ALT SERPL W P-5'-P-CCNC: 29 U/L (ref 12–78)
ANION GAP SERPL CALCULATED.3IONS-SCNC: 3 MMOL/L (ref 4–13)
AST SERPL W P-5'-P-CCNC: 17 U/L (ref 5–45)
BILIRUB SERPL-MCNC: 0.26 MG/DL (ref 0.2–1)
BUN SERPL-MCNC: 19 MG/DL (ref 5–25)
CALCIUM ALBUM COR SERPL-MCNC: 10.5 MG/DL (ref 8.3–10.1)
CALCIUM SERPL-MCNC: 9.9 MG/DL (ref 8.3–10.1)
CHLORIDE SERPL-SCNC: 109 MMOL/L (ref 96–108)
CHOLEST SERPL-MCNC: 159 MG/DL
CO2 SERPL-SCNC: 25 MMOL/L (ref 21–32)
CREAT SERPL-MCNC: 1.18 MG/DL (ref 0.6–1.3)
CREAT UR-MCNC: 60.3 MG/DL
GFR SERPL CREATININE-BSD FRML MDRD: 54 ML/MIN/1.73SQ M
GLUCOSE P FAST SERPL-MCNC: 141 MG/DL (ref 65–99)
HDLC SERPL-MCNC: 47 MG/DL
LDLC SERPL CALC-MCNC: 78 MG/DL (ref 0–100)
MICROALBUMIN UR-MCNC: 34.2 MG/L (ref 0–20)
MICROALBUMIN/CREAT 24H UR: 57 MG/G CREATININE (ref 0–30)
POTASSIUM SERPL-SCNC: 4.4 MMOL/L (ref 3.5–5.3)
PROT SERPL-MCNC: 8.2 G/DL (ref 6.4–8.4)
SODIUM SERPL-SCNC: 137 MMOL/L (ref 135–147)
T4 FREE SERPL-MCNC: 0.77 NG/DL (ref 0.61–1.12)
TRIGL SERPL-MCNC: 172 MG/DL
TSH SERPL DL<=0.05 MIU/L-ACNC: 2.71 UIU/ML (ref 0.45–4.5)

## 2023-06-13 PROCEDURE — 84443 ASSAY THYROID STIM HORMONE: CPT

## 2023-06-13 PROCEDURE — 84681 ASSAY OF C-PEPTIDE: CPT

## 2023-06-13 PROCEDURE — 82570 ASSAY OF URINE CREATININE: CPT | Performed by: STUDENT IN AN ORGANIZED HEALTH CARE EDUCATION/TRAINING PROGRAM

## 2023-06-13 PROCEDURE — 36415 COLL VENOUS BLD VENIPUNCTURE: CPT

## 2023-06-13 PROCEDURE — 82043 UR ALBUMIN QUANTITATIVE: CPT | Performed by: STUDENT IN AN ORGANIZED HEALTH CARE EDUCATION/TRAINING PROGRAM

## 2023-06-13 PROCEDURE — 84439 ASSAY OF FREE THYROXINE: CPT

## 2023-06-13 PROCEDURE — 83036 HEMOGLOBIN GLYCOSYLATED A1C: CPT

## 2023-06-13 PROCEDURE — 80053 COMPREHEN METABOLIC PANEL: CPT

## 2023-06-13 PROCEDURE — 80061 LIPID PANEL: CPT

## 2023-06-13 PROCEDURE — 99214 OFFICE O/P EST MOD 30 MIN: CPT | Performed by: STUDENT IN AN ORGANIZED HEALTH CARE EDUCATION/TRAINING PROGRAM

## 2023-06-13 RX ORDER — BLOOD-GLUCOSE METER
KIT MISCELLANEOUS
Qty: 1 KIT | Refills: 0 | Status: SHIPPED | OUTPATIENT
Start: 2023-06-13

## 2023-06-13 RX ORDER — BLOOD SUGAR DIAGNOSTIC
STRIP MISCELLANEOUS
Qty: 400 EACH | Refills: 3 | Status: SHIPPED | OUTPATIENT
Start: 2023-06-13

## 2023-06-13 RX ORDER — LANCETS 33 GAUGE
EACH MISCELLANEOUS
Qty: 400 EACH | Refills: 3 | Status: SHIPPED | OUTPATIENT
Start: 2023-06-13

## 2023-06-13 NOTE — PROGRESS NOTES
Reynaldo Rashid 49 y o  female MRN: [id number]    Encounter: [id number]      Assessment/Plan     1  Type 1 diabetes on insulin pump, c/b charcot deformity, OM, history of amputations - unable to download pump today  Control appears to be stable  Continue current pump settings  Will update labs  Discussed recommendation for statin therapy, after reviewing pro/cons  Patient agreeable  Will recommend initiation of therapy following labs  Reynaldo Bailey is reminded to schedule updated DM eye examination  She has backup insulin in case of pump failure  2  Insulin pump in place    3  Microalbuminuria - continue losartan  Follow up repeat MACr    Problem List Items Addressed This Visit        Endocrine    Diabetes mellitus type 1 ([facility] [address] ) - Primary    Relevant Medications    Blood Glucose Monitoring Suppl (OneTouch Verio Reflect) w/Device KIT    glucose blood (OneTouch Verio) test strip    OneTouch Delica Lancets 59L MISC    Other Relevant Orders    Basic metabolic panel Lab Collect    HEMOGLOBIN A1C W/ EAG ESTIMATION Lab Collect    Lipid Panel with Direct LDL reflex Lab Collect    T4, free Lab Collect    TSH, 3rd generation Lab Collect    Albumin / creatinine urine ratio    C-peptide Lab Collect   Other Visit Diagnoses     Insulin pump in place        Microalbuminuria            RTC 3-mo    CC: Diabetes    History of Present Illness     HPI:    Reynaldo Bailey returns for follow up of type 1 diabetes  She is joined by her SO, who is contributing to history  T1DM c/b history of OM s/p right great toe amputation, diabetic foot with Chartcot arthropathy, and history of BKA  Reynaldo Bailey reports no acute changes in health, no major health concerns  For diabetes, she takes metformin  mg bid, and she is on insulin pump as well  Patient is on a tandem tslim pump prescribed by endocrinology  She has been on this pump for 2-3 years  She denies any malfunctioning of the pump      Current Insulin pump settings: control IQ  Insulin to carb ratio: 5  Insulin sensitivity factor: 20  Type of insulin: novolog  BG target: 110  AIT: 5hr    Today, we are unable to access t:connect  Pump download is unavailable  In review of mobile software, TIR 98% over past 24 hours  Cande Chavira denies any instances of severe hypogylcemia, or uncontrolled hyperglycemia  She has backup insulin in case of pump failure  She has glucagon for emergency low  For hypertension she takes losartan 12 5 mg daily  She is not on statin  We discussed this today and she is agreeable to therapy  Review of Systems    Historical Information   Past Medical History:   Diagnosis Date   • Charcot's joint of foot, left    • Diabetes mellitus (HCC)     Insulin pump   • History of transfusion     8-4-22   • Osteomyelitis of foot, right, acute [facility])      Past Surgical History:   Procedure Laterality Date   • FOOT AMPUTATION Left 10/30/2020    Procedure: CHOPART AMPUTATION LEFT  FOOT:;  Surgeon: Parish Segovia DPM;  Location:  MAIN OR;  Service: Podiatry   • FOOT CAPSULE RELEASE W/ PERCUTANEOUS HEEL CORD LENGTHENING, TIBIAL TENDON TRANSFER Left 10/30/2020    Procedure: Achilles tenotomy left foot;  Surgeon: Parish Segovia DPM;  Location:  MAIN OR;  Service: Podiatry   • FOOT SURGERY Right     right first toe amputation  2019   • MT DISARTICULATION KNEE Left 8/31/2022    Procedure: AMPUTATION BELOW KNEE (BKA);   Surgeon: Marcel Boxer, MD;  Location: [facility];  Service: General     Social History   Social History     Substance and Sexual Activity   Alcohol Use Not Currently     Social History     Substance and Sexual Activity   Drug Use Never     Social History     Tobacco Use   Smoking Status Never   Smokeless Tobacco Never     Family History:   Family History   Problem Relation Age of Onset   • Diabetes Mother    • Stroke Mother    • Hypertension Father    • Diabetes Sister    • Diabetes Brother    • Hyperlipidemia Brother    • Hypertension Brother Meds/Allergies   Current Outpatient Medications   Medication Sig Dispense Refill   • acetaminophen (TYLENOL) 325 mg tablet Take 3 tablets (975 mg total) by mouth every 6 (six) hours  0   • Blood Glucose Monitoring Suppl (OneTouch Verio Reflect) w/Device KIT Check blood sugars four times daily  Please substitute with appropriate alternative as covered by patient's insurance  Dx: E11 65 1 kit 0   • Continuous Blood Gluc Sensor (Dexcom G6 Sensor) MISC CHANGE SENSOR EVERY 10 DAYS     • Continuous Blood Gluc Sensor (Dexcom G6 Sensor) MISC CHANGE SENSOR EVERY 10 DAYS     • Continuous Blood Gluc Transmit (Dexcom G6 Transmitter) MISC 1 Units by Device route in the morning Use as directed 1 each 1   • docusate sodium (COLACE) 100 mg capsule Take 1 capsule (100 mg total) by mouth 2 (two) times a day  0   • gabapentin (NEURONTIN) 300 mg capsule Take 1 capsule (300 mg total) by mouth 3 (three) times a day 270 capsule 0   • Glucagon (Baqsimi Two Pack) 3 MG/DOSE POWD Push plunger into nose for treatment of severe hypoglycemia 2 each 2   • glucose blood (OneTouch Verio) test strip Check blood sugars four times daily  Please substitute with appropriate alternative as covered by patient's insurance  Dx: E11 65 400 each 3   • Insulin Glargine Solostar (Lantus SoloStar) 100 UNIT/ML SOPN Inject 0 5 mL (50 Units total) under the skin daily at bedtime Back up insulin for pump failure 6 mL 1   • Insulin Pen Needle (BD Pen Needle Kim U/F) 32G X 4 MM MISC Use 4 (four) times a day 100 each 1   • losartan (COZAAR) 25 mg tablet Take 0 5 tablets (12 5 mg total) by mouth daily 45 tablet 1   • metFORMIN (GLUCOPHAGE-XR) 500 mg 24 hr tablet Take 1 tablet (500 mg total) by mouth 2 (two) times a day with meals 180 tablet 1   • NovoLOG 100 UNIT/ML injection UP TO 150U SUBCUTANEOUSLY DAILY VIA INSULIN PUMP     • OneTouch Delica Lancets 56C MISC Check blood sugars four times daily   Please substitute with appropriate alternative as covered by "patient's insurance  Dx: E11 65 400 each 3   • polyethylene glycol (MIRALAX) 17 g packet Take 17 g by mouth daily     • senna (SENOKOT) 8 6 mg Take 1 tablet (8 6 mg total) by mouth daily at bedtime 30 tablet 2   • Syringe/Needle, Disp, 30G X 1/2\" 1 ML MISC by Does not apply route     • oxyCODONE-acetaminophen (LYNOX)  MG per tablet Take 1 tablet by mouth every 4 (four) hours as needed for moderate pain (Patient not taking: Reported on 6/13/2023)       No current facility-administered medications for this visit  Allergies   Allergen Reactions   • Clindamycin Other (See Comments)     CHEST PRESSURE, FEELS LIKE A HEART ATTACK PER PT       Objective   Vitals: Blood pressure 120/68, pulse 89, height 5' 3\" (1 6 m), weight 110 kg (243 lb)  Physical Exam  Vitals reviewed  Constitutional:       General: She is not in acute distress  HENT:      Head: Normocephalic and atraumatic  Nose: Nose normal    Eyes:      General: No scleral icterus  Conjunctiva/sclera: Conjunctivae normal    Cardiovascular:      Pulses: Pulses are weak  Dorsalis pedis pulses are 1+ on the right side  Pulmonary:      Effort: Pulmonary effort is normal  No respiratory distress  Abdominal:      Palpations: Abdomen is soft  Tenderness: There is no abdominal tenderness  Feet:      Right foot: amputated     Skin integrity: No warmth or dry skin  Left foot: amputated  Skin:     General: Skin is warm and dry  Neurological:      General: No focal deficit present  Mental Status: She is alert  Psychiatric:         Mood and Affect: Mood normal          Behavior: Behavior normal          Diabetic Foot Exam    Patient's shoes and socks were not removed  Right Foot/Ankle   Right Foot Inspection  Skin Exam: No dry skin and no warmth  Amputation: amputation right foot (Comments: right great big toe)    Toe Exam: right toe deformity (Charcot)       Sensory   Vibration: intact  Monofilament testing: " absent    Vascular  The right DP pulse is 1+  Left Foot/Ankle  Left Foot Inspection  Amputation: amputation left foot (Comments: left BKA)    Assign Risk Category  Deformity present  Loss of protective sensation  Weak pulses  Risk: 3      The history was obtained from the review of the chart, patient and family      Lab Results:   Lab Results   Component Value Date/Time    Albumin 2 4 (L) 08/09/2022 05:11 AM    Albumin 2 3 (L) 08/08/2022 08:47 AM    Albumin 2 2 (L) 08/07/2022 04:46 AM    ALT 18 08/09/2022 05:11 AM    ALT 14 08/08/2022 08:47 AM    ALT 13 08/07/2022 04:46 AM    AST 13 08/09/2022 05:11 AM    AST 20 08/08/2022 08:47 AM    AST 16 08/07/2022 04:46 AM    BUN 18 09/02/2022 06:23 AM    BUN 16 08/09/2022 05:11 AM    BUN 13 08/08/2022 08:47 AM    Chloride 106 09/02/2022 06:23 AM    Chloride 98 08/09/2022 05:11 AM    Chloride 102 08/08/2022 08:47 AM    CO2 27 09/02/2022 06:23 AM    CO2 26 08/09/2022 05:11 AM    CO2 26 08/08/2022 08:47 AM    Creatinine 1 15 09/02/2022 06:23 AM    Creatinine 1 10 08/09/2022 05:11 AM    Creatinine 1 02 08/08/2022 08:47 AM    Hematocrit 26 0 (L) 09/02/2022 06:23 AM    Hematocrit 31 7 (L) 08/09/2022 05:11 AM    Hematocrit 29 1 (L) 08/08/2022 08:47 AM    Hemoglobin 7 7 (L) 09/02/2022 06:23 AM    Hemoglobin 9 6 (L) 08/09/2022 05:11 AM    Hemoglobin 8 9 (L) 08/08/2022 08:47 AM    Hemoglobin A1C 7 0 (A) 03/07/2023 10:32 AM    Hemoglobin A1C 7 1 (H) 08/04/2022 09:07 PM    Potassium 3 7 09/02/2022 06:23 AM    Potassium 4 3 08/09/2022 05:11 AM    Potassium 3 7 08/08/2022 08:47 AM    MCV 76 (L) 09/02/2022 06:23 AM    MCV 71 (L) 08/09/2022 05:11 AM    MCV 71 (L) 08/08/2022 08:47 AM    Platelets 269 73/65/0004 06:23 AM    Platelets 332 06/18/6061 05:11 AM    Platelets 995 61/06/6087 08:47 AM    Total Protein 8 0 08/09/2022 05:11 AM    Total Protein 7 5 08/08/2022 08:47 AM    Total Protein 7 0 08/07/2022 04:46 AM    WBC 8 31 09/02/2022 06:23 AM    WBC 6 94 08/09/2022 05:11 AM    WBC 7 71 "08/08/2022 08:47 AM           Imaging Studies: I have personally reviewed pertinent reports  Portions of the record may have been created with voice recognition software  Occasional wrong word or \"sound a like\" substitutions may have occurred due to the inherent limitations of voice recognition software  Read the chart carefully and recognize, using context, where substitutions have occurred    "

## 2023-06-14 LAB
C PEPTIDE SERPL-MCNC: 0.9 NG/ML (ref 1.1–4.4)
EST. AVERAGE GLUCOSE BLD GHB EST-MCNC: 148 MG/DL
HBA1C MFR BLD: 6.8 %

## 2023-06-15 DIAGNOSIS — E83.52 HYPERCALCEMIA: Primary | ICD-10-CM

## 2023-06-15 DIAGNOSIS — E10.621 TYPE 1 DIABETES MELLITUS WITH FOOT ULCER (HCC): ICD-10-CM

## 2023-06-15 DIAGNOSIS — L97.509 TYPE 1 DIABETES MELLITUS WITH FOOT ULCER (HCC): ICD-10-CM

## 2023-06-15 RX ORDER — LOSARTAN POTASSIUM 25 MG/1
12.5 TABLET ORAL DAILY
Qty: 45 TABLET | Refills: 1 | Status: SHIPPED | OUTPATIENT
Start: 2023-06-15

## 2023-06-19 DIAGNOSIS — E83.52 HYPERCALCEMIA: ICD-10-CM

## 2023-06-19 DIAGNOSIS — E10.621 TYPE 1 DIABETES MELLITUS WITH FOOT ULCER (HCC): Primary | ICD-10-CM

## 2023-06-19 DIAGNOSIS — L97.509 TYPE 1 DIABETES MELLITUS WITH FOOT ULCER (HCC): Primary | ICD-10-CM

## 2023-06-19 DIAGNOSIS — E10.42 TYPE 1 DIABETES MELLITUS WITH DIABETIC POLYNEUROPATHY (HCC): ICD-10-CM

## 2023-06-19 DIAGNOSIS — E55.9 VITAMIN D DEFICIENCY: ICD-10-CM

## 2023-08-26 DIAGNOSIS — E10.42 TYPE 1 DIABETES MELLITUS WITH DIABETIC POLYNEUROPATHY (HCC): ICD-10-CM

## 2023-08-28 RX ORDER — PROCHLORPERAZINE 25 MG/1
SUPPOSITORY RECTAL
Qty: 1 EACH | Refills: 1 | Status: SHIPPED | OUTPATIENT
Start: 2023-08-28

## 2023-09-15 ENCOUNTER — APPOINTMENT (OUTPATIENT)
Dept: LAB | Facility: CLINIC | Age: 48
End: 2023-09-15
Payer: COMMERCIAL

## 2023-09-15 ENCOUNTER — OFFICE VISIT (OUTPATIENT)
Dept: ENDOCRINOLOGY | Facility: CLINIC | Age: 48
End: 2023-09-15
Payer: COMMERCIAL

## 2023-09-15 VITALS
BODY MASS INDEX: 43.38 KG/M2 | HEIGHT: 63 IN | WEIGHT: 244.8 LBS | OXYGEN SATURATION: 98 % | DIASTOLIC BLOOD PRESSURE: 90 MMHG | SYSTOLIC BLOOD PRESSURE: 124 MMHG | HEART RATE: 95 BPM

## 2023-09-15 DIAGNOSIS — L97.425 DIABETIC ULCER OF LEFT MIDFOOT ASSOCIATED WITH TYPE 2 DIABETES MELLITUS, WITH MUSCLE INVOLVEMENT WITHOUT EVIDENCE OF NECROSIS (HCC): ICD-10-CM

## 2023-09-15 DIAGNOSIS — Z79.4 CONTROLLED TYPE 2 DIABETES MELLITUS WITH DIABETIC POLYNEUROPATHY, WITH LONG-TERM CURRENT USE OF INSULIN (HCC): ICD-10-CM

## 2023-09-15 DIAGNOSIS — E11.621 DIABETIC ULCER OF LEFT MIDFOOT ASSOCIATED WITH TYPE 2 DIABETES MELLITUS, WITH MUSCLE INVOLVEMENT WITHOUT EVIDENCE OF NECROSIS (HCC): ICD-10-CM

## 2023-09-15 DIAGNOSIS — E83.52 HYPERCALCEMIA: ICD-10-CM

## 2023-09-15 DIAGNOSIS — E55.9 VITAMIN D DEFICIENCY: ICD-10-CM

## 2023-09-15 DIAGNOSIS — E10.9 TYPE 1 DIABETES MELLITUS WITHOUT COMPLICATION (HCC): Primary | ICD-10-CM

## 2023-09-15 DIAGNOSIS — E10.621 TYPE 1 DIABETES MELLITUS WITH FOOT ULCER (HCC): ICD-10-CM

## 2023-09-15 DIAGNOSIS — E10.42 TYPE 1 DIABETES MELLITUS WITH DIABETIC POLYNEUROPATHY (HCC): ICD-10-CM

## 2023-09-15 DIAGNOSIS — Z89.9 S/P AMPUTATION: ICD-10-CM

## 2023-09-15 DIAGNOSIS — E11.42 CONTROLLED TYPE 2 DIABETES MELLITUS WITH DIABETIC POLYNEUROPATHY, WITH LONG-TERM CURRENT USE OF INSULIN (HCC): ICD-10-CM

## 2023-09-15 DIAGNOSIS — L97.509 TYPE 1 DIABETES MELLITUS WITH FOOT ULCER (HCC): ICD-10-CM

## 2023-09-15 LAB
25(OH)D3 SERPL-MCNC: 39.5 NG/ML (ref 30–100)
ALBUMIN SERPL BCP-MCNC: 3.8 G/DL (ref 3.5–5)
ALP SERPL-CCNC: 110 U/L (ref 34–104)
ALT SERPL W P-5'-P-CCNC: 17 U/L (ref 7–52)
ANION GAP SERPL CALCULATED.3IONS-SCNC: 8 MMOL/L
AST SERPL W P-5'-P-CCNC: 15 U/L (ref 13–39)
BILIRUB SERPL-MCNC: 0.32 MG/DL (ref 0.2–1)
BUN SERPL-MCNC: 18 MG/DL (ref 5–25)
CA-I BLD-SCNC: 1.2 MMOL/L (ref 1.12–1.32)
CALCIUM SERPL-MCNC: 9.2 MG/DL (ref 8.4–10.2)
CHLORIDE SERPL-SCNC: 102 MMOL/L (ref 96–108)
CO2 SERPL-SCNC: 26 MMOL/L (ref 21–32)
CREAT SERPL-MCNC: 0.95 MG/DL (ref 0.6–1.3)
EST. AVERAGE GLUCOSE BLD GHB EST-MCNC: 174 MG/DL
GFR SERPL CREATININE-BSD FRML MDRD: 70 ML/MIN/1.73SQ M
GLUCOSE SERPL-MCNC: 185 MG/DL (ref 65–140)
HBA1C MFR BLD: 7.7 %
POTASSIUM SERPL-SCNC: 4.1 MMOL/L (ref 3.5–5.3)
PROT SERPL-MCNC: 7.6 G/DL (ref 6.4–8.4)
PTH-INTACT SERPL-MCNC: 44.4 PG/ML (ref 12–88)
SODIUM SERPL-SCNC: 136 MMOL/L (ref 135–147)

## 2023-09-15 PROCEDURE — 36415 COLL VENOUS BLD VENIPUNCTURE: CPT

## 2023-09-15 PROCEDURE — 83970 ASSAY OF PARATHORMONE: CPT

## 2023-09-15 PROCEDURE — 83036 HEMOGLOBIN GLYCOSYLATED A1C: CPT

## 2023-09-15 PROCEDURE — 80053 COMPREHEN METABOLIC PANEL: CPT

## 2023-09-15 PROCEDURE — 82330 ASSAY OF CALCIUM: CPT

## 2023-09-15 PROCEDURE — 99214 OFFICE O/P EST MOD 30 MIN: CPT | Performed by: PHYSICIAN ASSISTANT

## 2023-09-15 PROCEDURE — 82306 VITAMIN D 25 HYDROXY: CPT

## 2023-09-15 RX ORDER — INSULIN ASPART 100 [IU]/ML
INJECTION, SOLUTION INTRAVENOUS; SUBCUTANEOUS
Qty: 120 ML | Refills: 1 | Status: SHIPPED | OUTPATIENT
Start: 2023-09-15

## 2023-09-15 RX ORDER — GABAPENTIN 300 MG/1
CAPSULE ORAL
Qty: 360 CAPSULE | Refills: 2 | Status: SHIPPED | OUTPATIENT
Start: 2023-09-15

## 2023-09-15 NOTE — PROGRESS NOTES
Frankey Greener [place] 51 y.o. female MRN: [id number]    Encounter: [id number]      Assessment/Plan   Problem List Items Addressed This Visit        Endocrine    Controlled type 2 diabetes mellitus with neurologic complication, with long-term current use of insulin (HCC)    Relevant Medications    gabapentin (NEURONTIN) 300 mg capsule    NovoLOG 100 UNIT/ML injection    Diabetes mellitus type 1 ([address]) - Primary       Lab Results   Component Value Date    HGBA1C 6.8 (H) 06/13/2023 ·   A1c demonstrates good glycemic control. · Diet - comfortable with carbohydrate counting, providing carb bolus entries appropriately. However, she tends to eat meals in close proximity to one another, which can lead to less correction insulin than necessary with pump algorithm. Recommended she try her best to space out meals further apart to help prevent this. · Pump adjustments - due to some post-prandial hyperglycemia, will make a slight adjustment to carbohydrate ratio as follows: change from 1:5 to 1:4 at 7am setting, change from 1:4.5 - 1:4 at 10am setting. · Continue metformin 500mg BID dosing, as this should help reduce insulin needs. · Continue ARB for renal protective benefits. BP remains stable. · Labs - will obtain pending labwork today. · Return to office in 3 months. Relevant Medications    NovoLOG 100 UNIT/ML injection       Other    S/P amputation     · With phantom limb pain. Currently taking neurontin at 300mg TID which seems to help, although night time sx can be the worst.  · Will increase evening dose of neurontin to 600mg. Other Visit Diagnoses     Diabetic ulcer of left midfoot associated with type 2 diabetes mellitus, with muscle involvement without evidence of necrosis (HCC)        Relevant Medications    gabapentin (NEURONTIN) 300 mg capsule    NovoLOG 100 UNIT/ML injection          CC: Diabetes  History of Present Illness     HPI:  Tevin Wilkes is here for routine follow up for type 1 DM.  She continues to use Tslimx2 insulin pump. Feels well overall, but not sleeping well. Having restless legs symptoms. Hypoglycemia occurs only occasionally, maybe once every few weeks. Insulin pump: Tandem Tslimx2 Control IQ settings  CGM: Dexcom  Current Insulin pump settings:  Basal rate:              Basal MN-3a   1.7 units per hour              3a-7am            2.5 units per hour              7D-96JE          2.1 units per hour              20B-5PS          2.1 units per hour              9pm-MN          1.7 units per hour  Insulin to carb ratio:              MN-10am 1unit:5g              10am-9pm 1unit:4.5g              9pm-MN 1:5  Insulin sensitivity factor: 20  AIT: 4h     Insulin daily averages (over 14 days):  Basal 60.43 units (62%)  Food bolus 21.68 units (22%)  Correction 3.31 units (3%)  Total insulin average 97.41 units / day           Review of Systems   Constitutional: Negative for chills and fever. HENT: Negative for ear pain and sore throat. Eyes: Negative for pain and visual disturbance. Respiratory: Negative for cough and shortness of breath. Cardiovascular: Negative for chest pain and palpitations. Gastrointestinal: Negative for abdominal pain and vomiting. Genitourinary: Negative for dysuria and hematuria. Musculoskeletal: Negative for arthralgias and back pain. Skin: Negative for color change and rash. Neurological: Negative for seizures and syncope. All other systems reviewed and are negative.       Historical Information   Past Medical History:   Diagnosis Date   • Charcot's joint of foot, left    • Diabetes mellitus (HCC)     Insulin pump   • History of transfusion     8-4-22   • Osteomyelitis of foot, right, acute [facility])      Past Surgical History:   Procedure Laterality Date   • FOOT AMPUTATION Left 10/30/2020    Procedure: CHOPART AMPUTATION LEFT  FOOT:;  Surgeon: Ninfa Pickard DPM;  Location:  MAIN OR;  Service: Podiatry   • FOOT CAPSULE RELEASE W/ PERCUTANEOUS HEEL CORD LENGTHENING, TIBIAL TENDON TRANSFER Left 10/30/2020    Procedure: Achilles tenotomy left foot;  Surgeon: Willam Ray DPM;  Location:  MAIN OR;  Service: Podiatry   • FOOT SURGERY Right     right first toe amputation  2019   • [place] DISARTICULATION KNEE Left 8/31/2022    Procedure: AMPUTATION BELOW KNEE (BKA); Surgeon: Jake Spencer MD;  Location: [facility];  Service: General     Social History   Social History     Substance and Sexual Activity   Alcohol Use Not Currently     Social History     Substance and Sexual Activity   Drug Use Never     Social History     Tobacco Use   Smoking Status Never   Smokeless Tobacco Never     Family History:   Family History   Problem Relation Age of Onset   • Diabetes Mother    • Stroke Mother    • Hypertension Father    • Diabetes Sister    • Diabetes Brother    • Hyperlipidemia Brother    • Hypertension Brother        Meds/Allergies   Current Outpatient Medications   Medication Sig Dispense Refill   • acetaminophen (TYLENOL) 325 mg tablet Take 3 tablets (975 mg total) by mouth every 6 (six) hours  0   • Blood Glucose Monitoring Suppl (OneTouch Verio Reflect) w/Device KIT Check blood sugars four times daily. Please substitute with appropriate alternative as covered by patient's insurance.  Dx: E11.65 1 kit 0   • Continuous Blood Gluc Sensor (Dexcom G6 Sensor) MISC CHANGE SENSOR EVERY 10 DAYS     • Continuous Blood Gluc Sensor (Dexcom G6 Sensor) MISC CHANGE SENSOR EVERY 10 DAYS     • Continuous Blood Gluc Transmit (Dexcom G6 Transmitter) MISC USE 1 DEVICE IN THE MORNING AS DIRECTED 1 each 1   • docusate sodium (COLACE) 100 mg capsule Take 1 capsule (100 mg total) by mouth 2 (two) times a day  0   • gabapentin (NEURONTIN) 300 mg capsule Take 1 capsule (300 mg total) by mouth 3 (three) times a day 270 capsule 0   • Glucagon (Baqsimi Two Pack) 3 MG/DOSE POWD Push plunger into nose for treatment of severe hypoglycemia 2 each 2   • glucose blood (OneTouch Verio) test strip Check blood sugars four times daily. Please substitute with appropriate alternative as covered by patient's insurance. Dx: E11.65 400 each 3   • Insulin Glargine Solostar (Lantus SoloStar) 100 UNIT/ML SOPN Inject 0.5 mL (50 Units total) under the skin daily at bedtime Back up insulin for pump failure 6 mL 1   • Insulin Pen Needle (BD Pen Needle Kim U/F) 32G X 4 MM MISC Use 4 (four) times a day 100 each 1   • losartan (COZAAR) 25 mg tablet Take 0.5 tablets (12.5 mg total) by mouth daily 45 tablet 1   • metFORMIN (GLUCOPHAGE-XR) 500 mg 24 hr tablet Take 1 tablet (500 mg total) by mouth 2 (two) times a day with meals 180 tablet 1   • NovoLOG 100 UNIT/ML injection UP TO 150U SUBCUTANEOUSLY DAILY VIA INSULIN PUMP     • OneTouch Delica Lancets 41G MISC Check blood sugars four times daily. Please substitute with appropriate alternative as covered by patient's insurance. Dx: E11.65 400 each 3   • oxyCODONE-acetaminophen (LYNOX)  MG per tablet Take 1 tablet by mouth every 4 (four) hours as needed for moderate pain (Patient not taking: Reported on 6/13/2023)     • polyethylene glycol (MIRALAX) 17 g packet Take 17 g by mouth daily     • senna (SENOKOT) 8.6 mg Take 1 tablet (8.6 mg total) by mouth daily at bedtime 30 tablet 2   • Syringe/Needle, Disp, 30G X 1/2" 1 ML MISC by Does not apply route       No current facility-administered medications for this visit. Allergies   Allergen Reactions   • Clindamycin Other (See Comments)     CHEST PRESSURE, FEELS LIKE A HEART ATTACK PER PT       Objective   Vitals: Blood pressure 124/90, pulse 95, height 5' 3" (1.6 m), weight 111 kg (244 lb 12.8 oz), SpO2 98 %. Physical Exam  Vitals reviewed. Constitutional:       General: She is not in acute distress. Appearance: She is not ill-appearing. HENT:      Head: Normocephalic.       Mouth/Throat:      Mouth: Mucous membranes are moist.   Cardiovascular:      Rate and Rhythm: Normal rate and regular rhythm. Pulses: Normal pulses. Pulmonary:      Effort: Pulmonary effort is normal.   Musculoskeletal:      Right lower leg: No edema. Comments: LLE prosthesis s/p L foot amputation. Skin:     General: Skin is warm and dry. Neurological:      Mental Status: She is alert and oriented to person, place, and time. Psychiatric:         Mood and Affect: Mood normal.         The history was obtained from the review of the chart, patient. Lab Results:   Lab Results   Component Value Date/Time    Hemoglobin A1C 6.8 (H) 06/13/2023 09:46 AM    Hemoglobin A1C 7.0 (A) 03/07/2023 10:32 AM    BUN 19 06/13/2023 09:46 AM    Potassium 4.4 06/13/2023 09:46 AM    Chloride 109 (H) 06/13/2023 09:46 AM    CO2 25 06/13/2023 09:46 AM    Creatinine 1.18 06/13/2023 09:46 AM    AST 17 06/13/2023 09:46 AM    ALT 29 06/13/2023 09:46 AM    Total Protein 8.2 06/13/2023 09:46 AM    Albumin 3.2 (L) 06/13/2023 09:46 AM    HDL, Direct 47 (L) 06/13/2023 09:46 AM    Triglycerides 172 (H) 06/13/2023 09:46 AM           Imaging Studies: n/a    Portions of the record may have been created with voice recognition software. Occasional wrong word or "sound a like" substitutions may have occurred due to the inherent limitations of voice recognition software. Read the chart carefully and recognize, using context, where substitutions have occurred.

## 2023-09-15 NOTE — ASSESSMENT & PLAN NOTE
· With phantom limb pain. Currently taking neurontin at 300mg TID which seems to help, although night time sx can be the worst.  · Will increase evening dose of neurontin to 600mg.

## 2023-09-15 NOTE — ASSESSMENT & PLAN NOTE
Lab Results   Component Value Date    HGBA1C 6.8 (H) 06/13/2023   · A1c demonstrates good glycemic control. · Diet - comfortable with carbohydrate counting, providing carb bolus entries appropriately. However, she tends to eat meals in close proximity to one another, which can lead to less correction insulin than necessary with pump algorithm. Recommended she try her best to space out meals further apart to help prevent this. · Pump adjustments - due to some post-prandial hyperglycemia, will make a slight adjustment to carbohydrate ratio as follows: change from 1:5 to 1:4 at 7am setting, change from 1:4.5 - 1:4 at 10am setting. · Continue metformin 500mg BID dosing, as this should help reduce insulin needs. · Continue ARB for renal protective benefits. BP remains stable. · Labs - will obtain pending labwork today. · Return to office in 3 months.

## 2023-11-13 DIAGNOSIS — E11.42 CONTROLLED TYPE 2 DIABETES MELLITUS WITH DIABETIC POLYNEUROPATHY, WITH LONG-TERM CURRENT USE OF INSULIN (HCC): Primary | ICD-10-CM

## 2023-11-13 DIAGNOSIS — Z79.4 CONTROLLED TYPE 2 DIABETES MELLITUS WITH DIABETIC POLYNEUROPATHY, WITH LONG-TERM CURRENT USE OF INSULIN (HCC): Primary | ICD-10-CM

## 2023-12-27 DIAGNOSIS — E10.42 TYPE 1 DIABETES MELLITUS WITH DIABETIC POLYNEUROPATHY (HCC): ICD-10-CM

## 2023-12-27 RX ORDER — PROCHLORPERAZINE 25 MG/1
SUPPOSITORY RECTAL
Qty: 1 EACH | Refills: 1 | Status: SHIPPED | OUTPATIENT
Start: 2023-12-27

## 2024-01-09 ENCOUNTER — TELEPHONE (OUTPATIENT)
Dept: DIABETES SERVICES | Facility: CLINIC | Age: 49
End: 2024-01-09

## 2024-01-09 NOTE — TELEPHONE ENCOUNTER
Per [facility] clinical staff: Patient called and is getting new Tslim pump tomorrow and was wondering if you could help put in her settings on Thursday ?

## 2024-01-09 NOTE — TELEPHONE ENCOUNTER
I called and spoke to patient. We have never seen this patient at dm education. She said she received a new Tandem pump due to the previous one not working with Dexcom. I explained the earliest I could schedule patient for appt is 1/25. Pt thought she could just leave her pump at the office and we would put in her settings.     I explained I can reach out to [facility] rep to see if they can meet with her virtually or by phone. Patient said she does not want to do that. I told patient she can reach out to [facility] customer care. She said she will be ok and will figure it out herself.

## 2024-01-12 ENCOUNTER — TELEPHONE (OUTPATIENT)
Dept: DIABETES SERVICES | Facility: CLINIC | Age: 49
End: 2024-01-12

## 2024-01-12 DIAGNOSIS — E10.9 TYPE 1 DIABETES MELLITUS WITHOUT COMPLICATION (HCC): Primary | ICD-10-CM

## 2024-01-12 DIAGNOSIS — E10.42 TYPE 1 DIABETES MELLITUS WITH DIABETIC POLYNEUROPATHY (HCC): ICD-10-CM

## 2024-01-12 RX ORDER — BLOOD SUGAR DIAGNOSTIC
STRIP MISCELLANEOUS
Qty: 400 EACH | Refills: 3 | Status: SHIPPED | OUTPATIENT
Start: 2024-01-12

## 2024-01-12 NOTE — TELEPHONE ENCOUNTER
Patient thought she could input settings herself but is concerned about doing it correctly. I offered for her to come in at 11:15 today to input her settings. Patient is unable to do that time. I scheduled her for 1/19. She says she does have a back-up plan with insulin & syringes. As stated before, she does need a meter. She says she has One-touch strips at home. She will be coming into the [facility] office this afternoon to  a sample meter.     Gertrudis- could you please input a referral for 1/19 for Tandem help. Thanks

## 2024-01-12 NOTE — TELEPHONE ENCOUNTER
Urban Mccartney called bc she got a new t slim pump and doesn't feel comfortable putting the settings in the new pump.     How much time would you need for an appointment to do that?     Also is there a sample meter at the office she can use?     I did advise her to check with her insurance as well bc they may not cover the supplies for the brand we have.

## 2024-01-22 ENCOUNTER — OFFICE VISIT (OUTPATIENT)
Dept: DIABETES SERVICES | Facility: CLINIC | Age: 49
End: 2024-01-22
Payer: COMMERCIAL

## 2024-01-22 DIAGNOSIS — E10.9 TYPE 1 DIABETES MELLITUS WITHOUT COMPLICATION (HCC): Primary | ICD-10-CM

## 2024-01-22 PROCEDURE — 98960 EDU&TRN PT SELF-MGMT NQHP 1: CPT

## 2024-01-22 NOTE — PROGRESS NOTES
"Tandem Pump        Assessment    Visit Type: Initial visit  Chief complaint/Medical Diagnosis/reason for visit E10.9 (ICD-10-CM) - Type 1 diabetes mellitus without complication (HCC)     HPI Urban Mccartney was seen in person for a Tandem pump settings transfer appointment, accompanied by her spouse, Himanshu. Reviewed navigation of pump. Guided patient as she transferred over to the new pump the following:    Max bolus 25  Basal Limit 3 units/hr    Sleep Schedule: everyday 9 pm to 6 am (Patient adjusted the 6 am to 7 am for her own preference)    Personal Profile    12 am-3 am; Basal 1.7; Correct 1:20; Carb 1:5; BG Target 110    3 am-7 am; Basal 2.5; Correct 1:20; Carb 1:5; BG Target 110    7 am-10 am; Basal 2.5; Correct 1:20; Carb 1:4; BG Target 110    10 am-9 pm; Basal 2.1; Correct 1:20; Carb 1:4; BG Target 110    9 pm- 12 am; Basal 1.7; Correct 1:20; Carb 1:5; BG Target 110    Also discussed the navigation of the CGM and answered questions. Patient felt comfortable updating her settings.    Ht Readings from Last 1 Encounters:   09/15/23 5' 3\" (1.6 m)     Wt Readings from Last 3 Encounters:   09/15/23 111 kg (244 lb 12.8 oz)   06/13/23 110 kg (243 lb)   03/07/23 116 kg (255 lb)       Begin Time: 4:02 pm  End Time: 4:54 pm  Referring Provider: Gertrudis Ko PA-C    Thank you for coming to the [facility] for education today.  Please feel free to call with any questions or concerns.    Darlyn Watson, RD  [address] [place] [id number]  [phone]  "

## 2024-02-09 ENCOUNTER — OFFICE VISIT (OUTPATIENT)
Dept: ENDOCRINOLOGY | Facility: CLINIC | Age: 49
End: 2024-02-09
Payer: COMMERCIAL

## 2024-02-09 VITALS
BODY MASS INDEX: 42.68 KG/M2 | OXYGEN SATURATION: 99 % | SYSTOLIC BLOOD PRESSURE: 138 MMHG | HEIGHT: 64 IN | HEART RATE: 98 BPM | WEIGHT: 250 LBS | DIASTOLIC BLOOD PRESSURE: 88 MMHG

## 2024-02-09 DIAGNOSIS — E78.5 HYPERLIPIDEMIA LDL GOAL <70: ICD-10-CM

## 2024-02-09 DIAGNOSIS — E10.621 TYPE 1 DIABETES MELLITUS WITH FOOT ULCER (HCC): ICD-10-CM

## 2024-02-09 DIAGNOSIS — E10.42 TYPE 1 DIABETES MELLITUS WITH DIABETIC POLYNEUROPATHY (HCC): Primary | ICD-10-CM

## 2024-02-09 DIAGNOSIS — L97.509 TYPE 1 DIABETES MELLITUS WITH FOOT ULCER (HCC): ICD-10-CM

## 2024-02-09 LAB — SL AMB POCT HEMOGLOBIN AIC: 8.1 (ref ?–6.5)

## 2024-02-09 PROCEDURE — 83036 HEMOGLOBIN GLYCOSYLATED A1C: CPT | Performed by: PHYSICIAN ASSISTANT

## 2024-02-09 PROCEDURE — 99214 OFFICE O/P EST MOD 30 MIN: CPT | Performed by: PHYSICIAN ASSISTANT

## 2024-02-09 RX ORDER — ROSUVASTATIN CALCIUM 5 MG/1
5 TABLET, COATED ORAL DAILY
Qty: 90 TABLET | Refills: 3 | Status: SHIPPED | OUTPATIENT
Start: 2024-02-09

## 2024-02-09 RX ORDER — METFORMIN HYDROCHLORIDE 500 MG/1
TABLET, EXTENDED RELEASE ORAL
Qty: 270 TABLET | Refills: 3 | Status: SHIPPED | OUTPATIENT
Start: 2024-02-09

## 2024-02-09 NOTE — ASSESSMENT & PLAN NOTE
Lab Results   Component Value Date    HGBA1C 8.1 (A) 02/09/2024   A1c worsening unfortunately.  Diet - agreeable to see CDE for hybrid visit of MNT, carb counting review, pump education follow up.  BG monitoring - encouraged start G6 sensor use again ASAP. Continues to benefit from CGM use due to chronic insulin use, on insulin pump.  Pharmacotherapy - Has TDD insuiln needs suggesting insulin resistance. Will up-titrate metformin from 500mg BID to 500mg in AM, 1000mg in PM. Keep pump settings same otherwise and prioritize CDE visit first. May wish to consider addition of GLP-1 to help with insulin resistance, reduction in total daily insulin needs.  Reminded she would benefit from DM eye exams - agreeable.  Labs - follow up A1c, lipid panel, CMP recommended in 3 months.  Follow up - 3 months.

## 2024-02-09 NOTE — PATIENT INSTRUCTIONS
Recommend follow up with ophthalmology.   Can call [phone] to inquire about services in network.

## 2024-02-09 NOTE — ASSESSMENT & PLAN NOTE
Would benefit from addition of low dose statin in setting of LDL above goal, and likely at least moderate risk for ASCVD.  Will start Crestor 5mg daily.  Follow up lipid panel in 3 months.

## 2024-02-09 NOTE — PROGRESS NOTES
Urban Rashid 48 y.o. female MRN: [id number]    Encounter: [id number]      Assessment/Plan   Problem List Items Addressed This Visit          Endocrine    Diabetes mellitus type 1 (HCC) - Primary       Lab Results   Component Value Date    HGBA1C 8.1 (A) 02/09/2024   A1c worsening unfortunately.  Diet - agreeable to see CDE for hybrid visit of MNT, carb counting review, pump education follow up.  BG monitoring - encouraged start G6 sensor use again ASAP. Continues to benefit from CGM use due to chronic insulin use, on insulin pump.  Pharmacotherapy - Has TDD insuiln needs suggesting insulin resistance. Will up-titrate metformin from 500mg BID to 500mg in AM, 1000mg in PM. Keep pump settings same otherwise and prioritize CDE visit first. May wish to consider addition of GLP-1 to help with insulin resistance, reduction in total daily insulin needs.  Reminded she would benefit from DM eye exams - agreeable.  Labs - follow up A1c, lipid panel, CMP recommended in 3 months.  Follow up - 3 months.         Relevant Medications    metFORMIN (GLUCOPHAGE-XR) 500 mg 24 hr tablet    Other Relevant Orders    POCT hemoglobin A1c (Completed)    Lipid panel    Hemoglobin A1C    Comprehensive metabolic panel    Ambulatory referral to Diabetic Education       Other    Hyperlipidemia LDL goal <70     Would benefit from addition of low dose statin in setting of LDL above goal, and likely at least moderate risk for ASCVD.  Will start Crestor 5mg daily.  Follow up lipid panel in 3 months.         Relevant Medications    rosuvastatin (CRESTOR) 5 mg tablet        CC: Diabetes    History of Present Illness     HPI:  Urban Mccartney is a very pleasant 48-year-old female here for routine follow-up visit for type 1 diabetes.  Diabetes is complicated by history of osteomyelitis of the left foot status post below the knee amputation.  She denies any recent hospitalizations, new diagnoses.    She did have URI recently which lead to some  hyperglycemia. She also reports Dexcom G6 transmitter was malfunctioning recently.  As part of troubleshooting process, her Tandem t:slim pump was replaced.  Additionally, she had issues with G6 sensors being affordable/covered in a new insurance plan year, suspect likely due to deductible not being met.  She has been without sensors the past few days but plans to  new sensor supply within the next week or so.    Urban Mccartney has backup plan for both blood glucose monitoring as well as insulin use.  She has glucometer with supplies available as well as Lantus, insulin needles/syringes as needed.    DM Foot exams -routinely follows with [facility], has appointment in April  DM eye exams - need updated, seeking ophthalmology provider.    Insulin pump: Tandem Tslimx2 Control IQ settings  CGM: Dexcom  Current Insulin pump settings:  Basal rate:              Basal MN-3a   1.7 units per hour              3a-7am            2.5 units per hour              7a-10am          2.1 units per hour              10a-9pm          2.1 units per hour              9pm-MN          1.7 units per hour  Insulin to carb ratio:              MN-7am 5               7am - 9pm  4              9pm-MN  5  Insulin sensitivity factor: 20  AIT: 5h     Insulin daily averages (over 14 days):  Basal 47.1 units (54%)  Food bolus 27.38 units (32%)  Correction 2.43 units (4%)  Control IQ bolus 8.62 units (10%)  Total insulin average : 86.54 units/day    CGM summary report  Urban Mccartney Gay   Device used - Dexcom G6  Home use     Indication   Type 1 Diabetes    More than 72 hours of data was reviewed. Report to be scanned to chart.     Date Range: January 26 - February 8, 2024    Analysis of data:   % time CGM used: 76%  Average Glucose: 174mg/dL  SD : 65.1  Time in Target Range:  56%   Time Above Range: 41% high  Time Below Range: 2% low    Interpretation of data:  Frequent post-prandial hyperglycemia of variable timing and  severity. Sometimes giving phantom carb bolus to correct prolonged high, which sometimes leads to near hypoglycemia to follow. Also admits to giving additional Novolog using vial/syringe to correct high (5-6 units at a time). So total daily insulin needs are likely higher.      A1c in office today is 8.1%         Review of Systems   Constitutional:  Negative for chills and fever.   HENT:  Negative for ear pain and sore throat.    Eyes:  Negative for pain and visual disturbance.   Respiratory:  Negative for cough and shortness of breath.    Cardiovascular:  Negative for chest pain and palpitations.   Gastrointestinal:  Negative for abdominal pain and vomiting.   Endocrine: Negative for polydipsia and polyuria.   Genitourinary:  Negative for dysuria and hematuria.   Musculoskeletal:  Negative for arthralgias and back pain.   Skin:  Negative for color change and rash.   Neurological:  Negative for seizures and syncope.   All other systems reviewed and are negative.      Historical Information   Past Medical History:   Diagnosis Date    Charcot's joint of foot, left     Diabetes mellitus (HCC)     Insulin pump    History of transfusion     8-4-22    Osteomyelitis of foot, right, acute (HCC)      Past Surgical History:   Procedure Laterality Date    FOOT AMPUTATION Left 10/30/2020    Procedure: CHOPART AMPUTATION LEFT  FOOT:;  Surgeon: Maura Anderson DPM;  Location:  MAIN OR;  Service: Podiatry    FOOT CAPSULE RELEASE W/ PERCUTANEOUS HEEL CORD LENGTHENING, TIBIAL TENDON TRANSFER Left 10/30/2020    Procedure: Achilles tenotomy left foot;  Surgeon: Maura Anderson DPM;  Location:  MAIN OR;  Service: Podiatry    FOOT SURGERY Right     right first toe amputation  2019    [place] DISARTICULATION KNEE Left 8/31/2022    Procedure: AMPUTATION BELOW KNEE (BKA);  Surgeon: Carolyn Lainez MD;  Location: [facility];  Service: General     Social History   Social History     Substance and Sexual Activity   Alcohol Use Not Currently      Social History     Substance and Sexual Activity   Drug Use Never     Social History     Tobacco Use   Smoking Status Never   Smokeless Tobacco Never     Family History:   Family History   Problem Relation Age of Onset    Diabetes Mother     Stroke Mother     Hypertension Father     Diabetes Sister     Diabetes Brother     Hyperlipidemia Brother     Hypertension Brother        Meds/Allergies   Current Outpatient Medications   Medication Sig Dispense Refill    Blood Glucose Monitoring Suppl (OneTouch Verio Reflect) w/Device KIT Check blood sugars four times daily. Please substitute with appropriate alternative as covered by patient's insurance. Dx: E11.65 1 kit 0    Continuous Blood Gluc Sensor (Dexcom G6 Sensor) MISC CHANGE SENSOR EVERY 10 DAYS      Continuous Blood Gluc Sensor (Dexcom G6 Sensor) MISC CHANGE SENSOR EVERY 10 DAYS      Continuous Blood Gluc Transmit (Dexcom G6 Transmitter) MISC USE 1 DEVICE IN THE MORNING AS DIRECTED 1 each 1    docusate sodium (COLACE) 100 mg capsule Take 1 capsule (100 mg total) by mouth 2 (two) times a day  0    gabapentin (NEURONTIN) 300 mg capsule Take 1 tablet with breakfast, 1 with lunch, and 2 tablets at bedtime. 360 capsule 2    Glucagon (Baqsimi Two Pack) 3 MG/DOSE POWD Push plunger into nose for treatment of severe hypoglycemia 2 each 2    glucose blood (OneTouch Verio) test strip Check blood sugars four times daily. Please substitute with appropriate alternative as covered by patient's insurance. Dx: E11.65 400 each 3    Insulin Glargine Solostar (Lantus SoloStar) 100 UNIT/ML SOPN Inject 0.5 mL (50 Units total) under the skin daily at bedtime Back up insulin for pump failure 6 mL 1    Insulin Pen Needle (BD Pen Needle Kim U/F) 32G X 4 MM MISC Use 4 (four) times a day 100 each 1    losartan (COZAAR) 25 mg tablet Take 0.5 tablets (12.5 mg total) by mouth daily 45 tablet 1    metFORMIN (GLUCOPHAGE-XR) 500 mg 24 hr tablet Take 1 tablet (500 mg total) by mouth 2 (two)  "times a day with meals 180 tablet 1    NovoLOG 100 UNIT/ML injection TDD up to 130 units daily with insulin pump. 120 mL 1    OneTouch Delica Lancets 33G MISC Check blood sugars four times daily. Please substitute with appropriate alternative as covered by patient's insurance. Dx: E11.65 400 each 3    polyethylene glycol (MIRALAX) 17 g packet Take 17 g by mouth daily      Syringe/Needle, Disp, 30G X 1/2\" 1 ML MISC Use in the morning 50 each 1    acetaminophen (TYLENOL) 325 mg tablet Take 3 tablets (975 mg total) by mouth every 6 (six) hours (Patient not taking: Reported on 9/15/2023)  0    oxyCODONE-acetaminophen (LYNOX)  MG per tablet Take 1 tablet by mouth every 4 (four) hours as needed for moderate pain (Patient not taking: Reported on 6/13/2023)      senna (SENOKOT) 8.6 mg Take 1 tablet (8.6 mg total) by mouth daily at bedtime 30 tablet 2     No current facility-administered medications for this visit.     Allergies   Allergen Reactions    Clindamycin Other (See Comments)     CHEST PRESSURE, FEELS LIKE A HEART ATTACK PER PT       Objective   Vitals: Blood pressure 138/88, pulse 98, height 5' 4\" (1.626 m), weight 113 kg (250 lb), SpO2 99%.    Physical Exam    The history was obtained from the review of the chart, patient.    Lab Results:   Component      Latest Ref Rng 6/13/2023 9/15/2023   Sodium      135 - 147 mmol/L  136    Potassium      3.5 - 5.3 mmol/L  4.1    Chloride      96 - 108 mmol/L  102    Carbon Dioxide      21 - 32 mmol/L  26    ANION GAP      mmol/L  8    BUN      5 - 25 mg/dL  18    Creatinine      0.60 - 1.30 mg/dL  0.95    GLUCOSE      65 - 140 mg/dL  185 (H)    Calcium      8.4 - 10.2 mg/dL  9.2    AST      13 - 39 U/L  15    ALT      7 - 52 U/L  17    ALK PHOS      34 - 104 U/L  110 (H)    Total Protein      6.4 - 8.4 g/dL  7.6    Albumin      3.5 - 5.0 g/dL  3.8    Total Bilirubin      0.20 - 1.00 mg/dL  0.32    GFR, Calculated      ml/min/1.73sq m  70    Cholesterol      See Comment " "mg/dL 159     Triglycerides      See Comment mg/dL 172 (H)     HDL      >=50 mg/dL 47 (L)     LDL Calculated      0 - 100 mg/dL 78     Hemoglobin A1C      Normal 4.0-5.6%; PreDiabetic 5.7-6.4%; Diabetic >=6.5%; Glycemic control for adults with diabetes <7.0% %  7.7 (H)    eAG, EST AVG Glucose      mg/dl  174    Calcium, Ionized      1.12 - 1.32 mmol/L  1.20    PARATHYROID HORMONE      12.0 - 88.0 pg/mL  44.4    Vit D, 25-Hydroxy      30.0 - 100.0 ng/mL  39.5       Legend:  (H) High  (L) Low    Imaging Studies: n/a    Portions of the record may have been created with voice recognition software. Occasional wrong word or \"sound a like\" substitutions may have occurred due to the inherent limitations of voice recognition software. Read the chart carefully and recognize, using context, where substitutions have occurred.    "

## 2024-02-21 PROBLEM — E10.10 DIABETIC KETOACIDOSIS WITHOUT COMA ASSOCIATED WITH TYPE 1 DIABETES MELLITUS (HCC): Status: RESOLVED | Noted: 2020-09-08 | Resolved: 2024-02-21

## 2024-04-19 DIAGNOSIS — E11.42 CONTROLLED TYPE 2 DIABETES MELLITUS WITH DIABETIC POLYNEUROPATHY, WITH LONG-TERM CURRENT USE OF INSULIN (HCC): ICD-10-CM

## 2024-04-19 DIAGNOSIS — Z79.4 CONTROLLED TYPE 2 DIABETES MELLITUS WITH DIABETIC POLYNEUROPATHY, WITH LONG-TERM CURRENT USE OF INSULIN (HCC): ICD-10-CM

## 2024-04-19 RX ORDER — INSULIN ASPART 100 [IU]/ML
INJECTION, SOLUTION INTRAVENOUS; SUBCUTANEOUS
Qty: 120 ML | Refills: 1 | Status: SHIPPED | OUTPATIENT
Start: 2024-04-19

## 2024-04-19 NOTE — TELEPHONE ENCOUNTER
Reason for call:   [x] Refill   [] Prior Auth  [] Other:     Office:   [] PCP/Provider -   [x] Specialty/Provider - Endo    Medication:   NovoLOG 100 UNIT/ML injection - TDD up to 130 units daily with insulin pump     Pharmacy:  [facility] #[id number] [place], [place] - [address] [place] [address]     Does the patient have enough for 3 days?   [] Yes   [x] No - Send as HP to POD

## 2024-05-16 ENCOUNTER — TELEPHONE (OUTPATIENT)
Age: 49
End: 2024-05-16

## 2024-05-16 ENCOUNTER — TELEPHONE (OUTPATIENT)
Dept: ENDOCRINOLOGY | Facility: CLINIC | Age: 49
End: 2024-05-16

## 2024-05-16 NOTE — TELEPHONE ENCOUNTER
Pt called in to reschedule her appt. She forgot that she had another appt scheduled for today. Pt is rescheduled to see Gertrudis 05/20/2024. Pt is aware and confirmed

## 2024-05-16 NOTE — TELEPHONE ENCOUNTER
Left message for pt that she has apt on Monday and needs labs.also left message that she has two apt scheduled which one does she want

## 2024-05-20 ENCOUNTER — LAB (OUTPATIENT)
Dept: LAB | Facility: CLINIC | Age: 49
End: 2024-05-20
Payer: COMMERCIAL

## 2024-05-20 ENCOUNTER — OFFICE VISIT (OUTPATIENT)
Dept: ENDOCRINOLOGY | Facility: CLINIC | Age: 49
End: 2024-05-20
Payer: COMMERCIAL

## 2024-05-20 DIAGNOSIS — L97.509 TYPE 1 DIABETES MELLITUS WITH FOOT ULCER (HCC): ICD-10-CM

## 2024-05-20 DIAGNOSIS — E11.9 DIABETES MELLITUS WITH FEATURES OF INSULIN RESISTANCE (HCC): ICD-10-CM

## 2024-05-20 DIAGNOSIS — E10.42 TYPE 1 DIABETES MELLITUS WITH DIABETIC POLYNEUROPATHY (HCC): Primary | ICD-10-CM

## 2024-05-20 DIAGNOSIS — E10.42 TYPE 1 DIABETES MELLITUS WITH DIABETIC POLYNEUROPATHY (HCC): ICD-10-CM

## 2024-05-20 DIAGNOSIS — E10.621 TYPE 1 DIABETES MELLITUS WITH FOOT ULCER (HCC): ICD-10-CM

## 2024-05-20 LAB
ALBUMIN SERPL BCP-MCNC: 3.8 G/DL (ref 3.5–5)
ALP SERPL-CCNC: 99 U/L (ref 34–104)
ALT SERPL W P-5'-P-CCNC: 18 U/L (ref 7–52)
ANION GAP SERPL CALCULATED.3IONS-SCNC: 8 MMOL/L (ref 4–13)
AST SERPL W P-5'-P-CCNC: 14 U/L (ref 13–39)
BILIRUB SERPL-MCNC: 0.31 MG/DL (ref 0.2–1)
BUN SERPL-MCNC: 18 MG/DL (ref 5–25)
CALCIUM SERPL-MCNC: 9.3 MG/DL (ref 8.4–10.2)
CHLORIDE SERPL-SCNC: 102 MMOL/L (ref 96–108)
CO2 SERPL-SCNC: 26 MMOL/L (ref 21–32)
CREAT SERPL-MCNC: 1.01 MG/DL (ref 0.6–1.3)
CREAT UR-MCNC: 85.4 MG/DL
EST. AVERAGE GLUCOSE BLD GHB EST-MCNC: 180 MG/DL
GFR SERPL CREATININE-BSD FRML MDRD: 65 ML/MIN/1.73SQ M
GLUCOSE P FAST SERPL-MCNC: 264 MG/DL (ref 65–99)
HBA1C MFR BLD: 7.9 %
MICROALBUMIN UR-MCNC: 99 MG/L
MICROALBUMIN/CREAT 24H UR: 116 MG/G CREATININE (ref 0–30)
POTASSIUM SERPL-SCNC: 4.2 MMOL/L (ref 3.5–5.3)
PROT SERPL-MCNC: 7.7 G/DL (ref 6.4–8.4)
SODIUM SERPL-SCNC: 136 MMOL/L (ref 135–147)

## 2024-05-20 PROCEDURE — 82570 ASSAY OF URINE CREATININE: CPT

## 2024-05-20 PROCEDURE — 36415 COLL VENOUS BLD VENIPUNCTURE: CPT

## 2024-05-20 PROCEDURE — 95251 CONT GLUC MNTR ANALYSIS I&R: CPT | Performed by: PHYSICIAN ASSISTANT

## 2024-05-20 PROCEDURE — 82043 UR ALBUMIN QUANTITATIVE: CPT

## 2024-05-20 PROCEDURE — 83036 HEMOGLOBIN GLYCOSYLATED A1C: CPT

## 2024-05-20 PROCEDURE — 99215 OFFICE O/P EST HI 40 MIN: CPT | Performed by: PHYSICIAN ASSISTANT

## 2024-05-20 PROCEDURE — 80053 COMPREHEN METABOLIC PANEL: CPT

## 2024-05-20 RX ORDER — PEN NEEDLE, DIABETIC 32GX 5/32"
NEEDLE, DISPOSABLE MISCELLANEOUS 4 TIMES DAILY
Qty: 90 EACH | Refills: 1 | Status: SHIPPED | OUTPATIENT
Start: 2024-05-20

## 2024-05-20 RX ORDER — LOSARTAN POTASSIUM 25 MG/1
12.5 TABLET ORAL DAILY
Qty: 45 TABLET | Refills: 3 | Status: SHIPPED | OUTPATIENT
Start: 2024-05-20

## 2024-05-20 RX ORDER — GLUCAGON INJECTION, SOLUTION 1 MG/.2ML
1 INJECTION, SOLUTION SUBCUTANEOUS AS NEEDED
Qty: 0.2 ML | Refills: 0 | Status: SHIPPED | OUTPATIENT
Start: 2024-05-20 | End: 2024-05-31

## 2024-05-20 NOTE — PROGRESS NOTES
Urban Rashid 48 y.o. female MRN: [id number]    Encounter: [id number]      Assessment & Plan   Problem List Items Addressed This Visit          Endocrine    Diabetes mellitus with features of insulin resistance (HCC)     High daily insulin needs (> 75 units /day), obesity suggests probable insulin resistance / type 2 DM in addition to type 1.  Would benefit from GLP-1 RA for cardiorenal protective benefits as well as hopeful reduction in TDD insulin needs, weight reduction.    We reviewed MOA, possible adverse effects of GLP-1 RA. She has no contraindications. Will move forward with Mounjaro at starting dose of 0.25mg weekly, with plan to increase to next step dose if well tolerated.          Relevant Medications    Gvoke HypoPen 1-Pack 1 MG/0.2ML SOAJ    tirzepatide (Mounjaro) 2.5 MG/0.5ML    Diabetes mellitus type 1 (HCC) - Primary       Summary: suspected worsening DM control with more extremes of hypo and hyperglycemia present.  BG monitoring: continue Dexcom G6 with Tslimx2 pump. May consider upgrade to G7 in near future, although patient notes she may wish to switch to alternative pump brand once due for upgrade, so we will hold off on CGM upgrade for now until this is confirmed.  Contacted Tandem pump rep requesting help re: troubleshooting infusion set issues.   Diet: recommended referral to CDE for MNT again. Would likely benefit  from MNT as well as carb counting review.   Pharmacotherapy discussion: Pump adjustments made today include reduction of basal rate from 1.7 --> 1.4u/hr during overnight hours. We also intensified carb ratio during daytime hours from 4 --> 3.2. Provided basal insuiln pen sample (Toujeo) to be utilized PRN if lapse in insulin / pump supplies. She has insulin syringles, Novolog vials that can also be utilized PRN.  Hypoglycemia management - reviewed today. She would benefit from addition of Gvoke pen to be used PRN for hypoglycemia emergency.  Labs: encouraged to obtain  pending labs today including A1c, albumin / creatinine ratio, CMP.  Return to office: 6 weeks due to high risk status.         Relevant Medications    losartan (COZAAR) 25 mg tablet    Gvoke HypoPen 1-Pack 1 MG/0.2ML SOAJ    Insulin Pen Needle (BD Pen Needle Kim U/F) 32G X 4 MM MISC    tirzepatide (Mounjaro) 2.5 MG/0.5ML    Other Relevant Orders    Albumin / creatinine urine ratio (Completed)    Ambulatory referral to Diabetic Education        CC: Diabetes    History of Present Illness     HPI:  Urban Rashid is a 48 y.o. female with type 1 diabetes on chronic insulin pump here for routine follow-up visit.  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  Last endocrinology visit: 2/9/24    Infusion set for Tslim pump has been malfunctioning lately, leading to frequent alarms. She is becoming increasingly frustrated, questioning discontinuation of the pump altogether.    She tolerated the recent increase in metformin well, no reports of any diarrhea, nausea abdominal discomfort.    She has been experiencing more frequent hypoglycemia or near hypoglycemia during fasting state.  Also with more frequently extreme hyperglycemia over the past few months.    Current DM medication review:   Metformin 500 mg in AM 1000 mg in PM    DM foot exam: wound care with podiatry - follows with them routinely for wound care, but no recent maintenance DM exam, toenail care.  She reports right foot surgery may be considered to correct ligament deformity, details unclear.  She has upcoming imaging of the right foot to further evaluate.    Insulin pump: Tandem Tslimx2 Control IQ settings  CGM: Dexcom  Current Insulin pump settings:  Basal rate:              Basal MN-3a   1.7 units per hour              3a-7am            2.5 units per hour              7a-10am          2.1 units per hour              10a-9pm          2.1 units per hour              9pm-MN          1.7 units per hour  Insulin to  carb ratio:              MN-7am 5               7am - 9pm      4              9pm-MN          5  Insulin sensitivity factor: 20  AIT: 5h     Insulin daily averages (over 14 days):  Basal 55.14 units (57%)  Food bolus 28.81 units (30%)  Correction 4.17 units (4%)  Control IQ bolus 9.34 units (10%)  Total insulin average : 97.45 units/day     CGM summary report  Urban Rashid   Device used - Dexcom G6  Home use      Indication   Type 1 Diabetes  More than 72 hours of data was reviewed. Report to be scanned to chart.      Date Range: January 26 - February 8, 2024     Analysis of data:   Number of days CGM in use: 10.7 days  Average Glucose: 183 mg/dL  SD : 63.6  Time in Target Range: 48%  Time Above Range: 50% high  Time Below Range: 2% low     Interpretation of data: Overall worsening blood sugar control.  She has more frequent fasting hypoglycemia and near hypoglycemia.  She also has what appears to be more extreme and frequent postprandial hyperglycemia which is variable day-to-day.     I have spent a total time of 55 minutes on 5/20/24 caring for this patient including Diagnostic results, Patient and family education, Counseling / Coordination of care, Reviewing / ordering tests, medicine, procedures  , and Obtaining or reviewing history  .         Review of Systems   Constitutional:  Negative for chills and fever.   HENT:  Negative for ear pain and sore throat.    Eyes:  Negative for pain and visual disturbance.   Respiratory:  Negative for cough and shortness of breath.    Cardiovascular:  Negative for chest pain and palpitations.   Gastrointestinal:  Negative for abdominal pain and vomiting.   Endocrine: Negative for polydipsia and polyuria.   Genitourinary:  Negative for dysuria and hematuria.   Musculoskeletal:  Negative for arthralgias and back pain.   Skin:  Negative for color change and rash.   Neurological:  Negative for seizures and syncope.   All other systems reviewed and are  negative.      Historical Information   Past Medical History:   Diagnosis Date    Charcot's joint of foot, left     Diabetes mellitus (HCC)     Insulin pump    History of transfusion     8-4-22    Osteomyelitis of foot, right, acute (HCC)      Past Surgical History:   Procedure Laterality Date    FOOT AMPUTATION Left 10/30/2020    Procedure: CHOPART AMPUTATION LEFT  FOOT:;  Surgeon: Maura Anderson DPM;  Location:  MAIN OR;  Service: Podiatry    FOOT CAPSULE RELEASE W/ PERCUTANEOUS HEEL CORD LENGTHENING, TIBIAL TENDON TRANSFER Left 10/30/2020    Procedure: Achilles tenotomy left foot;  Surgeon: Maura Anderson DPM;  [facility]: [facility];  Service: Podiatry    FOOT SURGERY Right     right first toe amputation  2019    [place] DISARTICULATION KNEE Left 8/31/2022    Procedure: AMPUTATION BELOW KNEE (BKA);  Surgeon: Carolyn Lainez MD;  Location: [facility];  Service: General     Social History   Social History     Substance and Sexual Activity   Alcohol Use Not Currently     Social History     Substance and Sexual Activity   Drug Use Never     Social History     Tobacco Use   Smoking Status Never   Smokeless Tobacco Never     Family History:   Family History   Problem Relation Age of Onset    Diabetes Mother     Stroke Mother     Hypertension Father     Diabetes Sister     Diabetes Brother     Hyperlipidemia Brother     Hypertension Brother        Meds/Allergies   Current Outpatient Medications   Medication Sig Dispense Refill    Continuous Blood Gluc Sensor (Dexcom G6 Sensor) MISC CHANGE SENSOR EVERY 10 DAYS      Continuous Blood Gluc Sensor (Dexcom G6 Sensor) MISC CHANGE SENSOR EVERY 10 DAYS      Continuous Blood Gluc Transmit (Dexcom G6 Transmitter) MISC USE 1 DEVICE IN THE MORNING AS DIRECTED 1 each 1    docusate sodium (COLACE) 100 mg capsule Take 1 capsule (100 mg total) by mouth 2 (two) times a day  0    gabapentin (NEURONTIN) 300 mg capsule Take 1 tablet with breakfast, 1 with lunch, and 2 tablets at bedtime. 360  "capsule 2    Glucagon (Baqsimi Two Pack) 3 MG/DOSE POWD Push plunger into nose for treatment of severe hypoglycemia 2 each 2    Insulin Glargine Solostar (Lantus SoloStar) 100 UNIT/ML SOPN Inject 0.5 mL (50 Units total) under the skin daily at bedtime Back up insulin for pump failure 6 mL 1    Insulin Pen Needle (BD Pen Needle Kim U/F) 32G X 4 MM MISC Use 4 (four) times a day 100 each 1    losartan (COZAAR) 25 mg tablet Take 0.5 tablets (12.5 mg total) by mouth daily 45 tablet 1    metFORMIN (GLUCOPHAGE-XR) 500 mg 24 hr tablet Take 1 tablet in AM, 2 tablet in  tablet 3    NovoLOG 100 UNIT/ML injection TDD up to 130 units daily with insulin pump. 120 mL 1    OneTouch Delica Lancets 33G MISC Check blood sugars four times daily. Please substitute with appropriate alternative as covered by patient's insurance. Dx: E11.65 400 each 3    polyethylene glycol (MIRALAX) 17 g packet Take 17 g by mouth daily      rosuvastatin (CRESTOR) 5 mg tablet Take 1 tablet (5 mg total) by mouth daily 90 tablet 3    Syringe/Needle, Disp, 30G X 1/2\" 1 ML MISC Use in the morning 50 each 1    acetaminophen (TYLENOL) 325 mg tablet Take 3 tablets (975 mg total) by mouth every 6 (six) hours (Patient not taking: Reported on 9/15/2023)  0    Blood Glucose Monitoring Suppl (OneTouch Verio Reflect) w/Device KIT Check blood sugars four times daily. Please substitute with appropriate alternative as covered by patient's insurance. Dx: E11.65 (Patient not taking: Reported on 5/20/2024) 1 kit 0    glucose blood (OneTouch Verio) test strip Check blood sugars four times daily. Please substitute with appropriate alternative as covered by patient's insurance. Dx: E11.65 (Patient not taking: Reported on 5/20/2024) 400 each 3    oxyCODONE-acetaminophen (LYNOX)  MG per tablet Take 1 tablet by mouth every 4 (four) hours as needed for moderate pain (Patient not taking: Reported on 6/13/2023)      senna (SENOKOT) 8.6 mg Take 1 tablet (8.6 mg total) by " "mouth daily at bedtime 30 tablet 2     No current facility-administered medications for this visit.     Allergies   Allergen Reactions    Clindamycin Other (See Comments)     CHEST PRESSURE, FEELS LIKE A HEART ATTACK PER PT       Objective   Vitals: Blood pressure 148/82, pulse 93, height 5' 4\" (1.626 m), weight 117 kg (258 lb).    Physical Exam  Vitals reviewed.   Constitutional:       General: She is not in acute distress.     Appearance: She is obese. She is not ill-appearing.   HENT:      Head: Normocephalic.      Mouth/Throat:      Mouth: Mucous membranes are moist.   Cardiovascular:      Rate and Rhythm: Normal rate and regular rhythm.      Pulses: Pulses are weak.           Dorsalis pedis pulses are 1+ on the right side.        Posterior tibial pulses are 1+ on the right side.   Pulmonary:      Effort: Pulmonary effort is normal. No respiratory distress.      Breath sounds: Normal breath sounds.   Musculoskeletal:      Right lower leg: Edema (trace) present.   Feet:      Right foot:      Skin integrity: No ulcer, skin breakdown, erythema, warmth, callus or dry skin.      Left foot: amputated  Skin:     General: Skin is warm.   Neurological:      Gait: Gait abnormal (ambulates with cane).     Diabetic Foot Exam    Patient's shoes and socks removed.    Right Foot/Ankle   Right Foot Inspection  Skin Exam: skin normal, skin intact and pre-ulcer. No dry skin, no warmth, no callus, no erythema, no maceration, no abnormal color, no ulcer and no callus.     Toe Exam: right toe deformity (s/p amputation of 1st, second toe).     Sensory   Vibration: diminished  Monofilament testing: diminished    Vascular  The right DP pulse is 1+. The right PT pulse is 1+.     Left Foot/Ankle  Left Foot Inspection  Amputation: amputation left foot (Comments: with prostehsis in place)    Assign Risk Category  Deformity present  Loss of protective sensation  Weak pulses  Risk: 2      The history was obtained from the review of the " "chart, patient.    Lab Results:   Lab Results   Component Value Date    HGBA1C 8.1 (A) 02/09/2024    HGBA1C 7.7 (H) 09/15/2023    HGBA1C 6.8 (H) 06/13/2023           Imaging Studies: n/a    Portions of the record may have been created with voice recognition software. Occasional wrong word or \"sound a like\" substitutions may have occurred due to the inherent limitations of voice recognition software. Read the chart carefully and recognize, using context, where substitutions have occurred.  "

## 2024-05-21 NOTE — RESULT ENCOUNTER NOTE
A1c not at goal as anticipated with elevated random glucose, worsening albuminuria.   Pump setting adjustments made at yesterday's visit, GLP1-RA to be initiated, close follow up planned.

## 2024-05-22 VITALS
WEIGHT: 258 LBS | SYSTOLIC BLOOD PRESSURE: 154 MMHG | HEART RATE: 93 BPM | DIASTOLIC BLOOD PRESSURE: 88 MMHG | HEIGHT: 64 IN | BODY MASS INDEX: 44.05 KG/M2

## 2024-05-22 NOTE — ASSESSMENT & PLAN NOTE
Summary: suspected worsening DM control with more extremes of hypo and hyperglycemia present.  BG monitoring: continue Dexcom G6 with Tslimx2 pump. May consider upgrade to G7 in near future, although patient notes she may wish to switch to alternative pump brand once due for upgrade, so we will hold off on CGM upgrade for now until this is confirmed.  Contacted Tandem pump rep requesting help re: troubleshooting infusion set issues.   Diet: recommended referral to CDE for MNT again. Would likely benefit  from MNT as well as carb counting review.   Pharmacotherapy discussion: Pump adjustments made today include reduction of basal rate from 1.7 --> 1.4u/hr during overnight hours. We also intensified carb ratio during daytime hours from 4 --> 3.2. Provided basal insuiln pen sample (Toujeo) to be utilized PRN if lapse in insulin / pump supplies. She has insulin syringles, Novolog vials that can also be utilized PRN.  Hypoglycemia management - reviewed today. She would benefit from addition of Gvoke pen to be used PRN for hypoglycemia emergency.  Labs: encouraged to obtain pending labs today including A1c, albumin / creatinine ratio, CMP.  Return to office: 6 weeks due to high risk status.

## 2024-05-22 NOTE — ASSESSMENT & PLAN NOTE
High daily insulin needs (> 75 units /day), obesity suggests probable insulin resistance / type 2 DM in addition to type 1.  Would benefit from GLP-1 RA for cardiorenal protective benefits as well as hopeful reduction in TDD insulin needs, weight reduction.    We reviewed MOA, possible adverse effects of GLP-1 RA. She has no contraindications. Will move forward with Mounjaro at starting dose of 0.25mg weekly, with plan to increase to next step dose if well tolerated.

## 2024-05-31 ENCOUNTER — TELEPHONE (OUTPATIENT)
Dept: ENDOCRINOLOGY | Facility: CLINIC | Age: 49
End: 2024-05-31

## 2024-05-31 DIAGNOSIS — E10.42 TYPE 1 DIABETES MELLITUS WITH DIABETIC POLYNEUROPATHY (HCC): Primary | ICD-10-CM

## 2024-05-31 RX ORDER — GLUCAGON INJECTION, SOLUTION 1 MG/.2ML
INJECTION, SOLUTION SUBCUTANEOUS
Qty: 0.2 ML | Refills: 0 | Status: SHIPPED | OUTPATIENT
Start: 2024-05-31

## 2024-06-03 ENCOUNTER — TELEPHONE (OUTPATIENT)
Age: 49
End: 2024-06-03

## 2024-06-03 DIAGNOSIS — Z79.4 CONTROLLED TYPE 2 DIABETES MELLITUS WITH DIABETIC POLYNEUROPATHY, WITH LONG-TERM CURRENT USE OF INSULIN (HCC): ICD-10-CM

## 2024-06-03 DIAGNOSIS — E11.42 CONTROLLED TYPE 2 DIABETES MELLITUS WITH DIABETIC POLYNEUROPATHY, WITH LONG-TERM CURRENT USE OF INSULIN (HCC): ICD-10-CM

## 2024-06-03 NOTE — TELEPHONE ENCOUNTER
Pt called said when the last rx was called in the pharmacy shorted her.  She only has one vial left pt stated she's using the 130 units in pump. Can we call the pharmacy and see where the mistake was made. Pt will need more novolg please let her know.

## 2024-06-04 RX ORDER — INSULIN ASPART 100 [IU]/ML
INJECTION, SOLUTION INTRAVENOUS; SUBCUTANEOUS
Qty: 135 ML | Refills: 1 | Status: SHIPPED | OUTPATIENT
Start: 2024-06-04

## 2024-06-04 NOTE — TELEPHONE ENCOUNTER
I updated her TDD amount to 150 units daily, so hopefully this should help with allowing her to have more than enough supply. Rx written for 90 day supply, sent to [facility] in [place].    Also please check in with patient also about the pharmacy issue -   For her last Rx for Novolog, she should have been dispensed enough for TDD of 130 units ( written on last Rx), so I believe that equates to 12 vials for 90 days? If she received less than that, she may be able to inquire about this with the pharmacy herself as to why / how that happened. If more information needed from us for Urban or pharmacy please help coordinate. Thank you!

## 2024-06-27 ENCOUNTER — TELEPHONE (OUTPATIENT)
Age: 49
End: 2024-06-27

## 2024-06-27 ENCOUNTER — OFFICE VISIT (OUTPATIENT)
Dept: ENDOCRINOLOGY | Facility: OTHER | Age: 49
End: 2024-06-27

## 2024-06-27 VITALS — WEIGHT: 264.2 LBS | BODY MASS INDEX: 45.35 KG/M2

## 2024-06-27 DIAGNOSIS — E10.42 TYPE 1 DIABETES MELLITUS WITH DIABETIC POLYNEUROPATHY (HCC): Primary | ICD-10-CM

## 2024-06-27 NOTE — PROGRESS NOTES
Medical Nutrition Therapy        Assessment    Visit Type: Initial visit  Chief complaint/Medical Diagnosis/reason for visit E10.42    HPI Urban Mccartney was seen in person for the initial MNT appointment, accompanied by her spouse, Himanshu. Patient was pleasant and willing to share assessment information. BG levels are checked frequently using dexcom. Patient shared that she would like to switch to a tubeless pump. Patient does take diabetes medication as prescribed.     Patient reported no current exercise regimen. Urban Mccartney is looking into choices of gyms. She has not yet found one. Weight gain of 14 lbs in 4 1/2 months has occurred. Noted Urban Mccartney is still in the process of having her prosthetic leg adjusted to fit properly and is having another surgery on her hand.    Problems identified in food recall include meal skipping, inconsistent carbohydrate intake, high-fat dairy, limited non-starchy vegetables and whole grains, sugary beverages, and sweets. Consumption of carbohydrates ranges from 20  to 85 grams per meal. Explained basic pathophysiology of diabetes and impact of diet on blood glucose levels and disease complications. Explained how sodium influences volume retention, blood pressure, and complications for heart disease, stroke, and CKD.     Provided patient with a 1386 calorie meal plan to assist with consistency, balance and portion control.  Encouraged the consumption of regular meals at regular times.  Advised patient to keep carbohydrate intake to 30 grams per meal and 15-20 per snack to assist with glycemic control.  Suggested keeping protein intake to 5 ounces a day and fat to 3 servings daily to assist with lipid management and calorie control. Portion booklet and food labels were used to teach basic carbohydrate counting. Patient agreed to keep daily food logs and return them in 2 months for assessment. RD will remain available for further dietary questions/concerns.     Ht Readings from Last 1  "Encounters:   05/20/24 5' 4\" (1.626 m)     Wt Readings from Last 3 Encounters:   05/20/24 117 kg (258 lb)   02/09/24 113 kg (250 lb)   09/15/23 111 kg (244 lb 12.8 oz)     Weight Change: Yes 14 lbs increase in 4 1/2 months    Barriers to Learning: no barriers    Do you follow any special diet presently?: No  Who shops: patient and spouse  Who cooks: patient and spouse    Food Log: Completed via the method of food recall    Wakes up 6:30-7 am    Breakfast:9-10 am; skips; 1 egg w/ butter w/ 1 slice toast (honey wheat) w/ ham with coffee (10 oz coffee w/ 2% and 1 tsp sugar) OR 1 c Banana Nut Crunch (45 g) w/ 1 c milk and coffee   Morning Snack: none, cookie with coffee  Lunch:skips; 1 pm; half to 1 whole of a sandwich (ham or meatloaf or tomato/james) and sometimes with indiv. chips with iced tea (regular, 16 oz)  Afternoon Snack: none  Dinner: 6-8 pm; 1 c pasta (enriched) w/ 1/2 c meat (hamburger, 70/30) sauce (no sugar added, unless store bought) and a salad (cucumber, onion, tomato, crouton, cheese, olives) and 4 oz wine OR >1 1/4 c lasagna w/ red beets and olives, and 1 slice of bread with milk (8 oz)  Evening Snack:none  Beverages: coffee, iced tea, wine, milk  Eating out/Take out: 1x/week; steak with salad or potato or rice OR pasta meal  Exercise none    Calorie needs 1386 kcals/day Carbs: 30 g/meal, 15-20 g/snack     Protein:5 ounces/day    Fat: 3 servings/day        Nutrition Diagnosis:  Food and nutrition related knowledge deficit  related to Lack of prior exposure to accurate nutrition related information as evidenced by Verbalizes inaccurate or incomplete information    Intervention: plate method, increased fiber intake, label reading, carbohydrate counting, meal timing, meal planning, monitoring portion control, and food diary     Treatment Goals: Patient understands education and recommendations, Patient will monitor food intake daily with tracker, Patient will consume 3 meals a day, Patient will monitor " portion control, Patient will count carbohydrates, Patient will exercise, and Patient will monitor blood glucose    Monitoring and evaluation:    Term code indicator  FH 1.3.2 Food Intake Criteria: Eat 3 meals per day, 4-5 hours apart. No meal skipping.   Term code indicator  FH 1.6.3 Carbohydrate Intake Criteria: Eat 30 grams of carbohydrate per meal, and no more than 15-20 grams per snack.  Term code indicator  CH 2.2 Treatments/Therapy/Alternative Medicine Criteria: Exercise as able and approved by your physician     Materials Provided: portion book, 3-day food log    Patient’s Response to Instruction:  Comprehensiongood  Motivationgood  Expected Compliancegood    Begin Time: 1:03 pm  End Time: 2:24 pm  Referring Provider: Gertrudis Ko PA-C    Thank you for coming to the [facility] for education today.  Please feel free to call with any questions or concerns.    Darlyn Watson, RD  [address] [place] [id number] [phone]

## 2024-06-27 NOTE — PATIENT INSTRUCTIONS
Eat 3 meals per day, 4-5 hours apart. No meal skipping.     Eat 30 grams of carbohydrate per meal, and no more than 15-20 grams per snack.    Exercise as able and approved by your physician     Complete 3-day food log and return completed log at the follow up appointment

## 2024-07-05 ENCOUNTER — OFFICE VISIT (OUTPATIENT)
Dept: ENDOCRINOLOGY | Facility: CLINIC | Age: 49
End: 2024-07-05
Payer: COMMERCIAL

## 2024-07-05 VITALS
WEIGHT: 262 LBS | SYSTOLIC BLOOD PRESSURE: 140 MMHG | HEART RATE: 95 BPM | DIASTOLIC BLOOD PRESSURE: 80 MMHG | BODY MASS INDEX: 44.73 KG/M2 | HEIGHT: 64 IN

## 2024-07-05 DIAGNOSIS — Z79.4 CONTROLLED TYPE 2 DIABETES MELLITUS WITH DIABETIC POLYNEUROPATHY, WITH LONG-TERM CURRENT USE OF INSULIN (HCC): Primary | ICD-10-CM

## 2024-07-05 DIAGNOSIS — L97.509 TYPE 1 DIABETES MELLITUS WITH FOOT ULCER (HCC): ICD-10-CM

## 2024-07-05 DIAGNOSIS — E10.621 TYPE 1 DIABETES MELLITUS WITH FOOT ULCER (HCC): ICD-10-CM

## 2024-07-05 DIAGNOSIS — E11.9 DIABETES MELLITUS WITH FEATURES OF INSULIN RESISTANCE (HCC): ICD-10-CM

## 2024-07-05 DIAGNOSIS — E11.42 CONTROLLED TYPE 2 DIABETES MELLITUS WITH DIABETIC POLYNEUROPATHY, WITH LONG-TERM CURRENT USE OF INSULIN (HCC): Primary | ICD-10-CM

## 2024-07-05 PROCEDURE — 99215 OFFICE O/P EST HI 40 MIN: CPT | Performed by: PHYSICIAN ASSISTANT

## 2024-07-05 RX ORDER — LOSARTAN POTASSIUM 25 MG/1
25 TABLET ORAL DAILY
Qty: 90 TABLET | Refills: 1 | Status: SHIPPED | OUTPATIENT
Start: 2024-07-05

## 2024-07-26 NOTE — ASSESSMENT & PLAN NOTE
Lab Results   Component Value Date    HGBA1C 7.9 (H) 05/20/2024   High TDD insuiln needs suggestive of insuiln resistance / type 2 DM.    Would benefit from GLP-1 RA   Rx for Ozempic provided given high cost of Mounjaro.  Will plan to start 0.25mg weekly x 4 weeks, up-titrate to 0.5mg weekly thereafter.

## 2024-07-26 NOTE — ASSESSMENT & PLAN NOTE
Lab Results   Component Value Date    HGBA1C 7.9 (H) 05/20/2024   Remains uncontrolled.  Will attempt addition of GLP-1 RA to help improve TDD insuiln needs, control post-prandial hyperglycemia.    Will attempt to upload pump report again in 1 month.   To notify us for any concerning hypo- or hyperglycemia trends in meantime.    Recommend update labs - A1c, BMP, lipid panel, albumin/creatinine ratio.  Follow up in 1 month recommended.

## 2024-08-14 ENCOUNTER — TELEPHONE (OUTPATIENT)
Dept: ENDOCRINOLOGY | Facility: CLINIC | Age: 49
End: 2024-08-14

## 2024-09-10 DIAGNOSIS — E10.42 TYPE 1 DIABETES MELLITUS WITH DIABETIC POLYNEUROPATHY (HCC): ICD-10-CM

## 2024-09-10 RX ORDER — PROCHLORPERAZINE 25 MG/1
SUPPOSITORY RECTAL
Qty: 1 EACH | Refills: 1 | Status: SHIPPED | OUTPATIENT
Start: 2024-09-10

## 2024-09-10 RX ORDER — PROCHLORPERAZINE 25 MG/1
SUPPOSITORY RECTAL
Qty: 3 EACH | Refills: 1 | Status: SHIPPED | OUTPATIENT
Start: 2024-09-10

## 2024-10-29 ENCOUNTER — APPOINTMENT (OUTPATIENT)
Dept: LAB | Facility: CLINIC | Age: 49
End: 2024-10-29
Payer: COMMERCIAL

## 2024-10-29 DIAGNOSIS — E10.42 TYPE 1 DIABETES MELLITUS WITH DIABETIC POLYNEUROPATHY (HCC): ICD-10-CM

## 2024-10-29 DIAGNOSIS — E10.621 TYPE 1 DIABETES MELLITUS WITH FOOT ULCER (HCC): ICD-10-CM

## 2024-10-29 DIAGNOSIS — L97.509 TYPE 1 DIABETES MELLITUS WITH FOOT ULCER (HCC): ICD-10-CM

## 2024-10-29 LAB
ANION GAP SERPL CALCULATED.3IONS-SCNC: 10 MMOL/L (ref 4–13)
BUN SERPL-MCNC: 18 MG/DL (ref 5–25)
CALCIUM SERPL-MCNC: 9.2 MG/DL (ref 8.4–10.2)
CHLORIDE SERPL-SCNC: 104 MMOL/L (ref 96–108)
CHOLEST SERPL-MCNC: 158 MG/DL
CO2 SERPL-SCNC: 24 MMOL/L (ref 21–32)
CREAT SERPL-MCNC: 0.95 MG/DL (ref 0.6–1.3)
CREAT UR-MCNC: 130.5 MG/DL
EST. AVERAGE GLUCOSE BLD GHB EST-MCNC: 186 MG/DL
GFR SERPL CREATININE-BSD FRML MDRD: 70 ML/MIN/1.73SQ M
GLUCOSE P FAST SERPL-MCNC: 154 MG/DL (ref 65–99)
HBA1C MFR BLD: 8.1 %
HDLC SERPL-MCNC: 41 MG/DL
LDLC SERPL CALC-MCNC: 78 MG/DL (ref 0–100)
MICROALBUMIN UR-MCNC: 66.4 MG/L
MICROALBUMIN/CREAT 24H UR: 51 MG/G CREATININE (ref 0–30)
NONHDLC SERPL-MCNC: 117 MG/DL
POTASSIUM SERPL-SCNC: 4.1 MMOL/L (ref 3.5–5.3)
SODIUM SERPL-SCNC: 138 MMOL/L (ref 135–147)
TRIGL SERPL-MCNC: 193 MG/DL

## 2024-10-29 PROCEDURE — 36415 COLL VENOUS BLD VENIPUNCTURE: CPT

## 2024-10-29 PROCEDURE — 82043 UR ALBUMIN QUANTITATIVE: CPT

## 2024-10-29 PROCEDURE — 80048 BASIC METABOLIC PNL TOTAL CA: CPT

## 2024-10-29 PROCEDURE — 83036 HEMOGLOBIN GLYCOSYLATED A1C: CPT

## 2024-10-29 PROCEDURE — 82570 ASSAY OF URINE CREATININE: CPT

## 2024-10-29 PROCEDURE — 80061 LIPID PANEL: CPT

## 2024-11-01 ENCOUNTER — OFFICE VISIT (OUTPATIENT)
Dept: ENDOCRINOLOGY | Facility: CLINIC | Age: 49
End: 2024-11-01
Payer: COMMERCIAL

## 2024-11-01 VITALS
SYSTOLIC BLOOD PRESSURE: 136 MMHG | HEART RATE: 97 BPM | WEIGHT: 257 LBS | BODY MASS INDEX: 43.87 KG/M2 | DIASTOLIC BLOOD PRESSURE: 78 MMHG | TEMPERATURE: 97.6 F | HEIGHT: 64 IN | OXYGEN SATURATION: 99 %

## 2024-11-01 DIAGNOSIS — E11.9 DIABETES MELLITUS WITH FEATURES OF INSULIN RESISTANCE (HCC): ICD-10-CM

## 2024-11-01 DIAGNOSIS — E10.42 TYPE 1 DIABETES MELLITUS WITH DIABETIC POLYNEUROPATHY (HCC): Primary | ICD-10-CM

## 2024-11-01 PROCEDURE — 99215 OFFICE O/P EST HI 40 MIN: CPT | Performed by: PHYSICIAN ASSISTANT

## 2024-11-01 RX ORDER — METFORMIN HYDROCHLORIDE 500 MG/1
TABLET, EXTENDED RELEASE ORAL
Qty: 270 TABLET | Refills: 3 | Status: SHIPPED | OUTPATIENT
Start: 2024-11-01 | End: 2024-12-12

## 2024-11-01 RX ORDER — DULAGLUTIDE 0.75 MG/.5ML
0.75 INJECTION, SOLUTION SUBCUTANEOUS WEEKLY
Qty: 2 ML | Refills: 1 | Status: SHIPPED | OUTPATIENT
Start: 2024-11-01 | End: 2024-12-12

## 2024-11-01 RX ORDER — INSULIN ASPART 100 [IU]/ML
INJECTION, SOLUTION INTRAVENOUS; SUBCUTANEOUS
Qty: 135 ML | Refills: 1 | Status: SHIPPED | OUTPATIENT
Start: 2024-11-01

## 2024-11-01 NOTE — ASSESSMENT & PLAN NOTE
Lab Results   Component Value Date    HGBA1C 8.1 (H) 10/29/2024      high TDD insulin needs / insulin resistance / type 2 DM   recommend another trial of GLP1 for cardiorenal protective benefits and better glycemic control.   Will start Trulicity 0.75mg weekly x 4 weeks with plans to up-titrate to 1.5mg weekly dose thereafter.      Orders:    Dulaglutide (Trulicity) 0.75 MG/0.5ML SOAJ; Inject 0.75 mg under the skin once a week    metFORMIN (GLUCOPHAGE-XR) 500 mg 24 hr tablet; Take 1 tablet in AM, 2 tablet in PM

## 2024-11-01 NOTE — PROGRESS NOTES
Ambulatory Visit  Name: Urban Rashid      : 1975      MRN: [id number]  Encounter Provider: Gertrudis Ko PA-C  Encounter Date: 2024   Encounter department: [facility]    Assessment & Plan  Type 1 diabetes mellitus with diabetic polyneuropathy (HCC)    Lab Results   Component Value Date    HGBA1C 8.1 (H) 10/29/2024   Unfortunately not at goal, not much change compared to last visit.    Insulin pump adjustments this visit:  Change carb ratio from 3.5 to 2.8 during daytime hours (7am-9pm) in efforts to better control post-prandial hyperglycemia.     Encouraged compliance with treatment including timely food bolus, interacting with pump as needed. Also encouraged improve compliance with metformin (missing doses intermittently).     Recommend follow up with dietitian, reschedule appointment ASAP.     Follow up - 6 weeks due to high risk status.      Orders:    Insulin Aspart (NovoLOG) 100 units/mL injection; TDD up to 150 units daily with insulin pump.    Diabetes mellitus with features of insulin resistance (HCC)    Lab Results   Component Value Date    HGBA1C 8.1 (H) 10/29/2024      high TDD insulin needs / insulin resistance / type 2 DM   recommend another trial of GLP1 for cardiorenal protective benefits and better glycemic control.   Will start Trulicity 0.75mg weekly x 4 weeks with plans to up-titrate to 1.5mg weekly dose thereafter.      Orders:    Dulaglutide (Trulicity) 0.75 MG/0.5ML SOAJ; Inject 0.75 mg under the skin once a week    metFORMIN (GLUCOPHAGE-XR) 500 mg 24 hr tablet; Take 1 tablet in AM, 2 tablet in PM        I have spent a total time of 45 minutes in caring for this patient on the day of the visit/encounter including Prognosis, Instructions for management, Patient and family education, Importance of tx compliance, Impressions, Counseling / Coordination of care, Obtaining or reviewing history  , and review of CGM report / insulin  pump report.   .   History of Present Illness     Urban Rashid is a 49 y.o. female who presents for routine follow up for type 1 DM.   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   Most recent endocrinology visit: 7/5/24.    Urban is s/p left 4th finger PIP arthrodesis on 6/10/24. There were some complications with postoperative infection, requiring I&D left 4th finger performed on 8/9/24, 8/13/24, as well as prolonged IV antibiotic course as part of treatment.    She unfortunately missed recent dietitian follow up visit on 9/13/24 due to her complicated health history.    She is still using Tandem pump but not happy due to frequent alarms. Had 3 other pumps previously - OmniPod, Medtronic brands previously attempted. She feels she may prefer OmniPod in future (tubeless system). Willing to continue on Tandem Tslimx 2 for now thought.    She started to Stopped Ozempic about a month ago. Had titrated up to as high as 0.5mg weekly dose and unfortunately had significant nausea, leading to discontinuation.   She continues on Metformin, but admittedly is not very compliant lately.     Insulin pump report - see media section for further details.  Insulin pump: Tandem Tslimx2 Control IQ settings  CGM: Dexcom  Current Insulin pump settings:  Basal rate:              Basal MN-3a   1.4 units per hour              3a-7am            2.5 units per hour              7a-10am          2.1 units per hour              10a-9pm          2.1 units per hour              9pm-MN          1.4 units per hour  Calculated daily basal 47.8 units     Insulin to carb ratio:              MN-7am5               7am - 9pm      3.2              9pm-MN          5  Insulin sensitivity factor: 20  AIT: 5h  Target BG 110mg/dL    Insulin use summary:  Average daily dose: 95.87 units  Basal 55%, 52.91 units  Bolus 45%, 42.97 units  Average daily boluses: 8  Manual 61%, 5 boluses  Control-IQ 39%, 3  "boluses  Average daily carbs: 119g    2 week CGM summary report:  Urban Rashid   Device used: Dexcom G6  Home use     Indication: Type 1 Diabetes    More than 72 hours of data was reviewed. Report to be scanned to chart.     Date Range: 10/19/24 - 11/1/24    Analysis of data:   % time CGM used: 52%  Average Glucose: 196mg/dL  GMI: n/a  Time in Target Range: 45%  Time Above Range: 42% high, 13% very high  Time Below Range: 0.1% low, 0% very low    Interpretation of data: Limited data due to running out of CGM sensors, but post prandial hyperglycemia seems to worsen as day progresses. Her current carb bolus does not seem to be adequately covering highs, leading to frequent control IQ boluses.        History obtained from : patient  Review of Systems   All other systems reviewed and are negative.    Medical History Reviewed by provider this encounter:  Tobacco  Allergies  Meds  Problems  Med Hx  Surg Hx  Fam Hx       Current Outpatient Medications on File Prior to Visit   Medication Sig Dispense Refill    Continuous Blood Gluc Sensor (Dexcom G6 Sensor) MISC CHANGE SENSOR EVERY 10 DAYS      Continuous Blood Gluc Sensor (Dexcom G6 Sensor) MISC CHANGE SENSOR EVERY 10 DAYS      Continuous Glucose Sensor (Dexcom G6 Sensor) MISC CHANGE SENSOR EVERY 10 DAYS 3 each 1    Continuous Glucose Transmitter (Dexcom G6 Transmitter) MISC USE 1 DEVICE IN THE MORNING AS DIRECTED 1 each 1    docusate sodium (COLACE) 100 mg capsule Take 1 capsule (100 mg total) by mouth 2 (two) times a day  0    gabapentin (NEURONTIN) 300 mg capsule Take 1 tablet with breakfast, 1 with lunch, and 2 tablets at bedtime. 360 capsule 2    losartan (COZAAR) 25 mg tablet Take 1 tablet (25 mg total) by mouth daily 90 tablet 1    polyethylene glycol (MIRALAX) 17 g packet Take 17 g by mouth daily      rosuvastatin (CRESTOR) 5 mg tablet Take 1 tablet (5 mg total) by mouth daily 90 tablet 3    Syringe/Needle, Disp, 30G X 1/2\" 1 ML MISC Use in the " morning 50 each 1    [DISCONTINUED] metFORMIN (GLUCOPHAGE-XR) 500 mg 24 hr tablet Take 1 tablet in AM, 2 tablet in  tablet 3    [DISCONTINUED] NovoLOG 100 UNIT/ML injection TDD up to 150 units daily with insulin pump. 135 mL 1    Blood Glucose Monitoring Suppl (OneTouch Verio Reflect) w/Device KIT Check blood sugars four times daily. Please substitute with appropriate alternative as covered by patient's insurance. Dx: E11.65 (Patient not taking: Reported on 5/20/2024) 1 kit 0    Glucagon (Gvoke HypoPen 1-Pack) 1 MG/0.2ML SOAJ Use as needed for hypoglycemia emergency (Patient not taking: Reported on 6/27/2024) 0.2 mL 0    glucose blood (OneTouch Verio) test strip Check blood sugars four times daily. Please substitute with appropriate alternative as covered by patient's insurance. Dx: E11.65 (Patient not taking: Reported on 5/20/2024) 400 each 3    Insulin Glargine Solostar (Lantus SoloStar) 100 UNIT/ML SOPN Inject 0.5 mL (50 Units total) under the skin daily at bedtime Back up insulin for pump failure (Patient not taking: Reported on 6/27/2024) 6 mL 1    Insulin Pen Needle (BD Pen Needle Kim U/F) 32G X 4 MM MISC Use 4 (four) times a day Use 3 times daily as needed with backup insulin (Patient not taking: Reported on 6/27/2024) 90 each 1    OneTouch Delica Lancets 33G MISC Check blood sugars four times daily. Please substitute with appropriate alternative as covered by patient's insurance. Dx: E11.65 (Patient not taking: Reported on 6/27/2024) 400 each 3    senna (SENOKOT) 8.6 mg Take 1 tablet (8.6 mg total) by mouth daily at bedtime 30 tablet 2    [DISCONTINUED] acetaminophen (TYLENOL) 325 mg tablet Take 3 tablets (975 mg total) by mouth every 6 (six) hours (Patient not taking: Reported on 9/15/2023)  0    [DISCONTINUED] oxyCODONE-acetaminophen (LYNOX)  MG per tablet Take 1 tablet by mouth every 4 (four) hours as needed for moderate pain (Patient not taking: Reported on 6/13/2023)      [DISCONTINUED]  "semaglutide, 0.25 or 0.5 mg/dose, (Ozempic, 0.25 or 0.5 MG/DOSE,) 2 mg/3 mL injection pen Inject 0.75 mL (0.5 mg total) under the skin every 7 days (Patient not taking: Reported on 11/1/2024) 3 mL 1     No current facility-administered medications on file prior to visit.      Social History     Tobacco Use    Smoking status: Never    Smokeless tobacco: Never   Vaping Use    Vaping status: Never Used   Substance and Sexual Activity    Alcohol use: Not Currently    Drug use: Never    Sexual activity: Not Currently         Objective     /78 (BP Location: Left arm, Patient Position: Sitting)   Pulse 97   Temp 97.6 °F (36.4 °C)   Ht 5' 4\" (1.626 m)   Wt 117 kg (257 lb)   SpO2 99%   BMI 44.11 kg/m²     Physical Exam  Vitals reviewed.   HENT:      Head: Normocephalic.   Pulmonary:      Effort: No respiratory distress.   Neurological:      Mental Status: She is alert.   Psychiatric:         Mood and Affect: Mood normal.         Behavior: Behavior normal.         "

## 2024-11-01 NOTE — ASSESSMENT & PLAN NOTE
Lab Results   Component Value Date    HGBA1C 8.1 (H) 10/29/2024   Unfortunately not at goal, not much change compared to last visit.    Insulin pump adjustments this visit:  Change carb ratio from 3.5 to 2.8 during daytime hours (7am-9pm) in efforts to better control post-prandial hyperglycemia.     Encouraged compliance with treatment including timely food bolus, interacting with pump as needed. Also encouraged improve compliance with metformin (missing doses intermittently).     Recommend follow up with dietitian, reschedule appointment ASAP.     Follow up - 6 weeks due to high risk status.      Orders:    Insulin Aspart (NovoLOG) 100 units/mL injection; TDD up to 150 units daily with insulin pump.

## 2024-11-20 ENCOUNTER — TELEPHONE (OUTPATIENT)
Age: 49
End: 2024-11-20

## 2024-11-20 NOTE — TELEPHONE ENCOUNTER
Patient calling stating she is itchy all over her body. She said she started Trulicity 3 weeks ago, and metformin she has been taking it off and on. She isn't sure if this is related to the medication or not? Asking for a sooner appt. Please advise and call patient.

## 2024-11-20 NOTE — TELEPHONE ENCOUNTER
Contacted patient. Just itchy skin for past week. No other symptoms. No raised lesions. Took trulicity for past few weeks, including yesterday. That did not make it worse. She has also been taking metformin for a few weeks. I advised low likelihood of concern for allx reaction, and advised options of either sx mgmt eg moisturizers and anti-histamines vs observation off therapy. Pt indicated plan for surveillance, may consider urgent care. Will continue presnet rx and follow up with us PRN

## 2024-11-20 NOTE — TELEPHONE ENCOUNTER
"Phone call from patient calling to report that \"she is very itchy allover, to the point of having blood\". Patient sounds very concern, doesn't know what to do. Attempted to transfer patient to CTS (not avail). Message sent via Teams to office. Please contact patient ASAP to advise.   "

## 2024-11-21 DIAGNOSIS — E10.42 TYPE 1 DIABETES MELLITUS WITH DIABETIC POLYNEUROPATHY (HCC): ICD-10-CM

## 2024-11-21 RX ORDER — PROCHLORPERAZINE 25 MG/1
SUPPOSITORY RECTAL
Qty: 3 EACH | Refills: 1 | Status: SHIPPED | OUTPATIENT
Start: 2024-11-21

## 2024-12-12 ENCOUNTER — OFFICE VISIT (OUTPATIENT)
Dept: ENDOCRINOLOGY | Facility: CLINIC | Age: 49
End: 2024-12-12
Payer: COMMERCIAL

## 2024-12-12 VITALS
HEIGHT: 64 IN | DIASTOLIC BLOOD PRESSURE: 82 MMHG | SYSTOLIC BLOOD PRESSURE: 160 MMHG | HEART RATE: 106 BPM | WEIGHT: 257.6 LBS | BODY MASS INDEX: 43.98 KG/M2 | TEMPERATURE: 96.9 F

## 2024-12-12 DIAGNOSIS — E11.9 DIABETES MELLITUS WITH FEATURES OF INSULIN RESISTANCE (HCC): ICD-10-CM

## 2024-12-12 DIAGNOSIS — E10.42 TYPE 1 DIABETES MELLITUS WITH DIABETIC POLYNEUROPATHY (HCC): Primary | ICD-10-CM

## 2024-12-12 PROCEDURE — 99215 OFFICE O/P EST HI 40 MIN: CPT | Performed by: PHYSICIAN ASSISTANT

## 2024-12-12 RX ORDER — GLUCAGON INJECTION, SOLUTION 1 MG/.2ML
INJECTION, SOLUTION SUBCUTANEOUS
Status: SHIPPED
Start: 2024-12-12

## 2024-12-12 RX ORDER — PEN NEEDLE, DIABETIC 32GX 5/32"
NEEDLE, DISPOSABLE MISCELLANEOUS 4 TIMES DAILY
Status: SHIPPED
Start: 2024-12-12

## 2024-12-12 RX ORDER — DULAGLUTIDE 0.75 MG/.5ML
0.75 INJECTION, SOLUTION SUBCUTANEOUS WEEKLY
Status: SHIPPED
Start: 2024-12-12

## 2024-12-12 NOTE — ASSESSMENT & PLAN NOTE
Type 2 DM / insulin resistance noted in setting of high TDD insulin needs.  She reports Trulicity was helpful to BG control, but questionable intolrance (pruritus / dry skin). We will cautiously re-introduce Trulicity 0.75mg weekly. Continue to HOLD metformin for now due to possible adverse effects. She understands to report any potential adverse effects to us, but will follow closely, re-evaluate in 1 month.    Lab Results   Component Value Date    HGBA1C 8.1 (H) 10/29/2024       Orders:    Dulaglutide (Trulicity) 0.75 MG/0.5ML SOAJ; Inject 0.75 mg under the skin once a week

## 2024-12-12 NOTE — ASSESSMENT & PLAN NOTE
Lab Results   Component Value Date    HGBA1C 8.1 (H) 10/29/2024   Poorly controlled, with minimal change since last visit.     Pump settings, CGM report reviewed in detail.   Most hyperglycemia occurs between 8pm - 2am. Will adjust both basal rate, carb ratio at that time - change basal rate to 1.9u/hr, change carb ratio to 4.   Re-introduction of Truilcity should assist with insulin needs, less need for autocorrections.     Follow up in 1 month.       Orders:    Glucagon (Gvoke HypoPen 1-Pack) 1 MG/0.2ML SOAJ; Use as needed for hypoglycemia emergency    Insulin Pen Needle (BD Pen Needle Kim U/F) 32G X 4 MM MISC; Use 4 (four) times a day Use 3 times daily as needed with backup insulin

## 2024-12-12 NOTE — PROGRESS NOTES
Name: Urban Rashid      : 1975      MRN: [id number]  Encounter Provider: Gertrudis Ko PA-C  Encounter Date: 2024   Encounter department: [facility]  :  Assessment & Plan  Type 1 diabetes mellitus with diabetic polyneuropathy (HCC)    Lab Results   Component Value Date    HGBA1C 8.1 (H) 10/29/2024   Poorly controlled, with minimal change since last visit.     Pump settings, CGM report reviewed in detail.   Most hyperglycemia occurs between 8pm - 2am. Will adjust both basal rate, carb ratio at that time - change basal rate to 1.9u/hr, change carb ratio to 4.   Re-introduction of Truilcity should assist with insulin needs, less need for autocorrections.     Follow up in 1 month.       Orders:    Glucagon (Gvoke HypoPen 1-Pack) 1 MG/0.2ML SOAJ; Use as needed for hypoglycemia emergency    Insulin Pen Needle (BD Pen Needle Kim U/F) 32G X 4 MM MISC; Use 4 (four) times a day Use 3 times daily as needed with backup insulin    Diabetes mellitus with features of insulin resistance (HCC)  Type 2 DM / insulin resistance noted in setting of high TDD insulin needs.  She reports Trulicity was helpful to BG control, but questionable intolrance (pruritus / dry skin). We will cautiously re-introduce Trulicity 0.75mg weekly. Continue to HOLD metformin for now due to possible adverse effects. She understands to report any potential adverse effects to us, but will follow closely, re-evaluate in 1 month.    Lab Results   Component Value Date    HGBA1C 8.1 (H) 10/29/2024       Orders:    Dulaglutide (Trulicity) 0.75 MG/0.5ML SOAJ; Inject 0.75 mg under the skin once a week    I have spent a total time of 45 minutes in caring for this patient on the day of the visit/encounter including Diagnostic results, Instructions for management, Impressions, Counseling / Coordination of care, Documenting in the medical record, and CGM / pump report and review .     History of  "Present Illness   HPI  Urban Rashid is a 49 y.o. female who presents for routine follow up visit for type 1 DM / type 2 DM with features of insulin resistance.   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   Most recent endocrinology visit: 11/1/24.    Since her last visit Catia Mccartney discontinued both Trulicity and metformin due to concern over generalized itching. This has improved since discontinuation of both medications. She was able to take Trulicity x 3 weeks, believes reaction began about 1-2 weeks into medication start. She is disappointed to stop Trulicity in particularly, feels it was greatly helping her glycemic control; she was having less \"high\" alarms with CGM (alarm set for BG > 225).    She has prior experience on GLP1 therapy - Poor experience on Ozempic in past, caused nausea / vomiting.     DM eye exam - due for appointment, plans to pursue soon.    Dietitian visit - has not been able to make appointment as of yet.     Insulin pump report - see media section for further details.  Insulin pump: Tandem Tslimx2 Control IQ settings  CGM: Dexcom   Current Insulin pump settings:  Basal rate:              Basal MN-3a   1.2 units per hour              3a-7am            1.4 units per hour              7a-10am          2.1 units per hour              10a-9pm          2.1 units per hour              9pm-MN          1.6 units per hour  Calculated daily basal 43.4 units     Insulin to carb ratio:              MN-7am 5               7am - 9pm      2.8              9pm-MN          5  Insulin sensitivity factor: 20  AIT: 5h  Target BG 110mg/dL     Insulin use summary:  Average daily dose: 108.21  Basal 53%, 57.89 units  Bolus 47%, 50.33 units  Average daily boluses: 11    Average daily carbs: 104g     2 week CGM summary report:  Urban Rashid   Device used: Dexcom G6  Home use      Indication: Type 1 Diabetes  More than 72 hours of data was reviewed. Report to " be scanned to chart.   Date Range: 10/19/24 - 11/1/24     Analysis of data:   % time CGM used: 88%  Average Glucose: 194mg/dL  GMI: n/a  Time in Target Range: 42%  Time Above Range: 42% high, 15% very high  Time Below Range: 0.4% low, 0.1% very low     Interpretation of data: Little change compared to data from 1 month ago. Time in range essentially the same. Late Evening / overnight hyperglycemia remains a concern.                  Review of Systems   All other systems reviewed and are negative.    Medical History Reviewed by provider this encounter:  Tobacco  Allergies  Meds  Problems  Med Hx  Surg Hx  Fam Hx     .  Current Outpatient Medications on File Prior to Visit   Medication Sig Dispense Refill    Continuous Blood Gluc Sensor (Dexcom G6 Sensor) MISC CHANGE SENSOR EVERY 10 DAYS      Continuous Blood Gluc Sensor (Dexcom G6 Sensor) MISC CHANGE SENSOR EVERY 10 DAYS      Continuous Glucose Sensor (Dexcom G6 Sensor) MISC CHANGE SENSOR EVERY 10 DAYS 3 each 1    Continuous Glucose Transmitter (Dexcom G6 Transmitter) MISC USE 1 DEVICE IN THE MORNING AS DIRECTED 1 each 1    docusate sodium (COLACE) 100 mg capsule Take 1 capsule (100 mg total) by mouth 2 (two) times a day  0    gabapentin (NEURONTIN) 300 mg capsule Take 1 tablet with breakfast, 1 with lunch, and 2 tablets at bedtime. 360 capsule 2    Insulin Aspart (NovoLOG) 100 units/mL injection TDD up to 150 units daily with insulin pump. 135 mL 1    Insulin Glargine Solostar (Lantus SoloStar) 100 UNIT/ML SOPN Inject 0.5 mL (50 Units total) under the skin daily at bedtime Back up insulin for pump failure 6 mL 1    losartan (COZAAR) 25 mg tablet Take 1 tablet (25 mg total) by mouth daily 90 tablet 1    polyethylene glycol (MIRALAX) 17 g packet Take 17 g by mouth daily      rosuvastatin (CRESTOR) 5 mg tablet Take 1 tablet (5 mg total) by mouth daily 90 tablet 3    senna (SENOKOT) 8.6 mg Take 1 tablet (8.6 mg total) by mouth daily at bedtime 30 tablet 2     "Syringe/Needle, Disp, 30G X 1/2\" 1 ML MISC Use in the morning 50 each 1    [DISCONTINUED] Blood Glucose Monitoring Suppl (OneTouch Verio Reflect) w/Device KIT Check blood sugars four times daily. Please substitute with appropriate alternative as covered by patient's insurance. Dx: E11.65 (Patient not taking: Reported on 12/12/2024) 1 kit 0    [DISCONTINUED] Dulaglutide (Trulicity) 0.75 MG/0.5ML SOAJ Inject 0.75 mg under the skin once a week (Patient not taking: Reported on 12/12/2024) 2 mL 1    [DISCONTINUED] Glucagon (Gvoke HypoPen 1-Pack) 1 MG/0.2ML SOAJ Use as needed for hypoglycemia emergency (Patient not taking: Reported on 12/12/2024) 0.2 mL 0    [DISCONTINUED] glucose blood (OneTouch Verio) test strip Check blood sugars four times daily. Please substitute with appropriate alternative as covered by patient's insurance. Dx: E11.65 (Patient not taking: Reported on 12/12/2024) 400 each 3    [DISCONTINUED] Insulin Pen Needle (BD Pen Needle Kim U/F) 32G X 4 MM MISC Use 4 (four) times a day Use 3 times daily as needed with backup insulin (Patient not taking: Reported on 12/12/2024) 90 each 1    [DISCONTINUED] metFORMIN (GLUCOPHAGE-XR) 500 mg 24 hr tablet Take 1 tablet in AM, 2 tablet in PM (Patient not taking: Reported on 12/12/2024) 270 tablet 3    [DISCONTINUED] OneTouch Delica Lancets 33G MISC Check blood sugars four times daily. Please substitute with appropriate alternative as covered by patient's insurance. Dx: E11.65 (Patient not taking: Reported on 12/12/2024) 400 each 3     No current facility-administered medications on file prior to visit.         Objective   /82 (Patient Position: Sitting, Cuff Size: Standard)   Pulse (!) 106   Temp (!) 96.9 °F (36.1 °C) (Temporal)   Ht 5' 4\" (1.626 m)   Wt 117 kg (257 lb 9.6 oz)   BMI 44.22 kg/m²      Physical Exam  Vitals reviewed.   Constitutional:       General: She is not in acute distress.     Appearance: She is obese. She is not ill-appearing.   GABBY: "      Head: Normocephalic.   Pulmonary:      Effort: No respiratory distress.   Musculoskeletal:      Comments: L BKA with prosthesis   Neurological:      Mental Status: She is oriented to person, place, and time. Mental status is at baseline.   Psychiatric:         Mood and Affect: Mood normal.

## 2024-12-20 NOTE — PROGRESS NOTES
Urban Mccartney Gay 48 y.o. female MRN: [id number]    Encounter: [id number]      Assessment & Plan   Problem List Items Addressed This Visit          Endocrine    Diabetes mellitus with features of insulin resistance (HCC)       Lab Results   Component Value Date    HGBA1C 7.9 (H) 05/20/2024   High TDD insuiln needs suggestive of insuiln resistance / type 2 DM.    Would benefit from GLP-1 RA   Rx for Ozempic provided given high cost of Mounjaro.  Will plan to start 0.25mg weekly x 4 weeks, up-titrate to 0.5mg weekly thereafter.           Relevant Medications    semaglutide, 0.25 or 0.5 mg/dose, (Ozempic, 0.25 or 0.5 MG/DOSE,) 2 mg/3 mL injection pen    Diabetes mellitus type 1 (HCC)       Lab Results   Component Value Date    HGBA1C 7.9 (H) 05/20/2024   Remains uncontrolled.  Will attempt addition of GLP-1 RA to help improve TDD insuiln needs, control post-prandial hyperglycemia.    Will attempt to upload pump report again in 1 month.   To notify us for any concerning hypo- or hyperglycemia trends in meantime.    Recommend update labs - A1c, BMP, lipid panel, albumin/creatinine ratio.  Follow up in 1 month recommended.         Relevant Medications    semaglutide, 0.25 or 0.5 mg/dose, (Ozempic, 0.25 or 0.5 MG/DOSE,) 2 mg/3 mL injection pen    losartan (COZAAR) 25 mg tablet    Other Relevant Orders    Hemoglobin A1C    Lipid panel    Basic metabolic panel    Albumin / creatinine urine ratio     Other Visit Diagnoses       Controlled type 2 diabetes mellitus with diabetic polyneuropathy, with long-term current use of insulin (HCC)    -  Primary    Relevant Medications    semaglutide, 0.25 or 0.5 mg/dose, (Ozempic, 0.25 or 0.5 MG/DOSE,) 2 mg/3 mL injection pen        I have spent a total time of 50 minutes in caring for this patient on the day of the visit/encounter including Diagnostic results, Patient and family education, Importance of tx compliance, Impressions, and Obtaining or reviewing history  .      CC:  Diabetes    History of Present Illness     HPI:  Urban Rashid is a 48 y.o. female with type 1 DM here for routine follow up visit today.  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   Last endocrinology visit: 5/20/24    Since her last visit, she had a finger surgery x 2 - 4th digit. Unfortunately having trouble with healing, required revision. She also reports pain has seemed to increase her BG trends recently - more hyperglycemia.    Regarding DM pharmacotherapy, she is taking metformin to help with insulin resistance.  Mounjaro was not affordable unfortunately. She is willing to try an alternative.    Saw dietician recently - 6/27/24, was helpful to patient. Plans to follow up again 9/13/24.      Pump upgrade is due soon, has had her current pump for almost 5 years.   Insulin pump: Tandem Tslimx2 Control IQ settings  CGM: Dexcom  Current Insulin pump settings:  Basal rate:              Basal MN-3a   1.4 units per hour              3a-7am            2.5 units per hour              7a-10am          2.1 units per hour              10a-9pm          2.1 units per hour              9pm-MN          1.4 units per hour  Calculated daily basal 47.8 units    Insulin to carb ratio:              MN-7am 5               7am - 9pm      3.2              9pm-MN          5  Insulin sensitivity factor: 20  AIT: 5h  Target BG 110mg/dL     Insulin daily averages (over 14 days):  Basal 55% 53.82u/day  Food bolus 25%, 24.22u/day  Correction 4%, 3.94 u/day  Control IQ bolus  17%, 16.4 u/day  Total insulin average: 98.38 units/day    CGM data:   Avg CGM reading 180mg/dL  Time in range:   Target range 50%  Above target 45%  Below target 5%         Review of Systems   All other systems reviewed and are negative.      Historical Information   Past Medical History:   Diagnosis Date    Charcot's joint of foot, left     Diabetes mellitus (HCC)     Insulin pump    History of transfusion     8-4-22     Osteomyelitis of foot, right, acute (HCC)      Past Surgical History:   Procedure Laterality Date    FOOT AMPUTATION Left 10/30/2020    Procedure: CHOPART AMPUTATION LEFT  FOOT:;  Surgeon: Maura Anderson DPM;  Location: [facility];  Service: Podiatry    FOOT CAPSULE RELEASE W/ PERCUTANEOUS HEEL CORD LENGTHENING, TIBIAL TENDON TRANSFER Left 10/30/2020    Procedure: Achilles tenotomy left foot;  Surgeon: Maura Anderson DPM;  Location: [facility];  Service: Podiatry    FOOT SURGERY Right     right first toe amputation  2019    OR DISARTICULATION KNEE Left 8/31/2022    Procedure: AMPUTATION BELOW KNEE (BKA);  Surgeon: Carolyn Lainez MD;  Location: [facility];  Service: General     Social History   Social History     Substance and Sexual Activity   Alcohol Use Not Currently     Social History     Substance and Sexual Activity   Drug Use Never     Social History     Tobacco Use   Smoking Status Never   Smokeless Tobacco Never     Family History:   Family History   Problem Relation Age of Onset    Diabetes Mother     Stroke Mother     Hypertension Father     Diabetes Sister     Diabetes Brother     Hyperlipidemia Brother     Hypertension Brother        Meds/Allergies   Current Outpatient Medications   Medication Sig Dispense Refill    acetaminophen (TYLENOL) 325 mg tablet Take 3 tablets (975 mg total) by mouth every 6 (six) hours (Patient not taking: Reported on 9/15/2023)  0    Blood Glucose Monitoring Suppl (OneTouch Verio Reflect) w/Device KIT Check blood sugars four times daily. Please substitute with appropriate alternative as covered by patient's insurance. Dx: E11.65 (Patient not taking: Reported on 5/20/2024) 1 kit 0    Continuous Blood Gluc Sensor (Dexcom G6 Sensor) MISC CHANGE SENSOR EVERY 10 DAYS      Continuous Blood Gluc Sensor (Dexcom G6 Sensor) MISC CHANGE SENSOR EVERY 10 DAYS      Continuous Blood Gluc Transmit (Dexcom G6 Transmitter) MISC USE 1 DEVICE IN THE MORNING AS DIRECTED 1 each 1    docusate  sodium (COLACE) 100 mg capsule Take 1 capsule (100 mg total) by mouth 2 (two) times a day  0    gabapentin (NEURONTIN) 300 mg capsule Take 1 tablet with breakfast, 1 with lunch, and 2 tablets at bedtime. 360 capsule 2    Glucagon (Gvoke HypoPen 1-Pack) 1 MG/0.2ML SOAJ Use as needed for hypoglycemia emergency (Patient not taking: Reported on 6/27/2024) 0.2 mL 0    glucose blood (OneTouch Verio) test strip Check blood sugars four times daily. Please substitute with appropriate alternative as covered by patient's insurance. Dx: E11.65 (Patient not taking: Reported on 5/20/2024) 400 each 3    Insulin Glargine Solostar (Lantus SoloStar) 100 UNIT/ML SOPN Inject 0.5 mL (50 Units total) under the skin daily at bedtime Back up insulin for pump failure (Patient not taking: Reported on 6/27/2024) 6 mL 1    Insulin Pen Needle (BD Pen Needle Kim U/F) 32G X 4 MM MISC Use 4 (four) times a day Use 3 times daily as needed with backup insulin (Patient not taking: Reported on 6/27/2024) 90 each 1    losartan (COZAAR) 25 mg tablet Take 0.5 tablets (12.5 mg total) by mouth daily 45 tablet 3    metFORMIN (GLUCOPHAGE-XR) 500 mg 24 hr tablet Take 1 tablet in AM, 2 tablet in  tablet 3    NovoLOG 100 UNIT/ML injection TDD up to 150 units daily with insulin pump. 135 mL 1    OneTouch Delica Lancets 33G MISC Check blood sugars four times daily. Please substitute with appropriate alternative as covered by patient's insurance. Dx: E11.65 (Patient not taking: Reported on 6/27/2024) 400 each 3    oxyCODONE-acetaminophen (LYNOX)  MG per tablet Take 1 tablet by mouth every 4 (four) hours as needed for moderate pain (Patient not taking: Reported on 6/13/2023)      polyethylene glycol (MIRALAX) 17 g packet Take 17 g by mouth daily      rosuvastatin (CRESTOR) 5 mg tablet Take 1 tablet (5 mg total) by mouth daily 90 tablet 3    senna (SENOKOT) 8.6 mg Take 1 tablet (8.6 mg total) by mouth daily at bedtime 30 tablet 2    Syringe/Needle, Disp,  "30G X 1/2\" 1 ML MISC Use in the morning 50 each 1    tirzepatide (Mounjaro) 2.5 MG/0.5ML Inject 0.5 mL (2.5 mg total) under the skin every 7 days (Patient not taking: Reported on 6/27/2024) 2 mL 2     No current facility-administered medications for this visit.     Allergies   Allergen Reactions    Clindamycin Other (See Comments)     CHEST PRESSURE, FEELS LIKE A HEART ATTACK PER PT       Objective   Vitals: Blood pressure 140/80, pulse 95, height 5' 4\" (1.626 m), weight 119 kg (262 lb).    Physical Exam  Vitals reviewed.   HENT:      Head: Normocephalic.   Pulmonary:      Effort: Pulmonary effort is normal. No respiratory distress.   Neurological:      Mental Status: She is alert.   Psychiatric:         Mood and Affect: Mood normal.         The history was obtained from the review of the chart, patient.    Lab Results:   Lab Results   Component Value Date    HGBA1C 7.9 (H) 05/20/2024    HGBA1C 8.1 (A) 02/09/2024    HGBA1C 7.7 (H) 09/15/2023     Lab Results   Component Value Date    GLUF 264 (H) 05/20/2024    LDLCALC 78 06/13/2023    CREATININE 1.1 (H) 06/10/2024       Lab Results   Component Value Date    SODIUM 136 06/10/2024    K 4.7 06/10/2024     06/10/2024    CO2 21 (L) 06/10/2024    AGAP 11 06/10/2024    BUN 22 (H) 06/10/2024    CREATININE 1.1 (H) 06/10/2024    GLUC 214 (H) 06/10/2024    GLUF 264 (H) 05/20/2024    CALCIUM 9.6 06/10/2024    AST 14 05/20/2024    ALT 18 05/20/2024    ALKPHOS 99 05/20/2024    TP 7.7 05/20/2024    TBILI 0.31 05/20/2024    EGFR 65 06/10/2024     Lab Results   Component Value Date    SODIUM 136 06/10/2024    K 4.7 06/10/2024     06/10/2024    CO2 21 (L) 06/10/2024    BUN 22 (H) 06/10/2024    CREATININE 1.1 (H) 06/10/2024    GLUC 214 (H) 06/10/2024    CALCIUM 9.6 06/10/2024     No results found for: \"LABPROT\"    No results for input(s): \"CREATININEUR\", \"LABMICR\", \"MICROALBCRE\" in the last 72 hours.  Lab Results   Component Value Date    CHOLESTEROL 159 06/13/2023     Lab " "Results   Component Value Date    HDL 47 (L) 06/13/2023     Lab Results   Component Value Date    TRIG 172 (H) 06/13/2023     No results found for: \"NONHDLC\"       Imaging Studies: n/a    Portions of the record may have been created with voice recognition software. Occasional wrong word or \"sound a like\" substitutions may have occurred due to the inherent limitations of voice recognition software. Read the chart carefully and recognize, using context, where substitutions have occurred.  " Vital Signs Last 24 Hrs  T(C): 36.7 (20 Dec 2024 07:01), Max: 37.2 (19 Dec 2024 20:45)  T(F): 98.1 (20 Dec 2024 07:01), Max: 99 (19 Dec 2024 20:45)  HR: 67 (20 Dec 2024 07:01) (63 - 86)  BP: 143/74 (20 Dec 2024 07:01) (110/74 - 143/74)  BP(mean): --  RR: 18 (20 Dec 2024 07:01) (16 - 18)  SpO2: 97% (20 Dec 2024 07:01) (97% - 99%)    Parameters below as of 20 Dec 2024 07:01  Patient On (Oxygen Delivery Method): room air        GENERAL: opens eyes to sternal rub  HEAD:  Atraumatic, Normocephalic  EYES: EOMI, PERRLA, conjunctiva and sclera clear  ENT: Pharynx not erythematous  PULMONARY: Clear to auscultation bilaterally; No wheeze  CARDIOVASCULAR: Regular rate and rhythm; No murmurs, rubs, or gallops  ABDOMEN: Soft, + tenderness to palpation. Nondistended; Bowel sounds present  EXTREMITIES:  2+ Peripheral Pulses, No clubbing, cyanosis, or edema  MUSCULOSKELETAL: No calf tenderness  PSYCH: AAOx0   SKIN: warm and dry, No rashes or lesions

## 2025-01-13 DIAGNOSIS — E11.9 DIABETES MELLITUS WITH FEATURES OF INSULIN RESISTANCE (HCC): ICD-10-CM

## 2025-01-16 RX ORDER — DULAGLUTIDE 0.75 MG/.5ML
0.75 INJECTION, SOLUTION SUBCUTANEOUS WEEKLY
Qty: 2 ML | Refills: 1 | Status: SHIPPED | OUTPATIENT
Start: 2025-01-16

## 2025-01-16 NOTE — TELEPHONE ENCOUNTER
Trulicity refilled at current dose.  High risk pump patient, could use sooner follow up if we can - any 60 minute slots within next month?

## 2025-02-12 ENCOUNTER — NURSE TRIAGE (OUTPATIENT)
Age: 50
End: 2025-02-12

## 2025-02-12 ENCOUNTER — TELEPHONE (OUTPATIENT)
Dept: ENDOCRINOLOGY | Facility: CLINIC | Age: 50
End: 2025-02-12

## 2025-02-12 DIAGNOSIS — B37.9 YEAST INFECTION: Primary | ICD-10-CM

## 2025-02-12 RX ORDER — FLUCONAZOLE 150 MG/1
150 TABLET ORAL ONCE
Qty: 1 TABLET | Refills: 0 | Status: SHIPPED | OUTPATIENT
Start: 2025-02-12 | End: 2025-02-12

## 2025-02-12 NOTE — TELEPHONE ENCOUNTER
Patient returned call, informed her of provider's message. She said she did do a correction dose in the pump, and yesterday did give herself an extra injection of Novolog she thinks it was around 9 pm. She said it did bring blood sugar down initially but then it keeps going back up. She said she does not think her pump is connected for office to review, and also she no longer has Dexcom.  She is also asking if Dr Wheatley would prescribe her something for her yeast infection?  Please advise and call patient.

## 2025-02-12 NOTE — TELEPHONE ENCOUNTER
I sent script for diflucan (yeast infection treatment) to her pharmacy. Can she be made aware? Is there anything we can do to help with her dexcom? The pump will be able to help with her blood sugars more if it is using the sensor and if she is using control IQ

## 2025-02-12 NOTE — TELEPHONE ENCOUNTER
"albuterol (PROAIR HFA/PROVENTIL HFA/VENTOLIN HFA) 108 (90 Base) MCG/ACT Inhaler     Requested Prescriptions   Pending Prescriptions Disp Refills     albuterol (PROAIR HFA/PROVENTIL HFA/VENTOLIN HFA) 108 (90 Base) MCG/ACT inhaler [Pharmacy Med Name: ALBUTEROL HFA INH (200 PUFFS) 18GM] 18 g 0     Sig: INHALE 2 PUFFS INTO THE LUNGS EVERY 4 HOURS AS NEEDED FOR SHORTNESS OF BREATH OR DIFFICULT BREATHING OR WHEEZING OR COUGHING  Last Written Prescription Date:  6/28/2018  Last Fill Quantity: 1 inhaler,  # refills: 3   Last office visit: 6/28/2018 with prescribing provider: 6/28/2018   Future Office Visit:         Asthma Maintenance Inhalers - Anticholinergics Failed - 4/15/2019  9:50 PM        Failed - Asthma control assessment score within normal limits in last 6 months     Please review ACT score.           Failed - Recent (6 mo) or future (30 days) visit within the authorizing provider's specialty     Patient had office visit in the last 6 months or has a visit in the next 30 days with authorizing provider or within the authorizing provider's specialty.  See \"Patient Info\" tab in inbasket, or \"Choose Columns\" in Meds & Orders section of the refill encounter.            Passed - Patient is age 12 years or older        Passed - Medication is active on med list          " Patient is aware and will adjust insulin as needed

## 2025-02-12 NOTE — TELEPHONE ENCOUNTER
"Reason for Disposition   Blood glucose > 300 mg/dL (16.7 mmol/L)    Answer Assessment - Initial Assessment Questions  1. BLOOD GLUCOSE: \"What is your blood glucose level?\"       374    2. ONSET: \"When did you check the blood glucose?\"      Checked while on the phone    3. USUAL RANGE: \"What is your glucose level usually?\" (e.g., usual fasting morning value, usual evening value)      Has been high the past few weeks    4. KETONES: \"Do you check for ketones (urine or blood test strips)?\" If Yes, ask: \"What does the test show now?\"       Denies    5. TYPE 1 or 2:  \"Do you know what type of diabetes you have?\"  (e.g., Type 1, Type 2, Gestational; doesn't know)       Type 1    6. INSULIN: \"Do you take insulin?\" \"What type of insulin(s) do you use? What is the mode of delivery? (syringe, pen; injection or pump)?\"       Novolog via pump    7. DIABETES PILLS: \"Do you take any pills for your diabetes?\" If Yes, ask: \"Have you missed taking any pills recently?\"      Metformin    8. OTHER SYMPTOMS: \"Do you have any symptoms?\" (e.g., fever, frequent urination, difficulty breathing, dizziness, weakness, vomiting)      Denies    Protocols used: Diabetes - High Blood Sugar-Adult-OH    "

## 2025-02-12 NOTE — TELEPHONE ENCOUNTER
Patient called stating her blood sugars have been higher for a few weeks. She states she can no longer afford the Dexcom and is checking via fingerstick. She checked while on the phone and BG is 374, she denies symptoms. Patient states she does have her period and a yeast infection so she's unsure if the way she is feeling is due to that. She just does not feel like herself. She uses Novolog 150 units via insulin pump she also states she is taking metformin twice a day but not every day as it gives her a rash. She is no longer taking Trulicity because the cost is too high. Patient has appointment for 3/21, tried to get her in sooner. Nothing available this month. Care advice reviewed with patient. Patient asking if her pump setting should be changed.  Please call.

## 2025-02-12 NOTE — TELEPHONE ENCOUNTER
Can you see nurse triage patient is having high blood sugar was 374 she says she does have a yeast infection there is a message also in there

## 2025-03-07 NOTE — PROGRESS NOTES
Progress Note - General Surgery   Rolly Rashid 49 y o  female MRN: [id number]  Unit/Bed#: E2 -01 Encounter: [id number]    Assessment:  51yoF p/w chronic nonhealing left foot wound c/b osteomyelitis, now s/p L BKA on 8/31    Vitals stable, afebrile  No labs    Plan:  - CCD2  - L BKA dressing changes daily per nursing, local wound care, knee immobilizer  - pain control  - insulin pump for control blood sugars  - Lovenox  - dispo planning, awaiting rehab placement, hopeful d/c to [place] early this week per Case Management    Subjective/Objective   Subjective:   Reporting some continued pain in the left BKA, also reporting some LLE phantom pain, otherwise tolerating a diet without nausea or emesis, voiding without issues, awaiting placement  Objective:     Blood pressure 107/60, pulse 95, temperature 98 1 °F (36 7 °C), temperature source Temporal, resp  rate 18, height 5' 3" (1 6 m), weight 104 kg (229 lb 0 9 oz), last menstrual period 08/28/2022, SpO2 97 %  ,Body mass index is 40 58 kg/m²  No intake or output data in the 24 hours ending 09/05/22 0728    Invasive Devices  Report    Peripheral Intravenous Line  Duration           Peripheral IV 08/31/22 Left;Proximal;Ventral (anterior) Forearm 4 days          Line  Duration           Pump Device Insulin pump Medial;Right;Upper Abdomen 4 days                Physical Exam:  General: NAD  Skin: Warm, dry, anicteric  HEENT: Normocephalic, atraumatic  CV: RRR, no m/r/g  Pulm: CTA b/l, no inc WOB  Abd: Soft, ND/NT  MSK:  L BKA with dressing present  Neuro: AOx3, GCS 15    Lab, Imaging and other studies:I have personally reviewed pertinent lab results      VTE Pharmacologic Prophylaxis: Sequential compression device (Venodyne)  and Enoxaparin (Lovenox)  VTE Mechanical Prophylaxis: sequential compression device Pt requesting more tylenol. MD notified.

## 2025-03-10 ENCOUNTER — TELEPHONE (OUTPATIENT)
Dept: ENDOCRINOLOGY | Facility: CLINIC | Age: 50
End: 2025-03-10

## 2025-03-10 DIAGNOSIS — E10.42 TYPE 1 DIABETES MELLITUS WITH DIABETIC POLYNEUROPATHY (HCC): Primary | ICD-10-CM

## 2025-03-11 ENCOUNTER — TELEPHONE (OUTPATIENT)
Dept: ENDOCRINOLOGY | Facility: CLINIC | Age: 50
End: 2025-03-11

## 2025-03-11 DIAGNOSIS — E10.42 TYPE 1 DIABETES MELLITUS WITH DIABETIC POLYNEUROPATHY (HCC): Primary | ICD-10-CM

## 2025-03-11 RX ORDER — INSULIN GLARGINE 300 U/ML
60 INJECTION, SOLUTION SUBCUTANEOUS
Qty: 6 ML | Refills: 3 | Status: SHIPPED | OUTPATIENT
Start: 2025-03-11 | End: 2025-03-21 | Stop reason: ALTCHOICE

## 2025-03-11 RX ORDER — PEN NEEDLE, DIABETIC 32GX 5/32"
NEEDLE, DISPOSABLE MISCELLANEOUS
Qty: 200 EACH | Refills: 5 | Status: SHIPPED | OUTPATIENT
Start: 2025-03-11

## 2025-03-11 RX ORDER — INSULIN ASPART 100 [IU]/ML
INJECTION, SOLUTION INTRAVENOUS; SUBCUTANEOUS
Qty: 24 ML | Refills: 5 | Status: SHIPPED | OUTPATIENT
Start: 2025-03-11 | End: 2025-03-21 | Stop reason: ALTCHOICE

## 2025-03-11 NOTE — TELEPHONE ENCOUNTER
Spoke to patient , made her aware Dr Wheatley sent in insulin to pharmcy and she should call the office if she has any problems getting insulin and needles

## 2025-03-11 NOTE — TELEPHONE ENCOUNTER
Patient called and is going to be out of pump supplies, she does not have money to get supplies, she has short acting insulin but will need long acting and does not know what to take , she is using vials and has no pen needles,she has syringes. She is using [facility]

## 2025-03-21 ENCOUNTER — OFFICE VISIT (OUTPATIENT)
Dept: ENDOCRINOLOGY | Facility: CLINIC | Age: 50
End: 2025-03-21
Payer: MEDICARE

## 2025-03-21 VITALS
HEIGHT: 64 IN | HEART RATE: 110 BPM | WEIGHT: 252 LBS | OXYGEN SATURATION: 97 % | SYSTOLIC BLOOD PRESSURE: 110 MMHG | DIASTOLIC BLOOD PRESSURE: 70 MMHG | BODY MASS INDEX: 43.02 KG/M2 | TEMPERATURE: 98 F

## 2025-03-21 DIAGNOSIS — Z79.4 TYPE 2 DIABETES MELLITUS WITH HYPERGLYCEMIA, WITH LONG-TERM CURRENT USE OF INSULIN (HCC): Primary | ICD-10-CM

## 2025-03-21 DIAGNOSIS — E10.42 TYPE 1 DIABETES MELLITUS WITH DIABETIC POLYNEUROPATHY (HCC): ICD-10-CM

## 2025-03-21 DIAGNOSIS — E11.65 TYPE 2 DIABETES MELLITUS WITH HYPERGLYCEMIA, WITH LONG-TERM CURRENT USE OF INSULIN (HCC): Primary | ICD-10-CM

## 2025-03-21 PROCEDURE — 99214 OFFICE O/P EST MOD 30 MIN: CPT | Performed by: STUDENT IN AN ORGANIZED HEALTH CARE EDUCATION/TRAINING PROGRAM

## 2025-03-21 PROCEDURE — G2211 COMPLEX E/M VISIT ADD ON: HCPCS | Performed by: STUDENT IN AN ORGANIZED HEALTH CARE EDUCATION/TRAINING PROGRAM

## 2025-03-21 RX ORDER — (INSULIN DEGLUDEC AND LIRAGLUTIDE) 100; 3.6 [IU]/ML; MG/ML
INJECTION, SOLUTION SUBCUTANEOUS
Qty: 15 ML | Refills: 1 | Status: SHIPPED | OUTPATIENT
Start: 2025-03-21
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
## 2025-03-21 NOTE — ASSESSMENT & PLAN NOTE
Lab Results   Component Value Date    HGBA1C 8.1 (H) 10/29/2024     Orders:  •  Ambulatory referral to Diabetic Education; Future

## 2025-03-27 ENCOUNTER — TELEPHONE (OUTPATIENT)
Age: 50
End: 2025-03-27

## 2025-03-27 DIAGNOSIS — Z79.4 TYPE 2 DIABETES MELLITUS WITH HYPERGLYCEMIA, WITH LONG-TERM CURRENT USE OF INSULIN (HCC): Primary | ICD-10-CM

## 2025-03-27 DIAGNOSIS — E11.65 TYPE 2 DIABETES MELLITUS WITH HYPERGLYCEMIA, WITH LONG-TERM CURRENT USE OF INSULIN (HCC): Primary | ICD-10-CM

## 2025-03-27 RX ORDER — INSULIN ASPART 100 [IU]/ML
INJECTION, SOLUTION INTRAVENOUS; SUBCUTANEOUS
Qty: 30 ML | Refills: 3 | Status: SHIPPED | OUTPATIENT
Start: 2025-03-27

## 2025-03-27 RX ORDER — PEN NEEDLE, DIABETIC 32GX 5/32"
NEEDLE, DISPOSABLE MISCELLANEOUS
Qty: 200 EACH | Refills: 5 | Status: SHIPPED | OUTPATIENT
Start: 2025-03-27

## 2025-03-27 NOTE — TELEPHONE ENCOUNTER
Patient's  Himanshu called in states that he did not think patient was to discontinue her Novolog, but pharmacy told them it was discontinued.  Patient has been taking the Xultophy as prescribed, but  asks should she also still be taking the Novolog?  Please advise and call Himanshu back.

## 2025-03-27 NOTE — TELEPHONE ENCOUNTER
Patients  called back and she is taking Novolog and Xultophy and her blood sugars, she started Xultophy last night and blood sugars today was 138 this morning .

## 2025-03-27 NOTE — TELEPHONE ENCOUNTER
Wife called back and said the phone got disconnected. She said she would like the novolog script sent to the [facility] pharmacy and if we can resend the pen needles as well?.

## 2025-04-08 ENCOUNTER — TELEPHONE (OUTPATIENT)
Age: 50
End: 2025-04-08

## 2025-04-08 NOTE — TELEPHONE ENCOUNTER
Patient called in to Dr Wheatley put in a referral for her for pump training. Patient current has a pump that is not working. She is looking to possibly upgrade her pump.     I was unable to reach a team member at this time. Please call patient back.

## 2025-04-10 ENCOUNTER — OFFICE VISIT (OUTPATIENT)
Dept: ENDOCRINOLOGY | Facility: OTHER | Age: 50
End: 2025-04-10
Payer: MEDICARE

## 2025-04-10 VITALS — BODY MASS INDEX: 43.08 KG/M2 | WEIGHT: 251 LBS

## 2025-04-10 DIAGNOSIS — E10.42 TYPE 1 DIABETES MELLITUS WITH DIABETIC POLYNEUROPATHY (HCC): Primary | ICD-10-CM

## 2025-04-10 PROCEDURE — G0108 DIAB MANAGE TRN  PER INDIV: HCPCS

## 2025-04-10 NOTE — TELEPHONE ENCOUNTER
"PT apologized for behavior on the phone, states she was stressed she called out of work for this appointment and could be written up and now \"could not be seen\". I was able to reconcile with the patient as well as Darlyn and patient is now on the schedule for pump at 1:00pm today in [place] office. I offered a work note for the patient if she were to need one later on this afternoon.   "

## 2025-04-10 NOTE — TELEPHONE ENCOUNTER
Patient called in first thing this morning very angry stating that she is suppose to have a pump assessment training appointment at the [facility] which she was at but when she looked on her MyChart it said [facility].  Patient stated she took off from work for this appointment and wanted to be seen. Patient wanted to know how this happened. I tried calling the Office for a warm transfer but there was no answer. I don't believe the office was open yet. I could not reschedule the appointment because it showed check-in. I did reach out to my manager for help and patient was offered a 1pm appointment for today.  Patient was not happy with that saying she already drove to get to this appointment that she was not coming back later. A high priority message was set to the office to reach out to this patient.

## 2025-04-10 NOTE — TELEPHONE ENCOUNTER
Received a call from the pt who started the conversation off by stating - I am getting a call asking why I am not in [place] for my appt. Why would someone schedule me for [place] when that is too far away. I apologized to the pt who just kept going on and on and becoming belittling to me. I asked her to stop as I was trying to help her. Tried to call Abbi who scheduled appt but it was before her time to start work. I did reach out to my manager who stated the pt already called earlier and was offered a 1 pm appt today but she declined and she sent an email out to the . I went back to the pt and asked her again if she would like the 1pm opening and she started all over again about how she took the day off of work and doesn't live close by and why can't I be seen right now and when is this person going to call me back and she is going to wait at the office. I stated to her I didn't have an exact time she will be getting a call back and if she didn't want the 1pm appt staying at the office is her choice. She ended up stating that our office is ridiculous and we are not taking into consideration that her time is just as important and hung up the phone.

## 2025-04-10 NOTE — PROGRESS NOTES
"Pump Assessment    HPI: Met with Urban Rashid for DSME Initial visit, accompanied by Himanshu.  Here today for initial insulin pump assessment. Patient is potentially interested in a pump.  Today we reviewed the basics of insulin pump therapy concepts.  We reviewed and looked at the three primary insulin pumps- Medtronic, Tslim, and Omnipod pumps. Other pump/insulin delivery devices discussed: Beta Bionic Reviewed pros and cons of each, special features, automation, CGM connectivity, etc.  Urban Rashid has a good understanding of the pump options available and can make informed decision that is best for them.     At this time, Urban Rashid is leaning towards the OmniPod5 insulin pump.   I requested that the Endo office order their pump: OmniPod5    Patient was screened for carb counting knowledge and flexible insulin therapy needs.    Prior to starting on their pump, Urban Rashid would benefit from diabetes education visits for the following: n/a; patient has previously been on 3 pumps.    They will call with questions or for more education, and will contact us when their pump arrives to schedule training with our education department.     Blood Sugar ranges:    Fastin mg/dL   Before meals: 113-230 mg/dL   2 hours after meals: unknown    Ht Readings from Last 1 Encounters:   25 5' 4\" (1.626 m)     Wt Readings from Last 3 Encounters:   25 114 kg (252 lb)   24 117 kg (257 lb 9.6 oz)   24 117 kg (257 lb)        There is no height or weight on file to calculate BMI.     Lab Results   Component Value Date    HGBA1C 8.1 (H) 10/29/2024    HGBA1C 7.9 (H) 2024    HGBA1C 8.1 (A) 2024       No results found for: \"CHOL\"  Lab Results   Component Value Date    HDL 41 (L) 10/29/2024    HDL 47 (L) 2023     Lab Results   Component Value Date    LDLCALC 78 10/29/2024    LDLCALC 78 2023     Lab Results   Component Value Date    TRIG 193 (H) " "10/29/2024    TRIG 172 (H) 06/13/2023     No results found for: \"CHOLHDL\"  No results found for: \"MICROALBUR\", \"[id number]\"    Diabetes Education Record  Urban Mccartney received the following handouts: none      Patient response to instruction    Comprehensiongood  Motivationgood  Expected Compliancegood    Thank you for referring your patient to [facility], it was a pleasure working with them today. Please feel free to call with any questions or concerns.    Darlyn Watson, RD  [address] [place] [address] [phone]  "

## 2025-04-11 DIAGNOSIS — Z79.4 TYPE 2 DIABETES MELLITUS WITH HYPERGLYCEMIA, WITH LONG-TERM CURRENT USE OF INSULIN (HCC): Primary | ICD-10-CM

## 2025-04-11 DIAGNOSIS — E11.65 TYPE 2 DIABETES MELLITUS WITH HYPERGLYCEMIA, WITH LONG-TERM CURRENT USE OF INSULIN (HCC): Primary | ICD-10-CM

## 2025-04-11 RX ORDER — INSULIN PMP CART,AUT,G6/7,CNTR
EACH SUBCUTANEOUS
Qty: 15 EACH | Refills: 3 | Status: SHIPPED | OUTPATIENT
Start: 2025-04-11 | End: 2025-04-11

## 2025-04-11 RX ORDER — INSULIN PMP CART,AUT,G6/7,CNTR
EACH SUBCUTANEOUS
Qty: 1 KIT | Refills: 0 | Status: SHIPPED | OUTPATIENT
Start: 2025-04-11 | End: 2025-04-11

## 2025-04-11 RX ORDER — INSULIN PMP CART,AUT,G6/7,CNTR
EACH SUBCUTANEOUS
Qty: 1 KIT | Refills: 0 | Status: SHIPPED | OUTPATIENT
Start: 2025-04-11

## 2025-04-11 RX ORDER — INSULIN ASPART 100 [IU]/ML
INJECTION, SOLUTION INTRAVENOUS; SUBCUTANEOUS
Qty: 40 ML | Refills: 5 | Status: SHIPPED | OUTPATIENT
Start: 2025-04-11 | End: 2025-04-11

## 2025-04-11 RX ORDER — INSULIN ASPART 100 [IU]/ML
INJECTION, SOLUTION INTRAVENOUS; SUBCUTANEOUS
Qty: 40 ML | Refills: 5 | Status: SHIPPED | OUTPATIENT
Start: 2025-04-11

## 2025-04-11 RX ORDER — INSULIN PMP CART,AUT,G6/7,CNTR
EACH SUBCUTANEOUS
Qty: 15 EACH | Refills: 3 | Status: SHIPPED | OUTPATIENT
Start: 2025-04-11

## 2025-04-14 ENCOUNTER — TELEPHONE (OUTPATIENT)
Age: 50
End: 2025-04-14

## 2025-04-14 NOTE — TELEPHONE ENCOUNTER
Patient called to schedule an appointment for training for the omnipod and the dexcom.  Patient is looking to schedule in the [place] office.  Please reach out to patient to schedule an appointment.

## 2025-04-15 ENCOUNTER — TELEPHONE (OUTPATIENT)
Dept: ENDOCRINOLOGY | Facility: CLINIC | Age: 50
End: 2025-04-15

## 2025-04-15 DIAGNOSIS — Z79.4 TYPE 2 DIABETES MELLITUS WITH HYPERGLYCEMIA, WITH LONG-TERM CURRENT USE OF INSULIN (HCC): Primary | ICD-10-CM

## 2025-04-15 DIAGNOSIS — E11.65 TYPE 2 DIABETES MELLITUS WITH HYPERGLYCEMIA, WITH LONG-TERM CURRENT USE OF INSULIN (HCC): Primary | ICD-10-CM

## 2025-04-15 RX ORDER — ACYCLOVIR 400 MG/1
1 TABLET ORAL ONCE
Qty: 1 EACH | Refills: 0 | Status: SHIPPED | OUTPATIENT
Start: 2025-04-15 | End: 2025-04-15

## 2025-04-15 RX ORDER — ACYCLOVIR 400 MG/1
TABLET ORAL
Qty: 9 EACH | Refills: 1 | Status: SHIPPED | OUTPATIENT
Start: 2025-04-15

## 2025-04-15 NOTE — TELEPHONE ENCOUNTER
PT stopped by [facility] office 4/15/25 requesting to be scheduled for her next pump appts with Darlyn as she rcvd her pump. Patient will also be using dexcom G7-task sent to provider to send to pharmacy. Please contact patient to schedule these appts with Darlyn for their pump. Verified patients phone number and made them aware someone from Diabetes Education will be reaching out, PT is willing to go to [place] or [facility] for soonest availability.

## 2025-04-15 NOTE — TELEPHONE ENCOUNTER
PT stopped by the office 4/15/25 and requested a dexcom G7 kit be sent to [facility] pharmacy in [place]-on file in chart. PT rcvd her pump and will be scheduling pump training with Darlyn-messages being routed to Abbi to contact patient to schedule

## 2025-04-15 NOTE — TELEPHONE ENCOUNTER
The pt called wanting to schedule for her omnipod. I was unable to reach a team member at this time and she would like a call back today as her comment was - are you sure someone is going to call me back today because I called yesterday and no one called me back. I let her know the time of her call was more towards the end of the day and apologized for that but she is asking to be called back today.

## 2025-04-16 NOTE — TELEPHONE ENCOUNTER
S/W patient to schedule for Pump start with Dexcom. She advised she does not have her Dexcom and will call back when she has it to schedule. Patient did confirm she does have vials of insulin already.

## 2025-04-16 NOTE — TELEPHONE ENCOUNTER
This is a duplicate thread. Patient does not have Dexcom supplies. She will c/b when she does. Message to [facility] to check on approval and message to Bianca to order vials of insulin.

## 2025-04-25 ENCOUNTER — OFFICE VISIT (OUTPATIENT)
Dept: ENDOCRINOLOGY | Facility: CLINIC | Age: 50
End: 2025-04-25
Payer: MEDICARE

## 2025-04-25 ENCOUNTER — TELEPHONE (OUTPATIENT)
Age: 50
End: 2025-04-25

## 2025-04-25 VITALS
HEART RATE: 84 BPM | HEIGHT: 64 IN | SYSTOLIC BLOOD PRESSURE: 112 MMHG | BODY MASS INDEX: 42.34 KG/M2 | WEIGHT: 248 LBS | DIASTOLIC BLOOD PRESSURE: 80 MMHG | TEMPERATURE: 97 F | OXYGEN SATURATION: 98 %

## 2025-04-25 DIAGNOSIS — E11.65 TYPE 2 DIABETES MELLITUS WITH HYPERGLYCEMIA, WITH LONG-TERM CURRENT USE OF INSULIN (HCC): Primary | ICD-10-CM

## 2025-04-25 DIAGNOSIS — E66.01 MORBID OBESITY (HCC): ICD-10-CM

## 2025-04-25 DIAGNOSIS — Z79.4 TYPE 2 DIABETES MELLITUS WITH HYPERGLYCEMIA, WITH LONG-TERM CURRENT USE OF INSULIN (HCC): Primary | ICD-10-CM

## 2025-04-25 PROCEDURE — 99214 OFFICE O/P EST MOD 30 MIN: CPT | Performed by: PHYSICIAN ASSISTANT

## 2025-04-25 NOTE — TELEPHONE ENCOUNTER
Pt  called back she now has everything for the pump set up she has her dexcom g7, and the oomnipod did get her insulin  .please call the pt. Tried to reach education.

## 2025-04-25 NOTE — PROGRESS NOTES
Name: Urban Rashid      : 1975      MRN: [id number]  Encounter Provider: Gertrudis Ko PA-C  Encounter Date: 2025   Encounter department: [facility] [place]    No chief complaint on file.  :  Assessment & Plan  Type 2 diabetes mellitus with hyperglycemia, with long-term current use of insulin (HCC)    Lab Results   Component Value Date    HGBA1C 8.1 (H) 10/29/2024   Uncontrolled in setting of interruption in therapy plan, non-complance with CGM due to lack of coverage / supply.     For short term plan -  increase Xultophy by 2 units every few days until consistent -130 is achieved. Will likely plan to transition to Victoza alone as preferred GLP1 (Ozempic, Trulicity unfortunately not covered/affordable)   Continue Novolog 25 units TID AC plus scale as taking in meantime.     Continues to benefit from CGM use in setting of MDI insulin use, with planned OmniPod initiation ASAP.   Provided sample G7 sensor x 1 - may start using immediately. Dexcom G7 sensor Rx already submitted to DME company in meantime, approval in process. Has all OmniPod5- pods, , infusion sets. Also has insulin vials prescribed. Will proceed with scheduling new pump start visit with CDE when able.     Follow up -4-6 weeks, with hopeful adjustment in new pump settings at that time.   Encouraged to obtain pending labs - A1c, CMP, albumin/creatinine ratio, lipid panel ASAP.      Orders:    Ambulatory referral to Diabetic Education; Future    Morbid obesity (HCC)  Would benefit from GLP1 use.       I have spent a total time of 35 minutes in caring for this patient on the day of the visit/encounter including Diagnostic results, Instructions for management, Impressions, Counseling / Coordination of care, Documenting in the medical record, and Obtaining or reviewing history  .     History of Present Illness     Urban Rashid is a 49 y.o. female with type 2 DM here for  "routine follow up visit today.   Most recent endocrinology visit: 3/21/25 with Dr. Wheatley. At that time she was experiencing issues with her pump, so MDI insulin pens were continued. She was started on combination GLP1 - Tresiba pen : Xultophy. So far tolerating Xultophy well, only mild nausea / dyspepsia.    DM medications reviewed:   Novolog flex pen 25 units plus scale, ISF 25 > 225. Might dial up to as high 30 units with meals.   Xultophy 25 units daily currently    Off sensor now for a few months. BG checks are via fingerstick TID AC plus QHS. Typical BG is as follows, recalled from memory:    - 150  Pre-lunch - 225-250   Pre dinner - usually skips.   Before bedtime - 180-190    Recently obtained OmniPod (G7) with Transmitter. She is actively working to obtain Dexcom G7 sensors through alternative pharmacy (current pharmacy not reliable).         Review of Systems as per HPI  Medical History Reviewed by provider this encounter:  Tobacco  Allergies  Problems  Med Hx  Surg Hx  Fam Hx     .     Medical History Reviewed by provider this encounter:     .    Objective   Ht 5' 4\" (1.626 m)   Wt 112 kg (248 lb)   BMI 42.57 kg/m²      Body mass index is 42.57 kg/m².  Wt Readings from Last 3 Encounters:   04/25/25 112 kg (248 lb)   04/10/25 114 kg (251 lb)   03/21/25 114 kg (252 lb)     Physical Exam  Vitals reviewed.   Constitutional:       Appearance: She is obese. She is not ill-appearing.   HENT:      Head: Normocephalic.   Pulmonary:      Effort: No respiratory distress.   Musculoskeletal:      Comments: Prosthesis LLE   Neurological:      Mental Status: She is alert.   Psychiatric:         Mood and Affect: Mood normal.         Labs:   Lab Results   Component Value Date    HGBA1C 8.1 (H) 10/29/2024    HGBA1C 7.9 (H) 05/20/2024    HGBA1C 8.1 (A) 02/09/2024     Lab Results   Component Value Date    CREATININE 0.95 10/29/2024    CREATININE 1.1 (H) 08/11/2024    CREATININE 1.0 08/09/2024    BUN 18 " 10/29/2024    K 4.1 10/29/2024     10/29/2024    CO2 24 10/29/2024     EGFR   Date Value Ref Range Status   08/11/2024 64 >=60 mL/min Final     Comment:     eGFR is calculated based on the CKD-EPI 2021 equation.   04/29/2019 55.11  Final     Comment:       Chronic Kidney Disease: eGFR <60 mL/min/1.73 sq.m.  Renal Failure: eGFR <15 mL/min/1.73 sq.m.     eGFR   Date Value Ref Range Status   10/29/2024 70 ml/min/1.73sq m Final     Lab Results   Component Value Date    HDL 41 (L) 10/29/2024    TRIG 193 (H) 10/29/2024     Lab Results   Component Value Date    ALT 18 05/20/2024    AST 14 05/20/2024    ALKPHOS 99 05/20/2024     Lab Results   Component Value Date    DOC4AHLBCAOH 2.715 06/13/2023    ICB3GSUABLRO 1.996 08/04/2022     Lab Results   Component Value Date    FREET4 0.77 06/13/2023       There are no Patient Instructions on file for this visit.    Discussed with the patient and all questioned fully answered. She will call me if any problems arise.

## 2025-04-28 ENCOUNTER — TELEPHONE (OUTPATIENT)
Dept: DIABETES SERVICES | Facility: CLINIC | Age: 50
End: 2025-04-28

## 2025-04-28 NOTE — TELEPHONE ENCOUNTER
Patient calling in regards to scheduling her device training    Unable to reach appropriate team member for scheduling device training    Please call patient to schedule appointment

## 2025-04-28 NOTE — TELEPHONE ENCOUNTER
I emailed the PSO and Checklist for the patient to start her Omnipod on 4/30. Please complete and return the PSO by 4/29. Thank you.

## 2025-04-29 ENCOUNTER — TELEPHONE (OUTPATIENT)
Dept: DIABETES SERVICES | Facility: CLINIC | Age: 50
End: 2025-04-29

## 2025-04-29 NOTE — TELEPHONE ENCOUNTER
Pt called advise she needs to set up [employer] acct. Pt stated she is trying to. She is going to call them today.

## 2025-04-29 NOTE — PROGRESS NOTES
Omnipod 5 Pump Start     Today Urban Rashid presented for her Omnipod 5 insulin pump start. She has all the necessary supplies with her     This is Urban Mccartney 's first insulin pump:  No      Urban Mccartney  is currently wearing Dexcom G7 and we will be pairing it with the pump.      Pump settings were provided by: Buster Wheatley, DO        Time       Basal        Correction   Carb Ratio   BG target    Correct Above  12 am-12 am 2 units/hr 1:20 1:4.5 110 110                                                                                     Training completed per training checklist scanned to chart. Settings: as above and order document scanned into the chart . Training completed on filling the pod, Pod insertion, how to take boluses, advanced features, alerts and alarms. Set up patient's Controller in office today.  Answered all of their questions. Will call with questions or for more education.    After instruction, Urban Mccartney  filled and activated pod. They tolerated pod placement on left abdomen well. Dexcom was already on back of upper left arm.  Understands importance of line of sight.    Automation was turned on today.      See Insulin Pump Training Checklist for additional documentation of topics taught.     Time spent: 109 minutes    Patient response to instruction    Comprehensiongood  Motivationgood  Expected Compliancegood    Thank you for referring your patient to [facility], it was a pleasure working with them today. Please feel free to call with any questions or concerns.    Darlyn Watson, RD  [address] [place] [id number] [phone]

## 2025-04-29 NOTE — TELEPHONE ENCOUNTER
4/29 sent another email to Glenda from [employer] advising patient if the [employer] acct is not set up she may need to r/s appt.    ----- Message from Darlyn PRAKASH sent at 4/29/2025 11:26 AM EDT -----  Regarding: PSO for tomorrow morning  Hi ladies,    I'll need the PSO for her OmniPod pump start. She's scheduled for 8 am tomorrow. Also, she does not have her [employer] acct set up & linked.    Thanks!

## 2025-04-30 ENCOUNTER — OFFICE VISIT (OUTPATIENT)
Dept: DIABETES SERVICES | Facility: CLINIC | Age: 50
End: 2025-04-30

## 2025-04-30 VITALS — BODY MASS INDEX: 42.6 KG/M2 | WEIGHT: 248.2 LBS

## 2025-04-30 DIAGNOSIS — E11.65 TYPE 2 DIABETES MELLITUS WITH HYPERGLYCEMIA, WITH LONG-TERM CURRENT USE OF INSULIN (HCC): Primary | ICD-10-CM

## 2025-04-30 DIAGNOSIS — Z79.4 TYPE 2 DIABETES MELLITUS WITH HYPERGLYCEMIA, WITH LONG-TERM CURRENT USE OF INSULIN (HCC): Primary | ICD-10-CM

## 2025-04-30 PROCEDURE — OPUMPS

## 2025-04-30 NOTE — PATIENT INSTRUCTIONS
Call customer service for any technical concerns with your sensor or pump. Call your doctor for any medical concerns with your blood sugars.

## 2025-05-09 DIAGNOSIS — E11.65 TYPE 2 DIABETES MELLITUS WITH HYPERGLYCEMIA, WITH LONG-TERM CURRENT USE OF INSULIN (HCC): ICD-10-CM

## 2025-05-09 DIAGNOSIS — Z79.4 TYPE 2 DIABETES MELLITUS WITH HYPERGLYCEMIA, WITH LONG-TERM CURRENT USE OF INSULIN (HCC): ICD-10-CM

## 2025-05-09 RX ORDER — INSULIN PMP CART,AUT,G6/7,CNTR
EACH SUBCUTANEOUS
Qty: 1 KIT | Refills: 0 | Status: SHIPPED | OUTPATIENT
Start: 2025-05-09

## 2025-05-09 NOTE — PROGRESS NOTES
Pump Start Follow-Up        Assessment     Chief complaint Urban Mccartney forgot that she needed to deactivate her old pod in order to connect her new pod. Then she did not remember that she needed to enter the new sensor information into her OmniPod ifrah. This caused her to go into manual mode briefly.       Visit Type: Follow-up visit     HPI: Urban Mccartney returned for follow-up today. Urban Mccartney’s [employer] download reveals active usage of CGM of 83%. She had no low BG levels. Time in target range was 43%, and high BG of 49% and very high of 8%.  Basal insulin and bolus insulin usage was 69% and 31%.    Urban Mccartney did miss some meal boluses or they were sometimes late.  It was identified on several occasions that her carb counts were incorrect, underestimated, which allowed for post meal hyperglycemic events.  Also discussed times when we should give ourselves a correction bolus. Lastly, reviewed that when entering a correction bolus, no carbs should be entered if not actually consuming carbohydrates.    No settings changes recommended at this time. How the pump is used is suggested as the primary change.    See [employer] Report upload for further details.     Patient’s Response to Instruction:  Comprehension good  Motivation good  Expected Compliance good     Thank you for coming to the [facility] for education today.  Please feel free to call with any questions or concerns.     Darlyn Watson, MS, RDN, LDN, [facility][note 3]ES  [address] [place] [id number]

## 2025-05-14 ENCOUNTER — OFFICE VISIT (OUTPATIENT)
Dept: DIABETES SERVICES | Facility: CLINIC | Age: 50
End: 2025-05-14

## 2025-05-14 VITALS — BODY MASS INDEX: 43.26 KG/M2 | WEIGHT: 252 LBS

## 2025-05-14 DIAGNOSIS — Z79.4 TYPE 2 DIABETES MELLITUS WITH HYPERGLYCEMIA, WITH LONG-TERM CURRENT USE OF INSULIN (HCC): Primary | ICD-10-CM

## 2025-05-14 DIAGNOSIS — E11.65 TYPE 2 DIABETES MELLITUS WITH HYPERGLYCEMIA, WITH LONG-TERM CURRENT USE OF INSULIN (HCC): Primary | ICD-10-CM

## 2025-05-14 PROCEDURE — OWEEKF

## 2025-05-14 NOTE — PATIENT INSTRUCTIONS
Bolus about 10-15 minutes prior to your meal.  Review for accurate carb counting.  Give yourself a correction dose when needed.  Do not enter carbs into a a correction bolus when not actually consuming carbs.    Phone Apps: [employer]

## 2025-07-21 ENCOUNTER — TELEPHONE (OUTPATIENT)
Dept: ENDOCRINOLOGY | Facility: CLINIC | Age: 50
End: 2025-07-21

## 2025-07-21 DIAGNOSIS — E11.65 TYPE 2 DIABETES MELLITUS WITH HYPERGLYCEMIA, WITH LONG-TERM CURRENT USE OF INSULIN (HCC): ICD-10-CM

## 2025-07-21 DIAGNOSIS — Z79.4 TYPE 2 DIABETES MELLITUS WITH HYPERGLYCEMIA, WITH LONG-TERM CURRENT USE OF INSULIN (HCC): ICD-10-CM

## 2025-07-21 RX ORDER — INSULIN PMP CART,AUT,G6/7,CNTR
EACH SUBCUTANEOUS
Qty: 45 EACH | Refills: 3 | Status: SHIPPED | OUTPATIENT
Start: 2025-07-21

## 2025-08-19 ENCOUNTER — TELEPHONE (OUTPATIENT)
Dept: ENDOCRINOLOGY | Facility: CLINIC | Age: 50
End: 2025-08-19

## 2025-08-22 LAB
ALBUMIN SERPL BCG-MCNC: 3.4 G/DL (ref 3.5–5)
ALP SERPL-CCNC: 106 U/L (ref 34–104)
ALT SERPL W P-5'-P-CCNC: 13 U/L (ref 7–52)
ANION GAP SERPL CALCULATED.3IONS-SCNC: 9 MMOL/L (ref 4–13)
AST SERPL W P-5'-P-CCNC: 13 U/L (ref 13–39)
BILIRUB SERPL-MCNC: 0.27 MG/DL (ref 0.2–1)
BUN SERPL-MCNC: 19 MG/DL (ref 5–25)
CALCIUM ALBUM COR SERPL-MCNC: 9.2 MG/DL (ref 8.3–10.1)
CALCIUM SERPL-MCNC: 8.7 MG/DL (ref 8.4–10.2)
CHLORIDE SERPL-SCNC: 106 MMOL/L (ref 96–108)
CHOLEST SERPL-MCNC: 123 MG/DL (ref ?–200)
CO2 SERPL-SCNC: 24 MMOL/L (ref 21–32)
CREAT SERPL-MCNC: 0.83 MG/DL (ref 0.6–1.3)
CREAT UR-MCNC: 78.8 MG/DL
EST. AVERAGE GLUCOSE BLD GHB EST-MCNC: 177 MG/DL
GFR SERPL CREATININE-BSD FRML MDRD: 82 ML/MIN/1.73SQ M
GLUCOSE P FAST SERPL-MCNC: 163 MG/DL (ref 65–99)
HBA1C MFR BLD: 7.8 %
HDLC SERPL-MCNC: 42 MG/DL
LDLC SERPL CALC-MCNC: 60 MG/DL (ref 0–100)
MICROALBUMIN UR-MCNC: 214.9 MG/L
MICROALBUMIN/CREAT 24H UR: 273 MG/G CREATININE (ref 0–30)
POTASSIUM SERPL-SCNC: 4.2 MMOL/L (ref 3.5–5.3)
PROT SERPL-MCNC: 7 G/DL (ref 6.4–8.4)
SODIUM SERPL-SCNC: 139 MMOL/L (ref 135–147)
TRIGL SERPL-MCNC: 107 MG/DL (ref ?–150)

## 2025-08-23 LAB — TSH SERPL DL<=0.05 MIU/L-ACNC: 2.6 UIU/ML (ref 0.45–4.5)

## (undated) DEVICE — SUT MONOCRYL 3-0 PS-2 27 IN Y427H

## (undated) DEVICE — SUT MONOCRYL 4-0 PS-2 27 IN Y426H

## (undated) DEVICE — SURGICAL GOWN, XL SMARTSLEEVE: Brand: CONVERTORS

## (undated) DEVICE — UTILITY MARKER,BLACK WITH LABELS: Brand: DEVON

## (undated) DEVICE — ACE WRAP 4 IN STERILE

## (undated) DEVICE — SINGLE PORT MANIFOLD: Brand: NEPTUNE 2

## (undated) DEVICE — DISPOSABLE EQUIPMENT COVER: Brand: SMALL TOWEL DRAPE

## (undated) DEVICE — IMMOBILIZER KNEE UNIVERSAL 19 IN

## (undated) DEVICE — SUT MONOCRYL 3-0 SH 27 IN Y416H

## (undated) DEVICE — ACE WRAP 6 IN XL STERILE

## (undated) DEVICE — ASTOUND IMPERVIOUS SURGICAL GOWN: Brand: CONVERTORS

## (undated) DEVICE — COBAN 6 IN STERILE

## (undated) DEVICE — CAST PADDING 4 IN UNSTERILE

## (undated) DEVICE — GLOVE INDICATOR PI UNDERGLOVE SZ 8 BLUE

## (undated) DEVICE — LIGHT GLOVE GREEN

## (undated) DEVICE — GLOVE INDICATOR PI UNDERGLOVE SZ 7 BLUE

## (undated) DEVICE — MEDI-VAC YANKAUER SUCTION HANDLE W/STRAIGHT TIP & CONTROL VENT: Brand: CARDINAL HEALTH

## (undated) DEVICE — GLOVE SRG BIOGEL 7

## (undated) DEVICE — JACKSON-PRATT 100CC BULB RESERVOIR: Brand: CARDINAL HEALTH

## (undated) DEVICE — SUT MONOCRYL 3-0 SH 27 IN Y316H

## (undated) DEVICE — GAUZE SPONGES,16 PLY: Brand: CURITY

## (undated) DEVICE — PLUMEPEN PRO 10FT

## (undated) DEVICE — SYRINGE 10ML LL

## (undated) DEVICE — GLOVE SRG BIOGEL 8

## (undated) DEVICE — DRAIN JACKSON PRATT 10FR 1/8END: Brand: CARDINAL HEALTH

## (undated) DEVICE — GLOVE INDICATOR PI UNDERGLOVE SZ 8.5 BLUE

## (undated) DEVICE — SUT ETHILON 3-0 PS-1 18 IN 1663H

## (undated) DEVICE — DRESSING MEPILEX AG BORDER 4 X 12 IN

## (undated) DEVICE — PAD PERINEAL

## (undated) DEVICE — PENCIL ROCKER SWITCH CAUTERY HAND CONTROL

## (undated) DEVICE — STOCKINETTE REGULAR

## (undated) DEVICE — CUFF TOURNIQUET 30 X 4 IN QUICK CONNECT DISP 1BLA

## (undated) DEVICE — BETHLEHEM UNIVERSAL  MIONR EXT: Brand: CARDINAL HEALTH

## (undated) DEVICE — BONE WAX WHITE: Brand: BONE WAX WHITE

## (undated) DEVICE — 1820 FOAM BLOCK NEEDLE COUNTER: Brand: DEVON

## (undated) DEVICE — MEDI-VAC YANK SUCT HNDL W/TPRD BULBOUS TIP: Brand: CARDINAL HEALTH

## (undated) DEVICE — INTENDED FOR TISSUE SEPARATION, AND OTHER PROCEDURES THAT REQUIRE A SHARP SURGICAL BLADE TO PUNCTURE OR CUT.: Brand: BARD-PARKER SAFETY BLADES SIZE 15, STERILE

## (undated) DEVICE — DRAPE EQUIPMENT RF WAND

## (undated) DEVICE — GLOVE SRG BIOGEL 7.5

## (undated) DEVICE — BULB SYRINGE,IRRIGATION WITH PROTECTIVE CAP: Brand: DOVER

## (undated) DEVICE — SUT VICRYL 3-0 SH 27 IN J416H

## (undated) DEVICE — SUT VICRYL 0 REEL 54 IN J287G

## (undated) DEVICE — ASTOUND STANDARD SURGICAL GOWN, XXL: Brand: CONVERTORS

## (undated) DEVICE — SPONGE STICK WITH PVP-I: Brand: KENDALL

## (undated) DEVICE — BASIC SINGLE BASIN 2-LF: Brand: MEDLINE INDUSTRIES, INC.

## (undated) DEVICE — GLOVE SRG BIOGEL 6

## (undated) DEVICE — SUT ETHILON 4-0 PS-2 18 IN 1667H

## (undated) DEVICE — DISPOSABLE BRIEF/UNDERWEAR

## (undated) DEVICE — WEBRIL 6 IN UNSTERILE

## (undated) DEVICE — SUT VICRYL 2-0 SH 27 IN UNDYED J417H

## (undated) DEVICE — 4-PORT MANIFOLD: Brand: NEPTUNE 2

## (undated) DEVICE — CUFF TOURNIQUET 24 X 4 IN QUICK CONNECT DISP 1BLA

## (undated) DEVICE — NEEDLE 25G X 1 1/2

## (undated) DEVICE — SPONGE LAP 18 X 18 IN STRL RFD

## (undated) DEVICE — ACE WRAP 6 IN UNSTERILE

## (undated) DEVICE — GLOVE INDICATOR PI UNDERGLOVE SZ 6.5 BLUE

## (undated) DEVICE — THE SIMPULSE SOLO SYSTEM WITH ULTREX RETRACTABLE SPLASH SHIELD TIP: Brand: SIMPULSE SOLO

## (undated) DEVICE — DRAPE SHEET THREE QUARTER

## (undated) DEVICE — SUT VICRYL 0 SH 27 IN J418

## (undated) DEVICE — TUBING SUCTION 5MM X 12 FT

## (undated) DEVICE — BLADE OSCILLATOR 86.0 X 19.5MM

## (undated) DEVICE — LIGACLIP MCA MULTIPLE CLIP APPLIERS, 20 MEDIUM CLIPS: Brand: LIGACLIP

## (undated) DEVICE — INTENDED FOR TISSUE SEPARATION, AND OTHER PROCEDURES THAT REQUIRE A SHARP SURGICAL BLADE TO PUNCTURE OR CUT.: Brand: BARD-PARKER ® CARBON RIB-BACK BLADES

## (undated) DEVICE — SUT VICRYL 3-0 REEL 54 IN J285G

## (undated) DEVICE — GLOVE SRG BIOGEL 6.5

## (undated) DEVICE — PRECISION THIN (9.0 X 0.38 X 25.0MM)

## (undated) DEVICE — SUT ETHILON 3-0 PS-1 18 IN 1663G

## (undated) DEVICE — WET SKIN PREP TRAY: Brand: MEDLINE INDUSTRIES, INC.

## (undated) DEVICE — HARMONIC FOCUS SHEARS 9CM LENGTH + ADAPTIVE TISSUE TECHNOLOGY FOR USE WITH BLUE HAND PIECE ONLY: Brand: HARMONIC FOCUS

## (undated) DEVICE — 3M™ COBAN™ NL STERILE NON-LATEX SELF-ADHERENT WRAP, 2084S, 4 IN X 5 YD (10 CM X 4,5 M), 18 ROLLS/CASE: Brand: 3M™ COBAN™

## (undated) DEVICE — SCD SEQUENTIAL COMPRESSION COMFORT SLEEVE MEDIUM KNEE LENGTH: Brand: KENDALL SCD

## (undated) DEVICE — CURITY STRETCH BANDAGE: Brand: CURITY

## (undated) DEVICE — DRAPE INTESTINAL ISOLATION BAG

## (undated) DEVICE — NEEDLE 18 G X 1 1/2 SAFETY

## (undated) DEVICE — MEDI-VAC YANKAUER SUCTION HANDLE W/BULBOUS AND CONTROL VENT: Brand: CARDINAL HEALTH

## (undated) DEVICE — SUT VICRYL 2-0 REEL 54 IN J286G

## (undated) DEVICE — SUPER SPONGES,MEDIUM: Brand: KERLIX

## (undated) DEVICE — ABDOMINAL PAD: Brand: DERMACEA

## (undated) DEVICE — ASTOUND STANDARD SURGICAL GOWN, XL: Brand: CONVERTORS

## (undated) DEVICE — ACE WRAP 6 IN STERILE

## (undated) DEVICE — CURITY NON-ADHERENT STRIPS: Brand: CURITY

## (undated) DEVICE — VIAL DECANTER